# Patient Record
Sex: FEMALE | Race: WHITE | NOT HISPANIC OR LATINO | Employment: OTHER | URBAN - METROPOLITAN AREA
[De-identification: names, ages, dates, MRNs, and addresses within clinical notes are randomized per-mention and may not be internally consistent; named-entity substitution may affect disease eponyms.]

---

## 2020-05-01 DIAGNOSIS — K21.9 GASTROESOPHAGEAL REFLUX DISEASE, ESOPHAGITIS PRESENCE NOT SPECIFIED: Primary | ICD-10-CM

## 2020-05-01 RX ORDER — OMEPRAZOLE 20 MG/1
CAPSULE, DELAYED RELEASE ORAL
Qty: 90 CAPSULE | Refills: 0 | Status: SHIPPED | OUTPATIENT
Start: 2020-05-01 | End: 2020-05-06

## 2020-05-02 PROBLEM — E03.9 HYPOTHYROID: Status: ACTIVE | Noted: 2020-05-02

## 2020-05-02 PROBLEM — K63.5 COLON POLYP: Status: ACTIVE | Noted: 2020-05-02

## 2020-05-02 PROBLEM — K21.00 GASTROESOPHAGEAL REFLUX DISEASE WITH ESOPHAGITIS: Status: ACTIVE | Noted: 2020-05-02

## 2020-05-02 PROBLEM — I10 ESSENTIAL HYPERTENSION: Status: ACTIVE | Noted: 2020-05-02

## 2020-05-02 PROBLEM — D64.9 ANEMIA: Status: ACTIVE | Noted: 2020-05-02

## 2020-05-02 PROBLEM — K57.90 DIVERTICULOSIS: Status: ACTIVE | Noted: 2020-05-02

## 2020-05-02 PROBLEM — E78.00 HYPERCHOLESTEROLEMIA: Status: ACTIVE | Noted: 2020-05-02

## 2020-05-02 PROBLEM — K51.90 ULCERATIVE COLITIS WITHOUT COMPLICATIONS (HCC): Status: ACTIVE | Noted: 2020-05-02

## 2020-05-02 PROBLEM — K22.70 BARRETT'S ESOPHAGUS: Status: ACTIVE | Noted: 2020-05-02

## 2020-05-02 PROBLEM — E55.9 VITAMIN D DEFICIENCY: Status: ACTIVE | Noted: 2020-05-02

## 2020-05-06 DIAGNOSIS — K21.9 GASTROESOPHAGEAL REFLUX DISEASE, ESOPHAGITIS PRESENCE NOT SPECIFIED: ICD-10-CM

## 2020-05-06 RX ORDER — OMEPRAZOLE 20 MG/1
CAPSULE, DELAYED RELEASE ORAL
Qty: 90 CAPSULE | Refills: 0 | Status: SHIPPED | OUTPATIENT
Start: 2020-05-06 | End: 2020-08-11

## 2020-06-04 ENCOUNTER — OFFICE VISIT (OUTPATIENT)
Dept: FAMILY MEDICINE CLINIC | Facility: CLINIC | Age: 80
End: 2020-06-04
Payer: MEDICARE

## 2020-06-04 VITALS
SYSTOLIC BLOOD PRESSURE: 130 MMHG | WEIGHT: 142 LBS | DIASTOLIC BLOOD PRESSURE: 76 MMHG | HEART RATE: 80 BPM | BODY MASS INDEX: 26.13 KG/M2 | TEMPERATURE: 98.6 F | HEIGHT: 62 IN

## 2020-06-04 DIAGNOSIS — E03.9 ACQUIRED HYPOTHYROIDISM: ICD-10-CM

## 2020-06-04 DIAGNOSIS — J01.00 ACUTE NON-RECURRENT MAXILLARY SINUSITIS: ICD-10-CM

## 2020-06-04 DIAGNOSIS — D64.9 ANEMIA, UNSPECIFIED TYPE: ICD-10-CM

## 2020-06-04 DIAGNOSIS — K51.90 ULCERATIVE COLITIS WITHOUT COMPLICATIONS, UNSPECIFIED LOCATION (HCC): ICD-10-CM

## 2020-06-04 DIAGNOSIS — K21.00 GASTROESOPHAGEAL REFLUX DISEASE WITH ESOPHAGITIS: ICD-10-CM

## 2020-06-04 DIAGNOSIS — E78.00 HYPERCHOLESTEROLEMIA: ICD-10-CM

## 2020-06-04 DIAGNOSIS — I10 ESSENTIAL HYPERTENSION: Primary | ICD-10-CM

## 2020-06-04 DIAGNOSIS — E55.9 VITAMIN D DEFICIENCY: ICD-10-CM

## 2020-06-04 PROCEDURE — 3008F BODY MASS INDEX DOCD: CPT | Performed by: FAMILY MEDICINE

## 2020-06-04 PROCEDURE — 99214 OFFICE O/P EST MOD 30 MIN: CPT | Performed by: FAMILY MEDICINE

## 2020-06-04 PROCEDURE — 3078F DIAST BP <80 MM HG: CPT | Performed by: FAMILY MEDICINE

## 2020-06-04 PROCEDURE — 4040F PNEUMOC VAC/ADMIN/RCVD: CPT | Performed by: FAMILY MEDICINE

## 2020-06-04 PROCEDURE — 1160F RVW MEDS BY RX/DR IN RCRD: CPT | Performed by: FAMILY MEDICINE

## 2020-06-04 PROCEDURE — 3075F SYST BP GE 130 - 139MM HG: CPT | Performed by: FAMILY MEDICINE

## 2020-06-04 PROCEDURE — 1036F TOBACCO NON-USER: CPT | Performed by: FAMILY MEDICINE

## 2020-06-04 RX ORDER — ATORVASTATIN CALCIUM 20 MG/1
20 TABLET, FILM COATED ORAL DAILY
COMMUNITY
Start: 2020-03-31 | End: 2021-01-20

## 2020-06-04 RX ORDER — AMOXICILLIN 875 MG/1
875 TABLET, COATED ORAL 2 TIMES DAILY
Qty: 20 TABLET | Refills: 0 | Status: SHIPPED | OUTPATIENT
Start: 2020-06-04 | End: 2020-06-14

## 2020-06-04 RX ORDER — AMLODIPINE BESYLATE AND BENAZEPRIL HYDROCHLORIDE 5; 20 MG/1; MG/1
1 CAPSULE ORAL DAILY
COMMUNITY
Start: 2020-05-01 | End: 2020-10-30

## 2020-06-04 RX ORDER — FAMOTIDINE 40 MG/1
40 TABLET, FILM COATED ORAL DAILY
COMMUNITY
Start: 2020-04-30 | End: 2020-10-08 | Stop reason: ALTCHOICE

## 2020-06-04 RX ORDER — SULFASALAZINE 500 MG/1
1000 TABLET ORAL 2 TIMES DAILY
COMMUNITY
Start: 2020-05-29 | End: 2021-02-11 | Stop reason: SDUPTHER

## 2020-06-04 RX ORDER — LEVOTHYROXINE SODIUM 0.07 MG/1
75 TABLET ORAL DAILY
COMMUNITY
Start: 2020-05-28 | End: 2020-09-06

## 2020-06-09 ENCOUNTER — OFFICE VISIT (OUTPATIENT)
Dept: URGENT CARE | Facility: CLINIC | Age: 80
End: 2020-06-09
Payer: MEDICARE

## 2020-06-09 VITALS
SYSTOLIC BLOOD PRESSURE: 142 MMHG | HEART RATE: 78 BPM | OXYGEN SATURATION: 98 % | RESPIRATION RATE: 18 BRPM | DIASTOLIC BLOOD PRESSURE: 75 MMHG | TEMPERATURE: 98.1 F

## 2020-06-09 DIAGNOSIS — Z11.59 SCREENING FOR VIRAL DISEASE: Primary | ICD-10-CM

## 2020-06-09 PROCEDURE — U0003 INFECTIOUS AGENT DETECTION BY NUCLEIC ACID (DNA OR RNA); SEVERE ACUTE RESPIRATORY SYNDROME CORONAVIRUS 2 (SARS-COV-2) (CORONAVIRUS DISEASE [COVID-19]), AMPLIFIED PROBE TECHNIQUE, MAKING USE OF HIGH THROUGHPUT TECHNOLOGIES AS DESCRIBED BY CMS-2020-01-R: HCPCS | Performed by: PHYSICIAN ASSISTANT

## 2020-06-09 PROCEDURE — G0463 HOSPITAL OUTPT CLINIC VISIT: HCPCS | Performed by: PHYSICIAN ASSISTANT

## 2020-06-09 PROCEDURE — 99212 OFFICE O/P EST SF 10 MIN: CPT | Performed by: PHYSICIAN ASSISTANT

## 2020-06-11 LAB — SARS-COV-2 RNA SPEC QL NAA+PROBE: NOT DETECTED

## 2020-06-12 ENCOUNTER — TELEPHONE (OUTPATIENT)
Dept: URGENT CARE | Facility: CLINIC | Age: 80
End: 2020-06-12

## 2020-06-12 DIAGNOSIS — D64.9 ANEMIA, UNSPECIFIED TYPE: Primary | ICD-10-CM

## 2020-06-13 LAB
25(OH)D3 SERPL-MCNC: 30 NG/ML (ref 30–100)
BASOPHILS # BLD AUTO: 41 CELLS/UL (ref 0–200)
BASOPHILS NFR BLD AUTO: 1.1 %
EOSINOPHIL # BLD AUTO: 266 CELLS/UL (ref 15–500)
EOSINOPHIL NFR BLD AUTO: 7.2 %
ERYTHROCYTE [DISTWIDTH] IN BLOOD BY AUTOMATED COUNT: 12.2 % (ref 11–15)
HCT VFR BLD AUTO: 29.7 % (ref 35–45)
HGB BLD-MCNC: 9.6 G/DL (ref 11.7–15.5)
LYMPHOCYTES # BLD AUTO: 1665 CELLS/UL (ref 850–3900)
LYMPHOCYTES NFR BLD AUTO: 45 %
MCH RBC QN AUTO: 33.7 PG (ref 27–33)
MCHC RBC AUTO-ENTMCNC: 32.3 G/DL (ref 32–36)
MCV RBC AUTO: 104.2 FL (ref 80–100)
MONOCYTES # BLD AUTO: 407 CELLS/UL (ref 200–950)
MONOCYTES NFR BLD AUTO: 11 %
NEUTROPHILS # BLD AUTO: 1321 CELLS/UL (ref 1500–7800)
NEUTROPHILS NFR BLD AUTO: 35.7 %
PLATELET # BLD AUTO: 245 THOUSAND/UL (ref 140–400)
PMV BLD REES-ECKER: 10.1 FL (ref 7.5–12.5)
RBC # BLD AUTO: 2.85 MILLION/UL (ref 3.8–5.1)
WBC # BLD AUTO: 3.7 THOUSAND/UL (ref 3.8–10.8)

## 2020-07-07 LAB
BASOPHILS # BLD AUTO: 31 CELLS/UL (ref 0–200)
BASOPHILS NFR BLD AUTO: 0.6 %
EOSINOPHIL # BLD AUTO: 219 CELLS/UL (ref 15–500)
EOSINOPHIL NFR BLD AUTO: 4.3 %
ERYTHROCYTE [DISTWIDTH] IN BLOOD BY AUTOMATED COUNT: 11.8 % (ref 11–15)
HCT VFR BLD AUTO: 31.8 % (ref 35–45)
HGB BLD-MCNC: 10.1 G/DL (ref 11.7–15.5)
LYMPHOCYTES # BLD AUTO: 2213 CELLS/UL (ref 850–3900)
LYMPHOCYTES NFR BLD AUTO: 43.4 %
MCH RBC QN AUTO: 33.1 PG (ref 27–33)
MCHC RBC AUTO-ENTMCNC: 31.8 G/DL (ref 32–36)
MCV RBC AUTO: 104.3 FL (ref 80–100)
MONOCYTES # BLD AUTO: 546 CELLS/UL (ref 200–950)
MONOCYTES NFR BLD AUTO: 10.7 %
NEUTROPHILS # BLD AUTO: 2091 CELLS/UL (ref 1500–7800)
NEUTROPHILS NFR BLD AUTO: 41 %
PLATELET # BLD AUTO: 268 THOUSAND/UL (ref 140–400)
PMV BLD REES-ECKER: 9.9 FL (ref 7.5–12.5)
RBC # BLD AUTO: 3.05 MILLION/UL (ref 3.8–5.1)
WBC # BLD AUTO: 5.1 THOUSAND/UL (ref 3.8–10.8)

## 2020-07-09 DIAGNOSIS — D50.8 OTHER IRON DEFICIENCY ANEMIA: Primary | ICD-10-CM

## 2020-08-10 DIAGNOSIS — K21.9 GASTROESOPHAGEAL REFLUX DISEASE, ESOPHAGITIS PRESENCE NOT SPECIFIED: ICD-10-CM

## 2020-08-11 RX ORDER — OMEPRAZOLE 20 MG/1
CAPSULE, DELAYED RELEASE ORAL
Qty: 90 CAPSULE | Refills: 0 | Status: SHIPPED | OUTPATIENT
Start: 2020-08-11 | End: 2020-11-11

## 2020-09-05 DIAGNOSIS — E03.9 ACQUIRED HYPOTHYROIDISM: Primary | ICD-10-CM

## 2020-09-06 RX ORDER — LEVOTHYROXINE SODIUM 75 UG/1
TABLET ORAL
Qty: 90 TABLET | Refills: 0 | Status: SHIPPED | OUTPATIENT
Start: 2020-09-06 | End: 2020-12-04

## 2020-10-08 ENCOUNTER — OFFICE VISIT (OUTPATIENT)
Dept: FAMILY MEDICINE CLINIC | Facility: CLINIC | Age: 80
End: 2020-10-08
Payer: MEDICARE

## 2020-10-08 VITALS
HEART RATE: 84 BPM | HEIGHT: 62 IN | BODY MASS INDEX: 27.05 KG/M2 | WEIGHT: 147 LBS | OXYGEN SATURATION: 96 % | TEMPERATURE: 97.7 F | SYSTOLIC BLOOD PRESSURE: 140 MMHG | DIASTOLIC BLOOD PRESSURE: 76 MMHG

## 2020-10-08 DIAGNOSIS — E78.00 HYPERCHOLESTEROLEMIA: ICD-10-CM

## 2020-10-08 DIAGNOSIS — K21.00 GASTROESOPHAGEAL REFLUX DISEASE WITH ESOPHAGITIS, UNSPECIFIED WHETHER HEMORRHAGE: ICD-10-CM

## 2020-10-08 DIAGNOSIS — I10 ESSENTIAL HYPERTENSION: Primary | ICD-10-CM

## 2020-10-08 DIAGNOSIS — D64.9 ANEMIA, UNSPECIFIED TYPE: ICD-10-CM

## 2020-10-08 DIAGNOSIS — E03.9 ACQUIRED HYPOTHYROIDISM: ICD-10-CM

## 2020-10-08 DIAGNOSIS — K51.90 ULCERATIVE COLITIS WITHOUT COMPLICATIONS, UNSPECIFIED LOCATION (HCC): ICD-10-CM

## 2020-10-08 DIAGNOSIS — J01.00 ACUTE NON-RECURRENT MAXILLARY SINUSITIS: ICD-10-CM

## 2020-10-08 DIAGNOSIS — E55.9 VITAMIN D DEFICIENCY: ICD-10-CM

## 2020-10-08 PROCEDURE — 99214 OFFICE O/P EST MOD 30 MIN: CPT | Performed by: FAMILY MEDICINE

## 2020-10-08 RX ORDER — CEFDINIR 300 MG/1
300 CAPSULE ORAL EVERY 12 HOURS SCHEDULED
Qty: 20 CAPSULE | Refills: 0 | Status: SHIPPED | OUTPATIENT
Start: 2020-10-08 | End: 2020-10-18

## 2020-10-08 RX ORDER — DOXYCYCLINE HYCLATE 50 MG/1
324 CAPSULE, GELATIN COATED ORAL
COMMUNITY

## 2020-10-19 ENCOUNTER — TELEPHONE (OUTPATIENT)
Dept: FAMILY MEDICINE CLINIC | Facility: CLINIC | Age: 80
End: 2020-10-19

## 2020-10-19 DIAGNOSIS — J32.9 RECURRENT SINUS INFECTIONS: Primary | ICD-10-CM

## 2020-10-19 RX ORDER — AMOXICILLIN AND CLAVULANATE POTASSIUM 875; 125 MG/1; MG/1
1 TABLET, FILM COATED ORAL EVERY 12 HOURS SCHEDULED
COMMUNITY
End: 2020-10-19 | Stop reason: SDUPTHER

## 2020-10-19 RX ORDER — AMOXICILLIN AND CLAVULANATE POTASSIUM 875; 125 MG/1; MG/1
1 TABLET, FILM COATED ORAL EVERY 12 HOURS SCHEDULED
Qty: 20 TABLET | Refills: 0 | Status: SHIPPED | OUTPATIENT
Start: 2020-10-19 | End: 2020-10-29

## 2020-10-30 DIAGNOSIS — I10 ESSENTIAL HYPERTENSION: Primary | ICD-10-CM

## 2020-10-30 RX ORDER — AMLODIPINE BESYLATE AND BENAZEPRIL HYDROCHLORIDE 5; 20 MG/1; MG/1
CAPSULE ORAL
Qty: 90 CAPSULE | Refills: 0 | Status: SHIPPED | OUTPATIENT
Start: 2020-10-30 | End: 2021-01-28

## 2020-11-11 DIAGNOSIS — K21.9 GASTROESOPHAGEAL REFLUX DISEASE: ICD-10-CM

## 2020-11-11 RX ORDER — OMEPRAZOLE 20 MG/1
CAPSULE, DELAYED RELEASE ORAL
Qty: 90 CAPSULE | Refills: 0 | Status: SHIPPED | OUTPATIENT
Start: 2020-11-11 | End: 2020-11-13 | Stop reason: SDUPTHER

## 2020-12-04 DIAGNOSIS — E03.9 ACQUIRED HYPOTHYROIDISM: ICD-10-CM

## 2020-12-04 RX ORDER — LEVOTHYROXINE SODIUM 75 UG/1
TABLET ORAL
Qty: 90 TABLET | Refills: 0 | Status: SHIPPED | OUTPATIENT
Start: 2020-12-04 | End: 2021-02-15

## 2020-12-28 DIAGNOSIS — K21.9 GASTROESOPHAGEAL REFLUX DISEASE: ICD-10-CM

## 2020-12-28 DIAGNOSIS — R05.9 COUGH: ICD-10-CM

## 2020-12-28 DIAGNOSIS — K21.9 GASTROESOPHAGEAL REFLUX DISEASE WITHOUT ESOPHAGITIS: ICD-10-CM

## 2020-12-28 DIAGNOSIS — R09.89 THROAT CLEARING: ICD-10-CM

## 2020-12-28 DIAGNOSIS — K21.9 LARYNGOPHARYNGEAL REFLUX (LPR): ICD-10-CM

## 2020-12-28 RX ORDER — OMEPRAZOLE 20 MG/1
CAPSULE, DELAYED RELEASE ORAL
Qty: 90 CAPSULE | Refills: 0 | Status: SHIPPED | OUTPATIENT
Start: 2020-12-28 | End: 2021-01-04

## 2021-01-04 DIAGNOSIS — K21.9 GASTROESOPHAGEAL REFLUX DISEASE WITHOUT ESOPHAGITIS: ICD-10-CM

## 2021-01-04 DIAGNOSIS — R05.9 COUGH: ICD-10-CM

## 2021-01-04 DIAGNOSIS — K21.9 LARYNGOPHARYNGEAL REFLUX (LPR): ICD-10-CM

## 2021-01-04 DIAGNOSIS — R09.89 THROAT CLEARING: ICD-10-CM

## 2021-01-04 DIAGNOSIS — K21.9 GASTROESOPHAGEAL REFLUX DISEASE: ICD-10-CM

## 2021-01-04 RX ORDER — OMEPRAZOLE 20 MG/1
40 CAPSULE, DELAYED RELEASE ORAL
COMMUNITY
End: 2021-01-04 | Stop reason: SDUPTHER

## 2021-01-04 RX ORDER — OMEPRAZOLE 20 MG/1
40 CAPSULE, DELAYED RELEASE ORAL DAILY
Qty: 180 CAPSULE | Refills: 1 | Status: SHIPPED | OUTPATIENT
Start: 2021-01-04 | End: 2021-02-15

## 2021-01-20 DIAGNOSIS — E78.00 HYPERCHOLESTEROLEMIA: Primary | ICD-10-CM

## 2021-01-20 RX ORDER — ATORVASTATIN CALCIUM 20 MG/1
TABLET, FILM COATED ORAL
Qty: 90 TABLET | Refills: 0 | Status: SHIPPED | OUTPATIENT
Start: 2021-01-20 | End: 2021-04-26

## 2021-01-28 DIAGNOSIS — I10 ESSENTIAL HYPERTENSION: ICD-10-CM

## 2021-01-28 RX ORDER — AMLODIPINE BESYLATE AND BENAZEPRIL HYDROCHLORIDE 5; 20 MG/1; MG/1
CAPSULE ORAL
Qty: 90 CAPSULE | Refills: 0 | Status: SHIPPED | OUTPATIENT
Start: 2021-01-28 | End: 2021-04-26

## 2021-02-06 LAB
25(OH)D3 SERPL-MCNC: 28 NG/ML (ref 30–100)
ALBUMIN SERPL-MCNC: 4.3 G/DL (ref 3.6–5.1)
ALBUMIN/GLOB SERPL: 1.7 (CALC) (ref 1–2.5)
ALP SERPL-CCNC: 73 U/L (ref 37–153)
ALT SERPL-CCNC: 12 U/L (ref 6–29)
AST SERPL-CCNC: 22 U/L (ref 10–35)
BILIRUB DIRECT SERPL-MCNC: 0.1 MG/DL
BILIRUB INDIRECT SERPL-MCNC: 0.4 MG/DL (CALC) (ref 0.2–1.2)
BILIRUB SERPL-MCNC: 0.5 MG/DL (ref 0.2–1.2)
BUN SERPL-MCNC: 13 MG/DL (ref 7–25)
BUN/CREAT SERPL: NORMAL (CALC) (ref 6–22)
CALCIUM SERPL-MCNC: 9.3 MG/DL (ref 8.6–10.4)
CHLORIDE SERPL-SCNC: 104 MMOL/L (ref 98–110)
CHOLEST SERPL-MCNC: 185 MG/DL
CHOLEST/HDLC SERPL: 3.3 (CALC)
CO2 SERPL-SCNC: 26 MMOL/L (ref 20–32)
CREAT SERPL-MCNC: 0.83 MG/DL (ref 0.6–0.88)
GLOBULIN SER CALC-MCNC: 2.6 G/DL (CALC) (ref 1.9–3.7)
GLUCOSE SERPL-MCNC: 95 MG/DL (ref 65–99)
HDLC SERPL-MCNC: 56 MG/DL
LDLC SERPL CALC-MCNC: 110 MG/DL (CALC)
NONHDLC SERPL-MCNC: 129 MG/DL (CALC)
POTASSIUM SERPL-SCNC: 4.1 MMOL/L (ref 3.5–5.3)
PROT SERPL-MCNC: 6.9 G/DL (ref 6.1–8.1)
SL AMB EGFR AFRICAN AMERICAN: 76 ML/MIN/1.73M2
SL AMB EGFR NON AFRICAN AMERICAN: 66 ML/MIN/1.73M2
SODIUM SERPL-SCNC: 137 MMOL/L (ref 135–146)
T4 FREE SERPL-MCNC: 1.2 NG/DL (ref 0.8–1.8)
TRIGL SERPL-MCNC: 98 MG/DL
TSH SERPL-ACNC: 2.71 MIU/L (ref 0.4–4.5)

## 2021-02-10 ENCOUNTER — OFFICE VISIT (OUTPATIENT)
Dept: FAMILY MEDICINE CLINIC | Facility: CLINIC | Age: 81
End: 2021-02-10
Payer: MEDICARE

## 2021-02-10 VITALS
SYSTOLIC BLOOD PRESSURE: 136 MMHG | HEIGHT: 62 IN | HEART RATE: 70 BPM | TEMPERATURE: 96.6 F | DIASTOLIC BLOOD PRESSURE: 74 MMHG | OXYGEN SATURATION: 96 % | BODY MASS INDEX: 27.6 KG/M2 | WEIGHT: 150 LBS

## 2021-02-10 DIAGNOSIS — E03.9 ACQUIRED HYPOTHYROIDISM: ICD-10-CM

## 2021-02-10 DIAGNOSIS — D64.9 ANEMIA, UNSPECIFIED TYPE: ICD-10-CM

## 2021-02-10 DIAGNOSIS — Z00.00 MEDICARE ANNUAL WELLNESS VISIT, SUBSEQUENT: Primary | ICD-10-CM

## 2021-02-10 DIAGNOSIS — E78.00 HYPERCHOLESTEROLEMIA: ICD-10-CM

## 2021-02-10 DIAGNOSIS — K21.00 GASTROESOPHAGEAL REFLUX DISEASE WITH ESOPHAGITIS, UNSPECIFIED WHETHER HEMORRHAGE: ICD-10-CM

## 2021-02-10 DIAGNOSIS — K51.90 ULCERATIVE COLITIS WITHOUT COMPLICATIONS, UNSPECIFIED LOCATION (HCC): ICD-10-CM

## 2021-02-10 DIAGNOSIS — I10 ESSENTIAL HYPERTENSION: ICD-10-CM

## 2021-02-10 DIAGNOSIS — E55.9 VITAMIN D DEFICIENCY: ICD-10-CM

## 2021-02-10 PROBLEM — J01.00 ACUTE NON-RECURRENT MAXILLARY SINUSITIS: Status: RESOLVED | Noted: 2020-06-04 | Resolved: 2021-02-10

## 2021-02-10 PROCEDURE — G0438 PPPS, INITIAL VISIT: HCPCS | Performed by: FAMILY MEDICINE

## 2021-02-10 PROCEDURE — 99214 OFFICE O/P EST MOD 30 MIN: CPT | Performed by: FAMILY MEDICINE

## 2021-02-10 PROCEDURE — 1123F ACP DISCUSS/DSCN MKR DOCD: CPT | Performed by: FAMILY MEDICINE

## 2021-02-10 NOTE — ASSESSMENT & PLAN NOTE
Stable at present  No recent bleeding  Cont current meds per Dr Tamera Cortez  Last colo was in June 2020

## 2021-02-10 NOTE — PROGRESS NOTES
BMI Counseling: Body mass index is 27 44 kg/m²  The BMI is above normal  Nutrition recommendations include decreasing portion sizes, encouraging healthy choices of fruits and vegetables, consuming healthier snacks and moderation in carbohydrate intake  Exercise recommendations include exercising 3-5 times per week  No pharmacotherapy was ordered  Assessment/Plan:         Problem List Items Addressed This Visit        Digestive    Ulcerative colitis without complications ([address] )     Stable at present  No recent bleeding  Cont current meds per Dr Carlene Swartz  Last colo was in June 2020  Gastroesophageal reflux disease with esophagitis     No recent GERD  Last EGD was done in July 2020  Cont omeprazole 20 mg qd  Endocrine    Hypothyroid     TSH 2 71 and Free T4 1 2 in Feb 2021  Cont Euthyrox 75 mcg qd  Cardiovascular and Mediastinum    Essential hypertension     BP borderline  Pt to check at home and call if high  Cont current meds and follow low Na diet  Other    Vitamin D deficiency     Level 28 in Feb 2021  Pt to take 2000 U qd  Medicare annual wellness visit, subsequent - Primary     Wellness exam done  Had flu shot in Oct 2020  Had prevnar 13 and pneumovacc 23  Recommend Tdap, Shingrix, and COVID vaccine  Lipids and FBS are UTD  Last colo was in June 2020  Mammo ordered  Last Dexa was in 2018 and was normal  Has living will  Mood good  No recent falls  Inc exercise  Hypercholesterolemia      and LFTs normal in Feb 2021  Cont atorvastatin 20 mg qhs  Anemia     Taking Fe pill qd  Hgb stable at 10 8 in Feb 2021  Subjective:      Patient ID: Alfonso Dent is a 79 y o  female  Pt here for f/u HTN, HL, Hypothyroidism, GERD, Ulcerative Colitis, Vit D def, Anemia  Pt doing ok  No cp/sob  No headaches  No recent GERD or rectal bleeding  Still has cough and seeing ENT  Next appt is next week  BP has been ok   Had labs done last week and were ok  Had flu shot in Oct 2020  Due for wellness exam        The following portions of the patient's history were reviewed and updated as appropriate:   Past Medical History:  She has a past medical history of Arthritis, Cancer ([address] ), Disease of thyroid gland, Diverticulitis, Dizziness, GERD (gastroesophageal reflux disease), Hyperlipidemia, Hypertension, and Low vitamin D level ,  _______________________________________________________________________  Medical Problems:  does not have any pertinent problems on file ,  _______________________________________________________________________  Past Surgical History:   has a past surgical history that includes Colonoscopy; Replacement total knee (Bilateral); Hemorroidectomy; Tonsillectomy; and Tubal ligation  ,  _______________________________________________________________________  Family History:  family history includes Cancer in her brother and daughter; Heart disease in her brother ,  _______________________________________________________________________  Social History:   reports that she has never smoked  She has never used smokeless tobacco  She reports current alcohol use  She reports that she does not use drugs  ,  _______________________________________________________________________  Allergies:  is allergic to sulfa antibiotics     _______________________________________________________________________  Current Outpatient Medications   Medication Sig Dispense Refill    amLODIPine-benazepril (LOTREL 5-20) 5-20 MG per capsule Take 1 capsule by mouth once daily 90 capsule 0    atorvastatin (LIPITOR) 20 mg tablet Take 1 tablet by mouth once daily 90 tablet 0    cholecalciferol (VITAMIN D3) 1,000 units tablet Take 1,000 Units by mouth daily      Coenzyme Q10 (Co Q 10) 100 MG CAPS Take by mouth      Cyanocobalamin (VITAMIN B 12 PO) Take by mouth      Euthyrox 75 MCG tablet Take 1 tablet by mouth once daily 90 tablet 0    ferrous gluconate (FERGON) 324 mg tablet Take 324 mg by mouth daily with breakfast      Multiple Vitamin (multivitamin) capsule Take 1 capsule by mouth daily      omeprazole (PriLOSEC) 20 mg delayed release capsule Take 2 capsules (40 mg total) by mouth daily 180 capsule 1    sulfaSALAzine (AZULFIDINE) 500 mg tablet Take 1,000 mg by mouth 2 (two) times a day      vitamin A 2250 MCG (7500 UT) capsule Take 7,500 Units by mouth daily      vitamin E, tocopherol, 1,000 units capsule Take 1,000 Units by mouth daily      aluminum hydroxide-magnesium carbonate (Gaviscon)  mg/15 mL oral suspension Take 15 mL by mouth daily at bedtime (Patient not taking: Reported on 2/10/2021) 355 mL 5     No current facility-administered medications for this visit       _______________________________________________________________________  Review of Systems   Constitutional: Negative for chills, fatigue, fever and unexpected weight change  HENT: Positive for congestion and postnasal drip  Negative for ear pain, rhinorrhea, sinus pressure, sinus pain, sore throat and trouble swallowing  Respiratory: Positive for cough  Negative for chest tightness, shortness of breath and wheezing  Cardiovascular: Negative for chest pain and palpitations  Gastrointestinal: Negative for abdominal pain, constipation, diarrhea, nausea and vomiting  Genitourinary: Negative for difficulty urinating  Musculoskeletal: Negative for arthralgias  Skin: Negative for rash  Neurological: Negative for dizziness and headaches  Objective:  Vitals:    02/10/21 1006 02/10/21 1036   BP: 144/84 136/74   BP Location:  Left arm   Patient Position:  Sitting   Cuff Size:  Standard   Pulse: 70    Temp: (!) 96 6 °F (35 9 °C)    SpO2: 96%    Weight: 68 kg (150 lb)    Height: 5' 2" (1 575 m)      Body mass index is 27 44 kg/m²  Physical Exam  Vitals signs and nursing note reviewed  Constitutional:       Appearance: Normal appearance  She is well-developed  HENT:      Head: Normocephalic and atraumatic  Neck:      Musculoskeletal: Normal range of motion and neck supple  Cardiovascular:      Rate and Rhythm: Normal rate and regular rhythm  Heart sounds: Normal heart sounds  No murmur  Pulmonary:      Effort: Pulmonary effort is normal  No respiratory distress  Breath sounds: Normal breath sounds  No wheezing  Musculoskeletal:      Right lower leg: No edema  Left lower leg: No edema  Lymphadenopathy:      Cervical: No cervical adenopathy  Neurological:      Mental Status: She is alert and oriented to person, place, and time  Psychiatric:         Mood and Affect: Mood normal          Behavior: Behavior normal          Thought Content:  Thought content normal          Judgment: Judgment normal

## 2021-02-10 NOTE — PATIENT INSTRUCTIONS
Medicare Preventive Visit Patient Instructions  Thank you for completing your Welcome to Medicare Visit or Medicare Annual Wellness Visit today  Your next wellness visit will be due in one year (2/10/2022)  The screening/preventive services that you may require over the next 5-10 years are detailed below  Some tests may not apply to you based off risk factors and/or age  Screening tests ordered at today's visit but not completed yet may show as past due  Also, please note that scanned in results may not display below  Preventive Screenings:  Service Recommendations Previous Testing/Comments   Colorectal Cancer Screening  * Colonoscopy    * Fecal Occult Blood Test (FOBT)/Fecal Immunochemical Test (FIT)  * Fecal DNA/Cologuard Test  * Flexible Sigmoidoscopy Age: 54-65 years old   Colonoscopy: every 10 years (may be performed more frequently if at higher risk)  OR  FOBT/FIT: every 1 year  OR  Cologuard: every 3 years  OR  Sigmoidoscopy: every 5 years  Screening may be recommended earlier than age 48 if at higher risk for colorectal cancer  Also, an individualized decision between you and your healthcare provider will decide whether screening between the ages of 74-80 would be appropriate  Colonoscopy: 06/16/2020  FOBT/FIT: Not on file  Cologuard: Not on file  Sigmoidoscopy: Not on file         Breast Cancer Screening Age: 36 years old  Frequency: every 1-2 years  Not required if history of left and right mastectomy Mammogram: Not on file    Screening Current   Cervical Cancer Screening Between the ages of 21-29, pap smear recommended once every 3 years  Between the ages of 33-67, can perform pap smear with HPV co-testing every 5 years     Recommendations may differ for women with a history of total hysterectomy, cervical cancer, or abnormal pap smears in past  Pap Smear: Not on file    Screening Not Indicated   Hepatitis C Screening Once for adults born between 1945 and 1965  More frequently in patients at high risk for Hepatitis C Hep C Antibody: Not on file       Diabetes Screening 1-2 times per year if you're at risk for diabetes or have pre-diabetes Fasting glucose: No results in last 5 years   A1C: No results in last 5 years    Screening Current   Cholesterol Screening Once every 5 years if you don't have a lipid disorder  May order more often based on risk factors  Lipid panel: 02/05/2021    Screening Not Indicated  History Lipid Disorder     Other Preventive Screenings Covered by Medicare:  1  Abdominal Aortic Aneurysm (AAA) Screening: covered once if your at risk  You're considered to be at risk if you have a family history of AAA  2  Lung Cancer Screening: covers low dose CT scan once per year if you meet all of the following conditions: (1) Age 50-69; (2) No signs or symptoms of lung cancer; (3) Current smoker or have quit smoking within the last 15 years; (4) You have a tobacco smoking history of at least 30 pack years (packs per day multiplied by number of years you smoked); (5) You get a written order from a healthcare provider  3  Glaucoma Screening: covered annually if you're considered high risk: (1) You have diabetes OR (2) Family history of glaucoma OR (3)  aged 48 and older OR (3)  American aged 72 and older  3  Osteoporosis Screening: covered every 2 years if you meet one of the following conditions: (1) You're estrogen deficient and at risk for osteoporosis based off medical history and other findings; (2) Have a vertebral abnormality; (3) On glucocorticoid therapy for more than 3 months; (4) Have primary hyperparathyroidism; (5) On osteoporosis medications and need to assess response to drug therapy  · Last bone density test (DXA Scan): Not on file  5  HIV Screening: covered annually if you're between the age of 12-76  Also covered annually if you are younger than 13 and older than 72 with risk factors for HIV infection   For pregnant patients, it is covered up to 3 times per pregnancy  Immunizations:  Immunization Recommendations   Influenza Vaccine Annual influenza vaccination during flu season is recommended for all persons aged >= 6 months who do not have contraindications   Pneumococcal Vaccine (Prevnar and Pneumovax)  * Prevnar = PCV13  * Pneumovax = PPSV23   Adults 25-60 years old: 1-3 doses may be recommended based on certain risk factors  Adults 72 years old: Prevnar (PCV13) vaccine recommended followed by Pneumovax (PPSV23) vaccine  If already received PPSV23 since turning 65, then PCV13 recommended at least one year after PPSV23 dose  Hepatitis B Vaccine 3 dose series if at intermediate or high risk (ex: diabetes, end stage renal disease, liver disease)   Tetanus (Td) Vaccine - COST NOT COVERED BY MEDICARE PART B Following completion of primary series, a booster dose should be given every 10 years to maintain immunity against tetanus  Td may also be given as tetanus wound prophylaxis  Tdap Vaccine - COST NOT COVERED BY MEDICARE PART B Recommended at least once for all adults  For pregnant patients, recommended with each pregnancy  Shingles Vaccine (Shingrix) - COST NOT COVERED BY MEDICARE PART B  2 shot series recommended in those aged 48 and above     Health Maintenance Due:  There are no preventive care reminders to display for this patient  Immunizations Due:      Topic Date Due    DTaP,Tdap,and Td Vaccines (1 - Tdap) 01/29/1961     Advance Directives   What are advance directives? Advance directives are legal documents that state your wishes and plans for medical care  These plans are made ahead of time in case you lose your ability to make decisions for yourself  Advance directives can apply to any medical decision, such as the treatments you want, and if you want to donate organs  What are the types of advance directives? There are many types of advance directives, and each state has rules about how to use them   You may choose a combination of any of the following:  · Living will: This is a written record of the treatment you want  You can also choose which treatments you do not want, which to limit, and which to stop at a certain time  This includes surgery, medicine, IV fluid, and tube feedings  · Durable power of  for healthcare [facility]): This is a written record that states who you want to make healthcare choices for you when you are unable to make them for yourself  This person, called a proxy, is usually a family member or a friend  You may choose more than 1 proxy  · Do not resuscitate (DNR) order:  A DNR order is used in case your heart stops beating or you stop breathing  It is a request not to have certain forms of treatment, such as CPR  A DNR order may be included in other types of advance directives  · Medical directive: This covers the care that you want if you are in a coma, near death, or unable to make decisions for yourself  You can list the treatments you want for each condition  Treatment may include pain medicine, surgery, blood transfusions, dialysis, IV or tube feedings, and a ventilator (breathing machine)  · Values history: This document has questions about your views, beliefs, and how you feel and think about life  This information can help others choose the care that you would choose  Why are advance directives important? An advance directive helps you control your care  Although spoken wishes may be used, it is better to have your wishes written down  Spoken wishes can be misunderstood, or not followed  Treatments may be given even if you do not want them  An advance directive may make it easier for your family to make difficult choices about your care  Weight Management   Why it is important to manage your weight:  Being overweight increases your risk of health conditions such as heart disease, high blood pressure, type 2 diabetes, and certain types of cancer   It can also increase your risk for osteoarthritis, sleep apnea, and other respiratory problems  Aim for a slow, steady weight loss  Even a small amount of weight loss can lower your risk of health problems  How to lose weight safely:  A safe and healthy way to lose weight is to eat fewer calories and get regular exercise  You can lose up about 1 pound a week by decreasing the number of calories you eat by 500 calories each day  Healthy meal plan for weight management:  A healthy meal plan includes a variety of foods, contains fewer calories, and helps you stay healthy  A healthy meal plan includes the following:  · Eat whole-grain foods more often  A healthy meal plan should contain fiber  Fiber is the part of grains, fruits, and vegetables that is not broken down by your body  Whole-grain foods are healthy and provide extra fiber in your diet  Some examples of whole-grain foods are whole-wheat breads and pastas, oatmeal, brown rice, and bulgur  · Eat a variety of vegetables every day  Include dark, leafy greens such as spinach, kale, smith greens, and mustard greens  Eat yellow and orange vegetables such as carrots, sweet potatoes, and winter squash  · Eat a variety of fruits every day  Choose fresh or canned fruit (canned in its own juice or light syrup) instead of juice  Fruit juice has very little or no fiber  · Eat low-fat dairy foods  Drink fat-free (skim) milk or 1% milk  Eat fat-free yogurt and low-fat cottage cheese  Try low-fat cheeses such as mozzarella and other reduced-fat cheeses  · Choose meat and other protein foods that are low in fat  Choose beans or other legumes such as split peas or lentils  Choose fish, skinless poultry (chicken or turkey), or lean cuts of red meat (beef or pork)  Before you cook meat or poultry, cut off any visible fat  · Use less fat and oil  Try baking foods instead of frying them  Add less fat, such as margarine, sour cream, regular salad dressing and mayonnaise to foods  Eat fewer high-fat foods   Some examples of high-fat foods include french fries, doughnuts, ice cream, and cakes  · Eat fewer sweets  Limit foods and drinks that are high in sugar  This includes candy, cookies, regular soda, and sweetened drinks  Exercise:  Exercise at least 30 minutes per day on most days of the week  Some examples of exercise include walking, biking, dancing, and swimming  You can also fit in more physical activity by taking the stairs instead of the elevator or parking farther away from stores  Ask your healthcare provider about the best exercise plan for you  © Copyright [employer] 2018 Information is for End User's use only and may not be sold, redistributed or otherwise used for commercial purposes   All illustrations and images included in CareNotes® are the copyrighted property of A D A M , [employer]  or [address] [place], [place] [address]pete

## 2021-02-10 NOTE — ASSESSMENT & PLAN NOTE
Wellness exam done  Had flu shot in Oct 2020  Had prevnar 13 and pneumovacc 23  Recommend Tdap, Shingrix, and COVID vaccine  Lipids and FBS are UTD  Last colo was in June 2020  Mammo ordered  Last Dexa was in 2018 and was normal  Has living will  Mood good  No recent falls  Inc exercise

## 2021-02-10 NOTE — PROGRESS NOTES
Assessment and Plan:     Problem List Items Addressed This Visit        Other    Medicare annual wellness visit, subsequent - Primary     Wellness exam done  Had flu shot in Oct 2020  Had prevnar 13 and pneumovacc 23  Recommend Tdap, Shingrix, and COVID vaccine  Lipids and FBS are UTD  Last colo was in June 2020  Mammo ordered  Last Dexa was in 2018 and was normal  Has living will  Mood good  No recent falls  Inc exercise  BMI Counseling: Body mass index is 27 44 kg/m²  The BMI is above normal  Nutrition recommendations include decreasing portion sizes, encouraging healthy choices of fruits and vegetables, consuming healthier snacks and moderation in carbohydrate intake  Exercise recommendations include exercising 3-5 times per week  No pharmacotherapy was ordered  Preventive health issues were discussed with patient, and age appropriate screening tests were ordered as noted in patient's After Visit Summary  Personalized health advice and appropriate referrals for health education or preventive services given if needed, as noted in patient's After Visit Summary       History of Present Illness:     Patient presents for Medicare Annual Wellness visit    Patient Care Team:  Cheyenne Burris MD as PCP - General (Family Medicine)     Problem List:     Patient Active Problem List   Diagnosis    Essential hypertension    Hypercholesterolemia    Hypothyroid    [facility]cerative [facility] without complications [facility] [address] [facility]    Diverticulosis    Low's esophagus    Colon polyp    Gastroesophageal reflux disease with esophagitis    Vitamin D deficiency    Anemia    Medicare annual wellness visit, subsequent      Past Medical and Surgical History:     Past Medical History:   Diagnosis Date    Arthritis     osteoporosis, djd knees    Cancer ([facility] [address] )     on face    Disease of thyroid gland     low thyroid    Diverticulitis     gerd, diverticulitis    Dizziness     GERD (gastroesophageal reflux disease) ulcerative colitis;  Low's esophagus, diverticulosis    Hyperlipidemia     Hypertension     Low vitamin D level      Past Surgical History:   Procedure Laterality Date    COLONOSCOPY      colon polyps    HEMORROIDECTOMY      hemorroid removal    REPLACEMENT TOTAL KNEE Bilateral     b/ replacement    TONSILLECTOMY      TUBAL LIGATION        Family History:     Family History   Problem Relation Age of Onset    Heart disease Brother     Cancer Brother     Cancer Daughter         unsure of type of cancer, it was female cancer and hysterectomy done by Dr Angelita Hamilton History:        Social History     Socioeconomic History    Marital status: /Civil Union     Spouse name: None    Number of children: None    Years of education: None    Highest education level: None   Occupational History    None   Social Needs    Financial resource strain: None    Food insecurity     Worry: None     Inability: None    Transportation needs     Medical: None     Non-medical: None   Tobacco Use    Smoking status: Never Smoker    Smokeless tobacco: Never Used   Substance and Sexual Activity    Alcohol use: Yes     Frequency: Monthly or less     Drinks per session: 1 or 2     Binge frequency: Never     Comment: occasional    Drug use: Never    Sexual activity: None   Lifestyle    Physical activity     Days per week: None     Minutes per session: None    Stress: None   Relationships    Social connections     Talks on phone: None     Gets together: None     Attends Zoroastrian service: None     Active member of club or organization: None     Attends meetings of clubs or organizations: None     Relationship status: None    Intimate partner violence     Fear of current or ex partner: None     Emotionally abused: None     Physically abused: None     Forced sexual activity: None   Other Topics Concern    None   Social History Narrative    · Occupation:   retired      · Marital status:    · Sexual orientation:   Heterosexual      · Alcohol intake:   Occasional      · Caffeine intake: Moderate      · Chewing tobacco:   none      · Guns present in home:   No      · Live alone or with others:   with others      · Pets:   No         Per missy       Medications and Allergies:     Current Outpatient Medications   Medication Sig Dispense Refill    amLODIPine-benazepril (LOTREL 5-20) 5-20 MG per capsule Take 1 capsule by mouth once daily 90 capsule 0    atorvastatin (LIPITOR) 20 mg tablet Take 1 tablet by mouth once daily 90 tablet 0    cholecalciferol (VITAMIN D3) 1,000 units tablet Take 1,000 Units by mouth daily      Coenzyme Q10 (Co Q 10) 100 MG CAPS Take by mouth      Cyanocobalamin (VITAMIN B 12 PO) Take by mouth      Euthyrox 75 MCG tablet Take 1 tablet by mouth once daily 90 tablet 0    ferrous gluconate (FERGON) 324 mg tablet Take 324 mg by mouth daily with breakfast      Multiple Vitamin (multivitamin) capsule Take 1 capsule by mouth daily      omeprazole (PriLOSEC) 20 mg delayed release capsule Take 2 capsules (40 mg total) by mouth daily 180 capsule 1    sulfaSALAzine (AZULFIDINE) 500 mg tablet Take 1,000 mg by mouth 2 (two) times a day      vitamin A 2250 MCG (7500 UT) capsule Take 7,500 Units by mouth daily      vitamin E, tocopherol, 1,000 units capsule Take 1,000 Units by mouth daily      aluminum hydroxide-magnesium carbonate (Gaviscon)  mg/15 mL oral suspension Take 15 mL by mouth daily at bedtime (Patient not taking: Reported on 2/10/2021) 355 mL 5     No current facility-administered medications for this visit        Allergies   Allergen Reactions    Sulfa Antibiotics       Immunizations:     Immunization History   Administered Date(s) Administered    Influenza, high dose seasonal 0 7 mL 10/01/2020    Pneumococcal Conjugate 13-Valent 01/08/2015    Pneumococcal Polysaccharide PPV23 09/08/2009    influenza, trivalent, adjuvanted 10/07/2019      Health Maintenance: There are no preventive care reminders to display for this patient  Topic Date Due    DTaP,Tdap,and Td Vaccines (1 - Tdap) 01/29/1961      Medicare Health Risk Assessment:     /84   Pulse 70   Temp (!) 96 6 °F (35 9 °C)   Ht 5' 2" (1 575 m)   Wt 68 kg (150 lb)   SpO2 96%   BMI 27 44 kg/m²      Dara Lucia is here for her Subsequent Wellness visit  Health Risk Assessment:   Patient rates overall health as good  Patient feels that their physical health rating is same  Eyesight was rated as same  Hearing was rated as same  Patient feels that their emotional and mental health rating is same  Pain experienced in the last 7 days has been none  Patient states that she has experienced no weight loss or gain in last 6 months  Depression Screening:   PHQ-2 Score: 0      Fall Risk Screening: In the past year, patient has experienced: no history of falling in past year      Urinary Incontinence Screening:   Patient has not leaked urine accidently in the last six months  Home Safety:  Patient does not have trouble with stairs inside or outside of their home  Patient has working smoke alarms and has working carbon monoxide detector  Home safety hazards include: none  Nutrition:   Current diet is Other (please comment)  It's problem, with her health conditions and her 's    Medications:   Patient is currently taking over-the-counter supplements  OTC medications include: see medication list  Patient is able to manage medications  Activities of Daily Living (ADLs)/Instrumental Activities of Daily Living (IADLs):   Walk and transfer into and out of bed and chair?: Yes  Dress and groom yourself?: Yes    Bathe or shower yourself?: Yes    Feed yourself?  Yes  Do your laundry/housekeeping?: Yes  Manage your money, pay your bills and track your expenses?: Yes  Make your own meals?: Yes    Do your own shopping?: Yes    Previous Hospitalizations:   Any hospitalizations or ED visits within the last 12 months?: No      Advance Care Planning:   Living will: Yes    Durable POA for healthcare:  Yes    Advanced directive: Yes    Five wishes given: No      Cognitive Screening:   Provider or family/friend/caregiver concerned regarding cognition?: No    PREVENTIVE SCREENINGS      Cardiovascular Screening:    General: Screening Not Indicated and History Lipid Disorder      Diabetes Screening:     General: Screening Current      Colorectal Cancer Screening:     General: Screening Current      Breast Cancer Screening:     General: Screening Current      Cervical Cancer Screening:    General: Screening Not Indicated      Osteoporosis Screening:    General: Screening Not Indicated      Abdominal Aortic Aneurysm (AAA) Screening:        General: Screening Not Indicated      Lung Cancer Screening:     General: Screening Not Indicated      Hepatitis C Screening:    General: Screening Not Indicated      Carlo Hough MD

## 2021-02-11 DIAGNOSIS — K51.80 OTHER ULCERATIVE COLITIS WITHOUT COMPLICATION (HCC): Primary | ICD-10-CM

## 2021-02-11 RX ORDER — SULFASALAZINE 500 MG/1
1000 TABLET ORAL 2 TIMES DAILY
Qty: 120 TABLET | Refills: 2 | Status: SHIPPED | OUTPATIENT
Start: 2021-02-11 | End: 2021-04-12

## 2021-02-12 ENCOUNTER — IMMUNIZATIONS (OUTPATIENT)
Dept: FAMILY MEDICINE CLINIC | Facility: HOSPITAL | Age: 81
End: 2021-02-12

## 2021-02-12 DIAGNOSIS — Z23 ENCOUNTER FOR IMMUNIZATION: Primary | ICD-10-CM

## 2021-02-12 PROCEDURE — 91301 SARS-COV-2 / COVID-19 MRNA VACCINE (MODERNA) 100 MCG: CPT

## 2021-02-12 PROCEDURE — 0011A SARS-COV-2 / COVID-19 MRNA VACCINE (MODERNA) 100 MCG: CPT

## 2021-02-15 DIAGNOSIS — E03.9 ACQUIRED HYPOTHYROIDISM: ICD-10-CM

## 2021-02-15 RX ORDER — LEVOTHYROXINE SODIUM 75 UG/1
TABLET ORAL
Qty: 90 TABLET | Refills: 0 | Status: SHIPPED | OUTPATIENT
Start: 2021-02-15 | End: 2021-03-10 | Stop reason: SDUPTHER

## 2021-02-23 ENCOUNTER — HOSPITAL ENCOUNTER (OUTPATIENT)
Dept: CT IMAGING | Facility: HOSPITAL | Age: 81
Discharge: HOME | End: 2021-02-23
Payer: MEDICARE

## 2021-02-23 DIAGNOSIS — K21.9 LARYNGOPHARYNGEAL REFLUX (LPR): ICD-10-CM

## 2021-02-23 DIAGNOSIS — K21.9 GASTROESOPHAGEAL REFLUX DISEASE WITHOUT ESOPHAGITIS: ICD-10-CM

## 2021-02-23 DIAGNOSIS — R05.9 COUGH: ICD-10-CM

## 2021-02-23 DIAGNOSIS — R09.89 THROAT CLEARING: ICD-10-CM

## 2021-02-23 DIAGNOSIS — K21.9 GASTROESOPHAGEAL REFLUX DISEASE: ICD-10-CM

## 2021-02-23 PROCEDURE — 70486 CT MAXILLOFACIAL W/O DYE: CPT

## 2021-02-23 PROCEDURE — G1004 CDSM NDSC: HCPCS

## 2021-03-10 ENCOUNTER — TELEPHONE (OUTPATIENT)
Dept: FAMILY MEDICINE CLINIC | Facility: CLINIC | Age: 81
End: 2021-03-10

## 2021-03-10 DIAGNOSIS — E03.9 ACQUIRED HYPOTHYROIDISM: ICD-10-CM

## 2021-03-10 RX ORDER — LEVOTHYROXINE SODIUM 0.07 MG/1
75 TABLET ORAL DAILY
Qty: 90 TABLET | Refills: 2 | Status: SHIPPED | OUTPATIENT
Start: 2021-03-10 | End: 2022-02-25

## 2021-03-10 RX ORDER — LEVOTHYROXINE SODIUM 0.07 MG/1
75 TABLET ORAL DAILY
Qty: 90 TABLET | Refills: 2 | Status: CANCELLED | OUTPATIENT
Start: 2021-03-10

## 2021-03-10 NOTE — TELEPHONE ENCOUNTER
It is a generic version of her thyroid med and was likely given to her because that is what her pharmacy had in stock  It is ok that she was changed to it

## 2021-03-10 NOTE — TELEPHONE ENCOUNTER
Patient had medication of thyroid was changed to euthyrox, patient would like to know why and if per PCP this is okay   ND

## 2021-03-11 ENCOUNTER — IMMUNIZATIONS (OUTPATIENT)
Dept: FAMILY MEDICINE CLINIC | Facility: HOSPITAL | Age: 81
End: 2021-03-11

## 2021-03-11 DIAGNOSIS — Z23 ENCOUNTER FOR IMMUNIZATION: Primary | ICD-10-CM

## 2021-03-11 PROCEDURE — 0012A SARS-COV-2 / COVID-19 MRNA VACCINE (MODERNA) 100 MCG: CPT

## 2021-03-11 PROCEDURE — 91301 SARS-COV-2 / COVID-19 MRNA VACCINE (MODERNA) 100 MCG: CPT

## 2021-03-15 ENCOUNTER — OFFICE VISIT (OUTPATIENT)
Dept: GASTROENTEROLOGY | Facility: CLINIC | Age: 81
End: 2021-03-15
Payer: MEDICARE

## 2021-03-15 VITALS
HEIGHT: 62 IN | WEIGHT: 152 LBS | BODY MASS INDEX: 27.97 KG/M2 | SYSTOLIC BLOOD PRESSURE: 138 MMHG | DIASTOLIC BLOOD PRESSURE: 78 MMHG | HEART RATE: 82 BPM

## 2021-03-15 DIAGNOSIS — K76.0 FATTY LIVER: ICD-10-CM

## 2021-03-15 DIAGNOSIS — K21.9 LPRD (LARYNGOPHARYNGEAL REFLUX DISEASE): Primary | ICD-10-CM

## 2021-03-15 DIAGNOSIS — K21.9 GASTROESOPHAGEAL REFLUX DISEASE WITHOUT ESOPHAGITIS: ICD-10-CM

## 2021-03-15 DIAGNOSIS — K51.30 ULCERATIVE RECTOSIGMOIDITIS WITHOUT COMPLICATION (HCC): ICD-10-CM

## 2021-03-15 PROCEDURE — 99214 OFFICE O/P EST MOD 30 MIN: CPT | Performed by: INTERNAL MEDICINE

## 2021-03-15 NOTE — PROGRESS NOTES
[facility]patient [facility]  Alfonso Dent 79 y o  female MRN: [id number]  Encounter: [id number]          ASSESSMENT AND PLAN:      1  LPRD (laryngopharyngeal reflux disease)   patient is complaining chronic cough, she was seen by ENT physician and started on famotidine 40 mg p o  q h s , she is already taking  Omeprazole 40 mg p o  q a m  will schedule for EGD and then decide about further treatment plan  - EGD; Future    2  Gastroesophageal reflux disease without esophagitis   she currently denying any heartburn, no reflux symptoms, she is on PPI and H2 blocker, she is due for endoscopy which will schedule it in [place]    3  Ulcerative rectosigmoiditis without complication ([place] [address] )   she currently denies any diarrhea, no rectal bleeding, she is taking sulfasalazine and findings mg 2 tablets twice a day, she will continue with current medication and will monitor blood test and colitis marker  - CBC and differential; Future  - Comprehensive metabolic panel; Future  - C-reactive protein; Future    4  Fatty liver   continue with vitamin-E 400 International Units  q day  ______________________________________________________________________    SUBJECTIVE:   Patient seen and examined, she denying any abdominal pain, no heartburn, no reflux symptoms, no chronic diarrhea or rectal bleeding, she is complaining chronic cough, frequent throat clearing    REVIEW OF SYSTEMS IS OTHERWISE NEGATIVE  Historical Information   Past Medical History:   Diagnosis Date    Arthritis     osteoporosis, djd knees    Cancer ([facility] )     on face    Disease of thyroid gland     low thyroid    Diverticulitis     gerd, diverticulitis    Dizziness     GERD (gastroesophageal reflux disease)     ulcerative colitis;  Low's esophagus, diverticulosis    Hyperlipidemia     Hypertension     Low vitamin D level      Past Surgical History:   Procedure Laterality Date    COLONOSCOPY      colon polyps    HEMORROIDECTOMY      hemorroid removal    REPLACEMENT TOTAL KNEE Bilateral     b/ replacement    TONSILLECTOMY      TUBAL LIGATION       Social History   Social History     Substance and Sexual Activity   Alcohol Use Yes    Frequency: Monthly or less    Drinks per session: 1 or 2    Binge frequency: Never    Comment: occasional     Social History     Substance and Sexual Activity   Drug Use Never     Social History     Tobacco Use   Smoking Status Never Smoker   Smokeless Tobacco Never Used     Family History   Problem Relation Age of Onset    Heart disease Brother     Cancer Brother     Cancer Daughter         unsure of type of cancer, it was female cancer and hysterectomy done by Dr Rafael Durbin       Meds/Allergies       Current Outpatient Medications:     amLODIPine-benazepril (LOTREL 5-20) 5-20 MG per capsule    atorvastatin (LIPITOR) 20 mg tablet    cholecalciferol (VITAMIN D3) 1,000 units tablet    Coenzyme Q10 (Co Q 10) 100 MG CAPS    Cyanocobalamin (VITAMIN B 12 PO)    famotidine (PEPCID) 40 MG tablet    ferrous gluconate (FERGON) 324 mg tablet    levothyroxine (Euthyrox) 75 mcg tablet    omeprazole (PriLOSEC) 40 MG capsule    sulfaSALAzine (AZULFIDINE) 500 mg tablet    vitamin A 2250 MCG (7500 UT) capsule    vitamin E, tocopherol, 1,000 units capsule    aluminum hydroxide-magnesium carbonate (Gaviscon)  mg/15 mL oral suspension    Multiple Vitamin (multivitamin) capsule    Allergies   Allergen Reactions    Sulfa Antibiotics            Objective     Blood pressure 138/78, pulse 82, height 5' 2" (1 575 m), weight 68 9 kg (152 lb)  Body mass index is 27 8 kg/m²        PHYSICAL EXAM:      General Appearance:   Alert, cooperative, no distress   HEENT:   Normocephalic, atraumatic, anicteric      Neck:  Supple, symmetrical, trachea midline   Lungs:   Clear to auscultation bilaterally; no rales, rhonchi or wheezing; respirations unlabored    Heart[de-identified]   Regular rate and rhythm; no murmur, rub, or gallop  Abdomen:   Soft, non-tender, non-distended; normal bowel sounds; no masses, no organomegaly    Genitalia:   Deferred    Rectal:   Deferred    Extremities:  No cyanosis, clubbing or edema    Pulses:  2+ and symmetric    Skin:  No jaundice, rashes, or lesions    Lymph nodes:  No palpable cervical lymphadenopathy        Lab Results:   No visits with results within 1 Day(s) from this visit  Latest known visit with results is:   Orders Only on 02/05/2021   Component Date Value    Total Cholesterol 02/05/2021 185     HDL 02/05/2021 56     Triglycerides 02/05/2021 98     LDL Calculated 02/05/2021 110*    Chol HDLC Ratio 02/05/2021 3 3     Non-HDL Cholesterol 02/05/2021 129     Glucose, Random 02/05/2021 95     BUN 02/05/2021 13     Creatinine 02/05/2021 0 83     eGFR Non  02/05/2021 66     eGFR  02/05/2021 76     SL AMB BUN/CREATININE RA* 05/28/5241 NOT APPLICABLE     Sodium 90/42/2315 137     Potassium 02/05/2021 4 1     Chloride 02/05/2021 104     CO2 02/05/2021 26     Calcium 02/05/2021 9 3     Protein, Total 02/05/2021 6 9     Albumin 02/05/2021 4 3     Globulin 02/05/2021 2 6     Albumin/Globulin Ratio 02/05/2021 1 7     TOTAL BILIRUBIN 02/05/2021 0 5     Bilirubin, Direct 02/05/2021 0 1     Bilirubin, Indirect 02/05/2021 0 4     Alkaline Phosphatase 02/05/2021 73     AST 02/05/2021 22     ALT 02/05/2021 12     Free t4 02/05/2021 1 2     TSH 02/05/2021 2 71     Vitamin D, 25-Hydroxy, S* 02/05/2021 28*         Radiology Results:   Ct Sinus Wo Contrast    Result Date: 2/25/2021  Narrative: CT SINUSES INDICATION:   K21 9: Gastro-esophageal reflux disease without esophagitis R05: Cough R68 89: Other general symptoms and signs K21 9: Gastro-esophageal reflux disease without esophagitis  COMPARISON:  None  TECHNIQUE:  Axial CT imaging through the sinuses was performed    In addition, sagittal and coronal reformatted images were submitted for interpretation  Radiation dose length product (DLP) for this visit:  388 7 mGy-cm   This examination, like all CT scans performed in the [facility], was performed utilizing techniques to minimize radiation dose exposure, including the use of iterative reconstruction and automated exposure control  IMAGE QUALITY:  Diagnostic  FINDINGS: FRONTAL SINUSES:  Clear  ETHMOID AIR CELLS:  Clear  MAXILLARY SINUSES:  Clear  SPHENOID SINUSES:  Mild mucosal thickening left sphenoid sinus  NASAL SEPTUM AND CAVITY:  Septum is midline  Normal nasal cavity  ANTERIOR OSTEOMEATAL COMPLEX:  Patent  OSSEOUS STRUCTURES:  No bony erosion or destruction  Normal cribriform plate and planum sphenoidale  Visualized mastoid temporal bones are clear  The bony walls of the carotid canals are intact  SOFT TISSUES:  Normal      Impression: Minimal mucosal thickening left sphenoid sinus  Otherwise, unremarkable CT of the paranasal sinuses   Workstation performed: [id number]

## 2021-03-19 DIAGNOSIS — K51.80 OTHER ULCERATIVE COLITIS WITHOUT COMPLICATION (HCC): ICD-10-CM

## 2021-03-19 RX ORDER — SULFASALAZINE 500 MG/1
1000 TABLET ORAL 2 TIMES DAILY
Qty: 120 TABLET | Refills: 2 | OUTPATIENT
Start: 2021-03-19

## 2021-03-19 NOTE — TELEPHONE ENCOUNTER
Spoke to patient does not currently need refill  There was a mix up at pharmacy but it was straighten out per patient  She still has another refill left

## 2021-04-12 DIAGNOSIS — K51.80 OTHER ULCERATIVE COLITIS WITHOUT COMPLICATION (HCC): ICD-10-CM

## 2021-04-12 RX ORDER — SULFASALAZINE 500 MG/1
TABLET ORAL
Qty: 120 TABLET | Refills: 1 | Status: SHIPPED | OUTPATIENT
Start: 2021-04-12 | End: 2021-06-09

## 2021-04-26 DIAGNOSIS — E78.00 HYPERCHOLESTEROLEMIA: ICD-10-CM

## 2021-04-26 DIAGNOSIS — I10 ESSENTIAL HYPERTENSION: ICD-10-CM

## 2021-04-26 RX ORDER — ATORVASTATIN CALCIUM 20 MG/1
TABLET, FILM COATED ORAL
Qty: 90 TABLET | Refills: 0 | Status: SHIPPED | OUTPATIENT
Start: 2021-04-26 | End: 2021-07-08 | Stop reason: SDUPTHER

## 2021-04-26 RX ORDER — AMLODIPINE BESYLATE AND BENAZEPRIL HYDROCHLORIDE 5; 20 MG/1; MG/1
CAPSULE ORAL
Qty: 90 CAPSULE | Refills: 0 | Status: SHIPPED | OUTPATIENT
Start: 2021-04-26 | End: 2021-06-18 | Stop reason: ALTCHOICE

## 2021-05-17 ENCOUNTER — APPOINTMENT (OUTPATIENT)
Dept: LAB | Facility: CLINIC | Age: 81
End: 2021-05-17
Payer: MEDICARE

## 2021-05-17 ENCOUNTER — HOSPITAL ENCOUNTER (OUTPATIENT)
Dept: RADIOLOGY | Facility: HOSPITAL | Age: 81
Discharge: HOME | End: 2021-05-17
Payer: MEDICARE

## 2021-05-17 DIAGNOSIS — J45.991 COUGH VARIANT ASTHMA: ICD-10-CM

## 2021-05-17 DIAGNOSIS — K51.919 ULCERATIVE COLITIS WITH COMPLICATION, UNSPECIFIED LOCATION (HCC): ICD-10-CM

## 2021-05-17 DIAGNOSIS — K51.30 ULCERATIVE RECTOSIGMOIDITIS WITHOUT COMPLICATION (HCC): ICD-10-CM

## 2021-05-17 DIAGNOSIS — E53.8 B12 DEFICIENCY: ICD-10-CM

## 2021-05-17 DIAGNOSIS — Z91.018 ALMOND ALLERGY: ICD-10-CM

## 2021-05-17 DIAGNOSIS — R05.9 COUGH: ICD-10-CM

## 2021-05-17 DIAGNOSIS — K90.41 GLUTEN INTOLERANCE: ICD-10-CM

## 2021-05-17 DIAGNOSIS — K90.49 DAIRY PRODUCT INTOLERANCE: ICD-10-CM

## 2021-05-17 DIAGNOSIS — J38.01 PARESIS OF RIGHT VOCAL FOLD: ICD-10-CM

## 2021-05-17 DIAGNOSIS — Z91.89 RISK OF EXPOSURE TO LYME DISEASE: ICD-10-CM

## 2021-05-17 DIAGNOSIS — D64.9 ANEMIA, UNSPECIFIED TYPE: ICD-10-CM

## 2021-05-17 LAB
FOLATE SERPL-MCNC: 10.3 NG/ML (ref 3.1–17.5)
IGA SERPL-MCNC: 246 MG/DL (ref 70–400)
IGG SERPL-MCNC: 1060 MG/DL (ref 700–1600)
IGM SERPL-MCNC: 110 MG/DL (ref 40–230)
VIT B12 SERPL-MCNC: 1445 PG/ML (ref 100–900)

## 2021-05-17 PROCEDURE — 83519 RIA NONANTIBODY: CPT

## 2021-05-17 PROCEDURE — 82607 VITAMIN B-12: CPT

## 2021-05-17 PROCEDURE — 86618 LYME DISEASE ANTIBODY: CPT

## 2021-05-17 PROCEDURE — 82785 ASSAY OF IGE: CPT

## 2021-05-17 PROCEDURE — 86038 ANTINUCLEAR ANTIBODIES: CPT

## 2021-05-17 PROCEDURE — 82784 ASSAY IGA/IGD/IGG/IGM EACH: CPT

## 2021-05-17 PROCEDURE — 36415 COLL VENOUS BLD VENIPUNCTURE: CPT

## 2021-05-17 PROCEDURE — 82746 ASSAY OF FOLIC ACID SERUM: CPT

## 2021-05-17 PROCEDURE — 71046 X-RAY EXAM CHEST 2 VIEWS: CPT

## 2021-05-17 PROCEDURE — 86430 RHEUMATOID FACTOR TEST QUAL: CPT

## 2021-05-17 PROCEDURE — 83516 IMMUNOASSAY NONANTIBODY: CPT

## 2021-05-17 PROCEDURE — 86003 ALLG SPEC IGE CRUDE XTRC EA: CPT

## 2021-05-17 PROCEDURE — 86738 MYCOPLASMA ANTIBODY: CPT

## 2021-05-18 LAB
ALLERGEN COMMENT: NORMAL
ALMOND IGE QN: <0.1 KUA/I
B BURGDOR IGG+IGM SER-ACNC: 2
CASHEW NUT IGE QN: <0.1 KUA/I
CODFISH IGE QN: <0.1 KUA/I
EGG WHITE IGE QN: <0.1 KUA/I
GLUTEN IGE QN: <0.1 KUA/I
HAZELNUT IGE QN: <0.1 KUA/L
M PNEUMO IGG SER IA-ACNC: <100 U/ML (ref 0–99)
M PNEUMO IGM SER IA-ACNC: <770 U/ML (ref 0–769)
MILK IGE QN: <0.1 KUA/I
PEANUT IGE QN: <0.1 KUA/I
RHEUMATOID FACT SER QL LA: NEGATIVE
SALMON IGE QN: <0.1 KUA/I
SCALLOP IGE QN: <0.1 KUA/L
SESAME SEED IGE QN: <0.1 KUA/I
SHRIMP IGE QN: <0.1 KUA/L
SOYBEAN IGE QN: <0.1 KUA/I
TOTAL IGE SMQN RAST: 2.55 KU/L (ref 0–113)
TUNA IGE QN: <0.1 KUA/I
WALNUT IGE QN: <0.1 KUA/I
WHEAT IGE QN: <0.1 KUA/I

## 2021-05-19 LAB — RYE IGE QN: NEGATIVE

## 2021-05-23 LAB
GLIADIN IGG SER IA-ACNC: 2 UNITS (ref 0–19)
GLIADIN PEPTIDE+TTG IGA+IGG SER QL IA: NEGATIVE
Lab: NORMAL
NOTE: NORMAL
WHEAT IGE QN: <0.1 KU/L

## 2021-05-26 LAB — ACHR MOD AB/ACHR TOTAL SFR SER: <12 % (ref 0–20)

## 2021-06-01 ENCOUNTER — ANESTHESIA EVENT (OUTPATIENT)
Dept: GASTROENTEROLOGY | Facility: AMBULATORY SURGERY CENTER | Age: 81
End: 2021-06-01

## 2021-06-01 ENCOUNTER — ANESTHESIA (OUTPATIENT)
Dept: GASTROENTEROLOGY | Facility: AMBULATORY SURGERY CENTER | Age: 81
End: 2021-06-01
Payer: MEDICARE

## 2021-06-01 ENCOUNTER — HOSPITAL ENCOUNTER (OUTPATIENT)
Dept: GASTROENTEROLOGY | Facility: AMBULATORY SURGERY CENTER | Age: 81
Discharge: HOME | End: 2021-06-01
Payer: MEDICARE

## 2021-06-01 VITALS
RESPIRATION RATE: 18 BRPM | DIASTOLIC BLOOD PRESSURE: 79 MMHG | OXYGEN SATURATION: 96 % | BODY MASS INDEX: 27.6 KG/M2 | HEART RATE: 56 BPM | SYSTOLIC BLOOD PRESSURE: 157 MMHG | WEIGHT: 150 LBS | HEIGHT: 62 IN | TEMPERATURE: 96.4 F

## 2021-06-01 DIAGNOSIS — B37.81 CANDIDA ESOPHAGITIS (HCC): Primary | ICD-10-CM

## 2021-06-01 DIAGNOSIS — K21.9 LPRD (LARYNGOPHARYNGEAL REFLUX DISEASE): ICD-10-CM

## 2021-06-01 PROCEDURE — 00731 ANES UPR GI NDSC PX NOS: CPT | Performed by: NURSE ANESTHETIST, CERTIFIED REGISTERED

## 2021-06-01 PROCEDURE — 99100 ANES PT EXTEME AGE<1 YR&>70: CPT | Performed by: NURSE ANESTHETIST, CERTIFIED REGISTERED

## 2021-06-01 PROCEDURE — 88312 SPECIAL STAINS GROUP 1: CPT | Performed by: PATHOLOGY

## 2021-06-01 PROCEDURE — 88305 TISSUE EXAM BY PATHOLOGIST: CPT | Performed by: PATHOLOGY

## 2021-06-01 PROCEDURE — 43239 EGD BIOPSY SINGLE/MULTIPLE: CPT | Performed by: INTERNAL MEDICINE

## 2021-06-01 RX ORDER — SODIUM CHLORIDE 9 MG/ML
30 INJECTION, SOLUTION INTRAVENOUS CONTINUOUS
Status: DISCONTINUED | OUTPATIENT
Start: 2021-06-01 | End: 2021-06-05 | Stop reason: HOSPADM

## 2021-06-01 RX ORDER — SODIUM CHLORIDE 9 MG/ML
20 INJECTION, SOLUTION INTRAVENOUS CONTINUOUS
Status: DISCONTINUED | OUTPATIENT
Start: 2021-06-01 | End: 2021-06-05 | Stop reason: HOSPADM

## 2021-06-01 RX ORDER — PROPOFOL 10 MG/ML
INJECTION, EMULSION INTRAVENOUS AS NEEDED
Status: DISCONTINUED | OUTPATIENT
Start: 2021-06-01 | End: 2021-06-01

## 2021-06-01 RX ORDER — LIDOCAINE HYDROCHLORIDE 10 MG/ML
INJECTION, SOLUTION EPIDURAL; INFILTRATION; INTRACAUDAL; PERINEURAL AS NEEDED
Status: DISCONTINUED | OUTPATIENT
Start: 2021-06-01 | End: 2021-06-01

## 2021-06-01 RX ORDER — SODIUM CHLORIDE 9 MG/ML
INJECTION, SOLUTION INTRAVENOUS CONTINUOUS PRN
Status: DISCONTINUED | OUTPATIENT
Start: 2021-06-01 | End: 2021-06-01

## 2021-06-01 RX ADMIN — PROPOFOL 20 MG: 10 INJECTION, EMULSION INTRAVENOUS at 08:06

## 2021-06-01 RX ADMIN — PROPOFOL 20 MG: 10 INJECTION, EMULSION INTRAVENOUS at 08:05

## 2021-06-01 RX ADMIN — PROPOFOL 100 MG: 10 INJECTION, EMULSION INTRAVENOUS at 08:04

## 2021-06-01 RX ADMIN — LIDOCAINE HYDROCHLORIDE 50 MG: 10 INJECTION, SOLUTION EPIDURAL; INFILTRATION; INTRACAUDAL; PERINEURAL at 08:04

## 2021-06-01 RX ADMIN — SODIUM CHLORIDE: 9 INJECTION, SOLUTION INTRAVENOUS at 07:59

## 2021-06-01 NOTE — DISCHARGE INSTRUCTIONS
Oral Candidiasis   WHAT YOU NEED TO KNOW:   What is oral candidiasis? Oral candidiasis, or thrush, is a fungal infection that affects the inside of your mouth  What causes oral candidiasis? Oral candidiasis is caused by a type of fungus called Candida  Fungi are normally found in your mouth  When there are too many fungi, it can cause an infection  Babies and the elderly are at higher risk because their immune systems are not as strong   babies may get thrush if the mother had vaginal candidiasis during delivery  The following may increase your risk of oral candidiasis:  · Medical conditions that suppress your immune system, such as diabetes, cancer, or HIV and AIDS    · Medicines, such as antibiotics, steroids, or chemotherapy    · Radiation therapy to the head and neck    · Dry mouth    · Smoking    · Dentures    What are the signs and symptoms of oral candidiasis? · White or whitish-yellow patches in the mouth that look like milk curds    · Redness or bleeding under the patches    · Sore and painful mouth, with cracking or tearing on the corners    · Bright red tongue that may feel like it is burning    · Trouble swallowing and tasting    · Swelling under dentures    How is oral candidiasis diagnosed? Your healthcare provider will ask about your medical history  He will ask when your signs and symptoms started  He will examine the inside of your mouth and the area around your mouth  Your healthcare provider will also rub a cotton swab on one of the patches and check it under a microscope  How is oral candidiasis treated? Antifungal medicine helps kill the fungus that caused your oral candidiasis  This medicine may be a pill or a solution that you gargle  Remove dentures before you gargle  How can I help to prevent oral candidiasis? Brush your teeth, gums, and tongue after you eat and before you go to sleep  Use a toothbrush with soft bristles  See your dentist for regular exams   Remove your dentures when you sleep, or at least 6 hours each day  Clean your dentures and soak them in denture   Let them air dry after soaking  When should I seek immediate care? · You have trouble swallowing and your jaw and neck are stiff  · You are dizzy, thirsty, or have a dry mouth  · You are urinating little or not at all  · You cannot eat or drink because of the pain  When should I contact my healthcare provider? · You have a fever  · You have nausea, vomiting, or diarrhea  · Your signs and symptoms get worse, even after treatment  · You have questions or concerns about your condition or care  CARE AGREEMENT:   You have the right to help plan your care  Learn about your health condition and how it may be treated  Discuss treatment options with your healthcare providers to decide what care you want to receive  You always have the right to refuse treatment  The above information is an  only  It is not intended as medical advice for individual conditions or treatments  Talk to your doctor, nurse or pharmacist before following any medical regimen to see if it is safe and effective for you  © Copyright [address] Information is for End User's use only and may not be sold, redistributed or otherwise used for commercial purposes  All illustrations and images included in CareNotes® are the copyrighted property of [employer] M [employer]  or [employer]  Upper Endoscopy   WHAT YOU NEED TO KNOW:   An upper endoscopy is also called an upper gastrointestinal (GI) endoscopy, or an esophagogastroduodenoscopy (EGD)  It is a procedure to examine the inside of your esophagus, stomach, and duodenum (first part of the small intestine) with a scope  You may feel bloated, gassy, or have some abdominal discomfort after your procedure  Your throat may be sore for 24 to 36 hours  You may burp or pass gas from air that is still inside your body           DISCHARGE INSTRUCTIONS:   Seek care immediately if:    You have sudden, severe abdominal pain   You have problems swallowing   You have a large amount of black, sticky bowel movements or blood in your bowel movements   You have sudden trouble breathing   You feel weak, lightheaded, or faint or your heart beats faster than normal for you  Contact your healthcare provider if:    You have a fever and chills   You have nausea or are vomiting   Your abdomen is bloated or feels full and hard   You have abdominal pain   You have black, sticky bowel movements or blood in your bowel movements   You have not had a bowel movement for 3 days after your procedure   You have rash or hives   You have questions or concerns about your procedure  Activity:    Do not lift, strain, or run for 24 hours after your procedure   Rest after your procedure  You have been given medicine to relax you  Do not drive or make important decisions until the day after your procedure  Return to your normal activity as directed   Relieve gas and discomfort from bloating by lying on your right side with a heating pad on your abdomen  You may need to take short walks to help the gas move out  Eat small meals until bloating is relieved  Follow up with your healthcare provider as directed: Write down your questions so you remember to ask them during your visits  If you take a blood thinner, please review the specific instructions from your endoscopist about when you should resume it  These can be found in the Recommendation and Your Medication list sections of this After Visit Summary  Hiatal Hernia   WHAT YOU NEED TO KNOW:   What is a hiatal hernia? A hiatal hernia is a condition that causes part of your stomach to bulge through the hiatus (small opening) in your diaphragm  The part of the stomach may move up and down, or it may get trapped above the diaphragm  What increases my risk for a hiatal hernia? The exact cause of a hiatal hernia is not known  You may have been born with a large hiatus  The following may increase your risk of a hiatal hernia:  · Obesity    · Older age    · Medical conditions such as diverticulosis or esophagitis    · Previous surgery of the esophagus or stomach or trauma such as from a motor vehicle accident    What are the types of hiatal hernia? · Type I (sliding hiatal hernia): A portion of the stomach slides in and out of the hiatus  This type is the most common and usually causes gastroesophageal reflux disease (GERD)  GERD occurs when the esophageal sphincter does not close properly and causes acid reflux  The esophageal sphincter is the lower muscle of the esophagus  · Type II (paraesophageal hiatal hernia):  Type II hiatal hernia forms when a part of the stomach squeezes through the hiatus and lies next to the esophagus  · Type III (combined):  Type III hiatal hernia is a combination of a sliding and a paraesophageal hiatal hernia  · Type IV (complex paraesophageal hiatal hernia): The whole stomach, the small and large bowels, spleen, pancreas, or liver is pushed up into the chest     What are the signs and symptoms of a hiatal hernia? The most common symptom is heartburn  This usually occurs after meals and spreads to your neck, jaw, or shoulder  You may have no signs or symptoms, or you may have any of the following:  · Abdominal pain, especially in the area just above your navel    · Bitter or acid taste in your mouth    · Trouble swallowing    · Coughing or hoarseness    · Chest pain or shortness of breath that occurs after eating    · Frequent burping or hiccups    · Uncomfortable feeling of fullness after eating    How is a hiatal hernia diagnosed? · An upper GI series test  includes x-rays of your esophagus, stomach, and your small intestines   It is also called a barium swallow test  You will be given barium (a chalky liquid) to drink before the pictures are taken  This liquid helps your stomach and intestines show up better on the x-rays  An upper GI series can show if you have an ulcer, a blocked intestine, or other problems  · An endoscopy  uses a scope to see the inside of your digestive tract  A scope is a long, bendable tube with a light on the end of it  A camera may be hooked to the scope to take pictures  How is a hiatal hernia treated? Treatment depends on the type of hiatal hernia you have and on your symptoms  You may not need any treatment  You may need any of the following:  · Medicines  may be given to relieve heartburn symptoms  These medicines help to decrease or block stomach acid  You may also be given medicines that help to tighten the esophageal sphincter  · Surgery  may be done when medicines cannot control your symptoms, or other problems are present  Your healthcare provider may also suggest surgery depending on the type of hernia you have  Your healthcare provider can put your stomach back into its normal location  He may make the hiatus (hole) smaller and anchor your stomach in your abdomen  Fundoplication is a surgery that wraps the upper part of the stomach around the esophageal sphincter to strengthen it  How can I manage symptoms? The following nutrition and lifestyle changes may be recommended to relieve symptoms of heartburn  · Avoid foods that make your symptoms worse  These may include spicy foods, fruit juices, alcohol, caffeine, chocolate, and mint  · Eat several small meals during the day  Small meals give your stomach less food to digest     · Avoid lying down and bending forward after you eat  Do not eat meals 2 to 3 hours before bedtime  This decreases your risk for reflux  · Maintain a healthy weight  If you are overweight, weight loss may help relieve your symptoms  · Sleep with your head elevated  at least 6 inches  · Do not smoke  Smoking can increase your symptoms of heartburn      When should I seek immediate care? · You have severe abdominal pain  · You try to vomit but nothing comes out (retching)  · You have severe chest pain and sudden trouble breathing  · Your bowel movements are black or bloody  · Your vomit looks like coffee grounds or has blood in it  When should I contact my healthcare provider? · Your symptoms are getting worse  · You have nausea, and you are vomiting  · You are losing weight without trying  · You have questions or concerns about your condition or care  CARE AGREEMENT:   You have the right to help plan your care  Learn about your health condition and how it may be treated  Discuss treatment options with your healthcare providers to decide what care you want to receive  You always have the right to refuse treatment  The above information is an  only  It is not intended as medical advice for individual conditions or treatments  Talk to your doctor, nurse or pharmacist before following any medical regimen to see if it is safe and effective for you  © Copyright [address] Information is for End User's use only and may not be sold, redistributed or otherwise used for commercial purposes  All illustrations and images included in CareNotes® are the copyrighted property of [employer] [facility] [employer]  or [address] [place] [address]  Gastroesophageal Reflux Disease   WHAT YOU NEED TO KNOW:   Gastroesophageal reflux disease (GERD) is reflux that occurs more than twice a week for a few weeks  Reflux means acid and food in the stomach back up into the esophagus  It usually causes heartburn and other symptoms  GERD can cause other health problems over time if it is not treated  DISCHARGE INSTRUCTIONS:   Call your local emergency number ([address] in the [address] [place] [address],[address]) if:   · You have severe chest pain and sudden trouble breathing  Seek care immediately if:   · You have trouble breathing after you vomit      · You have trouble swallowing, or pain with swallowing  · Your bowel movements are black, bloody, or tarry-looking  · Your vomit looks like coffee grounds or has blood in it  Call your doctor or gastroenterologist if:   · You feel full and cannot burp or vomit  · You vomit large amounts, or you vomit often  · You are losing weight without trying  · Your symptoms get worse or do not improve with treatment  · You have questions or concerns about your condition or care  Medicines:   · Medicines  are used to decrease stomach acid  Medicine may also be used to help your lower esophageal sphincter and stomach contract (tighten) more  · Take your medicine as directed  Contact your healthcare provider if you think your medicine is not helping or if you have side effects  Tell him or her if you are allergic to any medicine  Keep a list of the medicines, vitamins, and herbs you take  Include the amounts, and when and why you take them  Bring the list or the pill bottles to follow-up visits  Carry your medicine list with you in case of an emergency  Manage GERD:   · Do not have foods or drinks that may increase heartburn  These include chocolate, peppermint, fried or fatty foods, drinks that contain caffeine, or carbonated drinks (soda)  Other foods include spicy foods, onions, tomatoes, and tomato-based foods  Do not have foods or drinks that can irritate your esophagus, such as citrus fruits, juices, and alcohol  · Do not eat large meals  When you eat a lot of food at one time, your stomach needs more acid to digest it  Eat 6 small meals each day instead of 3 large ones, and eat slowly  Do not eat meals 2 to 3 hours before bedtime  · Elevate the head of your bed  Place 6-inch blocks under the head of your bed frame  You may also use more than one pillow under your head and shoulders while you sleep  · Maintain a healthy weight  If you are overweight, weight loss may help relieve symptoms of GERD  · Do not smoke    Smoking weakens the lower esophageal sphincter and increases the risk of GERD  Ask your healthcare provider for information if you currently smoke and need help to quit  E-cigarettes or smokeless tobacco still contain nicotine  Talk to your healthcare provider before you use these products  · Do not wear clothing that is tight around your waist   Tight clothing can put pressure on your stomach and cause or worsen GERD symptoms  Follow up with your doctor or gastroenterologist as directed:  Write down your questions so you remember to ask them during your visits  © Copyright [address] Information is for End User's use only and may not be sold, redistributed or otherwise used for commercial purposes  All illustrations and images included in CareNotes® are the copyrighted property of [employer] Inc  or [address] [place], [place] [address]pete   The above information is an  only  It is not intended as medical advice for individual conditions or treatments  Talk to your doctor, nurse or pharmacist before following any medical regimen to see if it is safe and effective for you

## 2021-06-01 NOTE — ANESTHESIA POSTPROCEDURE EVALUATION
Post-Op Assessment Note    CV Status:  Stable  Pain Score: 0    Pain management: adequate     Mental Status:  Alert and awake   Hydration Status:  Euvolemic   PONV Controlled:  Controlled   Airway Patency:  Patent      Post Op Vitals Reviewed: Yes      Staff: CRNA         No complications documented      BP  175/80   Temp     Pulse  62   Resp   18   SpO2   96%

## 2021-06-01 NOTE — ANESTHESIA PREPROCEDURE EVALUATION
Procedure:  EGD    Relevant Problems   CARDIO   (+) Essential hypertension   (+) Hypercholesterolemia      ENDO   (+) Hypothyroid      GI/HEPATIC   (+) Gastroesophageal reflux disease with esophagitis      HEMATOLOGY   (+) Anemia        Physical Exam    Airway    Mallampati score: II  TM Distance: >3 FB  Neck ROM: full     Dental       Cardiovascular  Rhythm: regular, Rate: normal,     Pulmonary  Breath sounds clear to auscultation,     Other Findings        Anesthesia Plan  ASA Score- 2     Anesthesia Type- IV sedation with anesthesia with ASA Monitors  Additional Monitors:   Airway Plan:           Plan Factors-Exercise tolerance (METS): >4 METS  Chart reviewed  Patient summary reviewed  Patient is not a current smoker  Induction-     Postoperative Plan-     Informed Consent- Anesthetic plan and risks discussed with patient

## 2021-06-01 NOTE — H&P
History and Physical -  [facility]  Álvaro Cummings 79 y o  female MRN: [id number]                  HPI: Álvaro Cummings is a 79y o  year old female who presents for heartburn and LPR related symptoms      REVIEW OF SYSTEMS: Per the HPI, and otherwise unremarkable  Historical Information   Past Medical History:   Diagnosis Date    Anemia     Arthritis     osteoporosis, djd knees    Low's esophagus     Cancer ([facility] [address] )     on face    Colon polyp     Disease of thyroid gland     low thyroid    Diverticulitis     gerd, diverticulitis    Dizziness     GERD (gastroesophageal reflux disease)     ulcerative colitis;  Low's esophagus, diverticulosis; hx of laryngopharyngeal reflux    Hyperlipidemia     Hypertension     Low vitamin D level     LPRD (laryngopharyngeal reflux disease)     PONV (postoperative nausea and vomiting)     AFTER "BIG SURGERIES"    Postnasal drip     WITH COUGH    Ulcerative colitis ([facility] [address] )      Past Surgical History:   Procedure Laterality Date    COLONOSCOPY      colon polyps    FOOT SURGERY Bilateral     bunionectomy    HEMORROIDECTOMY      hemorroid removal    REPLACEMENT TOTAL KNEE Bilateral     b/ replacement    TONSILLECTOMY      TUBAL LIGATION      UPPER GASTROINTESTINAL ENDOSCOPY       Social History   Social History     Substance and Sexual Activity   Alcohol Use Yes    Frequency: Monthly or less    Drinks per session: 1 or 2    Binge frequency: Never    Comment: occasional wine     Social History     Substance and Sexual Activity   Drug Use Never     Social History     Tobacco Use   Smoking Status Never Smoker   Smokeless Tobacco Never Used     Family History   Problem Relation Age of Onset    Heart disease Brother     Cancer Brother     Cancer Daughter         unsure of type of cancer, it was female cancer and hysterectomy done by Dr Ballesteros Person disease Father        Meds/Allergies     (Not in a hospital admission)      Allergies Allergen Reactions    Sulfa Antibiotics Itching       Objective     BP (!) 186/88   Pulse 70   Temp (!) 96 4 °F (35 8 °C) (Temporal)   Resp 18   Ht 5' 2" (1 575 m)   Wt 68 kg (150 lb)   SpO2 97%   BMI 27 44 kg/m²       PHYSICAL EXAM    Gen: NAD  CV: RRR  CHEST: Clear  ABD: soft, NT/ND  EXT: no edema      ASSESSMENT/PLAN:  This is a 79y o  year old female here for EGD, and she is stable and optimized for her procedure

## 2021-06-01 NOTE — ADDENDUM NOTE
Addendum  created 06/01/21 0825 by Lizbeth Mcknight CRNA    Flowsheet accepted, Intraprocedure Flowsheets edited

## 2021-06-08 DIAGNOSIS — K51.80 OTHER ULCERATIVE COLITIS WITHOUT COMPLICATION (HCC): ICD-10-CM

## 2021-06-09 RX ORDER — SULFASALAZINE 500 MG/1
TABLET ORAL
Qty: 120 TABLET | Refills: 1 | Status: SHIPPED | OUTPATIENT
Start: 2021-06-09 | End: 2021-08-05

## 2021-06-10 ENCOUNTER — OFFICE VISIT (OUTPATIENT)
Dept: FAMILY MEDICINE CLINIC | Facility: CLINIC | Age: 81
End: 2021-06-10
Payer: MEDICARE

## 2021-06-10 VITALS
SYSTOLIC BLOOD PRESSURE: 150 MMHG | OXYGEN SATURATION: 96 % | HEART RATE: 68 BPM | HEIGHT: 62 IN | RESPIRATION RATE: 16 BRPM | BODY MASS INDEX: 27.42 KG/M2 | TEMPERATURE: 97.6 F | WEIGHT: 149 LBS | DIASTOLIC BLOOD PRESSURE: 70 MMHG

## 2021-06-10 DIAGNOSIS — E55.9 VITAMIN D DEFICIENCY: ICD-10-CM

## 2021-06-10 DIAGNOSIS — K51.90 ULCERATIVE COLITIS WITHOUT COMPLICATIONS, UNSPECIFIED LOCATION (HCC): ICD-10-CM

## 2021-06-10 DIAGNOSIS — K21.00 GASTROESOPHAGEAL REFLUX DISEASE WITH ESOPHAGITIS, UNSPECIFIED WHETHER HEMORRHAGE: ICD-10-CM

## 2021-06-10 DIAGNOSIS — E78.00 HYPERCHOLESTEROLEMIA: ICD-10-CM

## 2021-06-10 DIAGNOSIS — E03.9 ACQUIRED HYPOTHYROIDISM: ICD-10-CM

## 2021-06-10 DIAGNOSIS — I10 ESSENTIAL HYPERTENSION: Primary | ICD-10-CM

## 2021-06-10 DIAGNOSIS — D64.9 ANEMIA, UNSPECIFIED TYPE: ICD-10-CM

## 2021-06-10 PROCEDURE — 99214 OFFICE O/P EST MOD 30 MIN: CPT | Performed by: FAMILY MEDICINE

## 2021-06-10 NOTE — ASSESSMENT & PLAN NOTE
and LFTs normal in Feb 2021  Continue atorvastatin 20 mg qhs  Advised pt to follow a low cholesterol diet and to exercise on a regular basis

## 2021-06-10 NOTE — ASSESSMENT & PLAN NOTE
BP high  Will increase lotrel to 2 tabs a day  Pt advised to continue low Na diet and to exercise on a regular basis

## 2021-06-10 NOTE — PROGRESS NOTES
Assessment/Plan:         Problem List Items Addressed This Visit        Digestive    Ulcerative colitis without complications ([facility] [address] )     No recent bleeding or pain  Continue current meds per Dr Cohen Meraux  Last colonoscopy was in June 2020  Gastroesophageal reflux disease with esophagitis     No recent symptoms  Last EGD was done in June 20201  Continue omeprazole 20 mg qd and GERD diet  Endocrine    Hypothyroidism     TSH was 2 71 and Free T4 was 1 2 in Feb 2021  Continue Euthyrox 75 mcg qd  Cardiovascular and Mediastinum    Essential hypertension - Primary     BP high  Will increase lotrel to 2 tabs a day  Pt advised to continue low Na diet and to exercise on a regular basis  Other    Vitamin D deficiency     Level 28 in Feb 2021  Continue 2000 U qd and recheck level  Relevant Orders    Vitamin D 25 hydroxy    Hypercholesterolemia      and LFTs normal in Feb 2021  Continue atorvastatin 20 mg qhs  Advised pt to follow a low cholesterol diet and to exercise on a regular basis  Anemia     Taking Fe pill qd  Hgb stable at 10 7 in March 2021  Subjective:      Patient ID: Jordan Schaffer is a 79 y o  female  Pt here for f/u HTN, HL, Hypothyroidism, Ulcerative Colitis, GERD, Vit D def, Anemia  Doing ok  No cp/sob  No headaches  No abd pain  Pt feels shakey at times  Still has cough and PND  Saw ENT recently  Had CT of sinuses and CXR and were ok  No diarrhea or rectal bleeding  No recent GERD  BP good at home  Had COVID vaccines         The following portions of the patient's history were reviewed and updated as appropriate:   Past Medical History:  She has a past medical history of Anemia, Arthritis, Low's esophagus, [facility] ), Colon polyp, Disease of thyroid gland, Diverticulitis, Dizziness, GERD (gastroesophageal reflux disease), Hyperlipidemia, Hypertension, Low vitamin D level, LPRD (laryngopharyngeal reflux disease), PONV (postoperative nausea and vomiting), Postnasal drip, and Ulcerative colitis ([address] )  ,  _______________________________________________________________________  Medical Problems:  does not have any pertinent problems on file ,  _______________________________________________________________________  Past Surgical History:   has a past surgical history that includes Colonoscopy; Replacement total knee (Bilateral); Hemorroidectomy; Tonsillectomy; Tubal ligation; Foot surgery (Bilateral); and Upper gastrointestinal endoscopy  ,  _______________________________________________________________________  Family History:  family history includes Cancer in her brother and daughter; Heart disease in her brother and father ,  _______________________________________________________________________  Social History:   reports that she has never smoked  She has never used smokeless tobacco  She reports current alcohol use  She reports that she does not use drugs  ,  _______________________________________________________________________  Allergies:  is allergic to sulfa antibiotics     _______________________________________________________________________  Current Outpatient Medications   Medication Sig Dispense Refill    amLODIPine-benazepril (LOTREL 5-20) 5-20 MG per capsule Take 1 capsule by mouth once daily 90 capsule 0    atorvastatin (LIPITOR) 20 mg tablet Take 1 tablet by mouth once daily 90 tablet 0    cholecalciferol (VITAMIN D3) 1,000 units tablet Take 1,000 Units by mouth daily      Coenzyme Q10 (Co Q 10) 100 MG CAPS Take by mouth      Cyanocobalamin (VITAMIN B 12 PO) Take by mouth      famotidine (PEPCID) 40 MG tablet Take 1 tablet (40 mg total) by mouth daily before dinner 90 tablet 5    ferrous gluconate (FERGON) 324 mg tablet Take 324 mg by mouth daily with breakfast      levothyroxine (Euthyrox) 75 mcg tablet Take 1 tablet (75 mcg total) by mouth daily 90 tablet 2    Multiple Vitamin (multivitamin) capsule Take 1 capsule by mouth daily      nystatin (MYCOSTATIN) 500,000 units/5 mL suspension Apply 5 mL (500,000 Units total) to the mouth or throat 4 (four) times a day 200 mL 1    omeprazole (PriLOSEC) 40 MG capsule Take 1 capsule (40 mg total) by mouth daily before breakfast 90 capsule 5    sulfaSALAzine (AZULFIDINE) 500 mg tablet TAKE TWO TABS BY MOUTH TWICE DAILY 120 tablet 1    vitamin A 2250 MCG (7500 UT) capsule Take 7,500 Units by mouth daily      vitamin E, tocopherol, 1,000 units capsule Take 1,000 Units by mouth daily       No current facility-administered medications for this visit       _______________________________________________________________________  Review of Systems   Constitutional: Negative for fatigue and unexpected weight change  Respiratory: Positive for cough  Negative for chest tightness and shortness of breath  Cardiovascular: Negative for chest pain and palpitations  Gastrointestinal: Negative for abdominal pain, constipation, diarrhea, nausea and vomiting  Genitourinary: Negative for difficulty urinating  Musculoskeletal: Negative for arthralgias  Skin: Negative for rash  Neurological: Negative for dizziness and headaches  Objective:  Vitals:    06/10/21 0959 06/10/21 1020   BP: 154/86 150/70   BP Location: Left arm Left arm   Patient Position: Sitting Sitting   Cuff Size:  Large   Pulse: 68    Resp: 16    Temp: 97 6 °F (36 4 °C)    SpO2: 96%    Weight: 67 6 kg (149 lb)    Height: 5' 2" (1 575 m)      Body mass index is 27 25 kg/m²  Physical Exam  Vitals signs and nursing note reviewed  Constitutional:       Appearance: Normal appearance  She is well-developed  HENT:      Head: Normocephalic and atraumatic  Neck:      Musculoskeletal: Normal range of motion and neck supple  Cardiovascular:      Rate and Rhythm: Normal rate and regular rhythm  Heart sounds: Normal heart sounds  No murmur     Pulmonary:      Effort: Pulmonary effort is normal  No respiratory distress  Breath sounds: Normal breath sounds  No wheezing  Musculoskeletal:      Right lower leg: No edema  Left lower leg: No edema  Lymphadenopathy:      Cervical: No cervical adenopathy  Neurological:      Mental Status: She is alert and oriented to person, place, and time  Psychiatric:         Mood and Affect: Mood normal          Behavior: Behavior normal          Thought Content:  Thought content normal          Judgment: Judgment normal

## 2021-06-10 NOTE — ASSESSMENT & PLAN NOTE
No recent bleeding or pain  Continue current meds per Dr Rachel Goodrich  Last colonoscopy was in June 2020

## 2021-06-15 ENCOUNTER — TRANSCRIBE ORDERS (OUTPATIENT)
Dept: LAB | Facility: CLINIC | Age: 81
End: 2021-06-15

## 2021-06-15 ENCOUNTER — APPOINTMENT (OUTPATIENT)
Dept: LAB | Facility: CLINIC | Age: 81
End: 2021-06-15
Payer: MEDICARE

## 2021-06-15 DIAGNOSIS — E55.9 VITAMIN D DEFICIENCY: ICD-10-CM

## 2021-06-15 LAB — 25(OH)D3 SERPL-MCNC: 25 NG/ML (ref 30–100)

## 2021-06-15 PROCEDURE — 36415 COLL VENOUS BLD VENIPUNCTURE: CPT

## 2021-06-15 PROCEDURE — 82306 VITAMIN D 25 HYDROXY: CPT

## 2021-06-17 ENCOUNTER — TELEPHONE (OUTPATIENT)
Dept: FAMILY MEDICINE CLINIC | Facility: CLINIC | Age: 81
End: 2021-06-17

## 2021-06-17 NOTE — TELEPHONE ENCOUNTER
Patient has some questions on her ov with her ENT and some medication changes      Patient ph # [phone]

## 2021-06-18 DIAGNOSIS — I10 ESSENTIAL HYPERTENSION: Primary | ICD-10-CM

## 2021-06-18 RX ORDER — AMLODIPINE BESYLATE 10 MG/1
10 TABLET ORAL DAILY
Qty: 30 TABLET | Refills: 5 | Status: SHIPPED | OUTPATIENT
Start: 2021-06-18 | End: 2021-07-08 | Stop reason: SDUPTHER

## 2021-06-18 NOTE — TELEPHONE ENCOUNTER
Pt saw ENT and wanted her to stop lotrel due to cough  Will change to amlodipine 10 mg qd and pt to call if BP high  Order sent to pharmacy

## 2021-07-08 ENCOUNTER — OFFICE VISIT (OUTPATIENT)
Dept: FAMILY MEDICINE CLINIC | Facility: CLINIC | Age: 81
End: 2021-07-08
Payer: MEDICARE

## 2021-07-08 VITALS
RESPIRATION RATE: 16 BRPM | TEMPERATURE: 97 F | HEIGHT: 62 IN | BODY MASS INDEX: 27.58 KG/M2 | WEIGHT: 149.9 LBS | DIASTOLIC BLOOD PRESSURE: 80 MMHG | SYSTOLIC BLOOD PRESSURE: 144 MMHG | HEART RATE: 74 BPM | OXYGEN SATURATION: 95 %

## 2021-07-08 DIAGNOSIS — E55.9 VITAMIN D DEFICIENCY: ICD-10-CM

## 2021-07-08 DIAGNOSIS — E78.00 HYPERCHOLESTEROLEMIA: ICD-10-CM

## 2021-07-08 DIAGNOSIS — I10 ESSENTIAL HYPERTENSION: Primary | ICD-10-CM

## 2021-07-08 DIAGNOSIS — D64.9 ANEMIA, UNSPECIFIED TYPE: ICD-10-CM

## 2021-07-08 DIAGNOSIS — E03.9 ACQUIRED HYPOTHYROIDISM: ICD-10-CM

## 2021-07-08 PROCEDURE — 99214 OFFICE O/P EST MOD 30 MIN: CPT | Performed by: FAMILY MEDICINE

## 2021-07-08 RX ORDER — VALSARTAN 80 MG/1
80 TABLET ORAL DAILY
Qty: 30 TABLET | Refills: 0 | Status: SHIPPED | OUTPATIENT
Start: 2021-07-08 | End: 2021-08-04

## 2021-07-08 RX ORDER — AMLODIPINE BESYLATE 10 MG/1
10 TABLET ORAL DAILY
Qty: 90 TABLET | Refills: 2 | Status: SHIPPED | OUTPATIENT
Start: 2021-07-08

## 2021-07-08 RX ORDER — ATORVASTATIN CALCIUM 20 MG/1
20 TABLET, FILM COATED ORAL DAILY
Qty: 90 TABLET | Refills: 2 | Status: SHIPPED | OUTPATIENT
Start: 2021-07-08 | End: 2022-05-11

## 2021-07-08 NOTE — PROGRESS NOTES
Assessment/Plan:         Problem List Items Addressed This Visit        Endocrine    Hypothyroidism     TSH was 2 71 and Free T4 was 1 2 in Feb 2021  Continue Euthyrox 75 mcg qd  Recheck in 3 months  Relevant Orders    T4, free    TSH, 3rd generation       Cardiovascular and Mediastinum    Essential hypertension - Primary     Pt changed from lotrel to amlodipine 10 mg due to chronic cough  BP high  Will add valsartan 160 mg qd  Pt advised to continue low Na diet and to exercise on a regular basis  Relevant Medications    amLODIPine (NORVASC) 10 mg tablet    valsartan (DIOVAN) 80 mg tablet    Other Relevant Orders    Comprehensive metabolic panel       Other    Vitamin D deficiency     Level 25 in June 2021  Continue 2000 U qd  Relevant Orders    Vitamin D 25 hydroxy    Hypercholesterolemia      and LFTs normal in Feb 2021  Continue atorvastatin 20 mg qhs  Advised pt to follow a low cholesterol diet and to exercise on a regular basis  Relevant Medications    atorvastatin (LIPITOR) 20 mg tablet    Other Relevant Orders    Lipid panel    Comprehensive metabolic panel    Anemia     Taking Fe pill qd  Hgb stable at 10 7 in March 2021  Recheck in 3 months  Relevant Orders    CBC and differential            Subjective:      Patient ID: Ester Ramos is a 79 y o  female  Pt here for f/u HTN, HL, Hypothyroidism, Vit D def, Anemia, Cough  Doing oik         The following portions of the patient's history were reviewed and updated as appropriate:   Past Medical History:  She has a past medical history of Anemia, Arthritis, Low's esophagus, Cancer ([address] ), Colon polyp, Disease of thyroid gland, Diverticulitis, Dizziness, GERD (gastroesophageal reflux disease), Hyperlipidemia, Hypertension, Low vitamin D level, LPRD (laryngopharyngeal reflux disease), PONV (postoperative nausea and vomiting), Postnasal drip, and Ulcerative colitis [facility]) ,  _______________________________________________________________________  Medical Problems:  does not have any pertinent problems on file ,  _______________________________________________________________________  Past Surgical History:   has a past surgical history that includes Colonoscopy; Replacement total knee (Bilateral); Hemorroidectomy; Tonsillectomy; Tubal ligation; Foot surgery (Bilateral); and Upper gastrointestinal endoscopy  ,  _______________________________________________________________________  Family History:  family history includes Cancer in her brother and daughter; Heart disease in her brother and father ,  _______________________________________________________________________  Social History:   reports that she has never smoked  She has never used smokeless tobacco  She reports current alcohol use  She reports that she does not use drugs  ,  _______________________________________________________________________  Allergies:  is allergic to sulfa antibiotics     _______________________________________________________________________  Current Outpatient Medications   Medication Sig Dispense Refill    amLODIPine (NORVASC) 10 mg tablet Take 1 tablet (10 mg total) by mouth daily 90 tablet 2    atorvastatin (LIPITOR) 20 mg tablet Take 1 tablet (20 mg total) by mouth daily 90 tablet 2    cholecalciferol (VITAMIN D3) 1,000 units tablet Take 1,000 Units by mouth daily      Coenzyme Q10 (Co Q 10) 100 MG CAPS Take by mouth      Cyanocobalamin (VITAMIN B 12 PO) Take by mouth      ferrous gluconate (FERGON) 324 mg tablet Take 324 mg by mouth daily with breakfast      levothyroxine (Euthyrox) 75 mcg tablet Take 1 tablet (75 mcg total) by mouth daily 90 tablet 2    Multiple Vitamin (multivitamin) capsule Take 1 capsule by mouth daily      nystatin (MYCOSTATIN) 500,000 units/5 mL suspension Apply 5 mL (500,000 Units total) to the mouth or throat 4 (four) times a day 200 mL 1    omeprazole (PriLOSEC) 40 MG capsule Take 1 capsule (40 mg total) by mouth daily before breakfast 90 capsule 5    sulfaSALAzine (AZULFIDINE) 500 mg tablet TAKE TWO TABS BY MOUTH TWICE DAILY 120 tablet 1    vitamin A 2250 MCG (7500 UT) capsule Take 7,500 Units by mouth daily      vitamin E, tocopherol, 1,000 units capsule Take 1,000 Units by mouth daily      famotidine (PEPCID) 40 MG tablet Take 1 tablet (40 mg total) by mouth daily before dinner (Patient not taking: Reported on 7/8/2021) 90 tablet 5    valsartan (DIOVAN) 80 mg tablet Take 1 tablet (80 mg total) by mouth daily 30 tablet 0     No current facility-administered medications for this visit      _______________________________________________________________________  Review of Systems      Objective:  Vitals:    07/08/21 1037   BP: 144/80   BP Location: Left arm   Patient Position: Sitting   Cuff Size: Adult   Pulse: 74   Resp: 16   Temp: (!) 97 °F (36 1 °C)   TempSrc: Temporal   SpO2: 95%   Weight: 68 kg (149 lb 14 4 oz)   Height: 5' 2" (1 575 m)     Body mass index is 27 42 kg/m²       Physical Exam

## 2021-07-08 NOTE — ASSESSMENT & PLAN NOTE
Pt changed from lotrel to amlodipine 10 mg due to chronic cough  BP high  Will add valsartan 160 mg qd  Pt advised to continue low Na diet and to exercise on a regular basis

## 2021-07-26 PROBLEM — J45.991 COUGH VARIANT ASTHMA: Status: ACTIVE | Noted: 2021-07-26

## 2021-07-26 PROBLEM — Z91.018 ALMOND ALLERGY: Status: ACTIVE | Noted: 2021-07-26

## 2021-07-26 PROBLEM — J38.01 PARESIS OF RIGHT VOCAL FOLD: Status: ACTIVE | Noted: 2021-07-26

## 2021-07-26 PROBLEM — E53.8 B12 DEFICIENCY: Status: ACTIVE | Noted: 2021-07-26

## 2021-07-26 PROBLEM — K90.41 GLUTEN INTOLERANCE: Status: ACTIVE | Noted: 2021-07-26

## 2021-07-26 PROBLEM — K51.919 ULCERATIVE COLITIS WITH COMPLICATION (HCC): Status: ACTIVE | Noted: 2021-07-26

## 2021-07-26 PROBLEM — H69.83 DYSFUNCTION OF BOTH EUSTACHIAN TUBES: Status: ACTIVE | Noted: 2021-07-26

## 2021-07-26 PROBLEM — R05.9 COUGH: Status: ACTIVE | Noted: 2021-07-26

## 2021-07-26 PROBLEM — R49.0 DYSPHONIA: Status: ACTIVE | Noted: 2021-07-26

## 2021-07-26 PROBLEM — K90.49 DAIRY PRODUCT INTOLERANCE: Status: ACTIVE | Noted: 2021-07-26

## 2021-07-26 PROBLEM — R13.14 PHARYNGOESOPHAGEAL DYSPHAGIA: Status: ACTIVE | Noted: 2021-07-26

## 2021-07-26 PROBLEM — Z91.89 RISK OF EXPOSURE TO LYME DISEASE: Status: ACTIVE | Noted: 2021-07-26

## 2021-07-26 PROBLEM — H69.93 DYSFUNCTION OF BOTH EUSTACHIAN TUBES: Status: ACTIVE | Noted: 2021-07-26

## 2021-08-04 DIAGNOSIS — I10 ESSENTIAL HYPERTENSION: ICD-10-CM

## 2021-08-04 RX ORDER — VALSARTAN 80 MG/1
TABLET ORAL
Qty: 30 TABLET | Refills: 0 | Status: SHIPPED | OUTPATIENT
Start: 2021-08-04 | End: 2021-08-30

## 2021-08-05 DIAGNOSIS — K51.80 OTHER ULCERATIVE COLITIS WITHOUT COMPLICATION (HCC): ICD-10-CM

## 2021-08-05 RX ORDER — SULFASALAZINE 500 MG/1
TABLET ORAL
Qty: 120 TABLET | Refills: 1 | Status: SHIPPED | OUTPATIENT
Start: 2021-08-05 | End: 2021-10-07

## 2021-08-13 ENCOUNTER — OFFICE VISIT (OUTPATIENT)
Dept: GASTROENTEROLOGY | Facility: CLINIC | Age: 81
End: 2021-08-13
Payer: MEDICARE

## 2021-08-13 VITALS
DIASTOLIC BLOOD PRESSURE: 84 MMHG | SYSTOLIC BLOOD PRESSURE: 152 MMHG | HEART RATE: 72 BPM | WEIGHT: 151 LBS | HEIGHT: 62 IN | BODY MASS INDEX: 27.79 KG/M2

## 2021-08-13 DIAGNOSIS — K21.9 GASTROESOPHAGEAL REFLUX DISEASE WITHOUT ESOPHAGITIS: ICD-10-CM

## 2021-08-13 DIAGNOSIS — K51.20 ULCERATIVE PROCTITIS WITHOUT COMPLICATION (HCC): Primary | ICD-10-CM

## 2021-08-13 DIAGNOSIS — R09.89 THROAT CLEARING: ICD-10-CM

## 2021-08-13 DIAGNOSIS — K21.9 GASTROESOPHAGEAL REFLUX DISEASE: ICD-10-CM

## 2021-08-13 DIAGNOSIS — R05.9 COUGH: ICD-10-CM

## 2021-08-13 DIAGNOSIS — K21.9 LARYNGOPHARYNGEAL REFLUX (LPR): ICD-10-CM

## 2021-08-13 PROCEDURE — 99214 OFFICE O/P EST MOD 30 MIN: CPT | Performed by: INTERNAL MEDICINE

## 2021-08-13 RX ORDER — OMEPRAZOLE 40 MG/1
40 CAPSULE, DELAYED RELEASE ORAL
Qty: 90 CAPSULE | Refills: 2 | Status: SHIPPED | OUTPATIENT
Start: 2021-08-13 | End: 2022-01-13

## 2021-08-13 NOTE — PROGRESS NOTES
[facility] - Outpatient Follow-up Note  Jemal Del Real 79 y o  female MRN: [id number]  Encounter: [id number]          ASSESSMENT AND PLAN:      1  Gastroesophageal reflux disease   patient had a recent upper endoscopy which shows mild Candida esophagitis, small sliding hiatal hernia, she finish liquid nystatin, she currently denies any dysphagia or odynophagia, she is still complaining chronic cough, she was seen by ENT physician, she stop taking PPI, which I advised her to restart, chronic cough could be related to laryngopharyngeal reflux  She need to restart Prilosec, she stop taking famotidine also  - omeprazole (PriLOSEC) 40 MG capsule; Take 1 capsule (40 mg total) by mouth daily before breakfast  Dispense: 90 capsule; Refill: 2    2  Cough   most likely related to laryngopharyngeal reflux  - omeprazole (PriLOSEC) 40 MG capsule; Take 1 capsule (40 mg total) by mouth daily before breakfast  Dispense: 90 capsule; Refill: 2    3  Throat clearing     oral cavity examination appeared normal, no evidence of oral thrush    -4  Laryngopharyngeal reflux (LPR)    - omeprazole (PriLOSEC) 40 MG capsule; Take 1 capsule (40 mg total) by mouth daily before breakfast  Dispense: 90 capsule; Refill: 2      5   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)   sulfasalazine 500 mg  2 tablets twice a day, she currently denies any diarrhea, no rectal bleeding, she is up-to-date with colonoscopy    ______________________________________________________________________    SUBJECTIVE:   Patient seen and examined, she come for follow-up, she is complaining chronic cough which is dry cough, she had a recent upper endoscopy which shows small hiatal hernia, evidence of GERD and mild Candida esophagitis, she was treated with liquid nystatin, she currently denies any dysphagia or odynophagia,  somehow she stop taking  Prilosec and famotidine, advised her to restart taking at least Prilosec in the morning which will help with the cough    REVIEW OF SYSTEMS IS OTHERWISE NEGATIVE  Historical Information   Past Medical History:   Diagnosis Date    Anemia     Arthritis     osteoporosis, djd knees    Low's esophagus     Cancer ([facility] [address] )     on face    Colon polyp     Disease of thyroid gland     low thyroid    Diverticulitis     gerd, diverticulitis    Dizziness     GERD (gastroesophageal reflux disease)     ulcerative colitis;  Low's esophagus, diverticulosis; hx of laryngopharyngeal reflux    Hyperlipidemia     Hypertension     Low vitamin D level     LPRD (laryngopharyngeal reflux disease)     PONV (postoperative nausea and vomiting)     AFTER "BIG SURGERIES"    Postnasal drip     WITH COUGH    Ulcerative colitis ([facility] [address] )      Past Surgical History:   Procedure Laterality Date    COLONOSCOPY      colon polyps    FOOT SURGERY Bilateral     bunionectomy    HEMORROIDECTOMY      hemorroid removal    REPLACEMENT TOTAL KNEE Bilateral     b/ replacement    TONSILLECTOMY      TUBAL LIGATION      UPPER GASTROINTESTINAL ENDOSCOPY       Social History   Social History     Substance and Sexual Activity   Alcohol Use Yes    Comment: occasional wine     Social History     Substance and Sexual Activity   Drug Use Never     Social History     Tobacco Use   Smoking Status Never Smoker   Smokeless Tobacco Never Used     Family History   Problem Relation Age of Onset    Heart disease Brother     Cancer Brother     Cancer Daughter         unsure of type of cancer, it was female cancer and hysterectomy done by Dr Reyes Mead Heart disease Father        Meds/Allergies       Current Outpatient Medications:     amLODIPine (NORVASC) 10 mg tablet    atorvastatin (LIPITOR) 20 mg tablet    cholecalciferol (VITAMIN D3) 1,000 units tablet    Coenzyme Q10 (Co Q 10) 100 MG CAPS    Cyanocobalamin (VITAMIN B 12 PO)    ferrous gluconate (FERGON) 324 mg tablet    levothyroxine (Euthyrox) 75 mcg tablet    Multiple Vitamin (multivitamin) capsule    nystatin (MYCOSTATIN) 500,000 units/5 mL suspension    omeprazole (PriLOSEC) 40 MG capsule    sulfaSALAzine (AZULFIDINE) 500 mg tablet    valsartan (DIOVAN) 80 mg tablet    vitamin A 2250 MCG (7500 UT) capsule    vitamin E, tocopherol, 1,000 units capsule    famotidine (PEPCID) 40 MG tablet    Allergies   Allergen Reactions    Sulfa Antibiotics Itching           Objective     Blood pressure 152/84, pulse 72, height 5' 2" (1 575 m), weight 68 5 kg (151 lb)  Body mass index is 27 62 kg/m²  PHYSICAL EXAM:      General Appearance:   Alert, cooperative, no distress   HEENT:   Normocephalic, atraumatic, anicteric      Neck:  Supple, symmetrical, trachea midline   Lungs:   Clear to auscultation bilaterally; no rales, rhonchi or wheezing; respirations unlabored    Heart[de-identified]   Regular rate and rhythm; no murmur, rub, or gallop  Abdomen:   Soft, non-tender, non-distended; normal bowel sounds; no masses, no organomegaly    Genitalia:   Deferred    Rectal:   Deferred    Extremities:  No cyanosis, clubbing or edema    Pulses:  2+ and symmetric    Skin:  No jaundice, rashes, or lesions    Lymph nodes:  No palpable cervical lymphadenopathy        Lab Results:   No visits with results within 1 Day(s) from this visit  Latest known visit with results is:   Appointment on 06/15/2021   Component Date Value    Vit D, 25-Hydroxy 06/15/2021 25 0*         Radiology Results:   No results found

## 2021-08-30 DIAGNOSIS — I10 ESSENTIAL HYPERTENSION: ICD-10-CM

## 2021-08-30 RX ORDER — VALSARTAN 80 MG/1
TABLET ORAL
Qty: 30 TABLET | Refills: 0 | Status: SHIPPED | OUTPATIENT
Start: 2021-08-30 | End: 2021-10-01

## 2021-09-20 ENCOUNTER — APPOINTMENT (OUTPATIENT)
Dept: LAB | Facility: CLINIC | Age: 81
End: 2021-09-20
Payer: MEDICARE

## 2021-09-20 DIAGNOSIS — I10 ESSENTIAL HYPERTENSION: ICD-10-CM

## 2021-09-20 DIAGNOSIS — E55.9 VITAMIN D DEFICIENCY: ICD-10-CM

## 2021-09-20 DIAGNOSIS — E03.9 ACQUIRED HYPOTHYROIDISM: ICD-10-CM

## 2021-09-20 DIAGNOSIS — E78.00 HYPERCHOLESTEROLEMIA: ICD-10-CM

## 2021-09-20 DIAGNOSIS — D64.9 ANEMIA, UNSPECIFIED TYPE: ICD-10-CM

## 2021-09-20 LAB
25(OH)D3 SERPL-MCNC: 26.7 NG/ML (ref 30–100)
ALBUMIN SERPL BCP-MCNC: 3.9 G/DL (ref 3.5–5)
ALP SERPL-CCNC: 99 U/L (ref 46–116)
ALT SERPL W P-5'-P-CCNC: 28 U/L (ref 12–78)
ANION GAP SERPL CALCULATED.3IONS-SCNC: 1 MMOL/L (ref 4–13)
AST SERPL W P-5'-P-CCNC: 21 U/L (ref 5–45)
BASOPHILS # BLD AUTO: 0.06 THOUSANDS/ΜL (ref 0–0.1)
BASOPHILS NFR BLD AUTO: 1 % (ref 0–1)
BILIRUB SERPL-MCNC: 0.37 MG/DL (ref 0.2–1)
BUN SERPL-MCNC: 14 MG/DL (ref 5–25)
CALCIUM SERPL-MCNC: 9 MG/DL (ref 8.3–10.1)
CHLORIDE SERPL-SCNC: 104 MMOL/L (ref 100–108)
CHOLEST SERPL-MCNC: 152 MG/DL (ref 50–200)
CO2 SERPL-SCNC: 29 MMOL/L (ref 21–32)
CREAT SERPL-MCNC: 0.76 MG/DL (ref 0.6–1.3)
EOSINOPHIL # BLD AUTO: 0.24 THOUSAND/ΜL (ref 0–0.61)
EOSINOPHIL NFR BLD AUTO: 5 % (ref 0–6)
ERYTHROCYTE [DISTWIDTH] IN BLOOD BY AUTOMATED COUNT: 12.2 % (ref 11.6–15.1)
GFR SERPL CREATININE-BSD FRML MDRD: 73 ML/MIN/1.73SQ M
GLUCOSE P FAST SERPL-MCNC: 87 MG/DL (ref 65–99)
HCT VFR BLD AUTO: 33.7 % (ref 34.8–46.1)
HDLC SERPL-MCNC: 53 MG/DL
HGB BLD-MCNC: 10.9 G/DL (ref 11.5–15.4)
IMM GRANULOCYTES # BLD AUTO: 0.01 THOUSAND/UL (ref 0–0.2)
IMM GRANULOCYTES NFR BLD AUTO: 0 % (ref 0–2)
LDLC SERPL CALC-MCNC: 88 MG/DL (ref 0–100)
LYMPHOCYTES # BLD AUTO: 1.87 THOUSANDS/ΜL (ref 0.6–4.47)
LYMPHOCYTES NFR BLD AUTO: 37 % (ref 14–44)
MCH RBC QN AUTO: 34.2 PG (ref 26.8–34.3)
MCHC RBC AUTO-ENTMCNC: 32.3 G/DL (ref 31.4–37.4)
MCV RBC AUTO: 106 FL (ref 82–98)
MONOCYTES # BLD AUTO: 0.56 THOUSAND/ΜL (ref 0.17–1.22)
MONOCYTES NFR BLD AUTO: 11 % (ref 4–12)
NEUTROPHILS # BLD AUTO: 2.28 THOUSANDS/ΜL (ref 1.85–7.62)
NEUTS SEG NFR BLD AUTO: 46 % (ref 43–75)
NONHDLC SERPL-MCNC: 99 MG/DL
NRBC BLD AUTO-RTO: 0 /100 WBCS
PLATELET # BLD AUTO: 263 THOUSANDS/UL (ref 149–390)
PMV BLD AUTO: 10.2 FL (ref 8.9–12.7)
POTASSIUM SERPL-SCNC: 4.1 MMOL/L (ref 3.5–5.3)
PROT SERPL-MCNC: 7.6 G/DL (ref 6.4–8.2)
RBC # BLD AUTO: 3.19 MILLION/UL (ref 3.81–5.12)
SODIUM SERPL-SCNC: 134 MMOL/L (ref 136–145)
T4 FREE SERPL-MCNC: 1.05 NG/DL (ref 0.76–1.46)
TRIGL SERPL-MCNC: 56 MG/DL
TSH SERPL DL<=0.05 MIU/L-ACNC: 3.97 UIU/ML (ref 0.36–3.74)
WBC # BLD AUTO: 5.02 THOUSAND/UL (ref 4.31–10.16)

## 2021-09-20 PROCEDURE — 84439 ASSAY OF FREE THYROXINE: CPT

## 2021-09-20 PROCEDURE — 36415 COLL VENOUS BLD VENIPUNCTURE: CPT

## 2021-09-20 PROCEDURE — 85025 COMPLETE CBC W/AUTO DIFF WBC: CPT

## 2021-09-20 PROCEDURE — 80053 COMPREHEN METABOLIC PANEL: CPT

## 2021-09-20 PROCEDURE — 84443 ASSAY THYROID STIM HORMONE: CPT

## 2021-09-20 PROCEDURE — 82306 VITAMIN D 25 HYDROXY: CPT

## 2021-09-20 PROCEDURE — 80061 LIPID PANEL: CPT

## 2021-10-01 DIAGNOSIS — I10 ESSENTIAL HYPERTENSION: ICD-10-CM

## 2021-10-01 RX ORDER — VALSARTAN 80 MG/1
TABLET ORAL
Qty: 30 TABLET | Refills: 0 | Status: SHIPPED | OUTPATIENT
Start: 2021-10-01 | End: 2021-10-06

## 2021-10-06 DIAGNOSIS — I10 ESSENTIAL HYPERTENSION: ICD-10-CM

## 2021-10-06 RX ORDER — VALSARTAN 80 MG/1
TABLET ORAL
Qty: 30 TABLET | Refills: 0 | Status: SHIPPED | OUTPATIENT
Start: 2021-10-06 | End: 2021-11-30

## 2021-10-07 ENCOUNTER — OFFICE VISIT (OUTPATIENT)
Dept: URGENT CARE | Facility: CLINIC | Age: 81
End: 2021-10-07

## 2021-10-07 VITALS
TEMPERATURE: 97.4 F | HEART RATE: 69 BPM | WEIGHT: 151 LBS | SYSTOLIC BLOOD PRESSURE: 133 MMHG | HEIGHT: 62 IN | DIASTOLIC BLOOD PRESSURE: 66 MMHG | BODY MASS INDEX: 27.79 KG/M2 | RESPIRATION RATE: 16 BRPM

## 2021-10-07 DIAGNOSIS — B02.9 HERPES ZOSTER WITHOUT COMPLICATION: Primary | ICD-10-CM

## 2021-10-07 DIAGNOSIS — K51.80 OTHER ULCERATIVE COLITIS WITHOUT COMPLICATION (HCC): ICD-10-CM

## 2021-10-07 RX ORDER — VALACYCLOVIR HYDROCHLORIDE 1 G/1
1000 TABLET, FILM COATED ORAL 3 TIMES DAILY
Qty: 21 TABLET | Refills: 0 | Status: SHIPPED | OUTPATIENT
Start: 2021-10-07 | End: 2022-01-13

## 2021-10-07 RX ORDER — SULFASALAZINE 500 MG/1
TABLET ORAL
Qty: 120 TABLET | Refills: 1 | Status: SHIPPED | OUTPATIENT
Start: 2021-10-07 | End: 2021-12-12 | Stop reason: SDUPTHER

## 2021-10-08 ENCOUNTER — TELEPHONE (OUTPATIENT)
Dept: FAMILY MEDICINE CLINIC | Facility: CLINIC | Age: 81
End: 2021-10-08

## 2021-10-13 ENCOUNTER — OFFICE VISIT (OUTPATIENT)
Dept: FAMILY MEDICINE CLINIC | Facility: CLINIC | Age: 81
End: 2021-10-13
Payer: MEDICARE

## 2021-10-13 VITALS
WEIGHT: 150 LBS | DIASTOLIC BLOOD PRESSURE: 70 MMHG | HEART RATE: 73 BPM | TEMPERATURE: 97 F | OXYGEN SATURATION: 97 % | HEIGHT: 62 IN | SYSTOLIC BLOOD PRESSURE: 130 MMHG | BODY MASS INDEX: 27.6 KG/M2

## 2021-10-13 DIAGNOSIS — B02.9 HERPES ZOSTER WITHOUT COMPLICATION: ICD-10-CM

## 2021-10-13 DIAGNOSIS — E78.00 HYPERCHOLESTEROLEMIA: ICD-10-CM

## 2021-10-13 DIAGNOSIS — K21.00 GASTROESOPHAGEAL REFLUX DISEASE WITH ESOPHAGITIS WITHOUT HEMORRHAGE: ICD-10-CM

## 2021-10-13 DIAGNOSIS — K51.90 ULCERATIVE COLITIS WITHOUT COMPLICATIONS, UNSPECIFIED LOCATION (HCC): ICD-10-CM

## 2021-10-13 DIAGNOSIS — D64.9 ANEMIA, UNSPECIFIED TYPE: ICD-10-CM

## 2021-10-13 DIAGNOSIS — I10 ESSENTIAL HYPERTENSION: Primary | ICD-10-CM

## 2021-10-13 DIAGNOSIS — E55.9 VITAMIN D DEFICIENCY: ICD-10-CM

## 2021-10-13 DIAGNOSIS — E03.9 ACQUIRED HYPOTHYROIDISM: ICD-10-CM

## 2021-10-13 PROBLEM — K90.41 GLUTEN INTOLERANCE: Status: RESOLVED | Noted: 2021-07-26 | Resolved: 2021-10-13

## 2021-10-13 PROCEDURE — 99214 OFFICE O/P EST MOD 30 MIN: CPT | Performed by: FAMILY MEDICINE

## 2021-11-05 ENCOUNTER — OFFICE VISIT (OUTPATIENT)
Dept: GASTROENTEROLOGY | Facility: CLINIC | Age: 81
End: 2021-11-05
Payer: MEDICARE

## 2021-11-05 VITALS
DIASTOLIC BLOOD PRESSURE: 84 MMHG | HEART RATE: 68 BPM | HEIGHT: 62 IN | SYSTOLIC BLOOD PRESSURE: 167 MMHG | BODY MASS INDEX: 27.97 KG/M2 | WEIGHT: 152 LBS

## 2021-11-05 DIAGNOSIS — K51.20 ULCERATIVE PROCTITIS WITHOUT COMPLICATION (HCC): ICD-10-CM

## 2021-11-05 DIAGNOSIS — R05.3 CHRONIC COUGH: Primary | ICD-10-CM

## 2021-11-05 DIAGNOSIS — K21.9 GASTROESOPHAGEAL REFLUX DISEASE WITHOUT ESOPHAGITIS: ICD-10-CM

## 2021-11-05 PROCEDURE — 99214 OFFICE O/P EST MOD 30 MIN: CPT | Performed by: INTERNAL MEDICINE

## 2021-11-05 RX ORDER — DEXTROMETHORPHAN HYDROBROMIDE AND PROMETHAZINE HYDROCHLORIDE 15; 6.25 MG/5ML; MG/5ML
5 SOLUTION ORAL
Qty: 240 ML | Refills: 1 | Status: SHIPPED | OUTPATIENT
Start: 2021-11-05

## 2021-11-30 DIAGNOSIS — I10 ESSENTIAL HYPERTENSION: ICD-10-CM

## 2021-11-30 RX ORDER — VALSARTAN 80 MG/1
TABLET ORAL
Qty: 30 TABLET | Refills: 0 | Status: SHIPPED | OUTPATIENT
Start: 2021-11-30 | End: 2022-01-03

## 2021-12-01 ENCOUNTER — TELEPHONE (OUTPATIENT)
Dept: GASTROENTEROLOGY | Facility: CLINIC | Age: 81
End: 2021-12-01

## 2021-12-02 ENCOUNTER — TELEPHONE (OUTPATIENT)
Dept: FAMILY MEDICINE CLINIC | Facility: CLINIC | Age: 81
End: 2021-12-02

## 2021-12-11 DIAGNOSIS — K51.80 OTHER ULCERATIVE COLITIS WITHOUT COMPLICATION (HCC): ICD-10-CM

## 2021-12-12 RX ORDER — SULFASALAZINE 500 MG/1
TABLET ORAL
Qty: 120 TABLET | Refills: 1 | Status: SHIPPED | OUTPATIENT
Start: 2021-12-12 | End: 2022-02-07

## 2022-01-04 ENCOUNTER — APPOINTMENT (OUTPATIENT)
Dept: LAB | Facility: CLINIC | Age: 82
End: 2022-01-04
Payer: MEDICARE

## 2022-01-04 DIAGNOSIS — E03.9 ACQUIRED HYPOTHYROIDISM: ICD-10-CM

## 2022-01-04 DIAGNOSIS — E55.9 VITAMIN D DEFICIENCY: ICD-10-CM

## 2022-01-04 LAB
25(OH)D3 SERPL-MCNC: 30.7 NG/ML (ref 30–100)
T4 FREE SERPL-MCNC: 1.04 NG/DL (ref 0.76–1.46)
TSH SERPL DL<=0.05 MIU/L-ACNC: 2.95 UIU/ML (ref 0.36–3.74)

## 2022-01-04 PROCEDURE — 84443 ASSAY THYROID STIM HORMONE: CPT

## 2022-01-04 PROCEDURE — 82306 VITAMIN D 25 HYDROXY: CPT

## 2022-01-04 PROCEDURE — 36415 COLL VENOUS BLD VENIPUNCTURE: CPT

## 2022-01-04 PROCEDURE — 84439 ASSAY OF FREE THYROXINE: CPT

## 2022-01-26 DIAGNOSIS — I10 ESSENTIAL HYPERTENSION: ICD-10-CM

## 2022-01-26 RX ORDER — VALSARTAN 80 MG/1
80 TABLET ORAL DAILY
Qty: 90 TABLET | Refills: 1 | Status: SHIPPED | OUTPATIENT
Start: 2022-01-26

## 2022-01-31 PROBLEM — R05.3 CHRONIC COUGH: Status: ACTIVE | Noted: 2021-07-26

## 2022-02-04 DIAGNOSIS — K51.80 OTHER ULCERATIVE COLITIS WITHOUT COMPLICATION (HCC): ICD-10-CM

## 2022-02-07 ENCOUNTER — OFFICE VISIT (OUTPATIENT)
Dept: GASTROENTEROLOGY | Facility: CLINIC | Age: 82
End: 2022-02-07
Payer: MEDICARE

## 2022-02-07 VITALS
HEIGHT: 62 IN | DIASTOLIC BLOOD PRESSURE: 87 MMHG | SYSTOLIC BLOOD PRESSURE: 163 MMHG | BODY MASS INDEX: 28.08 KG/M2 | WEIGHT: 152.6 LBS | HEART RATE: 92 BPM

## 2022-02-07 DIAGNOSIS — R05.3 CHRONIC COUGH: Primary | ICD-10-CM

## 2022-02-07 DIAGNOSIS — K21.9 GASTROESOPHAGEAL REFLUX DISEASE WITHOUT ESOPHAGITIS: ICD-10-CM

## 2022-02-07 DIAGNOSIS — K21.9 LARYNGOPHARYNGEAL REFLUX (LPR): ICD-10-CM

## 2022-02-07 DIAGNOSIS — K51.20 ULCERATIVE PROCTITIS WITHOUT COMPLICATION (HCC): ICD-10-CM

## 2022-02-07 PROCEDURE — 99214 OFFICE O/P EST MOD 30 MIN: CPT | Performed by: INTERNAL MEDICINE

## 2022-02-07 RX ORDER — SULFASALAZINE 500 MG/1
TABLET ORAL
Qty: 120 TABLET | Refills: 1 | Status: SHIPPED | OUTPATIENT
Start: 2022-02-07 | End: 2022-04-06

## 2022-02-07 NOTE — PROGRESS NOTES
[facility] - Outpatient Follow-up Note  Carina Wilson 79 y o  female MRN: [id number]  Encounter: [id number]          ASSESSMENT AND PLAN:      1  Chronic cough  Patient now come for follow-up, she is still having persistent cough, she had a multiple Doctors Visit including ENT physician, pulmonologist ,  laryngoscopy examination was done also  She is taking pantoprazole and famotidine but no improvement in cough  Will schedule for EGD with [facility] pH testing for 48 hour, will hold PPI and H2 blocker 5 days before the test  - EGD; Future  - [facility] pH Monitoring; Future    2  Gastroesophageal reflux disease without esophagitis  Continue with pantoprazole 40 mg p o  q a m  and famotidine 40 mg p o  q h s , will schedule for EGD with 48 hour [facility] testing while patient is off from medication  - EGD; Future  - [facility] pH Monitoring; Future    3  Laryngopharyngeal reflux (LPR)    - EGD; Future  - [facility] pH Monitoring; Future    4  Ulcerative proctitis without complication ([address] )  Continue with sulfasalazine and 4 tablet p o  q day, currently she denies any rectal bleeding, no diarrhea, no abdominal pain    ______________________________________________________________________    SUBJECTIVE:  Patient seen and examined, she come for follow-up, she is still frustrated with persistent cough throughout the day and also at the nighttime whenever she lies down  She also complained postnasal drip, she was seen by ENT physician who think it is related to reflux, she is taking pantoprazole and famotidine but no improvement, she denies any heartburn or regurgitation, she also has a history of left-sided ulcerative colitis currently under remission with sulfasalazine  Her bowel movements are regular, she denies any abdominal pain      REVIEW OF SYSTEMS IS OTHERWISE NEGATIVE        Historical Information   Past Medical History:   Diagnosis Date    Anemia     Arthritis     osteoporosis, djd knees    Low's esophagus     Cancer ([facility] [address] )     on face    Colon polyp     Disease of thyroid gland     low thyroid    Diverticulitis     gerd, diverticulitis    Dizziness     GERD (gastroesophageal reflux disease)     ulcerative colitis;  Low's esophagus, diverticulosis; hx of laryngopharyngeal reflux    Hyperlipidemia     Hypertension     Low vitamin D level     LPRD (laryngopharyngeal reflux disease)     PONV (postoperative nausea and vomiting)     AFTER "BIG SURGERIES"    Postnasal drip     WITH COUGH    Ulcerative colitis ([facility] [address] )      Past Surgical History:   Procedure Laterality Date    COLONOSCOPY      colon polyps    FOOT SURGERY Bilateral     bunionectomy    HEMORROIDECTOMY      hemorroid removal    REPLACEMENT TOTAL KNEE Bilateral     b/ replacement    TONSILLECTOMY      TUBAL LIGATION      UPPER GASTROINTESTINAL ENDOSCOPY       Social History   Social History     Substance and Sexual Activity   Alcohol Use Yes    Comment: occasional wine     Social History     Substance and Sexual Activity   Drug Use Never     Social History     Tobacco Use   Smoking Status Never Smoker   Smokeless Tobacco Never Used     Family History   Problem Relation Age of Onset    Heart disease Brother     Cancer Brother     Cancer Daughter         unsure of type of cancer, it was female cancer and hysterectomy done by Dr Zully Carver Heart disease Father        Meds/Allergies       Current Outpatient Medications:     amLODIPine (NORVASC) 10 mg tablet    atorvastatin (LIPITOR) 20 mg tablet    cholecalciferol (VITAMIN D3) 1,000 units tablet    Coenzyme Q10 (Co Q 10) 100 MG CAPS    Cyanocobalamin (VITAMIN B 12 PO)    famotidine (PEPCID) 40 MG tablet    ferrous gluconate (FERGON) 324 mg tablet    levothyroxine (Euthyrox) 75 mcg tablet    pantoprazole (PROTONIX) 40 mg tablet    sulfaSALAzine (AZULFIDINE) 500 mg tablet    valsartan (DIOVAN) 80 mg tablet    vitamin A 2250 MCG (7500 UT) capsule    vitamin E, tocopherol, 1,000 units capsule    Multiple Vitamin (multivitamin) capsule    nystatin (MYCOSTATIN) 500,000 units/5 mL suspension    Promethazine-DM (PHENERGAN-DM) 6 25-15 mg/5 mL oral syrup    valACYclovir (VALTREX) 1,000 mg tablet    Allergies   Allergen Reactions    Sulfa Antibiotics Itching           Objective     Blood pressure 163/87, pulse 92, height 5' 2" (1 575 m), weight 69 2 kg (152 lb 9 6 oz)  Body mass index is 27 91 kg/m²  PHYSICAL EXAM:      General Appearance:   Alert, cooperative, no distress   HEENT:   Normocephalic, atraumatic, anicteric      Neck:  Supple, symmetrical, trachea midline   Lungs:   Clear to auscultation bilaterally; no rales, rhonchi or wheezing; respirations unlabored    Heart[de-identified]   Regular rate and rhythm; no murmur, rub, or gallop  Abdomen:   Soft, non-tender, non-distended; normal bowel sounds; no masses, no organomegaly    Genitalia:   Deferred    Rectal:   Deferred    Extremities:  No cyanosis, clubbing or edema    Pulses:  2+ and symmetric    Skin:  No jaundice, rashes, or lesions    Lymph nodes:  No palpable cervical lymphadenopathy        Lab Results:   No visits with results within 1 Day(s) from this visit  Latest known visit with results is:   Appointment on 01/04/2022   Component Date Value    Free T4 01/04/2022 1 04     TSH 3RD GENERATON 01/04/2022 2 950     Vit D, 25-Hydroxy 01/04/2022 30 7          Radiology Results:   No results found

## 2022-02-07 NOTE — PATIENT INSTRUCTIONS
Procedure:  EGD [facility]  Scheduled date of procedure (as of today): 2/22/22  Physician performing procedure: Maicol  Location of procedure: [facility]  Instructions reviewed with patient by:  Madyson  Clearances: N/A

## 2022-02-17 ENCOUNTER — TELEPHONE (OUTPATIENT)
Dept: GASTROENTEROLOGY | Facility: CLINIC | Age: 82
End: 2022-02-17

## 2022-02-17 NOTE — TELEPHONE ENCOUNTER
Please call pt  She said she is returning a call from [facility] she said  Thank you

## 2022-02-18 ENCOUNTER — TELEPHONE (OUTPATIENT)
Dept: GASTROENTEROLOGY | Facility: CLINIC | Age: 82
End: 2022-02-18

## 2022-02-18 NOTE — TELEPHONE ENCOUNTER
Spoke to Azalia Vang and informed 3 day ppi hold approved by Dr Nicanor Louis    Called and spoke to pt and informed that Dr Nicanor Louis approved 3 day ppi hold and that she would be contacted on Monday by a nurse to go over some questions and to provide time for her procedure on Tue

## 2022-02-18 NOTE — TELEPHONE ENCOUNTER
Dr Rajwinder Alves, this Selma Petersenuton was scheduled when pt was in the office and I was not made aware of it  Pt did not hold her PPI 5 days  She took it today already and will be starting hold tomorrow  She is scheduled Tue 2/22/22 which will be 3 day hold  Is this ok or should pt be rescheduled? Thank you!

## 2022-02-18 NOTE — TELEPHONE ENCOUNTER
I left message on both the home and cell for pt to please call me back asap as I need to ask her health questions for her procedure scheduled on Tue 2/22/22 with Dr Nancy Mistry  If I do not hear back from her in a few hours, will call one of her contacts as well

## 2022-02-18 NOTE — TELEPHONE ENCOUNTER
----- Message from Tam Campbell sent at 2/17/2022 11:42 AM EST -----  Regarding: FW: BRAVO check list  I lmom for pt to please call back to go over health questions for her BRAVO on Tue 2/22/22  Will call her again tomorrow if do not hear back from her    ----- Message -----  From: Onalee Apley, RN  Sent: 2/17/2022  10:08 AM EST  To: [address] Clerical  Subject: check list                                       Not see the check list for this pt's upcoming procedure-EGD/BRAVO  Can you send to me if done?

## 2022-02-21 ENCOUNTER — RA CDI HCC (OUTPATIENT)
Dept: OTHER | Facility: HOSPITAL | Age: 82
End: 2022-02-21

## 2022-02-21 NOTE — PROGRESS NOTES
[facility] 75  coding opportunities       Chart reviewed, no opportunity found: CHART REVIEWED, NO OPPORTUNITY FOUND                        Patients insurance company: [employer]

## 2022-02-22 ENCOUNTER — ANESTHESIA (OUTPATIENT)
Dept: GASTROENTEROLOGY | Facility: AMBULATORY SURGERY CENTER | Age: 82
End: 2022-02-22

## 2022-02-22 ENCOUNTER — ANESTHESIA EVENT (OUTPATIENT)
Dept: GASTROENTEROLOGY | Facility: AMBULATORY SURGERY CENTER | Age: 82
End: 2022-02-22

## 2022-02-22 ENCOUNTER — HOSPITAL ENCOUNTER (OUTPATIENT)
Dept: GASTROENTEROLOGY | Facility: AMBULATORY SURGERY CENTER | Age: 82
Discharge: HOME | End: 2022-02-22
Payer: MEDICARE

## 2022-02-22 VITALS
HEART RATE: 60 BPM | WEIGHT: 152 LBS | SYSTOLIC BLOOD PRESSURE: 145 MMHG | RESPIRATION RATE: 18 BRPM | TEMPERATURE: 96.8 F | HEIGHT: 62 IN | DIASTOLIC BLOOD PRESSURE: 74 MMHG | OXYGEN SATURATION: 96 % | BODY MASS INDEX: 27.97 KG/M2

## 2022-02-22 DIAGNOSIS — K21.9 LARYNGOPHARYNGEAL REFLUX (LPR): ICD-10-CM

## 2022-02-22 DIAGNOSIS — R05.3 CHRONIC COUGH: ICD-10-CM

## 2022-02-22 DIAGNOSIS — K21.9 GASTROESOPHAGEAL REFLUX DISEASE WITHOUT ESOPHAGITIS: ICD-10-CM

## 2022-02-22 PROCEDURE — 88305 TISSUE EXAM BY PATHOLOGIST: CPT | Performed by: PATHOLOGY

## 2022-02-22 PROCEDURE — 00731 ANES UPR GI NDSC PX NOS: CPT | Performed by: NURSE ANESTHETIST, CERTIFIED REGISTERED

## 2022-02-22 PROCEDURE — 43239 EGD BIOPSY SINGLE/MULTIPLE: CPT | Performed by: INTERNAL MEDICINE

## 2022-02-22 PROCEDURE — NC001 PR NO CHARGE: Performed by: INTERNAL MEDICINE

## 2022-02-22 PROCEDURE — 99100 ANES PT EXTEME AGE<1 YR&>70: CPT | Performed by: NURSE ANESTHETIST, CERTIFIED REGISTERED

## 2022-02-22 RX ORDER — SODIUM CHLORIDE 9 MG/ML
20 INJECTION, SOLUTION INTRAVENOUS CONTINUOUS
Status: CANCELLED | OUTPATIENT
Start: 2022-02-22

## 2022-02-22 RX ORDER — LIDOCAINE HYDROCHLORIDE 10 MG/ML
INJECTION, SOLUTION EPIDURAL; INFILTRATION; INTRACAUDAL; PERINEURAL AS NEEDED
Status: DISCONTINUED | OUTPATIENT
Start: 2022-02-22 | End: 2022-02-22

## 2022-02-22 RX ORDER — PROPOFOL 10 MG/ML
INJECTION, EMULSION INTRAVENOUS AS NEEDED
Status: DISCONTINUED | OUTPATIENT
Start: 2022-02-22 | End: 2022-02-22

## 2022-02-22 RX ORDER — SODIUM CHLORIDE 9 MG/ML
30 INJECTION, SOLUTION INTRAVENOUS CONTINUOUS
Status: DISCONTINUED | OUTPATIENT
Start: 2022-02-22 | End: 2022-02-26 | Stop reason: HOSPADM

## 2022-02-22 RX ORDER — SODIUM CHLORIDE 9 MG/ML
INJECTION, SOLUTION INTRAVENOUS CONTINUOUS PRN
Status: DISCONTINUED | OUTPATIENT
Start: 2022-02-22 | End: 2022-02-22

## 2022-02-22 RX ADMIN — PROPOFOL 50 MG: 10 INJECTION, EMULSION INTRAVENOUS at 10:31

## 2022-02-22 RX ADMIN — LIDOCAINE HYDROCHLORIDE 30 MG: 10 INJECTION, SOLUTION EPIDURAL; INFILTRATION; INTRACAUDAL; PERINEURAL at 10:27

## 2022-02-22 RX ADMIN — PROPOFOL 100 MG: 10 INJECTION, EMULSION INTRAVENOUS at 10:27

## 2022-02-22 RX ADMIN — SODIUM CHLORIDE: 9 INJECTION, SOLUTION INTRAVENOUS at 10:18

## 2022-02-22 NOTE — ANESTHESIA POSTPROCEDURE EVALUATION
Post-Op Assessment Note    CV Status:  Stable  Pain Score: 0    Pain management: adequate     Mental Status:  Alert and awake   Hydration Status:  Euvolemic   PONV Controlled:  Controlled   Airway Patency:  Patent      Post Op Vitals Reviewed: Yes      Staff: CRNA   Comments: vss        No complications documented      BP     Temp     Pulse     Resp      SpO2

## 2022-02-22 NOTE — ANESTHESIA PREPROCEDURE EVALUATION
Procedure:  EGD  BRAVO PH MONITORING    Relevant Problems   CARDIO   (+) Essential hypertension   (+) Hypercholesterolemia      ENDO   (+) Hypothyroidism      GI/HEPATIC   (+) Gastroesophageal reflux disease with esophagitis   (+) Pharyngoesophageal dysphagia      HEMATOLOGY   (+) Anemia      PULMONARY   (+) Cough variant asthma         Physical Exam    Airway    Mallampati score: I  TM Distance: >3 FB  Neck ROM: full     Dental   No notable dental hx implants,     Cardiovascular  Rhythm: regular, Rate: normal, Cardiovascular exam normal    Pulmonary  Pulmonary exam normal Breath sounds clear to auscultation,     Other Findings        Anesthesia Plan  ASA Score- 2     Anesthesia Type- IV sedation with anesthesia with ASA Monitors  Additional Monitors:   Airway Plan:           Plan Factors-Exercise tolerance (METS): >4 METS  Chart reviewed  EKG reviewed  Imaging results reviewed  Existing labs reviewed  Patient summary reviewed  Induction- intravenous  Postoperative Plan-     Informed Consent- Anesthetic plan and risks discussed with patient

## 2022-02-22 NOTE — DISCHARGE INSTRUCTIONS
Upper Endoscopy   WHAT YOU NEED TO KNOW:   An upper endoscopy is also called an upper gastrointestinal (GI) endoscopy, or an esophagogastroduodenoscopy (EGD)  It is a procedure to examine the inside of your esophagus, stomach, and duodenum (first part of the small intestine) with a scope  You may feel bloated, gassy, or have some abdominal discomfort after your procedure  Your throat may be sore for 24 to 36 hours  You may burp or pass gas from air that is still inside your body  DISCHARGE INSTRUCTIONS:   Seek care immediately if:    You have sudden, severe abdominal pain   You have problems swallowing   You have a large amount of black, sticky bowel movements or blood in your bowel movements   You have sudden trouble breathing   You feel weak, lightheaded, or faint or your heart beats faster than normal for you  Contact your healthcare provider if:    You have a fever and chills   You have nausea or are vomiting   Your abdomen is bloated or feels full and hard   You have abdominal pain   You have black, sticky bowel movements or blood in your bowel movements   You have not had a bowel movement for 3 days after your procedure   You have rash or hives   You have questions or concerns about your procedure  Activity:    Do not lift, strain, or run for 24 hours after your procedure   Rest after your procedure  You have been given medicine to relax you  Do not drive or make important decisions until the day after your procedure  Return to your normal activity as directed   Relieve gas and discomfort from bloating by lying on your right side with a heating pad on your abdomen  You may need to take short walks to help the gas move out  Eat small meals until bloating is relieved  Follow up with your healthcare provider as directed: Write down your questions so you remember to ask them during your visits       If you take a blood thinner, please review the specific instructions from your endoscopist about when you should resume it  These can be found in the Recommendation and Your Medication list sections of this After Visit Summary  Gastritis   WHAT YOU NEED TO KNOW:   What is gastritis? Gastritis is inflammation or irritation of the lining of your stomach  What increases my risk for gastritis? · Infection with bacteria, a virus, or a parasite    · NSAIDs, aspirin, or steroid medicine    · Use of tobacco products or alcohol    · Trauma such as an injury to your stomach or intestine    · Autoimmune disorders such as diabetes, thyroid disease, or Crohn disease    · Stress    · Age older than 60 years    · Illegal drugs, such as cocaine    What are the signs and symptoms of gastritis? · Stomach pain, burning, or tenderness when you press on it    · Stomach fullness or tightness    · Nausea or vomiting    · Loss of appetite, or feeling full quickly when you eat    · Bad breath    · Fatigue or feeling more tired than usual    · Heartburn    How is gastritis diagnosed? Your healthcare provider will ask about your signs and symptoms and examine you  You may need any of the following:  · Blood tests  may be used to show an infection, dehydration, or anemia (low red blood cell levels)  · A bowel movement sample  may be tested for blood or the germ that may be causing your gastritis  · A breath test  may show if H pylori is causing your gastritis  You will be given a liquid to drink  Then you will breathe into a bag  Your healthcare provider will measure the amount of carbon dioxide in your breath  Extra amounts of carbon dioxide may mean you have an H pylori infection  · An endoscopy  may be used to look for irritation or bleeding in your stomach  Your healthcare provider will use an endoscope (tube with a light and camera on the end) during the procedure  He or she may take a sample from your stomach to be tested      How is gastritis treated? Your symptoms may go away without treatment  Treatment will depend on what is causing your gastritis  Your healthcare provider may recommend changes to the medicines you take  Medicines may be given to help treat a bacterial infection or decrease stomach acid  How can I manage or prevent gastritis? · Do not smoke  Nicotine and other chemicals in cigarettes and cigars can make your symptoms worse and cause lung damage  Ask your healthcare provider for information if you currently smoke and need help to quit  E-cigarettes or smokeless tobacco still contain nicotine  Talk to your healthcare provider before you use these products  · Do not drink alcohol  Alcohol can prevent healing and make your gastritis worse  Talk to your healthcare provider if you need help to stop drinking  · Do not take NSAIDs or aspirin unless directed  These and similar medicines can cause irritation of your stomach lining  If your healthcare provider says it is okay to take NSAIDs, take them with food  · Do not eat foods that cause irritation  Foods such as oranges and salsa can cause burning or pain  Eat a variety of healthy foods  Examples include fruits (not citrus), vegetables, low-fat dairy products, beans, whole-grain breads, and lean meats and fish  Try to eat small meals, and drink water with your meals  Do not eat for at least 3 hours before you go to bed  · Find ways to relax and decrease stress  Stress can increase stomach acid and make gastritis worse  Activities such as yoga, meditation, or listening to music can help you relax  Spend time with friends, or do things you enjoy  Call 911 for any of the following:   · You develop chest pain or shortness of breath  When should I seek immediate care? · You vomit blood  · You have black or bloody bowel movements  · You have severe stomach or back pain  When should I contact my healthcare provider? · You have a fever      · You have new or worsening symptoms, even after treatment  · You have questions or concerns about your condition or care  CARE AGREEMENT:   You have the right to help plan your care  Learn about your health condition and how it may be treated  Discuss treatment options with your healthcare providers to decide what care you want to receive  You always have the right to refuse treatment  The above information is an  only  It is not intended as medical advice for individual conditions or treatments  Talk to your doctor, nurse or pharmacist before following any medical regimen to see if it is safe and effective for you  © Copyright [employer] 2021 Information is for End User's use only and may not be sold, redistributed or otherwise used for commercial purposes  All illustrations and images included in CareNotes® are the copyrighted property of A D A M , Inc  or [facility] Hakeem Smith   GERD (Gastroesophageal Reflux Disease)   WHAT YOU NEED TO KNOW:   What is gastroesophageal reflux disease (GERD)? GERD is reflux that occurs more than twice a week for a few weeks  Reflux means acid and food in the stomach back up into the esophagus  It usually causes heartburn and other symptoms  GERD can cause other health problems over time if it is not treated  What causes GERD? GERD often occurs when the lower muscle (sphincter) of the esophagus does not close properly  The sphincter normally opens to let food into the stomach  It then closes to keep food and stomach acid in the stomach  If the sphincter does not close properly, stomach acid and food back up (reflux) into the esophagus   The following may increase your risk for GERD:  · Certain foods such as spicy foods, chocolate, foods that contain caffeine, peppermint, and fried foods    · Hiatal hernia    · Certain medicines such as calcium channel blockers (used to treat high blood pressure), allergy medicines, sedatives, or antidepressants    · Pregnancy or obesity    · Lying down after a meal    · Drinking alcohol or smoking    What are the signs and symptoms of GERD? · Heartburn (burning pain in your chest)    · Pain after meals that spreads to your neck, jaw, or shoulder    · Pain that gets better when you change positions    · Bitter or acid taste in your mouth    · A dry cough    · Trouble swallowing or pain with swallowing    · Hoarseness or a sore throat    · Burping or hiccups    · Feeling full soon after you start eating    How is GERD diagnosed? Your healthcare provider will ask about your symptoms and when they started  Tell him or her about other medical conditions you have, your eating habits, and your activities  You may also need any of the following:  · The amount of acid  in your esophagus and stomach may be checked  A small probe is used to check the amount  · An endoscopy  is a procedure used to look at the inside of your esophagus and stomach  An endoscope is a bendable tube with a light and camera on the end  Your healthcare provider may remove a small sample of tissue and send it to a lab for tests  · X-ray  pictures may be taken of your stomach and intestines (bowel)  You may be given a chalky liquid to drink before the pictures are taken  This liquid helps your stomach and intestines show up better on the x-rays  · Pressure and function  tests of your esophagus can help find problems such as a hiatal hernia  How is GERD treated? · Medicines  are used to decrease stomach acid  Medicine may also be used to help your lower esophageal sphincter and stomach contract (tighten) more  · Surgery  is done to wrap the upper part of the stomach around the esophageal sphincter  This will strengthen the sphincter and prevent reflux  How can I manage GERD? · Do not have foods or drinks that may increase heartburn  These include chocolate, peppermint, fried or fatty foods, drinks that contain caffeine, or carbonated drinks (soda)  Other foods include spicy foods, onions, tomatoes, and tomato-based foods  Do not have foods or drinks that can irritate your esophagus, such as citrus fruits, juices, and alcohol  · Do not eat large meals  When you eat a lot of food at one time, your stomach needs more acid to digest it  Eat 6 small meals each day instead of 3 large ones, and eat slowly  Do not eat meals 2 to 3 hours before bedtime  · Elevate the head of your bed  Place 6-inch blocks under the head of your bed frame  You may also use more than one pillow under your head and shoulders while you sleep  · Maintain a healthy weight  If you are overweight, weight loss may help relieve symptoms of GERD  · Do not smoke  Smoking weakens the lower esophageal sphincter and increases the risk of GERD  Ask your healthcare provider for information if you currently smoke and need help to quit  E-cigarettes or smokeless tobacco still contain nicotine  Talk to your healthcare provider before you use these products  · Do not wear clothing that is tight around your waist   Tight clothing can put pressure on your stomach and cause or worsen GERD symptoms  Call your local emergency number (911 in the [address] [facility]) if:   · You have severe chest pain and sudden trouble breathing  When should I seek immediate care? · You have trouble breathing after you vomit  · You have trouble swallowing, or pain with swallowing  · Your bowel movements are black, bloody, or tarry-looking  · Your vomit looks like coffee grounds or has blood in it  When should I call my doctor? · You feel full and cannot burp or vomit  · You vomit large amounts, or you vomit often  · You are losing weight without trying  · Your symptoms get worse or do not improve with treatment  · You have questions or concerns about your condition or care  CARE AGREEMENT:   You have the right to help plan your care   Learn about your health condition and how it may be treated  Discuss treatment options with your healthcare providers to decide what care you want to receive  You always have the right to refuse treatment  The above information is an  only  It is not intended as medical advice for individual conditions or treatments  Talk to your doctor, nurse or pharmacist before following any medical regimen to see if it is safe and effective for you  © Copyright [employer] 2021 Information is for End User's use only and may not be sold, redistributed or otherwise used for commercial purposes  All illustrations and images included in CareNotes® are the copyrighted property of [employer]  or Nataly Smith   Hiatal Hernia   WHAT YOU NEED TO KNOW:   What is a hiatal hernia? A hiatal hernia is a condition that causes part of your stomach to bulge through the hiatus (small opening) in your diaphragm  The part of the stomach may move up and down, or it may get trapped above the diaphragm  What increases my risk for a hiatal hernia? The exact cause of a hiatal hernia is not known  You may have been born with a large hiatus  The following may increase your risk of a hiatal hernia:  · Obesity    · Older age    · Medical conditions such as diverticulosis or esophagitis    · Previous surgery of the esophagus or stomach or trauma such as from a motor vehicle accident    What are the types of hiatal hernia? · Type I (sliding hiatal hernia): A portion of the stomach slides in and out of the hiatus  This type is the most common and usually causes gastroesophageal reflux disease (GERD)  GERD occurs when the esophageal sphincter does not close properly and causes acid reflux  The esophageal sphincter is the lower muscle of the esophagus  · Type II (paraesophageal hiatal hernia):  Type II hiatal hernia forms when a part of the stomach squeezes through the hiatus and lies next to the esophagus      · Type III (combined):  Type III hiatal hernia is a combination of a sliding and a paraesophageal hiatal hernia  · Type IV (complex paraesophageal hiatal hernia): The whole stomach, the small and large bowels, spleen, pancreas, or liver is pushed up into the chest     What are the signs and symptoms of a hiatal hernia? The most common symptom is heartburn  This usually occurs after meals and spreads to your neck, jaw, or shoulder  You may have no signs or symptoms, or you may have any of the following:  · Abdominal pain, especially in the area just above your navel    · Bitter or acid taste in your mouth    · Trouble swallowing    · Coughing or hoarseness    · Chest pain or shortness of breath that occurs after eating    · Frequent burping or hiccups    · Uncomfortable feeling of fullness after eating    How is a hiatal hernia diagnosed? · An upper GI series test  includes x-rays of your esophagus, stomach, and your small intestines  It is also called a barium swallow test  You will be given barium (a chalky liquid) to drink before the pictures are taken  This liquid helps your stomach and intestines show up better on the x-rays  An upper GI series can show if you have an ulcer, a blocked intestine, or other problems  · An endoscopy  uses a scope to see the inside of your digestive tract  A scope is a long, bendable tube with a light on the end of it  A camera may be hooked to the scope to take pictures  How is a hiatal hernia treated? Treatment depends on the type of hiatal hernia you have and on your symptoms  You may not need any treatment  You may need any of the following:  · Medicines  may be given to relieve heartburn symptoms  These medicines help to decrease or block stomach acid  You may also be given medicines that help to tighten the esophageal sphincter  · Surgery  may be done when medicines cannot control your symptoms, or other problems are present  Your healthcare provider may also suggest surgery depending on the type of hernia you have   Your healthcare provider can put your stomach back into its normal location  He or she may make the hiatus (hole) smaller and anchor your stomach in your abdomen  Fundoplication is a surgery that wraps the upper part of the stomach around the esophageal sphincter to strengthen it  How can I manage my symptoms? The following nutrition and lifestyle changes may be recommended to relieve symptoms of heartburn:     · Avoid foods that make your symptoms worse  These may include spicy foods, fruit juices, alcohol, caffeine, chocolate, and mint  · Eat several small meals during the day  Small meals give your stomach less food to digest     · Avoid lying down and bending forward after you eat  Do not eat meals 2 to 3 hours before bedtime  This decreases your risk for reflux  · Maintain a healthy weight  If you are overweight, weight loss may help relieve your symptoms  · Sleep with your head elevated  at least 6 inches  · Do not smoke  Smoking can increase your symptoms of heartburn  Call your local emergency number (911 in the [address] [facility]) if:   · You have severe chest pain and sudden trouble breathing  When should I seek immediate care? · You have severe abdominal pain  · You try to vomit but nothing comes out (retching)  · Your bowel movements are black or bloody  · Your vomit looks like coffee grounds or has blood in it  When should I call my doctor? · Your symptoms are getting worse  · You have nausea, and you are vomiting  · You are losing weight without trying  · You have questions or concerns about your condition or care  CARE AGREEMENT:   You have the right to help plan your care  Learn about your health condition and how it may be treated  Discuss treatment options with your healthcare providers to decide what care you want to receive  You always have the right to refuse treatment  The above information is an  only   It is not intended as medical advice for individual conditions or treatments  Talk to your doctor, nurse or pharmacist before following any medical regimen to see if it is safe and effective for you  © Copyright [employer] 2021 Information is for End User's use only and may not be sold, redistributed or otherwise used for commercial purposes  All illustrations and images included in CareNotes® are the copyrighted property of [employer] [place]  or [facility]  Peptic Ulcer   WHAT YOU NEED TO KNOW:   What is a peptic ulcer? A peptic ulcer is an open sore in the lining of your stomach, intestine, or esophagus  Peptic ulcers have different names, depending on their location  Gastric ulcers are peptic ulcers in the stomach  Duodenal ulcers are peptic ulcers in the small intestine  Esophageal ulcers are peptic ulcers in the esophagus  Peptic ulcers may be a short-term or long-term problem  What causes or increases my risk for a peptic ulcer? · Bacteria in the stomach called Helicobacter pylori (H  pylori)    · Certain medicines, such as NSAIDs or aspirin    · Zollinger-Jolley syndrome    · Cigarettes or alcohol    · Physical stress, such as from a burn or severe illness    · A family history of peptic ulcers    What are the signs and symptoms of a peptic ulcer? · Burning or pain in your upper abdomen 1 to 3 hours after you eat or when your stomach is empty    · Pain that is worse at night or that comes and goes for weeks    · Pain that is relieved or worse when you eat or take antacid medicine    · Nausea, vomiting, or burping    · Red or black bowel movements from bleeding caused by the ulcer    How is a peptic ulcer diagnosed? · Blood tests  may be done to test for H  pylori  · A sample of your bowel movement  may be sent to a lab for tests  The test can show if you have H  pylori or if there is blood in your bowel movements  · A urea breath test  checks for H  pylori  You will drink a liquid that has a radioactive carbon   [address] [facility] minutes after you drink the liquid, you will blow into a bag  The radioactive carbon will show if H  pylori is present  · An endoscopy  uses a scope to see the inside of your digestive system  A scope is a long, flexible tube with a light on the end  A camera may be used with the scope to take pictures  During an endoscopy, your healthcare provider may find problems with how your digestive system is working  Samples may be taken from your digestive system and sent to a lab for tests  · An upper GI x-ray  is a picture of your stomach and intestines  You may be given a chalky liquid to drink before the pictures are taken  This liquid helps your stomach and intestines show up better on the x-rays  An upper GI x-ray can show if you have an ulcer  How is a peptic ulcer treated? · Medicines  that decrease the amount of acid made by your stomach may be given  You may also need medicines that protect your stomach lining from acid and antibiotics to treat H  pylori infection  Your healthcare provider may make changes to your current medicines if it caused your ulcer  Do not make changes to your medicines without your provider's direction  · Surgery  may be needed if other treatments do not heal your ulcer  Surgery on the nerves in your stomach may be done to help your stomach make less acid  Another type of surgery removes part of your stomach  Surgery may also be done to close an ulcer that has caused a perforation (tear) through your stomach or intestines  Call your local emergency number (911 in the [address] [place] [address],[address]) if:   · You have a fast heartbeat or fast breathing  · You are too dizzy or weak to stand up  · You vomit bright red blood  · You have bright red blood in your bowel movement  When should I seek immediate care? · You have severe pain in your stomach  · Your vomit looks like coffee grounds  · Your bowel movements are black  · You have sudden shortness of breath      When should I call my doctor? · You have a fever  · You have diarrhea or constipation  · Your stomach pain does not go away or gets worse after you take medicine  · You have questions or concerns about your condition or care  CARE AGREEMENT:   You have the right to help plan your care  Learn about your health condition and how it may be treated  Discuss treatment options with your healthcare providers to decide what care you want to receive  You always have the right to refuse treatment  The above information is an  only  It is not intended as medical advice for individual conditions or treatments  Talk to your doctor, nurse or pharmacist before following any medical regimen to see if it is safe and effective for you  © Copyright [employer] 2021 Information is for End User's use only and may not be sold, redistributed or otherwise used for commercial purposes   All illustrations and images included in CareNotes® are the copyrighted property of [employer]  or [address] [place] [address] [employer]

## 2022-02-22 NOTE — H&P
History and Physical -  [facility]  Benjamín Ku 79 y o  female MRN: [id number]                  HPI: Benjamín Ku is a 79y o  year old female who presents for chronic cough      REVIEW OF SYSTEMS: Per the HPI, and otherwise unremarkable  Historical Information   Past Medical History:   Diagnosis Date    Anemia     Arthritis     osteoporosis, djd knees, pt denies osteoporosis on 2/21/22    Low's esophagus     Cancer ([facility] [address] )     on face    Colon polyp     Cough 02/21/2022    cough for 3 years, expectorates yellow mucus    Disease of thyroid gland     low thyroid    Diverticulitis     gerd, diverticulitis    Dizziness     GERD (gastroesophageal reflux disease)     ulcerative colitis;  Low's esophagus, diverticulosis; hx of laryngopharyngeal reflux    Hyperlipidemia     Hypertension     Low vitamin D level     LPRD (laryngopharyngeal reflux disease) 02/21/2022    PONV (postoperative nausea and vomiting)     AFTER "BIG SURGERIES"    Postnasal drip     WITH COUGH    Ulcerative colitis ([facility] [address] )      Past Surgical History:   Procedure Laterality Date    APPENDECTOMY      CATARACT EXTRACTION Bilateral     COLONOSCOPY      colon polyps    COLONOSCOPY      FOOT SURGERY Bilateral     bunionectomy    HEMORROIDECTOMY      hemorroid removal    JOINT REPLACEMENT Bilateral     knees    REPLACEMENT TOTAL KNEE Bilateral     b/ replacement    TONSILLECTOMY      TUBAL LIGATION      UPPER GASTROINTESTINAL ENDOSCOPY       Social History   Social History     Substance and Sexual Activity   Alcohol Use Not Currently     Social History     Substance and Sexual Activity   Drug Use Never     Social History     Tobacco Use   Smoking Status Never Smoker   Smokeless Tobacco Never Used     Family History   Problem Relation Age of Onset    Heart disease Brother     Cancer Brother     Cancer Daughter         unsure of type of cancer, it was female cancer and hysterectomy done by   Fierro    Heart disease Father        Meds/Allergies     (Not in a hospital admission)      Allergies   Allergen Reactions    Sulfa Antibiotics Itching       Objective     BP (!) 175/83 Comment: 173/81  Pulse 68   Temp (!) 96 8 °F (36 °C) (Temporal)   Resp 18   Ht 5' 2" (1 575 m)   Wt 68 9 kg (152 lb)   SpO2 97%   BMI 27 80 kg/m²       PHYSICAL EXAM    Gen: NAD  CV: RRR  CHEST: Clear  ABD: soft, NT/ND  EXT: no edema      ASSESSMENT/PLAN:  This is a 79y o  year old female here for EGD with Bravo pH study, and she is stable and optimized for her procedure

## 2022-02-25 DIAGNOSIS — E03.9 ACQUIRED HYPOTHYROIDISM: ICD-10-CM

## 2022-02-25 RX ORDER — LEVOTHYROXINE SODIUM 75 UG/1
TABLET ORAL
Qty: 90 TABLET | Refills: 0 | Status: SHIPPED | OUTPATIENT
Start: 2022-02-25 | End: 2022-06-07

## 2022-02-28 ENCOUNTER — OFFICE VISIT (OUTPATIENT)
Dept: FAMILY MEDICINE CLINIC | Facility: CLINIC | Age: 82
End: 2022-02-28
Payer: MEDICARE

## 2022-02-28 VITALS
RESPIRATION RATE: 16 BRPM | BODY MASS INDEX: 28.34 KG/M2 | HEART RATE: 72 BPM | TEMPERATURE: 98 F | WEIGHT: 154 LBS | DIASTOLIC BLOOD PRESSURE: 80 MMHG | HEIGHT: 62 IN | SYSTOLIC BLOOD PRESSURE: 132 MMHG | OXYGEN SATURATION: 96 %

## 2022-02-28 DIAGNOSIS — E03.9 ACQUIRED HYPOTHYROIDISM: ICD-10-CM

## 2022-02-28 DIAGNOSIS — I10 ESSENTIAL HYPERTENSION: ICD-10-CM

## 2022-02-28 DIAGNOSIS — Z12.31 ENCOUNTER FOR SCREENING MAMMOGRAM FOR BREAST CANCER: ICD-10-CM

## 2022-02-28 DIAGNOSIS — D64.9 ANEMIA, UNSPECIFIED TYPE: ICD-10-CM

## 2022-02-28 DIAGNOSIS — Z13.820 ENCOUNTER FOR OSTEOPOROSIS SCREENING IN ASYMPTOMATIC POSTMENOPAUSAL PATIENT: ICD-10-CM

## 2022-02-28 DIAGNOSIS — Z00.00 MEDICARE ANNUAL WELLNESS VISIT, SUBSEQUENT: Primary | ICD-10-CM

## 2022-02-28 DIAGNOSIS — E55.9 VITAMIN D DEFICIENCY: ICD-10-CM

## 2022-02-28 DIAGNOSIS — K51.90 ULCERATIVE COLITIS WITHOUT COMPLICATIONS, UNSPECIFIED LOCATION (HCC): ICD-10-CM

## 2022-02-28 DIAGNOSIS — Z78.0 ENCOUNTER FOR OSTEOPOROSIS SCREENING IN ASYMPTOMATIC POSTMENOPAUSAL PATIENT: ICD-10-CM

## 2022-02-28 DIAGNOSIS — E78.00 HYPERCHOLESTEROLEMIA: ICD-10-CM

## 2022-02-28 PROCEDURE — 1123F ACP DISCUSS/DSCN MKR DOCD: CPT | Performed by: FAMILY MEDICINE

## 2022-02-28 PROCEDURE — 99214 OFFICE O/P EST MOD 30 MIN: CPT | Performed by: FAMILY MEDICINE

## 2022-02-28 PROCEDURE — G0439 PPPS, SUBSEQ VISIT: HCPCS | Performed by: FAMILY MEDICINE

## 2022-02-28 NOTE — ASSESSMENT & PLAN NOTE
BP good  Continue amlodipine 10 mg and valsartan 80 mg qd  Pt advised to continue low Na diet and to exercise on a regular basis

## 2022-02-28 NOTE — ASSESSMENT & PLAN NOTE
LDL was 88 and LFTs were normal in Sept 2021  Continue atorvastatin 20 mg qhs  Advised pt to follow a low cholesterol diet and to exercise on a regular basis

## 2022-02-28 NOTE — ASSESSMENT & PLAN NOTE
Wellness exam done  Had COVID vaccines  Had prevnar 13 and pneumovacc 23  Recommend Tdap and Shingrix  Lipids and FBS are UTD  Last colo was in June 2020  Has living will  Mood good  No recent falls

## 2022-02-28 NOTE — PROGRESS NOTES
Assessment and Plan:     Problem List Items Addressed This Visit        Digestive    Ulcerative colitis without complications ([address] )     No recent bleeding or abdominal pain  Continue current meds per Dr Cherylene Gentry  Last colonoscopy was in June 2020  Endocrine    Hypothyroidism     TSH 2 95 and Free T4 1 04 in Jan 2022  Continue Euthyrox 75 mcg qd  Relevant Orders    TSH, 3rd generation    T4, free       Cardiovascular and Mediastinum    Essential hypertension     BP good  Continue amlodipine 10 mg and valsartan 80 mg qd  Pt advised to continue low Na diet and to exercise on a regular basis  Relevant Orders    Comprehensive metabolic panel    CBC and differential       Other    Vitamin D deficiency     Level 30 7 in Jan 2022  Continue 4000 U qd  Relevant Orders    Vitamin D 25 hydroxy    Medicare annual wellness visit, subsequent - Primary     Wellness exam done  Had COVID vaccines  Had prevnar 13 and pneumovacc 23  Recommend Tdap and Shingrix  Lipids and FBS are UTD  Last colo was in June 2020  Has living will  Mood good  No recent falls  Hypercholesterolemia     LDL was 88 and LFTs were normal in Sept 2021  Continue atorvastatin 20 mg qhs  Advised pt to follow a low cholesterol diet and to exercise on a regular basis  Relevant Orders    Lipid panel    Comprehensive metabolic panel    Anemia     Taking Fe pill qd  Hgb stable at 10 9 in Sept 2021  Relevant Orders    CBC and differential      Other Visit Diagnoses     Encounter for osteoporosis screening in asymptomatic postmenopausal patient        Relevant Orders    DXA bone density spine hip and pelvis    Encounter for screening mammogram for breast cancer        Relevant Orders    Mammo screening bilateral w cad        BMI Counseling: Body mass index is 28 17 kg/m²   The BMI is above normal  Nutrition recommendations include decreasing portion sizes, encouraging healthy choices of fruits and vegetables, consuming healthier snacks and moderation in carbohydrate intake  Exercise recommendations include exercising 3-5 times per week  No pharmacotherapy was ordered  Rationale for BMI follow-up plan is due to patient being overweight or obese  Depression Screening and Follow-up Plan: Patient was screened for depression during today's encounter  They screened negative with a PHQ-2 score of 0  Falls Plan of Care: balance, strength, and gait training instructions were provided  Preventive health issues were discussed with patient, and age appropriate screening tests were ordered as noted in patient's After Visit Summary  Personalized health advice and appropriate referrals for health education or preventive services given if needed, as noted in patient's After Visit Summary       History of Present Illness:     Patient presents for Medicare Annual Wellness visit    Patient Care Team:  Audrey Dang MD as PCP - General (Family Medicine)     Problem List:     Patient Active Problem List   Diagnosis    Essential hypertension    Hypercholesterolemia    Hypothyroidism    Ulcerative colitis without complications ([address] )    Diverticulosis    Low's esophagus    Colon polyp    Gastroesophageal reflux disease with esophagitis    Vitamin D deficiency    Anemia    Medicare annual wellness visit, subsequent    Chronic cough    Ulcerative colitis with complication ([address] )    Dairy product intolerance    [place] allergy    B12 deficiency    Risk of exposure to Lyme disease    Paresis of right vocal fold    Cough variant asthma     Dysphonia    Pharyngoesophageal dysphagia    Dysfunction of both eustachian tubes    Herpes zoster without complication      Past Medical and Surgical History:     Past Medical History:   Diagnosis Date    Anemia     Arthritis     osteoporosis, djd knees, pt denies osteoporosis on 2/21/22    Low's esophagus     Cancer ([address] )     on face    Colon polyp     Cough 02/21/2022    cough for 3 years, expectorates yellow mucus    Disease of thyroid gland     low thyroid    Diverticulitis     gerd, diverticulitis    Dizziness     GERD (gastroesophageal reflux disease)     ulcerative colitis;  Low's esophagus, diverticulosis; hx of laryngopharyngeal reflux    Hyperlipidemia     Hypertension     Low vitamin D level     LPRD (laryngopharyngeal reflux disease) 02/21/2022    PONV (postoperative nausea and vomiting)     AFTER "BIG SURGERIES"    Postnasal drip     WITH COUGH    Ulcerative colitis ([address] )      Past Surgical History:   Procedure Laterality Date    APPENDECTOMY      CATARACT EXTRACTION Bilateral     COLONOSCOPY      colon polyps    COLONOSCOPY      FOOT SURGERY Bilateral     bunionectomy    HEMORROIDECTOMY      hemorroid removal    JOINT REPLACEMENT Bilateral     knees    REPLACEMENT TOTAL KNEE Bilateral     b/ replacement    TONSILLECTOMY      TUBAL LIGATION      UPPER GASTROINTESTINAL ENDOSCOPY        Family History:     Family History   Problem Relation Age of Onset    Heart disease Brother     Cancer Brother     Cancer Daughter         unsure of type of cancer, it was female cancer and hysterectomy done by Dr Nicholos Boas Heart disease Father       Social History:     Social History     Socioeconomic History    Marital status: /Civil Union     Spouse name: None    Number of children: None    Years of education: None    Highest education level: None   Occupational History    None   Tobacco Use    Smoking status: Never Smoker    Smokeless tobacco: Never Used   Vaping Use    Vaping Use: Never used   Substance and Sexual Activity    Alcohol use: Not Currently    Drug use: Never    Sexual activity: Not Currently   Other Topics Concern    None   Social History Narrative    · Occupation:   retired      · Marital status:         · Sexual orientation:   Heterosexual      · Alcohol intake:   Occasional      · Caffeine intake:    Moderate      · Chewing tobacco:   none      · Guns present in home:   No      · Live alone or with others:   with others      · Pets:   No         Per missy      Social Determinants of Health     Financial Resource Strain: Not on file   Food Insecurity: Not on file   Transportation Needs: Not on file   Physical Activity: Not on file   Stress: Not on file   Social Connections: Not on file   Intimate Partner Violence: Not on file   Housing Stability: Not on file      Medications and Allergies:     Current Outpatient Medications   Medication Sig Dispense Refill    amLODIPine (NORVASC) 10 mg tablet Take 1 tablet (10 mg total) by mouth daily 90 tablet 2    atorvastatin (LIPITOR) 20 mg tablet Take 1 tablet (20 mg total) by mouth daily 90 tablet 2    cholecalciferol (VITAMIN D3) 1,000 units tablet Take 1,000 Units by mouth daily      Coenzyme Q10 (Co Q 10) 100 MG CAPS Take by mouth      Cyanocobalamin (VITAMIN B 12 PO) Take by mouth      Euthyrox 75 MCG tablet Take 1 tablet by mouth once daily 90 tablet 0    famotidine (PEPCID) 40 MG tablet TAKE 1 TABLET BY MOUTH ONCE DAILY BEFORE SUPPER 90 tablet 0    ferrous gluconate (FERGON) 324 mg tablet Take 324 mg by mouth daily with breakfast      Multiple Vitamin (multivitamin) capsule Take 1 capsule by mouth daily        pantoprazole (PROTONIX) 40 mg tablet Take 1 tablet (40 mg total) by mouth every morning 30 min before breakfast 30 tablet 11    sulfaSALAzine (AZULFIDINE) 500 mg tablet TAKE 2 TABLETS BY MOUTH TWICE A  tablet 1    valsartan (DIOVAN) 80 mg tablet Take 1 tablet (80 mg total) by mouth daily 90 tablet 1    vitamin A 2250 MCG (7500 UT) capsule Take 7,500 Units by mouth daily      vitamin E, tocopherol, 1,000 units capsule Take 1,000 Units by mouth daily      nystatin (MYCOSTATIN) 500,000 units/5 mL suspension Apply 5 mL (500,000 Units total) to the mouth or throat 4 (four) times a day (Patient not taking: Reported on 10/7/2021) 200 mL 1    Promethazine-DM (PHENERGAN-DM) 6 25-15 mg/5 mL oral syrup Take 5 mL by mouth 2 (two) times daily after meals (Patient not taking: Reported on 1/13/2022 ) 240 mL 1    valACYclovir (VALTREX) 1,000 mg tablet Take 1 tablet (1,000 mg total) by mouth 3 (three) times a day for 7 days 21 tablet 0     No current facility-administered medications for this visit  Allergies   Allergen Reactions    Sulfa Antibiotics Itching      Immunizations:     Immunization History   Administered Date(s) Administered    COVID-19 MODERNA VACC 0 25 ML IM BOOSTER 11/22/2021    COVID-19 MODERNA VACC 0 5 ML IM 02/12/2021, 03/11/2021    Influenza, high dose seasonal 0 7 mL 10/01/2020, 10/13/2021    Pneumococcal Conjugate 13-Valent 01/08/2015    Pneumococcal Polysaccharide PPV23 09/08/2009    influenza, trivalent, adjuvanted 10/07/2019      Health Maintenance: There are no preventive care reminders to display for this patient  There are no preventive care reminders to display for this patient  Medicare Health Risk Assessment:     /80 (BP Location: Left arm, Patient Position: Sitting)   Pulse 72   Temp 98 °F (36 7 °C)   Resp 16   Ht 5' 2" (1 575 m)   Wt 69 9 kg (154 lb)   SpO2 96%   BMI 28 17 kg/m²      Bernie Hare is here for her Subsequent Wellness visit  Health Risk Assessment:   Patient rates overall health as very good  Patient feels that their physical health rating is same  Patient is satisfied with their life  Eyesight was rated as same  Hearing was rated as same  Patient feels that their emotional and mental health rating is same  Patients states they are sometimes angry  Patient states they are sometimes unusually tired/fatigued  Pain experienced in the last 7 days has been none  Patient states that she has experienced no weight loss or gain in last 6 months  Depression Screening:   PHQ-2 Score: 0      Fall Risk Screening:    In the past year, patient has experienced: history of falling in past year Number of falls: 1  Injured during fall?: Yes    Feels unsteady when standing or walking?: No    Worried about falling?: No      Urinary Incontinence Screening:   Patient has not leaked urine accidently in the last six months  Home Safety:  Patient does not have trouble with stairs inside or outside of their home  Patient has working smoke alarms and has working carbon monoxide detector  Home safety hazards include: none  Nutrition:   Current diet is Regular  Careful due to colitis and diverticulitis    Medications:   Patient is currently taking over-the-counter supplements  OTC medications include: supplements  Patient is able to manage medications  Activities of Daily Living (ADLs)/Instrumental Activities of Daily Living (IADLs):   Walk and transfer into and out of bed and chair?: Yes  Dress and groom yourself?: Yes    Bathe or shower yourself?: Yes    Feed yourself? Yes  Do your laundry/housekeeping?: Yes  Manage your money, pay your bills and track your expenses?: Yes  Make your own meals?: Yes    Do your own shopping?: Yes    Previous Hospitalizations:   Any hospitalizations or ED visits within the last 12 months?: No      Advance Care Planning:   Living will: Yes    Durable POA for healthcare:  Yes    Advanced directive: Yes    Advanced directive counseling given: Yes    Five wishes given: No    Patient declined ACP directive: Yes      Cognitive Screening:   Provider or family/friend/caregiver concerned regarding cognition?: No    PREVENTIVE SCREENINGS      Cardiovascular Screening:    General: Screening Not Indicated and History Lipid Disorder      Diabetes Screening:     General: Screening Current      Breast Cancer Screening:       Due for: Mammogram        Cervical Cancer Screening:    General: Screening Not Indicated      Osteoporosis Screening:      Due for: DXA Axial      Abdominal Aortic Aneurysm (AAA) Screening:        General: Screening Not Indicated      Lung Cancer Screening: General: Screening Not Indicated    Screening, Brief Intervention, and Referral to Treatment (SBIRT)    Screening  Typical number of drinks in a day: 0  Typical number of drinks in a week: 0  Interpretation: Low risk drinking behavior  AUDIT-C Screenin) How often did you have a drink containing alcohol in the past year? never  2) How many drinks did you have on a typical day when you were drinking in the past year? 0  3) How often did you have 6 or more drinks on one occasion in the past year? never    AUDIT-C Score: 0  Interpretation: Score 0-2 (female): Negative screen for alcohol misuse    Brief Intervention  Alcohol & drug use screenings were reviewed  No concerns regarding substance use disorder identified  Assessment/Plan:         Problem List Items Addressed This Visit        Digestive    Ulcerative colitis without complications ([address] )     No recent bleeding or abdominal pain  Continue current meds per Dr Sandor Sprague  Last colonoscopy was in 2020  Endocrine    Hypothyroidism     TSH 2 95 and Free T4 1 04 in 2022  Continue Euthyrox 75 mcg qd  Relevant Orders    TSH, 3rd generation    T4, free       Cardiovascular and Mediastinum    Essential hypertension     BP good  Continue amlodipine 10 mg and valsartan 80 mg qd  Pt advised to continue low Na diet and to exercise on a regular basis  Relevant Orders    Comprehensive metabolic panel    CBC and differential       Other    Vitamin D deficiency     Level 30 7 in 2022  Continue 4000 U qd  Relevant Orders    Vitamin D 25 hydroxy    Medicare annual wellness visit, subsequent - Primary     Wellness exam done  Had COVID vaccines  Had prevnar 13 and pneumovacc 23  Recommend Tdap and Shingrix  Lipids and FBS are UTD  Last colo was in 2020  Has living will  Mood good  No recent falls  Hypercholesterolemia     LDL was 88 and LFTs were normal in 2021  Continue atorvastatin 20 mg qhs   Advised pt to follow a low cholesterol diet and to exercise on a regular basis  Relevant Orders    Lipid panel    Comprehensive metabolic panel    Anemia     Taking Fe pill qd  Hgb stable at 10 9 in Sept 2021  Relevant Orders    CBC and differential      Other Visit Diagnoses     Encounter for osteoporosis screening in asymptomatic postmenopausal patient        Relevant Orders    DXA bone density spine hip and pelvis    Encounter for screening mammogram for breast cancer        Relevant Orders    Mammo screening bilateral w cad            Subjective:      Patient ID: Kailyn Cardona is a 79 y o  female  Pt here for f/u HTN, HL, Hypothyroidism, Vit D Def, Ulcerative Colitis, Anemia  Pt also due for wellness exam  No cp/sob  No recent abd pain  BP has been good  Walks at times  Had flu shot and COVID vaccines, Had shingrix #1  No new c/o  The following portions of the patient's history were reviewed and updated as appropriate:   Past Medical History:  She has a past medical history of Anemia, Arthritis, Low's esophagus, Cancer ([facility] [address] ), Colon polyp, Cough (02/21/2022), Disease of thyroid gland, Diverticulitis, Dizziness, GERD (gastroesophageal reflux disease), Hyperlipidemia, Hypertension, Low vitamin D level, LPRD (laryngopharyngeal reflux disease) (02/21/2022), PONV (postoperative nausea and vomiting), Postnasal drip, and Ulcerative colitis ([facility] [address] )  ,  _______________________________________________________________________  Medical Problems:  does not have any pertinent problems on file ,  _______________________________________________________________________  Past Surgical History:   has a past surgical history that includes Colonoscopy; Replacement total knee (Bilateral); Hemorroidectomy; Tonsillectomy; Tubal ligation; Foot surgery (Bilateral); Upper gastrointestinal endoscopy; Colonoscopy; Joint replacement (Bilateral);  Cataract extraction (Bilateral); and Appendectomy  ,  _______________________________________________________________________  Family History:  family history includes Cancer in her brother and daughter; Heart disease in her brother and father ,  _______________________________________________________________________  Social History:   reports that she has never smoked  She has never used smokeless tobacco  She reports previous alcohol use  She reports that she does not use drugs  ,  _______________________________________________________________________  Allergies:  is allergic to sulfa antibiotics     _______________________________________________________________________  Current Outpatient Medications   Medication Sig Dispense Refill    amLODIPine (NORVASC) 10 mg tablet Take 1 tablet (10 mg total) by mouth daily 90 tablet 2    atorvastatin (LIPITOR) 20 mg tablet Take 1 tablet (20 mg total) by mouth daily 90 tablet 2    cholecalciferol (VITAMIN D3) 1,000 units tablet Take 1,000 Units by mouth daily      Coenzyme Q10 (Co Q 10) 100 MG CAPS Take by mouth      Cyanocobalamin (VITAMIN B 12 PO) Take by mouth      Euthyrox 75 MCG tablet Take 1 tablet by mouth once daily 90 tablet 0    famotidine (PEPCID) 40 MG tablet TAKE 1 TABLET BY MOUTH ONCE DAILY BEFORE SUPPER 90 tablet 0    ferrous gluconate (FERGON) 324 mg tablet Take 324 mg by mouth daily with breakfast      Multiple Vitamin (multivitamin) capsule Take 1 capsule by mouth daily        pantoprazole (PROTONIX) 40 mg tablet Take 1 tablet (40 mg total) by mouth every morning 30 min before breakfast 30 tablet 11    sulfaSALAzine (AZULFIDINE) 500 mg tablet TAKE 2 TABLETS BY MOUTH TWICE A  tablet 1    valsartan (DIOVAN) 80 mg tablet Take 1 tablet (80 mg total) by mouth daily 90 tablet 1    vitamin A 2250 MCG (7500 UT) capsule Take 7,500 Units by mouth daily      vitamin E, tocopherol, 1,000 units capsule Take 1,000 Units by mouth daily      nystatin (MYCOSTATIN) 500,000 units/5 mL suspension Apply 5 mL (500,000 Units total) to the mouth or throat 4 (four) times a day (Patient not taking: Reported on 10/7/2021) 200 mL 1    Promethazine-DM (PHENERGAN-DM) 6 25-15 mg/5 mL oral syrup Take 5 mL by mouth 2 (two) times daily after meals (Patient not taking: Reported on 1/13/2022 ) 240 mL 1    valACYclovir (VALTREX) 1,000 mg tablet Take 1 tablet (1,000 mg total) by mouth 3 (three) times a day for 7 days 21 tablet 0     No current facility-administered medications for this visit      _______________________________________________________________________  Review of Systems   Constitutional: Negative for fatigue and unexpected weight change  Respiratory: Negative for cough, chest tightness and shortness of breath  Cardiovascular: Negative for chest pain and palpitations  Gastrointestinal: Negative for abdominal pain, constipation, diarrhea, nausea and vomiting  Genitourinary: Negative for difficulty urinating  Musculoskeletal: Positive for arthralgias  Skin: Negative for rash  Neurological: Negative for dizziness and headaches  Objective:  Vitals:    02/28/22 1400   BP: 132/80   BP Location: Left arm   Patient Position: Sitting   Pulse: 72   Resp: 16   Temp: 98 °F (36 7 °C)   SpO2: 96%   Weight: 69 9 kg (154 lb)   Height: 5' 2" (1 575 m)     Body mass index is 28 17 kg/m²  Physical Exam  Vitals and nursing note reviewed  Constitutional:       Appearance: Normal appearance  She is well-developed  HENT:      Head: Normocephalic and atraumatic  Cardiovascular:      Rate and Rhythm: Normal rate and regular rhythm  Heart sounds: Normal heart sounds  No murmur heard  Pulmonary:      Effort: Pulmonary effort is normal  No respiratory distress  Breath sounds: Normal breath sounds  No wheezing  Musculoskeletal:      Cervical back: Normal range of motion and neck supple  Right lower leg: No edema  Left lower leg: No edema     Lymphadenopathy: Cervical: No cervical adenopathy  Neurological:      Mental Status: She is alert and oriented to person, place, and time  Psychiatric:         Mood and Affect: Mood normal          Behavior: Behavior normal          Thought Content:  Thought content normal          Judgment: Judgment normal            Nancy Tom MD

## 2022-02-28 NOTE — PATIENT INSTRUCTIONS
Medicare Preventive Visit Patient Instructions  Thank you for completing your Welcome to Medicare Visit or Medicare Annual Wellness Visit today  Your next wellness visit will be due in one year (3/1/2023)  The screening/preventive services that you may require over the next 5-10 years are detailed below  Some tests may not apply to you based off risk factors and/or age  Screening tests ordered at today's visit but not completed yet may show as past due  Also, please note that scanned in results may not display below  Preventive Screenings:  Service Recommendations Previous Testing/Comments   Colorectal Cancer Screening  * Colonoscopy    * Fecal Occult Blood Test (FOBT)/Fecal Immunochemical Test (FIT)  * Fecal DNA/Cologuard Test  * Flexible Sigmoidoscopy Age: 54-65 years old   Colonoscopy: every 10 years (may be performed more frequently if at higher risk)  OR  FOBT/FIT: every 1 year  OR  Cologuard: every 3 years  OR  Sigmoidoscopy: every 5 years  Screening may be recommended earlier than age 48 if at higher risk for colorectal cancer  Also, an individualized decision between you and your healthcare provider will decide whether screening between the ages of 74-80 would be appropriate  Colonoscopy: 06/16/2020  FOBT/FIT: Not on file  Cologuard: Not on file  Sigmoidoscopy: Not on file          Breast Cancer Screening Age: 36 years old  Frequency: every 1-2 years  Not required if history of left and right mastectomy Mammogram: 08/08/2019        Cervical Cancer Screening Between the ages of 21-29, pap smear recommended once every 3 years  Between the ages of 33-67, can perform pap smear with HPV co-testing every 5 years     Recommendations may differ for women with a history of total hysterectomy, cervical cancer, or abnormal pap smears in past  Pap Smear: Not on file    Screening Not Indicated   Hepatitis C Screening Once for adults born between [facility]  More frequently in patients at high risk for Hepatitis C Hep C Antibody: Not on file        Diabetes Screening 1-2 times per year if you're at risk for diabetes or have pre-diabetes Fasting glucose: 87 mg/dL   A1C: No results in last 5 years    Screening Current   Cholesterol Screening Once every 5 years if you don't have a lipid disorder  May order more often based on risk factors  Lipid panel: 09/20/2021    Screening Not Indicated  History Lipid Disorder     Other Preventive Screenings Covered by Medicare:  1  Abdominal Aortic Aneurysm (AAA) Screening: covered once if your at risk  You're considered to be at risk if you have a family history of AAA  2  Lung Cancer Screening: covers low dose CT scan once per year if you meet all of the following conditions: (1) Age 50-69; (2) No signs or symptoms of lung cancer; (3) Current smoker or have quit smoking within the last 15 years; (4) You have a tobacco smoking history of at least 30 pack years (packs per day multiplied by number of years you smoked); (5) You get a written order from a healthcare provider  3  Glaucoma Screening: covered annually if you're considered high risk: (1) You have diabetes OR (2) Family history of glaucoma OR (3)  aged 48 and older OR (3)  American aged 72 and older  3  Osteoporosis Screening: covered every 2 years if you meet one of the following conditions: (1) You're estrogen deficient and at risk for osteoporosis based off medical history and other findings; (2) Have a vertebral abnormality; (3) On glucocorticoid therapy for more than 3 months; (4) Have primary hyperparathyroidism; (5) On osteoporosis medications and need to assess response to drug therapy  · Last bone density test (DXA Scan): Not on file  5  HIV Screening: covered annually if you're between the age of 12-76  Also covered annually if you are younger than 13 and older than 72 with risk factors for HIV infection   For pregnant patients, it is covered up to 3 times per pregnancy  Immunizations:  Immunization Recommendations   Influenza Vaccine Annual influenza vaccination during flu season is recommended for all persons aged >= 6 months who do not have contraindications   Pneumococcal Vaccine (Prevnar and Pneumovax)  * Prevnar = PCV13  * Pneumovax = PPSV23   Adults 25-60 years old: 1-3 doses may be recommended based on certain risk factors  Adults 72 years old: Prevnar (PCV13) vaccine recommended followed by Pneumovax (PPSV23) vaccine  If already received PPSV23 since turning 65, then PCV13 recommended at least one year after PPSV23 dose  Hepatitis B Vaccine 3 dose series if at intermediate or high risk (ex: diabetes, end stage renal disease, liver disease)   Tetanus (Td) Vaccine - COST NOT COVERED BY MEDICARE PART B Following completion of primary series, a booster dose should be given every 10 years to maintain immunity against tetanus  Td may also be given as tetanus wound prophylaxis  Tdap Vaccine - COST NOT COVERED BY MEDICARE PART B Recommended at least once for all adults  For pregnant patients, recommended with each pregnancy  Shingles Vaccine (Shingrix) - COST NOT COVERED BY MEDICARE PART B  2 shot series recommended in those aged 48 and above     Health Maintenance Due:  There are no preventive care reminders to display for this patient  Immunizations Due:  There are no preventive care reminders to display for this patient  Advance Directives   What are advance directives? Advance directives are legal documents that state your wishes and plans for medical care  These plans are made ahead of time in case you lose your ability to make decisions for yourself  Advance directives can apply to any medical decision, such as the treatments you want, and if you want to donate organs  What are the types of advance directives? There are many types of advance directives, and each state has rules about how to use them   You may choose a combination of any of the following:  · Living will: This is a written record of the treatment you want  You can also choose which treatments you do not want, which to limit, and which to stop at a certain time  This includes surgery, medicine, IV fluid, and tube feedings  · Durable power of  for healthcare [facility]): This is a written record that states who you want to make healthcare choices for you when you are unable to make them for yourself  This person, called a proxy, is usually a family member or a friend  You may choose more than 1 proxy  · Do not resuscitate (DNR) order:  A DNR order is used in case your heart stops beating or you stop breathing  It is a request not to have certain forms of treatment, such as CPR  A DNR order may be included in other types of advance directives  · Medical directive: This covers the care that you want if you are in a coma, near death, or unable to make decisions for yourself  You can list the treatments you want for each condition  Treatment may include pain medicine, surgery, blood transfusions, dialysis, IV or tube feedings, and a ventilator (breathing machine)  · Values history: This document has questions about your views, beliefs, and how you feel and think about life  This information can help others choose the care that you would choose  Why are advance directives important? An advance directive helps you control your care  Although spoken wishes may be used, it is better to have your wishes written down  Spoken wishes can be misunderstood, or not followed  Treatments may be given even if you do not want them  An advance directive may make it easier for your family to make difficult choices about your care  Fall Prevention    Fall prevention  includes ways to make your home and other areas safer  It also includes ways you can move more carefully to prevent a fall   Health conditions that cause changes in your blood pressure, vision, or muscle strength and coordination may increase your risk for falls  Medicines may also increase your risk for falls if they make you dizzy, weak, or sleepy  Fall prevention tips:   · Stand or sit up slowly  · Use assistive devices as directed  · Wear shoes that fit well and have soles that   · Wear a personal alarm  · Stay active  · Manage your medical conditions  Home Safety Tips:  · Add items to prevent falls in the bathroom  · Keep paths clear  · Install bright lights in your home  · Keep items you use often on shelves within reach  · Paint or place reflective tape on the edges of your stairs  Weight Management   Why it is important to manage your weight:  Being overweight increases your risk of health conditions such as heart disease, high blood pressure, type 2 diabetes, and certain types of cancer  It can also increase your risk for osteoarthritis, sleep apnea, and other respiratory problems  Aim for a slow, steady weight loss  Even a small amount of weight loss can lower your risk of health problems  How to lose weight safely:  A safe and healthy way to lose weight is to eat fewer calories and get regular exercise  You can lose up about 1 pound a week by decreasing the number of calories you eat by 500 calories each day  Healthy meal plan for weight management:  A healthy meal plan includes a variety of foods, contains fewer calories, and helps you stay healthy  A healthy meal plan includes the following:  · Eat whole-grain foods more often  A healthy meal plan should contain fiber  Fiber is the part of grains, fruits, and vegetables that is not broken down by your body  Whole-grain foods are healthy and provide extra fiber in your diet  Some examples of whole-grain foods are whole-wheat breads and pastas, oatmeal, brown rice, and bulgur  · Eat a variety of vegetables every day  Include dark, leafy greens such as spinach, kale, smith greens, and mustard greens   Eat yellow and orange vegetables such as carrots, sweet potatoes, and winter squash  · Eat a variety of fruits every day  Choose fresh or canned fruit (canned in its own juice or light syrup) instead of juice  Fruit juice has very little or no fiber  · Eat low-fat dairy foods  Drink fat-free (skim) milk or 1% milk  Eat fat-free yogurt and low-fat cottage cheese  Try low-fat cheeses such as mozzarella and other reduced-fat cheeses  · Choose meat and other protein foods that are low in fat  Choose beans or other legumes such as split peas or lentils  Choose fish, skinless poultry (chicken or turkey), or lean cuts of red meat (beef or pork)  Before you cook meat or poultry, cut off any visible fat  · Use less fat and oil  Try baking foods instead of frying them  Add less fat, such as margarine, sour cream, regular salad dressing and mayonnaise to foods  Eat fewer high-fat foods  Some examples of high-fat foods include french fries, doughnuts, ice cream, and cakes  · Eat fewer sweets  Limit foods and drinks that are high in sugar  This includes candy, cookies, regular soda, and sweetened drinks  Exercise:  Exercise at least 30 minutes per day on most days of the week  Some examples of exercise include walking, biking, dancing, and swimming  You can also fit in more physical activity by taking the stairs instead of the elevator or parking farther away from stores  Ask your healthcare provider about the best exercise plan for you  © Copyright [employer] 2018 Information is for End User's use only and may not be sold, redistributed or otherwise used for commercial purposes   All illustrations and images included in CareNotes® are the copyrighted property of [employer] M [employer]  or [address] [place] [address] [employer]

## 2022-02-28 NOTE — ASSESSMENT & PLAN NOTE
No recent bleeding or abdominal pain  Continue current meds per Dr Cat Meade  Last colonoscopy was in June 2020

## 2022-03-01 ENCOUNTER — TELEPHONE (OUTPATIENT)
Dept: GASTROENTEROLOGY | Facility: CLINIC | Age: 82
End: 2022-03-01

## 2022-03-01 NOTE — TELEPHONE ENCOUNTER
----- Message from Samuel Hernadez LPN sent at 3/1/8705 10:42 AM EST -----  Patient aware  Educated  Please call patient to schedule f/u appt   Thank you

## 2022-03-01 NOTE — TELEPHONE ENCOUNTER
Spoke with pt  She was driving  She will call back to schedule appt  I will try calling her again tomorrow  If she calls back p[lease get her scheduled  Thank you

## 2022-03-09 ENCOUNTER — HOSPITAL ENCOUNTER (OUTPATIENT)
Dept: MAMMOGRAPHY | Facility: HOSPITAL | Age: 82
Discharge: HOME | End: 2022-03-09
Attending: FAMILY MEDICINE
Payer: MEDICARE

## 2022-03-09 VITALS — BODY MASS INDEX: 28.36 KG/M2 | WEIGHT: 154.1 LBS | HEIGHT: 62 IN

## 2022-03-09 DIAGNOSIS — Z12.31 ENCOUNTER FOR SCREENING MAMMOGRAM FOR BREAST CANCER: ICD-10-CM

## 2022-03-09 PROCEDURE — 77067 SCR MAMMO BI INCL CAD: CPT

## 2022-03-09 PROCEDURE — 77063 BREAST TOMOSYNTHESIS BI: CPT

## 2022-03-18 ENCOUNTER — OFFICE VISIT (OUTPATIENT)
Dept: GASTROENTEROLOGY | Facility: CLINIC | Age: 82
End: 2022-03-18
Payer: MEDICARE

## 2022-03-18 VITALS
HEART RATE: 69 BPM | HEIGHT: 62 IN | BODY MASS INDEX: 28.67 KG/M2 | DIASTOLIC BLOOD PRESSURE: 54 MMHG | WEIGHT: 155.8 LBS | SYSTOLIC BLOOD PRESSURE: 133 MMHG

## 2022-03-18 DIAGNOSIS — K21.9 GASTROESOPHAGEAL REFLUX DISEASE WITHOUT ESOPHAGITIS: ICD-10-CM

## 2022-03-18 DIAGNOSIS — K44.9 HIATAL HERNIA: ICD-10-CM

## 2022-03-18 DIAGNOSIS — K21.9 LPRD (LARYNGOPHARYNGEAL REFLUX DISEASE): Primary | ICD-10-CM

## 2022-03-18 DIAGNOSIS — R01.1 HEART MURMUR ON PHYSICAL EXAMINATION: ICD-10-CM

## 2022-03-18 DIAGNOSIS — K26.9 DUODENAL ULCER: ICD-10-CM

## 2022-03-18 PROCEDURE — 99214 OFFICE O/P EST MOD 30 MIN: CPT | Performed by: INTERNAL MEDICINE

## 2022-03-18 NOTE — PROGRESS NOTES
[facility] - Outpatient Follow-up Note  Carina Wilson 79 y o  female MRN: [id number]  Encounter: [id number]          ASSESSMENT AND PLAN:      1  LPRD (laryngopharyngeal reflux disease)   patient is status post EGD with 48  Hour of  Bravo pH study which confirm moderate to severe degree of GERD with very high DeMeester score, 96% correlation with chronic cough and acid reflux, 100% correlation with heartburn and reflux symptoms with GERD  She is taking pantoprazole 40 mg p o  q a m  and famotidine 40 mg p o  q h s , she is still having nighttime reflux symptoms and cough, will increase pantoprazole dose to 40 mg twice a day and continue with famotidine at bedtime  I advised her to avoid late dinner, head of the bed elevation    2  Hiatal hernia   small 1-2 cm size hiatal hernia  If she did not respond to medication then will consider for TIF procedure  3  Gastroesophageal reflux disease without esophagitis    Long history of GERD with LPR symptoms, will increase pantoprazole dose to twice a day and famotidine at bedtime    4  Duodenal ulcer   could be NSAID induced ulcer, biopsy came back negative for H pylori infection    5  Heart murmur on physical examination    - Ambulatory Referral to Cardiology; Future    ______________________________________________________________________    SUBJECTIVE:   Patient seen and examined, she come for follow-up after EGD with 48 hour Bravo pH testing  PH study was done while she was off of PPI and H2 blocker  Bravo pH study result was reviewed with the patient, she had  97 episode of reflux in 2 days, average DeMeester score was 75, total acid exposure time 8 4%, and there is a strong symptom correlation with heartburn, chronic cough and reflux symptoms with GERD  She restarted pantoprazole and famotidine but still having cough at nighttime    She denies any dysphagia or odynophagia, she denies any postnasal drip      REVIEW OF SYSTEMS IS OTHERWISE NEGATIVE  Historical Information   Past Medical History:   Diagnosis Date    Anemia     Arthritis     osteoporosis, djd knees, pt denies osteoporosis on 2/21/22    Low's esophagus     Cancer ([facility] [address] )     on face    Colon polyp     Cough 02/21/2022    cough for 3 years, expectorates yellow mucus    Disease of thyroid gland     low thyroid    Diverticulitis     gerd, diverticulitis    Dizziness     GERD (gastroesophageal reflux disease)     ulcerative colitis;  Low's esophagus, diverticulosis; hx of laryngopharyngeal reflux    Hyperlipidemia     Hypertension     Low vitamin D level     LPRD (laryngopharyngeal reflux disease) 02/21/2022    PONV (postoperative nausea and vomiting)     AFTER "BIG SURGERIES"    Postnasal drip     WITH COUGH    Ulcerative colitis ([facility] [address] )      Past Surgical History:   Procedure Laterality Date    APPENDECTOMY      CATARACT EXTRACTION Bilateral     COLONOSCOPY      colon polyps    COLONOSCOPY      FOOT SURGERY Bilateral     bunionectomy    HEMORROIDECTOMY      hemorroid removal    JOINT REPLACEMENT Bilateral     knees    REPLACEMENT TOTAL KNEE Bilateral     b/ replacement    TONSILLECTOMY      TUBAL LIGATION      UPPER GASTROINTESTINAL ENDOSCOPY       Social History   Social History     Substance and Sexual Activity   Alcohol Use Not Currently     Social History     Substance and Sexual Activity   Drug Use Never     Social History     Tobacco Use   Smoking Status Never Smoker   Smokeless Tobacco Never Used     Family History   Problem Relation Age of Onset    Heart disease Brother     Cancer Brother 54    Cancer Daughter         unsure of type of cancer, it was female cancer and hysterectomy done by Dr Moreno Rape disease Father     Cancer Brother 79       Meds/Allergies       Current Outpatient Medications:     amLODIPine (NORVASC) 10 mg tablet    atorvastatin (LIPITOR) 20 mg tablet    cholecalciferol (VITAMIN D3) 1,000 units tablet    Coenzyme Q10 (Co Q 10) 100 MG CAPS    Cyanocobalamin (VITAMIN B 12 PO)    Euthyrox 75 MCG tablet    famotidine (PEPCID) 40 MG tablet    ferrous gluconate (FERGON) 324 mg tablet    Multiple Vitamin (multivitamin) capsule    pantoprazole (PROTONIX) 40 mg tablet    Promethazine-DM (PHENERGAN-DM) 6 25-15 mg/5 mL oral syrup    sulfaSALAzine (AZULFIDINE) 500 mg tablet    valsartan (DIOVAN) 80 mg tablet    vitamin A 2250 MCG (7500 UT) capsule    vitamin E, tocopherol, 1,000 units capsule    nystatin (MYCOSTATIN) 500,000 units/5 mL suspension    valACYclovir (VALTREX) 1,000 mg tablet    Allergies   Allergen Reactions    Sulfa Antibiotics Itching           Objective     Blood pressure 133/54, pulse 69, height 5' 2" (1 575 m), weight 70 7 kg (155 lb 12 8 oz)  Body mass index is 28 5 kg/m²  PHYSICAL EXAM:      General Appearance:   Alert, cooperative, no distress   HEENT:   Normocephalic, atraumatic, anicteric      Neck:  Supple, symmetrical, trachea midline   Lungs:   Clear to auscultation bilaterally; no rales, rhonchi or wheezing; respirations unlabored    Heart[de-identified]   Regular rate and rhythm;  Systolic ejection murmur in aortic area, rub, or gallop  Abdomen:   Soft, non-tender, non-distended; normal bowel sounds; no masses, no organomegaly    Genitalia:   Deferred    Rectal:   Deferred    Extremities:  No cyanosis, clubbing or edema    Pulses:  2+ and symmetric    Skin:  No jaundice, rashes, or lesions    Lymph nodes:  No palpable cervical lymphadenopathy        Lab Results:   No visits with results within 1 Day(s) from this visit     Latest known visit with results is:   Hospital Outpatient Visit on 02/22/2022   Component Date Value    Case Report 02/22/2022                      Value:Surgical Pathology Report                         Case: [id number]                                   Authorizing Provider:  Chriss Frazier MD Collected:           02/22/2022 1031              Ordering Location:     [address] [place] [address] Received:            02/22/2022 2159                                     [facility]                                                                  Pathologist:           Gregorio Comer MD                                                         Specimen:    Stomach, cold bx antrum gastritis ck for hpy                                               Final Diagnosis 02/22/2022                      Value: This result contains rich text formatting which cannot be displayed here   Additional Information 02/22/2022                      Value: This result contains rich text formatting which cannot be displayed here  [place] Description 02/22/2022                      Value: This result contains rich text formatting which cannot be displayed here  Radiology Results:   EGD    Result Date: 2/22/2022  Narrative: [facility] [address] [facility] [id number] [phone] DATE OF SERVICE: 2/22/22 PHYSICIAN(S): Kelly Bradley MD Proceduralist Procedure : EGD with Bravo capsule deployment in the lower esophagus along with biopsies INDICATION: Chronic cough, Gastroesophageal reflux disease without esophagitis, Laryngopharyngeal reflux (LPR) POST-OP DIAGNOSIS: See the impression below  PREPROCEDURE: Informed consent was obtained for the procedure, including sedation  Risks of perforation, hemorrhage, adverse drug reaction and aspiration were discussed  The patient was placed in the left lateral decubitus position  Patient was explained about the risks and benefits of the procedure  Risks including but not limited to bleeding, infection, and perforation were explained in detail  Also explained about less than 100% sensitivity with the exam and other alternatives  DETAILS OF PROCEDURE: Patient was taken to the procedure room where a time out was performed to confirm correct patient and correct procedure   The patient underwent monitored anesthesia care, which was administered by an anesthesia professional  The patient's blood pressure, heart rate, level of consciousness, respirations and oxygen were monitored throughout the procedure  The scope was advanced to the second part of the duodenum  Retroflexion was performed in the fundus  Prior to the procedure, the patient's H  Pylori status was negative  The patient experienced no blood loss  The procedure was not difficult  The patient tolerated the procedure well  There were no apparent complications  ANESTHESIA INFORMATION: ASA: II Anesthesia Type: IV Sedation with Anesthesia MEDICATIONS: No administrations occurring from 1023 to 1037 on 02/22/22 FINDINGS: Single small, superficial, round, benign-appearing ulcer in the duodenal bulb with clean base (Fan III) Mild, localized erythematous mucosa in the body of the stomach and antrum; performed cold forceps biopsy to rule out H  pylori Small sliding hiatal hernia (type I hiatal hernia) without Eddi Saunas lesions present GE junction was 36 cm from incisor, 6 cm about the GE junction, Bravo capsule was deployed under direct endoscopic visualization SPECIMENS: ID Type Source Tests Collected by Time Destination 1 : cold bx antrum gastritis ck for hpy Tissue Stomach TISSUE EXAM Oscar Marley MD 2/22/2022 10:31 AM      Impression: Small duodenal ulcer Successfully placement of Bravo capsule 6 cm above the GE junction Small sliding hiatal Hernia Mild erythema in the antrum RECOMMENDATION: Await pathology results Follow up post Bravo instructions Avoid PPI and H2 blocker for next 48 hour Follow-up office visit in 2-3 weeks   Oscar Marley MD     Mammo screening bilateral w 3d & cad    Result Date: 3/10/2022  Narrative: DIAGNOSIS: Encounter for screening mammogram for breast cancer TECHNIQUE: Digital screening mammography was performed  Computer Aided Detection (CAD) analyzed all applicable images   COMPARISONS: Prior breast imaging dated: 08/08/2019, 06/28/2018, 12/15/2016, 02/19/2013, and 01/12/2012 RELEVANT HISTORY: Family Breast Cancer History: No known family history of breast cancer  Family Medical History: No known relevant family medical history  Personal History: Hormone history includes birth control  No known relevant surgical history  No known relevant medical history  The patient is scheduled in a reminder system for screening mammography  8-10% of cancers will be missed on mammography  Management of a palpable abnormality must be based on clinical grounds  Patients will be notified of their results via letter from our facility  Accredited by [employer] of Radiology and FDA  RISK ASSESSMENT: 5 Year Tyrer-Cuzick: No Score 10 Year Tyrer-Cuzick: No Score Lifetime Tyrer-Cuzick: 0 43 % TISSUE DENSITY: The breasts are heterogeneously dense, which may obscure small masses  INDICATION: Nicole Lal is a 79 y o  female presenting for screening mammography  FINDINGS: Bilateral There are no suspicious masses, grouped microcalcifications or areas of unexplained architectural distortion  The skin and nipple areolar complex are unremarkable  Benign-type calcification present  Impression: No mammographic evidence of malignancy  ASSESSMENT/BI-RADS CATEGORY: Left: 2 - Benign Right: 2 - Benign Overall: 2 - Benign RECOMMENDATION:      - Routine screening mammogram in 1 year for both breasts  Workstation ID: [id number]     Bravo pH Monitoring    Result Date: 3/7/2022  Narrative:  48 hour Bravo pH study interpretation report-  study was done while patient was off from PPI and H2 blocker for more than 5 days  Impression- 1  Average DeMeester score 75 7     2  Total acid exposure time 8 4% which is higher than normal limit     3  Total acid exposure time 2/57 minutes, number of episode of reflux 97,   number of  long reflux 11, longest reflux was 17 9 minute    4   Symptoms associated probability and symptoms index suggest is 100% correlation with heartburn, 95% correlation with cough and 96% correlation with reflux symptoms finding consistent with GERD with LPR  Recommendation- 1  Will increase PPI dose to twice a day along with famotidine 40 mg at nighttime    Will try high-dose PPI along with H2 blocker at nighttime for 8 weeks and then follow up in office visit

## 2022-03-31 DIAGNOSIS — K21.00 GASTROESOPHAGEAL REFLUX DISEASE WITH ESOPHAGITIS WITHOUT HEMORRHAGE: ICD-10-CM

## 2022-03-31 RX ORDER — PANTOPRAZOLE SODIUM 40 MG/1
40 TABLET, DELAYED RELEASE ORAL 2 TIMES DAILY
Qty: 60 TABLET | Refills: 3 | Status: SHIPPED | OUTPATIENT
Start: 2022-03-31 | End: 2022-07-29

## 2022-04-05 ENCOUNTER — HOSPITAL ENCOUNTER (OUTPATIENT)
Dept: RADIOLOGY | Facility: HOSPITAL | Age: 82
Discharge: HOME | End: 2022-04-05
Payer: MEDICARE

## 2022-04-05 DIAGNOSIS — Z78.0 ENCOUNTER FOR OSTEOPOROSIS SCREENING IN ASYMPTOMATIC POSTMENOPAUSAL PATIENT: ICD-10-CM

## 2022-04-05 DIAGNOSIS — Z13.820 ENCOUNTER FOR OSTEOPOROSIS SCREENING IN ASYMPTOMATIC POSTMENOPAUSAL PATIENT: ICD-10-CM

## 2022-04-05 PROCEDURE — 77080 DXA BONE DENSITY AXIAL: CPT

## 2022-04-06 DIAGNOSIS — K51.80 OTHER ULCERATIVE COLITIS WITHOUT COMPLICATION (HCC): ICD-10-CM

## 2022-04-06 RX ORDER — SULFASALAZINE 500 MG/1
TABLET ORAL
Qty: 120 TABLET | Refills: 1 | Status: SHIPPED | OUTPATIENT
Start: 2022-04-06 | End: 2022-06-13

## 2022-04-12 ENCOUNTER — APPOINTMENT (OUTPATIENT)
Dept: LAB | Facility: CLINIC | Age: 82
End: 2022-04-12
Payer: MEDICARE

## 2022-04-12 DIAGNOSIS — D64.9 ANEMIA, UNSPECIFIED TYPE: ICD-10-CM

## 2022-04-12 DIAGNOSIS — E03.9 ACQUIRED HYPOTHYROIDISM: ICD-10-CM

## 2022-04-12 DIAGNOSIS — I10 ESSENTIAL HYPERTENSION: ICD-10-CM

## 2022-04-12 DIAGNOSIS — E78.00 HYPERCHOLESTEROLEMIA: ICD-10-CM

## 2022-04-12 DIAGNOSIS — E55.9 VITAMIN D DEFICIENCY: ICD-10-CM

## 2022-04-12 LAB
25(OH)D3 SERPL-MCNC: 32.7 NG/ML (ref 30–100)
ALBUMIN SERPL BCP-MCNC: 4.1 G/DL (ref 3.5–5)
ALP SERPL-CCNC: 95 U/L (ref 46–116)
ALT SERPL W P-5'-P-CCNC: 22 U/L (ref 12–78)
ANION GAP SERPL CALCULATED.3IONS-SCNC: 4 MMOL/L (ref 4–13)
AST SERPL W P-5'-P-CCNC: 23 U/L (ref 5–45)
BASOPHILS # BLD AUTO: 0.05 THOUSANDS/ΜL (ref 0–0.1)
BASOPHILS NFR BLD AUTO: 1 % (ref 0–1)
BILIRUB SERPL-MCNC: 0.43 MG/DL (ref 0.2–1)
BUN SERPL-MCNC: 14 MG/DL (ref 5–25)
CALCIUM SERPL-MCNC: 9.3 MG/DL (ref 8.3–10.1)
CHLORIDE SERPL-SCNC: 105 MMOL/L (ref 100–108)
CHOLEST SERPL-MCNC: 189 MG/DL
CO2 SERPL-SCNC: 30 MMOL/L (ref 21–32)
CREAT SERPL-MCNC: 0.83 MG/DL (ref 0.6–1.3)
EOSINOPHIL # BLD AUTO: 0.19 THOUSAND/ΜL (ref 0–0.61)
EOSINOPHIL NFR BLD AUTO: 5 % (ref 0–6)
ERYTHROCYTE [DISTWIDTH] IN BLOOD BY AUTOMATED COUNT: 12.7 % (ref 11.6–15.1)
GFR SERPL CREATININE-BSD FRML MDRD: 65 ML/MIN/1.73SQ M
GLUCOSE P FAST SERPL-MCNC: 87 MG/DL (ref 65–99)
HCT VFR BLD AUTO: 32.3 % (ref 34.8–46.1)
HDLC SERPL-MCNC: 57 MG/DL
HGB BLD-MCNC: 10.2 G/DL (ref 11.5–15.4)
IMM GRANULOCYTES # BLD AUTO: 0 THOUSAND/UL (ref 0–0.2)
IMM GRANULOCYTES NFR BLD AUTO: 0 % (ref 0–2)
LDLC SERPL CALC-MCNC: 119 MG/DL (ref 0–100)
LYMPHOCYTES # BLD AUTO: 1.89 THOUSANDS/ΜL (ref 0.6–4.47)
LYMPHOCYTES NFR BLD AUTO: 48 % (ref 14–44)
MCH RBC QN AUTO: 33.9 PG (ref 26.8–34.3)
MCHC RBC AUTO-ENTMCNC: 31.6 G/DL (ref 31.4–37.4)
MCV RBC AUTO: 107 FL (ref 82–98)
MONOCYTES # BLD AUTO: 0.37 THOUSAND/ΜL (ref 0.17–1.22)
MONOCYTES NFR BLD AUTO: 10 % (ref 4–12)
NEUTROPHILS # BLD AUTO: 1.41 THOUSANDS/ΜL (ref 1.85–7.62)
NEUTS SEG NFR BLD AUTO: 36 % (ref 43–75)
NONHDLC SERPL-MCNC: 132 MG/DL
NRBC BLD AUTO-RTO: 0 /100 WBCS
PLATELET # BLD AUTO: 237 THOUSANDS/UL (ref 149–390)
PMV BLD AUTO: 10.3 FL (ref 8.9–12.7)
POTASSIUM SERPL-SCNC: 3.7 MMOL/L (ref 3.5–5.3)
PROT SERPL-MCNC: 7.4 G/DL (ref 6.4–8.2)
RBC # BLD AUTO: 3.01 MILLION/UL (ref 3.81–5.12)
SODIUM SERPL-SCNC: 139 MMOL/L (ref 136–145)
T4 FREE SERPL-MCNC: 1.08 NG/DL (ref 0.76–1.46)
TRIGL SERPL-MCNC: 64 MG/DL
TSH SERPL DL<=0.05 MIU/L-ACNC: 4.55 UIU/ML (ref 0.45–4.5)
WBC # BLD AUTO: 3.91 THOUSAND/UL (ref 4.31–10.16)

## 2022-04-12 PROCEDURE — 80053 COMPREHEN METABOLIC PANEL: CPT

## 2022-04-12 PROCEDURE — 84439 ASSAY OF FREE THYROXINE: CPT

## 2022-04-12 PROCEDURE — 36415 COLL VENOUS BLD VENIPUNCTURE: CPT

## 2022-04-12 PROCEDURE — 85025 COMPLETE CBC W/AUTO DIFF WBC: CPT

## 2022-04-12 PROCEDURE — 82306 VITAMIN D 25 HYDROXY: CPT

## 2022-04-12 PROCEDURE — 84443 ASSAY THYROID STIM HORMONE: CPT

## 2022-04-12 PROCEDURE — 80061 LIPID PANEL: CPT

## 2022-05-11 DIAGNOSIS — E78.00 HYPERCHOLESTEROLEMIA: ICD-10-CM

## 2022-05-11 RX ORDER — ATORVASTATIN CALCIUM 20 MG/1
TABLET, FILM COATED ORAL
Qty: 90 TABLET | Refills: 0 | Status: SHIPPED | OUTPATIENT
Start: 2022-05-11

## 2022-05-24 ENCOUNTER — OFFICE VISIT (OUTPATIENT)
Dept: CARDIOLOGY CLINIC | Facility: CLINIC | Age: 82
End: 2022-05-24
Payer: MEDICARE

## 2022-05-24 VITALS
OXYGEN SATURATION: 98 % | DIASTOLIC BLOOD PRESSURE: 78 MMHG | WEIGHT: 161.5 LBS | HEIGHT: 62 IN | BODY MASS INDEX: 29.72 KG/M2 | SYSTOLIC BLOOD PRESSURE: 138 MMHG | HEART RATE: 76 BPM

## 2022-05-24 DIAGNOSIS — I10 ESSENTIAL HYPERTENSION: Primary | ICD-10-CM

## 2022-05-24 DIAGNOSIS — R01.1 HEART MURMUR ON PHYSICAL EXAMINATION: ICD-10-CM

## 2022-05-24 DIAGNOSIS — R01.1 HEART MURMUR: ICD-10-CM

## 2022-05-24 DIAGNOSIS — E78.5 DYSLIPIDEMIA: ICD-10-CM

## 2022-05-24 PROCEDURE — 99205 OFFICE O/P NEW HI 60 MIN: CPT | Performed by: INTERNAL MEDICINE

## 2022-05-24 PROCEDURE — 93000 ELECTROCARDIOGRAM COMPLETE: CPT | Performed by: INTERNAL MEDICINE

## 2022-05-24 NOTE — PROGRESS NOTES
Cardiology Consultation     Lillie Aquino  [id number]  1940  [facility] [place]  [address] [place] [address] [address] [facility] [id number]      1  Essential hypertension     2  Heart murmur  POCT ECG    Echo complete w/ contrast if indicated   3  Dyslipidemia     4  Heart murmur on physical examination  Ambulatory Referral to Cardiology       Discussion/Summary:    On auscultation, it sounds like she has the murmur of aortic stenosis  No findings suggest severe stenosis based on the exam   I have ordered an echocardiogram   Blood pressure is adequately controlled  Discussed that she will likely need surveillance with echocardiogram in the future to monitor for any progression of aortic stenosis if this is truly present  Even if severe, does not seem that she is symptomatic currently so conservative management seems appropriate for the time being  She and her  are aware that if she has symptomatic severe aortic stenosis, this would require evaluation with surgery, likely TAVR  Lipid panel is reasonably controlled with her current dose of atorvastatin  In the absence of any alarming findings on the echocardiogram, would see her back in 6 months  History of Present Illness:    76-year-old female who is referred to the office today after auscultation of a murmur on a regular exam by her gastroenterologist   In general, she denies any cardiac symptoms  Denies any prior cardiac testing and has not seen a cardiologist in the past to her recollection  Some family members have had cardiac disease, but not necessarily at an early age  She knows of no family history of valve disease  She is not very active overall, but in her daily routine denies any major limitations  Walking from the exam room to the waiting room, she did not need to stop    In general, she denies any symptoms of shortness of breath with exertion  She has no PND, orthopnea, lower extremity edema  She has had complaints of a somewhat chronic cough  She is producing some yellow sputum  No clear etiology for this has been found  Patient Active Problem List   Diagnosis    Essential hypertension    Hypercholesterolemia    Hypothyroidism    Ulcerative colitis without complications ([facility] [address] )    Diverticulosis    Low's esophagus    Colon polyp    Gastroesophageal reflux disease with esophagitis    Vitamin D deficiency    Anemia    Medicare annual wellness visit, subsequent    Chronic cough    Ulcerative colitis with complication ([facility] [address] )    Dairy product intolerance    Shirley allergy    B12 deficiency    Risk of exposure to Lyme disease    Paresis of right vocal fold    Cough variant asthma     Dysphonia    Pharyngoesophageal dysphagia    Dysfunction of both eustachian tubes    Herpes zoster without complication     Past Medical History:   Diagnosis Date    Anemia     Arthritis     osteoporosis, djd knees, pt denies osteoporosis on 2/21/22    Low's esophagus     Cancer ([facility] [address] )     on face    Colon polyp     Cough 02/21/2022    cough for 3 years, expectorates yellow mucus    Disease of thyroid gland     low thyroid    Diverticulitis     gerd, diverticulitis    Dizziness     GERD (gastroesophageal reflux disease)     ulcerative colitis;  Low's esophagus, diverticulosis; hx of laryngopharyngeal reflux    Hyperlipidemia     Hypertension     Low vitamin D level     LPRD (laryngopharyngeal reflux disease) 02/21/2022    PONV (postoperative nausea and vomiting)     AFTER "BIG SURGERIES"    Postnasal drip     WITH COUGH    Ulcerative colitis ([facility] [address] )      Social History     Tobacco Use    Smoking status: Never Smoker    Smokeless tobacco: Never Used   Vaping Use    Vaping Use: Never used   Substance Use Topics    Alcohol use: Not Currently    Drug use: Never      Family History   Problem Relation Age of Onset    Heart disease Brother     Cancer Brother 54    Cancer Daughter         unsure of type of cancer, it was female cancer and hysterectomy done by Dr Mcduffie Son disease Father     Cancer Brother 79     Past Surgical History:   Procedure Laterality Date    APPENDECTOMY      CATARACT EXTRACTION Bilateral     COLONOSCOPY      colon polyps    COLONOSCOPY      FOOT SURGERY Bilateral     bunionectomy    HEMORROIDECTOMY      hemorroid removal    JOINT REPLACEMENT Bilateral     knees    REPLACEMENT TOTAL KNEE Bilateral     b/ replacement    TONSILLECTOMY      TUBAL LIGATION      UPPER GASTROINTESTINAL ENDOSCOPY         Current Outpatient Medications:     amLODIPine (NORVASC) 10 mg tablet, Take 1 tablet (10 mg total) by mouth daily, Disp: 90 tablet, Rfl: 2    atorvastatin (LIPITOR) 20 mg tablet, Take 1 tablet by mouth once daily, Disp: 90 tablet, Rfl: 0    cholecalciferol (VITAMIN D3) 1,000 units tablet, Take 1,000 Units by mouth daily, Disp: , Rfl:     Coenzyme Q10 (Co Q 10) 100 MG CAPS, Take by mouth, Disp: , Rfl:     Cyanocobalamin (VITAMIN B 12 PO), Take by mouth, Disp: , Rfl:     Euthyrox 75 MCG tablet, Take 1 tablet by mouth once daily, Disp: 90 tablet, Rfl: 0    famotidine (PEPCID) 40 MG tablet, TAKE 1 TABLET BY MOUTH ONCE DAILY BEFORE SUPPER, Disp: 90 tablet, Rfl: 0    Multiple Vitamin (multivitamin) capsule, Take 1 capsule by mouth daily  , Disp: , Rfl:     pantoprazole (PROTONIX) 40 mg tablet, Take 1 tablet (40 mg total) by mouth 2 (two) times a day 30 min before breakfast, Disp: 60 tablet, Rfl: 3    sulfaSALAzine (AZULFIDINE) 500 mg tablet, TAKE 2 TABLETS BY MOUTH TWICE A DAY, Disp: 120 tablet, Rfl: 1    valsartan (DIOVAN) 80 mg tablet, Take 1 tablet (80 mg total) by mouth daily, Disp: 90 tablet, Rfl: 1    vitamin A 2250 MCG (7500 UT) capsule, Take 7,500 Units by mouth daily, Disp: , Rfl:     vitamin E, tocopherol, 1,000 units capsule, Take 1,000 Units by mouth daily, Disp: , Rfl:   ferrous gluconate (FERGON) 324 mg tablet, Take 324 mg by mouth daily with breakfast (Patient not taking: No sig reported), Disp: , Rfl:     nystatin (MYCOSTATIN) 500,000 units/5 mL suspension, Apply 5 mL (500,000 Units total) to the mouth or throat 4 (four) times a day (Patient not taking: No sig reported), Disp: 200 mL, Rfl: 1    Promethazine-DM (PHENERGAN-DM) 6 25-15 mg/5 mL oral syrup, Take 5 mL by mouth 2 (two) times daily after meals (Patient not taking: No sig reported), Disp: 240 mL, Rfl: 1    valACYclovir (VALTREX) 1,000 mg tablet, Take 1 tablet (1,000 mg total) by mouth 3 (three) times a day for 7 days (Patient not taking: No sig reported), Disp: 21 tablet, Rfl: 0  Allergies   Allergen Reactions    Sulfa Antibiotics Itching       Vitals:    05/24/22 1004   BP: 138/78   BP Location: Left arm   Patient Position: Sitting   Cuff Size: Standard   Pulse: 76   SpO2: 98%   Weight: 73 3 kg (161 lb 8 oz)   Height: 5' 2" (1 575 m)     Vitals:    05/24/22 1004   Weight: 73 3 kg (161 lb 8 oz)      Height: 5' 2" (157 5 cm)   Body mass index is 29 54 kg/m²  Physical Exam:   GENERAL: Alert, well appearing, and in no distress  HEENT:  PERRL, EOMI, no scleral icterus, no conjunctival pallor  NECK:  Supple, No elevated JVP, no thyromegaly, no carotid bruits  HEART:  Regular  3/6 ANNA RUSB  Preserved S2   LUNGS:  Clear to auscultation bilaterally  ABDOMEN:  Soft, non-tender, positive bowel sounds, no rebound or guarding  EXTREMITIES:  No edema  VASCULAR:  Normal pedal pulses   NEURO: No focal deficits,  SKIN: Normal without suspicious lesions on exposed skin    ROS:  Except as noted in HPI, is otherwise reviewed in detail and a 12 point review of systems is negative      Labs:  Lab Results   Component Value Date    SODIUM 139 04/12/2022    K 3 7 04/12/2022     04/12/2022    CREATININE 0 83 04/12/2022    BUN 14 04/12/2022    CO2 30 04/12/2022    ALT 22 04/12/2022    AST 23 04/12/2022    TSH 2 71 02/05/2021 GLUF 87 04/12/2022    WBC 3 91 (L) 04/12/2022    HGB 10 2 (L) 04/12/2022    HCT 32 3 (L) 04/12/2022     04/12/2022       No results found for: CHOL  Lab Results   Component Value Date    HDL 57 04/12/2022    HDL 53 09/20/2021    HDL 56 02/05/2021     Lab Results   Component Value Date    LDLCALC 119 (H) 04/12/2022    LDLCALC 88 09/20/2021    LDLCALC 110 (H) 02/05/2021     Lab Results   Component Value Date    TRIG 64 04/12/2022    TRIG 56 09/20/2021    TRIG 98 02/05/2021       EKG:  Sinus rhythm, 76 BPM  Normal EKG

## 2022-06-07 DIAGNOSIS — E03.9 ACQUIRED HYPOTHYROIDISM: ICD-10-CM

## 2022-06-07 RX ORDER — LEVOTHYROXINE SODIUM 75 UG/1
TABLET ORAL
Qty: 90 TABLET | Refills: 0 | Status: SHIPPED | OUTPATIENT
Start: 2022-06-07

## 2022-06-13 DIAGNOSIS — K51.80 OTHER ULCERATIVE COLITIS WITHOUT COMPLICATION (HCC): ICD-10-CM

## 2022-06-13 RX ORDER — SULFASALAZINE 500 MG/1
TABLET ORAL
Qty: 120 TABLET | Refills: 1 | Status: SHIPPED | OUTPATIENT
Start: 2022-06-13 | End: 2022-07-11

## 2022-06-21 ENCOUNTER — RA CDI HCC (OUTPATIENT)
Dept: OTHER | Facility: HOSPITAL | Age: 82
End: 2022-06-21

## 2022-06-21 NOTE — PROGRESS NOTES
[facility] [address]  coding opportunities       Chart reviewed, no opportunity found: CHART REVIEWED, NO OPPORTUNITY FOUND        Patients Insurance     Medicare Insurance: Medicare

## 2022-06-28 ENCOUNTER — OFFICE VISIT (OUTPATIENT)
Dept: FAMILY MEDICINE CLINIC | Facility: CLINIC | Age: 82
End: 2022-06-28
Payer: MEDICARE

## 2022-06-28 VITALS
DIASTOLIC BLOOD PRESSURE: 80 MMHG | HEIGHT: 62 IN | TEMPERATURE: 96.6 F | BODY MASS INDEX: 29.63 KG/M2 | SYSTOLIC BLOOD PRESSURE: 130 MMHG | RESPIRATION RATE: 16 BRPM | WEIGHT: 161 LBS | OXYGEN SATURATION: 97 % | HEART RATE: 75 BPM

## 2022-06-28 DIAGNOSIS — K51.90 ULCERATIVE COLITIS WITHOUT COMPLICATIONS, UNSPECIFIED LOCATION (HCC): ICD-10-CM

## 2022-06-28 DIAGNOSIS — I10 ESSENTIAL HYPERTENSION: Primary | ICD-10-CM

## 2022-06-28 DIAGNOSIS — E78.00 HYPERCHOLESTEROLEMIA: ICD-10-CM

## 2022-06-28 DIAGNOSIS — K21.00 GASTROESOPHAGEAL REFLUX DISEASE WITH ESOPHAGITIS, UNSPECIFIED WHETHER HEMORRHAGE: ICD-10-CM

## 2022-06-28 DIAGNOSIS — D64.9 ANEMIA, UNSPECIFIED TYPE: ICD-10-CM

## 2022-06-28 DIAGNOSIS — E03.9 ACQUIRED HYPOTHYROIDISM: ICD-10-CM

## 2022-06-28 DIAGNOSIS — E55.9 VITAMIN D DEFICIENCY: ICD-10-CM

## 2022-06-28 PROCEDURE — 99214 OFFICE O/P EST MOD 30 MIN: CPT | Performed by: FAMILY MEDICINE

## 2022-06-28 NOTE — ASSESSMENT & PLAN NOTE
No recent bleeding or abdominal pain  Continue current meds per Dr Stratton Fee  Last colonoscopy was in June 2020

## 2022-06-28 NOTE — ASSESSMENT & PLAN NOTE
Pt had 48 hr pH monitor and EGD in Feb 2022 by Dr Norberto Rosen  They showed increased acid production and her med was changed to pantoprazole 40 mg bid and famotidine 40 mg before supper

## 2022-06-28 NOTE — ASSESSMENT & PLAN NOTE
LDL was 119 and LFTs were normal in April 2022  Continue atorvastatin 20 mg qhs  Advised pt to follow a low cholesterol diet and to exercise on a regular basis

## 2022-06-28 NOTE — PROGRESS NOTES
Depression Screening and Follow-up Plan: Patient was screened for depression during today's encounter  They screened negative with a PHQ-2 score of 0  Assessment/Plan:         Problem List Items Addressed This Visit        Digestive    Ulcerative colitis without complications ([address] )     No recent bleeding or abdominal pain  Continue current meds per Dr Dea Mcknight  Last colonoscopy was in June 2020  Gastroesophageal reflux disease with esophagitis     Pt had 48 hr pH monitor and EGD in Feb 2022 by Dr Dea Mcknight  They showed increased acid production and her med was changed to pantoprazole 40 mg bid and famotidine 40 mg before supper  Endocrine    Hypothyroidism     TSH 4 55 and Free T4 1 08 in April 2022  Continue Euthyrox 75 mcg qd  Cardiovascular and Mediastinum    Essential hypertension - Primary     BP has been good  Continue amlodipine 10 mg and valsartan 80 mg qd  Pt advised to continue low Na diet and to exercise on a regular basis  Other    Vitamin D deficiency     Level 32 7 in April 2022  Continue 4000 U qd  Hypercholesterolemia     LDL was 119 and LFTs were normal in April 2022  Continue atorvastatin 20 mg qhs  Advised pt to follow a low cholesterol diet and to exercise on a regular basis  Anemia     Taking Fe pill qd  Hgb a little lower at 10 2 in April 2022  Will recheck CBC  Relevant Orders    CBC and differential            Subjective:      Patient ID: Jerome Sparrow is a 79 y o  female  Pt here for f/u HTN, HL, Hypothyroidism, UC, GERD, Anemia  Pt doing ok  No cp/sob  Still has chronic cough  Had 48 hr pH monitor and EGD done in Feb 2022 and med changed to pantoprazole 40 bid and famotidine 40 qd  BP good  No recent abd pain or bleeding  Follows diet  Saw Cardiology and going for echo this week for her systolic murmur       Hypertension  Pertinent negatives include no chest pain, headaches, palpitations or shortness of breath  Hyperlipidemia  Pertinent negatives include no chest pain or shortness of breath  The following portions of the patient's history were reviewed and updated as appropriate:   Past Medical History:  She has a past medical history of Anemia, Arthritis, Low's esophagus, Cancer ([facility] ), Colon polyp, Cough (02/21/2022), Disease of thyroid gland, Diverticulitis, Dizziness, GERD (gastroesophageal reflux disease), Hyperlipidemia, Hypertension, Low vitamin D level, LPRD (laryngopharyngeal reflux disease) (02/21/2022), PONV (postoperative nausea and vomiting), Postnasal drip, and Ulcerative colitis ([place] [address] )  ,  _______________________________________________________________________  Medical Problems:  does not have any pertinent problems on file ,  _______________________________________________________________________  Past Surgical History:   has a past surgical history that includes Colonoscopy; Replacement total knee (Bilateral); Hemorroidectomy; Tonsillectomy; Tubal ligation; Foot surgery (Bilateral); Upper gastrointestinal endoscopy; Colonoscopy; Joint replacement (Bilateral); Cataract extraction (Bilateral); and Appendectomy  ,  _______________________________________________________________________  Family History:  family history includes Cancer in her daughter; Cancer (age of onset: 54) in her brother; Cancer (age of onset: 79) in her brother; Heart disease in her brother and father ,  _______________________________________________________________________  Social History:   reports that she has never smoked  She has never used smokeless tobacco  She reports previous alcohol use  She reports that she does not use drugs  ,  _______________________________________________________________________  Allergies:  is allergic to sulfa antibiotics     _______________________________________________________________________  Current Outpatient Medications   Medication Sig Dispense Refill    amLODIPine (NORVASC) 10 mg tablet Take 1 tablet (10 mg total) by mouth daily 90 tablet 2    atorvastatin (LIPITOR) 20 mg tablet Take 1 tablet by mouth once daily 90 tablet 0    cholecalciferol (VITAMIN D3) 1,000 units tablet Take 1,000 Units by mouth daily      Coenzyme Q10 (Co Q 10) 100 MG CAPS Take by mouth      Cyanocobalamin (VITAMIN B 12 PO) Take by mouth      Euthyrox 75 MCG tablet Take 1 tablet by mouth once daily 90 tablet 0    famotidine (PEPCID) 40 MG tablet TAKE 1 TABLET BY MOUTH ONCE DAILY BEFORE SUPPER 90 tablet 0    ferrous gluconate (FERGON) 324 mg tablet Take 324 mg by mouth daily with breakfast Takes once a week      Multiple Vitamin (multivitamin) capsule Take 1 capsule by mouth daily        pantoprazole (PROTONIX) 40 mg tablet Take 1 tablet (40 mg total) by mouth 2 (two) times a day 30 min before breakfast 60 tablet 3    sulfaSALAzine (AZULFIDINE) 500 mg tablet TAKE 2 TABLETS BY MOUTH TWICE A  tablet 1    valsartan (DIOVAN) 80 mg tablet Take 1 tablet (80 mg total) by mouth daily 90 tablet 1    vitamin A 2250 MCG (7500 UT) capsule Take 7,500 Units by mouth daily      vitamin E, tocopherol, 1,000 units capsule Take 1,000 Units by mouth daily      nystatin (MYCOSTATIN) 500,000 units/5 mL suspension Apply 5 mL (500,000 Units total) to the mouth or throat 4 (four) times a day (Patient not taking: No sig reported) 200 mL 1    Promethazine-DM (PHENERGAN-DM) 6 25-15 mg/5 mL oral syrup Take 5 mL by mouth 2 (two) times daily after meals (Patient not taking: No sig reported) 240 mL 1    valACYclovir (VALTREX) 1,000 mg tablet Take 1 tablet (1,000 mg total) by mouth 3 (three) times a day for 7 days (Patient not taking: No sig reported) 21 tablet 0     No current facility-administered medications for this visit      _______________________________________________________________________  Review of Systems   Constitutional: Negative for fatigue and unexpected weight change     Respiratory: Positive for cough  Negative for chest tightness and shortness of breath  Cardiovascular: Negative for chest pain and palpitations  Gastrointestinal: Negative for abdominal pain, constipation, diarrhea, nausea and vomiting  Genitourinary: Negative for difficulty urinating  Musculoskeletal: Positive for arthralgias  Skin: Negative for rash  Neurological: Negative for dizziness and headaches  Objective:  Vitals:    06/28/22 1003   BP: 130/80   BP Location: Left arm   Patient Position: Sitting   Cuff Size: Standard   Pulse: 75   Resp: 16   Temp: (!) 96 6 °F (35 9 °C)   TempSrc: Temporal   SpO2: 97%   Weight: 73 kg (161 lb)   Height: 5' 2" (1 575 m)     Body mass index is 29 45 kg/m²  Physical Exam  Vitals and nursing note reviewed  Constitutional:       Appearance: Normal appearance  She is well-developed  HENT:      Head: Normocephalic and atraumatic  Cardiovascular:      Rate and Rhythm: Normal rate and regular rhythm  Heart sounds: Murmur heard  Pulmonary:      Effort: Pulmonary effort is normal  No respiratory distress  Breath sounds: Normal breath sounds  No wheezing  Musculoskeletal:      Cervical back: Normal range of motion and neck supple  Right lower leg: No edema  Left lower leg: No edema  Lymphadenopathy:      Cervical: No cervical adenopathy  Neurological:      Mental Status: She is alert and oriented to person, place, and time  Psychiatric:         Mood and Affect: Mood normal          Behavior: Behavior normal          Thought Content:  Thought content normal          Judgment: Judgment normal

## 2022-06-28 NOTE — ASSESSMENT & PLAN NOTE
BP has been good  Continue amlodipine 10 mg and valsartan 80 mg qd  Pt advised to continue low Na diet and to exercise on a regular basis

## 2022-06-30 ENCOUNTER — HOSPITAL ENCOUNTER (OUTPATIENT)
Dept: NON INVASIVE DIAGNOSTICS | Facility: CLINIC | Age: 82
Discharge: HOME | End: 2022-06-30
Payer: MEDICARE

## 2022-06-30 VITALS
WEIGHT: 161 LBS | BODY MASS INDEX: 29.63 KG/M2 | HEART RATE: 75 BPM | DIASTOLIC BLOOD PRESSURE: 80 MMHG | HEIGHT: 62 IN | SYSTOLIC BLOOD PRESSURE: 130 MMHG

## 2022-06-30 DIAGNOSIS — R01.1 HEART MURMUR: ICD-10-CM

## 2022-06-30 LAB
AORTIC ROOT: 3 CM
AORTIC VALVE MEAN VELOCITY: 21.9 M/S
APICAL FOUR CHAMBER EJECTION FRACTION: 65 %
ASCENDING AORTA: 3.3 CM
AV AREA BY CONTINUOUS VTI: 0.8 CM2
AV AREA PEAK VELOCITY: 0.8 CM2
AV LVOT MEAN GRADIENT: 1 MMHG
AV LVOT PEAK GRADIENT: 2 MMHG
AV MEAN GRADIENT: 21 MMHG
AV PEAK GRADIENT: 32 MMHG
AV VALVE AREA: 0.76 CM2
AV VELOCITY RATIO: 0.27
DOP CALC AO PEAK VEL: 2.81 M/S
DOP CALC AO VTI: 72.39 CM
DOP CALC LVOT AREA: 2.83 CM2
DOP CALC LVOT DIAMETER: 1.9 CM
DOP CALC LVOT PEAK VEL VTI: 19.39 CM
DOP CALC LVOT PEAK VEL: 0.76 M/S
DOP CALC LVOT STROKE INDEX: 32.2 ML/M2
DOP CALC LVOT STROKE VOLUME: 54.95 CM3
E WAVE DECELERATION TIME: 250 MS
FRACTIONAL SHORTENING: 30 % (ref 28–44)
INTERVENTRICULAR SEPTUM IN DIASTOLE (PARASTERNAL SHORT AXIS VIEW): 1 CM
INTERVENTRICULAR SEPTUM: 1 CM (ref 0.6–1.1)
LAAS-AP2: 15.5 CM2
LAAS-AP4: 17 CM2
LEFT ATRIUM SIZE: 3.7 CM
LEFT INTERNAL DIMENSION IN SYSTOLE: 3.1 CM (ref 2.1–4)
LEFT VENTRICULAR INTERNAL DIMENSION IN DIASTOLE: 4.4 CM (ref 3.5–6)
LEFT VENTRICULAR POSTERIOR WALL IN END DIASTOLE: 1 CM
LEFT VENTRICULAR STROKE VOLUME: 49 ML
LVSV (TEICH): 49 ML
MV E'TISSUE VEL-SEP: 5 CM/S
MV PEAK A VEL: 0.79 M/S
MV PEAK E VEL: 69 CM/S
MV STENOSIS PRESSURE HALF TIME: 72 MS
MV VALVE AREA P 1/2 METHOD: 3.06 CM2
PV PEAK GRADIENT: 2 MMHG
RIGHT ATRIUM AREA SYSTOLE A4C: 14 CM2
RIGHT VENTRICLE ID DIMENSION: 3.8 CM
SL CV LEFT ATRIUM LENGTH A2C: 5.4 CM
SL CV LV EF: 65
SL CV PED ECHO LEFT VENTRICLE DIASTOLIC VOLUME (MOD BIPLANE) 2D: 87 ML
SL CV PED ECHO LEFT VENTRICLE SYSTOLIC VOLUME (MOD BIPLANE) 2D: 38 ML
TR MAX PG: 16 MMHG
TR PEAK VELOCITY: 2 M/S
TRICUSPID VALVE PEAK REGURGITATION VELOCITY: 1.98 M/S

## 2022-06-30 PROCEDURE — 93306 TTE W/DOPPLER COMPLETE: CPT | Performed by: INTERNAL MEDICINE

## 2022-06-30 PROCEDURE — 93306 TTE W/DOPPLER COMPLETE: CPT

## 2022-07-01 ENCOUNTER — APPOINTMENT (OUTPATIENT)
Dept: LAB | Facility: CLINIC | Age: 82
End: 2022-07-01
Payer: MEDICARE

## 2022-07-01 DIAGNOSIS — D64.9 ANEMIA, UNSPECIFIED TYPE: ICD-10-CM

## 2022-07-01 LAB
BASOPHILS # BLD AUTO: 0.06 THOUSANDS/ΜL (ref 0–0.1)
BASOPHILS NFR BLD AUTO: 1 % (ref 0–1)
EOSINOPHIL # BLD AUTO: 0.25 THOUSAND/ΜL (ref 0–0.61)
EOSINOPHIL NFR BLD AUTO: 6 % (ref 0–6)
ERYTHROCYTE [DISTWIDTH] IN BLOOD BY AUTOMATED COUNT: 13 % (ref 11.6–15.1)
HCT VFR BLD AUTO: 30.7 % (ref 34.8–46.1)
HGB BLD-MCNC: 10 G/DL (ref 11.5–15.4)
IMM GRANULOCYTES # BLD AUTO: 0.01 THOUSAND/UL (ref 0–0.2)
IMM GRANULOCYTES NFR BLD AUTO: 0 % (ref 0–2)
LYMPHOCYTES # BLD AUTO: 1.84 THOUSANDS/ΜL (ref 0.6–4.47)
LYMPHOCYTES NFR BLD AUTO: 41 % (ref 14–44)
MCH RBC QN AUTO: 33.4 PG (ref 26.8–34.3)
MCHC RBC AUTO-ENTMCNC: 32.6 G/DL (ref 31.4–37.4)
MCV RBC AUTO: 103 FL (ref 82–98)
MONOCYTES # BLD AUTO: 0.46 THOUSAND/ΜL (ref 0.17–1.22)
MONOCYTES NFR BLD AUTO: 10 % (ref 4–12)
NEUTROPHILS # BLD AUTO: 1.9 THOUSANDS/ΜL (ref 1.85–7.62)
NEUTS SEG NFR BLD AUTO: 42 % (ref 43–75)
NRBC BLD AUTO-RTO: 0 /100 WBCS
PLATELET # BLD AUTO: 260 THOUSANDS/UL (ref 149–390)
PMV BLD AUTO: 10.1 FL (ref 8.9–12.7)
RBC # BLD AUTO: 2.99 MILLION/UL (ref 3.81–5.12)
WBC # BLD AUTO: 4.52 THOUSAND/UL (ref 4.31–10.16)

## 2022-07-01 PROCEDURE — 85025 COMPLETE CBC W/AUTO DIFF WBC: CPT

## 2022-07-01 PROCEDURE — 36415 COLL VENOUS BLD VENIPUNCTURE: CPT

## 2022-07-08 DIAGNOSIS — K51.80 OTHER ULCERATIVE COLITIS WITHOUT COMPLICATION (HCC): ICD-10-CM

## 2022-07-11 ENCOUNTER — OFFICE VISIT (OUTPATIENT)
Dept: GASTROENTEROLOGY | Facility: CLINIC | Age: 82
End: 2022-07-11
Payer: MEDICARE

## 2022-07-11 ENCOUNTER — TELEPHONE (OUTPATIENT)
Dept: GASTROENTEROLOGY | Facility: CLINIC | Age: 82
End: 2022-07-11

## 2022-07-11 VITALS
HEART RATE: 68 BPM | WEIGHT: 162.4 LBS | SYSTOLIC BLOOD PRESSURE: 151 MMHG | BODY MASS INDEX: 29.88 KG/M2 | DIASTOLIC BLOOD PRESSURE: 88 MMHG | HEIGHT: 62 IN

## 2022-07-11 DIAGNOSIS — D50.0 IRON DEFICIENCY ANEMIA DUE TO CHRONIC BLOOD LOSS: Primary | ICD-10-CM

## 2022-07-11 DIAGNOSIS — K51.211 ULCERATIVE PROCTITIS WITH RECTAL BLEEDING (HCC): ICD-10-CM

## 2022-07-11 DIAGNOSIS — K21.9 LPRD (LARYNGOPHARYNGEAL REFLUX DISEASE): ICD-10-CM

## 2022-07-11 PROCEDURE — 99214 OFFICE O/P EST MOD 30 MIN: CPT | Performed by: INTERNAL MEDICINE

## 2022-07-11 RX ORDER — SULFASALAZINE 500 MG/1
TABLET ORAL
Qty: 360 TABLET | Refills: 1 | Status: SHIPPED | OUTPATIENT
Start: 2022-07-11 | End: 2022-08-11

## 2022-07-11 NOTE — TELEPHONE ENCOUNTER
Scheduled date of colonoscopy (as of today): 7/28/22  Physician performing colonoscopy: Nancy Mistry  Location of colonoscopy: [facility]  Bowel prep reviewed with patient: Lauro Sánchez  Instructions reviewed with patient by: Yudi Figueredo  Clearances: None

## 2022-07-11 NOTE — PROGRESS NOTES
[facility] - Outpatient Follow-up Note  Pushpa Mahan 79 y o  female MRN: [id number]  Encounter: [id number]          ASSESSMENT AND PLAN:      1  Iron deficiency anemia due to chronic blood loss  [de-identified] year female with history of left-sided ulcerative colitis, history of GERD with LPR was referred back to us for drop in hemoglobin and hematocrit with evidence of iron deficiency anemia, patient stopped taking iron tablet, she has small amount of blood in the stool, she denies any melena, no abdominal pain, no watery diarrhea, she is taking sulfasalazine every day, she also take Protonix and famotidine for chronic GERD, she denies any NSAID use, will do colonoscopy to evaluate for ulcerative colitis in a any lower GI blood loss, risk and benefit of the procedure was discussed  - Colonoscopy; Future    2  Ulcerative proctitis with rectal bleeding (HCC)    - Colonoscopy; Future    3  LPRD (laryngopharyngeal reflux disease)  Status post EGD with [facility]ravo pH study which confirmed evidence of LPR, continue with Protonix in the morning, famotidine at nighttime    ______________________________________________________________________    SUBJECTIVE:  Patient seen and examined, she come for follow-up because of drop in hemoglobin and hematocrit, she has a history of iron deficiency anemia, history of left-sided ulcerative colitis, currently denies any melena or rectal bleeding, she stop taking iron tablet and that is reason her hemoglobin start trending down, her last colonoscopy was in 2020, last EGD was recently, she has a history of LPR, currently on Protonix in the morning and famotidine at nighttime  She also takes sulfasalazine      REVIEW OF SYSTEMS IS OTHERWISE NEGATIVE        Historical Information   Past Medical History:   Diagnosis Date    Anemia     Arthritis     osteoporosis, djd knees, pt denies osteoporosis on 2/21/22    Low's esophagus     Cancer ([address] )     on face    Colon polyp     Cough 02/21/2022    cough for 3 years, expectorates yellow mucus    Disease of thyroid gland     low thyroid    Diverticulitis     gerd, diverticulitis    Dizziness     GERD (gastroesophageal reflux disease)     ulcerative colitis;  Low's esophagus, diverticulosis; hx of laryngopharyngeal reflux    Hyperlipidemia     Hypertension     Low vitamin D level     LPRD (laryngopharyngeal reflux disease) 02/21/2022    PONV (postoperative nausea and vomiting)     AFTER "BIG SURGERIES"    Postnasal drip     WITH COUGH    Ulcerative colitis ([address] )      Past Surgical History:   Procedure Laterality Date    APPENDECTOMY      CATARACT EXTRACTION Bilateral     COLONOSCOPY      colon polyps    COLONOSCOPY      FOOT SURGERY Bilateral     bunionectomy    HEMORROIDECTOMY      hemorroid removal    JOINT REPLACEMENT Bilateral     knees    REPLACEMENT TOTAL KNEE Bilateral     b/ replacement    TONSILLECTOMY      TUBAL LIGATION      UPPER GASTROINTESTINAL ENDOSCOPY       Social History   Social History     Substance and Sexual Activity   Alcohol Use Not Currently     Social History     Substance and Sexual Activity   Drug Use Never     Social History     Tobacco Use   Smoking Status Never Smoker   Smokeless Tobacco Never Used     Family History   Problem Relation Age of Onset    Heart disease Brother     Cancer Brother 54    Cancer Daughter         unsure of type of cancer, it was female cancer and hysterectomy done by Dr Missy Toth Heart disease Father     Cancer Brother 79       Meds/Allergies       Current Outpatient Medications:     amLODIPine (NORVASC) 10 mg tablet    atorvastatin (LIPITOR) 20 mg tablet    cholecalciferol (VITAMIN D3) 1,000 units tablet    Coenzyme Q10 (Co Q 10) 100 MG CAPS    Cyanocobalamin (VITAMIN B 12 PO)    Euthyrox 75 MCG tablet    famotidine (PEPCID) 40 MG tablet    ferrous gluconate (FERGON) 324 mg tablet    Multiple Vitamin (multivitamin) capsule   nystatin (MYCOSTATIN) 500,000 units/5 mL suspension    pantoprazole (PROTONIX) 40 mg tablet    Promethazine-DM (PHENERGAN-DM) 6 25-15 mg/5 mL oral syrup    sulfaSALAzine (AZULFIDINE) 500 mg tablet    valsartan (DIOVAN) 80 mg tablet    vitamin A 2250 MCG (7500 UT) capsule    vitamin E, tocopherol, 1,000 units capsule    valACYclovir (VALTREX) 1,000 mg tablet    Allergies   Allergen Reactions    Sulfa Antibiotics Itching           Objective     Blood pressure 151/88, pulse 68, height 5' 2" (1 575 m), weight 73 7 kg (162 lb 6 4 oz)  Body mass index is 29 7 kg/m²  PHYSICAL EXAM:      General Appearance:   Alert, cooperative, no distress   HEENT:   Normocephalic, atraumatic, anicteric      Neck:  Supple, symmetrical, trachea midline   Lungs:   Clear to auscultation bilaterally; no rales, rhonchi or wheezing; respirations unlabored    Heart[de-identified]   Regular rate and rhythm; no murmur, rub, or gallop  Abdomen:   Soft, non-tender, non-distended; normal bowel sounds; no masses, no organomegaly    Genitalia:   Deferred    Rectal:   Deferred    Extremities:  No cyanosis, clubbing or edema    Pulses:  2+ and symmetric    Skin:  No jaundice, rashes, or lesions    Lymph nodes:  No palpable cervical lymphadenopathy        Lab Results:   No visits with results within 1 Day(s) from this visit     Latest known visit with results is:   Appointment on 07/01/2022   Component Date Value    WBC 07/01/2022 4 52     RBC 07/01/2022 2 99 (A)    Hemoglobin 07/01/2022 10 0 (A)    Hematocrit 07/01/2022 30 7 (A)    MCV 07/01/2022 103 (A)    MCH 07/01/2022 33 4     MCHC 07/01/2022 32 6     RDW 07/01/2022 13 0     MPV 07/01/2022 10 1     Platelets 68/63/2785 260     nRBC 07/01/2022 0     Neutrophils Relative 07/01/2022 42 (A)    Immat GRANS % 07/01/2022 0     Lymphocytes Relative 07/01/2022 41     Monocytes Relative 07/01/2022 10     Eosinophils Relative 07/01/2022 6     Basophils Relative 07/01/2022 1     Neutrophils Absolute 07/01/2022 1 90     Immature Grans Absolute 07/01/2022 0 01     Lymphocytes Absolute 07/01/2022 1 84     Monocytes Absolute 07/01/2022 0 46     Eosinophils Absolute 07/01/2022 0 25     Basophils Absolute 07/01/2022 0 06          Radiology Results:   Echo complete w/ contrast if indicated    Result Date: 6/30/2022  Narrative: Meme Camargo  Left Ventricle: Left ventricular cavity size is normal  Wall thickness is normal  The left ventricular ejection fraction is 65%  Systolic function is normal  Wall motion is normal  Diastolic function is mildly abnormal, consistent with grade I (abnormal) relaxation  Left atrial filling pressure is elevated    Right Ventricle: Right ventricular cavity size is normal  Systolic function is normal    Aortic Valve: The aortic valve is trileaflet  The leaflets are moderately thickened  The leaflets are moderately calcified  There is moderately reduced mobility  There is moderate and There is moderate to severe stenosis  The aortic valve peak velocity is 2 81 m/s  The aortic valve peak gradient is 32 mmHg  The aortic valve mean gradient is 21 mmHg  The DVI is 0 27  The aortic valve area is 0 76 cm2 using an LVOT diameter of 1 9 cm    Mitral Valve: There is mild regurgitation    Tricuspid Valve: There is moderate regurgitation

## 2022-07-13 ENCOUNTER — TELEPHONE (OUTPATIENT)
Dept: OTHER | Facility: OTHER | Age: 82
End: 2022-07-13

## 2022-07-13 NOTE — TELEPHONE ENCOUNTER
Pt  Called to let Dr Prerna Miguel know that her Cardiologist did and echo on her because of there heart murmur and he said everything was fine, She was good to go for the procedure

## 2022-07-28 ENCOUNTER — ANESTHESIA EVENT (OUTPATIENT)
Dept: GASTROENTEROLOGY | Facility: AMBULATORY SURGERY CENTER | Age: 82
End: 2022-07-28

## 2022-07-28 ENCOUNTER — HOSPITAL ENCOUNTER (OUTPATIENT)
Dept: GASTROENTEROLOGY | Facility: AMBULATORY SURGERY CENTER | Age: 82
Discharge: HOME | End: 2022-07-28
Payer: MEDICARE

## 2022-07-28 ENCOUNTER — ANESTHESIA (OUTPATIENT)
Dept: GASTROENTEROLOGY | Facility: AMBULATORY SURGERY CENTER | Age: 82
End: 2022-07-28

## 2022-07-28 VITALS
WEIGHT: 150 LBS | HEART RATE: 57 BPM | TEMPERATURE: 98.1 F | SYSTOLIC BLOOD PRESSURE: 174 MMHG | OXYGEN SATURATION: 96 % | DIASTOLIC BLOOD PRESSURE: 80 MMHG | HEIGHT: 62 IN | BODY MASS INDEX: 27.6 KG/M2 | RESPIRATION RATE: 18 BRPM

## 2022-07-28 DIAGNOSIS — K51.211 ULCERATIVE PROCTITIS WITH RECTAL BLEEDING (HCC): ICD-10-CM

## 2022-07-28 DIAGNOSIS — D50.0 IRON DEFICIENCY ANEMIA DUE TO CHRONIC BLOOD LOSS: ICD-10-CM

## 2022-07-28 PROCEDURE — 88305 TISSUE EXAM BY PATHOLOGIST: CPT | Performed by: PATHOLOGY

## 2022-07-28 PROCEDURE — 45380 COLONOSCOPY AND BIOPSY: CPT | Performed by: INTERNAL MEDICINE

## 2022-07-28 RX ORDER — PROPOFOL 10 MG/ML
INJECTION, EMULSION INTRAVENOUS AS NEEDED
Status: DISCONTINUED | OUTPATIENT
Start: 2022-07-28 | End: 2022-07-28

## 2022-07-28 RX ORDER — SODIUM CHLORIDE, SODIUM LACTATE, POTASSIUM CHLORIDE, CALCIUM CHLORIDE 600; 310; 30; 20 MG/100ML; MG/100ML; MG/100ML; MG/100ML
INJECTION, SOLUTION INTRAVENOUS CONTINUOUS PRN
Status: DISCONTINUED | OUTPATIENT
Start: 2022-07-28 | End: 2022-07-28

## 2022-07-28 RX ADMIN — PROPOFOL 20 MG: 10 INJECTION, EMULSION INTRAVENOUS at 09:47

## 2022-07-28 RX ADMIN — PROPOFOL 30 MG: 10 INJECTION, EMULSION INTRAVENOUS at 09:51

## 2022-07-28 RX ADMIN — PROPOFOL 100 MG: 10 INJECTION, EMULSION INTRAVENOUS at 09:43

## 2022-07-28 RX ADMIN — SODIUM CHLORIDE, SODIUM LACTATE, POTASSIUM CHLORIDE, CALCIUM CHLORIDE: 600; 310; 30; 20 INJECTION, SOLUTION INTRAVENOUS at 09:28

## 2022-07-28 NOTE — ANESTHESIA PREPROCEDURE EVALUATION
Procedure:  COLONOSCOPY    Relevant Problems   CARDIO   (+) Essential hypertension   (+) Hypercholesterolemia      ENDO   (+) Hypothyroidism      GI/HEPATIC   (+) Gastroesophageal reflux disease with esophagitis   (+) Pharyngoesophageal dysphagia      HEMATOLOGY   (+) Anemia      PULMONARY   (+) Cough variant asthma         Physical Exam    Airway    Mallampati score: III  TM Distance: >3 FB  Neck ROM: full     Dental   No notable dental hx     Cardiovascular  Rhythm: regular, Rate: normal, Murmur,     Pulmonary  Pulmonary exam normal     Other Findings      6/2022 echo showing EF 65%, mod-severe AS  Patient asymptomatic and very functional  Did fine with EGD a few months ago  Anesthesia Plan  ASA Score- 2     Anesthesia Type- IV sedation with anesthesia with ASA Monitors  Additional Monitors:   Airway Plan:           Plan Factors-Exercise tolerance (METS): >4 METS  Chart reviewed  Patient summary reviewed  Patient is not a current smoker  Induction- intravenous  Postoperative Plan-     Informed Consent- Anesthetic plan and risks discussed with patient

## 2022-07-28 NOTE — H&P
History and Physical - [facility]  Carmen Lugo 79 y o  female MRN: [id number]                  HPI: Carmen Lugo is a 79y o  year old female who presents for colonoscopy, history of left-sided ulcerative colitis with intermittent rectal bleeding      REVIEW OF SYSTEMS: Per the HPI, and otherwise unremarkable  Historical Information   Past Medical History:   Diagnosis Date    Anemia     Arthritis     osteoporosis, djd knees, pt denies osteoporosis on 2/21/22    Low's esophagus     [facility] [address]     on face    Colon polyp     Cough 02/21/2022    cough for 3 years, expectorates yellow mucus    Disease of thyroid gland     low thyroid    Diverticulitis     gerd, diverticulitis    Dizziness     GERD (gastroesophageal reflux disease)     ulcerative colitis;  Low's esophagus, diverticulosis; hx of laryngopharyngeal reflux    Hyperlipidemia     Hypertension     Low vitamin D level     LPRD (laryngopharyngeal reflux disease) 02/21/2022    PONV (postoperative nausea and vomiting)     AFTER "BIG SURGERIES"    Postnasal drip     WITH COUGH    Ulcerative colitis ([facility] [address] )      Past Surgical History:   Procedure Laterality Date    APPENDECTOMY      CATARACT EXTRACTION Bilateral     COLONOSCOPY      colon polyps    COLONOSCOPY      FOOT SURGERY Bilateral     bunionectomy    HEMORROIDECTOMY      hemorroid removal    JOINT REPLACEMENT Bilateral     knees    REPLACEMENT TOTAL KNEE Bilateral     b/ replacement    TONSILLECTOMY      TUBAL LIGATION      UPPER GASTROINTESTINAL ENDOSCOPY       Social History   Social History     Substance and Sexual Activity   Alcohol Use Not Currently     Social History     Substance and Sexual Activity   Drug Use Never     Social History     Tobacco Use   Smoking Status Never Smoker   Smokeless Tobacco Never Used     Family History   Problem Relation Age of Onset    Heart disease Brother     Cancer Brother 54    Cancer Daughter         unsure of type of cancer, it was female cancer and hysterectomy done by Dr Tonya Crane disease Father     Cancer Brother 79       Meds/Allergies     (Not in a hospital admission)      Allergies   Allergen Reactions    Sulfa Antibiotics Itching       Objective     /80   Pulse 75   Temp 98 1 °F (36 7 °C)   Resp 18   Ht 5' 2" (1 575 m)   Wt 68 kg (150 lb)   SpO2 94%   BMI 27 44 kg/m²       PHYSICAL EXAM    Gen: NAD  CV: RRR  CHEST: Clear  ABD: soft, NT/ND  EXT: no edema      ASSESSMENT/PLAN:  This is a 79y o  year old female here for colonoscopy, and she is stable and optimized for her procedure

## 2022-07-28 NOTE — ANESTHESIA POSTPROCEDURE EVALUATION
Post-Op Assessment Note    CV Status:  Stable  Pain Score: 0    Pain management: adequate     Mental Status:  Alert and awake   Hydration Status:  Euvolemic   PONV Controlled:  Controlled   Airway Patency:  Patent      Post Op Vitals Reviewed: Yes      Staff: CRNA         No complications documented      BP   159/72   Temp   98   Pulse  51   Resp   16   SpO2   98

## 2022-07-29 DIAGNOSIS — K21.00 GASTROESOPHAGEAL REFLUX DISEASE WITH ESOPHAGITIS WITHOUT HEMORRHAGE: ICD-10-CM

## 2022-07-29 RX ORDER — PANTOPRAZOLE SODIUM 40 MG/1
TABLET, DELAYED RELEASE ORAL
Qty: 60 TABLET | Refills: 0 | Status: SHIPPED | OUTPATIENT
Start: 2022-07-29 | End: 2022-08-28

## 2022-08-11 DIAGNOSIS — K51.80 OTHER ULCERATIVE COLITIS WITHOUT COMPLICATION (HCC): ICD-10-CM

## 2022-08-11 RX ORDER — SULFASALAZINE 500 MG/1
TABLET ORAL
Qty: 360 TABLET | Refills: 2 | Status: SHIPPED | OUTPATIENT
Start: 2022-08-11

## 2022-08-12 DIAGNOSIS — I10 ESSENTIAL HYPERTENSION: ICD-10-CM

## 2022-08-12 DIAGNOSIS — E78.00 HYPERCHOLESTEROLEMIA: ICD-10-CM

## 2022-08-12 RX ORDER — VALSARTAN 80 MG/1
TABLET ORAL
Qty: 90 TABLET | Refills: 0 | Status: SHIPPED | OUTPATIENT
Start: 2022-08-12

## 2022-08-12 RX ORDER — ATORVASTATIN CALCIUM 20 MG/1
TABLET, FILM COATED ORAL
Qty: 90 TABLET | Refills: 0 | Status: SHIPPED | OUTPATIENT
Start: 2022-08-12

## 2022-08-28 DIAGNOSIS — K21.00 GASTROESOPHAGEAL REFLUX DISEASE WITH ESOPHAGITIS WITHOUT HEMORRHAGE: ICD-10-CM

## 2022-08-28 RX ORDER — PANTOPRAZOLE SODIUM 40 MG/1
TABLET, DELAYED RELEASE ORAL
Qty: 60 TABLET | Refills: 0 | Status: SHIPPED | OUTPATIENT
Start: 2022-08-28 | End: 2022-10-03

## 2022-09-04 DIAGNOSIS — E03.9 ACQUIRED HYPOTHYROIDISM: ICD-10-CM

## 2022-09-06 RX ORDER — LEVOTHYROXINE SODIUM 75 UG/1
TABLET ORAL
Qty: 90 TABLET | Refills: 0 | Status: SHIPPED | OUTPATIENT
Start: 2022-09-06

## 2022-09-12 DIAGNOSIS — I10 ESSENTIAL HYPERTENSION: ICD-10-CM

## 2022-09-12 RX ORDER — AMLODIPINE BESYLATE 10 MG/1
TABLET ORAL
Qty: 90 TABLET | Refills: 0 | Status: SHIPPED | OUTPATIENT
Start: 2022-09-12

## 2022-10-03 DIAGNOSIS — K21.00 GASTROESOPHAGEAL REFLUX DISEASE WITH ESOPHAGITIS WITHOUT HEMORRHAGE: ICD-10-CM

## 2022-10-03 RX ORDER — PANTOPRAZOLE SODIUM 40 MG/1
TABLET, DELAYED RELEASE ORAL
Qty: 60 TABLET | Refills: 0 | Status: SHIPPED | OUTPATIENT
Start: 2022-10-03

## 2022-10-12 PROBLEM — Z00.00 MEDICARE ANNUAL WELLNESS VISIT, SUBSEQUENT: Status: RESOLVED | Noted: 2021-02-10 | Resolved: 2022-10-12

## 2022-11-02 DIAGNOSIS — K21.00 GASTROESOPHAGEAL REFLUX DISEASE WITH ESOPHAGITIS WITHOUT HEMORRHAGE: ICD-10-CM

## 2022-11-02 RX ORDER — PANTOPRAZOLE SODIUM 40 MG/1
TABLET, DELAYED RELEASE ORAL
Qty: 60 TABLET | Refills: 0 | Status: SHIPPED | OUTPATIENT
Start: 2022-11-02

## 2022-11-04 ENCOUNTER — OFFICE VISIT (OUTPATIENT)
Dept: FAMILY MEDICINE CLINIC | Facility: CLINIC | Age: 82
End: 2022-11-04

## 2022-11-04 VITALS
HEART RATE: 88 BPM | OXYGEN SATURATION: 98 % | WEIGHT: 151 LBS | TEMPERATURE: 98.6 F | DIASTOLIC BLOOD PRESSURE: 72 MMHG | RESPIRATION RATE: 16 BRPM | BODY MASS INDEX: 27.79 KG/M2 | SYSTOLIC BLOOD PRESSURE: 122 MMHG | HEIGHT: 62 IN

## 2022-11-04 DIAGNOSIS — E78.00 HYPERCHOLESTEROLEMIA: ICD-10-CM

## 2022-11-04 DIAGNOSIS — I10 ESSENTIAL HYPERTENSION: Primary | ICD-10-CM

## 2022-11-04 DIAGNOSIS — D64.9 ANEMIA, UNSPECIFIED TYPE: ICD-10-CM

## 2022-11-04 DIAGNOSIS — K51.90 ULCERATIVE COLITIS WITHOUT COMPLICATIONS, UNSPECIFIED LOCATION (HCC): ICD-10-CM

## 2022-11-04 DIAGNOSIS — E03.9 ACQUIRED HYPOTHYROIDISM: ICD-10-CM

## 2022-11-04 DIAGNOSIS — E55.9 VITAMIN D DEFICIENCY: ICD-10-CM

## 2022-11-04 DIAGNOSIS — K21.00 GASTROESOPHAGEAL REFLUX DISEASE WITH ESOPHAGITIS, UNSPECIFIED WHETHER HEMORRHAGE: ICD-10-CM

## 2022-11-04 PROBLEM — Z91.89 RISK OF EXPOSURE TO LYME DISEASE: Status: RESOLVED | Noted: 2021-07-26 | Resolved: 2022-11-04

## 2022-11-04 NOTE — ASSESSMENT & PLAN NOTE
LDL was 119 and LFTs were normal in April 2022  Continue atorvastatin 20 mg qhs  Advised pt to follow a low cholesterol diet and to exercise on a regular basis  Will recheck

## 2022-11-04 NOTE — PROGRESS NOTES
Depression Screening and Follow-up Plan: Patient was screened for depression during today's encounter  They screened negative with a PHQ-2 score of 0  Assessment/Plan:         Problem List Items Addressed This Visit        Digestive    Ulcerative colitis without complications ([address] )     No recent bleeding or abdominal pain  Continue current meds per Dr Fang Ruiz  Last colonoscopy was in July 2022  Gastroesophageal reflux disease with esophagitis     Stable  Pt had 48 hr pH monitor and EGD in Feb 2022 by Dr Fang Ruiz  They showed increased acid production and her med was changed to pantoprazole 40 mg bid and famotidine 40 mg before supper  Endocrine    Hypothyroidism     TSH was 4 55 and Free T4 was 1 08 in April 2022  Continue Euthyrox 75 mcg qd  Recheck levels  Relevant Orders    TSH, 3rd generation    T4, free       Cardiovascular and Mediastinum    Essential hypertension - Primary     BP good  Continue amlodipine 10 mg and valsartan 80 mg qd  Pt advised to continue low Na diet and to exercise on a regular basis  Relevant Orders    Comprehensive metabolic panel       Other    Vitamin D deficiency     Level 32 7 in April 2022  Continue 4000 U qd  Hypercholesterolemia     LDL was 119 and LFTs were normal in April 2022  Continue atorvastatin 20 mg qhs  Advised pt to follow a low cholesterol diet and to exercise on a regular basis  Will recheck  Relevant Orders    Lipid panel    Comprehensive metabolic panel    Anemia     Taking Fe pill qd  Hgb was a little lower at 10 2 in April 2022  Will recheck CBC  Relevant Orders    CBC and differential            Subjective:      Patient ID: Marko Dean is a 79 y o  female  Pt here for f/u HTN, HL, Hypothyroidism, Anemia, GERD, Ulcerative Colitis  No cp/sob  No headaches  Had flu shot  No recent GERD  Still gets cough at times  No recent abd pain or bleeding  BP has been good         The following portions of the patient's history were reviewed and updated as appropriate:   Past Medical History:  She has a past medical history of Anemia, Arthritis, Low's esophagus, Cancer ([facility] ), Colon polyp, Cough (02/21/2022), Disease of thyroid gland, Diverticulitis, Dizziness, GERD (gastroesophageal reflux disease), Hyperlipidemia, Hypertension, Low vitamin D level, LPRD (laryngopharyngeal reflux disease) (02/21/2022), PONV (postoperative nausea and vomiting), Postnasal drip, and Ulcerative colitis ([facility] )  ,  _______________________________________________________________________  Medical Problems:  does not have any pertinent problems on file ,  _______________________________________________________________________  Past Surgical History:   has a past surgical history that includes Colonoscopy; Replacement total knee (Bilateral); Hemorroidectomy; Tonsillectomy; Tubal ligation; Foot surgery (Bilateral); Upper gastrointestinal endoscopy; Colonoscopy; Joint replacement (Bilateral); Cataract extraction (Bilateral); and Appendectomy  ,  _______________________________________________________________________  Family History:  family history includes Cancer in her daughter; Cancer (age of onset: 54) in her brother; Cancer (age of onset: 79) in her brother; Heart disease in her brother and father ,  _______________________________________________________________________  Social History:   reports that she has never smoked  She has never used smokeless tobacco  She reports previous alcohol use  She reports that she does not use drugs  ,  _______________________________________________________________________  Allergies:  is allergic to sulfa antibiotics     _______________________________________________________________________  Current Outpatient Medications   Medication Sig Dispense Refill   • amLODIPine (NORVASC) 10 mg tablet Take 1 tablet by mouth once daily 90 tablet 0   • atorvastatin (LIPITOR) 20 mg tablet Take 1 tablet by mouth once daily 90 tablet 0   • cholecalciferol (VITAMIN D3) 1,000 units tablet Take 1,000 Units by mouth daily     • Coenzyme Q10 (Co Q 10) 100 MG CAPS Take by mouth     • Cyanocobalamin (VITAMIN B 12 PO) Take by mouth     • Euthyrox 75 MCG tablet Take 1 tablet by mouth once daily 90 tablet 0   • famotidine (PEPCID) 40 MG tablet TAKE 1 TABLET BY MOUTH ONCE DAILY BEFORE SUPPER 90 tablet 0   • ferrous gluconate (FERGON) 324 mg tablet Take 324 mg by mouth daily with breakfast Takes once a week     • fluticasone (FLONASE) 50 mcg/act nasal spray 2 sprays into each nostril daily 16 g 11   • Multiple Vitamin (multivitamin) capsule Take 1 capsule by mouth daily     • pantoprazole (PROTONIX) 40 mg tablet TAKE 1 TABLET BY MOUTH TWICE DAILY 30  MINUTES  BEFORE  MEALS 60 tablet 0   • sulfaSALAzine (AZULFIDINE) 500 mg tablet TAKE 2 TABLETS BY MOUTH TWICE A  tablet 2   • valsartan (DIOVAN) 80 mg tablet Take 1 tablet by mouth once daily 90 tablet 0   • vitamin A 2250 MCG (7500 UT) capsule Take 7,500 Units by mouth daily     • vitamin E, tocopherol, 1,000 units capsule Take 1,000 Units by mouth daily     • nystatin (MYCOSTATIN) 500,000 units/5 mL suspension Apply 5 mL (500,000 Units total) to the mouth or throat 4 (four) times a day (Patient not taking: No sig reported) 200 mL 1   • Promethazine-DM (PHENERGAN-DM) 6 25-15 mg/5 mL oral syrup Take 5 mL by mouth 2 (two) times daily after meals (Patient not taking: No sig reported) 240 mL 1   • valACYclovir (VALTREX) 1,000 mg tablet Take 1 tablet (1,000 mg total) by mouth 3 (three) times a day for 7 days (Patient not taking: No sig reported) 21 tablet 0     No current facility-administered medications for this visit      _______________________________________________________________________  Review of Systems   Constitutional: Negative for fatigue and unexpected weight change  Respiratory: Negative for cough, chest tightness and shortness of breath      Cardiovascular: Negative for chest pain and palpitations  Gastrointestinal: Negative for abdominal pain, constipation, diarrhea, nausea and vomiting  Genitourinary: Negative for difficulty urinating  Musculoskeletal: Negative for arthralgias  Skin: Negative for rash  Neurological: Negative for dizziness and headaches  Objective:  Vitals:    11/04/22 1403   BP: 122/72   BP Location: Left arm   Patient Position: Standing   Cuff Size: Standard   Pulse: 88   Resp: 16   Temp: 98 6 °F (37 °C)   TempSrc: Temporal   SpO2: 98%   Weight: 68 5 kg (151 lb)   Height: 5' 2" (1 575 m)     Body mass index is 27 62 kg/m²  Physical Exam  Vitals and nursing note reviewed  Constitutional:       Appearance: Normal appearance  She is well-developed  HENT:      Head: Normocephalic and atraumatic  Cardiovascular:      Rate and Rhythm: Normal rate and regular rhythm  Heart sounds: Normal heart sounds  No murmur heard  Pulmonary:      Effort: Pulmonary effort is normal  No respiratory distress  Breath sounds: Normal breath sounds  No wheezing  Musculoskeletal:      Cervical back: Normal range of motion and neck supple  Right lower leg: No edema  Left lower leg: No edema  Lymphadenopathy:      Cervical: No cervical adenopathy  Neurological:      Mental Status: She is alert and oriented to person, place, and time  Psychiatric:         Mood and Affect: Mood normal          Behavior: Behavior normal          Thought Content:  Thought content normal          Judgment: Judgment normal

## 2022-11-04 NOTE — ASSESSMENT & PLAN NOTE
Stable  Pt had 48 hr pH monitor and EGD in Feb 2022 by Dr Barbie Bhardwaj  They showed increased acid production and her med was changed to pantoprazole 40 mg bid and famotidine 40 mg before supper

## 2022-11-04 NOTE — ASSESSMENT & PLAN NOTE
No recent bleeding or abdominal pain  Continue current meds per Dr Popeye Carbajal  Last colonoscopy was in July 2022

## 2022-11-09 ENCOUNTER — APPOINTMENT (OUTPATIENT)
Dept: LAB | Facility: CLINIC | Age: 82
End: 2022-11-09

## 2022-11-09 DIAGNOSIS — E03.9 ACQUIRED HYPOTHYROIDISM: ICD-10-CM

## 2022-11-09 DIAGNOSIS — R79.89 LOW SERUM SODIUM: Primary | ICD-10-CM

## 2022-11-09 DIAGNOSIS — D64.9 ANEMIA, UNSPECIFIED TYPE: ICD-10-CM

## 2022-11-09 DIAGNOSIS — E78.00 HYPERCHOLESTEROLEMIA: ICD-10-CM

## 2022-11-09 DIAGNOSIS — I10 ESSENTIAL HYPERTENSION: ICD-10-CM

## 2022-11-09 LAB
ALBUMIN SERPL BCP-MCNC: 3.6 G/DL (ref 3.5–5)
ALP SERPL-CCNC: 90 U/L (ref 46–116)
ALT SERPL W P-5'-P-CCNC: 22 U/L (ref 12–78)
ANION GAP SERPL CALCULATED.3IONS-SCNC: 7 MMOL/L (ref 4–13)
AST SERPL W P-5'-P-CCNC: 21 U/L (ref 5–45)
BASOPHILS # BLD AUTO: 0.04 THOUSANDS/ÂΜL (ref 0–0.1)
BASOPHILS NFR BLD AUTO: 1 % (ref 0–1)
BILIRUB SERPL-MCNC: 0.62 MG/DL (ref 0.2–1)
BUN SERPL-MCNC: 12 MG/DL (ref 5–25)
CALCIUM SERPL-MCNC: 9.4 MG/DL (ref 8.3–10.1)
CHLORIDE SERPL-SCNC: 99 MMOL/L (ref 96–108)
CHOLEST SERPL-MCNC: 134 MG/DL
CO2 SERPL-SCNC: 24 MMOL/L (ref 21–32)
CREAT SERPL-MCNC: 0.78 MG/DL (ref 0.6–1.3)
EOSINOPHIL # BLD AUTO: 0.25 THOUSAND/ÂΜL (ref 0–0.61)
EOSINOPHIL NFR BLD AUTO: 5 % (ref 0–6)
ERYTHROCYTE [DISTWIDTH] IN BLOOD BY AUTOMATED COUNT: 12.6 % (ref 11.6–15.1)
GFR SERPL CREATININE-BSD FRML MDRD: 70 ML/MIN/1.73SQ M
GLUCOSE P FAST SERPL-MCNC: 94 MG/DL (ref 65–99)
HCT VFR BLD AUTO: 30.1 % (ref 34.8–46.1)
HDLC SERPL-MCNC: 51 MG/DL
HGB BLD-MCNC: 9.9 G/DL (ref 11.5–15.4)
IMM GRANULOCYTES # BLD AUTO: 0.01 THOUSAND/UL (ref 0–0.2)
IMM GRANULOCYTES NFR BLD AUTO: 0 % (ref 0–2)
LDLC SERPL CALC-MCNC: 65 MG/DL (ref 0–100)
LYMPHOCYTES # BLD AUTO: 1.48 THOUSANDS/ÂΜL (ref 0.6–4.47)
LYMPHOCYTES NFR BLD AUTO: 31 % (ref 14–44)
MCH RBC QN AUTO: 34.4 PG (ref 26.8–34.3)
MCHC RBC AUTO-ENTMCNC: 32.9 G/DL (ref 31.4–37.4)
MCV RBC AUTO: 105 FL (ref 82–98)
MONOCYTES # BLD AUTO: 0.63 THOUSAND/ÂΜL (ref 0.17–1.22)
MONOCYTES NFR BLD AUTO: 13 % (ref 4–12)
NEUTROPHILS # BLD AUTO: 2.35 THOUSANDS/ÂΜL (ref 1.85–7.62)
NEUTS SEG NFR BLD AUTO: 50 % (ref 43–75)
NONHDLC SERPL-MCNC: 83 MG/DL
NRBC BLD AUTO-RTO: 0 /100 WBCS
PLATELET # BLD AUTO: 290 THOUSANDS/UL (ref 149–390)
PMV BLD AUTO: 9.9 FL (ref 8.9–12.7)
POTASSIUM SERPL-SCNC: 3.9 MMOL/L (ref 3.5–5.3)
PROT SERPL-MCNC: 7.5 G/DL (ref 6.4–8.4)
RBC # BLD AUTO: 2.88 MILLION/UL (ref 3.81–5.12)
SODIUM SERPL-SCNC: 130 MMOL/L (ref 135–147)
T4 FREE SERPL-MCNC: 1.43 NG/DL (ref 0.76–1.46)
TRIGL SERPL-MCNC: 89 MG/DL
TSH SERPL DL<=0.05 MIU/L-ACNC: 2.67 UIU/ML (ref 0.45–4.5)
WBC # BLD AUTO: 4.76 THOUSAND/UL (ref 4.31–10.16)

## 2022-11-10 DIAGNOSIS — I10 ESSENTIAL HYPERTENSION: ICD-10-CM

## 2022-11-10 DIAGNOSIS — E78.00 HYPERCHOLESTEROLEMIA: ICD-10-CM

## 2022-11-10 RX ORDER — VALSARTAN 80 MG/1
TABLET ORAL
Qty: 90 TABLET | Refills: 0 | Status: SHIPPED | OUTPATIENT
Start: 2022-11-10

## 2022-11-10 RX ORDER — ATORVASTATIN CALCIUM 20 MG/1
TABLET, FILM COATED ORAL
Qty: 90 TABLET | Refills: 0 | Status: SHIPPED | OUTPATIENT
Start: 2022-11-10

## 2022-11-17 ENCOUNTER — HOSPITAL ENCOUNTER (OUTPATIENT)
Dept: CT IMAGING | Facility: HOSPITAL | Age: 82
Discharge: HOME | End: 2022-11-17
Attending: OTOLARYNGOLOGY

## 2022-11-17 DIAGNOSIS — R05.3 CHRONIC COUGH: ICD-10-CM

## 2022-11-23 ENCOUNTER — APPOINTMENT (OUTPATIENT)
Dept: LAB | Facility: CLINIC | Age: 82
End: 2022-11-23

## 2022-11-23 DIAGNOSIS — R79.89 LOW SERUM SODIUM: ICD-10-CM

## 2022-11-23 LAB
ANION GAP SERPL CALCULATED.3IONS-SCNC: 7 MMOL/L (ref 4–13)
BUN SERPL-MCNC: 8 MG/DL (ref 5–25)
CALCIUM SERPL-MCNC: 9.2 MG/DL (ref 8.3–10.1)
CHLORIDE SERPL-SCNC: 101 MMOL/L (ref 96–108)
CO2 SERPL-SCNC: 25 MMOL/L (ref 21–32)
CREAT SERPL-MCNC: 0.89 MG/DL (ref 0.6–1.3)
GFR SERPL CREATININE-BSD FRML MDRD: 60 ML/MIN/1.73SQ M
GLUCOSE SERPL-MCNC: 106 MG/DL (ref 65–140)
POTASSIUM SERPL-SCNC: 3.8 MMOL/L (ref 3.5–5.3)
SODIUM SERPL-SCNC: 133 MMOL/L (ref 135–147)

## 2022-11-30 ENCOUNTER — OFFICE VISIT (OUTPATIENT)
Dept: CARDIOLOGY CLINIC | Facility: CLINIC | Age: 82
End: 2022-11-30

## 2022-11-30 VITALS
SYSTOLIC BLOOD PRESSURE: 126 MMHG | DIASTOLIC BLOOD PRESSURE: 78 MMHG | HEIGHT: 62 IN | HEART RATE: 81 BPM | OXYGEN SATURATION: 97 % | BODY MASS INDEX: 27.38 KG/M2 | WEIGHT: 148.8 LBS

## 2022-11-30 DIAGNOSIS — E78.00 HYPERCHOLESTEROLEMIA: ICD-10-CM

## 2022-11-30 DIAGNOSIS — I35.0 NON-RHEUMATIC AORTIC STENOSIS: Primary | ICD-10-CM

## 2022-11-30 DIAGNOSIS — I10 ESSENTIAL HYPERTENSION: ICD-10-CM

## 2022-11-30 NOTE — PATIENT INSTRUCTIONS
Transcatheter Aortic Valve Replacement   AMBULATORY CARE:   What you need to know about a TAVR:   A TAVR is a procedure to replace your aortic valve  It is done without removing your old valve  The aortic valve is between the left ventricle and the aorta  The left ventricle is the lower left chamber of your heart  The aorta is a blood vessel that pumps blood to your brain and body  The aortic valve opens and closes to let blood flow from your heart to the rest of your body  TAVR may be used to replace your aortic valve if open heart surgery is not safe for you  Your valve will be replaced with a tissue valve  The tissue may be taken from an animal, such as a pig or cow  What will happen before a TAVR:   You may need an echocardiogram, angiogram, EKG, or CT scan before your procedure  These tests will help your healthcare provider plan your procedure  They will also make sure a TAVR is safe for you  You may need to stop taking blood thinner medicine several days before your procedure  This will prevent heavy bleeding during your procedure  Your healthcare provider will talk to you about how to prepare for your procedure  He may tell you not to eat or drink anything after midnight on the day of your procedure  He will tell you what medicines to take or not take on the day of your procedure  You may stay in the hospital 2 to 5 days after your procedure  Arrange for someone to drive you home and stay with you  This person can call 911 if you have problems at home  What will happen during a TAVR:   You may be given general anesthesia to keep you asleep and free from pain during your procedure  You may instead be given IV sedation and local anesthesia  IV sedation will make you feel calm and relaxed during the procedure  With local anesthesia, you may still feel pressure or pushing during your procedure, but you should not feel any pain   You may be given an antibiotic through your IV to prevent a bacterial infection  Tell healthcare providers if you have ever had an allergic reaction to an antibiotic  Your healthcare provider will make an incision in your neck, groin, or chest  He will guide a catheter with a balloon on the end through a blood vessel and into your heart  He will inflate the balloon in the opening of your valve  This balloon make space for your new valve to fit inside the old one  The balloon will be deflated and the catheter will be removed  Your healthcare provider will insert another catheter into your heart  This catheter will hold a balloon, a stent, and the new valve  The new valve will be inside of the stent  When the catheter reaches your valve, your healthcare provider will expand the balloon  The balloon will push your old valve against the walls of your heart  The stent and new valve will be put in the old valve's position  The balloon will be deflated  The stent will continue to hold your old valve out of the way  It will also keep your new valve in the correct place  Your healthcare provider will remove the catheter and wire  He may use clamps, stitches, or other devices to close the incision  Pressure will be applied to the incision for several minutes to stop any bleeding  A pressure bandage or other pressure device may be placed over the incision to help prevent more bleeding  What will happen after a TAVR:   Healthcare providers will monitor your vital signs and pulses in your arm or leg, closely  They will check your pressure bandage for bleeding or swelling  You will need to lie flat with your leg or arm straight for 2 to 4 hours  Do not  get out of bed until healthcare providers says it is okay  Arm or leg movements can cause serious bleeding  You may need blood tests, a chest x-ray, an EKG, or an echocardiogram before you leave the hospital  These tests will make sure your valve is working correctly   You will need to take blood thinner medicine for at least 6 months after your procedure  You may need to take aspirin for the rest of your life  These medicines will prevent blood clots from forming near your valve  Risks of a TAVR:  You may have bruising or pain where the catheter was  You may get an infection or bleed more than expected  The catheter may cause a hole in your heart or blood vessels  A blood clot may form around your valve  The clot may travel to your brain and cause a stroke  Your heartbeat may become irregular during or after a TAVR  You may need medicines or procedures to treat this  Your new valve may not work correctly  You may need another procedure to replace the valve  Follow up with your doctor as directed:  Write down your questions so you remember to ask them during your visits  © Copyright [employer] 2022 Information is for End User's use only and may not be sold, redistributed or otherwise used for commercial purposes  All illustrations and images included in CareNotes® are the copyrighted property of [employer] A M , Inc  or [facility]  The above information is an  only  It is not intended as medical advice for individual conditions or treatments  Talk to your doctor, nurse or pharmacist before following any medical regimen to see if it is safe and effective for you  Aortic Stenosis   WHAT YOU NEED TO KNOW:   Aortic stenosis is a condition that makes your aortic valve become narrow and stiff  The narrow, stiff valve causes your heart to work harder to pump blood into the aorta         DISCHARGE INSTRUCTIONS:   Call your local emergency number (911 in the [address] [place] [address],[address]) or have someone call if:   You have any of the following signs of a heart attack:      Squeezing, pressure, or pain in your chest    You may  also have any of the following:     Discomfort or pain in your back, neck, jaw, stomach, or arm    Shortness of breath    Nausea or vomiting    Lightheadedness or a sudden cold sweat    You have any of the following signs of a stroke:      Numbness or drooping on one side of your face     Weakness in an arm or leg    Confusion or difficulty speaking    Dizziness, a severe headache, or vision loss    Return to the emergency department if:   You have chest pain when you move around  It goes away when you are still  You have increasing shortness of breath  You faint  Call your doctor or cardiologist if:   The veins in your neck look swollen or are bulging  You have increased swelling in your legs or ankles  Your heart beats faster than usual     You feel your heart flutter often  You have questions or concerns about your condition or care  Medicines: You may need any of the following:  Medicines  may help decrease your cholesterol levels and your blood pressure  You may also be given medicine to help lower swelling  Take your medicine as directed  Contact your healthcare provider if you think your medicine is not helping or if you have side effects  Tell him or her if you are allergic to any medicine  Keep a list of the medicines, vitamins, and herbs you take  Include the amounts, and when and why you take them  Bring the list or the pill bottles to follow-up visits  Carry your medicine list with you in case of an emergency  Manage aortic stenosis:   Limit activities  Your healthcare provider may have you limit strenuous activity  Strenuous activity will make your heart work too hard  Ask your healthcare provider what activities are safe for you to do  Eat heart-healthy foods  Heart-healthy foods include salmon, tuna, walnuts, whole-grain breads, low-fat dairy products, beans, and oils such as olive or canola oil  A dietitian or your provider can give you more information on meal plans such as the DASH (Dietary Approaches to Stop Hypertension) eating plan  The DASH plan is low in sodium, processed sugar, unhealthy fats, and total fat  It is high in potassium, calcium, and fiber   These can be found in vegetables, fruit, and whole-grain foods  Limit sodium (salt) as directed  Too much sodium can affect your fluid balance  Check labels to find low-sodium or no-salt-added foods  You can also make small changes to get less salt  For example, if you add salt while you cook, do not add more salt at the table  Ask your healthcare provider or dietitian for more ways to cut down on salt  Limit or do not drink alcohol  Ask your healthcare provider if it is okay for you to drink alcohol  Alcohol can increase your risk for high blood pressure and coronary artery disease  Your provider can tell you how many drinks are okay to have within 24 hours or within 1 week  A drink of alcohol is 12 ounces of beer, 5 ounces of wine, or 1½ ounces of liquor  Maintain a healthy weight  Being overweight can increase your risk for high blood pressure and coronary artery disease  These conditions can make your symptoms worse  Ask your healthcare provider what a healthy weight is for you  Ask him or her to help you create a weight loss plan if you are overweight  Talk to your healthcare provider about pregnancy  If you are a woman and want to get pregnant, talk to your healthcare provider  You and your baby may need to be monitored by specialists during your pregnancy  Ask about vaccines you may need  Certain diseases are dangerous for a person who has aortic stenosis  Vaccines help prevent infections that can cause some diseases  Get a flu vaccine as soon as recommended each year, usually in September or October  Your healthcare provider can tell you if you also need other vaccines, and when to get them  Prevent aortic stenosis:   Manage other health conditions  High blood pressure and high cholesterol levels increase your risk for aortic stenosis  Ask your healthcare provider for more information on managing these conditions  Get treatment for strep throat    If strep throat is not treated, it can cause rheumatic fever  Take care of your teeth and gums  Gingivitis, a gum disease, increases your risk for aortic stenosis  See your dental provider regularly to treat problems early  Follow up with your doctor or cardiologist as directed: You may need to return for more tests to check your heart over time  Write down your questions so you remember to ask them during your visits  © Copyright [employer] 2022 Information is for End User's use only and may not be sold, redistributed or otherwise used for commercial purposes  All illustrations and images included in CareNotes® are the copyrighted property of [employer]  or [facility]   The above information is an  only  It is not intended as medical advice for individual conditions or treatments  Talk to your doctor, nurse or pharmacist before following any medical regimen to see if it is safe and effective for you

## 2022-11-30 NOTE — PROGRESS NOTES
Cardiology Follow Up    Kane Almeida  [id number]  1940  [facility] [place]  [address] [place], [place] [address] [facility] [address]      1  Non-rheumatic aortic stenosis  Echo complete w/ contrast if indicated      2  Essential hypertension        3  Hypercholesterolemia            Discussion/Summary:    Aortic stenosis is moderate to severe  Mean gradient was 21, DVI 0 27  However, the calculated valve area was around 0 8 cm2  She remains asymptomatic  I encouraged her to do a little bit more aerobic exercise  She says her memory is poor  I advised her to have family members come to next visit, as I do suspect that intervention on the valve will be forthcoming and I would like to discuss further  Overall, she seems to be a quite reasonable candidate for TAVR  I will repeat an echo in 6 months and see her afterwards  If she has any symptoms in the meantime, she will contact me  History of Present Illness:    79-year-old female who had been referred to me after gastroenterologists heard a murmur  Echocardiogram showed moderate to severe aortic stenosis diagnosed in June of 2022  LV function is normal     She lives independently with her   She is reasonably active with walking but does not do any formal exercise  She had denied any symptoms  Interval history:  She returns for follow-up  We reviewed her echocardiogram from June which showed moderate to severe aortic stenosis  I discussed the diagnosis with her and the ultimate need for intervention if she develops severe aortic stenosis and symptoms  At this time, she seems to be asymptomatic still  I encouraged her to do little bit more aerobic exercise  She has not had any PND, orthopnea, chest pain  No lower extremity edema          Patient Active Problem List   Diagnosis   • Essential hypertension   • Hypercholesterolemia   • Hypothyroidism   • Ulcerative colitis without complications (RUST 75 )   • Diverticulosis   • Low's esophagus   • Colon polyp   • Gastroesophageal reflux disease with esophagitis   • Vitamin D deficiency   • Anemia   • Chronic cough   • Ulcerative colitis with complication (HCC)   • Dairy product intolerance   • Marcell allergy   • B12 deficiency   • Paresis of right vocal fold   • Cough variant asthma    • Dysphonia   • Pharyngoesophageal dysphagia   • Dysfunction of both eustachian tubes   • Herpes zoster without complication   • Non-rheumatic aortic stenosis     Past Medical History:   Diagnosis Date   • Anemia    • Arthritis     osteoporosis, djd knees, pt denies osteoporosis on 2/21/22   • Low's esophagus    • Cancer ([place] [address] )     on face   • Colon polyp    • Cough 02/21/2022    cough for 3 years, expectorates yellow mucus   • Disease of thyroid gland     low thyroid   • Diverticulitis     gerd, diverticulitis   • Dizziness    • GERD (gastroesophageal reflux disease)     ulcerative colitis;  Low's esophagus, diverticulosis; hx of laryngopharyngeal reflux   • Hyperlipidemia    • Hypertension    • Low vitamin D level    • LPRD (laryngopharyngeal reflux disease) 02/21/2022   • PONV (postoperative nausea and vomiting)     AFTER "BIG SURGERIES"   • Postnasal drip     WITH COUGH   • Ulcerative colitis ([place] [address] )      Social History     Tobacco Use   • Smoking status: Never   • Smokeless tobacco: Never   Vaping Use   • Vaping Use: Never used   Substance Use Topics   • Alcohol use: Not Currently   • Drug use: Never      Family History   Problem Relation Age of Onset   • Heart disease Brother    • Cancer Brother 54   • Cancer Daughter         unsure of type of cancer, it was female cancer and hysterectomy done by Dr Mike Hartman   • Heart disease Father    • Cancer Brother 79     Past Surgical History:   Procedure Laterality Date   • APPENDECTOMY     • CATARACT EXTRACTION Bilateral    • COLONOSCOPY      colon polyps   • COLONOSCOPY     • FOOT SURGERY Bilateral     bunionectomy   • HEMORROIDECTOMY      hemorroid removal   • JOINT REPLACEMENT Bilateral     knees   • REPLACEMENT TOTAL KNEE Bilateral     b/ replacement   • TONSILLECTOMY     • TUBAL LIGATION     • UPPER GASTROINTESTINAL ENDOSCOPY         Current Outpatient Medications:   •  amLODIPine (NORVASC) 10 mg tablet, Take 1 tablet by mouth once daily, Disp: 90 tablet, Rfl: 0  •  atorvastatin (LIPITOR) 20 mg tablet, Take 1 tablet by mouth once daily, Disp: 90 tablet, Rfl: 0  •  cholecalciferol (VITAMIN D3) 1,000 units tablet, Take 1,000 Units by mouth daily, Disp: , Rfl:   •  Coenzyme Q10 (Co Q 10) 100 MG CAPS, Take by mouth, Disp: , Rfl:   •  Cyanocobalamin (VITAMIN B 12 PO), Take by mouth, Disp: , Rfl:   •  Euthyrox 75 MCG tablet, Take 1 tablet by mouth once daily, Disp: 90 tablet, Rfl: 0  •  famotidine (PEPCID) 40 MG tablet, TAKE 1 TABLET BY MOUTH ONCE DAILY BEFORE SUPPER, Disp: 90 tablet, Rfl: 0  •  ferrous gluconate (FERGON) 324 mg tablet, Take 324 mg by mouth daily with breakfast Takes once a week, Disp: , Rfl:   •  Multiple Vitamin (multivitamin) capsule, Take 1 capsule by mouth daily, Disp: , Rfl:   •  pantoprazole (PROTONIX) 40 mg tablet, TAKE 1 TABLET BY MOUTH TWICE DAILY 30  MINUTES  BEFORE  MEALS, Disp: 60 tablet, Rfl: 0  •  sulfaSALAzine (AZULFIDINE) 500 mg tablet, TAKE 2 TABLETS BY MOUTH TWICE A DAY, Disp: 360 tablet, Rfl: 2  •  valsartan (DIOVAN) 80 mg tablet, Take 1 tablet by mouth once daily, Disp: 90 tablet, Rfl: 0  •  vitamin A 2250 MCG (7500 UT) capsule, Take 7,500 Units by mouth daily, Disp: , Rfl:   •  vitamin E, tocopherol, 1,000 units capsule, Take 1,000 Units by mouth daily, Disp: , Rfl:   •  fluticasone (FLONASE) 50 mcg/act nasal spray, 2 sprays into each nostril daily (Patient not taking: Reported on 11/30/2022), Disp: 16 g, Rfl: 11  •  nystatin (MYCOSTATIN) 500,000 units/5 mL suspension, Apply 5 mL (500,000 Units total) to the mouth or throat 4 (four) times a day (Patient not taking: Reported on 10/17/2022), Disp: 200 mL, Rfl: 1  •  Promethazine-DM (PHENERGAN-DM) 6 25-15 mg/5 mL oral syrup, Take 5 mL by mouth 2 (two) times daily after meals (Patient not taking: Reported on 10/17/2022), Disp: 240 mL, Rfl: 1  •  valACYclovir (VALTREX) 1,000 mg tablet, Take 1 tablet (1,000 mg total) by mouth 3 (three) times a day for 7 days (Patient not taking: Reported on 3/18/2022), Disp: 21 tablet, Rfl: 0  Allergies   Allergen Reactions   • Sulfa Antibiotics Itching       Vitals:    11/30/22 0903   BP: 126/78   BP Location: Left arm   Patient Position: Sitting   Cuff Size: Adult   Pulse: 81   SpO2: 97%   Weight: 67 5 kg (148 lb 12 8 oz)   Height: 5' 2" (1 575 m)     Vitals:    11/30/22 0903   Weight: 67 5 kg (148 lb 12 8 oz)      Height: 5' 2" (157 5 cm)   Body mass index is 27 22 kg/m²  Physical Exam:  GEN: Karly Garibay appears well, alert and oriented x 3, pleasant and cooperative   HEENT: pupils equal, round, and reactive to light; extraocular muscles intact  NECK: supple, no carotid bruits   HEART: regular rhythm, 3/6 ANNA  LUNGS: clear to auscultation bilaterally; no wheezes, rales, or rhonchi   ABDOMEN: normal bowel sounds, soft, no tenderness, no distention  EXTREMITIES: peripheral pulses normal; no clubbing, cyanosis, or edema  NEURO: no focal findings   SKIN: normal without suspicious lesions on exposed skin    ROS:  Except as noted in HPI, is otherwise reviewed in detail and a 12 point review of systems is negative      Labs:  Lab Results   Component Value Date    SODIUM 133 (L) 11/23/2022    K 3 8 11/23/2022     11/23/2022    CREATININE 0 89 11/23/2022    BUN 8 11/23/2022    CO2 25 11/23/2022    ALT 22 11/09/2022    AST 21 11/09/2022    TSH 2 71 02/05/2021    GLUF 94 11/09/2022    WBC 4 76 11/09/2022    HGB 9 9 (L) 11/09/2022    HCT 30 1 (L) 11/09/2022     11/09/2022       No results found for: CHOL  Lab Results   Component Value Date    HDL 51 11/09/2022    HDL 57 04/12/2022    HDL 53 09/20/2021     Lab Results   Component Value Date    LDLCALC 65 11/09/2022    LDLCALC 119 (H) 04/12/2022    LDLCALC 88 09/20/2021     Lab Results   Component Value Date    TRIG 89 11/09/2022    TRIG 64 04/12/2022    TRIG 56 09/20/2021       Echo 6/30/22:  Left Ventricle: Left ventricular cavity size is normal  Wall thickness is normal  The left ventricular ejection fraction is 65%  Systolic function is normal  Wall motion is normal  Diastolic function is mildly abnormal, consistent with grade I (abnormal) relaxation  Left atrial filling pressure is elevated  •  Right Ventricle: Right ventricular cavity size is normal  Systolic function is normal   •  Aortic Valve: The aortic valve is trileaflet  The leaflets are moderately thickened  The leaflets are moderately calcified  There is moderately reduced mobility  There is moderate and There is moderate to severe stenosis  The aortic valve peak velocity is 2 81 m/s  The aortic valve peak gradient is 32 mmHg  The aortic valve mean gradient is 21 mmHg  The DVI is 0 27  The aortic valve area is 0 76 cm2 using an LVOT diameter of 1 9 cm  •  Mitral Valve: There is mild regurgitation  •  Tricuspid Valve: There is moderate regurgitation

## 2022-12-01 DIAGNOSIS — E03.9 ACQUIRED HYPOTHYROIDISM: ICD-10-CM

## 2022-12-01 DIAGNOSIS — K21.00 GASTROESOPHAGEAL REFLUX DISEASE WITH ESOPHAGITIS WITHOUT HEMORRHAGE: ICD-10-CM

## 2022-12-01 RX ORDER — PANTOPRAZOLE SODIUM 40 MG/1
TABLET, DELAYED RELEASE ORAL
Qty: 60 TABLET | Refills: 0 | Status: SHIPPED | OUTPATIENT
Start: 2022-12-01

## 2022-12-01 RX ORDER — LEVOTHYROXINE SODIUM 0.07 MG/1
TABLET ORAL
Qty: 90 TABLET | Refills: 0 | Status: SHIPPED | OUTPATIENT
Start: 2022-12-01

## 2022-12-27 DIAGNOSIS — K21.00 GASTROESOPHAGEAL REFLUX DISEASE WITH ESOPHAGITIS WITHOUT HEMORRHAGE: ICD-10-CM

## 2022-12-28 RX ORDER — PANTOPRAZOLE SODIUM 40 MG/1
TABLET, DELAYED RELEASE ORAL
Qty: 60 TABLET | Refills: 0 | Status: SHIPPED | OUTPATIENT
Start: 2022-12-28

## 2023-01-27 DIAGNOSIS — K21.00 GASTROESOPHAGEAL REFLUX DISEASE WITH ESOPHAGITIS WITHOUT HEMORRHAGE: ICD-10-CM

## 2023-01-27 RX ORDER — PANTOPRAZOLE SODIUM 40 MG/1
TABLET, DELAYED RELEASE ORAL
Qty: 60 TABLET | Refills: 0 | Status: SHIPPED | OUTPATIENT
Start: 2023-01-27

## 2023-02-04 ENCOUNTER — HOSPITAL ENCOUNTER (EMERGENCY)
Facility: HOSPITAL | Age: 83
Discharge: HOME | End: 2023-02-04
Attending: EMERGENCY MEDICINE

## 2023-02-04 ENCOUNTER — APPOINTMENT (EMERGENCY)
Dept: RADIOLOGY | Facility: HOSPITAL | Age: 83
End: 2023-02-04

## 2023-02-04 ENCOUNTER — APPOINTMENT (EMERGENCY)
Dept: ULTRASOUND IMAGING | Facility: HOSPITAL | Age: 83
End: 2023-02-04

## 2023-02-04 ENCOUNTER — APPOINTMENT (EMERGENCY)
Dept: CT IMAGING | Facility: HOSPITAL | Age: 83
End: 2023-02-04

## 2023-02-04 VITALS
DIASTOLIC BLOOD PRESSURE: 88 MMHG | OXYGEN SATURATION: 97 % | SYSTOLIC BLOOD PRESSURE: 197 MMHG | RESPIRATION RATE: 18 BRPM | TEMPERATURE: 98 F | HEART RATE: 60 BPM

## 2023-02-04 DIAGNOSIS — M54.6 ACUTE RIGHT-SIDED THORACIC BACK PAIN: Primary | ICD-10-CM

## 2023-02-04 LAB
ALBUMIN SERPL BCP-MCNC: 3.8 G/DL (ref 3.5–5)
ALP SERPL-CCNC: 86 U/L (ref 34–104)
ALT SERPL W P-5'-P-CCNC: 14 U/L (ref 7–52)
ANION GAP SERPL CALCULATED.3IONS-SCNC: 7 MMOL/L (ref 4–13)
AST SERPL W P-5'-P-CCNC: 24 U/L (ref 13–39)
BACTERIA UR QL AUTO: ABNORMAL /HPF
BASOPHILS # BLD AUTO: 0.05 THOUSANDS/ÂΜL (ref 0–0.1)
BASOPHILS NFR BLD AUTO: 1 % (ref 0–1)
BILIRUB SERPL-MCNC: 0.38 MG/DL (ref 0.2–1)
BILIRUB UR QL STRIP: NEGATIVE
BUN SERPL-MCNC: 14 MG/DL (ref 5–25)
CALCIUM SERPL-MCNC: 9 MG/DL (ref 8.4–10.2)
CHLORIDE SERPL-SCNC: 103 MMOL/L (ref 96–108)
CLARITY UR: CLEAR
CO2 SERPL-SCNC: 25 MMOL/L (ref 21–32)
COLOR UR: YELLOW
CREAT SERPL-MCNC: 0.85 MG/DL (ref 0.6–1.3)
EOSINOPHIL # BLD AUTO: 0.21 THOUSAND/ÂΜL (ref 0–0.61)
EOSINOPHIL NFR BLD AUTO: 5 % (ref 0–6)
ERYTHROCYTE [DISTWIDTH] IN BLOOD BY AUTOMATED COUNT: 12.9 % (ref 11.6–15.1)
GFR SERPL CREATININE-BSD FRML MDRD: 63 ML/MIN/1.73SQ M
GLUCOSE SERPL-MCNC: 93 MG/DL (ref 65–140)
GLUCOSE UR STRIP-MCNC: NEGATIVE MG/DL
HCT VFR BLD AUTO: 29.2 % (ref 34.8–46.1)
HGB BLD-MCNC: 9.5 G/DL (ref 11.5–15.4)
HGB UR QL STRIP.AUTO: NEGATIVE
IMM GRANULOCYTES # BLD AUTO: 0.01 THOUSAND/UL (ref 0–0.2)
IMM GRANULOCYTES NFR BLD AUTO: 0 % (ref 0–2)
KETONES UR STRIP-MCNC: NEGATIVE MG/DL
LEUKOCYTE ESTERASE UR QL STRIP: ABNORMAL
LYMPHOCYTES # BLD AUTO: 1.55 THOUSANDS/ÂΜL (ref 0.6–4.47)
LYMPHOCYTES NFR BLD AUTO: 35 % (ref 14–44)
MCH RBC QN AUTO: 33.6 PG (ref 26.8–34.3)
MCHC RBC AUTO-ENTMCNC: 32.5 G/DL (ref 31.4–37.4)
MCV RBC AUTO: 103 FL (ref 82–98)
MONOCYTES # BLD AUTO: 0.54 THOUSAND/ÂΜL (ref 0.17–1.22)
MONOCYTES NFR BLD AUTO: 12 % (ref 4–12)
NEUTROPHILS # BLD AUTO: 2.05 THOUSANDS/ÂΜL (ref 1.85–7.62)
NEUTS SEG NFR BLD AUTO: 47 % (ref 43–75)
NITRITE UR QL STRIP: NEGATIVE
NON-SQ EPI CELLS URNS QL MICRO: ABNORMAL /HPF
NRBC BLD AUTO-RTO: 0 /100 WBCS
PH UR STRIP.AUTO: 7.5 [PH]
PLATELET # BLD AUTO: 258 THOUSANDS/UL (ref 149–390)
PMV BLD AUTO: 9.5 FL (ref 8.9–12.7)
POTASSIUM SERPL-SCNC: 3.9 MMOL/L (ref 3.5–5.3)
PROT SERPL-MCNC: 6.8 G/DL (ref 6.4–8.4)
PROT UR STRIP-MCNC: NEGATIVE MG/DL
RBC # BLD AUTO: 2.83 MILLION/UL (ref 3.81–5.12)
RBC #/AREA URNS AUTO: ABNORMAL /HPF
SODIUM SERPL-SCNC: 135 MMOL/L (ref 135–147)
SP GR UR STRIP.AUTO: 1.02 (ref 1–1.03)
UROBILINOGEN UR STRIP-ACNC: <2 MG/DL
WBC # BLD AUTO: 4.41 THOUSAND/UL (ref 4.31–10.16)
WBC #/AREA URNS AUTO: ABNORMAL /HPF

## 2023-02-04 RX ORDER — LIDOCAINE 50 MG/G
1 PATCH TOPICAL ONCE
Status: DISCONTINUED | OUTPATIENT
Start: 2023-02-04 | End: 2023-02-04 | Stop reason: HOSPADM

## 2023-02-04 RX ORDER — ACETAMINOPHEN 325 MG/1
650 TABLET ORAL ONCE
Status: COMPLETED | OUTPATIENT
Start: 2023-02-04 | End: 2023-02-04

## 2023-02-04 RX ADMIN — LIDOCAINE 5% 1 PATCH: 700 PATCH TOPICAL at 16:11

## 2023-02-04 RX ADMIN — ACETAMINOPHEN 650 MG: 325 TABLET ORAL at 16:10

## 2023-02-05 LAB
ATRIAL RATE: 63 BPM
P AXIS: 0 DEGREES
PR INTERVAL: 194 MS
QRS AXIS: 7 DEGREES
QRSD INTERVAL: 84 MS
QT INTERVAL: 422 MS
QTC INTERVAL: 431 MS
T WAVE AXIS: 53 DEGREES
VENTRICULAR RATE: 63 BPM

## 2023-02-05 NOTE — ED PROVIDER NOTES
History  Chief Complaint   Patient presents with   • Flank Pain     Pt reports R flank pain, started 2 days ago  Denies urinary symptoms  HPI   79-year-old female with history of hypertension, hyperlipidemia, GERD presenting with R sided back/flank pain  She reports that the pain started approximately 2 days ago and has progressively worsened to the point where she has difficulty sleeping at night  Denies any trauma, new physical activities such as bending or twisting  Describes it as a sharp, shooting pain from the back forward around the ribs  Denies any urinary symptoms such as dysuria, hematuria  No fevers/chills, abdominal pain, diarrhea, nausea or vomiting  Denies a history of kidney stones  Has not tried taking anything for the pain yet at home  Prior to Admission Medications   Prescriptions Last Dose Informant Patient Reported? Taking?    Coenzyme Q10 (Co Q 10) 100 MG CAPS 2023  Yes Yes   Sig: Take by mouth   Cyanocobalamin (VITAMIN B 12 PO) 2023  Yes Yes   Sig: Take by mouth   Multiple Vitamin (multivitamin) capsule 2023  Yes Yes   Sig: Take 1 capsule by mouth daily   Promethazine-DM (PHENERGAN-DM) 6 25-15 mg/5 mL oral syrup Not Taking  No No   Sig: Take 5 mL by mouth 2 (two) times daily after meals   Patient not taking: Reported on 10/17/2022   amLODIPine (NORVASC) 10 mg tablet 2023  No Yes   Sig: Take 1 tablet by mouth once daily   atorvastatin (LIPITOR) 20 mg tablet 2023  No Yes   Sig: Take 1 tablet by mouth once daily   azelastine (ASTELIN) 0 1 % nasal spray   No No   Si sprays into each nostril 2 (two) times a day   benzonatate (TESSALON PERLES) 100 mg capsule Not Taking  No No   Sig: Take 1-2 capsules (100-200 mg total) by mouth 3 (three) times a day as needed for cough for up to 14 days   Patient not taking: Reported on 2023   cholecalciferol (VITAMIN D3) 1,000 units tablet 2023  Yes Yes   Sig: Take 1,000 Units by mouth daily   famotidine (PEPCID) 40 MG tablet 2023  No Yes   Sig: TAKE 1 TABLET BY MOUTH ONCE DAILY BEFORE SUPPER   ferrous gluconate (FERGON) 324 mg tablet 2023  Yes Yes   Sig: Take 324 mg by mouth daily with breakfast Takes once a week   fluticasone (FLONASE) 50 mcg/act nasal spray Not Taking  No No   Si sprays into each nostril daily   Patient not taking: Reported on 2022   ipratropium (ATROVENT) 0 03 % nasal spray Unknown  No No   Si sprays into each nostril 3 (three) times a day as needed (nasal drip with meals)   Patient not taking: Reported on 2023   levothyroxine 75 mcg tablet 2023  No Yes   Sig: Take 1 tablet by mouth once daily   nystatin (MYCOSTATIN) 500,000 units/5 mL suspension Not Taking  No No   Sig: Apply 5 mL (500,000 Units total) to the mouth or throat 4 (four) times a day   Patient not taking: Reported on 10/17/2022   pantoprazole (PROTONIX) 40 mg tablet 2023  No Yes   Sig: TAKE 1 TABLET BY MOUTH TWICE DAILY 30 MINUTES BEFORE MEALS   sulfaSALAzine (AZULFIDINE) 500 mg tablet Not Taking  No No   Sig: TAKE 2 TABLETS BY MOUTH TWICE A DAY   Patient not taking: Reported on 2023   valACYclovir (VALTREX) 1,000 mg tablet   No No   Sig: Take 1 tablet (1,000 mg total) by mouth 3 (three) times a day for 7 days   Patient not taking: Reported on 3/18/2022   valsartan (DIOVAN) 80 mg tablet 2023  No Yes   Sig: Take 1 tablet by mouth once daily   vitamin A 2250 MCG (7500 UT) capsule 2023  Yes Yes   Sig: Take 7,500 Units by mouth daily   vitamin E, tocopherol, 1,000 units capsule 2023  Yes Yes   Sig: Take 1,000 Units by mouth daily      Facility-Administered Medications: None       Past Medical History:   Diagnosis Date   • Anemia    • Arthritis     osteoporosis, djd knees, pt denies osteoporosis on 21   • Low's esophagus    • Cancer (HCC)     on face   • Colon polyp    • Cough 2022    cough for 3 years, expectorates yellow mucus   • Disease of thyroid gland     low thyroid   • Diverticulitis     gerd, diverticulitis   • Dizziness    • GERD (gastroesophageal reflux disease)     ulcerative colitis; Low's esophagus, diverticulosis; hx of laryngopharyngeal reflux   • Hyperlipidemia    • Hypertension    • Low vitamin D level    • LPRD (laryngopharyngeal reflux disease) 02/21/2022   • PONV (postoperative nausea and vomiting)     AFTER "BIG SURGERIES"   • Postnasal drip     WITH COUGH   • Ulcerative colitis ([address] )        Past Surgical History:   Procedure Laterality Date   • APPENDECTOMY     • CATARACT EXTRACTION Bilateral    • COLONOSCOPY      colon polyps   • COLONOSCOPY     • FOOT SURGERY Bilateral     bunionectomy   • HEMORROIDECTOMY      hemorroid removal   • JOINT REPLACEMENT Bilateral     knees   • REPLACEMENT TOTAL KNEE Bilateral     b/ replacement   • TONSILLECTOMY     • TUBAL LIGATION     • UPPER GASTROINTESTINAL ENDOSCOPY         Family History   Problem Relation Age of Onset   • Heart disease Brother    • Cancer Brother 54   • Cancer Daughter         unsure of type of cancer, it was female cancer and hysterectomy done by Dr Pippa Mcbride   • Heart disease Father    • Cancer Brother 79     I have reviewed and agree with the history as documented  E-Cigarette/Vaping   • E-Cigarette Use Never User      E-Cigarette/Vaping Substances   • Nicotine No    • THC No    • CBD No    • Flavoring No    • Other No    • Unknown No      Social History     Tobacco Use   • Smoking status: Never   • Smokeless tobacco: Never   Vaping Use   • Vaping Use: Never used   Substance Use Topics   • Alcohol use: Not Currently   • Drug use: Never        Review of Systems   Constitutional: Negative for chills and fever  HENT: Negative for ear pain and sore throat  Eyes: Negative for pain and visual disturbance  Respiratory: Negative for cough and shortness of breath  Cardiovascular: Negative for chest pain and palpitations  Gastrointestinal: Negative for abdominal pain and vomiting     Genitourinary: Positive for flank pain  Negative for dysuria and hematuria  Musculoskeletal: Positive for back pain  Negative for arthralgias  Skin: Negative for color change and rash  Neurological: Negative for seizures and syncope  All other systems reviewed and are negative  Physical Exam  ED Triage Vitals   Temperature Pulse Respirations Blood Pressure SpO2   02/04/23 1531 02/04/23 1531 02/04/23 1531 02/04/23 1531 02/04/23 1531   98 °F (36 7 °C) 71 18 130/100 97 %      Temp Source Heart Rate Source Patient Position - Orthostatic VS BP Location FiO2 (%)   02/04/23 1531 02/04/23 1531 02/04/23 1531 02/04/23 1531 --   Oral Monitor Sitting Right arm       Pain Score       02/04/23 1610       7             Orthostatic Vital Signs  Vitals:    02/04/23 1531 02/04/23 1800 02/04/23 1915   BP: 130/100 (!) 182/86 (!) 197/88   Pulse: 71 61 60   Patient Position - Orthostatic VS: Sitting  Lying       Physical Exam  Vitals and nursing note reviewed  Constitutional:       General: She is not in acute distress  Appearance: She is well-developed  HENT:      Head: Normocephalic and atraumatic  Eyes:      Conjunctiva/sclera: Conjunctivae normal    Cardiovascular:      Rate and Rhythm: Normal rate and regular rhythm  Heart sounds: No murmur heard  Pulmonary:      Effort: Pulmonary effort is normal  No respiratory distress  Breath sounds: Normal breath sounds  Abdominal:      Palpations: Abdomen is soft  Tenderness: There is no abdominal tenderness  There is no right CVA tenderness or left CVA tenderness  Musculoskeletal:         General: Tenderness present  No swelling, deformity or signs of injury  Cervical back: Neck supple  Comments: Tenderness to palpation of mid thoracic paraspinal muscles under scapula without evidence of obvious injury  Skin:     General: Skin is warm and dry  Capillary Refill: Capillary refill takes less than 2 seconds     Neurological:      Mental Status: She is alert    Psychiatric:         Mood and Affect: Mood normal          ED Medications  Medications   acetaminophen (TYLENOL) tablet 650 mg (650 mg Oral Given 2/4/23 1610)       Diagnostic Studies  Results Reviewed     Procedure Component Value Units Date/Time    Urine culture [[id number]] Collected: 02/04/23 1717    Lab Status:  In process Specimen: Urine, Clean Catch Updated: 02/04/23 2029    Comprehensive metabolic panel [[id number]] Collected: 02/04/23 1609    Lab Status: Final result Specimen: Blood from Arm, Left Updated: 02/04/23 1809     Sodium 135 mmol/L      Potassium 3 9 mmol/L      Chloride 103 mmol/L      CO2 25 mmol/L      ANION GAP 7 mmol/L      BUN 14 mg/dL      Creatinine 0 85 mg/dL      Glucose 93 mg/dL      Calcium 9 0 mg/dL      AST 24 U/L      ALT 14 U/L      Alkaline Phosphatase 86 U/L      Total Protein 6 8 g/dL      Albumin 3 8 g/dL      Total Bilirubin 0 38 mg/dL      eGFR 63 ml/min/1 73sq m     Narrative:      [place] guidelines for Chronic Kidney Disease (CKD):   •  Stage 1 with normal or high GFR (GFR > 90 mL/min/1 73 square meters)  •  Stage 2 Mild CKD (GFR = 60-89 mL/min/1 73 square meters)  •  Stage 3A Moderate CKD (GFR = 45-59 mL/min/1 73 square meters)  •  Stage 3B Moderate CKD (GFR = 30-44 mL/min/1 73 square meters)  •  Stage 4 Severe CKD (GFR = 15-29 mL/min/1 73 square meters)  •  Stage 5 End Stage CKD (GFR <15 mL/min/1 73 square meters)  Note: GFR calculation is accurate only with a steady state creatinine    Urine Microscopic [[id number]]  (Abnormal) Collected: 02/04/23 1717    Lab Status: Final result Specimen: Urine, Clean Catch Updated: 02/04/23 1730     RBC, UA 1-2 /hpf      WBC, UA 4-10 /hpf      Epithelial Cells None Seen /hpf      Bacteria, UA None Seen /hpf     UA w Reflex to Microscopic w Reflex to Culture [[id number]]  (Abnormal) Collected: 02/04/23 1717    Lab Status: Final result Specimen: Urine, Clean Catch Updated: 02/04/23 1726     Color, UA Yellow     Clarity, UA Clear     Specific Gravity, UA 1 019     pH, UA 7 5     Leukocytes, UA Trace     Nitrite, UA Negative     Protein, UA Negative mg/dl      Glucose, UA Negative mg/dl      Ketones, UA Negative mg/dl      Urobilinogen, UA <2 0 mg/dl      Bilirubin, UA Negative     Occult Blood, UA Negative    CBC and differential [[id number]]  (Abnormal) Collected: 02/04/23 1609    Lab Status: Final result Specimen: Blood from Arm, Left Updated: 02/04/23 1619     WBC 4 41 Thousand/uL      RBC 2 83 Million/uL      Hemoglobin 9 5 g/dL      Hematocrit 29 2 %       fL      MCH 33 6 pg      MCHC 32 5 g/dL      RDW 12 9 %      MPV 9 5 fL      Platelets 858 Thousands/uL      nRBC 0 /100 WBCs      Neutrophils Relative 47 %      Immat GRANS % 0 %      Lymphocytes Relative 35 %      Monocytes Relative 12 %      Eosinophils Relative 5 %      Basophils Relative 1 %      Neutrophils Absolute 2 05 Thousands/µL      Immature Grans Absolute 0 01 Thousand/uL      Lymphocytes Absolute 1 55 Thousands/µL      Monocytes Absolute 0 54 Thousand/µL      Eosinophils Absolute 0 21 Thousand/µL      Basophils Absolute 0 05 Thousands/µL                  US right upper quadrant   Final Result by Gary Melchor MD (02/04 1938)      Cholelithiasis without evidence of acute cholecystitis  Workstation performed: [id number]         CT renal stone study abdomen pelvis wo contrast   Final Result by Sterling Urbina MD (02/04 1835)      Nonobstructing left renal stone  No evidence of ureteral stone or hydronephrosis  1 2 cm left renal lesion is similar in size to 2019 although slightly denser and may represent hemorrhagic cyst   Nonemergent ultrasound follow-up is suggested  Gallstones      Mild fecal stasis  The study was marked in [facility] for immediate notification           Workstation performed: [id number]         XR chest 1 view portable   ED Interpretation by Isa Giron DO (02/04 1914)   No acute cardiopulmonary abnormality      Final Result by Lanie Null MD (02/05 2225)      No acute cardiopulmonary disease  Workstation performed: [id number]               Procedures  ECG 12 Lead Documentation Only    Date/Time: 2/5/2023 5:03 PM  Performed by: Sirena Wild DO  Authorized by: Sirena Wild DO     Indications / Diagnosis:  Back pain  ECG reviewed by me, the ED Provider: yes    Previous ECG:     Previous ECG:  Compared to current    Similarity:  No change    Comparison to cardiac monitor: Yes    Interpretation:     Interpretation: normal    Rate:     ECG rate:  63    ECG rate assessment: normal    Rhythm:     Rhythm: sinus rhythm    Ectopy:     Ectopy: none    QRS:     QRS axis:  Normal  Conduction:     Conduction: normal    ST segments:     ST segments:  Normal  T waves:     T waves: normal            ED Course                             SBIRT 20yo+    Flowsheet Row Most Recent Value   SBIRT (25 yo +)    In order to provide better care to our patients, we are screening all of our patients for alcohol and drug use  Would it be okay to ask you these screening questions? Yes Filed at: 02/04/2023 1847   Initial Alcohol Screen: US AUDIT-C     1  How often do you have a drink containing alcohol? 0 Filed at: 02/04/2023 1847   2  How many drinks containing alcohol do you have on a typical day you are drinking? 0 Filed at: 02/04/2023 1847   3a  Male UNDER 65: How often do you have five or more drinks on one occasion? 0 Filed at: 02/04/2023 1847   3b  FEMALE Any Age, or MALE 65+: How often do you have 4 or more drinks on one occassion? 0 Filed at: 02/04/2023 1847   Audit-C Score 0 Filed at: 02/04/2023 1847   SARBJIT: How many times in the past year have you    Used an illegal drug or used a prescription medication for non-medical reasons?  Never Filed at: 02/04/2023 1847          Devon' Criteria for PE    Flowsheet Row Most Recent Value   Devon' Criteria for PE    Clinical signs and symptoms of DVT 0 Filed at: 02/04/2023 1558   PE is primary diagnosis or equally likely 0 Filed at: 02/04/2023 1558   HR >100 0 Filed at: 02/04/2023 1558   Immobilization at least 3 days or Surgery in the previous 4 weeks 0 Filed at: 02/04/2023 1558   Previous, objectively diagnosed PE or DVT 0 Filed at: 02/04/2023 1558   Hemoptysis 0 Filed at: 02/04/2023 1558   Malignancy with treatment within 6 months or palliative 0 Filed at: 02/04/2023 1558   Wells' Criteria Total 0 Filed at: 02/04/2023 [address] [facility] Making  79-year-old female patient presenting with right-sided, sharp flank pain for 2 days  On initial evaluation, differential diagnosis included muscle strain, developing shingles, renal stone, gallbladder pathology among others  Based on physical examination, leans most likely toward acute muscle strain given reproducible tenderness to palpation of the back  However, given severity of the pain and patient's age and risk factors did obtain CT of the abdomen and pelvis to rule out obstructing renal stone  This was largely unremarkable  Right upper quadrant ultrasound was ordered given presence of gallstones on CT scan showed no evidence of acute cholecystitis  Labs showed preserved renal function, no sign of infection  Urinalysis showed no sign of infection as well  Patient was treated with p o  Tylenol and lidocaine patch with improvement in pain  Most likely patient experienced muscle strain and is now having significant pain related to this  Counseled on rest, avoiding bending and twisting, p o  anti-inflammatories at home for symptomatic relief  Counseled to follow-up with PCP and and return to the ED if symptoms acutely worsen and are unable to be managed at home  At time of discharge, patient was stable and in understanding of and in agreement with plan      Acute right-sided thoracic back pain: acute illness or injury  Amount and/or Complexity of Data Reviewed  Labs: ordered  Radiology: ordered and independent interpretation performed  Risk  OTC drugs  Disposition  Final diagnoses:   Acute right-sided thoracic back pain     Time reflects when diagnosis was documented in both MDM as applicable and the Disposition within this note     Time User Action Codes Description Comment    2/4/2023  7:40 PM Dileep Gonzalez Add [M54 6] Acute right-sided thoracic back pain       ED Disposition     ED Disposition   Discharge    Condition   Stable    Date/Time   Sat Feb 4, 2023  7:41 PM    Comment   Thaddeus Jiang discharge to home/self care                 Follow-up Information     Follow up With Specialties Details Why [address] MD Family Medicine Call  For ER follow up [address]  [address] [facility]  [address] [address] [place] [address]  [phone]            Discharge Medication List as of 2/4/2023  7:42 PM      CONTINUE these medications which have NOT CHANGED    Details   amLODIPine (NORVASC) 10 mg tablet Take 1 tablet by mouth once daily, Normal      atorvastatin (LIPITOR) 20 mg tablet Take 1 tablet by mouth once daily, Normal      cholecalciferol (VITAMIN D3) 1,000 units tablet Take 1,000 Units by mouth daily, Historical Med      Coenzyme Q10 (Co Q 10) 100 MG CAPS Take by mouth, Historical Med      Cyanocobalamin (VITAMIN B 12 PO) Take by mouth, Historical Med      famotidine (PEPCID) 40 MG tablet TAKE 1 TABLET BY MOUTH ONCE DAILY BEFORE SUPPER, Normal      ferrous gluconate (FERGON) 324 mg tablet Take 324 mg by mouth daily with breakfast Takes once a week, Historical Med      levothyroxine 75 mcg tablet Take 1 tablet by mouth once daily, Normal      Multiple Vitamin (multivitamin) capsule Take 1 capsule by mouth daily, Historical Med      pantoprazole (PROTONIX) 40 mg tablet TAKE 1 TABLET BY MOUTH TWICE DAILY 30 MINUTES BEFORE MEALS, Normal      valsartan (DIOVAN) 80 mg tablet Take 1 tablet by mouth once daily, Normal      vitamin A 2250 MCG (7500 UT) capsule Take 7,500 Units by mouth daily, Historical Med      vitamin E, tocopherol, 1,000 units capsule Take 1,000 Units by mouth daily, Historical Med      azelastine (ASTELIN) 0 1 % nasal spray 2 sprays into each nostril 2 (two) times a day, Starting Mon 12/12/2022, Until Wed 1/11/2023, Normal      benzonatate (TESSALON PERLES) 100 mg capsule Take 1-2 capsules (100-200 mg total) by mouth 3 (three) times a day as needed for cough for up to 14 days, Starting Mon 1/23/2023, Until Mon 2/6/2023 at 2359, Normal      fluticasone (FLONASE) 50 mcg/act nasal spray 2 sprays into each nostril daily, Starting Mon 10/17/2022, Normal      ipratropium (ATROVENT) 0 03 % nasal spray 2 sprays into each nostril 3 (three) times a day as needed (nasal drip with meals), Starting Mon 12/12/2022, Normal      nystatin (MYCOSTATIN) 500,000 units/5 mL suspension Apply 5 mL (500,000 Units total) to the mouth or throat 4 (four) times a day, Starting Tue 6/1/2021, Normal      Promethazine-DM (PHENERGAN-DM) 6 25-15 mg/5 mL oral syrup Take 5 mL by mouth 2 (two) times daily after meals, Starting Fri 11/5/2021, Normal      sulfaSALAzine (AZULFIDINE) 500 mg tablet TAKE 2 TABLETS BY MOUTH TWICE A DAY, Normal      valACYclovir (VALTREX) 1,000 mg tablet Take 1 tablet (1,000 mg total) by mouth 3 (three) times a day for 7 days, Starting Thu 10/7/2021, Until Thu 1/13/2022, Normal           No discharge procedures on file  PDMP Review     None           ED Provider  Attending physically available and evaluated Nella Harris  DOMO managed the patient along with the ED Attending      Electronically Signed by         Euell Dubin, DO  02/05/23 3381

## 2023-02-05 NOTE — DISCHARGE INSTRUCTIONS
- Please continue to use pain patches at home as needed, you can also take aleve, ibuprofen, or tylenol for pain at home  - Follow up with primary care physician as needed   - Return to the ED if symptoms acutely worsen and you are not able to tolerate at home

## 2023-02-06 LAB — BACTERIA UR CULT: NORMAL

## 2023-02-07 DIAGNOSIS — I10 ESSENTIAL HYPERTENSION: ICD-10-CM

## 2023-02-07 DIAGNOSIS — E78.00 HYPERCHOLESTEROLEMIA: ICD-10-CM

## 2023-02-07 RX ORDER — ATORVASTATIN CALCIUM 20 MG/1
TABLET, FILM COATED ORAL
Qty: 90 TABLET | Refills: 0 | Status: SHIPPED | OUTPATIENT
Start: 2023-02-07

## 2023-02-07 RX ORDER — VALSARTAN 80 MG/1
TABLET ORAL
Qty: 90 TABLET | Refills: 0 | Status: SHIPPED | OUTPATIENT
Start: 2023-02-07

## 2023-02-08 NOTE — ED ATTENDING ATTESTATION
2/4/2023  Angelica OLIVEROS DO, saw and evaluated the patient  I have discussed the patient with the resident/non-physician practitioner and agree with the resident's/non-physician practitioner's findings, Plan of Care, and MDM as documented in the resident's/non-physician practitioner's note, except where noted  All available labs and Radiology studies were reviewed  I was present for key portions of any procedure(s) performed by the resident/non-physician practitioner and I was immediately available to provide assistance  At this point I agree with the current assessment done in the Emergency Department  I have conducted an independent evaluation of this patient a history and physical is as follows:    80 yo F w/ right sided back/flank pain x 2 days  Worse w/ movement  No other associated symptoms  PE:  The patient is well appearing, non-toxic, in NAD  Head: normocephalic, atraumatic  HEENT: mucous membranes moist   Lungs: CTA b/l, no resp distress  Heart: RRR  No M/R/G  Abdomen: NT, ND, no R/R/G  Neuro: CN2-12 intact, GCS 15  Normal strength and sensation, normal speech and gait  Cap refill < 2 sec, skin warm and dry  No rashes or lesions  Mild tenderness R mid thoracic back laterally to paraspinal back muscles, no midline tenderness  Workup - labs, urine, CT renal stone study showed gallstones - RUQ US shows cholelithiasis w/o cholecystitis  Stable for d/c home, outpt f/u          ED Course         Critical Care Time  Procedures

## 2023-02-16 ENCOUNTER — TELEPHONE (OUTPATIENT)
Dept: FAMILY MEDICINE CLINIC | Facility: CLINIC | Age: 83
End: 2023-02-16

## 2023-02-16 DIAGNOSIS — Z12.31 ENCOUNTER FOR SCREENING MAMMOGRAM FOR BREAST CANCER: Primary | ICD-10-CM

## 2023-02-16 NOTE — TELEPHONE ENCOUNTER
Estelita Yanez came into the office needs a new order for her mammogram    Please call Estelita Yanez when order has been placed     Estelita Yanez # [phone]

## 2023-03-01 DIAGNOSIS — E03.9 ACQUIRED HYPOTHYROIDISM: ICD-10-CM

## 2023-03-01 DIAGNOSIS — K21.00 GASTROESOPHAGEAL REFLUX DISEASE WITH ESOPHAGITIS WITHOUT HEMORRHAGE: ICD-10-CM

## 2023-03-01 RX ORDER — PANTOPRAZOLE SODIUM 40 MG/1
TABLET, DELAYED RELEASE ORAL
Qty: 60 TABLET | Refills: 0 | Status: SHIPPED | OUTPATIENT
Start: 2023-03-01

## 2023-03-01 RX ORDER — LEVOTHYROXINE SODIUM 0.07 MG/1
TABLET ORAL
Qty: 90 TABLET | Refills: 0 | Status: SHIPPED | OUTPATIENT
Start: 2023-03-01

## 2023-03-21 PROBLEM — J30.9 ALLERGIC RHINITIS: Status: ACTIVE | Noted: 2023-03-21

## 2023-03-21 PROBLEM — K21.9 GASTROESOPHAGEAL REFLUX DISEASE WITHOUT ESOPHAGITIS: Status: ACTIVE | Noted: 2023-03-21

## 2023-03-21 PROBLEM — J84.10 CALCIFIED GRANULOMA OF LUNG (HCC): Status: ACTIVE | Noted: 2023-03-21

## 2023-03-21 PROBLEM — J98.4 CALCIFIED GRANULOMA OF LUNG: Status: ACTIVE | Noted: 2023-03-21

## 2023-03-30 ENCOUNTER — TELEPHONE (OUTPATIENT)
Dept: FAMILY MEDICINE CLINIC | Facility: CLINIC | Age: 83
End: 2023-03-30

## 2023-03-30 DIAGNOSIS — K21.00 GASTROESOPHAGEAL REFLUX DISEASE WITH ESOPHAGITIS WITHOUT HEMORRHAGE: ICD-10-CM

## 2023-03-30 RX ORDER — PANTOPRAZOLE SODIUM 40 MG/1
TABLET, DELAYED RELEASE ORAL
Qty: 60 TABLET | Refills: 0 | Status: SHIPPED | OUTPATIENT
Start: 2023-03-30

## 2023-03-30 NOTE — TELEPHONE ENCOUNTER
Pt is no longer taking amlodipine. She is wondering if something else can be prescribed to her. The medication makes her cough. When she stops the medication the cough goes away.

## 2023-04-03 NOTE — TELEPHONE ENCOUNTER
Spoke to pt and she stated her BP is fine and she doesn't know why she was on it in the first place. I advised her to record readings in the am and pm for 1 week and call the office with the numbers. I told her if you feel she needs medication something will be ordered at that time.

## 2023-04-28 DIAGNOSIS — K21.00 GASTROESOPHAGEAL REFLUX DISEASE WITH ESOPHAGITIS WITHOUT HEMORRHAGE: ICD-10-CM

## 2023-04-28 RX ORDER — PANTOPRAZOLE SODIUM 40 MG/1
TABLET, DELAYED RELEASE ORAL
Qty: 60 TABLET | Refills: 0 | Status: SHIPPED | OUTPATIENT
Start: 2023-04-28

## 2023-05-03 ENCOUNTER — OFFICE VISIT (OUTPATIENT)
Dept: FAMILY MEDICINE CLINIC | Facility: CLINIC | Age: 83
End: 2023-05-03

## 2023-05-03 VITALS
HEIGHT: 62 IN | SYSTOLIC BLOOD PRESSURE: 144 MMHG | BODY MASS INDEX: 27.23 KG/M2 | HEART RATE: 72 BPM | DIASTOLIC BLOOD PRESSURE: 82 MMHG | OXYGEN SATURATION: 96 % | TEMPERATURE: 97.7 F | WEIGHT: 148 LBS

## 2023-05-03 DIAGNOSIS — D64.9 ANEMIA, UNSPECIFIED TYPE: ICD-10-CM

## 2023-05-03 DIAGNOSIS — K51.90 ULCERATIVE COLITIS WITHOUT COMPLICATIONS, UNSPECIFIED LOCATION (HCC): ICD-10-CM

## 2023-05-03 DIAGNOSIS — I10 ESSENTIAL HYPERTENSION: ICD-10-CM

## 2023-05-03 DIAGNOSIS — E03.9 ACQUIRED HYPOTHYROIDISM: ICD-10-CM

## 2023-05-03 DIAGNOSIS — E78.00 HYPERCHOLESTEROLEMIA: ICD-10-CM

## 2023-05-03 DIAGNOSIS — Z00.00 MEDICARE ANNUAL WELLNESS VISIT, SUBSEQUENT: Primary | ICD-10-CM

## 2023-05-03 DIAGNOSIS — E55.9 VITAMIN D DEFICIENCY: ICD-10-CM

## 2023-05-03 DIAGNOSIS — K21.00 GASTROESOPHAGEAL REFLUX DISEASE WITH ESOPHAGITIS, UNSPECIFIED WHETHER HEMORRHAGE: ICD-10-CM

## 2023-05-03 PROBLEM — K51.919 ULCERATIVE COLITIS WITH COMPLICATION (HCC): Status: RESOLVED | Noted: 2021-07-26 | Resolved: 2023-05-03

## 2023-05-03 PROBLEM — K21.9 GASTROESOPHAGEAL REFLUX DISEASE WITHOUT ESOPHAGITIS: Status: RESOLVED | Noted: 2023-03-21 | Resolved: 2023-05-03

## 2023-05-03 RX ORDER — AMLODIPINE BESYLATE 10 MG/1
10 TABLET ORAL DAILY
Qty: 90 TABLET | Refills: 2 | Status: SHIPPED | OUTPATIENT
Start: 2023-05-03

## 2023-05-03 NOTE — PATIENT INSTRUCTIONS
Medicare Preventive Visit Patient Instructions  Thank you for completing your Welcome to Medicare Visit or Medicare Annual Wellness Visit today  Your next wellness visit will be due in one year (5/3/2024)  The screening/preventive services that you may require over the next 5-10 years are detailed below  Some tests may not apply to you based off risk factors and/or age  Screening tests ordered at today's visit but not completed yet may show as past due  Also, please note that scanned in results may not display below  Preventive Screenings:  Service Recommendations Previous Testing/Comments   Colorectal Cancer Screening  * Colonoscopy    * Fecal Occult Blood Test (FOBT)/Fecal Immunochemical Test (FIT)  * Fecal DNA/Cologuard Test  * Flexible Sigmoidoscopy Age: 39-70 years old   Colonoscopy: every 10 years (may be performed more frequently if at higher risk)  OR  FOBT/FIT: every 1 year  OR  Cologuard: every 3 years  OR  Sigmoidoscopy: every 5 years  Screening may be recommended earlier than age 39 if at higher risk for colorectal cancer  Also, an individualized decision between you and your healthcare provider will decide whether screening between the ages of 74-80 would be appropriate  Colonoscopy: 07/28/2022  FOBT/FIT: Not on file  Cologuard: Not on file  Sigmoidoscopy: Not on file          Breast Cancer Screening Age: 36 years old  Frequency: every 1-2 years  Not required if history of left and right mastectomy Mammogram: 03/09/2022    Screening Current   Cervical Cancer Screening Between the ages of 21-29, pap smear recommended once every 3 years  Between the ages of 33-67, can perform pap smear with HPV co-testing every 5 years     Recommendations may differ for women with a history of total hysterectomy, cervical cancer, or abnormal pap smears in past  Pap Smear: Not on file    Screening Not Indicated   Hepatitis C Screening Once for adults born between 1945 and 1965  More frequently in patients at high risk for Hepatitis C Hep C Antibody: Not on file        Diabetes Screening 1-2 times per year if you're at risk for diabetes or have pre-diabetes Fasting glucose: 94 mg/dL (11/9/2022)  A1C: No results in last 5 years (No results in last 5 years)  Screening Current   Cholesterol Screening Once every 5 years if you don't have a lipid disorder  May order more often based on risk factors  Lipid panel: 11/09/2022    Screening Not Indicated  History Lipid Disorder     Other Preventive Screenings Covered by Medicare:  1  Abdominal Aortic Aneurysm (AAA) Screening: covered once if your at risk  You're considered to be at risk if you have a family history of AAA  2  Lung Cancer Screening: covers low dose CT scan once per year if you meet all of the following conditions: (1) Age 50-69; (2) No signs or symptoms of lung cancer; (3) Current smoker or have quit smoking within the last 15 years; (4) You have a tobacco smoking history of at least 20 pack years (packs per day multiplied by number of years you smoked); (5) You get a written order from a healthcare provider  3  Glaucoma Screening: covered annually if you're considered high risk: (1) You have diabetes OR (2) Family history of glaucoma OR (3)  aged 48 and older OR (3)  American aged 72 and older  3  Osteoporosis Screening: covered every 2 years if you meet one of the following conditions: (1) You're estrogen deficient and at risk for osteoporosis based off medical history and other findings; (2) Have a vertebral abnormality; (3) On glucocorticoid therapy for more than 3 months; (4) Have primary hyperparathyroidism; (5) On osteoporosis medications and need to assess response to drug therapy  · Last bone density test (DXA Scan): 04/05/2022   5  HIV Screening: covered annually if you're between the age of 15-65  Also covered annually if you are younger than 13 and older than 72 with risk factors for HIV infection   For pregnant patients, it is covered up to 3 times per pregnancy  Immunizations:  Immunization Recommendations   Influenza Vaccine Annual influenza vaccination during flu season is recommended for all persons aged >= 6 months who do not have contraindications   Pneumococcal Vaccine   * Pneumococcal conjugate vaccine = PCV13 (Prevnar 13), PCV15 (Vaxneuvance), PCV20 (Prevnar 20)  * Pneumococcal polysaccharide vaccine = PPSV23 (Pneumovax) Adults 25-60 years old: 1-3 doses may be recommended based on certain risk factors  Adults 72 years old: 1-2 doses may be recommended based off what pneumonia vaccine you previously received   Hepatitis B Vaccine 3 dose series if at intermediate or high risk (ex: diabetes, end stage renal disease, liver disease)   Tetanus (Td) Vaccine - COST NOT COVERED BY MEDICARE PART B Following completion of primary series, a booster dose should be given every 10 years to maintain immunity against tetanus  Td may also be given as tetanus wound prophylaxis  Tdap Vaccine - COST NOT COVERED BY MEDICARE PART B Recommended at least once for all adults  For pregnant patients, recommended with each pregnancy  Shingles Vaccine (Shingrix) - COST NOT COVERED BY MEDICARE PART B  2 shot series recommended in those aged 48 and above     Health Maintenance Due:  There are no preventive care reminders to display for this patient  Immunizations Due:      Topic Date Due    COVID-19 Vaccine (4 - Booster for Moderna series) 01/17/2022     Advance Directives   What are advance directives? Advance directives are legal documents that state your wishes and plans for medical care  These plans are made ahead of time in case you lose your ability to make decisions for yourself  Advance directives can apply to any medical decision, such as the treatments you want, and if you want to donate organs  What are the types of advance directives? There are many types of advance directives, and each state has rules about how to use them   You may choose a combination of any of the following:  · Living will: This is a written record of the treatment you want  You can also choose which treatments you do not want, which to limit, and which to stop at a certain time  This includes surgery, medicine, IV fluid, and tube feedings  · Durable power of  for healthcare [facility]): This is a written record that states who you want to make healthcare choices for you when you are unable to make them for yourself  This person, called a proxy, is usually a family member or a friend  You may choose more than 1 proxy  · Do not resuscitate (DNR) order:  A DNR order is used in case your heart stops beating or you stop breathing  It is a request not to have certain forms of treatment, such as CPR  A DNR order may be included in other types of advance directives  · Medical directive: This covers the care that you want if you are in a coma, near death, or unable to make decisions for yourself  You can list the treatments you want for each condition  Treatment may include pain medicine, surgery, blood transfusions, dialysis, IV or tube feedings, and a ventilator (breathing machine)  · Values history: This document has questions about your views, beliefs, and how you feel and think about life  This information can help others choose the care that you would choose  Why are advance directives important? An advance directive helps you control your care  Although spoken wishes may be used, it is better to have your wishes written down  Spoken wishes can be misunderstood, or not followed  Treatments may be given even if you do not want them  An advance directive may make it easier for your family to make difficult choices about your care  Urinary Incontinence   Urinary incontinence (UI)  is when you lose control of your bladder  UI develops because your bladder cannot store or empty urine properly   The 3 most common types of UI are stress incontinence, urge incontinence, or both  Medicines:   · May be given to help strengthen your bladder control  Report any side effects of medication to your healthcare provider  Do pelvic muscle exercises often:  Your pelvic muscles help you stop urinating  Squeeze these muscles tight for 5 seconds, then relax for 5 seconds  Gradually work up to squeezing for 10 seconds  Do 3 sets of 15 repetitions a day, or as directed  This will help strengthen your pelvic muscles and improve bladder control  Train your bladder:  Go to the bathroom at set times, such as every 2 hours, even if you do not feel the urge to go  You can also try to hold your urine when you feel the urge to go  For example, hold your urine for 5 minutes when you feel the urge to go  As that becomes easier, hold your urine for 10 minutes  Self-care:   · Keep a UI record  Write down how often you leak urine and how much you leak  Make a note of what you were doing when you leaked urine  · Drink liquids as directed  You may need to limit the amount of liquid you drink to help control your urine leakage  Do not drink any liquid right before you go to bed  Limit or do not have drinks that contain caffeine or alcohol  · Prevent constipation  Eat a variety of high-fiber foods  Good examples are high-fiber cereals, beans, vegetables, and whole-grain breads  Walking is the best way to trigger your intestines to have a bowel movement  · Exercise regularly and maintain a healthy weight  Weight loss and exercise will decrease pressure on your bladder and help you control your leakage  · Use a catheter as directed  to help empty your bladder  A catheter is a tiny, plastic tube that is put into your bladder to drain your urine  · Go to behavior therapy as directed  Behavior therapy may be used to help you learn to control your urge to urinate      Weight Management   Why it is important to manage your weight:  Being overweight increases your risk of health conditions such as heart disease, high blood pressure, type 2 diabetes, and certain types of cancer  It can also increase your risk for osteoarthritis, sleep apnea, and other respiratory problems  Aim for a slow, steady weight loss  Even a small amount of weight loss can lower your risk of health problems  How to lose weight safely:  A safe and healthy way to lose weight is to eat fewer calories and get regular exercise  You can lose up about 1 pound a week by decreasing the number of calories you eat by 500 calories each day  Healthy meal plan for weight management:  A healthy meal plan includes a variety of foods, contains fewer calories, and helps you stay healthy  A healthy meal plan includes the following:  · Eat whole-grain foods more often  A healthy meal plan should contain fiber  Fiber is the part of grains, fruits, and vegetables that is not broken down by your body  Whole-grain foods are healthy and provide extra fiber in your diet  Some examples of whole-grain foods are whole-wheat breads and pastas, oatmeal, brown rice, and bulgur  · Eat a variety of vegetables every day  Include dark, leafy greens such as spinach, kale, smith greens, and mustard greens  Eat yellow and orange vegetables such as carrots, sweet potatoes, and winter squash  · Eat a variety of fruits every day  Choose fresh or canned fruit (canned in its own juice or light syrup) instead of juice  Fruit juice has very little or no fiber  · Eat low-fat dairy foods  Drink fat-free (skim) milk or 1% milk  Eat fat-free yogurt and low-fat cottage cheese  Try low-fat cheeses such as mozzarella and other reduced-fat cheeses  · Choose meat and other protein foods that are low in fat  Choose beans or other legumes such as split peas or lentils  Choose fish, skinless poultry (chicken or turkey), or lean cuts of red meat (beef or pork)  Before you cook meat or poultry, cut off any visible fat  · Use less fat and oil    Try baking foods instead of frying them  Add less fat, such as margarine, sour cream, regular salad dressing and mayonnaise to foods  Eat fewer high-fat foods  Some examples of high-fat foods include french fries, doughnuts, ice cream, and cakes  · Eat fewer sweets  Limit foods and drinks that are high in sugar  This includes candy, cookies, regular soda, and sweetened drinks  Exercise:  Exercise at least 30 minutes per day on most days of the week  Some examples of exercise include walking, biking, dancing, and swimming  You can also fit in more physical activity by taking the stairs instead of the elevator or parking farther away from stores  Ask your healthcare provider about the best exercise plan for you  © Copyright [employer] 2018 Information is for End User's use only and may not be sold, redistributed or otherwise used for commercial purposes   All illustrations and images included in CareNotes® are the copyrighted property of [employer]  or [address] [place] [address]

## 2023-05-03 NOTE — ASSESSMENT & PLAN NOTE
Wellness exam done  Had COVID vaccines  Had prevnar 13 and pneumovacc 23  Recommend Tdap and Shingrix series  Labs are UTD  For mammogram in June 2023  Last colonoscopy was in July 2022 and no further ones needed per GI  Has living will  Mood good  No recent falls

## 2023-05-03 NOTE — ASSESSMENT & PLAN NOTE
LDL 65 and LFTs were normal in Nov 2022  Continue atorvastatin 20 mg qhs  Advised pt to follow a low cholesterol diet and to exercise on a regular basis

## 2023-05-03 NOTE — ASSESSMENT & PLAN NOTE
BP up and down  Continue valsartan 80 mg qd  Has been holding amlodipine for past 6 weeks to see if it helped her cough but no change  Pt told to restart it  Pt advised to continue low Na diet and to exercise on a regular basis

## 2023-05-08 DIAGNOSIS — K51.80 OTHER ULCERATIVE COLITIS WITHOUT COMPLICATION (HCC): ICD-10-CM

## 2023-05-08 RX ORDER — SULFASALAZINE 500 MG/1
TABLET ORAL
Qty: 360 TABLET | Refills: 2 | Status: SHIPPED | OUTPATIENT
Start: 2023-05-08

## 2023-05-09 ENCOUNTER — APPOINTMENT (OUTPATIENT)
Dept: LAB | Facility: CLINIC | Age: 83
End: 2023-05-09

## 2023-05-09 DIAGNOSIS — E55.9 VITAMIN D DEFICIENCY: ICD-10-CM

## 2023-05-09 DIAGNOSIS — D64.9 ANEMIA, UNSPECIFIED TYPE: ICD-10-CM

## 2023-05-09 LAB
25(OH)D3 SERPL-MCNC: 35.4 NG/ML (ref 30–100)
BASOPHILS # BLD AUTO: 0.08 THOUSANDS/ÂΜL (ref 0–0.1)
BASOPHILS NFR BLD AUTO: 1 % (ref 0–1)
EOSINOPHIL # BLD AUTO: 0.35 THOUSAND/ÂΜL (ref 0–0.61)
EOSINOPHIL NFR BLD AUTO: 6 % (ref 0–6)
ERYTHROCYTE [DISTWIDTH] IN BLOOD BY AUTOMATED COUNT: 13.5 % (ref 11.6–15.1)
HCT VFR BLD AUTO: 29.3 % (ref 34.8–46.1)
HGB BLD-MCNC: 9.1 G/DL (ref 11.5–15.4)
IMM GRANULOCYTES # BLD AUTO: 0.02 THOUSAND/UL (ref 0–0.2)
IMM GRANULOCYTES NFR BLD AUTO: 0 % (ref 0–2)
LYMPHOCYTES # BLD AUTO: 1.77 THOUSANDS/ÂΜL (ref 0.6–4.47)
LYMPHOCYTES NFR BLD AUTO: 30 % (ref 14–44)
MCH RBC QN AUTO: 33 PG (ref 26.8–34.3)
MCHC RBC AUTO-ENTMCNC: 31.1 G/DL (ref 31.4–37.4)
MCV RBC AUTO: 106 FL (ref 82–98)
MONOCYTES # BLD AUTO: 0.61 THOUSAND/ÂΜL (ref 0.17–1.22)
MONOCYTES NFR BLD AUTO: 10 % (ref 4–12)
NEUTROPHILS # BLD AUTO: 3.01 THOUSANDS/ÂΜL (ref 1.85–7.62)
NEUTS SEG NFR BLD AUTO: 53 % (ref 43–75)
NRBC BLD AUTO-RTO: 0 /100 WBCS
PLATELET # BLD AUTO: 294 THOUSANDS/UL (ref 149–390)
PMV BLD AUTO: 9.8 FL (ref 8.9–12.7)
RBC # BLD AUTO: 2.76 MILLION/UL (ref 3.81–5.12)
WBC # BLD AUTO: 5.84 THOUSAND/UL (ref 4.31–10.16)

## 2023-05-12 DIAGNOSIS — I10 ESSENTIAL HYPERTENSION: ICD-10-CM

## 2023-05-12 RX ORDER — VALSARTAN 80 MG/1
TABLET ORAL
Qty: 90 TABLET | Refills: 0 | Status: SHIPPED | OUTPATIENT
Start: 2023-05-12 | End: 2023-05-18

## 2023-05-15 ENCOUNTER — TELEPHONE (OUTPATIENT)
Dept: FAMILY MEDICINE CLINIC | Facility: CLINIC | Age: 83
End: 2023-05-15

## 2023-05-15 ENCOUNTER — TELEPHONE (OUTPATIENT)
Dept: HEMATOLOGY ONCOLOGY | Facility: CLINIC | Age: 83
End: 2023-05-15

## 2023-05-15 DIAGNOSIS — D50.8 OTHER IRON DEFICIENCY ANEMIA: Primary | ICD-10-CM

## 2023-05-15 NOTE — TELEPHONE ENCOUNTER
Ok to refer to Hematology for persistent iron deficiency anemia and pt can't tolerate oral iron therapy

## 2023-05-15 NOTE — TELEPHONE ENCOUNTER
Patient Call    Who are you speaking with? Patient    If it is not the patient, are they listed on an active communication consent form? N/A   What is the reason for this call? Patient calling in to schedule a new patient appointment  The patient did not have an exact diagnosis so she will be asking her referring provider to fax us a referral with her exact diagnosis  Does this require a call back? No   If a call back is required, please list best call back number N/A   If a call back is required, advise that a message will be forwarded to their care team and someone will return their call as soon as possible  Did you relay this information to the patient?  N/A

## 2023-05-15 NOTE — TELEPHONE ENCOUNTER
LM on our VM:  Nicole Vogel said the hematologist is needing an order/script with a reason for her to be seen in order to make this appointment    She said their phone number is [phone] their FAX: [phone]

## 2023-05-18 DIAGNOSIS — I10 ESSENTIAL HYPERTENSION: ICD-10-CM

## 2023-05-18 DIAGNOSIS — E78.00 HYPERCHOLESTEROLEMIA: ICD-10-CM

## 2023-05-18 RX ORDER — ATORVASTATIN CALCIUM 20 MG/1
TABLET, FILM COATED ORAL
Qty: 90 TABLET | Refills: 0 | Status: SHIPPED | OUTPATIENT
Start: 2023-05-18

## 2023-05-18 RX ORDER — VALSARTAN 80 MG/1
TABLET ORAL
Qty: 90 TABLET | Refills: 0 | Status: SHIPPED | OUTPATIENT
Start: 2023-05-18

## 2023-06-01 ENCOUNTER — HOSPITAL ENCOUNTER (OUTPATIENT)
Dept: NON INVASIVE DIAGNOSTICS | Facility: CLINIC | Age: 83
Discharge: HOME | End: 2023-06-01

## 2023-06-01 VITALS
BODY MASS INDEX: 27.23 KG/M2 | SYSTOLIC BLOOD PRESSURE: 144 MMHG | WEIGHT: 148 LBS | DIASTOLIC BLOOD PRESSURE: 82 MMHG | HEIGHT: 62 IN | HEART RATE: 72 BPM

## 2023-06-01 DIAGNOSIS — I35.0 NON-RHEUMATIC AORTIC STENOSIS: ICD-10-CM

## 2023-06-01 LAB
AORTIC ROOT: 3.2 CM
AORTIC VALVE MEAN VELOCITY: 28.4 M/S
APICAL FOUR CHAMBER EJECTION FRACTION: 69 %
ASCENDING AORTA: 3.4 CM
AV AREA BY CONTINUOUS VTI: 0.7 CM2
AV AREA PEAK VELOCITY: 0.8 CM2
AV LVOT MEAN GRADIENT: 2 MMHG
AV LVOT PEAK GRADIENT: 4 MMHG
AV MEAN GRADIENT: 36 MMHG
AV PEAK GRADIENT: 57 MMHG
AV VALVE AREA: 0.74 CM2
AV VELOCITY RATIO: 0.27
DOP CALC AO PEAK VEL: 3.77 M/S
DOP CALC AO VTI: 93.24 CM
DOP CALC LVOT AREA: 2.83 CM2
DOP CALC LVOT DIAMETER: 1.9 CM
DOP CALC LVOT PEAK VEL VTI: 24.41 CM
DOP CALC LVOT PEAK VEL: 1.01 M/S
DOP CALC LVOT STROKE INDEX: 42.9 ML/M2
DOP CALC LVOT STROKE VOLUME: 69.17 CM3
E WAVE DECELERATION TIME: 349 MS
FRACTIONAL SHORTENING: 35 % (ref 28–44)
INTERVENTRICULAR SEPTUM IN DIASTOLE (PARASTERNAL SHORT AXIS VIEW): 1.4 CM
INTERVENTRICULAR SEPTUM: 1.4 CM (ref 0.6–1.1)
LAAS-AP2: 21.3 CM2
LAAS-AP4: 17 CM2
LEFT ATRIUM SIZE: 3.7 CM
LEFT INTERNAL DIMENSION IN SYSTOLE: 2.4 CM (ref 2.1–4)
LEFT VENTRICULAR INTERNAL DIMENSION IN DIASTOLE: 3.7 CM (ref 3.5–6)
LEFT VENTRICULAR POSTERIOR WALL IN END DIASTOLE: 1.1 CM
LEFT VENTRICULAR STROKE VOLUME: 38 ML
LVSV (TEICH): 38 ML
MV E'TISSUE VEL-SEP: 4 CM/S
MV PEAK A VEL: 0.95 M/S
MV PEAK E VEL: 64 CM/S
MV STENOSIS PRESSURE HALF TIME: 101 MS
MV VALVE AREA P 1/2 METHOD: 2.18 CM2
RIGHT ATRIUM AREA SYSTOLE A4C: 10.6 CM2
RIGHT VENTRICLE ID DIMENSION: 2.6 CM
SL CV LEFT ATRIUM LENGTH A2C: 5.5 CM
SL CV LV EF: 65
SL CV PED ECHO LEFT VENTRICLE DIASTOLIC VOLUME (MOD BIPLANE) 2D: 58 ML
SL CV PED ECHO LEFT VENTRICLE SYSTOLIC VOLUME (MOD BIPLANE) 2D: 21 ML
TR MAX PG: 19 MMHG
TR PEAK VELOCITY: 2.2 M/S
TRICUSPID ANNULAR PLANE SYSTOLIC EXCURSION: 2 CM
TRICUSPID VALVE PEAK REGURGITATION VELOCITY: 2.2 M/S

## 2023-06-02 DIAGNOSIS — K21.00 GASTROESOPHAGEAL REFLUX DISEASE WITH ESOPHAGITIS WITHOUT HEMORRHAGE: ICD-10-CM

## 2023-06-02 RX ORDER — PANTOPRAZOLE SODIUM 40 MG/1
TABLET, DELAYED RELEASE ORAL
Qty: 60 TABLET | Refills: 0 | Status: SHIPPED | OUTPATIENT
Start: 2023-06-02

## 2023-06-02 NOTE — RESULT ENCOUNTER NOTE
Spoke with pt in regards of results per Dr Chris Arthur , pt verbalized understanding and will also be scheduling an appt

## 2023-06-19 ENCOUNTER — HOSPITAL ENCOUNTER (OUTPATIENT)
Dept: MAMMOGRAPHY | Facility: HOSPITAL | Age: 83
Discharge: HOME | End: 2023-06-19
Attending: FAMILY MEDICINE
Payer: MEDICARE

## 2023-06-19 VITALS — BODY MASS INDEX: 27.23 KG/M2 | HEIGHT: 62 IN | WEIGHT: 148 LBS

## 2023-06-19 DIAGNOSIS — Z12.31 ENCOUNTER FOR SCREENING MAMMOGRAM FOR BREAST CANCER: ICD-10-CM

## 2023-06-19 PROCEDURE — 77063 BREAST TOMOSYNTHESIS BI: CPT

## 2023-06-19 PROCEDURE — 77067 SCR MAMMO BI INCL CAD: CPT

## 2023-06-20 DIAGNOSIS — R92.8 ABNORMAL MAMMOGRAM OF LEFT BREAST: Primary | ICD-10-CM

## 2023-07-01 DIAGNOSIS — K21.00 GASTROESOPHAGEAL REFLUX DISEASE WITH ESOPHAGITIS WITHOUT HEMORRHAGE: ICD-10-CM

## 2023-07-02 PROBLEM — Z00.00 MEDICARE ANNUAL WELLNESS VISIT, SUBSEQUENT: Status: RESOLVED | Noted: 2021-02-10 | Resolved: 2023-07-02

## 2023-07-03 RX ORDER — PANTOPRAZOLE SODIUM 40 MG/1
TABLET, DELAYED RELEASE ORAL
Qty: 60 TABLET | Refills: 0 | Status: SHIPPED | OUTPATIENT
Start: 2023-07-03

## 2023-07-09 PROBLEM — J38.7 IRRITABLE LARYNX: Status: ACTIVE | Noted: 2021-07-26

## 2023-07-09 PROBLEM — J32.9 RHINOSINUSITIS: Status: ACTIVE | Noted: 2023-07-09

## 2023-07-09 PROBLEM — J31.0 RHINOSINUSITIS: Status: ACTIVE | Noted: 2023-07-09

## 2023-07-11 ENCOUNTER — APPOINTMENT (OUTPATIENT)
Dept: LAB | Facility: CLINIC | Age: 83
End: 2023-07-11
Payer: MEDICARE

## 2023-07-11 ENCOUNTER — CONSULT (OUTPATIENT)
Dept: HEMATOLOGY ONCOLOGY | Facility: CLINIC | Age: 83
End: 2023-07-11
Payer: MEDICARE

## 2023-07-11 VITALS
HEART RATE: 75 BPM | RESPIRATION RATE: 16 BRPM | OXYGEN SATURATION: 96 % | DIASTOLIC BLOOD PRESSURE: 86 MMHG | HEIGHT: 62 IN | WEIGHT: 145 LBS | TEMPERATURE: 98.4 F | SYSTOLIC BLOOD PRESSURE: 146 MMHG | BODY MASS INDEX: 26.68 KG/M2

## 2023-07-11 DIAGNOSIS — E73.9 LACTOSE MALABSORPTION: ICD-10-CM

## 2023-07-11 DIAGNOSIS — D50.0 IRON DEFICIENCY ANEMIA DUE TO CHRONIC BLOOD LOSS: ICD-10-CM

## 2023-07-11 DIAGNOSIS — K57.90 DIVERTICULOSIS: ICD-10-CM

## 2023-07-11 DIAGNOSIS — K51.90 ULCERATIVE COLITIS WITHOUT COMPLICATIONS, UNSPECIFIED LOCATION (HCC): ICD-10-CM

## 2023-07-11 DIAGNOSIS — D53.9 MACROCYTIC ANEMIA: ICD-10-CM

## 2023-07-11 DIAGNOSIS — D53.9 MACROCYTIC ANEMIA: Primary | ICD-10-CM

## 2023-07-11 LAB
ALBUMIN SERPL BCP-MCNC: 4.1 G/DL (ref 3.5–5)
ALP SERPL-CCNC: 77 U/L (ref 34–104)
ALT SERPL W P-5'-P-CCNC: 11 U/L (ref 7–52)
ANION GAP SERPL CALCULATED.3IONS-SCNC: 5 MMOL/L
AST SERPL W P-5'-P-CCNC: 21 U/L (ref 13–39)
BASOPHILS # BLD AUTO: 0.07 THOUSANDS/ÂΜL (ref 0–0.1)
BASOPHILS NFR BLD AUTO: 1 % (ref 0–1)
BILIRUB SERPL-MCNC: 0.4 MG/DL (ref 0.2–1)
BLD SMEAR INTERP: NORMAL
BUN SERPL-MCNC: 12 MG/DL (ref 5–25)
CALCIUM SERPL-MCNC: 9 MG/DL (ref 8.4–10.2)
CHLORIDE SERPL-SCNC: 94 MMOL/L (ref 96–108)
CO2 SERPL-SCNC: 27 MMOL/L (ref 21–32)
CREAT SERPL-MCNC: 0.71 MG/DL (ref 0.6–1.3)
EOSINOPHIL # BLD AUTO: 0.18 THOUSAND/ÂΜL (ref 0–0.61)
EOSINOPHIL NFR BLD AUTO: 4 % (ref 0–6)
ERYTHROCYTE [DISTWIDTH] IN BLOOD BY AUTOMATED COUNT: 13.7 % (ref 11.6–15.1)
FERRITIN SERPL-MCNC: 35 NG/ML (ref 11–307)
FOLATE SERPL-MCNC: 12.5 NG/ML
GFR SERPL CREATININE-BSD FRML MDRD: 78 ML/MIN/1.73SQ M
GLUCOSE SERPL-MCNC: 103 MG/DL (ref 65–140)
HCT VFR BLD AUTO: 27.6 % (ref 34.8–46.1)
HGB BLD-MCNC: 9.1 G/DL (ref 11.5–15.4)
IMM GRANULOCYTES # BLD AUTO: 0.01 THOUSAND/UL (ref 0–0.2)
IMM GRANULOCYTES NFR BLD AUTO: 0 % (ref 0–2)
IRON SATN MFR SERPL: 21 % (ref 15–50)
IRON SERPL-MCNC: 62 UG/DL (ref 50–170)
LDH SERPL-CCNC: 180 U/L (ref 140–271)
LYMPHOCYTES # BLD AUTO: 1.87 THOUSANDS/ÂΜL (ref 0.6–4.47)
LYMPHOCYTES NFR BLD AUTO: 36 % (ref 14–44)
MCH RBC QN AUTO: 33 PG (ref 26.8–34.3)
MCHC RBC AUTO-ENTMCNC: 33 G/DL (ref 31.4–37.4)
MCV RBC AUTO: 100 FL (ref 82–98)
MONOCYTES # BLD AUTO: 0.63 THOUSAND/ÂΜL (ref 0.17–1.22)
MONOCYTES NFR BLD AUTO: 12 % (ref 4–12)
NEUTROPHILS # BLD AUTO: 2.41 THOUSANDS/ÂΜL (ref 1.85–7.62)
NEUTS SEG NFR BLD AUTO: 47 % (ref 43–75)
NRBC BLD AUTO-RTO: 0 /100 WBCS
PLATELET # BLD AUTO: 301 THOUSANDS/UL (ref 149–390)
PMV BLD AUTO: 9.5 FL (ref 8.9–12.7)
POTASSIUM SERPL-SCNC: 4.5 MMOL/L (ref 3.5–5.3)
PROT SERPL-MCNC: 7 G/DL (ref 6.4–8.4)
RBC # BLD AUTO: 2.76 MILLION/UL (ref 3.81–5.12)
RETICS # AUTO: ABNORMAL 10*3/UL (ref 14097–95744)
RETICS # CALC: 2.62 % (ref 0.37–1.87)
SODIUM SERPL-SCNC: 126 MMOL/L (ref 135–147)
TIBC SERPL-MCNC: 301 UG/DL (ref 250–450)
VIT B12 SERPL-MCNC: 576 PG/ML (ref 180–914)
WBC # BLD AUTO: 5.17 THOUSAND/UL (ref 4.31–10.16)

## 2023-07-11 PROCEDURE — 99204 OFFICE O/P NEW MOD 45 MIN: CPT

## 2023-07-11 PROCEDURE — 82607 VITAMIN B-12: CPT

## 2023-07-11 PROCEDURE — 82746 ASSAY OF FOLIC ACID SERUM: CPT

## 2023-07-11 PROCEDURE — 83010 ASSAY OF HAPTOGLOBIN QUANT: CPT

## 2023-07-11 PROCEDURE — 80053 COMPREHEN METABOLIC PANEL: CPT

## 2023-07-11 PROCEDURE — 83550 IRON BINDING TEST: CPT

## 2023-07-11 PROCEDURE — 85045 AUTOMATED RETICULOCYTE COUNT: CPT

## 2023-07-11 PROCEDURE — 83918 ORGANIC ACIDS TOTAL QUANT: CPT

## 2023-07-11 PROCEDURE — 85025 COMPLETE CBC W/AUTO DIFF WBC: CPT

## 2023-07-11 PROCEDURE — 82728 ASSAY OF FERRITIN: CPT

## 2023-07-11 PROCEDURE — 83615 LACTATE (LD) (LDH) ENZYME: CPT

## 2023-07-11 PROCEDURE — 36415 COLL VENOUS BLD VENIPUNCTURE: CPT

## 2023-07-11 PROCEDURE — 83540 ASSAY OF IRON: CPT

## 2023-07-11 NOTE — PROGRESS NOTES
[facility] [id number]  Hematology Ambulatory Consult  Cynthia Bautista, 1940, [id number]  7/11/2023    Assessment/Plan:  1. Macrocytic anemia  2. Diverticulosis  3. Ulcerative colitis without complications, unspecified location ([address])  4. Iron deficiency anemia due to chronic blood loss  5. Lactose malabsorption  Ms. Priscilla Campos is an 70-year-old female seen in consultation for worsening macrocytic anemia. Baseline hemoglobin was tens but most recently was noted to be decreasing at 9.1 with an MCV of 100. She has been trying to take oral iron without an iron panel to assess if there is a deficiency and is unable to tolerate due to worsening constipation. She does have sources of chronic blood loss including ulcerative colitis and diverticulosis for which she usually has a UC flare once a month noting blood in her stool. She also has malabsorption due to chronic PPI use that she takes Pepcid and Protonix daily as well as has a lactose intolerance. I discussed to the patient that this anemia could be multifactorial and explained in detail the work-up listed below. Lab work today includes hemolysis work-up as well as deficiencies in folate, B12, and iron as well as reticulocyte count. She will go for these labs today after leaving the office and I will call her with the results if any interventions are necessary based on the results. In hopes that there is a deficiency to correct I will see her back in the office in about 3 months to ensure adequate supplementation with repeat labs at that time. She knows to call the office with new or worsening symptoms for sooner evaluation.      - Ambulatory Referral to Hematology / Oncology  - CBC and differential; Future  - Comprehensive metabolic panel; Future  - Haptoglobin; Future  - Hemolysis Smear; Future  - Folate; Future  - LD,Blood;  Future  - Methylmalonic acid, serum; Future  - Reticulocytes; Future  - Vitamin B12; Future  - Iron Panel (Includes Ferritin, Iron Sat%, Iron, and TIBC); Future      The patient is scheduled for follow-up in approximately 3 months. Patient voiced agreement and understanding to the above. Patient knows to call the Hematology/Oncology office with any questions and concerns regarding the above. Barrier(s) to care: None. The patient is able to self care.    -------------------------------------------------------------------------------------------------------    Chief Complaint   Patient presents with   • Consult     Anemia       Referring provider:  Shraddha Baker MD  [address] [place] [address]    History of present illness:  Shaun Halsted is an 41-year-old female with past medical history of ulcerative colitis, diverticulosis, Low's esophagus, GERD, hypothyroidism, hypertension, aortic stenosis, hypercholesterolemia, and lactose intolerance. She is seen in consultation for worsening macrocytic anemia. Her baseline hemoglobin is usually in the tens and most recently has decreased to 9.1 with an MCV of 100. All other cell lines are within normal limits. She has been on oral iron without an iron panel drawn which has caused her significant constipation. She denies ever being told that she or a family member is diagnosed with thalassemia. She denies history of gastric bypass or colon resection. Her last colonoscopy was 2022 UC and divertiulitis controlled with diet. She did note blood in her stool last week which is likely due to her ulcerative colitis. She says she has a flare about once a month with this. She also takes Protonix and Pepcid once daily. She denies pagophagia, pica, restless leg syndrome, brittle nails, thinning hair, or pallor. She denies coffee ground stools, tarry stools, bright red blood per rectum, hematuria, or vaginal bleeding.   She denies lightheadedness, dizziness, shortness of breath,  palpitations, or chest pain. She denies fevers, chills, unintentional weight loss, abdominal pain/distension, nausea, vomiting, constipation, diarrhea, petechiae/purpura, or LE swelling. Her current symptoms include fatigue, mild dyspnea on exertion, and easy bruising. Review of Systems   Constitutional: Positive for fatigue. Negative for activity change, appetite change, diaphoresis, fever and unexpected weight change. HENT: Negative for trouble swallowing and voice change. Eyes: Negative for photophobia and visual disturbance. Respiratory: Positive for shortness of breath. Negative for chest tightness. Cardiovascular: Negative for chest pain, palpitations and leg swelling. Gastrointestinal: Positive for blood in stool. Negative for abdominal distention, abdominal pain, constipation, diarrhea, nausea and vomiting. Endocrine: Negative for cold intolerance and heat intolerance. Genitourinary: Negative for dysuria, hematuria and urgency. Musculoskeletal: Negative for arthralgias, back pain, gait problem, joint swelling and myalgias. Skin: Negative for pallor and rash. Neurological: Negative for dizziness, weakness and light-headedness. Hematological: Negative for adenopathy. Bruises/bleeds easily. Psychiatric/Behavioral: Negative for confusion and sleep disturbance.        Patient Active Problem List   Diagnosis   • Essential hypertension   • Hypercholesterolemia   • Hypothyroidism   • Ulcerative colitis without complications ([address])   • Diverticulosis   • Low's esophagus   • Colon polyp   • Gastroesophageal reflux disease with esophagitis   • Vitamin D deficiency   • Anemia   • Chronic cough   • Dairy product intolerance   • Mills allergy   • B12 deficiency   • Irritable larynx   • Cough variant asthma    • Dysphonia   • Pharyngoesophageal dysphagia   • Dysfunction of both eustachian tubes   • Herpes zoster without complication   • Non-rheumatic aortic stenosis   • Calcified granuloma of lung (HCC)   • Allergic rhinitis   • Gastroesophageal reflux disease   • Rhinosinusitis       Past Medical History:   Diagnosis Date   • Allergic rhinitis 3/21/2023   • Anemia    • Arthritis     osteoporosis, djd knees, pt denies osteoporosis on 2/21/22   • Low's esophagus    • Cancer ([address])     on face   • Colon polyp    • Cough 02/21/2022    cough for 3 years, expectorates yellow mucus   • Disease of thyroid gland     low thyroid   • Diverticulitis     gerd, diverticulitis   • Dizziness    • GERD (gastroesophageal reflux disease)     ulcerative colitis;  Low's esophagus, diverticulosis; hx of laryngopharyngeal reflux   • Hyperlipidemia    • Hypertension    • Low vitamin D level    • LPRD (laryngopharyngeal reflux disease) 02/21/2022   • PONV (postoperative nausea and vomiting)     AFTER "BIG SURGERIES"   • Postnasal drip     WITH COUGH   • Ulcerative colitis ([address])        Past Surgical History:   Procedure Laterality Date   • APPENDECTOMY     • CATARACT EXTRACTION Bilateral    • COLONOSCOPY      colon polyps   • COLONOSCOPY     • FOOT SURGERY Bilateral     bunionectomy   • HEMORROIDECTOMY      hemorroid removal   • JOINT REPLACEMENT Bilateral     knees   • REPLACEMENT TOTAL KNEE Bilateral     b/ replacement   • TONSILLECTOMY     • TUBAL LIGATION     • UPPER GASTROINTESTINAL ENDOSCOPY         Family History   Problem Relation Age of Onset   • Heart disease Father    • No Known Problems Sister    • No Known Problems Sister    • Cancer Daughter         unsure of type of cancer, it was female cancer and hysterectomy done by Dr. Wendi Skelton   • No Known Problems Maternal Grandmother    • No Known Problems Paternal Grandmother    • Heart disease Brother    • Cancer Brother 54   • Cancer Brother 79   • No Known Problems Maternal Aunt    • No Known Problems Paternal Aunt        Social History     Socioeconomic History   • Marital status: /Civil Union     Spouse name: None   • Number of children: None   • Years of education: None   • Highest education level: None   Occupational History   • None   Tobacco Use   • Smoking status: Never   • Smokeless tobacco: Never   Vaping Use   • Vaping Use: Never used   Substance and Sexual Activity   • Alcohol use: Not Currently   • Drug use: Never   • Sexual activity: Not Currently   Other Topics Concern   • None   Social History Narrative    · Occupation:   retired      · Marital status:         · Sexual orientation:   Heterosexual      · Alcohol intake:   Occasional      · Caffeine intake: Moderate      · Chewing tobacco:   none      · Guns present in home:   No      · Live alone or with others:   with others      · Pets:   No         Per missy      Social Determinants of Health     Financial Resource Strain: Low Risk  (5/3/2023)    Overall Financial Resource Strain (CARDIA)    • Difficulty of Paying Living Expenses: Not very hard   Food Insecurity: Not on file   Transportation Needs: No Transportation Needs (5/3/2023)    PRAPARE - Transportation    • Lack of Transportation (Medical): No    • Lack of Transportation (Non-Medical):  No   Physical Activity: Not on file   Stress: Not on file   Social Connections: Not on file   Intimate Partner Violence: Not on file   Housing Stability: Not on file         Current Outpatient Medications:   •  amLODIPine (NORVASC) 10 mg tablet, Take 1 tablet (10 mg total) by mouth daily, Disp: 90 tablet, Rfl: 2  •  atorvastatin (LIPITOR) 20 mg tablet, Take 1 tablet by mouth once daily, Disp: 90 tablet, Rfl: 0  •  cholecalciferol (VITAMIN D3) 1,000 units tablet, Take 1,000 Units by mouth daily, Disp: , Rfl:   •  Coenzyme Q10 (Co Q 10) 100 MG CAPS, Take by mouth, Disp: , Rfl:   •  Cyanocobalamin (VITAMIN B 12 PO), Take by mouth, Disp: , Rfl:   •  levothyroxine 75 mcg tablet, Take 1 tablet by mouth once daily, Disp: 90 tablet, Rfl: 1  •  Multiple Vitamin (multivitamin) capsule, Take 1 capsule by mouth daily, Disp: , Rfl:   • pantoprazole (PROTONIX) 40 mg tablet, TAKE 1 TABLET BY MOUTH TWICE DAILY 30 MINUTES BEFORE MEALS, Disp: 60 tablet, Rfl: 0  •  Promethazine-DM (PHENERGAN-DM) 6.25-15 mg/5 mL oral syrup, Take 5 mL by mouth 2 (two) times daily after meals, Disp: 240 mL, Rfl: 1  •  sulfaSALAzine (AZULFIDINE) 500 mg tablet, TAKE 2 TABLETS BY MOUTH TWICE A DAY, Disp: 360 tablet, Rfl: 2  •  valsartan (DIOVAN) 80 mg tablet, Take 1 tablet by mouth once daily, Disp: 90 tablet, Rfl: 0  •  vitamin A 2250 MCG (7500 UT) capsule, Take 7,500 Units by mouth daily, Disp: , Rfl:   •  vitamin E, tocopherol, 1,000 units capsule, Take 1,000 Units by mouth daily, Disp: , Rfl:   •  azelastine (ASTELIN) 0.1 % nasal spray, 2 sprays into each nostril 2 (two) times a day (Patient not taking: Reported on 5/3/2023), Disp: 30 mL, Rfl: 11  •  famotidine (PEPCID) 40 MG tablet, TAKE 1 TABLET BY MOUTH ONCE DAILY BEFORE SUPPER (Patient not taking: Reported on 7/11/2023), Disp: 90 tablet, Rfl: 0  •  ferrous gluconate (FERGON) 324 mg tablet, Take 324 mg by mouth daily with breakfast Takes once a week (Patient not taking: Reported on 5/3/2023), Disp: , Rfl:   •  fluticasone (FLONASE) 50 mcg/act nasal spray, 2 sprays into each nostril daily (Patient not taking: Reported on 5/3/2023), Disp: 16 g, Rfl: 11  •  ipratropium (ATROVENT) 0.03 % nasal spray, 2 sprays into each nostril 3 (three) times a day as needed (nasal drip with meals) (Patient not taking: Reported on 5/3/2023), Disp: 30 mL, Rfl: 11  •  nystatin (MYCOSTATIN) 500,000 units/5 mL suspension, Apply 5 mL (500,000 Units total) to the mouth or throat 4 (four) times a day (Patient not taking: Reported on 7/11/2023), Disp: 200 mL, Rfl: 1  •  valACYclovir (VALTREX) 1,000 mg tablet, Take 1 tablet (1,000 mg total) by mouth 3 (three) times a day for 7 days (Patient not taking: Reported on 3/18/2022), Disp: 21 tablet, Rfl: 0    Allergies   Allergen Reactions   • Sulfa Antibiotics Itching       Objective:  /86 (BP Location: Left arm, Patient Position: Sitting, Cuff Size: Adult)   Pulse 75   Temp 98.4 °F (36.9 °C) (Tympanic)   Resp 16   Ht 5' 2" (1.575 m)   Wt 65.8 kg (145 lb)   SpO2 96%   BMI 26.52 kg/m²   Physical Exam  Constitutional:       General: She is not in acute distress. Appearance: Normal appearance. She is not ill-appearing. HENT:      Head: Normocephalic and atraumatic. Eyes:      Extraocular Movements: Extraocular movements intact. Conjunctiva/sclera: Conjunctivae normal.      Pupils: Pupils are equal, round, and reactive to light. Cardiovascular:      Rate and Rhythm: Normal rate and regular rhythm. Pulmonary:      Effort: Pulmonary effort is normal. No respiratory distress. Abdominal:      General: Bowel sounds are normal. There is no distension. Tenderness: There is no abdominal tenderness. Musculoskeletal:         General: Normal range of motion. Cervical back: Normal range of motion. No tenderness. Right lower leg: No edema. Left lower leg: No edema. Lymphadenopathy:      Cervical: No cervical adenopathy. Skin:     General: Skin is warm and dry. Capillary Refill: Capillary refill takes less than 2 seconds. Coloration: Skin is not jaundiced or pale. Neurological:      General: No focal deficit present. Mental Status: She is alert and oriented to person, place, and time. Mental status is at baseline. Motor: No weakness. Gait: Gait normal.   Psychiatric:         Mood and Affect: Mood normal.         Behavior: Behavior normal.         Thought Content:  Thought content normal.         Judgment: Judgment normal.         Result Review  Labs:   Hospital Outpatient Visit on 06/01/2023   Component Date Value Ref Range Status   • AV area peak didi 06/01/2023 0.8  cm2 Final   • LA size 06/01/2023 3.7  cm Final   • Aortic valve mean velocity 06/01/2023 28.40  m/s Final   • Triscuspid Valve Regurgitation Pea* 06/01/2023 19.0  mmHg Final   • Tricuspid valve peak regurgitation* 06/01/2023 2.20  m/s Final   • LVPWd 06/01/2023 1.10  cm Final   • Left Atrium Area-systolic Apical T* 60/76/7692 21.3  cm2 Final   • Left Atrium Area-systolic Four Rhonda* 65/97/8058 17  cm2 Final   • MV E' Tissue Velocity Septal 06/01/2023 4  cm/s Final   • Tricuspid annular plane systolic e* 65/69/7882 1.51  cm Final   • TR Peak Vasiliy 06/01/2023 2.2  m/s Final   • IVSd 06/01/2023 1.40  cm Final   • LV DIASTOLIC VOLUME (MOD BIPLANE) * 06/01/2023 58  mL Final   • LEFT VENTRICLE SYSTOLIC VOLUME (MO* 97/06/6121 21  mL Final   • Left ventricular stroke volume (2D) 06/01/2023 38.00  mL Final   • A4C EF 06/01/2023 69  % Final   • LA length (A2C) 06/01/2023 5.50  cm Final   • LVIDd 06/01/2023 3.70  cm Final   • IVS 06/01/2023 1.4  cm Final   • LVIDS 06/01/2023 2.40  cm Final   • FS 06/01/2023 35  28 - 44 % Final   • Asc Ao 06/01/2023 3.4  cm Final   • Ao root 06/01/2023 3.20  cm Final   • RVID d 06/01/2023 2.6  cm Final   • LVOT mn grad 06/01/2023 2.0  mmHg Final   • AV area by cont VTI 06/01/2023 0.7  cm2 Final   • AV mean gradient 06/01/2023 36  mmHg Final   • AV LVOT peak gradient 06/01/2023 4  mmHg Final   • MV valve area p 1/2 method 06/01/2023 2.18  cm2 Final   • E wave deceleration time 06/01/2023 349  ms Final   • LVOT diameter 06/01/2023 1.9  cm Final   • LVOT peak vasiliy 06/01/2023 1.01  m/s Final   • LVOT peak VTI 06/01/2023 24.41  cm Final   • Aortic valve peak velocity 06/01/2023 3.77  m/s Final   • Ao VTI 06/01/2023 93.24  cm Final   • LVOT stroke volume 06/01/2023 69.17  cm3 Final   • AV peak gradient 06/01/2023 57  mmHg Final   • MV Peak E Vasiliy 06/01/2023 64  cm/s Final   • MV Peak A Vasiliy 06/01/2023 0.95  m/s Final   • RAA A4C 06/01/2023 10.6  cm2 Final   • MV stenosis pressure 1/2 time 06/01/2023 101  ms Final   • LVOT stroke volume index 06/01/2023 42.90  ml/m2 Final   • LVSV, 2D 06/01/2023 38  mL Final   • LVOT area 06/01/2023 2.83  cm2 Final   • DVI 06/01/2023 0.27   Final • AV valve area 06/01/2023 0.74  cm2 Final   • LV EF 06/01/2023 65   Final   Office Visit on 05/15/2023   Component Date Value Ref Range Status   • Wound Culture 05/18/2023 Few Colonies of   Final    Mixed Skin Kiley   • Gram Stain Result 05/18/2023 1+ Polys   Final   • Gram Stain Result 05/18/2023 No bacteria seen   Final     Imaging:  No relevant imaging to review       Please note: This report has been generated by a voice recognition software system. Therefore there may be syntax, spelling, and/or grammatical errors. Please call if you have any questions.

## 2023-07-12 LAB — HAPTOGLOB SERPL-MCNC: 42 MG/DL (ref 41–333)

## 2023-07-15 LAB — METHYLMALONATE SERPL-SCNC: 435 NMOL/L (ref 0–378)

## 2023-07-19 ENCOUNTER — HOSPITAL ENCOUNTER (OUTPATIENT)
Dept: MAMMOGRAPHY | Facility: CLINIC | Age: 83
Discharge: HOME | End: 2023-07-19
Payer: MEDICARE

## 2023-07-19 VITALS — WEIGHT: 145 LBS | HEIGHT: 62 IN | BODY MASS INDEX: 26.68 KG/M2

## 2023-07-19 DIAGNOSIS — R92.8 ABNORMAL SCREENING MAMMOGRAM: ICD-10-CM

## 2023-07-19 PROCEDURE — 77065 DX MAMMO INCL CAD UNI: CPT

## 2023-07-27 ENCOUNTER — OFFICE VISIT (OUTPATIENT)
Dept: CARDIOLOGY CLINIC | Facility: CLINIC | Age: 83
End: 2023-07-27
Payer: MEDICARE

## 2023-07-27 VITALS
HEART RATE: 79 BPM | OXYGEN SATURATION: 96 % | WEIGHT: 144 LBS | HEIGHT: 62 IN | SYSTOLIC BLOOD PRESSURE: 110 MMHG | BODY MASS INDEX: 26.5 KG/M2 | DIASTOLIC BLOOD PRESSURE: 68 MMHG

## 2023-07-27 DIAGNOSIS — I10 ESSENTIAL HYPERTENSION: ICD-10-CM

## 2023-07-27 DIAGNOSIS — I35.0 NON-RHEUMATIC AORTIC STENOSIS: Primary | ICD-10-CM

## 2023-07-27 PROCEDURE — 99215 OFFICE O/P EST HI 40 MIN: CPT | Performed by: INTERNAL MEDICINE

## 2023-07-27 NOTE — PATIENT INSTRUCTIONS
Transcatheter Aortic Valve Replacement   AMBULATORY CARE:   What you need to know about a TAVR:   A TAVR is a procedure to replace your aortic valve. It is done without removing your old valve. The aortic valve is between the left ventricle and the aorta. The left ventricle is the lower left chamber of your heart. The aorta is a blood vessel that pumps blood to your brain and body. The aortic valve opens and closes to let blood flow from your heart to the rest of your body. TAVR may be used to replace your aortic valve if open heart surgery is not safe for you. Your valve will be replaced with a tissue valve. The tissue may be taken from an animal, such as a pig or cow. What will happen before a TAVR:   You may need an echocardiogram, angiogram, EKG, or CT scan before your procedure. These tests will help your provider plan your procedure. They will also make sure a TAVR is safe for you. You may need to stop taking blood thinner medicine several days before your procedure. This will prevent heavy bleeding during your procedure. Your provider will talk to you about how to prepare for your procedure. He or she may tell you not to eat or drink anything after midnight on the day of your procedure. He or she will tell you what medicines to take or not take on the day of your procedure. You may stay in the hospital 2 to 5 days after your procedure. Arrange for someone to drive you home and stay with you. This person can call 911 if you have problems at home. What will happen during a TAVR:   You may be given general anesthesia to keep you asleep and free from pain during your procedure. You may instead be given IV sedation and local anesthesia. IV sedation will make you feel calm and relaxed during the procedure. With local anesthesia, you may still feel pressure or pushing during your procedure, but you should not feel any pain.  You may be given an antibiotic through your IV to prevent a bacterial infection. Tell providers if you have ever had an allergic reaction to an antibiotic. Your provider will make an incision in your neck, groin, or chest. He or she will guide a catheter with a balloon on the end through a blood vessel and into your heart. He or she will inflate the balloon in the opening of your valve. This balloon make space for your new valve to fit inside the old one. The balloon will be deflated and the catheter will be removed. Your provider will insert another catheter into your heart. This catheter will hold a balloon, a stent, and the new valve. The new valve will be inside of the stent. When the catheter reaches your valve, your provider will expand the balloon. The balloon will push your old valve against the walls of your heart. The stent and new valve will be put in the old valve's position. The balloon will be deflated. The stent will continue to hold your old valve out of the way. It will also keep your new valve in the correct place. Your provider will remove the catheter and wire. He or she may use clamps, stitches, or other devices to close the incision. Pressure will be applied to the incision for several minutes to stop any bleeding. A pressure bandage or other pressure device may be placed over the incision to help prevent more bleeding. What will happen after a TAVR:   Providers will monitor your vital signs and pulses in your arm or leg, closely. They will check your pressure bandage for bleeding or swelling. You will need to lie flat with your leg or arm straight for 2 to 4 hours. Do not  get out of bed until providers says it is okay. Arm or leg movements can cause serious bleeding. You may need blood tests, a chest x-ray, an EKG, or an echocardiogram before you leave the hospital. These tests will make sure your valve is working correctly. You will need to take blood thinner medicine for at least 6 months after your procedure.  You may need to take aspirin for the rest of your life. These medicines will prevent blood clots from forming near your valve. Risks of a TAVR:  You may have bruising or pain where the catheter was. You may get an infection or bleed more than expected. The catheter may cause a hole in your heart or blood vessels. A blood clot may form around your valve. The clot may travel to your brain and cause a stroke. Your heartbeat may become irregular during or after a TAVR. You may need medicines or procedures to treat this. Your new valve may not work correctly. You may need another procedure to replace the valve. Follow up with your doctor as directed:  Write down your questions so you remember to ask them during your visits. © Copyright Arie Robison 2022 Information is for End User's use only and may not be sold, redistributed or otherwise used for commercial purposes. The above information is an  only. It is not intended as medical advice for individual conditions or treatments. Talk to your doctor, nurse or pharmacist before following any medical regimen to see if it is safe and effective for you. Aortic Stenosis   AMBULATORY CARE:   Aortic stenosis  is a condition that makes your aortic valve become narrow and stiff. The narrow, stiff valve causes your heart to work harder to pump blood into the aorta.        Common symptoms include the following:   Chest pain or tightness    Fast, jumpy, or fluttery heartbeat    Shortness of breath during activity or when you lie down    Severe tiredness    Dizziness or feeling faint    Call your local emergency number (911 in the [address]) or have someone call if:   You have any of the following signs of a heart attack:      Squeezing, pressure, or pain in your chest    You may  also have any of the following:     Discomfort or pain in your back, neck, jaw, stomach, or arm    Shortness of breath    Nausea or vomiting    Lightheadedness or a sudden cold sweat    You have any of the following signs of a stroke:      Numbness or drooping on one side of your face     Weakness in an arm or leg    Confusion or difficulty speaking    Dizziness, a severe headache, or vision loss    Seek care immediately if:   You have chest pain when you move around. It goes away when you are still. You have increasing shortness of breath. You faint. Call your doctor or cardiologist if:   The veins in your neck look swollen or are bulging. You have increased swelling in your legs or ankles. Your heart beats faster than usual.    You feel your heart flutter often. You have questions or concerns about your condition or care. Treatment  may include any of the following:  Medicines  may help decrease your cholesterol levels and your blood pressure. You may also be given medicine to help lower swelling. Valve replacement  is the main treatment for aortic stenosis. It is a surgery to remove part or all of your aortic valve. A new valve is then secured in place. The new valve may be from a donor (another person or animal), or may be an artificial valve. Balloon valvuloplasty  helps widen your aortic valve and allow blood to flow through easier. It is also called a closed valvotomy. Manage aortic stenosis:   Limit activities. Your healthcare provider may have you limit strenuous activity. Strenuous activity will make your heart work too hard. Ask your healthcare provider what activities are safe for you to do. Eat heart-healthy foods. Heart-healthy foods include salmon, tuna, walnuts, whole-grain breads, low-fat dairy products, beans, and oils such as olive or canola oil. A dietitian or your provider can give you more information on meal plans such as the DASH (Dietary Approaches to Stop Hypertension) eating plan. The DASH plan is low in sodium, processed sugar, unhealthy fats, and total fat. It is high in potassium, calcium, and fiber. These can be found in vegetables, fruit, and whole-grain foods. Limit sodium (salt) as directed. Too much sodium can affect your fluid balance. Check labels to find low-sodium or no-salt-added foods. You can also make small changes to get less salt. For example, if you add salt while you cook, do not add more salt at the table. Ask your healthcare provider or dietitian for more ways to cut down on salt. Limit or do not drink alcohol. Ask your healthcare provider if it is okay for you to drink alcohol. Alcohol can increase your risk for high blood pressure and coronary artery disease. Your provider can tell you how many drinks are okay to have within 24 hours or within 1 week. A drink of alcohol is 12 ounces of beer, 5 ounces of wine, or 1½ ounces of liquor. Maintain a healthy weight. Being overweight can increase your risk for high blood pressure and coronary artery disease. These conditions can make your symptoms worse. Ask your healthcare provider what a healthy weight is for you. Ask him or her to help you create a weight loss plan if you are overweight. Talk to your healthcare provider about pregnancy. If you are a woman and want to get pregnant, talk to your healthcare provider. You and your baby may need to be monitored by specialists during your pregnancy. Ask about vaccines you may need. Certain diseases are dangerous for a person who has aortic stenosis. Vaccines help prevent infections that can cause some diseases. Get a flu vaccine as soon as recommended each year, usually in September or October. Your healthcare provider can tell you if you also need other vaccines, and when to get them. Prevent aortic stenosis:   Manage other health conditions. High blood pressure and high cholesterol levels increase your risk for aortic stenosis. Ask your healthcare provider for more information on managing these conditions. Get treatment for strep throat. If strep throat is not treated, it can cause rheumatic fever.     Take care of your teeth and gums. Gingivitis, a gum disease, increases your risk for aortic stenosis. See your dental provider regularly to treat problems early. Follow up with your doctor or cardiologist as directed: You may need to return for more tests to check your heart over time. Write down your questions so you remember to ask them during your visits. © Copyright Mely Guardian 2022 Information is for End User's use only and may not be sold, redistributed or otherwise used for commercial purposes. The above information is an  only. It is not intended as medical advice for individual conditions or treatments. Talk to your doctor, nurse or pharmacist before following any medical regimen to see if it is safe and effective for you.

## 2023-07-27 NOTE — PROGRESS NOTES
Cardiology Follow Up    Shorty Calvert  [id number]  1940  [facility] [address] [facility] [address] [phone]      1. Non-rheumatic aortic stenosis  Ambulatory referral to Cardiovascular Surgery      2. Essential hypertension            Discussion/Summary:    Most recent echocardiogram now shows severe aortic stenosis. She has symptoms attributable to this. She reports dyspnea on exertion and fatigue. She has chronic dizziness and poor balance and also has a chronic cough. I was very clear to explain to the patient and her family that I do not think these are necessarily related to the aortic valve and she should not expect drastic improvement in these after correction of the AS. We reviewed the work-up for TAVR. I will refer her to the CT surgeons. Her blood pressure is currently controlled in the office. She is on amlodipine and Diovan. These are chronic medications without changes in doses recently. She is unsure of what her blood pressures at home. She initially told me that her blood pressure was reaching 044M systolic, but I think this is actually probably more 80 in the diastolic as her blood pressures have been controlled when she seen her primary care physician, as well. I did not change her blood pressure medication today but asked her to keep track of the readings and write them down and bring her cuff to the next visit so that I can correlate and make adjustments if necessary. History of Present Illness:    58-year-old female discovered to have aortic stenosis after gastroenterologist heard aortic murmur at time of her procedure. Initial echocardiogram showed moderate to severe aortic stenosis diagnosed in June of 2022. LV function is normal.    She lives independently with her . Interval history:    She returns for follow-up after the echocardiogram done recently.  At the last visit, she indicated to me that she had "poor memory ", so I advised her to bring family members. Today, her  as well as her son and daughter are all in the room with her today. I am meeting him for the first time. Patient continues to live independently with her . Not do a lot of exercise or activity, but she says she walks with a cane. She is not using a cane in the office today. She reports poor balance and some dizziness and does have falls in the past.    When she exerts herself, she reports she is tired and fatigued. He has no PND or orthopnea. She has had a chronic cough for several years. She has undergone evaluation with GI but never a pulmonologist. This has not necessarily worsened but has been quite persistent. We discussed the most recent echo which was done and showed progression of the aortic stenosis which is now in the severe range. We discussed that while I do not think the cough or the dizziness are necessarily attributable to the valve, her fatigue and decreased exertional capacity in general is likely relation to the severe AS. Reviewed TAVR in detail with the patient and her family. She is interested in proceeding with evaluation.     Patient Active Problem List   Diagnosis   • Essential hypertension   • Hypercholesterolemia   • Hypothyroidism   • Ulcerative colitis without complications ([address])   • Diverticulosis   • Low's esophagus   • Colon polyp   • Gastroesophageal reflux disease with esophagitis   • Vitamin D deficiency   • Anemia   • Chronic cough   • Dairy product intolerance   • [place] allergy   • B12 deficiency   • Irritable larynx   • Cough variant asthma    • Dysphonia   • Pharyngoesophageal dysphagia   • Dysfunction of both eustachian tubes   • Herpes zoster without complication   • Non-rheumatic aortic stenosis   • Calcified granuloma of lung (HCC)   • Allergic rhinitis   • Gastroesophageal reflux disease   • Rhinosinusitis     Past Medical History:   Diagnosis Date   • Allergic rhinitis 3/21/2023   • Anemia    • Arthritis     osteoporosis, djd knees, pt denies osteoporosis on 2/21/22   • Low's esophagus    • Cancer ([address])     on face   • Colon polyp    • Cough 02/21/2022    cough for 3 years, expectorates yellow mucus   • Disease of thyroid gland     low thyroid   • Diverticulitis     gerd, diverticulitis   • Dizziness    • GERD (gastroesophageal reflux disease)     ulcerative colitis;  Low's esophagus, diverticulosis; hx of laryngopharyngeal reflux   • Hyperlipidemia    • Hypertension    • Low vitamin D level    • LPRD (laryngopharyngeal reflux disease) 02/21/2022   • PONV (postoperative nausea and vomiting)     AFTER "BIG SURGERIES"   • Postnasal drip     WITH COUGH   • Ulcerative colitis ([address])      Social History     Tobacco Use   • Smoking status: Never   • Smokeless tobacco: Never   Vaping Use   • Vaping Use: Never used   Substance Use Topics   • Alcohol use: Not Currently   • Drug use: Never      Family History   Problem Relation Age of Onset   • Heart disease Father    • No Known Problems Sister    • No Known Problems Sister    • Cancer Daughter         unsure of type of cancer, it was female cancer and hysterectomy done by Dr. Aydee Jc   • No Known Problems Maternal Grandmother    • No Known Problems Paternal Grandmother    • Heart disease Brother    • Cancer Brother 54   • Cancer Brother 79   • No Known Problems Maternal Aunt    • No Known Problems Paternal Aunt      Past Surgical History:   Procedure Laterality Date   • APPENDECTOMY     • CATARACT EXTRACTION Bilateral    • COLONOSCOPY      colon polyps   • COLONOSCOPY     • FOOT SURGERY Bilateral     bunionectomy   • HEMORROIDECTOMY      hemorroid removal   • JOINT REPLACEMENT Bilateral     knees   • REPLACEMENT TOTAL KNEE Bilateral     b/ replacement   • TONSILLECTOMY     • TUBAL LIGATION     • UPPER GASTROINTESTINAL ENDOSCOPY         Current Outpatient Medications:   •  amLODIPine (NORVASC) 10 mg tablet, Take 1 tablet (10 mg total) by mouth daily, Disp: 90 tablet, Rfl: 2  •  atorvastatin (LIPITOR) 20 mg tablet, Take 1 tablet by mouth once daily, Disp: 90 tablet, Rfl: 0  •  cholecalciferol (VITAMIN D3) 1,000 units tablet, Take 1,000 Units by mouth daily, Disp: , Rfl:   •  Coenzyme Q10 (Co Q 10) 100 MG CAPS, Take by mouth, Disp: , Rfl:   •  Cyanocobalamin (VITAMIN B 12 PO), Take by mouth, Disp: , Rfl:   •  famotidine (PEPCID) 40 MG tablet, TAKE 1 TABLET BY MOUTH ONCE DAILY BEFORE SUPPER, Disp: 90 tablet, Rfl: 0  •  gabapentin (Neurontin) 100 mg capsule, Take 1 capsule (100 mg total) by mouth 2 (two) times a day for 5 days, THEN 2 capsules (200 mg total) 2 (two) times a day for 5 days, THEN 3 capsules (300 mg total) 2 (two) times a day for 20 days. , Disp: 150 capsule, Rfl: 1  •  levothyroxine 75 mcg tablet, Take 1 tablet by mouth once daily, Disp: 90 tablet, Rfl: 1  •  Multiple Vitamin (multivitamin) capsule, Take 1 capsule by mouth daily, Disp: , Rfl:   •  sulfaSALAzine (AZULFIDINE) 500 mg tablet, TAKE 2 TABLETS BY MOUTH TWICE A DAY, Disp: 360 tablet, Rfl: 2  •  valsartan (DIOVAN) 80 mg tablet, Take 1 tablet by mouth once daily, Disp: 90 tablet, Rfl: 0  •  vitamin A 2250 MCG (7500 UT) capsule, Take 7,500 Units by mouth daily, Disp: , Rfl:   •  vitamin E, tocopherol, 1,000 units capsule, Take 1,000 Units by mouth daily, Disp: , Rfl:   •  azelastine (ASTELIN) 0.1 % nasal spray, 2 sprays into each nostril 2 (two) times a day (Patient not taking: Reported on 5/3/2023), Disp: 30 mL, Rfl: 11  •  ferrous gluconate (FERGON) 324 mg tablet, Take 324 mg by mouth daily with breakfast Takes once a week (Patient not taking: Reported on 5/3/2023), Disp: , Rfl:   •  fluticasone (FLONASE) 50 mcg/act nasal spray, 2 sprays into each nostril daily (Patient not taking: Reported on 5/3/2023), Disp: 16 g, Rfl: 11  •  ipratropium (ATROVENT) 0.03 % nasal spray, 2 sprays into each nostril 3 (three) times a day as needed (nasal drip with meals) (Patient not taking: Reported on 5/3/2023), Disp: 30 mL, Rfl: 11  •  nystatin (MYCOSTATIN) 500,000 units/5 mL suspension, Apply 5 mL (500,000 Units total) to the mouth or throat 4 (four) times a day (Patient not taking: Reported on 7/11/2023), Disp: 200 mL, Rfl: 1  •  pantoprazole (PROTONIX) 40 mg tablet, TAKE 1 TABLET BY MOUTH TWICE DAILY 30 MINUTES BEFORE MEALS (Patient not taking: Reported on 7/27/2023), Disp: 60 tablet, Rfl: 0  •  Promethazine-DM (PHENERGAN-DM) 6.25-15 mg/5 mL oral syrup, Take 5 mL by mouth 2 (two) times daily after meals (Patient not taking: Reported on 7/27/2023), Disp: 240 mL, Rfl: 1  •  valACYclovir (VALTREX) 1,000 mg tablet, Take 1 tablet (1,000 mg total) by mouth 3 (three) times a day for 7 days (Patient not taking: Reported on 3/18/2022), Disp: 21 tablet, Rfl: 0  Allergies   Allergen Reactions   • Sulfa Antibiotics Itching       Vitals:    07/27/23 1415   BP: 110/68   BP Location: Left arm   Patient Position: Sitting   Cuff Size: Standard   Pulse: 79   SpO2: 96%   Weight: 65.3 kg (144 lb)   Height: 5' 2" (1.575 m)     Vitals:    07/27/23 1415   Weight: 65.3 kg (144 lb)      Height: 5' 2" (157.5 cm)   Body mass index is 26.34 kg/m². Physical Exam:  GEN: Jenny Wynn is an elderly female. HEENT: pupils equal, round, and reactive to light; extraocular muscles intact  NECK: supple, no carotid bruits   HEART: regular +3/6 ANNA. Diminished S2.   LUNGS: mild rales  ABDOMEN: normal bowel sounds, soft, no tenderness, no distention  EXTREMITIES: peripheral pulses normal; no clubbing, cyanosis, or edema  NEURO: no focal findings   SKIN: normal without suspicious lesions on exposed skin    ROS:  Except as noted in HPI, is otherwise reviewed in detail and a 12 point review of systems is negative.     Labs:  Lab Results   Component Value Date    SODIUM 126 (L) 07/11/2023    K 4.5 07/11/2023    CL 94 (L) 07/11/2023    CREATININE 0.71 07/11/2023    BUN 12 07/11/2023    CO2 27 07/11/2023 ALT 11 07/11/2023    AST 21 07/11/2023    TSH 2.71 02/05/2021    GLUF 94 11/09/2022    WBC 5.17 07/11/2023    HGB 9.1 (L) 07/11/2023    HCT 27.6 (L) 07/11/2023     07/11/2023       No results found for: "CHOL"  Lab Results   Component Value Date    HDL 51 11/09/2022    HDL 57 04/12/2022    HDL 53 09/20/2021     Lab Results   Component Value Date    LDLCALC 65 11/09/2022    LDLCALC 119 (H) 04/12/2022    LDLCALC 88 09/20/2021     Lab Results   Component Value Date    TRIG 89 11/09/2022    TRIG 64 04/12/2022    TRIG 56 09/20/2021     Echo 6/2023  Left Ventricle: Left ventricular cavity size is normal. Wall thickness is moderately increased. The left ventricular ejection fraction is 65%. Systolic function is normal. Wall motion is normal. Diastolic function is mildly abnormal, consistent with grade I (abnormal) relaxation. •  Aortic Valve: The aortic valve is trileaflet. The leaflets are not thickened. The leaflets are moderately calcified. There is moderately reduced mobility. There is severe stenosis. •  Mitral Valve: There is mild regurgitation. •  Tricuspid Valve: There is mild regurgitation. Echo 6/30/22:  Left Ventricle: Left ventricular cavity size is normal. Wall thickness is normal. The left ventricular ejection fraction is 65%. Systolic function is normal. Wall motion is normal. Diastolic function is mildly abnormal, consistent with grade I (abnormal) relaxation. Left atrial filling pressure is elevated. •  Right Ventricle: Right ventricular cavity size is normal. Systolic function is normal.  •  Aortic Valve: The aortic valve is trileaflet. The leaflets are moderately thickened. The leaflets are moderately calcified. There is moderately reduced mobility. There is moderate and There is moderate to severe stenosis. The aortic valve peak velocity is 2.81 m/s. The aortic valve peak gradient is 32 mmHg. The aortic valve mean gradient is 21 mmHg. The DVI is 0.27.  The aortic valve area is 0.76 cm2 using an LVOT diameter of 1.9 cm. •  Mitral Valve: There is mild regurgitation. •  Tricuspid Valve: There is moderate regurgitation.

## 2023-08-02 ENCOUNTER — HOSPITAL ENCOUNTER (OUTPATIENT)
Dept: INFUSION CENTER | Facility: CLINIC | Age: 83
Discharge: HOME | End: 2023-08-02
Payer: MEDICARE

## 2023-08-02 DIAGNOSIS — E53.8 B12 DEFICIENCY: Primary | ICD-10-CM

## 2023-08-02 PROCEDURE — 96372 THER/PROPH/DIAG INJ SC/IM: CPT

## 2023-08-02 RX ORDER — CYANOCOBALAMIN 1000 UG/ML
1000 INJECTION, SOLUTION INTRAMUSCULAR; SUBCUTANEOUS ONCE
Status: COMPLETED | OUTPATIENT
Start: 2023-08-02 | End: 2023-08-02

## 2023-08-02 RX ORDER — CYANOCOBALAMIN 1000 UG/ML
1000 INJECTION, SOLUTION INTRAMUSCULAR; SUBCUTANEOUS ONCE
Status: CANCELLED | OUTPATIENT
Start: 2023-08-09

## 2023-08-02 RX ADMIN — CYANOCOBALAMIN 1000 MCG: 1000 INJECTION, SOLUTION INTRAMUSCULAR; SUBCUTANEOUS at 13:33

## 2023-08-02 NOTE — PLAN OF CARE
Med rec updated and complete  Allergies reviewed  Pt reports no antibiotics in the last 2 weeks  Pt also takes LISINOPRIL 20MG in the morning and LISINOPRIL with HCTZ 20-25MG at night   Problem: Potential for Falls  Goal: Patient will remain free of falls  Description: INTERVENTIONS:  - Educate patient/family on patient safety including physical limitations  - Instruct patient to call for assistance with activity   - Consult OT/PT to assist with strengthening/mobility   - Keep Call bell within reach  - Keep bed low and locked with side rails adjusted as appropriate  - Keep care items and personal belongings within reach  - Initiate and maintain comfort rounds  - Make Fall Risk Sign visible to stafF    - Obtain necessary fall risk management equipment: CANE  - Apply yellow socks and bracelet for high fall risk patients  - Consider moving patient to room near nurses station  Outcome: Progressing

## 2023-08-07 DIAGNOSIS — K21.00 GASTROESOPHAGEAL REFLUX DISEASE WITH ESOPHAGITIS WITHOUT HEMORRHAGE: ICD-10-CM

## 2023-08-07 RX ORDER — PANTOPRAZOLE SODIUM 40 MG/1
TABLET, DELAYED RELEASE ORAL
Qty: 60 TABLET | Refills: 0 | Status: SHIPPED | OUTPATIENT
Start: 2023-08-07

## 2023-08-09 ENCOUNTER — HOSPITAL ENCOUNTER (OUTPATIENT)
Dept: INFUSION CENTER | Facility: CLINIC | Age: 83
Discharge: HOME | End: 2023-08-09
Payer: MEDICARE

## 2023-08-09 DIAGNOSIS — E53.8 B12 DEFICIENCY: Primary | ICD-10-CM

## 2023-08-09 RX ORDER — CYANOCOBALAMIN 1000 UG/ML
1000 INJECTION, SOLUTION INTRAMUSCULAR; SUBCUTANEOUS ONCE
Status: COMPLETED | OUTPATIENT
Start: 2023-08-09 | End: 2023-08-09

## 2023-08-09 RX ORDER — CYANOCOBALAMIN 1000 UG/ML
1000 INJECTION, SOLUTION INTRAMUSCULAR; SUBCUTANEOUS ONCE
Status: CANCELLED | OUTPATIENT
Start: 2023-08-16

## 2023-08-09 RX ADMIN — CYANOCOBALAMIN 1000 MCG: 1000 INJECTION, SOLUTION INTRAMUSCULAR; SUBCUTANEOUS at 13:52

## 2023-08-09 NOTE — PROGRESS NOTES
Pt resting with no complaints. B12 given in [facility] without issue. Pt declined AVS. Next appt reviewed c/ pt.

## 2023-08-16 ENCOUNTER — HOSPITAL ENCOUNTER (OUTPATIENT)
Dept: INFUSION CENTER | Facility: CLINIC | Age: 83
Discharge: HOME | End: 2023-08-16
Payer: MEDICARE

## 2023-08-16 DIAGNOSIS — E53.8 B12 DEFICIENCY: Primary | ICD-10-CM

## 2023-08-16 PROCEDURE — 96372 THER/PROPH/DIAG INJ SC/IM: CPT

## 2023-08-16 RX ORDER — CYANOCOBALAMIN 1000 UG/ML
1000 INJECTION, SOLUTION INTRAMUSCULAR; SUBCUTANEOUS ONCE
Status: COMPLETED | OUTPATIENT
Start: 2023-08-16 | End: 2023-08-16

## 2023-08-16 RX ORDER — CYANOCOBALAMIN 1000 UG/ML
1000 INJECTION, SOLUTION INTRAMUSCULAR; SUBCUTANEOUS ONCE
Status: CANCELLED | OUTPATIENT
Start: 2023-08-23

## 2023-08-16 RX ADMIN — CYANOCOBALAMIN 1000 MCG: 1000 INJECTION, SOLUTION INTRAMUSCULAR; SUBCUTANEOUS at 14:57

## 2023-08-16 NOTE — PROGRESS NOTES
Pt. offers no complaints. B12 admin. IM in [address] [place] [address] Dr Alfonso without incident. AVS declined, next appt. confirmed.
[FreeTextEntry1] : 52 year old female presents for follow-up for covid infectino.\par Continue current regimen.\par Further management pending re-evaluation in 2 weeks\par \par Attending:\par Agree with above\par Pt improving\par complete 5 days pred 20\par d/w Dr. Glaser -- after the 20mg, will do 10mg for a week, then 5mg per week, while holding her humira/mtx for RA\par f/u scheduled\par

## 2023-08-17 DIAGNOSIS — E78.00 HYPERCHOLESTEROLEMIA: ICD-10-CM

## 2023-08-18 RX ORDER — ATORVASTATIN CALCIUM 20 MG/1
TABLET, FILM COATED ORAL
Qty: 90 TABLET | Refills: 1 | Status: SHIPPED | OUTPATIENT
Start: 2023-08-18

## 2023-08-21 ENCOUNTER — TELEPHONE (OUTPATIENT)
Dept: CARDIOLOGY CLINIC | Facility: CLINIC | Age: 83
End: 2023-08-21

## 2023-08-21 ENCOUNTER — OFFICE VISIT (OUTPATIENT)
Dept: CARDIAC SURGERY | Facility: CLINIC | Age: 83
End: 2023-08-21
Payer: MEDICARE

## 2023-08-21 ENCOUNTER — PREP FOR PROCEDURE (OUTPATIENT)
Dept: CARDIOLOGY CLINIC | Facility: CLINIC | Age: 83
End: 2023-08-21

## 2023-08-21 VITALS
OXYGEN SATURATION: 95 % | SYSTOLIC BLOOD PRESSURE: 118 MMHG | DIASTOLIC BLOOD PRESSURE: 58 MMHG | WEIGHT: 147.9 LBS | HEIGHT: 62 IN | HEART RATE: 72 BPM | TEMPERATURE: 98.1 F | BODY MASS INDEX: 27.22 KG/M2

## 2023-08-21 DIAGNOSIS — I35.0 NONRHEUMATIC AORTIC (VALVE) STENOSIS: Primary | ICD-10-CM

## 2023-08-21 DIAGNOSIS — I35.0 NON-RHEUMATIC AORTIC STENOSIS: Primary | ICD-10-CM

## 2023-08-21 DIAGNOSIS — Z13.6 ENCOUNTER FOR SCREENING FOR STENOSIS OF CAROTID ARTERY: ICD-10-CM

## 2023-08-21 PROCEDURE — 99205 OFFICE O/P NEW HI 60 MIN: CPT | Performed by: THORACIC SURGERY (CARDIOTHORACIC VASCULAR SURGERY)

## 2023-08-21 NOTE — LETTER
August 21, 2023     Jay Anderson, [address]    Patient: Sukhi Perez   YOB: 1940   Date of Visit: 8/21/2023       Dear Dr. Natalia Tello:    Thank you for referring Sukhi Perez to me for evaluation. Below are my notes for this consultation. If you have questions, please do not hesitate to call me. I look forward to following your patient along with you. Sincerely,        Gaudencio Mac, DO        CC: MD Wendi [facility], [facility]  8/21/2023  1:49 PM  Attested  Consultation - Cardiothoracic Surgery   Sukhi Perez 79 y.o. female MRN: [id number]    Physician Requesting Consult: Dr. Jennie Flaherty    Reason for Consult / Principal Problem: Aortic stenosis, Non-Rheumatic    History of Present Illness: Sukhi Perez is a 79y.o. year old female who presents for initial outpatient surgical consultation for symptomatic severe aortic stenosis. Patient's medical history is notable for AS, HTN, HLD, hypothyrroidism, pulmonary nodules, anemia, ulcerative colitis, colon polyps, diverticulitis, GERD, Low's esophagus, cholelithiasis and arthritis/DJD s/p b/l TKR's. Patient's Gastroenterologist detected a heart murmur on exam at an office appt in March 2022. Patient was referred for Cardiology consultation and echocardiogram performed in June 2022 demonstrated moderate to severe AS. She has been under surveillnace by Cardiology. She underwent repeat echo in June 2023 that now demonstrates progression to severe AS. Patient is accompanied by her  and daughter today. She admits to SAVAGE with activities such as mopping the floor, climbing stairs and other strenuous activity. She has had a chronic cough for approximately 4 years that is productive for yellow mucus. She has post nasal drip which exacerbates the cough. She also admits to fatigue / decrease in activity tolerance as well as feeling off balance.  She states she thinks her fatigue is related to the diet she has to follow for her ulcerative colitis as she has lost muscle mass and stamina. She experiences mild bilateral ankle edema. She denies chest pian, palpitations, lightheadedness or syncope. Patient denies childhood h/o murmur or rheumatic fever. Patient denies tobacco, alcohol or drugs. Patient obtains routine dental care every 6 months- no active issues. Patient is  and lives independently with her  in a single level home. Past Medical History:  Past Medical History:   Diagnosis Date   • Allergic rhinitis 3/21/2023   • Anemia    • Arthritis     osteoporosis, djd knees, pt denies osteoporosis on 2/21/22   • Low's esophagus    • Cancer ([address])     on face   • Colon polyp    • Cough 02/21/2022    cough for 3 years, expectorates yellow mucus   • Disease of thyroid gland     low thyroid   • Diverticulitis     gerd, diverticulitis   • Dizziness    • GERD (gastroesophageal reflux disease)     ulcerative colitis;  Low's esophagus, diverticulosis; hx of laryngopharyngeal reflux   • Hyperlipidemia    • Hypertension    • Low vitamin D level    • LPRD (laryngopharyngeal reflux disease) 02/21/2022   • PONV (postoperative nausea and vomiting)     AFTER "BIG SURGERIES"   • Postnasal drip     WITH COUGH   • Ulcerative colitis ([address])          Past Surgical History:   Past Surgical History:   Procedure Laterality Date   • APPENDECTOMY     • CATARACT EXTRACTION Bilateral    • COLONOSCOPY      colon polyps   • COLONOSCOPY  07/28/2022   • FOOT SURGERY Bilateral     bunionectomy   • HEMORROIDECTOMY      hemorroid removal   • JOINT REPLACEMENT Bilateral     knees   • REPLACEMENT TOTAL KNEE Bilateral     b/ replacement   • TONSILLECTOMY     • TUBAL LIGATION     • UPPER GASTROINTESTINAL ENDOSCOPY           Family History:  Family History   Problem Relation Age of Onset   • Heart disease Father    • No Known Problems Sister    • No Known Problems Sister    • Cancer Daughter unsure of type of cancer, it was female cancer and hysterectomy done by Dr. Jak Alvarenga   • No Known Problems Maternal Grandmother    • No Known Problems Paternal Grandmother    • Heart disease Brother    • Cancer Brother 54   • Cancer Brother 79   • No Known Problems Maternal Aunt    • No Known Problems Paternal Aunt          Social History:    Social History     Substance and Sexual Activity   Alcohol Use Not Currently     Social History     Substance and Sexual Activity   Drug Use Never     Social History     Tobacco Use   Smoking Status Never   Smokeless Tobacco Never         Home Medications:   Prior to Admission medications    Medication Sig Start Date End Date Taking?  Authorizing Provider   amLODIPine (NORVASC) 10 mg tablet Take 1 tablet (10 mg total) by mouth daily 5/3/23   Kristine Collado MD   atorvastatin (LIPITOR) 20 mg tablet Take 1 tablet by mouth once daily 8/18/23   Kristine Collado MD   azelastine (ASTELIN) 0.1 % nasal spray 2 sprays into each nostril 2 (two) times a day  Patient not taking: Reported on 5/3/2023 12/12/22 1/11/23  Antony Parra MD   cholecalciferol (VITAMIN D3) 1,000 units tablet Take 1,000 Units by mouth daily    Historical Provider, MD   Coenzyme Q10 (Co Q 10) 100 MG CAPS Take by mouth    Historical Provider, MD   Cyanocobalamin (VITAMIN B 12 PO) Take by mouth    Historical Provider, MD   famotidine (PEPCID) 40 MG tablet TAKE 1 TABLET BY MOUTH ONCE DAILY BEFORE SUPPER 6/6/23   Sejal Tapia MD   ferrous gluconate (FERGON) 324 mg tablet Take 324 mg by mouth daily with breakfast Takes once a week  Patient not taking: Reported on 5/3/2023    Historical Provider, MD   fluticasone [place]) 50 mcg/act nasal spray 2 sprays into each nostril daily  Patient not taking: Reported on 5/3/2023 10/17/22   Antony Parra MD   gabapentin (Neurontin) 100 mg capsule Take 1 capsule (100 mg total) by mouth 2 (two) times a day for 5 days, THEN 2 capsules (200 mg total) 2 (two) times a day for 5 days, THEN 3 capsules (300 mg total) 2 (two) times a day for 20 days. 7/23/23 8/22/23  Yamila Carson MD   ipratropium (ATROVENT) 0.03 % nasal spray 2 sprays into each nostril 3 (three) times a day as needed (nasal drip with meals)  Patient not taking: Reported on 5/3/2023 12/12/22   Yamila Carson MD   levothyroxine 75 mcg tablet Take 1 tablet by mouth once daily 6/2/23   Elder Hassan MD   Multiple Vitamin (multivitamin) capsule Take 1 capsule by mouth daily    Historical Provider, MD   nystatin (MYCOSTATIN) 500,000 units/5 mL suspension Apply 5 mL (500,000 Units total) to the mouth or throat 4 (four) times a day  Patient not taking: Reported on 7/11/2023 6/1/21   Vel Larsen MD   pantoprazole (PROTONIX) 40 mg tablet TAKE 1 TABLET BY MOUTH TWICE DAILY 30 MINUTES BEFORE MEALS 8/7/23   Óscar Cedeno PA-C   Promethazine-[facility]) 6.25-15 mg/5 mL oral syrup Take 5 mL by mouth 2 (two) times daily after meals  Patient not taking: Reported on 7/27/2023 11/5/21   Vel Larsen MD   sulfaSALAzine (AZULFIDINE) 500 mg tablet TAKE 2 TABLETS BY MOUTH TWICE A DAY 5/8/23   Elder Hassan MD   valACYclovir (VALTREX) 1,000 mg tablet Take 1 tablet (1,000 mg total) by mouth 3 (three) times a day for 7 days  Patient not taking: Reported on 3/18/2022 10/7/21 1/13/22  ALINA Young   valsartan (DIOVAN) 80 mg tablet Take 1 tablet by mouth once daily 5/18/23   Elder Hassan MD   vitamin A 2250 MCG (7500 UT) capsule Take 7,500 Units by mouth daily    Historical Provider, MD   vitamin E, tocopherol, 1,000 units capsule Take 1,000 Units by mouth daily    Historical Provider, MD       Allergies:   Allergies   Allergen Reactions   • Sulfa Antibiotics Itching       Review of Systems:  Review of Systems - History obtained from chart review and the patient  General ROS: positive for  - fatigue and change in activity tolerance  negative for - chills, fever, sleep disturbance or weight gain  Psychological ROS: negative  Ophthalmic ROS: negative  ENT ROS: negative  Allergy and Immunology ROS: negative  Hematological and Lymphatic ROS: negative  Endocrine ROS: negative  Breast ROS: negative  Respiratory ROS: positive for - cough / expectorating yellow mucus  negative for - hemoptysis, orthopnea, pleuritic pain, stridor, tachypnea or wheezing  Cardiovascular ROS: positive for - dyspnea on exertion, edema and murmur  negative for - chest pain, irregular heartbeat, loss of consciousness, orthopnea, palpitations, paroxysmal nocturnal dyspnea or rapid heart rate  Gastrointestinal ROS: no abdominal pain, change in bowel habits, or black or bloody stools  Genito-Urinary ROS: no dysuria, trouble voiding, or hematuria  Musculoskeletal ROS: positive for - gait disturbance  Neurological ROS: positive for - gait disturbance, impaired coordination/balance and memory loss  negative for - numbness/tingling, seizures, speech problems, tremors or visual changes  Dermatological ROS: negative    Vital Signs:     Vitals:    08/21/23 1314 08/21/23 1316   BP: 129/62 118/58   BP Location: Left arm Right arm   Patient Position: Sitting Sitting   Cuff Size: Standard Standard   Pulse: 72    Temp: 98.1 °F (36.7 °C)    TempSrc: Tympanic    SpO2: 95%    Weight: 67.1 kg (147 lb 14.4 oz)    Height: 5' 2" (1.575 m)        Physical Exam:    General: Alert, oriented, obese, no acute distress  HEENT/NECK:  PERRLA. No jugular venous distention. Cardiac:Regular rate and rhythm, II/VI harsh systolic murmur RUSB   Carotid arteries: 1+ pulses, faint bruits vs radiation of murmur to neck bilaterally. Pulmonary:  Breath sounds clear bilaterally. Abdomen:  Non-tender, Non-distended. Positive bowel sounds. Upper extremities: 2+ radial pulses; brisk capillary refill  Lower extremities: Extremities warm/dry. PT/DP pulses 2+ bilaterally. 1+ edema B/L  Neuro: Alert and oriented X 3. Sensation is grossly intact. No focal deficits.   Musculoskeletal: MAEE, stable gait  Skin: Warm/Dry, without rashes or lesions. Lab Results:   Lab Results   Component Value Date    WBC 5.17 07/11/2023    HGB 9.1 (L) 07/11/2023    HCT 27.6 (L) 07/11/2023     (H) 07/11/2023     07/11/2023     Lab Results   Component Value Date    SODIUM 126 (L) 07/11/2023    K 4.5 07/11/2023    CL 94 (L) 07/11/2023    CO2 27 07/11/2023    AGAP 5 07/11/2023    BUN 12 07/11/2023    CREATININE 0.71 07/11/2023    GLUC 103 07/11/2023    GLUF 94 11/09/2022    CALCIUM 9.0 07/11/2023    AST 21 07/11/2023    ALT 11 07/11/2023    ALKPHOS 77 07/11/2023    TP 7.0 07/11/2023    TBILI 0.40 07/11/2023    EGFR 78 07/11/2023       Imaging Studies:     Echocardiogram: 6/1/23    •  Left Ventricle: Left ventricular cavity size is normal. Wall thickness is moderately increased. The left ventricular ejection fraction is 65%. Systolic function is normal. Wall motion is normal. Diastolic function is mildly abnormal, consistent with grade I (abnormal) relaxation. •  Aortic Valve: The aortic valve is trileaflet. The leaflets are not thickened. The leaflets are moderately calcified. There is moderately reduced mobility. There is severe stenosis. •  Mitral Valve: There is mild regurgitation. •  Tricuspid Valve: There is mild regurgitation. Findings    Left Ventricle Left ventricular cavity size is normal. Wall thickness is moderately increased. The left ventricular ejection fraction is 65%. Systolic function is normal.  Wall motion is normal. Diastolic function is mildly abnormal, consistent with grade I (abnormal) relaxation. Right Ventricle Right ventricular cavity size is normal. Systolic function is normal. Wall thickness is normal.      Left Atrium The atrium is normal in size. Right Atrium The atrium is normal in size. Aortic Valve The aortic valve is trileaflet. The leaflets are not thickened. The leaflets are moderately calcified. There is moderately reduced mobility.  There is no evidence of regurgitation. There is severe stenosis. Mitral Valve Mitral valve structure is normal. There is mild regurgitation. There is no evidence of stenosis. Tricuspid Valve Tricuspid valve structure is normal. There is mild regurgitation. There is no evidence of stenosis. Pulmonic Valve Pulmonic valve structure is normal. There is no evidence of regurgitation. There is no evidence of stenosis. Ascending Aorta The aortic root is normal in size. IVC/SVC The inferior vena cava is normal in size. Pericardium There is no pericardial effusion. The pericardium is normal in appearance.         Left Ventricle Measurements    Function/Volumes   A4C EF 69 %         LVOT stroke volume 69.17 cm3         LVOT stroke volume index 42.9 ml/m2         Dimensions   LVIDd 3.7 cm         LVIDS 2.4 cm         IVSd 1.4 cm         LVPWd 1.1 cm         LVOT area 2.83 cm2         FS 35 %         Diastolic Filling   MV E' Tissue Velocity Septal 4 cm/s         E wave deceleration time 349 ms         MV Peak E Vasiliy 64 cm/s         MV Peak A Vasiliy 0.95 m/s          Report Measurements   AV LVOT peak gradient 4 mmHg              Interventricular Septum Measurements    Shunt Ratio   LVOT peak VTI 24.41 cm         LVOT peak vasiliy 1.01 m/s              Right Ventricle Measurements    Dimensions   RVID d 2.6 cm         Tricuspid annular plane systolic excursion 2 cm               Left Atrium Measurements    Dimensions   LA size 3.7 cm         LA length (A2C) 5.5 cm               Right Atrium Measurements    Dimensions   RAA A4C 10.6 cm2               Atrial Septum Measurements    Shunt Ratio   LVOT peak VTI 24.41 cm         LVOT peak vasiliy 1.01 m/s               Aortic Valve Measurements    Stenosis   Aortic valve peak velocity 3.77 m/s         LVOT peak vasiliy 1.01 m/s         Ao VTI 93.24 cm         LVOT peak VTI 24.41 cm         AV mean gradient 36 mmHg         LVOT mn grad 2 mmHg         AV peak gradient 57 mmHg AV LVOT peak gradient 4 mmHg         Area/Dimensions   DVI 0.27          AV valve area 0.74 cm2         AV area by cont VTI 0.7 cm2         AV area peak vasiliy 0.8 cm2         LVOT diameter 1.9 cm         LVOT area 2.83 cm2               Mitral Valve Measurements    Stenosis   MV stenosis pressure 1/2 time 101 ms         MV valve area p 1/2 method 2.18 cm2               Tricuspid Valve Measurements    RVSP Parameters   TR Peak Vasiliy 2.2 m/s         Triscuspid Valve Regurgitation Peak Gradient 19 mmHg               Aorta Measurements    Aortic Dimensions   Ao root 3.2 cm         Asc Ao 3.4 cm                   I have personally reviewed pertinent films in PACS     PCP and Cardiology notes reviewed    TAVR evaluation Assessment:     Maximino Garcia: III    Aortic Stenosis Stage: D3    STS risk score (preliminary): 4.3 %    KCCQ-12 completed    Assessment:  Patient Active Problem List    Diagnosis Date Noted   • Rhinosinusitis 07/09/2023   • Calcified granuloma of lung ([address]) 03/21/2023   • Allergic rhinitis 03/21/2023   • Gastroesophageal reflux disease 03/21/2023   • Non-rheumatic aortic stenosis 11/30/2022   • Herpes zoster without complication 61/73/5807   • Chronic cough 07/26/2021   • Dairy product intolerance 07/26/2021   • [place] allergy 07/26/2021   • B12 deficiency 07/26/2021   • Irritable larynx 07/26/2021   • Cough variant asthma  07/26/2021   • Dysphonia 07/26/2021   • Pharyngoesophageal dysphagia 07/26/2021   • Dysfunction of both eustachian tubes 07/26/2021   • Essential hypertension 05/02/2020   • Hypercholesterolemia 05/02/2020   • Hypothyroidism 05/02/2020   • Ulcerative colitis without complications ([address]) 86/80/7807   • Diverticulosis 05/02/2020   • Low's esophagus 05/02/2020   • Colon polyp 05/02/2020   • Gastroesophageal reflux disease with esophagitis 05/02/2020   • Vitamin D deficiency 05/02/2020   • Anemia 05/02/2020         Impression/Plan:    Judith Egan has symptomatic severe aortic stenosis.  They will undergo the following testing for transcatheter aortic valve replacement: Gated CTA of the chest/abdomen/pelvis, 3D GIOVANNY, cardiac catheterization, dental clearance, pulmonary function tests with ABG, and carotid artery ultrasound. Once these studies have been completed, Pao Singh will follow up in our office to review the results and to be evaluated to confirm the suitability of proceeding with transcatheter aortic valve replacment. Pao Singh was comfortable with our recommendations, and their questions were answered to their satisfaction. Thank you for allowing us to participate in the care of this patient. The patient recently had a screening colonoscopy in 7/28/22. Therefore GI referral is not indicated at this time. ALINA Agosto  DATE: August 21, 2023  TIME: 1:18 PM  Attestation signed by Corrie Mak DO at 8/21/2023  1:52 PM:  I supervised the Advanced Practitioner. ? I performed, in its entirety, the assessment/plan component of the visit. I agree with the Advanced Practitioner's note with the following additions/exceptions:      Ms. Erma West presents for evaluation of severe aortic stenosis. She has some mild SOB and fatigue, as well as a chronic productive cough. TTE was reviewed by me personally. It shows severe AS with an PINO of 0.74, a mean gradient of 36mmHg and a velocity of 3.77m/s. Based on these findings, I recommend TAVR. Pre-op testing, including CTA, carotid duplex and cardiac cath have been ordered. She will return once her testing is complete to review results and plan her procedure.     Corrie Mak DO 08/21/23

## 2023-08-21 NOTE — LETTER
2023       Sukhi Perez              : 1940        MRN: [id number]  [address] [place] [address] [address] [address]       Procedure Name: RIGHT + LEFT HEART CATHETERIZATION    Procedure date: 23    Location: [facility]  Address: [address] [place] [address], [address]      The hospital will contact you the day prior to your procedure, usually between 4PM - 6PM to instruct you on the time and place to report. If you do not hear from a [facility]  by 6PM the evening prior to your procedure, please contact the campus that you are scheduled at. [facility]: [facility] [address] [place] [address] Street: [address] [place] [address] Day Surgery [phone]   [facility] HOSPITAL: [facility] [facility] [address] [phone]    You may have nothing to eat or drink from midnight the night prior to your procedure. You may have a minimal amount of water with your morning medications. DO NOT stop taking Plavix or Aspirin unless advised otherwise. If your procedure is scheduled after 12:00 noon, you may have clear liquids until 8:00AM the morning of your procedure. Clear liquids are 7UP, Ginger Ale, Jello or broth. Arrange for a responsible person to drive you to and from the hospital.    Please shower/bathe the night before your procedure and do not use powders or lotions. Please notify us if you have been admitted to the hospital within the past 30 days. Bring a list of daily medications, vitamins, minerals, herbals and nutritional supplements you take. Include dosage and time you take them each day. If packing an overnight bag, pack minimal clothing, you will be given hospital sleepwear. Do not bring money, valuables or jewelry. Wedding band is OK. If you use CPAP machine, bring it to the hospital.      Have your Photo ID and Insurance cards with you.     DO NOT take any diabetic medication, including insulin, the morning of the procedure. Oral diabetic medications may include: Glucophage, Prandin, Glyburide, Micronase, Avandia, Glocovance, Precose, Glynase y Starlix. You should continue to take your morning dose of heart and/or blood pressure medications with a sip of water UNLESS ADVISED OTHERWISE.     Special Instructions:    Medication hold:   N/A    Blood work to be done on 8/24/23:  CMP / CBC (FASTING 8 HOURS)        Thank you,   Eden "Jeremy Torres" [address] Cardiology   [facility]  Christina REYNA  Ph: [phone]

## 2023-08-21 NOTE — TELEPHONE ENCOUNTER
----- Message from Florencio Meyer sent at 8/21/2023  1:40 PM EDT -----  Regarding: Cath  Please schedule patient for:    Pre TAVR Cardiac Cath: to be scheduled in the next few weeks. Patient has medicare as primary insurance and had recent bloodwork. Please schedule with either Dr. Debra To, Dr. Ari Reese, Dr. Temi Lira, or Dr. Espinoza Escort    To be done at: [address] [place] [address]    To be done by:    Please address any questions regarding this request to Herlinda Lehman or Sen Kowalski,    Thank you.

## 2023-08-21 NOTE — TELEPHONE ENCOUNTER
Patient scheduled for Right + Left Heart Cath on 9/7/23 at [facility] with Dr. Michelle Mckinney. Mailed patient instructions. Patient aware of all general instructions. Medication holds:   N/A    Labs to be done on 8/24/23:  CMP / CBC (fasting 8 hours)     Insurance: Medicare     Please obtain auth.      Thank you,  Eden "Ani AgarwalKeepTruckins Wrike

## 2023-08-21 NOTE — PROGRESS NOTES
Consultation - Cardiothoracic Surgery   Myra Kidd 79 y.o. female MRN: [id number]    Physician Requesting Consult: Dr. Caty Patel    Reason for Consult / Principal Problem: Aortic stenosis, Non-Rheumatic    History of Present Illness: Myra Kidd is a 79y.o. year old female who presents for initial outpatient surgical consultation for symptomatic severe aortic stenosis. Patient's medical history is notable for AS, HTN, HLD, hypothyrroidism, pulmonary nodules, anemia, ulcerative colitis, colon polyps, diverticulitis, GERD, Low's esophagus, cholelithiasis and arthritis/DJD s/p b/l TKR's. Patient's Gastroenterologist detected a heart murmur on exam at an office appt in March 2022. Patient was referred for Cardiology consultation and echocardiogram performed in June 2022 demonstrated moderate to severe AS. She has been under surveillnace by Cardiology. She underwent repeat echo in June 2023 that now demonstrates progression to severe AS. Patient is accompanied by her  and daughter today. She admits to SAVAGE with activities such as mopping the floor, climbing stairs and other strenuous activity. She has had a chronic cough for approximately 4 years that is productive for yellow mucus. She has post nasal drip which exacerbates the cough. She also admits to fatigue / decrease in activity tolerance as well as feeling off balance. She states she thinks her fatigue is related to the diet she has to follow for her ulcerative colitis as she has lost muscle mass and stamina. She experiences mild bilateral ankle edema. She denies chest pian, palpitations, lightheadedness or syncope. Patient denies childhood h/o murmur or rheumatic fever. Patient denies tobacco, alcohol or drugs. Patient obtains routine dental care every 6 months- no active issues. Patient is  and lives independently with her  in a single level home.       Past Medical History:  Past Medical History:   Diagnosis Date • Allergic rhinitis 3/21/2023   • Anemia    • Arthritis     osteoporosis, djd knees, pt denies osteoporosis on 2/21/22   • Low's esophagus    • Cancer ([address])     on face   • Colon polyp    • Cough 02/21/2022    cough for 3 years, expectorates yellow mucus   • Disease of thyroid gland     low thyroid   • Diverticulitis     gerd, diverticulitis   • Dizziness    • GERD (gastroesophageal reflux disease)     ulcerative colitis;  Low's esophagus, diverticulosis; hx of laryngopharyngeal reflux   • Hyperlipidemia    • Hypertension    • Low vitamin D level    • LPRD (laryngopharyngeal reflux disease) 02/21/2022   • PONV (postoperative nausea and vomiting)     AFTER "BIG SURGERIES"   • Postnasal drip     WITH COUGH   • Ulcerative colitis ([address])          Past Surgical History:   Past Surgical History:   Procedure Laterality Date   • APPENDECTOMY     • CATARACT EXTRACTION Bilateral    • COLONOSCOPY      colon polyps   • COLONOSCOPY  07/28/2022   • FOOT SURGERY Bilateral     bunionectomy   • HEMORROIDECTOMY      hemorroid removal   • JOINT REPLACEMENT Bilateral     knees   • REPLACEMENT TOTAL KNEE Bilateral     b/ replacement   • TONSILLECTOMY     • TUBAL LIGATION     • UPPER GASTROINTESTINAL ENDOSCOPY           Family History:  Family History   Problem Relation Age of Onset   • Heart disease Father    • No Known Problems Sister    • No Known Problems Sister    • Cancer Daughter         unsure of type of cancer, it was female cancer and hysterectomy done by Dr. Ildefonso Strauss   • No Known Problems Maternal Grandmother    • No Known Problems Paternal Grandmother    • Heart disease Brother    • Cancer Brother 54   • Cancer Brother 79   • No Known Problems Maternal Aunt    • No Known Problems Paternal Aunt          Social History:    Social History     Substance and Sexual Activity   Alcohol Use Not Currently     Social History     Substance and Sexual Activity   Drug Use Never     Social History     Tobacco Use   Smoking Status Never   Smokeless Tobacco Never         Home Medications:   Prior to Admission medications    Medication Sig Start Date End Date Taking? Authorizing Provider   amLODIPine (NORVASC) 10 mg tablet Take 1 tablet (10 mg total) by mouth daily 5/3/23   Josiah Malave MD   atorvastatin (LIPITOR) 20 mg tablet Take 1 tablet by mouth once daily 8/18/23   Josiah Malave MD   azelastine (ASTELIN) 0.1 % nasal spray 2 sprays into each nostril 2 (two) times a day  Patient not taking: Reported on 5/3/2023 12/12/22 1/11/23  Yuliet Plata MD   cholecalciferol (VITAMIN D3) 1,000 units tablet Take 1,000 Units by mouth daily    Historical Provider, MD   Coenzyme Q10 (Co Q 10) 100 MG CAPS Take by mouth    Historical Provider, MD   Cyanocobalamin (VITAMIN B 12 PO) Take by mouth    Historical Provider, MD   famotidine (PEPCID) 40 MG tablet TAKE 1 TABLET BY MOUTH ONCE DAILY BEFORE SUPPER 6/6/23   Hussein Finnegan MD   ferrous gluconate (FERGON) 324 mg tablet Take 324 mg by mouth daily with breakfast Takes once a week  Patient not taking: Reported on 5/3/2023    Historical Provider, MD   fluticasone Mayank Shave) 50 mcg/act nasal spray 2 sprays into each nostril daily  Patient not taking: Reported on 5/3/2023 10/17/22   Yuliet Plata MD   gabapentin (Neurontin) 100 mg capsule Take 1 capsule (100 mg total) by mouth 2 (two) times a day for 5 days, THEN 2 capsules (200 mg total) 2 (two) times a day for 5 days, THEN 3 capsules (300 mg total) 2 (two) times a day for 20 days.  7/23/23 8/22/23  Yuliet Plata MD   ipratropium (ATROVENT) 0.03 % nasal spray 2 sprays into each nostril 3 (three) times a day as needed (nasal drip with meals)  Patient not taking: Reported on 5/3/2023 12/12/22   Yuliet Plata MD   levothyroxine 75 mcg tablet Take 1 tablet by mouth once daily 6/2/23   Josiah Malave MD   Multiple Vitamin (multivitamin) capsule Take 1 capsule by mouth daily    Historical Provider, MD   nystatin (MYCOSTATIN) 500,000 units/5 mL suspension Apply 5 mL (500,000 Units total) to the mouth or throat 4 (four) times a day  Patient not taking: Reported on 7/11/2023 6/1/21   Joana Wells MD   pantoprazole (PROTONIX) 40 mg tablet TAKE 1 TABLET BY MOUTH TWICE DAILY 30 MINUTES BEFORE MEALS 8/7/23   Keny Thrasher PA-C   Promethazine-DM [facility]) 6.25-15 mg/5 mL oral syrup Take 5 mL by mouth 2 (two) times daily after meals  Patient not taking: Reported on 7/27/2023 11/5/21   Joana Wells MD   sulfaSALAzine (AZULFIDINE) 500 mg tablet TAKE 2 TABLETS BY MOUTH TWICE A DAY 5/8/23   Michelle Heredia MD   valACYclovir (VALTREX) 1,000 mg tablet Take 1 tablet (1,000 mg total) by mouth 3 (three) times a day for 7 days  Patient not taking: Reported on 3/18/2022 10/7/21 1/13/22  ALINA Bruner   valsartan (DIOVAN) 80 mg tablet Take 1 tablet by mouth once daily 5/18/23   Michelle Heredia MD   vitamin A 2250 MCG (7500 UT) capsule Take 7,500 Units by mouth daily    Historical Provider, MD   vitamin E, tocopherol, 1,000 units capsule Take 1,000 Units by mouth daily    Historical Provider, MD       Allergies:   Allergies   Allergen Reactions   • Sulfa Antibiotics Itching       Review of Systems:  Review of Systems - History obtained from chart review and the patient  General ROS: positive for  - fatigue and change in activity tolerance  negative for - chills, fever, sleep disturbance or weight gain  Psychological ROS: negative  Ophthalmic ROS: negative  ENT ROS: negative  Allergy and Immunology ROS: negative  Hematological and Lymphatic ROS: negative  Endocrine ROS: negative  Breast ROS: negative  Respiratory ROS: positive for - cough / expectorating yellow mucus  negative for - hemoptysis, orthopnea, pleuritic pain, stridor, tachypnea or wheezing  Cardiovascular ROS: positive for - dyspnea on exertion, edema and murmur  negative for - chest pain, irregular heartbeat, loss of consciousness, orthopnea, palpitations, paroxysmal nocturnal dyspnea or rapid heart rate  Gastrointestinal ROS: no abdominal pain, change in bowel habits, or black or bloody stools  Genito-Urinary ROS: no dysuria, trouble voiding, or hematuria  Musculoskeletal ROS: positive for - gait disturbance  Neurological ROS: positive for - gait disturbance, impaired coordination/balance and memory loss  negative for - numbness/tingling, seizures, speech problems, tremors or visual changes  Dermatological ROS: negative    Vital Signs:     Vitals:    08/21/23 1314 08/21/23 1316   BP: 129/62 118/58   BP Location: Left arm Right arm   Patient Position: Sitting Sitting   Cuff Size: Standard Standard   Pulse: 72    Temp: 98.1 °F (36.7 °C)    TempSrc: Tympanic    SpO2: 95%    Weight: 67.1 kg (147 lb 14.4 oz)    Height: 5' 2" (1.575 m)        Physical Exam:    General: Alert, oriented, obese, no acute distress  HEENT/NECK:  PERRLA. No jugular venous distention. Cardiac:Regular rate and rhythm, II/VI harsh systolic murmur RUSB   Carotid arteries: 1+ pulses, faint bruits vs radiation of murmur to neck bilaterally. Pulmonary:  Breath sounds clear bilaterally. Abdomen:  Non-tender, Non-distended. Positive bowel sounds. Upper extremities: 2+ radial pulses; brisk capillary refill  Lower extremities: Extremities warm/dry. PT/DP pulses 2+ bilaterally. 1+ edema B/L  Neuro: Alert and oriented X 3. Sensation is grossly intact. No focal deficits. Musculoskeletal: MAEE, stable gait  Skin: Warm/Dry, without rashes or lesions.       Lab Results:   Lab Results   Component Value Date    WBC 5.17 07/11/2023    HGB 9.1 (L) 07/11/2023    HCT 27.6 (L) 07/11/2023     (H) 07/11/2023     07/11/2023     Lab Results   Component Value Date    SODIUM 126 (L) 07/11/2023    K 4.5 07/11/2023    CL 94 (L) 07/11/2023    CO2 27 07/11/2023    AGAP 5 07/11/2023    BUN 12 07/11/2023    CREATININE 0.71 07/11/2023    GLUC 103 07/11/2023    GLUF 94 11/09/2022    CALCIUM 9.0 07/11/2023    AST 21 07/11/2023    ALT 11 07/11/2023 ALKPHOS 77 07/11/2023    TP 7.0 07/11/2023    TBILI 0.40 07/11/2023    EGFR 78 07/11/2023       Imaging Studies:     Echocardiogram: 6/1/23    •  Left Ventricle: Left ventricular cavity size is normal. Wall thickness is moderately increased. The left ventricular ejection fraction is 65%. Systolic function is normal. Wall motion is normal. Diastolic function is mildly abnormal, consistent with grade I (abnormal) relaxation. •  Aortic Valve: The aortic valve is trileaflet. The leaflets are not thickened. The leaflets are moderately calcified. There is moderately reduced mobility. There is severe stenosis. •  Mitral Valve: There is mild regurgitation. •  Tricuspid Valve: There is mild regurgitation.     Findings    Left Ventricle Left ventricular cavity size is normal. Wall thickness is moderately increased. The left ventricular ejection fraction is 65%. Systolic function is normal.  Wall motion is normal. Diastolic function is mildly abnormal, consistent with grade I (abnormal) relaxation. Right Ventricle Right ventricular cavity size is normal. Systolic function is normal. Wall thickness is normal.      Left Atrium The atrium is normal in size. Right Atrium The atrium is normal in size. Aortic Valve The aortic valve is trileaflet. The leaflets are not thickened. The leaflets are moderately calcified. There is moderately reduced mobility. There is no evidence of regurgitation. There is severe stenosis. Mitral Valve Mitral valve structure is normal. There is mild regurgitation. There is no evidence of stenosis. Tricuspid Valve Tricuspid valve structure is normal. There is mild regurgitation. There is no evidence of stenosis. Pulmonic Valve Pulmonic valve structure is normal. There is no evidence of regurgitation. There is no evidence of stenosis. Ascending Aorta The aortic root is normal in size. IVC/SVC The inferior vena cava is normal in size.       Pericardium There is no pericardial effusion. The pericardium is normal in appearance.         Left Ventricle Measurements    Function/Volumes   A4C EF 69 %         LVOT stroke volume 69.17 cm3         LVOT stroke volume index 42.9 ml/m2         Dimensions   LVIDd 3.7 cm         LVIDS 2.4 cm         IVSd 1.4 cm         LVPWd 1.1 cm         LVOT area 2.83 cm2         FS 35 %         Diastolic Filling   MV E' Tissue Velocity Septal 4 cm/s         E wave deceleration time 349 ms         MV Peak E Vasiliy 64 cm/s         MV Peak A Vasiliy 0.95 m/s          Report Measurements   AV LVOT peak gradient 4 mmHg              Interventricular Septum Measurements    Shunt Ratio   LVOT peak VTI 24.41 cm         LVOT peak vasiliy 1.01 m/s              Right Ventricle Measurements    Dimensions   RVID d 2.6 cm         Tricuspid annular plane systolic excursion 2 cm               Left Atrium Measurements    Dimensions   LA size 3.7 cm         LA length (A2C) 5.5 cm               Right Atrium Measurements    Dimensions   RAA A4C 10.6 cm2               Atrial Septum Measurements    Shunt Ratio   LVOT peak VTI 24.41 cm         LVOT peak vasiliy 1.01 m/s               Aortic Valve Measurements    Stenosis   Aortic valve peak velocity 3.77 m/s         LVOT peak vasiliy 1.01 m/s         Ao VTI 93.24 cm         LVOT peak VTI 24.41 cm         AV mean gradient 36 mmHg         LVOT mn grad 2 mmHg         AV peak gradient 57 mmHg         AV LVOT peak gradient 4 mmHg         Area/Dimensions   DVI 0.27          AV valve area 0.74 cm2         AV area by cont VTI 0.7 cm2         AV area peak vasiliy 0.8 cm2         LVOT diameter 1.9 cm         LVOT area 2.83 cm2               Mitral Valve Measurements    Stenosis   MV stenosis pressure 1/2 time 101 ms         MV valve area p 1/2 method 2.18 cm2               Tricuspid Valve Measurements    RVSP Parameters   TR Peak Vasiliy 2.2 m/s         Triscuspid Valve Regurgitation Peak Gradient 19 mmHg               Aorta Measurements    Aortic Dimensions   Ao root 3.2 cm         Asc Ao 3.4 cm                   I have personally reviewed pertinent films in PACS     PCP and Cardiology notes reviewed    TAVR evaluation Assessment:     [address]: III    Aortic Stenosis Stage: D3    STS risk score (preliminary): 4.3 %    KCCQ-12 completed    Assessment:  Patient Active Problem List    Diagnosis Date Noted   • Rhinosinusitis 07/09/2023   • Calcified granuloma of lung ([address]) 03/21/2023   • Allergic rhinitis 03/21/2023   • Gastroesophageal reflux disease 03/21/2023   • Non-rheumatic aortic stenosis 11/30/2022   • Herpes zoster without complication 11/77/9981   • Chronic cough 07/26/2021   • Dairy product intolerance 07/26/2021   • [place] allergy 07/26/2021   • B12 deficiency 07/26/2021   • Irritable larynx 07/26/2021   • Cough variant asthma  07/26/2021   • Dysphonia 07/26/2021   • Pharyngoesophageal dysphagia 07/26/2021   • Dysfunction of both eustachian tubes 07/26/2021   • Essential hypertension 05/02/2020   • Hypercholesterolemia 05/02/2020   • Hypothyroidism 05/02/2020   • Ulcerative colitis without complications ([address]) 05/64/5347   • Diverticulosis 05/02/2020   • Low's esophagus 05/02/2020   • Colon polyp 05/02/2020   • Gastroesophageal reflux disease with esophagitis 05/02/2020   • Vitamin D deficiency 05/02/2020   • Anemia 05/02/2020         Impression/Plan:    Citlalli Tovar has symptomatic severe aortic stenosis. They will undergo the following testing for transcatheter aortic valve replacement: Gated CTA of the chest/abdomen/pelvis, 3D GIOVANNY, cardiac catheterization, dental clearance, pulmonary function tests with ABG, and carotid artery ultrasound. Once these studies have been completed, Citlalli Tovar will follow up in our office to review the results and to be evaluated to confirm the suitability of proceeding with transcatheter aortic valve replacment.      Citlalli Tovar was comfortable with our recommendations, and their questions were answered to their satisfaction. Thank you for allowing us to participate in the care of this patient. The patient recently had a screening colonoscopy in 7/28/22. Therefore GI referral is not indicated at this time.      SIGNATURE: ALINA Pisano  DATE: August 21, 2023  TIME: 1:18 PM

## 2023-08-21 NOTE — H&P (VIEW-ONLY)
Consultation - Cardiothoracic Surgery   Iveth Howell 79 y.o. female MRN: [id number]    Physician Requesting Consult: Dr. Tito Ramirez    Reason for Consult / Principal Problem: Aortic stenosis, Non-Rheumatic    History of Present Illness: Iveth Howell is a 79y.o. year old female who presents for initial outpatient surgical consultation for symptomatic severe aortic stenosis. Patient's medical history is notable for AS, HTN, HLD, hypothyrroidism, pulmonary nodules, anemia, ulcerative colitis, colon polyps, diverticulitis, GERD, Low's esophagus, cholelithiasis and arthritis/DJD s/p b/l TKR's. Patient's Gastroenterologist detected a heart murmur on exam at an office appt in March 2022. Patient was referred for Cardiology consultation and echocardiogram performed in June 2022 demonstrated moderate to severe AS. She has been under surveillnace by Cardiology. She underwent repeat echo in June 2023 that now demonstrates progression to severe AS. Patient is accompanied by her  and daughter today. She admits to SAVAGE with activities such as mopping the floor, climbing stairs and other strenuous activity. She has had a chronic cough for approximately 4 years that is productive for yellow mucus. She has post nasal drip which exacerbates the cough. She also admits to fatigue / decrease in activity tolerance as well as feeling off balance. She states she thinks her fatigue is related to the diet she has to follow for her ulcerative colitis as she has lost muscle mass and stamina. She experiences mild bilateral ankle edema. She denies chest pian, palpitations, lightheadedness or syncope. Patient denies childhood h/o murmur or rheumatic fever. Patient denies tobacco, alcohol or drugs. Patient obtains routine dental care every 6 months- no active issues. Patient is  and lives independently with her  in a single level home.       Past Medical History:  Past Medical History:   Diagnosis Date • Allergic rhinitis 3/21/2023   • Anemia    • Arthritis     osteoporosis, djd knees, pt denies osteoporosis on 2/21/22   • Low's esophagus    • Cancer ([address])     on face   • Colon polyp    • Cough 02/21/2022    cough for 3 years, expectorates yellow mucus   • Disease of thyroid gland     low thyroid   • Diverticulitis     gerd, diverticulitis   • Dizziness    • GERD (gastroesophageal reflux disease)     ulcerative colitis;  Low's esophagus, diverticulosis; hx of laryngopharyngeal reflux   • Hyperlipidemia    • Hypertension    • Low vitamin D level    • LPRD (laryngopharyngeal reflux disease) 02/21/2022   • PONV (postoperative nausea and vomiting)     AFTER "BIG SURGERIES"   • Postnasal drip     WITH COUGH   • Ulcerative colitis ([address])          Past Surgical History:   Past Surgical History:   Procedure Laterality Date   • APPENDECTOMY     • CATARACT EXTRACTION Bilateral    • COLONOSCOPY      colon polyps   • COLONOSCOPY  07/28/2022   • FOOT SURGERY Bilateral     bunionectomy   • HEMORROIDECTOMY      hemorroid removal   • JOINT REPLACEMENT Bilateral     knees   • REPLACEMENT TOTAL KNEE Bilateral     b/ replacement   • TONSILLECTOMY     • TUBAL LIGATION     • UPPER GASTROINTESTINAL ENDOSCOPY           Family History:  Family History   Problem Relation Age of Onset   • Heart disease Father    • No Known Problems Sister    • No Known Problems Sister    • Cancer Daughter         unsure of type of cancer, it was female cancer and hysterectomy done by Dr. Sloan Escalera   • No Known Problems Maternal Grandmother    • No Known Problems Paternal Grandmother    • Heart disease Brother    • Cancer Brother 54   • Cancer Brother 79   • No Known Problems Maternal Aunt    • No Known Problems Paternal Aunt          Social History:    Social History     Substance and Sexual Activity   Alcohol Use Not Currently     Social History     Substance and Sexual Activity   Drug Use Never     Social History     Tobacco Use   Smoking Status Never   Smokeless Tobacco Never         Home Medications:   Prior to Admission medications    Medication Sig Start Date End Date Taking? Authorizing Provider   amLODIPine (NORVASC) 10 mg tablet Take 1 tablet (10 mg total) by mouth daily 5/3/23   Cameron Smiley MD   atorvastatin (LIPITOR) 20 mg tablet Take 1 tablet by mouth once daily 8/18/23   Cameron Smiley MD   azelastine (ASTELIN) 0.1 % nasal spray 2 sprays into each nostril 2 (two) times a day  Patient not taking: Reported on 5/3/2023 12/12/22 1/11/23  Zaida Bedolla MD   cholecalciferol (VITAMIN D3) 1,000 units tablet Take 1,000 Units by mouth daily    Historical Provider, MD   Coenzyme Q10 (Co Q 10) 100 MG CAPS Take by mouth    Historical Provider, MD   Cyanocobalamin (VITAMIN B 12 PO) Take by mouth    Historical Provider, MD   famotidine (PEPCID) 40 MG tablet TAKE 1 TABLET BY MOUTH ONCE DAILY BEFORE SUPPER 6/6/23   Beni Smallwood MD   ferrous gluconate (FERGON) 324 mg tablet Take 324 mg by mouth daily with breakfast Takes once a week  Patient not taking: Reported on 5/3/2023    Historical Provider, MD   fluticasone Linda Hench) 50 mcg/act nasal spray 2 sprays into each nostril daily  Patient not taking: Reported on 5/3/2023 10/17/22   Zaida Bedolla MD   gabapentin (Neurontin) 100 mg capsule Take 1 capsule (100 mg total) by mouth 2 (two) times a day for 5 days, THEN 2 capsules (200 mg total) 2 (two) times a day for 5 days, THEN 3 capsules (300 mg total) 2 (two) times a day for 20 days.  7/23/23 8/22/23  Zaida Bedolla MD   ipratropium (ATROVENT) 0.03 % nasal spray 2 sprays into each nostril 3 (three) times a day as needed (nasal drip with meals)  Patient not taking: Reported on 5/3/2023 12/12/22   Zaida Bedolla MD   levothyroxine 75 mcg tablet Take 1 tablet by mouth once daily 6/2/23   Cameron Smiley MD   Multiple Vitamin (multivitamin) capsule Take 1 capsule by mouth daily    Historical Provider, MD   nystatin (MYCOSTATIN) 500,000 units/5 mL suspension Apply 5 mL (500,000 Units total) to the mouth or throat 4 (four) times a day  Patient not taking: Reported on 7/11/2023 6/1/21   Zoya Vail MD   pantoprazole (PROTONIX) 40 mg tablet TAKE 1 TABLET BY MOUTH TWICE DAILY 30 MINUTES BEFORE MEALS 8/7/23   Gustavo Phillip PA-C   Promethazine-[facility]) 6.25-15 mg/5 mL oral syrup Take 5 mL by mouth 2 (two) times daily after meals  Patient not taking: Reported on 7/27/2023 11/5/21   Zoya Vail MD   sulfaSALAzine (AZULFIDINE) 500 mg tablet TAKE 2 TABLETS BY MOUTH TWICE A DAY 5/8/23   Ricardo Winn MD   valACYclovir (VALTREX) 1,000 mg tablet Take 1 tablet (1,000 mg total) by mouth 3 (three) times a day for 7 days  Patient not taking: Reported on 3/18/2022 10/7/21 1/13/22  ALINA Ruiz   valsartan (DIOVAN) 80 mg tablet Take 1 tablet by mouth once daily 5/18/23   Ricardo Winn MD   vitamin A 2250 MCG (7500 UT) capsule Take 7,500 Units by mouth daily    Historical Provider, MD   vitamin E, tocopherol, 1,000 units capsule Take 1,000 Units by mouth daily    Historical Provider, MD       Allergies:   Allergies   Allergen Reactions   • Sulfa Antibiotics Itching       Review of Systems:  Review of Systems - History obtained from chart review and the patient  General ROS: positive for  - fatigue and change in activity tolerance  negative for - chills, fever, sleep disturbance or weight gain  Psychological ROS: negative  Ophthalmic ROS: negative  ENT ROS: negative  Allergy and Immunology ROS: negative  Hematological and Lymphatic ROS: negative  Endocrine ROS: negative  Breast ROS: negative  Respiratory ROS: positive for - cough / expectorating yellow mucus  negative for - hemoptysis, orthopnea, pleuritic pain, stridor, tachypnea or wheezing  Cardiovascular ROS: positive for - dyspnea on exertion, edema and murmur  negative for - chest pain, irregular heartbeat, loss of consciousness, orthopnea, palpitations, paroxysmal nocturnal dyspnea or rapid heart rate  Gastrointestinal ROS: no abdominal pain, change in bowel habits, or black or bloody stools  Genito-Urinary ROS: no dysuria, trouble voiding, or hematuria  Musculoskeletal ROS: positive for - gait disturbance  Neurological ROS: positive for - gait disturbance, impaired coordination/balance and memory loss  negative for - numbness/tingling, seizures, speech problems, tremors or visual changes  Dermatological ROS: negative    Vital Signs:     Vitals:    08/21/23 1314 08/21/23 1316   BP: 129/62 118/58   BP Location: Left arm Right arm   Patient Position: Sitting Sitting   Cuff Size: Standard Standard   Pulse: 72    Temp: 98.1 °F (36.7 °C)    TempSrc: Tympanic    SpO2: 95%    Weight: 67.1 kg (147 lb 14.4 oz)    Height: 5' 2" (1.575 m)        Physical Exam:    General: Alert, oriented, obese, no acute distress  HEENT/NECK:  PERRLA. No jugular venous distention. Cardiac:Regular rate and rhythm, II/VI harsh systolic murmur RUSB   Carotid arteries: 1+ pulses, faint bruits vs radiation of murmur to neck bilaterally. Pulmonary:  Breath sounds clear bilaterally. Abdomen:  Non-tender, Non-distended. Positive bowel sounds. Upper extremities: 2+ radial pulses; brisk capillary refill  Lower extremities: Extremities warm/dry. PT/DP pulses 2+ bilaterally. 1+ edema B/L  Neuro: Alert and oriented X 3. Sensation is grossly intact. No focal deficits. Musculoskeletal: MAEE, stable gait  Skin: Warm/Dry, without rashes or lesions.       Lab Results:   Lab Results   Component Value Date    WBC 5.17 07/11/2023    HGB 9.1 (L) 07/11/2023    HCT 27.6 (L) 07/11/2023     (H) 07/11/2023     07/11/2023     Lab Results   Component Value Date    SODIUM 126 (L) 07/11/2023    K 4.5 07/11/2023    CL 94 (L) 07/11/2023    CO2 27 07/11/2023    AGAP 5 07/11/2023    BUN 12 07/11/2023    CREATININE 0.71 07/11/2023    GLUC 103 07/11/2023    GLUF 94 11/09/2022    CALCIUM 9.0 07/11/2023    AST 21 07/11/2023    ALT 11 07/11/2023 ALKPHOS 77 07/11/2023    TP 7.0 07/11/2023    TBILI 0.40 07/11/2023    EGFR 78 07/11/2023       Imaging Studies:     Echocardiogram: 6/1/23    •  Left Ventricle: Left ventricular cavity size is normal. Wall thickness is moderately increased. The left ventricular ejection fraction is 65%. Systolic function is normal. Wall motion is normal. Diastolic function is mildly abnormal, consistent with grade I (abnormal) relaxation. •  Aortic Valve: The aortic valve is trileaflet. The leaflets are not thickened. The leaflets are moderately calcified. There is moderately reduced mobility. There is severe stenosis. •  Mitral Valve: There is mild regurgitation. •  Tricuspid Valve: There is mild regurgitation.     Findings    Left Ventricle Left ventricular cavity size is normal. Wall thickness is moderately increased. The left ventricular ejection fraction is 65%. Systolic function is normal.  Wall motion is normal. Diastolic function is mildly abnormal, consistent with grade I (abnormal) relaxation. Right Ventricle Right ventricular cavity size is normal. Systolic function is normal. Wall thickness is normal.      Left Atrium The atrium is normal in size. Right Atrium The atrium is normal in size. Aortic Valve The aortic valve is trileaflet. The leaflets are not thickened. The leaflets are moderately calcified. There is moderately reduced mobility. There is no evidence of regurgitation. There is severe stenosis. Mitral Valve Mitral valve structure is normal. There is mild regurgitation. There is no evidence of stenosis. Tricuspid Valve Tricuspid valve structure is normal. There is mild regurgitation. There is no evidence of stenosis. Pulmonic Valve Pulmonic valve structure is normal. There is no evidence of regurgitation. There is no evidence of stenosis. Ascending Aorta The aortic root is normal in size. IVC/SVC The inferior vena cava is normal in size.       Pericardium There is no pericardial effusion. The pericardium is normal in appearance.         Left Ventricle Measurements    Function/Volumes   A4C EF 69 %         LVOT stroke volume 69.17 cm3         LVOT stroke volume index 42.9 ml/m2         Dimensions   LVIDd 3.7 cm         LVIDS 2.4 cm         IVSd 1.4 cm         LVPWd 1.1 cm         LVOT area 2.83 cm2         FS 35 %         Diastolic Filling   MV E' Tissue Velocity Septal 4 cm/s         E wave deceleration time 349 ms         MV Peak E Vasiliy 64 cm/s         MV Peak A Vasiliy 0.95 m/s          Report Measurements   AV LVOT peak gradient 4 mmHg              Interventricular Septum Measurements    Shunt Ratio   LVOT peak VTI 24.41 cm         LVOT peak vasiliy 1.01 m/s              Right Ventricle Measurements    Dimensions   RVID d 2.6 cm         Tricuspid annular plane systolic excursion 2 cm               Left Atrium Measurements    Dimensions   LA size 3.7 cm         LA length (A2C) 5.5 cm               Right Atrium Measurements    Dimensions   RAA A4C 10.6 cm2               Atrial Septum Measurements    Shunt Ratio   LVOT peak VTI 24.41 cm         LVOT peak vasiliy 1.01 m/s               Aortic Valve Measurements    Stenosis   Aortic valve peak velocity 3.77 m/s         LVOT peak vasiliy 1.01 m/s         Ao VTI 93.24 cm         LVOT peak VTI 24.41 cm         AV mean gradient 36 mmHg         LVOT mn grad 2 mmHg         AV peak gradient 57 mmHg         AV LVOT peak gradient 4 mmHg         Area/Dimensions   DVI 0.27          AV valve area 0.74 cm2         AV area by cont VTI 0.7 cm2         AV area peak vasiliy 0.8 cm2         LVOT diameter 1.9 cm         LVOT area 2.83 cm2               Mitral Valve Measurements    Stenosis   MV stenosis pressure 1/2 time 101 ms         MV valve area p 1/2 method 2.18 cm2               Tricuspid Valve Measurements    RVSP Parameters   TR Peak Vasiliy 2.2 m/s         Triscuspid Valve Regurgitation Peak Gradient 19 mmHg               Aorta Measurements    Aortic Dimensions   Ao root 3.2 cm         Asc Ao 3.4 cm                   I have personally reviewed pertinent films in PACS     PCP and Cardiology notes reviewed    TAVR evaluation Assessment:     [address]: III    Aortic Stenosis Stage: D3    STS risk score (preliminary): 4.3 %    KCCQ-12 completed    Assessment:  Patient Active Problem List    Diagnosis Date Noted   • Rhinosinusitis 07/09/2023   • Calcified granuloma of lung ([address]) 03/21/2023   • Allergic rhinitis 03/21/2023   • Gastroesophageal reflux disease 03/21/2023   • Non-rheumatic aortic stenosis 11/30/2022   • Herpes zoster without complication 34/84/7558   • Chronic cough 07/26/2021   • Dairy product intolerance 07/26/2021   • [place] allergy 07/26/2021   • B12 deficiency 07/26/2021   • Irritable larynx 07/26/2021   • Cough variant asthma  07/26/2021   • Dysphonia 07/26/2021   • Pharyngoesophageal dysphagia 07/26/2021   • Dysfunction of both eustachian tubes 07/26/2021   • Essential hypertension 05/02/2020   • Hypercholesterolemia 05/02/2020   • Hypothyroidism 05/02/2020   • Ulcerative colitis without complications ([address]) 30/81/8466   • Diverticulosis 05/02/2020   • Low's esophagus 05/02/2020   • Colon polyp 05/02/2020   • Gastroesophageal reflux disease with esophagitis 05/02/2020   • Vitamin D deficiency 05/02/2020   • Anemia 05/02/2020         Impression/Plan:    Tameka Fraga has symptomatic severe aortic stenosis. They will undergo the following testing for transcatheter aortic valve replacement: Gated CTA of the chest/abdomen/pelvis, 3D GIOVANNY, cardiac catheterization, dental clearance, pulmonary function tests with ABG, and carotid artery ultrasound. Once these studies have been completed, Tameka Fraga will follow up in our office to review the results and to be evaluated to confirm the suitability of proceeding with transcatheter aortic valve replacment.      Tameka Fraga was comfortable with our recommendations, and their questions were answered to their satisfaction. Thank you for allowing us to participate in the care of this patient. The patient recently had a screening colonoscopy in 7/28/22. Therefore GI referral is not indicated at this time.      SIGNATURE: ALINA Hirsch  DATE: August 21, 2023  TIME: 1:18 PM

## 2023-08-23 ENCOUNTER — HOSPITAL ENCOUNTER (OUTPATIENT)
Dept: INFUSION CENTER | Facility: CLINIC | Age: 83
Discharge: HOME | End: 2023-08-23
Payer: MEDICARE

## 2023-08-23 DIAGNOSIS — E53.8 B12 DEFICIENCY: Primary | ICD-10-CM

## 2023-08-23 PROCEDURE — 96372 THER/PROPH/DIAG INJ SC/IM: CPT

## 2023-08-23 RX ORDER — CYANOCOBALAMIN 1000 UG/ML
1000 INJECTION, SOLUTION INTRAMUSCULAR; SUBCUTANEOUS ONCE
Status: COMPLETED | OUTPATIENT
Start: 2023-08-23 | End: 2023-08-23

## 2023-08-23 RX ORDER — CYANOCOBALAMIN 1000 UG/ML
1000 INJECTION, SOLUTION INTRAMUSCULAR; SUBCUTANEOUS ONCE
Status: CANCELLED | OUTPATIENT
Start: 2023-08-23

## 2023-08-23 RX ADMIN — CYANOCOBALAMIN 1000 MCG: 1000 INJECTION, SOLUTION INTRAMUSCULAR; SUBCUTANEOUS at 15:01

## 2023-08-23 NOTE — PLAN OF CARE
Problem: Knowledge Deficit  Goal: Patient/family/caregiver demonstrates understanding of disease process, treatment plan, medications, and discharge instructions  Description: Complete learning assessment and assess knowledge base.   Interventions:  - Provide teaching at level of understanding  - Provide teaching via preferred learning methods  Outcome: Progressing Yes - the patient is able to be screened

## 2023-08-23 NOTE — PROGRESS NOTES
Patient to infusion for B12 injection. She offers no complaints. Pt tolerated Injection given in Left Arm. Next appointment confirmed, copy of AVS provided.

## 2023-08-24 ENCOUNTER — APPOINTMENT (OUTPATIENT)
Dept: LAB | Facility: CLINIC | Age: 83
End: 2023-08-24
Payer: MEDICARE

## 2023-08-24 LAB
ALBUMIN SERPL BCP-MCNC: 3.9 G/DL (ref 3.5–5)
ALP SERPL-CCNC: 73 U/L (ref 34–104)
ALT SERPL W P-5'-P-CCNC: 9 U/L (ref 7–52)
ANION GAP SERPL CALCULATED.3IONS-SCNC: 3 MMOL/L
AST SERPL W P-5'-P-CCNC: 19 U/L (ref 13–39)
BASOPHILS # BLD AUTO: 0.08 THOUSANDS/ÂΜL (ref 0–0.1)
BASOPHILS NFR BLD AUTO: 2 % (ref 0–1)
BILIRUB SERPL-MCNC: 0.35 MG/DL (ref 0.2–1)
BUN SERPL-MCNC: 14 MG/DL (ref 5–25)
CALCIUM SERPL-MCNC: 8.9 MG/DL (ref 8.4–10.2)
CHLORIDE SERPL-SCNC: 98 MMOL/L (ref 96–108)
CO2 SERPL-SCNC: 29 MMOL/L (ref 21–32)
CREAT SERPL-MCNC: 0.89 MG/DL (ref 0.6–1.3)
EOSINOPHIL # BLD AUTO: 0.24 THOUSAND/ÂΜL (ref 0–0.61)
EOSINOPHIL NFR BLD AUTO: 5 % (ref 0–6)
ERYTHROCYTE [DISTWIDTH] IN BLOOD BY AUTOMATED COUNT: 13.6 % (ref 11.6–15.1)
GFR SERPL CREATININE-BSD FRML MDRD: 59 ML/MIN/1.73SQ M
GLUCOSE SERPL-MCNC: 95 MG/DL (ref 65–140)
HCT VFR BLD AUTO: 26.5 % (ref 34.8–46.1)
HGB BLD-MCNC: 8.6 G/DL (ref 11.5–15.4)
IMM GRANULOCYTES # BLD AUTO: 0.01 THOUSAND/UL (ref 0–0.2)
IMM GRANULOCYTES NFR BLD AUTO: 0 % (ref 0–2)
LYMPHOCYTES # BLD AUTO: 1.96 THOUSANDS/ÂΜL (ref 0.6–4.47)
LYMPHOCYTES NFR BLD AUTO: 37 % (ref 14–44)
MCH RBC QN AUTO: 33.2 PG (ref 26.8–34.3)
MCHC RBC AUTO-ENTMCNC: 32.5 G/DL (ref 31.4–37.4)
MCV RBC AUTO: 102 FL (ref 82–98)
MONOCYTES # BLD AUTO: 0.72 THOUSAND/ÂΜL (ref 0.17–1.22)
MONOCYTES NFR BLD AUTO: 14 % (ref 4–12)
NEUTROPHILS # BLD AUTO: 2.33 THOUSANDS/ÂΜL (ref 1.85–7.62)
NEUTS SEG NFR BLD AUTO: 42 % (ref 43–75)
NRBC BLD AUTO-RTO: 0 /100 WBCS
PLATELET # BLD AUTO: 275 THOUSANDS/UL (ref 149–390)
PMV BLD AUTO: 9.5 FL (ref 8.9–12.7)
POTASSIUM SERPL-SCNC: 4.4 MMOL/L (ref 3.5–5.3)
PROT SERPL-MCNC: 6.8 G/DL (ref 6.4–8.4)
RBC # BLD AUTO: 2.59 MILLION/UL (ref 3.81–5.12)
SODIUM SERPL-SCNC: 130 MMOL/L (ref 135–147)
WBC # BLD AUTO: 5.34 THOUSAND/UL (ref 4.31–10.16)

## 2023-08-24 PROCEDURE — 85025 COMPLETE CBC W/AUTO DIFF WBC: CPT

## 2023-08-24 PROCEDURE — 36415 COLL VENOUS BLD VENIPUNCTURE: CPT

## 2023-08-24 PROCEDURE — 80053 COMPREHEN METABOLIC PANEL: CPT

## 2023-08-24 NOTE — TELEPHONE ENCOUNTER
Jordin Wyman is not required. Verified that patient has Medicare primary with a supplement secondary.

## 2023-08-28 ENCOUNTER — RA CDI HCC (OUTPATIENT)
Dept: OTHER | Facility: HOSPITAL | Age: 83
End: 2023-08-28

## 2023-08-30 ENCOUNTER — HOSPITAL ENCOUNTER (OUTPATIENT)
Dept: NON INVASIVE DIAGNOSTICS | Facility: HOSPITAL | Age: 83
Discharge: HOME | End: 2023-08-30
Payer: MEDICARE

## 2023-08-30 ENCOUNTER — HOSPITAL ENCOUNTER (OUTPATIENT)
Dept: RADIOLOGY | Facility: HOSPITAL | Age: 83
Discharge: HOME | End: 2023-08-30
Payer: MEDICARE

## 2023-08-30 DIAGNOSIS — I35.0 NON-RHEUMATIC AORTIC STENOSIS: ICD-10-CM

## 2023-08-30 DIAGNOSIS — Z13.6 ENCOUNTER FOR SCREENING FOR STENOSIS OF CAROTID ARTERY: ICD-10-CM

## 2023-08-30 PROCEDURE — G1004 CDSM NDSC: HCPCS

## 2023-08-30 PROCEDURE — 93880 EXTRACRANIAL BILAT STUDY: CPT

## 2023-08-30 PROCEDURE — 74174 CTA ABD&PLVS W/CONTRAST: CPT

## 2023-08-30 PROCEDURE — 93880 EXTRACRANIAL BILAT STUDY: CPT | Performed by: SURGERY

## 2023-08-30 PROCEDURE — 75572 CT HRT W/3D IMAGE: CPT

## 2023-08-30 RX ORDER — IODIXANOL 320 MG/ML
120 INJECTION, SOLUTION INTRAVASCULAR
Status: COMPLETED | OUTPATIENT
Start: 2023-08-30 | End: 2023-08-30

## 2023-08-30 RX ADMIN — IODIXANOL 120 ML: 320 INJECTION, SOLUTION INTRAVASCULAR at 09:51

## 2023-09-02 DIAGNOSIS — K21.00 GASTROESOPHAGEAL REFLUX DISEASE WITH ESOPHAGITIS WITHOUT HEMORRHAGE: ICD-10-CM

## 2023-09-05 RX ORDER — PANTOPRAZOLE SODIUM 40 MG/1
TABLET, DELAYED RELEASE ORAL
Qty: 60 TABLET | Refills: 0 | Status: SHIPPED | OUTPATIENT
Start: 2023-09-05

## 2023-09-06 ENCOUNTER — OFFICE VISIT (OUTPATIENT)
Dept: FAMILY MEDICINE CLINIC | Facility: CLINIC | Age: 83
End: 2023-09-06
Payer: MEDICARE

## 2023-09-06 VITALS
RESPIRATION RATE: 16 BRPM | OXYGEN SATURATION: 97 % | BODY MASS INDEX: 26.68 KG/M2 | HEART RATE: 72 BPM | SYSTOLIC BLOOD PRESSURE: 122 MMHG | HEIGHT: 62 IN | DIASTOLIC BLOOD PRESSURE: 72 MMHG | TEMPERATURE: 98.6 F | WEIGHT: 145 LBS

## 2023-09-06 DIAGNOSIS — Z23 ENCOUNTER FOR IMMUNIZATION: ICD-10-CM

## 2023-09-06 DIAGNOSIS — K51.90 ULCERATIVE COLITIS WITHOUT COMPLICATIONS, UNSPECIFIED LOCATION (HCC): ICD-10-CM

## 2023-09-06 DIAGNOSIS — D64.9 ANEMIA, UNSPECIFIED TYPE: ICD-10-CM

## 2023-09-06 DIAGNOSIS — E78.00 HYPERCHOLESTEROLEMIA: ICD-10-CM

## 2023-09-06 DIAGNOSIS — N18.30 STAGE 3 CHRONIC KIDNEY DISEASE, UNSPECIFIED WHETHER STAGE 3A OR 3B CKD (HCC): ICD-10-CM

## 2023-09-06 DIAGNOSIS — I10 ESSENTIAL HYPERTENSION: Primary | ICD-10-CM

## 2023-09-06 DIAGNOSIS — E03.9 ACQUIRED HYPOTHYROIDISM: ICD-10-CM

## 2023-09-06 DIAGNOSIS — R93.89 ABNORMAL CT OF THE CHEST: ICD-10-CM

## 2023-09-06 DIAGNOSIS — E55.9 VITAMIN D DEFICIENCY: ICD-10-CM

## 2023-09-06 DIAGNOSIS — K21.00 GASTROESOPHAGEAL REFLUX DISEASE WITH ESOPHAGITIS, UNSPECIFIED WHETHER HEMORRHAGE: ICD-10-CM

## 2023-09-06 PROBLEM — K21.9 GASTROESOPHAGEAL REFLUX DISEASE: Status: RESOLVED | Noted: 2023-03-21 | Resolved: 2023-09-06

## 2023-09-06 PROCEDURE — G0008 ADMIN INFLUENZA VIRUS VAC: HCPCS | Performed by: FAMILY MEDICINE

## 2023-09-06 PROCEDURE — 99214 OFFICE O/P EST MOD 30 MIN: CPT | Performed by: FAMILY MEDICINE

## 2023-09-06 PROCEDURE — 90662 IIV NO PRSV INCREASED AG IM: CPT | Performed by: FAMILY MEDICINE

## 2023-09-06 NOTE — ASSESSMENT & PLAN NOTE
Recheck lipids. Continue atorvastatin 20 mg qhs. Advised pt to follow a low cholesterol diet and to exercise on a regular basis.

## 2023-09-06 NOTE — ASSESSMENT & PLAN NOTE
Blood pressure ok. Continue valsartan 80 mg daily and amlodipine 10 mg daily. Pt advised to continue low Na diet and to exercise on a regular basis.

## 2023-09-06 NOTE — ASSESSMENT & PLAN NOTE
Lab Results   Component Value Date    EGFR 59 08/24/2023    EGFR 78 07/11/2023    EGFR 63 02/04/2023    CREATININE 0.89 08/24/2023    CREATININE 0.71 07/11/2023    CREATININE 0.85 02/04/2023     GFR 59 in Aug 2023.

## 2023-09-06 NOTE — PROGRESS NOTES
Assessment/Plan:         Problem List Items Addressed This Visit        Digestive    Ulcerative colitis without complications ([address])     Has bleeding at times and loose BMs. Continue current meds per Dr Dee Blunt. Last colonoscopy was in July 2022. Gastroesophageal reflux disease with esophagitis     Stable. Pt had 48 hr pH monitor and EGD in Feb 2022 by Dr Dee Blunt. Continue pantoprazole 40 mg bid and famotidine 40 mg before supper. Endocrine    Hypothyroidism     Recheck TSH and Free T4. Continue levothyroxine 75 mcg qd. Relevant Orders    TSH, 3rd generation    T4, free       Cardiovascular and Mediastinum    Essential hypertension - Primary     Blood pressure ok. Continue valsartan 80 mg daily and amlodipine 10 mg daily. Pt advised to continue low Na diet and to exercise on a regular basis. Genitourinary    Stage 3 chronic kidney disease, unspecified whether stage 3a or 3b CKD ([facility])     Lab Results   Component Value Date    EGFR 59 08/24/2023    EGFR 78 07/11/2023    EGFR 63 02/04/2023    CREATININE 0.89 08/24/2023    CREATININE 0.71 07/11/2023    CREATININE 0.85 02/04/2023     GFR 59 in Aug 2023. Other    Vitamin D deficiency     Level 35.4 in May 2023. Continue 4000 U qd. Hypercholesterolemia     Recheck lipids. Continue atorvastatin 20 mg qhs. Advised pt to follow a low cholesterol diet and to exercise on a regular basis. Relevant Orders    Lipid panel    Anemia     Patient saw Hematology in July 2023. Hgb down to 8.6 in Aug 2023. Recheck CBC. Relevant Orders    CBC and differential    Abnormal CT of the chest     Will refer to Pulmonary for further evaluation and management.           Relevant Orders    Ambulatory Referral to Pulmonology   Other Visit Diagnoses     Encounter for immunization        Relevant Orders    influenza vaccine, high-dose, PF 0.7 mL (FLUZONE HIGH-DOSE)            Subjective:      Patient ID: Cheikh Paula is a 79 y.o. female. Patient here for follow-up Hypertension, Hyperlipidemia, Hypothyroidism, UC, Anemia, GERD, Vitamin D deficiency. Patient doing ok. No chest pain. Gets shortness of breath at times. Going to get catherization tomorrow. Blood pressure has been good. No abdominal pain or blood in BMs. Patient has been seeing Hematology and getting B12 shots. Not taking iron pill. The following portions of the patient's history were reviewed and updated as appropriate:   Past Medical History:  She has a past medical history of Allergic rhinitis (3/21/2023), Anemia, Arthritis, Low's esophagus, Cancer ([address]), Colon polyp, Cough (02/21/2022), Disease of thyroid gland, Diverticulitis, Dizziness, GERD (gastroesophageal reflux disease), Hyperlipidemia, Hypertension, Low vitamin D level, LPRD (laryngopharyngeal reflux disease) (02/21/2022), PONV (postoperative nausea and vomiting), Postnasal drip, and Ulcerative colitis ([address]). ,  _______________________________________________________________________  Medical Problems:  does not have any pertinent problems on file.,  _______________________________________________________________________  Past Surgical History:   has a past surgical history that includes Colonoscopy; Replacement total knee (Bilateral); Hemorroidectomy; Tonsillectomy; Tubal ligation; Foot surgery (Bilateral); Upper gastrointestinal endoscopy; Colonoscopy (07/28/2022); Joint replacement (Bilateral); Cataract extraction (Bilateral); and Appendectomy. ,  _______________________________________________________________________  Family History:  family history includes Cancer in her daughter; Cancer (age of onset: 54) in her brother; Cancer (age of onset: 79) in her brother; Heart disease in her brother and father; No Known Problems in her maternal aunt, maternal grandmother, paternal aunt, paternal grandmother, sister, and sister. ,  _______________________________________________________________________  Social History:   reports that she has never smoked. She has never used smokeless tobacco. She reports that she does not currently use alcohol. She reports that she does not use drugs. ,  _______________________________________________________________________  Allergies:  is allergic to sulfa antibiotics. .  _______________________________________________________________________  Current Outpatient Medications   Medication Sig Dispense Refill   • amLODIPine (NORVASC) 10 mg tablet Take 1 tablet (10 mg total) by mouth daily 90 tablet 2   • atorvastatin (LIPITOR) 20 mg tablet Take 1 tablet by mouth once daily 90 tablet 1   • cholecalciferol (VITAMIN D3) 1,000 units tablet Take 1,000 Units by mouth daily     • Coenzyme Q10 (Co Q 10) 100 MG CAPS Take by mouth     • Cyanocobalamin (VITAMIN B 12 PO) Take by mouth     • famotidine (PEPCID) 40 MG tablet TAKE 1 TABLET BY MOUTH ONCE DAILY BEFORE SUPPER 90 tablet 0   • levothyroxine 75 mcg tablet Take 1 tablet by mouth once daily 90 tablet 1   • Multiple Vitamin (multivitamin) capsule Take 1 capsule by mouth daily     • pantoprazole (PROTONIX) 40 mg tablet TAKE 1 TABLET BY MOUTH TWICE DAILY 30  MINUTES  BEFORE  MEALS 60 tablet 0   • sulfaSALAzine (AZULFIDINE) 500 mg tablet TAKE 2 TABLETS BY MOUTH TWICE A  tablet 2   • valsartan (DIOVAN) 80 mg tablet Take 1 tablet by mouth once daily 90 tablet 0   • vitamin A 2250 MCG (7500 UT) capsule Take 7,500 Units by mouth daily     • vitamin E, tocopherol, 1,000 units capsule Take 1,000 Units by mouth daily       No current facility-administered medications for this visit.     _______________________________________________________________________  Review of Systems   Constitutional: Positive for fatigue. Negative for unexpected weight change. Respiratory: Positive for cough. Negative for chest tightness and shortness of breath.     Cardiovascular: Negative for chest pain and palpitations. Gastrointestinal: Negative for abdominal pain, constipation, diarrhea, nausea and vomiting. Genitourinary: Negative for difficulty urinating. Musculoskeletal: Positive for gait problem. Negative for arthralgias. Skin: Negative for rash. Neurological: Negative for dizziness and headaches. Objective:  Vitals:    09/06/23 0954   BP: 122/72   BP Location: Left arm   Patient Position: Sitting   Cuff Size: Standard   Pulse: 72   Resp: 16   Temp: 98.6 °F (37 °C)   TempSrc: Tympanic   SpO2: 97%   Weight: 65.8 kg (145 lb)   Height: 5' 2" (1.575 m)     Body mass index is 26.52 kg/m². Physical Exam  Vitals and nursing note reviewed. Constitutional:       Appearance: Normal appearance. She is well-developed. Comments: Uses cane   HENT:      Head: Normocephalic and atraumatic. Cardiovascular:      Rate and Rhythm: Normal rate and regular rhythm. Heart sounds: Normal heart sounds. No murmur heard. Pulmonary:      Effort: Pulmonary effort is normal. No respiratory distress. Breath sounds: Normal breath sounds. No wheezing. Musculoskeletal:      Cervical back: Normal range of motion and neck supple. Right lower leg: No edema. Left lower leg: No edema. Lymphadenopathy:      Cervical: No cervical adenopathy. Neurological:      Mental Status: She is alert and oriented to person, place, and time. Psychiatric:         Mood and Affect: Mood normal.         Behavior: Behavior normal.         Thought Content:  Thought content normal.         Judgment: Judgment normal.

## 2023-09-06 NOTE — ASSESSMENT & PLAN NOTE
Stable. Pt had 48 hr pH monitor and EGD in Feb 2022 by Dr Sung Kan. Continue pantoprazole 40 mg bid and famotidine 40 mg before supper.

## 2023-09-06 NOTE — ASSESSMENT & PLAN NOTE
Has bleeding at times and loose BMs. Continue current meds per Dr Rosario Turpin. Last colonoscopy was in July 2022.

## 2023-09-07 ENCOUNTER — HOSPITAL ENCOUNTER (OUTPATIENT)
Facility: HOSPITAL | Age: 83
Setting detail: OUTPATIENT SURGERY
Discharge: HOME | End: 2023-09-07
Attending: INTERNAL MEDICINE | Admitting: INTERNAL MEDICINE
Payer: MEDICARE

## 2023-09-07 VITALS
SYSTOLIC BLOOD PRESSURE: 156 MMHG | WEIGHT: 145 LBS | TEMPERATURE: 98.2 F | RESPIRATION RATE: 16 BRPM | BODY MASS INDEX: 26.68 KG/M2 | HEART RATE: 65 BPM | HEIGHT: 62 IN | OXYGEN SATURATION: 97 % | DIASTOLIC BLOOD PRESSURE: 67 MMHG

## 2023-09-07 DIAGNOSIS — I35.0 NONRHEUMATIC AORTIC (VALVE) STENOSIS: ICD-10-CM

## 2023-09-07 PROBLEM — J31.0 RHINOSINUSITIS: Status: RESOLVED | Noted: 2023-07-09 | Resolved: 2023-09-07

## 2023-09-07 PROBLEM — J32.9 RHINOSINUSITIS: Status: RESOLVED | Noted: 2023-07-09 | Resolved: 2023-09-07

## 2023-09-07 LAB
ANION GAP SERPL CALCULATED.3IONS-SCNC: 7 MMOL/L
ATRIAL RATE: 78 BPM
BUN SERPL-MCNC: 14 MG/DL (ref 5–25)
CALCIUM SERPL-MCNC: 8.6 MG/DL (ref 8.4–10.2)
CHLORIDE SERPL-SCNC: 101 MMOL/L (ref 96–108)
CO2 SERPL-SCNC: 25 MMOL/L (ref 21–32)
CREAT SERPL-MCNC: 0.77 MG/DL (ref 0.6–1.3)
GFR SERPL CREATININE-BSD FRML MDRD: 71 ML/MIN/1.73SQ M
GLUCOSE P FAST SERPL-MCNC: 86 MG/DL (ref 65–99)
GLUCOSE SERPL-MCNC: 86 MG/DL (ref 65–140)
P AXIS: -24 DEGREES
POTASSIUM SERPL-SCNC: 3.9 MMOL/L (ref 3.5–5.3)
PR INTERVAL: 176 MS
QRS AXIS: -7 DEGREES
QRSD INTERVAL: 82 MS
QT INTERVAL: 378 MS
QTC INTERVAL: 430 MS
SODIUM SERPL-SCNC: 133 MMOL/L (ref 135–147)
T WAVE AXIS: 32 DEGREES
VENTRICULAR RATE: 78 BPM

## 2023-09-07 PROCEDURE — 93005 ELECTROCARDIOGRAM TRACING: CPT

## 2023-09-07 PROCEDURE — 99152 MOD SED SAME PHYS/QHP 5/>YRS: CPT | Performed by: INTERNAL MEDICINE

## 2023-09-07 PROCEDURE — 99153 MOD SED SAME PHYS/QHP EA: CPT | Performed by: INTERNAL MEDICINE

## 2023-09-07 PROCEDURE — C1769 GUIDE WIRE: HCPCS | Performed by: INTERNAL MEDICINE

## 2023-09-07 PROCEDURE — 80048 BASIC METABOLIC PNL TOTAL CA: CPT | Performed by: INTERNAL MEDICINE

## 2023-09-07 PROCEDURE — 93010 ELECTROCARDIOGRAM REPORT: CPT | Performed by: INTERNAL MEDICINE

## 2023-09-07 PROCEDURE — 93454 CORONARY ARTERY ANGIO S&I: CPT | Performed by: INTERNAL MEDICINE

## 2023-09-07 PROCEDURE — C1894 INTRO/SHEATH, NON-LASER: HCPCS | Performed by: INTERNAL MEDICINE

## 2023-09-07 RX ORDER — HEPARIN SODIUM 1000 [USP'U]/ML
INJECTION, SOLUTION INTRAVENOUS; SUBCUTANEOUS CODE/TRAUMA/SEDATION MEDICATION
Status: DISCONTINUED | OUTPATIENT
Start: 2023-09-07 | End: 2023-09-07 | Stop reason: HOSPADM

## 2023-09-07 RX ORDER — MIDAZOLAM HYDROCHLORIDE 2 MG/2ML
INJECTION, SOLUTION INTRAMUSCULAR; INTRAVENOUS CODE/TRAUMA/SEDATION MEDICATION
Status: DISCONTINUED | OUTPATIENT
Start: 2023-09-07 | End: 2023-09-07 | Stop reason: HOSPADM

## 2023-09-07 RX ORDER — NITROGLYCERIN 20 MG/100ML
INJECTION INTRAVENOUS CODE/TRAUMA/SEDATION MEDICATION
Status: DISCONTINUED | OUTPATIENT
Start: 2023-09-07 | End: 2023-09-07 | Stop reason: HOSPADM

## 2023-09-07 RX ORDER — CLOPIDOGREL BISULFATE 75 MG/1
600 TABLET ORAL ONCE
Status: COMPLETED | OUTPATIENT
Start: 2023-09-07 | End: 2023-09-07

## 2023-09-07 RX ORDER — SODIUM CHLORIDE 9 MG/ML
100 INJECTION, SOLUTION INTRAVENOUS CONTINUOUS
Status: DISCONTINUED | OUTPATIENT
Start: 2023-09-07 | End: 2023-09-07 | Stop reason: HOSPADM

## 2023-09-07 RX ORDER — ASPIRIN 81 MG/1
324 TABLET, CHEWABLE ORAL ONCE
Status: COMPLETED | OUTPATIENT
Start: 2023-09-07 | End: 2023-09-07

## 2023-09-07 RX ORDER — FENTANYL CITRATE 50 UG/ML
INJECTION, SOLUTION INTRAMUSCULAR; INTRAVENOUS CODE/TRAUMA/SEDATION MEDICATION
Status: DISCONTINUED | OUTPATIENT
Start: 2023-09-07 | End: 2023-09-07 | Stop reason: HOSPADM

## 2023-09-07 RX ORDER — VERAPAMIL HCL 2.5 MG/ML
AMPUL (ML) INTRAVENOUS CODE/TRAUMA/SEDATION MEDICATION
Status: DISCONTINUED | OUTPATIENT
Start: 2023-09-07 | End: 2023-09-07 | Stop reason: HOSPADM

## 2023-09-07 RX ORDER — LIDOCAINE HYDROCHLORIDE 10 MG/ML
INJECTION, SOLUTION EPIDURAL; INFILTRATION; INTRACAUDAL; PERINEURAL CODE/TRAUMA/SEDATION MEDICATION
Status: DISCONTINUED | OUTPATIENT
Start: 2023-09-07 | End: 2023-09-07 | Stop reason: HOSPADM

## 2023-09-07 RX ORDER — SODIUM CHLORIDE 9 MG/ML
50 INJECTION, SOLUTION INTRAVENOUS CONTINUOUS
Status: DISCONTINUED | OUTPATIENT
Start: 2023-09-07 | End: 2023-09-07

## 2023-09-07 RX ADMIN — CLOPIDOGREL BISULFATE 600 MG: 75 TABLET ORAL at 08:23

## 2023-09-07 RX ADMIN — ASPIRIN 81 MG CHEWABLE TABLET 324 MG: 81 TABLET CHEWABLE at 08:23

## 2023-09-07 RX ADMIN — SODIUM CHLORIDE 201.3 ML: 0.9 INJECTION, SOLUTION INTRAVENOUS at 08:19

## 2023-09-07 NOTE — Clinical Note
The site was marked. Prepped: right groin and right radial. Prepped with: ChloraPrep. The site was clipped. The patient was draped.

## 2023-09-07 NOTE — DISCHARGE INSTR - AVS FIRST PAGE
1. Please see the post cardiac catheterization dishcarge instructions. No heavy lifting, greater than 10 lbs. or strenuous  activity for 48 hrs. 2.Remove band aid tomorrow. Shower and wash area- wrist gently with soap and water- beginning tomorrow. Rinse and pat dry. Apply new water seal band aid. Repeat this process for 5 days. No powders, creams lotions or antibiotic ointments  for 5 days. No tub baths, hot tubs or swimming for 5 days. 3. Please call our office ([phone]) if you have any fever, redness, swelling, discharge from your wrist access site.     4.No driving for 1 day

## 2023-09-07 NOTE — INTERVAL H&P NOTE
H&P reviewed. After examining the patient, I find no changed to the H&P since it had been written. 83F w/ severe AS is referred for preop C for TAVR. /77 (BP Location: Right arm)   Pulse 83   Temp 97.6 °F (36.4 °C) (Temporal)   Resp 16   Ht 5' 2" (1.575 m)   Wt 65.8 kg (145 lb)   SpO2 97%   BMI 26.52 kg/m²      Patient re-evaluated.  Accept as history and physical.    Caitlin Wasserman, DO/September 7, 2023/7:51 AM

## 2023-09-13 ENCOUNTER — TELEPHONE (OUTPATIENT)
Dept: OTHER | Facility: OTHER | Age: 83
End: 2023-09-13

## 2023-09-13 NOTE — TELEPHONE ENCOUNTER
Pt called. \[facility]\"" is returning a phone call from the office to schedule appt.    Pt will be available for a callback on Friday, 09/15/23

## 2023-09-18 ENCOUNTER — TELEPHONE (OUTPATIENT)
Dept: CARDIAC SURGERY | Facility: CLINIC | Age: 83
End: 2023-09-18

## 2023-09-18 ENCOUNTER — HOSPITAL ENCOUNTER (OUTPATIENT)
Dept: INFUSION CENTER | Facility: CLINIC | Age: 83
Discharge: HOME | End: 2023-09-18
Payer: MEDICARE

## 2023-09-18 DIAGNOSIS — E53.8 B12 DEFICIENCY: Primary | ICD-10-CM

## 2023-09-18 PROCEDURE — 96372 THER/PROPH/DIAG INJ SC/IM: CPT

## 2023-09-18 RX ORDER — CYANOCOBALAMIN 1000 UG/ML
1000 INJECTION, SOLUTION INTRAMUSCULAR; SUBCUTANEOUS ONCE
OUTPATIENT
Start: 2023-10-16

## 2023-09-18 RX ORDER — CYANOCOBALAMIN 1000 UG/ML
1000 INJECTION, SOLUTION INTRAMUSCULAR; SUBCUTANEOUS ONCE
Status: COMPLETED | OUTPATIENT
Start: 2023-09-18 | End: 2023-09-18

## 2023-09-18 RX ADMIN — CYANOCOBALAMIN 1000 MCG: 1000 INJECTION, SOLUTION INTRAMUSCULAR at 15:24

## 2023-09-18 NOTE — PROGRESS NOTES
Pt to clinic for B12 injection, pt tolerated in left arm.  Reviewed pt next appointment, declined avs

## 2023-09-18 NOTE — TELEPHONE ENCOUNTER
Spoke with patient and informed her Dr. Kamlesh Zheng will see her back in [facility] after she has been seen by pulmonary team for pre TAVR CTA incidental findings.

## 2023-09-20 ENCOUNTER — APPOINTMENT (OUTPATIENT)
Dept: LAB | Facility: CLINIC | Age: 83
End: 2023-09-20
Payer: MEDICARE

## 2023-09-20 DIAGNOSIS — E03.9 ACQUIRED HYPOTHYROIDISM: ICD-10-CM

## 2023-09-20 DIAGNOSIS — D64.9 ANEMIA, UNSPECIFIED TYPE: ICD-10-CM

## 2023-09-20 DIAGNOSIS — E78.00 HYPERCHOLESTEROLEMIA: ICD-10-CM

## 2023-09-20 LAB
BASOPHILS # BLD AUTO: 0.06 THOUSANDS/ÂΜL (ref 0–0.1)
BASOPHILS NFR BLD AUTO: 1 % (ref 0–1)
CHOLEST SERPL-MCNC: 134 MG/DL
EOSINOPHIL # BLD AUTO: 0.26 THOUSAND/ÂΜL (ref 0–0.61)
EOSINOPHIL NFR BLD AUTO: 6 % (ref 0–6)
ERYTHROCYTE [DISTWIDTH] IN BLOOD BY AUTOMATED COUNT: 13.9 % (ref 11.6–15.1)
HCT VFR BLD AUTO: 26.5 % (ref 34.8–46.1)
HDLC SERPL-MCNC: 48 MG/DL
HGB BLD-MCNC: 8.9 G/DL (ref 11.5–15.4)
IMM GRANULOCYTES # BLD AUTO: 0.01 THOUSAND/UL (ref 0–0.2)
IMM GRANULOCYTES NFR BLD AUTO: 0 % (ref 0–2)
LDLC SERPL CALC-MCNC: 72 MG/DL (ref 0–100)
LYMPHOCYTES # BLD AUTO: 1.65 THOUSANDS/ÂΜL (ref 0.6–4.47)
LYMPHOCYTES NFR BLD AUTO: 36 % (ref 14–44)
MCH RBC QN AUTO: 34.1 PG (ref 26.8–34.3)
MCHC RBC AUTO-ENTMCNC: 33.6 G/DL (ref 31.4–37.4)
MCV RBC AUTO: 102 FL (ref 82–98)
MONOCYTES # BLD AUTO: 0.53 THOUSAND/ÂΜL (ref 0.17–1.22)
MONOCYTES NFR BLD AUTO: 12 % (ref 4–12)
NEUTROPHILS # BLD AUTO: 2.03 THOUSANDS/ÂΜL (ref 1.85–7.62)
NEUTS SEG NFR BLD AUTO: 45 % (ref 43–75)
NONHDLC SERPL-MCNC: 86 MG/DL
NRBC BLD AUTO-RTO: 0 /100 WBCS
PLATELET # BLD AUTO: 283 THOUSANDS/UL (ref 149–390)
PMV BLD AUTO: 9.4 FL (ref 8.9–12.7)
RBC # BLD AUTO: 2.61 MILLION/UL (ref 3.81–5.12)
T4 FREE SERPL-MCNC: 0.91 NG/DL (ref 0.61–1.12)
TRIGL SERPL-MCNC: 68 MG/DL
TSH SERPL DL<=0.05 MIU/L-ACNC: 5.77 UIU/ML (ref 0.45–4.5)
WBC # BLD AUTO: 4.54 THOUSAND/UL (ref 4.31–10.16)

## 2023-09-20 PROCEDURE — 84443 ASSAY THYROID STIM HORMONE: CPT

## 2023-09-20 PROCEDURE — 80061 LIPID PANEL: CPT

## 2023-09-20 PROCEDURE — 85025 COMPLETE CBC W/AUTO DIFF WBC: CPT

## 2023-09-20 PROCEDURE — 84439 ASSAY OF FREE THYROXINE: CPT

## 2023-09-20 PROCEDURE — 36415 COLL VENOUS BLD VENIPUNCTURE: CPT

## 2023-09-21 ENCOUNTER — TELEPHONE (OUTPATIENT)
Dept: FAMILY MEDICINE CLINIC | Facility: CLINIC | Age: 83
End: 2023-09-21

## 2023-09-21 DIAGNOSIS — D64.9 ANEMIA, UNSPECIFIED TYPE: Primary | ICD-10-CM

## 2023-09-21 NOTE — TELEPHONE ENCOUNTER
----- Message from Chris Xie MD sent at 9/20/2023  9:20 AM EDT -----  Call pt. Hgb  a little higher at 8.9.  Recheck CBC in 1 mth

## 2023-09-22 ENCOUNTER — CONSULT (OUTPATIENT)
Dept: PULMONOLOGY | Facility: CLINIC | Age: 83
End: 2023-09-22
Payer: MEDICARE

## 2023-09-22 VITALS
HEART RATE: 75 BPM | SYSTOLIC BLOOD PRESSURE: 128 MMHG | WEIGHT: 144 LBS | RESPIRATION RATE: 18 BRPM | HEIGHT: 62 IN | TEMPERATURE: 98 F | BODY MASS INDEX: 26.5 KG/M2 | OXYGEN SATURATION: 96 % | DIASTOLIC BLOOD PRESSURE: 82 MMHG

## 2023-09-22 DIAGNOSIS — R91.8 OTHER NONSPECIFIC ABNORMAL FINDING OF LUNG FIELD: ICD-10-CM

## 2023-09-22 DIAGNOSIS — R93.89 ABNORMAL CT OF THE CHEST: Primary | ICD-10-CM

## 2023-09-22 DIAGNOSIS — I35.0 AORTIC STENOSIS, SEVERE: ICD-10-CM

## 2023-09-22 DIAGNOSIS — R05.3 CHRONIC COUGH: ICD-10-CM

## 2023-09-22 PROCEDURE — 99204 OFFICE O/P NEW MOD 45 MIN: CPT | Performed by: INTERNAL MEDICINE

## 2023-09-22 NOTE — PROGRESS NOTES
Assessment/Plan:    Abnormal CT of the chest  Mrs. Alvaro Wood had a CT angiogram on 8/30/2023 as part of the work-up prior to TAVR for severe aortic stenosis. It showed  abnormal soft tissue dense material interposed between the esophagus and aorta. . Similar  soft tissue was seen on the old study from November of last year. There are some small mediastinal nodes anterior to the maxime and adjacent to the arch. These were present previously. The presence of these other nodes raises the suspicion that the  process anterior to the aorta could be related to adenopathy. He also had opacification of the medial basal and posterior basal bronchi likely due to mucous. She has no previous history of cancer and is a never smoker. On clinical examination, her chest was clear to auscultation. She did not have any significant neck or supraclavicular or axillary lymphadenopathy. She had systolic murmur of severe aortic stenosis. Etiology of this soft tissue shadow is not clear at this point. Most likely this is a lymph node. The possibilities are sarcoidosis, neoplasm or previous granulomatous disease. I will discuss this with the radiologist.  If this is a chronic finding and unlikely a neoplasm, the patient can undergo surgery. I had a long discussion with her and her  and answered all their questions. Chronic cough  She has chronic cough for about 4 years which is productive of phlegm which is occasionally yellow. She has history of sinus issues and postnasal drip. She also has shortness of breath on exertion and her exercise tolerance is about a block on level ground. She had no wheezing. She has no previous history of asthma or COPD. She is a never smoker. On clinical examination, her chest was clear to auscultation. Currently she is not on any inhalers or oxygen. I have ordered a PFT to further evaluate her. This could be due to asthma.   Sarcoidosis is also there in the differential diagnosis. Diagnoses and all orders for this visit:    Abnormal CT of the chest  -     Ambulatory Referral to Pulmonology    Chronic cough  -     Complete PFT with post Bronchodilator and Six Minute walk; Future    Aortic stenosis, severe          Subjective:      Patient ID: Leticia Craft is a 79 y.o. female. Mrs. Leticia Craft is a 24-year-old lady with past medical history of hypertension hyperlipidemia and ulcerative colitis who was found to have a systolic aortic murmur and was subsequently diagnosed with severe arctic stenosis with valve area of 0.74 and gradient of 36. She underwent a CT angiogram as part of the preparation for TAVR. She was reported to have " abnormal soft tissue dense material interposed between the esophagus and aorta. Similar soft tissue was seen on the old study from November of last year. There are some small mediastinal nodes anterior to the maxime and adjacent to the arch which were present previously. The presence of these other nodes raised the suspicion that the process anterior to the aorta could be related to adenopathy". It was also some opacification of the medial basal and posterior basal bronchi likely due to mucous. She is a never smoker and there is no previous history of cancer. She had mammogram last year which was reportedly normal. she has no history of previous major lung problems. She however gives a history of chronic cough which has been present for about 4 years. This cough is productive mostly in the mornings. She has noted some yellow phlegm. She also has sinus issues and has noted postnasal drip. She does not use any inhaler or oxygen. She has shortness of breath on exertion and her exercise tolerance is about a block. She denied any wheezing or chest pain. However she admitted to some chest tightness occasionally. She feels occasionally dizzy. He denied any dysphagia. She stated that she had endoscopy sometime back with Dr. Aster Sosa. Has been on treatment with sulfasalazine for her ulcerative colitis and her symptoms are controlled. She denied any fever or chills. She did not have COVID. Has occasional swelling of ankles. Has history of hypertension and has been on treatment with amlodipine. The following portions of the patient's history were reviewed and updated as appropriate: allergies, current medications, past family history, past medical history, past social history, past surgical history and problem list.    Review of Systems   Constitutional: Positive for fatigue. Negative for activity change, appetite change, chills, fever and unexpected weight change. HENT: Positive for hearing loss. Negative for rhinorrhea, sneezing, sore throat, trouble swallowing and voice change. Eyes: Negative for visual disturbance. Respiratory: Positive for cough, shortness of breath and wheezing. Negative for chest tightness. Cardiovascular: Positive for leg swelling. Negative for chest pain and palpitations. Gastrointestinal: Positive for abdominal pain. Negative for constipation, diarrhea, nausea and vomiting. History of severe gastroesophageal reflux disease   Genitourinary: Negative for dysuria, frequency and urgency. Musculoskeletal: Positive for arthralgias. Negative for gait problem. Skin: Negative for rash. Allergic/Immunologic: Negative for environmental allergies. Neurological: Positive for dizziness. Negative for syncope, light-headedness and headaches. Psychiatric/Behavioral: Positive for sleep disturbance. Negative for agitation and confusion. The patient is not nervous/anxious. Objective:      /82 (BP Location: Left arm, Patient Position: Sitting, Cuff Size: Standard)   Pulse 75   Temp 98 °F (36.7 °C) (Tympanic)   Resp 18   Ht 5' 2" (1.575 m)   Wt 65.3 kg (144 lb)   SpO2 96%   BMI 26.34 kg/m²          Physical Exam  Vitals reviewed.    Constitutional:       General: She is not in acute distress. Appearance: She is not ill-appearing, toxic-appearing or diaphoretic. HENT:      Head: Normocephalic. Mouth/Throat:      Mouth: Mucous membranes are moist.   Eyes:      General: No scleral icterus. Conjunctiva/sclera: Conjunctivae normal.   Cardiovascular:      Rate and Rhythm: Regular rhythm. Heart sounds: Murmur heard. Pulmonary:      Effort: Pulmonary effort is normal. No respiratory distress. Breath sounds: Normal breath sounds. No stridor. No wheezing, rhonchi or rales. Chest:      Chest wall: No tenderness. Abdominal:      General: Bowel sounds are normal.      Palpations: Abdomen is soft. Tenderness: There is no abdominal tenderness. There is no guarding. Musculoskeletal:      Cervical back: Neck supple. No rigidity. Right lower leg: Edema present. Left lower leg: Edema present. Lymphadenopathy:      Cervical: No cervical adenopathy. Skin:     Coloration: Skin is not jaundiced or pale. Findings: No rash. Neurological:      Mental Status: She is alert and oriented to person, place, and time. Gait: Gait normal.   Psychiatric:         Mood and Affect: Mood normal.         Behavior: Behavior normal.         Thought Content: Thought content normal.         Judgment: Judgment normal.        9/27/2023: I subsequently spoke to Dr. Fan Patel the radiologist who read her CT scan. We decided to order a CT PET scan for further evaluation. She also had a lytic lesion in the spine. If it is negative, she can go ahead with surgery and do further work-up after surgery. I spoke to the patient subsequently. She stated that her surgery has been tentatively scheduled for the 10/10/2023.

## 2023-09-22 NOTE — ASSESSMENT & PLAN NOTE
She has chronic cough for about 4 years which is productive of phlegm which is occasionally yellow. She has history of sinus issues and postnasal drip. She also has shortness of breath on exertion and her exercise tolerance is about a block on level ground. She had no wheezing. She has no previous history of asthma or COPD. She is a never smoker. On clinical examination, her chest was clear to auscultation. Currently she is not on any inhalers or oxygen. I have ordered a PFT to further evaluate her. This could be due to asthma. Sarcoidosis is also there in the differential diagnosis.

## 2023-09-22 NOTE — ASSESSMENT & PLAN NOTE
Mrs. Meme Gabriel had a CT angiogram on 8/30/2023 as part of the work-up prior to TAVR for severe aortic stenosis. It showed  abnormal soft tissue dense material interposed between the esophagus and aorta. . Similar  soft tissue was seen on the old study from November of last year. There are some small mediastinal nodes anterior to the maxime and adjacent to the arch. These were present previously. The presence of these other nodes raises the suspicion that the  process anterior to the aorta could be related to adenopathy. He also had opacification of the medial basal and posterior basal bronchi likely due to mucous. She has no previous history of cancer and is a never smoker. On clinical examination, her chest was clear to auscultation. She did not have any significant neck or supraclavicular or axillary lymphadenopathy. She had systolic murmur of severe aortic stenosis. Etiology of this soft tissue shadow is not clear at this point. Most likely this is a lymph node. The possibilities are sarcoidosis, neoplasm or previous granulomatous disease. I will discuss this with the radiologist.  If this is a chronic finding and unlikely a neoplasm, the patient can undergo surgery. I had a long discussion with her and her  and answered all their questions.

## 2023-09-27 DIAGNOSIS — R91.8 OTHER NONSPECIFIC ABNORMAL FINDING OF LUNG FIELD: ICD-10-CM

## 2023-09-27 DIAGNOSIS — R93.89 ABNORMAL CT OF THE CHEST: Primary | ICD-10-CM

## 2023-09-30 DIAGNOSIS — K21.00 GASTROESOPHAGEAL REFLUX DISEASE WITH ESOPHAGITIS WITHOUT HEMORRHAGE: ICD-10-CM

## 2023-10-05 ENCOUNTER — TELEPHONE (OUTPATIENT)
Dept: CARDIAC SURGERY | Facility: CLINIC | Age: 83
End: 2023-10-05

## 2023-10-05 ENCOUNTER — OFFICE VISIT (OUTPATIENT)
Dept: GASTROENTEROLOGY | Facility: CLINIC | Age: 83
End: 2023-10-05
Payer: MEDICARE

## 2023-10-05 ENCOUNTER — APPOINTMENT (OUTPATIENT)
Dept: LAB | Facility: CLINIC | Age: 83
End: 2023-10-05
Payer: MEDICARE

## 2023-10-05 VITALS
HEIGHT: 62 IN | SYSTOLIC BLOOD PRESSURE: 130 MMHG | DIASTOLIC BLOOD PRESSURE: 73 MMHG | BODY MASS INDEX: 26.68 KG/M2 | WEIGHT: 145 LBS | HEART RATE: 76 BPM

## 2023-10-05 DIAGNOSIS — D53.9 MACROCYTIC ANEMIA: ICD-10-CM

## 2023-10-05 DIAGNOSIS — K51.30 ULCERATIVE RECTOSIGMOIDITIS WITHOUT COMPLICATION (HCC): ICD-10-CM

## 2023-10-05 DIAGNOSIS — E53.8 B12 DEFICIENCY: ICD-10-CM

## 2023-10-05 DIAGNOSIS — K21.00 GASTROESOPHAGEAL REFLUX DISEASE WITH ESOPHAGITIS WITHOUT HEMORRHAGE: ICD-10-CM

## 2023-10-05 DIAGNOSIS — D51.9 ANEMIA DUE TO VITAMIN B12 DEFICIENCY, UNSPECIFIED B12 DEFICIENCY TYPE: ICD-10-CM

## 2023-10-05 DIAGNOSIS — J84.10 CALCIFIED GRANULOMA OF LUNG (HCC): ICD-10-CM

## 2023-10-05 DIAGNOSIS — D50.0 IRON DEFICIENCY ANEMIA DUE TO CHRONIC BLOOD LOSS: ICD-10-CM

## 2023-10-05 DIAGNOSIS — K21.9 GASTROESOPHAGEAL REFLUX DISEASE WITHOUT ESOPHAGITIS: ICD-10-CM

## 2023-10-05 DIAGNOSIS — K51.30 ULCERATIVE RECTOSIGMOIDITIS WITHOUT COMPLICATION (HCC): Primary | ICD-10-CM

## 2023-10-05 DIAGNOSIS — D64.9 ANEMIA, UNSPECIFIED TYPE: ICD-10-CM

## 2023-10-05 LAB
ALBUMIN SERPL BCP-MCNC: 4 G/DL (ref 3.5–5)
ALP SERPL-CCNC: 71 U/L (ref 34–104)
ALT SERPL W P-5'-P-CCNC: 9 U/L (ref 7–52)
ANION GAP SERPL CALCULATED.3IONS-SCNC: 6 MMOL/L
AST SERPL W P-5'-P-CCNC: 19 U/L (ref 13–39)
BASOPHILS # BLD AUTO: 0.05 THOUSANDS/ÂΜL (ref 0–0.1)
BASOPHILS NFR BLD AUTO: 1 % (ref 0–1)
BILIRUB SERPL-MCNC: 0.48 MG/DL (ref 0.2–1)
BUN SERPL-MCNC: 15 MG/DL (ref 5–25)
CALCIUM SERPL-MCNC: 8.7 MG/DL (ref 8.4–10.2)
CHLORIDE SERPL-SCNC: 96 MMOL/L (ref 96–108)
CO2 SERPL-SCNC: 25 MMOL/L (ref 21–32)
CREAT SERPL-MCNC: 0.86 MG/DL (ref 0.6–1.3)
CRP SERPL QL: <1 MG/L
EOSINOPHIL # BLD AUTO: 0.13 THOUSAND/ÂΜL (ref 0–0.61)
EOSINOPHIL NFR BLD AUTO: 3 % (ref 0–6)
ERYTHROCYTE [DISTWIDTH] IN BLOOD BY AUTOMATED COUNT: 14.2 % (ref 11.6–15.1)
ERYTHROCYTE [SEDIMENTATION RATE] IN BLOOD: 11 MM/HOUR (ref 0–29)
FERRITIN SERPL-MCNC: 19 NG/ML (ref 11–307)
FOLATE SERPL-MCNC: 11.4 NG/ML
GFR SERPL CREATININE-BSD FRML MDRD: 62 ML/MIN/1.73SQ M
GLUCOSE SERPL-MCNC: 90 MG/DL (ref 65–140)
HCT VFR BLD AUTO: 24.3 % (ref 34.8–46.1)
HGB BLD-MCNC: 8.3 G/DL (ref 11.5–15.4)
IMM GRANULOCYTES # BLD AUTO: 0.02 THOUSAND/UL (ref 0–0.2)
IMM GRANULOCYTES NFR BLD AUTO: 0 % (ref 0–2)
IRON SATN MFR SERPL: 26 % (ref 15–50)
IRON SERPL-MCNC: 78 UG/DL (ref 50–212)
LYMPHOCYTES # BLD AUTO: 1.46 THOUSANDS/ÂΜL (ref 0.6–4.47)
LYMPHOCYTES NFR BLD AUTO: 29 % (ref 14–44)
MCH RBC QN AUTO: 33.7 PG (ref 26.8–34.3)
MCHC RBC AUTO-ENTMCNC: 34.2 G/DL (ref 31.4–37.4)
MCV RBC AUTO: 99 FL (ref 82–98)
MONOCYTES # BLD AUTO: 0.56 THOUSAND/ÂΜL (ref 0.17–1.22)
MONOCYTES NFR BLD AUTO: 11 % (ref 4–12)
NEUTROPHILS # BLD AUTO: 2.74 THOUSANDS/ÂΜL (ref 1.85–7.62)
NEUTS SEG NFR BLD AUTO: 56 % (ref 43–75)
NRBC BLD AUTO-RTO: 0 /100 WBCS
PLATELET # BLD AUTO: 260 THOUSANDS/UL (ref 149–390)
PMV BLD AUTO: 9.4 FL (ref 8.9–12.7)
POTASSIUM SERPL-SCNC: 4.1 MMOL/L (ref 3.5–5.3)
PROT SERPL-MCNC: 6.8 G/DL (ref 6.4–8.4)
RBC # BLD AUTO: 2.46 MILLION/UL (ref 3.81–5.12)
SODIUM SERPL-SCNC: 127 MMOL/L (ref 135–147)
TIBC SERPL-MCNC: 297 UG/DL (ref 250–450)
UIBC SERPL-MCNC: 219 UG/DL (ref 155–355)
VIT B12 SERPL-MCNC: 1242 PG/ML (ref 180–914)
WBC # BLD AUTO: 4.96 THOUSAND/UL (ref 4.31–10.16)

## 2023-10-05 PROCEDURE — 82607 VITAMIN B-12: CPT

## 2023-10-05 PROCEDURE — 85025 COMPLETE CBC W/AUTO DIFF WBC: CPT

## 2023-10-05 PROCEDURE — 86140 C-REACTIVE PROTEIN: CPT

## 2023-10-05 PROCEDURE — 83550 IRON BINDING TEST: CPT

## 2023-10-05 PROCEDURE — 82746 ASSAY OF FOLIC ACID SERUM: CPT

## 2023-10-05 PROCEDURE — 83918 ORGANIC ACIDS TOTAL QUANT: CPT

## 2023-10-05 PROCEDURE — 80053 COMPREHEN METABOLIC PANEL: CPT

## 2023-10-05 PROCEDURE — 82164 ANGIOTENSIN I ENZYME TEST: CPT

## 2023-10-05 PROCEDURE — 36415 COLL VENOUS BLD VENIPUNCTURE: CPT

## 2023-10-05 PROCEDURE — 83540 ASSAY OF IRON: CPT

## 2023-10-05 PROCEDURE — 99214 OFFICE O/P EST MOD 30 MIN: CPT | Performed by: INTERNAL MEDICINE

## 2023-10-05 PROCEDURE — 82728 ASSAY OF FERRITIN: CPT

## 2023-10-05 PROCEDURE — 85652 RBC SED RATE AUTOMATED: CPT

## 2023-10-05 RX ORDER — PANTOPRAZOLE SODIUM 40 MG/1
40 TABLET, DELAYED RELEASE ORAL
Qty: 90 TABLET | Refills: 1 | Status: SHIPPED | OUTPATIENT
Start: 2023-10-05

## 2023-10-05 NOTE — TELEPHONE ENCOUNTER
Spoke with patient and informed her Dr. Laurie Mishra recommends canceling TAVR for next week until after the 10/18/23 PET scan is done and reviewed. I informed patient we would cancel today's appointment and call her after Dr. Laurie Mishra has reviewed the PET scan results.

## 2023-10-05 NOTE — PROGRESS NOTES
[facility] - Outpatient Follow-up Note  Alvaro Wood 79 y.o. female MRN: [id number]  Encounter: [id number]          ASSESSMENT AND PLAN:      1. Ulcerative rectosigmoiditis without complication (HCC)  Left-sided ulcerative colitis, currently ulcerative colitis related symptoms are under remission with sulfasalazine 2 tablet twice a day, patient is tolerating medication without any side effect no rectal bleeding, no diarrhea or abdominal pain. Will order C-reactive protein and ESR. Last colonoscopy was in July 2022 which shows evidence of diverticulosis, no active colitis. - C-reactive protein; Future  - Sedimentation rate, automated; Future    2. Gastroesophageal reflux disease without esophagitis  Refill for pantoprazole was given for 90-day supply, advised to stop famotidine, up-to-date with endoscopy    3. Anemia due to vitamin B12 deficiency, unspecified B12 deficiency type  Hemoglobin is slowly trending down, MCV is high could be B12 deficiency, patient is getting B12 injection at [facility]. I also advised to take iron tablet every day and monitor CBC  ______________________________________________________________________    SUBJECTIVE:. patient seen and examined, she come for follow-up, she denying any abdominal pain, no diarrhea or rectal bleeding, she has a left-sided ulcerative colitis, colitis related symptoms are under remission, her hemoglobin is slowly trending down but no sign of GI bleeding, last colonoscopy and endoscopy both came back normal.  She is asking for refill for pantoprazole, reflux symptoms are well controlled, advised her to stop taking famotidine. She was diagnosed with aortic stenosis and she is scheduled for TAVR with cardiothoracic surgeon at [facility]. She is getting B12 injection at [facility], last B12 level is within normal limit, hemoglobin overall stable not dropping.   Her stool color is normal, she denying any smoking or alcohol use        REVIEW OF SYSTEMS IS OTHERWISE NEGATIVE. Historical Information   Past Medical History:   Diagnosis Date   • Allergic rhinitis 3/21/2023   • Anemia    • Arthritis     osteoporosis, djd knees, pt denies osteoporosis on 2/21/22   • Low's esophagus    • Cancer ([address])     on face   • Colon polyp    • Cough 02/21/2022    cough for 3 years, expectorates yellow mucus   • Disease of thyroid gland     low thyroid   • Diverticulitis     gerd, diverticulitis   • Dizziness    • GERD (gastroesophageal reflux disease)     ulcerative colitis; Low's esophagus, diverticulosis; hx of laryngopharyngeal reflux   • Hyperlipidemia    • Hypertension    • Low vitamin D level    • LPRD (laryngopharyngeal reflux disease) 02/21/2022   • PONV (postoperative nausea and vomiting)     AFTER "BIG SURGERIES"   • Postnasal drip     WITH COUGH   • Ulcerative colitis [facility])      Past Surgical History:   Procedure Laterality Date   • APPENDECTOMY     • CARDIAC CATHETERIZATION N/A 9/7/2023    Procedure: Cardiac RHC/LHC; Surgeon: Apoorva Ratliff MD;  Location: [facility];   Service: Cardiology   • CATARACT EXTRACTION Bilateral    • COLONOSCOPY      colon polyps   • COLONOSCOPY  07/28/2022   • FOOT SURGERY Bilateral     bunionectomy   • HEMORROIDECTOMY      hemorroid removal   • JOINT REPLACEMENT Bilateral     knees   • REPLACEMENT TOTAL KNEE Bilateral     b/ replacement   • TONSILLECTOMY     • TUBAL LIGATION     • UPPER GASTROINTESTINAL ENDOSCOPY       Social History   Social History     Substance and Sexual Activity   Alcohol Use Not Currently     Social History     Substance and Sexual Activity   Drug Use Never     Social History     Tobacco Use   Smoking Status Never   Smokeless Tobacco Never     Family History   Problem Relation Age of Onset   • Heart disease Father    • No Known Problems Sister    • No Known Problems Sister    • Cancer Daughter         unsure of type of cancer, it was female cancer and hysterectomy done by Dr. Jey Daugherty   • No Known Problems Maternal Grandmother    • No Known Problems Paternal Grandmother    • Heart disease Brother    • Cancer Brother 54   • Cancer Brother 79   • No Known Problems Maternal Aunt    • No Known Problems Paternal Aunt        Meds/Allergies       Current Outpatient Medications:   •  amLODIPine (NORVASC) 10 mg tablet  •  atorvastatin (LIPITOR) 20 mg tablet  •  cholecalciferol (VITAMIN D3) 1,000 units tablet  •  Coenzyme Q10 (Co Q 10) 100 MG CAPS  •  Cyanocobalamin (VITAMIN B 12 PO)  •  famotidine (PEPCID) 40 MG tablet  •  levothyroxine 75 mcg tablet  •  Multiple Vitamin (multivitamin) capsule  •  pantoprazole (PROTONIX) 40 mg tablet  •  sulfaSALAzine (AZULFIDINE) 500 mg tablet  •  valsartan (DIOVAN) 80 mg tablet  •  vitamin A 2250 MCG (7500 UT) capsule  •  vitamin E, tocopherol, 1,000 units capsule    Allergies   Allergen Reactions   • Sulfa Antibiotics Itching           Objective     Blood pressure 130/73, pulse 76, height 5' 2" (1.575 m), weight 65.8 kg (145 lb). Body mass index is 26.52 kg/m². PHYSICAL EXAM:      General Appearance:   Alert, cooperative, no distress   HEENT:   Normocephalic, atraumatic, anicteric.     Neck:  Supple, symmetrical, trachea midline   Lungs:   Clear to auscultation bilaterally; no rales, rhonchi or wheezing; respirations unlabored    Heart[de-identified]   Regular rate and rhythm; no murmur, rub, or gallop. Abdomen:   Soft, non-tender, non-distended; normal bowel sounds; no masses, no organomegaly    Genitalia:   Deferred    Rectal:   Deferred    Extremities:  No cyanosis, clubbing or edema    Pulses:  2+ and symmetric    Skin:  No jaundice, rashes, or lesions    Lymph nodes:  No palpable cervical lymphadenopathy        Lab Results:   No visits with results within 1 Day(s) from this visit.    Latest known visit with results is:   Appointment on 09/20/2023   Component Date Value   • TSH 3RD GENERATON 09/20/2023 5.770 (H) • Free T4 09/20/2023 0.91    • Cholesterol 09/20/2023 134    • Triglycerides 09/20/2023 68    • HDL, Direct 09/20/2023 48 (L)    • LDL Calculated 09/20/2023 72    • Non-HDL-Chol (CHOL-HDL) 09/20/2023 86    • WBC 09/20/2023 4.54    • RBC 09/20/2023 2.61 (L)    • Hemoglobin 09/20/2023 8.9 (L)    • Hematocrit 09/20/2023 26.5 (L)    • MCV 09/20/2023 102 (H)    • MCH 09/20/2023 34.1    • MCHC 09/20/2023 33.6    • RDW 09/20/2023 13.9    • MPV 09/20/2023 9.4    • Platelets 83/91/3653 283    • nRBC 09/20/2023 0    • Neutrophils Relative 09/20/2023 45    • Immat GRANS % 09/20/2023 0    • Lymphocytes Relative 09/20/2023 36    • Monocytes Relative 09/20/2023 12    • Eosinophils Relative 09/20/2023 6    • Basophils Relative 09/20/2023 1    • Neutrophils Absolute 09/20/2023 2.03    • Immature Grans Absolute 09/20/2023 0.01    • Lymphocytes Absolute 09/20/2023 1.65    • Monocytes Absolute 09/20/2023 0.53    • Eosinophils Absolute 09/20/2023 0.26    • Basophils Absolute 09/20/2023 0.06          Radiology Results:   Cardiac catheterization    Result Date: 9/7/2023  Narrative: Non-obstructive coronary atherosclerosis.  Plan: TAVR

## 2023-10-06 ENCOUNTER — TELEPHONE (OUTPATIENT)
Dept: HEMATOLOGY ONCOLOGY | Facility: CLINIC | Age: 83
End: 2023-10-06

## 2023-10-06 LAB — ACE SERPL-CCNC: 35 U/L (ref 14–82)

## 2023-10-06 NOTE — TELEPHONE ENCOUNTER
RANDA  DR chel ALLEN   Who are you speaking with? Patient   If it is not the patient, are they listed on an active communication consent form? N/A   Is this a RANDA or DR chel ALLEN RANDA   Which provider is patient currently scheduled or established with? ALINA Caldwell   What is the original appointment date and time? 10/24/23 1:30PM   At which location is the appointment scheduled to take place? [facility]   Which provider is the patient transitioning care to? Dr. Jey Hampton   What is the new appointment date and time? 1204/23 8:20AM   At which location is the new appointment scheduled to take place? [facility]   What is the reason for this change?  Provider

## 2023-10-06 NOTE — TELEPHONE ENCOUNTER
KAREN still in process. Called and spoke with patient. She is aware results will be reviewed by provider once they are all back and we will reach out with the results. Patient verbalized understanding.

## 2023-10-06 NOTE — TELEPHONE ENCOUNTER
Lab Inquiry   Who are you speaking with? Patient     If it is not the patient, are they listed on an active communication consent form? N/A   Name of ordering provider Dr. Princess Madera   What is being requested? Lab results to be reviewed   Lab draw location [facility]   What is the best call back number?  [phone]

## 2023-10-09 LAB — METHYLMALONATE SERPL-SCNC: 315 NMOL/L (ref 0–378)

## 2023-10-10 RX ORDER — PANTOPRAZOLE SODIUM 40 MG/1
TABLET, DELAYED RELEASE ORAL
Qty: 60 TABLET | Refills: 0 | Status: SHIPPED | OUTPATIENT
Start: 2023-10-10

## 2023-10-11 ENCOUNTER — OFFICE VISIT (OUTPATIENT)
Dept: GASTROENTEROLOGY | Facility: CLINIC | Age: 83
End: 2023-10-11
Payer: MEDICARE

## 2023-10-11 VITALS
SYSTOLIC BLOOD PRESSURE: 142 MMHG | BODY MASS INDEX: 26.68 KG/M2 | DIASTOLIC BLOOD PRESSURE: 65 MMHG | HEART RATE: 78 BPM | HEIGHT: 62 IN | WEIGHT: 145 LBS

## 2023-10-11 DIAGNOSIS — K21.9 GASTROESOPHAGEAL REFLUX DISEASE WITHOUT ESOPHAGITIS: Primary | ICD-10-CM

## 2023-10-11 DIAGNOSIS — K51.30 ULCERATIVE RECTOSIGMOIDITIS WITHOUT COMPLICATION (HCC): ICD-10-CM

## 2023-10-11 DIAGNOSIS — R05.3 CHRONIC COUGH: ICD-10-CM

## 2023-10-11 DIAGNOSIS — K51.80 OTHER ULCERATIVE COLITIS WITHOUT COMPLICATION (HCC): ICD-10-CM

## 2023-10-11 PROCEDURE — 99213 OFFICE O/P EST LOW 20 MIN: CPT | Performed by: INTERNAL MEDICINE

## 2023-10-11 RX ORDER — SULFASALAZINE 500 MG/1
1000 TABLET ORAL 2 TIMES DAILY
Qty: 360 TABLET | Refills: 2 | Status: SHIPPED | OUTPATIENT
Start: 2023-10-11

## 2023-10-11 NOTE — PROGRESS NOTES
[facility] - Outpatient Follow-up Note  Carlito Gonzalez 79 y.o. female MRN: [id number]  Encounter: [id number]          ASSESSMENT AND PLAN:      1. Gastroesophageal reflux disease without esophagitis  Long history of GERD currently on pantoprazole 40 mg twice a day,, last office visit we discontinued famotidine, now pharmacy is giving her hard time to refill pantoprazole twice a day. Her last endoscopy shows ulcer was well-healed, will recommended reducing pantoprazole dose to once a day only. She is still having chronic cough, there is no improvement in the cough after starting pantoprazole twice a day or for adding famotidine  - EGD; Future    2. Ulcerative rectosigmoiditis without complication ([address])  Under remission with sulfasalazine 500 mg 2 tablet twice a day    3. Chronic cough  Patient was seen by ENT physician, suspecting LPR, pantoprazole dose was increased to twice a day but no improvement in chronic cough. She is okay to switch pantoprazole to once a day and discontinue famotidine  - EGD; Future    4. Other ulcerative colitis without complication ([facility])  Refill for sulfasalazine was given  - sulfaSALAzine (AZULFIDINE) 500 mg tablet; Take 2 tablets (1,000 mg total) by mouth 2 (two) times a day  Dispense: 360 tablet; Refill: 2    ______________________________________________________________________    SUBJECTIVE: Patient seen and examined, she come for follow-up. As per patient she was advised to see us because pharmacy is giving her hard time to refill the prescription of pantoprazole twice a day. She was seen by ENT physician who increase pantoprazole dose to twice a day, she is taking PPI twice a day and famotidine but no improvement in coughing, last office visit we discussed about reducing famotidine or discontinuing, she is okay to discontinue famotidine, she also needed clarification about pantoprazole dose which I advised to reduce to once a day only.   She is due for endoscopy which she want to schedule next year. She denying any smoking or alcohol use, she denying any abdominal pain, no rectal bleeding, she is asking refill for sulfasalazine which was also sent to the pharmacy      REVIEW OF SYSTEMS IS OTHERWISE NEGATIVE. Historical Information   Past Medical History:   Diagnosis Date    Allergic rhinitis 3/21/2023    Anemia     Arthritis     osteoporosis, djd knees, pt denies osteoporosis on 2/21/22    Low's esophagus     Cancer ([address])     on face    Colon polyp     Cough 02/21/2022    cough for 3 years, expectorates yellow mucus    Disease of thyroid gland     low thyroid    Diverticulitis     gerd, diverticulitis    Dizziness     GERD (gastroesophageal reflux disease)     ulcerative colitis; Low's esophagus, diverticulosis; hx of laryngopharyngeal reflux    Hyperlipidemia     Hypertension     Low vitamin D level     LPRD (laryngopharyngeal reflux disease) 02/21/2022    PONV (postoperative nausea and vomiting)     AFTER "BIG SURGERIES"    Postnasal drip     WITH COUGH    Ulcerative colitis [facility])      Past Surgical History:   Procedure Laterality Date    APPENDECTOMY      CARDIAC CATHETERIZATION N/A 9/7/2023    Procedure: Cardiac RHC/LHC; Surgeon: Dm Maddox MD;  [facility];   Service: Cardiology    CATARACT EXTRACTION Bilateral     COLONOSCOPY      colon polyps    COLONOSCOPY  07/28/2022    FOOT SURGERY Bilateral     bunionectomy    HEMORROIDECTOMY      hemorroid removal    JOINT REPLACEMENT Bilateral     knees    REPLACEMENT TOTAL KNEE Bilateral     b/ replacement    TONSILLECTOMY      TUBAL LIGATION      UPPER GASTROINTESTINAL ENDOSCOPY       Social History   Social History     Substance and Sexual Activity   Alcohol Use Not Currently     Social History     Substance and Sexual Activity   Drug Use Never     Social History     Tobacco Use   Smoking Status Never   Smokeless Tobacco Never     Family History   Problem Relation Age of Onset Heart disease Father     No Known Problems Sister     No Known Problems Sister     Cancer Daughter         unsure of type of cancer, it was female cancer and hysterectomy done by Dr. Ildefonso Strauss    No Known Problems Maternal Grandmother     No Known Problems Paternal Grandmother     Heart disease Brother     Cancer Brother 54    Cancer Brother 79    No Known Problems Maternal Aunt     No Known Problems Paternal Aunt        Meds/Allergies       Current Outpatient Medications:     amLODIPine (NORVASC) 10 mg tablet    atorvastatin (LIPITOR) 20 mg tablet    cholecalciferol (VITAMIN D3) 1,000 units tablet    Coenzyme Q10 (Co Q 10) 100 MG CAPS    Cyanocobalamin (VITAMIN B 12 PO)    famotidine (PEPCID) 40 MG tablet    levothyroxine 75 mcg tablet    Multiple Vitamin (multivitamin) capsule    pantoprazole (PROTONIX) 40 mg tablet    sulfaSALAzine (AZULFIDINE) 500 mg tablet    valsartan (DIOVAN) 80 mg tablet    vitamin A 2250 MCG (7500 UT) capsule    vitamin E, tocopherol, 1,000 units capsule    pantoprazole (PROTONIX) 40 mg tablet    Allergies   Allergen Reactions    Sulfa Antibiotics Itching           Objective     Blood pressure 142/65, pulse 78, height 5' 2" (1.575 m), weight 65.8 kg (145 lb). Body mass index is 26.52 kg/m². PHYSICAL EXAM:      General Appearance:   Alert, cooperative, no distress   HEENT:   Normocephalic, atraumatic, anicteric. Neck:  Supple, symmetrical, trachea midline   Lungs:   Clear to auscultation bilaterally; no rales, rhonchi or wheezing; respirations unlabored    Heart[de-identified]   Regular rate and rhythm; no murmur, rub, or gallop.    Abdomen:   Soft, non-tender, non-distended; normal bowel sounds; no masses, no organomegaly    Genitalia:   Deferred    Rectal:   Deferred    Extremities:  No cyanosis, clubbing or edema    Pulses:  2+ and symmetric    Skin:  No jaundice, rashes, or lesions    Lymph nodes:  No palpable cervical lymphadenopathy        Lab Results:   No visits with results within 1 Day(s) from this visit. Latest known visit with results is:   Appointment on 10/05/2023   Component Date Value    WBC 10/05/2023 4.96     RBC 10/05/2023 2.46 (L)     Hemoglobin 10/05/2023 8.3 (L)     Hematocrit 10/05/2023 24.3 (L)     MCV 10/05/2023 99 (H)     MCH 10/05/2023 33.7     MCHC 10/05/2023 34.2     RDW 10/05/2023 14.2     MPV 10/05/2023 9.4     Platelets 54/17/4373 260     nRBC 10/05/2023 0     Neutrophils Relative 10/05/2023 56     Immat GRANS % 10/05/2023 0     Lymphocytes Relative 10/05/2023 29     Monocytes Relative 10/05/2023 11     Eosinophils Relative 10/05/2023 3     Basophils Relative 10/05/2023 1     Neutrophils Absolute 10/05/2023 2.74     Immature Grans Absolute 10/05/2023 0.02     Lymphocytes Absolute 10/05/2023 1.46     Monocytes Absolute 10/05/2023 0.56     Eosinophils Absolute 10/05/2023 0.13     Basophils Absolute 10/05/2023 0.05     CRP 10/05/2023 <1.0     Sed Rate 10/05/2023 11     Angio Convert Enzyme 10/05/2023 35     Sodium 10/05/2023 127 (L)     Potassium 10/05/2023 4.1     Chloride 10/05/2023 96     CO2 10/05/2023 25     ANION GAP 10/05/2023 6     BUN 10/05/2023 15     Creatinine 10/05/2023 0.86     Glucose 10/05/2023 90     Calcium 10/05/2023 8.7     AST 10/05/2023 19     ALT 10/05/2023 9     Alkaline Phosphatase 10/05/2023 71     Total Protein 10/05/2023 6.8     Albumin 10/05/2023 4.0     Total Bilirubin 10/05/2023 0.48     eGFR 10/05/2023 62     Folate 10/05/2023 11.4     Vitamin B-12 10/05/2023 1,242 (H)     Methylmalonic Acid, S 10/05/2023 315     Iron Saturation 10/05/2023 26     TIBC 10/05/2023 297     Iron 10/05/2023 78     UIBC 10/05/2023 219     Ferritin 10/05/2023 19          Radiology Results:   No results found.

## 2023-10-12 DIAGNOSIS — E53.8 B12 DEFICIENCY: Primary | ICD-10-CM

## 2023-10-12 RX ORDER — CYANOCOBALAMIN 1000 UG/ML
1000 INJECTION, SOLUTION INTRAMUSCULAR; SUBCUTANEOUS ONCE
Status: CANCELLED | OUTPATIENT
Start: 2023-10-16

## 2023-10-16 ENCOUNTER — OFFICE VISIT (OUTPATIENT)
Dept: PULMONOLOGY | Facility: CLINIC | Age: 83
End: 2023-10-16
Payer: MEDICARE

## 2023-10-16 ENCOUNTER — HOSPITAL ENCOUNTER (OUTPATIENT)
Dept: INFUSION CENTER | Facility: CLINIC | Age: 83
Discharge: HOME | End: 2023-10-16
Payer: MEDICARE

## 2023-10-16 VITALS
HEART RATE: 84 BPM | SYSTOLIC BLOOD PRESSURE: 132 MMHG | BODY MASS INDEX: 26.68 KG/M2 | WEIGHT: 145 LBS | TEMPERATURE: 100.7 F | DIASTOLIC BLOOD PRESSURE: 80 MMHG | HEIGHT: 62 IN | OXYGEN SATURATION: 98 %

## 2023-10-16 DIAGNOSIS — R05.3 CHRONIC COUGH: ICD-10-CM

## 2023-10-16 DIAGNOSIS — R93.89 ABNORMAL CT OF THE CHEST: Primary | ICD-10-CM

## 2023-10-16 DIAGNOSIS — E53.8 B12 DEFICIENCY: Primary | ICD-10-CM

## 2023-10-16 PROCEDURE — 99214 OFFICE O/P EST MOD 30 MIN: CPT | Performed by: INTERNAL MEDICINE

## 2023-10-16 RX ORDER — CYANOCOBALAMIN 1000 UG/ML
1000 INJECTION, SOLUTION INTRAMUSCULAR; SUBCUTANEOUS ONCE
OUTPATIENT
Start: 2023-11-13

## 2023-10-16 RX ORDER — CYANOCOBALAMIN 1000 UG/ML
1000 INJECTION, SOLUTION INTRAMUSCULAR; SUBCUTANEOUS ONCE
Status: COMPLETED | OUTPATIENT
Start: 2023-10-16 | End: 2023-10-16

## 2023-10-16 RX ADMIN — CYANOCOBALAMIN 1000 MCG: 1000 INJECTION INTRAMUSCULAR; SUBCUTANEOUS at 16:05

## 2023-10-16 NOTE — PROGRESS NOTES
Patient to [address] [place] for Vitamin B12: Offers no complaints at present time: Lab work ( 10/05/23 ) reviewed: Vitamin B12 - 1242:  Within parameters to treat: Injection given in Left UA without incident: No adverse reactions noted: Verified follow up appt with patient ( 11/13/23 ): AVS offered and declined

## 2023-10-16 NOTE — PROGRESS NOTES
Assessment/Plan:    Abnormal CT of the chest  Mrs. Stefan Baldwin had a CT angiogram on 8/30/2023 as part of the work-up prior to TAVR for severe aortic stenosis. It showed  abnormal soft tissue dense material interposed between the esophagus and aorta. . Similar  soft tissue was seen on the old study from November of last year. There were some small mediastinal nodes anterior to the maxime and adjacent to the arch. These were present previously. The presence of these other nodes raises the suspicion that the  process anterior to the aorta could be related to adenopathy. He also had opacification of the medial basal and posterior basal bronchi likely due to mucous. She had no previous history of cancer and is a never smoker. On clinical examination, her chest was clear to auscultation. She did not have any significant neck or supraclavicular or axillary lymphadenopathy. She had systolic murmur of severe aortic stenosis. Etiology of this soft tissue shadow is not clear at this point. This is most likely this is a lymph node. The possibilities were sarcoidosis, neoplasm or previous granulomatous disease. I had discussed with the radiologist.  She cannot undergo bronchoscopy at this time. I have ordered a CT PET scan for further evaluation. If the PET scan is negative  our plan is to let her undergo TAVR I had a long discussion with her and her family and answered all their questions. Chronic cough  She has chronic cough for about 4 years which is productive of phlegm which is occasionally yellow. She has history of sinus issues and postnasal drip. She also has shortness of breath on exertion and her exercise tolerance is about a block on level ground. She had no wheezing. She has no previous history of asthma or COPD. She is a never smoker. On clinical examination, her chest was clear to auscultation. Currently she is not on any inhalers or oxygen. I have ordered a PFT t is waiting to be done. Currently her cough is not bothersome. Diagnoses and all orders for this visit:    Abnormal CT of the chest    Chronic cough          Subjective:      Patient ID: Cheikh Mitchell is a 79 y.o. female. Mrs. Cheikh Mitchell came for follow-up for her abnormal CT scan and chronic cough. She has severe aortic stenosis and is she is awaiting TAVR. Her TAVR was postponed in view of the abnormal CT scan for which she is going for a PET scan soon. If the PET scan is negative she can be cleared for surgery. Currently she has cough with occasional yellow phlegm which I think is secondary to her chronic sinus issues. She has some shortness of breath on exertion and some swelling of feet. No wheezing no chest pain no palpitations. Her appetite has been good. He was found to have a temperature 100.4 in the office and I told her to recheck it at home. She has had  flu vaccination as well as COVID vaccinations. She has some arthralgia. The following portions of the patient's history were reviewed and updated as appropriate: allergies, current medications, past family history, past medical history, past social history, past surgical history, and problem list.    Review of Systems   Constitutional:  Positive for fatigue and fever. Negative for appetite change and chills. HENT:  Positive for hearing loss, rhinorrhea and sneezing. Negative for sore throat, trouble swallowing and voice change. Respiratory:  Positive for cough (occ yellow phlegm) and shortness of breath. Negative for chest tightness and wheezing. Cardiovascular:  Positive for leg swelling. Negative for chest pain and palpitations. Gastrointestinal:  Positive for constipation. Negative for abdominal pain, diarrhea, nausea and vomiting. Genitourinary:  Negative for dysuria, frequency and urgency. Musculoskeletal:  Positive for arthralgias. Negative for gait problem. Skin:  Negative for rash.    Allergic/Immunologic: Negative for environmental allergies. Neurological:  Positive for dizziness. Negative for syncope, light-headedness and headaches. Psychiatric/Behavioral:  Negative for agitation, confusion and sleep disturbance. The patient is not nervous/anxious. Objective:      /80 (BP Location: Right arm, Patient Position: Sitting, Cuff Size: Standard)   Pulse 84   Temp (!) 100.7 °F (38.2 °C)   Ht 5' 2" (1.575 m)   Wt 65.8 kg (145 lb)   SpO2 98%   BMI 26.52 kg/m²          Physical Exam  Constitutional:       General: She is not in acute distress. Appearance: She is obese. She is not toxic-appearing. HENT:      Head: Normocephalic. Mouth/Throat:      Mouth: Mucous membranes are moist.   Eyes:      General: No scleral icterus. Conjunctiva/sclera: Conjunctivae normal.   Cardiovascular:      Rate and Rhythm: Normal rate and regular rhythm. Heart sounds: Normal heart sounds. No murmur heard. Pulmonary:      Effort: Pulmonary effort is normal. No respiratory distress. Breath sounds: Normal breath sounds. No wheezing, rhonchi or rales. Abdominal:      General: Bowel sounds are normal.      Palpations: Abdomen is soft. Tenderness: There is no abdominal tenderness. There is no guarding. Musculoskeletal:      Cervical back: No rigidity. Right lower leg: No edema. Left lower leg: No edema. Lymphadenopathy:      Cervical: No cervical adenopathy. Skin:     Coloration: Skin is not jaundiced or pale. Findings: No rash. Neurological:      Mental Status: She is alert and oriented to person, place, and time. Gait: Gait normal.   Psychiatric:         Mood and Affect: Mood normal.         Behavior: Behavior normal.         Thought Content: Thought content normal.         Judgment: Judgment normal.      I spent 30 minutes of time take care of this patient with complex medical issues.   The majority of this time was spent directly with the patient counseling as well as coordinating care.

## 2023-10-17 ENCOUNTER — TELEPHONE (OUTPATIENT)
Dept: HEMATOLOGY ONCOLOGY | Facility: CLINIC | Age: 83
End: 2023-10-17

## 2023-10-17 DIAGNOSIS — D50.0 IRON DEFICIENCY ANEMIA DUE TO CHRONIC BLOOD LOSS: ICD-10-CM

## 2023-10-17 DIAGNOSIS — E53.8 B12 DEFICIENCY: Primary | ICD-10-CM

## 2023-10-17 DIAGNOSIS — D53.9 MACROCYTIC ANEMIA: ICD-10-CM

## 2023-10-17 NOTE — TELEPHONE ENCOUNTER
Returned telephone call back and left vm. called her this AM. Slow Fe. Left vm with RN teams number.

## 2023-10-17 NOTE — TELEPHONE ENCOUNTER
Hi, this is Iveth Howell calling you back. YOB: 1940 and I didn't get the name of the when I'm supposed to get for the I guess for the iron. Could you call me back please and let me know what that is again what I'm supposed to start taking? Thank you.  Bye, bye.

## 2023-10-17 NOTE — ASSESSMENT & PLAN NOTE
She has chronic cough for about 4 years which is productive of phlegm which is occasionally yellow. She has history of sinus issues and postnasal drip. She also has shortness of breath on exertion and her exercise tolerance is about a block on level ground. She had no wheezing. She has no previous history of asthma or COPD. She is a never smoker. On clinical examination, her chest was clear to auscultation. Currently she is not on any inhalers or oxygen. I have ordered a PFT t is waiting to be done. Currently her cough is not bothersome.

## 2023-10-17 NOTE — ASSESSMENT & PLAN NOTE
Mrs. Blake Shore had a CT angiogram on 8/30/2023 as part of the work-up prior to TAVR for severe aortic stenosis. It showed  abnormal soft tissue dense material interposed between the esophagus and aorta. . Similar  soft tissue was seen on the old study from November of last year. There were some small mediastinal nodes anterior to the maxime and adjacent to the arch. These were present previously. The presence of these other nodes raises the suspicion that the  process anterior to the aorta could be related to adenopathy. He also had opacification of the medial basal and posterior basal bronchi likely due to mucous. She had no previous history of cancer and is a never smoker. On clinical examination, her chest was clear to auscultation. She did not have any significant neck or supraclavicular or axillary lymphadenopathy. She had systolic murmur of severe aortic stenosis. Etiology of this soft tissue shadow is not clear at this point. This is most likely this is a lymph node. The possibilities were sarcoidosis, neoplasm or previous granulomatous disease. I had discussed with the radiologist.  She cannot undergo bronchoscopy at this time. I have ordered a CT PET scan for further evaluation. If the PET scan is negative  our plan is to let her undergo TAVR I had a long discussion with her and her family and answered all their questions.

## 2023-10-18 ENCOUNTER — HOSPITAL ENCOUNTER (OUTPATIENT)
Dept: RADIOLOGY | Age: 83
Discharge: HOME | End: 2023-10-18
Payer: MEDICARE

## 2023-10-18 ENCOUNTER — TELEPHONE (OUTPATIENT)
Dept: PULMONOLOGY | Facility: CLINIC | Age: 83
End: 2023-10-18

## 2023-10-18 DIAGNOSIS — D38.3 NEOPLASM OF UNCERTAIN BEHAVIOR OF MEDIASTINUM: ICD-10-CM

## 2023-10-18 DIAGNOSIS — R91.8 OTHER NONSPECIFIC ABNORMAL FINDING OF LUNG FIELD: ICD-10-CM

## 2023-10-18 DIAGNOSIS — R93.89 ABNORMAL CT OF THE CHEST: ICD-10-CM

## 2023-10-18 LAB — GLUCOSE SERPL-MCNC: 88 MG/DL (ref 65–140)

## 2023-10-18 PROCEDURE — 78815 PET IMAGE W/CT SKULL-THIGH: CPT

## 2023-10-18 PROCEDURE — 82948 REAGENT STRIP/BLOOD GLUCOSE: CPT

## 2023-10-18 PROCEDURE — G1004 CDSM NDSC: HCPCS

## 2023-10-18 PROCEDURE — A9552 F18 FDG: HCPCS

## 2023-10-18 NOTE — TELEPHONE ENCOUNTER
Peri from Radiology left v/m re: sign. findings on NM PET CT.   Please review and either check paras report or call them at [phone] option 3

## 2023-10-18 NOTE — LETTER
[address] [place] [id number]      October 23, 2023    MRN: [id number]     Phone: [phone]     Dear Ms. Mike Dumont recently had a(n) Nuclear Medicine performed on 10/18/2023 at  [address] [place] [address] that was requested by Lux Guardado MD. The study was reviewed by a radiologist, which is a physician who specializes in medical imaging. The radiologist issued a report describing his or her findings. In that report there was a finding that the radiologist felt warranted further discussion with your health care provider and that discussion would be beneficial to you. The results were sent to Lux Guardado MD on 10/18/2023  3:21 PM. We recommend that you contact Lux Guardado MD at [phone] or set up an appointment to discuss the results of the imaging test. If you have already heard from Lux Guardado MD regarding the results of your study, you can disregard this letter. This letter is not meant to alarm you, but intended to encourage you to follow-up on your results with the provider that sent you for the imaging study. In addition, we have enclosed answers to frequently asked questions by other patients who have also received a letter to review results with their health care provider (see page two). Thank you for choosing [address] [place] [address] for your medical imaging needs. FREQUENTLY ASKED QUESTIONS    Why am I receiving this letter? [facility] requires us to notify patients who have findings on imaging exams that may require more testing or follow-up with a health professional within the next 3 months.         How serious is the finding on the imaging test?  This letter is sent to all patients who may need follow-up or more testing within the next 3 months. Receiving this letter does not necessarily mean you have a life-threatening imaging finding or disease. Recommendations in the radiologist’s imaging report are general in nature and it is up to your healthcare provider to say whether those recommendations make sense for your situation. You are strongly encouraged to talk to your health care provider about the results and ask whether additional steps need to be taken. Where can I get a copy of the final report for my recent radiology exam?  To get a full copy of the report you can access your records online at [email or web address] or please contact [facility] Medical Records Department at [phone] Monday through Friday between 8 am and 6 pm.         What do I need to do now? Please contact your health care provider who requested the imaging study to discuss what further actions (if any) are needed. You may have already heard from (your ordering provider) in regard to this test in which case you can disregard this letter. NOTICE IN ACCORDANCE WITH THE [place] “PATIENT TEST RESULT INFORMATION ACT OF 2018”    You are receiving this notice as a result of a determination by your diagnostic imaging service that further discussions of your test results are warranted and would be beneficial to you. The complete results of your test or tests have been or will be sent to the health care practitioner that ordered the test or tests. It is recommended that you contact your health care practitioner to discuss your results as soon as possible.

## 2023-10-27 ENCOUNTER — OFFICE VISIT (OUTPATIENT)
Dept: CARDIOLOGY CLINIC | Facility: CLINIC | Age: 83
End: 2023-10-27
Payer: MEDICARE

## 2023-10-27 VITALS
BODY MASS INDEX: 26.31 KG/M2 | OXYGEN SATURATION: 96 % | DIASTOLIC BLOOD PRESSURE: 58 MMHG | SYSTOLIC BLOOD PRESSURE: 122 MMHG | WEIGHT: 143 LBS | HEIGHT: 62 IN | HEART RATE: 69 BPM

## 2023-10-27 DIAGNOSIS — R93.89 ABNORMAL CT OF THE CHEST: ICD-10-CM

## 2023-10-27 DIAGNOSIS — I10 ESSENTIAL HYPERTENSION: ICD-10-CM

## 2023-10-27 DIAGNOSIS — I35.0 AORTIC STENOSIS, SEVERE: Primary | ICD-10-CM

## 2023-10-27 PROCEDURE — 99214 OFFICE O/P EST MOD 30 MIN: CPT | Performed by: INTERNAL MEDICINE

## 2023-10-27 NOTE — PROGRESS NOTES
Cardiology Follow Up    Clement Wagner  [id number]  1940  [facility] [address] [place] [id number] [phone]      1. Aortic stenosis, severe        2. Essential hypertension        3. Abnormal CT of the chest            Discussion/Summary:    AS - for TAVR. THis should be scheduled for the near future now that her testing is done and the abnormality on the CT scan has been sorted out. PET scan did not suggest malignancy. She will need abx prior to dental work after she has the valve replacement. HTN - controlled. Indicated to patient she may be on different medications after her surgery. Nonobstructive CAD on cath. No focal lesions. On atorvastatin and will be on antiplatelet with her valve. History of Present Illness:  79-year-old female discovered to have aortic stenosis after gastroenterologist heard aortic murmur at time of her procedure. Initial echocardiogram showed moderate to severe aortic stenosis diagnosed in June of 2022. LV function is normal.    She lives independently with her . AS has progressed, and she has some symptoms. She has undergone TAVR eval.  On the CT, there was concern for malignancy but she has now undergon an evluation for this and the PEt scan did not suggest it. Interval history:    Preop testing showed moderate diffuse plaque in coronary vessels, but no focal lesion for intervention. There was abnormality on the CT scan, she has seen pulmonary for evaluation, just had a PET scan which did not suggest malignancy and now she is going to see the surgeon on Monday and should proceed with the surgical schedulding. She says it's scheduled for mid nov. Has limited memory still but denies any cardiac symptoms. BP is well controlled.       Patient Active Problem List   Diagnosis   • Essential hypertension   • Hypercholesterolemia   • Hypothyroidism   • Ulcerative colitis without complications (HCC)   • Diverticulosis   • Low's esophagus   • Colon polyp   • Gastroesophageal reflux disease with esophagitis   • Vitamin D deficiency   • Anemia   • Chronic cough   • Dairy product intolerance   • [place] allergy   • B12 deficiency   • Irritable larynx   • Cough variant asthma    • Dysphonia   • Pharyngoesophageal dysphagia   • Dysfunction of both eustachian tubes   • Herpes zoster without complication   • Aortic stenosis, severe   • Calcified granuloma of lung (HCC)   • Allergic rhinitis   • Abnormal CT of the chest   • Stage 3 chronic kidney disease, unspecified whether stage 3a or 3b CKD (HCC)     Past Medical History:   Diagnosis Date   • Allergic rhinitis 3/21/2023   • Anemia    • Arthritis     osteoporosis, djd knees, pt denies osteoporosis on 2/21/22   • Low's esophagus    • Cancer ([address])     on face   • Colon polyp    • Cough 02/21/2022    cough for 3 years, expectorates yellow mucus   • Disease of thyroid gland     low thyroid   • Diverticulitis     gerd, diverticulitis   • Dizziness    • GERD (gastroesophageal reflux disease)     ulcerative colitis;  Low's esophagus, diverticulosis; hx of laryngopharyngeal reflux   • Hyperlipidemia    • Hypertension    • Low vitamin D level    • LPRD (laryngopharyngeal reflux disease) 02/21/2022   • PONV (postoperative nausea and vomiting)     AFTER "BIG SURGERIES"   • Postnasal drip     WITH COUGH   • Ulcerative colitis ([address])      Social History     Tobacco Use   • Smoking status: Never   • Smokeless tobacco: Never   Vaping Use   • Vaping Use: Never used   Substance Use Topics   • Alcohol use: Not Currently   • Drug use: Never      Family History   Problem Relation Age of Onset   • Heart disease Father    • No Known Problems Sister    • No Known Problems Sister    • Cancer Daughter         unsure of type of cancer, it was female cancer and hysterectomy done by Dr. Lam [place]   • No Known Problems Maternal Grandmother    • No Known Problems Paternal Grandmother    • Heart disease Brother    • Cancer Brother 54   • Cancer Brother 79   • No Known Problems Maternal Aunt    • No Known Problems Paternal Aunt      Past Surgical History:   Procedure Laterality Date   • APPENDECTOMY     • CARDIAC CATHETERIZATION N/A 9/7/2023    Procedure: Cardiac RHC/LHC; Surgeon: Kyara Lee MD;  Location:  CARDIAC CATH LAB;   Service: Cardiology   • CATARACT EXTRACTION Bilateral    • COLONOSCOPY      colon polyps   • COLONOSCOPY  07/28/2022   • FOOT SURGERY Bilateral     bunionectomy   • HEMORROIDECTOMY      hemorroid removal   • JOINT REPLACEMENT Bilateral     knees   • REPLACEMENT TOTAL KNEE Bilateral     b/ replacement   • TONSILLECTOMY     • TUBAL LIGATION     • UPPER GASTROINTESTINAL ENDOSCOPY         Current Outpatient Medications:   •  amLODIPine (NORVASC) 10 mg tablet, Take 1 tablet (10 mg total) by mouth daily, Disp: 90 tablet, Rfl: 2  •  atorvastatin (LIPITOR) 20 mg tablet, Take 1 tablet by mouth once daily, Disp: 90 tablet, Rfl: 1  •  cholecalciferol (VITAMIN D3) 1,000 units tablet, Take 1,000 Units by mouth daily, Disp: , Rfl:   •  Coenzyme Q10 (Co Q 10) 100 MG CAPS, Take by mouth, Disp: , Rfl:   •  Cyanocobalamin (VITAMIN B 12 PO), Take by mouth, Disp: , Rfl:   •  levothyroxine 75 mcg tablet, Take 1 tablet by mouth once daily, Disp: 90 tablet, Rfl: 1  •  Multiple Vitamin (multivitamin) capsule, Take 1 capsule by mouth daily, Disp: , Rfl:   •  sulfaSALAzine (AZULFIDINE) 500 mg tablet, Take 2 tablets (1,000 mg total) by mouth 2 (two) times a day, Disp: 360 tablet, Rfl: 2  •  valsartan (DIOVAN) 80 mg tablet, Take 1 tablet by mouth once daily, Disp: 90 tablet, Rfl: 0  •  vitamin A 2250 MCG (7500 UT) capsule, Take 7,500 Units by mouth daily, Disp: , Rfl:   •  vitamin E, tocopherol, 1,000 units capsule, Take 1,000 Units by mouth daily, Disp: , Rfl:   •  famotidine (PEPCID) 40 MG tablet, TAKE 1 TABLET BY MOUTH ONCE DAILY BEFORE SUPPER (Patient not taking: Reported on 10/16/2023), Disp: 90 tablet, Rfl: 0  •  pantoprazole (PROTONIX) 40 mg tablet, TAKE 1 TABLET BY MOUTH TWICE DAILY 30 MINUTES BEFORE MEALS (Patient not taking: Reported on 10/16/2023), Disp: 60 tablet, Rfl: 0  Allergies   Allergen Reactions   • Sulfa Antibiotics Itching       Vitals:    10/27/23 1543   BP: 122/58   BP Location: Left arm   Patient Position: Sitting   Cuff Size: Standard   Pulse: 69   SpO2: 96%   Weight: 64.9 kg (143 lb)   Height: 5' 2" (1.575 m)     Vitals:    10/27/23 1543   Weight: 64.9 kg (143 lb)      Height: 5' 2" (157.5 cm)   Body mass index is 26.16 kg/m². Physical Exam:  GEN: Judith Egan is an elderly female, NAD  HEENT: pupils equal, round, and reactive to light; extraocular muscles intact  NECK: supple, no carotid bruits   HEART: regular 3/6 ANNA. Decreased S2.  LUNGS: scattered rales  ABDOMEN: normal bowel sounds, soft, no tenderness, no distention  EXTREMITIES: peripheral pulses normal; no clubbing, cyanosis, or edema  NEURO: no focal findings   SKIN: normal without suspicious lesions on exposed skin    ROS:  Except as noted in HPI, is otherwise reviewed in detail and a 12 point review of systems is negative.     Labs:  Lab Results   Component Value Date    SODIUM 127 (L) 10/05/2023    K 4.1 10/05/2023    CL 96 10/05/2023    CREATININE 0.86 10/05/2023    BUN 15 10/05/2023    CO2 25 10/05/2023    ALT 9 10/05/2023    AST 19 10/05/2023    TSH 2.71 02/05/2021    GLUF 86 09/07/2023    WBC 4.96 10/05/2023    HGB 8.3 (L) 10/05/2023    HCT 24.3 (L) 10/05/2023     10/05/2023       No results found for: "CHOL"  Lab Results   Component Value Date    HDL 48 (L) 09/20/2023    HDL 51 11/09/2022    HDL 57 04/12/2022     Lab Results   Component Value Date    LDLCALC 72 09/20/2023    LDLCALC 65 11/09/2022    LDLCALC 119 (H) 04/12/2022     Lab Results   Component Value Date    TRIG 68 09/20/2023    TRIG 89 11/09/2022    TRIG 64 04/12/2022     Echo 6/2023  Left Ventricle: Left ventricular cavity size is normal. Wall thickness is moderately increased. The left ventricular ejection fraction is 65%. Systolic function is normal. Wall motion is normal. Diastolic function is mildly abnormal, consistent with grade I (abnormal) relaxation. •  Aortic Valve: The aortic valve is trileaflet. The leaflets are not thickened. The leaflets are moderately calcified. There is moderately reduced mobility. There is severe stenosis. •  Mitral Valve: There is mild regurgitation. •  Tricuspid Valve: There is mild regurgitation. Echo 6/30/22:  Left Ventricle: Left ventricular cavity size is normal. Wall thickness is normal. The left ventricular ejection fraction is 65%. Systolic function is normal. Wall motion is normal. Diastolic function is mildly abnormal, consistent with grade I (abnormal) relaxation. Left atrial filling pressure is elevated. •  Right Ventricle: Right ventricular cavity size is normal. Systolic function is normal.  •  Aortic Valve: The aortic valve is trileaflet. The leaflets are moderately thickened. The leaflets are moderately calcified. There is moderately reduced mobility. There is moderate and There is moderate to severe stenosis. The aortic valve peak velocity is 2.81 m/s. The aortic valve peak gradient is 32 mmHg. The aortic valve mean gradient is 21 mmHg. The DVI is 0.27. The aortic valve area is 0.76 cm2 using an LVOT diameter of 1.9 cm. •  Mitral Valve: There is mild regurgitation. •  Tricuspid Valve: There is moderate regurgitation.

## 2023-10-28 NOTE — ASSESSMENT & PLAN NOTE
Problem: Discharge Planning  Goal: Discharge to home or other facility with appropriate resources  Outcome: Progressing     Problem: Pain  Goal: Verbalizes/displays adequate comfort level or baseline comfort level  Outcome: Progressing     Problem: Safety - Adult  Goal: Free from fall injury  Outcome: Progressing     Problem: ABCDS Injury Assessment  Goal: Absence of physical injury  Outcome: Progressing     Problem: Musculoskeletal - Adult  Goal: Return mobility to safest level of function  Outcome: Progressing  Goal: Return ADL status to a safe level of function  Outcome: Progressing Stable  Pt had 48 hr pH monitor and EGD in Feb 2022 by Dr Santa Valle  Continue pantoprazole 40 mg bid and famotidine 40 mg before supper

## 2023-10-30 ENCOUNTER — OFFICE VISIT (OUTPATIENT)
Dept: CARDIAC SURGERY | Facility: CLINIC | Age: 83
End: 2023-10-30
Payer: MEDICARE

## 2023-10-30 VITALS
BODY MASS INDEX: 26.35 KG/M2 | OXYGEN SATURATION: 98 % | TEMPERATURE: 97.6 F | DIASTOLIC BLOOD PRESSURE: 67 MMHG | SYSTOLIC BLOOD PRESSURE: 139 MMHG | HEIGHT: 62 IN | HEART RATE: 69 BPM | WEIGHT: 143.2 LBS

## 2023-10-30 DIAGNOSIS — I35.0 AORTIC STENOSIS, SEVERE: Primary | ICD-10-CM

## 2023-10-30 PROCEDURE — 99214 OFFICE O/P EST MOD 30 MIN: CPT | Performed by: THORACIC SURGERY (CARDIOTHORACIC VASCULAR SURGERY)

## 2023-10-30 RX ORDER — CEFAZOLIN SODIUM 2 G/50ML
2000 SOLUTION INTRAVENOUS ONCE
OUTPATIENT
Start: 2023-10-30 | End: 2023-10-30

## 2023-10-30 RX ORDER — CHLORHEXIDINE GLUCONATE ORAL RINSE 1.2 MG/ML
15 SOLUTION DENTAL ONCE
OUTPATIENT
Start: 2023-10-30 | End: 2023-10-30

## 2023-10-30 NOTE — PROGRESS NOTES
Pre-Op History & Physical - Cardiothoracic Surgery   Dorina Steele 79 y.o. female MRN: [id number]    Physician Requesting Consult: Dr. Piyush Marti    Reason for Consult / Principal Problem: Aortic stenosis, Non-Rheumatic    History of Present Illness: Dorina Steele is a 79y.o. year old female who was previously evaluated in our office by Dar Wahlen D.O. for transcatheter aortic valve replacement. During this initial consultation, arrangements were made for the following studies to be completed: Gated CTA of the chest/abdomen/pelvis, cardiac catheterization, dental clearance and carotid artery ultrasound. Dorina Steele now presents to review the results of these tests and obtain a second surgeon to confirm the suitability of proceeding with transcatheter aortic valve replacment. CTA c/a/p demonstrated a new endobronchial process for which she was seen by pulmonary medicine, underwent NM PET scan and subsequently cleared from a pulmonary standpoint to proceed with TAVR. She will be evaluated at a later date with elective diagnostic bronchoscopy after recovery from TAVR. In review, Patient's medical history is notable for AS, HTN, HLD, hypothyroidism, pulmonary nodules, anemia, ulcerative colitis, colon polyps, diverticulitis, GERD, Low's esophagus, cholelithiasis and arthritis/DJD s/p b/l TKR's. Patient's Gastroenterologist detected a heart murmur on exam at an office appt in March 2022. Patient was referred for Cardiology consultation and echocardiogram performed in June 2022 demonstrated moderate to severe AS. She has been under surveillnace by Cardiology. She underwent repeat echo in June 2023 that now demonstrates progression to severe AS. Patient is accompanied by her  and daughter today. She admits to SAVAGE with activities such as mopping the floor, climbing stairs and other strenuous activity. She has had a chronic cough for approximately 4 years that is productive for yellow mucus. She has post nasal drip which exacerbates the cough. She also admits to fatigue / decrease in activity tolerance as well as feeling off balance. She states she thinks her fatigue is related to the diet she has to follow for her ulcerative colitis as she has lost muscle mass and stamina. She experiences mild bilateral ankle edema. She denies chest pian, palpitations, lightheadedness or syncope. Patient denies childhood h/o murmur or rheumatic fever. Patient denies tobacco, alcohol or drugs. Patient obtains routine dental care every 6 months- no active issues and up to date. Patient is  and lives independently with her  in a single level home. Past Medical History:  Past Medical History:   Diagnosis Date    Allergic rhinitis 3/21/2023    Anemia     Arthritis     osteoporosis, djd knees, pt denies osteoporosis on 2/21/22    Low's esophagus     Cancer ([address])     on face    Colon polyp     Cough 02/21/2022    cough for 3 years, expectorates yellow mucus    Disease of thyroid gland     low thyroid    Diverticulitis     gerd, diverticulitis    Dizziness     GERD (gastroesophageal reflux disease)     ulcerative colitis; Low's esophagus, diverticulosis; hx of laryngopharyngeal reflux    Hyperlipidemia     Hypertension     Low vitamin D level     LPRD (laryngopharyngeal reflux disease) 02/21/2022    PONV (postoperative nausea and vomiting)     AFTER "BIG SURGERIES"    Postnasal drip     WITH COUGH    Ulcerative colitis [facility])          Past Surgical History:   Past Surgical History:   Procedure Laterality Date    APPENDECTOMY      CARDIAC CATHETERIZATION N/A 9/7/2023    Procedure: Cardiac RHC/LHC; Surgeon: Mitul Ward MD;  Location: [facility];   Service: Cardiology    CATARACT EXTRACTION Bilateral     COLONOSCOPY      colon polyps    COLONOSCOPY  07/28/2022    FOOT SURGERY Bilateral     bunionectomy    HEMORROIDECTOMY      hemorroid removal    JOINT REPLACEMENT Bilateral knees    REPLACEMENT TOTAL KNEE Bilateral     b/ replacement    TONSILLECTOMY      TUBAL LIGATION      UPPER GASTROINTESTINAL ENDOSCOPY           Family History:  Family History   Problem Relation Age of Onset    Heart disease Father     No Known Problems Sister     No Known Problems Sister     Cancer Daughter         unsure of type of cancer, it was female cancer and hysterectomy done by Dr. Maura Dow    No Known Problems Maternal Grandmother     No Known Problems Paternal Grandmother     Heart disease Brother     Cancer Brother 54    Cancer Brother 79    No Known Problems Maternal Aunt     No Known Problems Paternal Aunt          Social History:    Social History     Substance and Sexual Activity   Alcohol Use Not Currently     Social History     Substance and Sexual Activity   Drug Use Never     Social History     Tobacco Use   Smoking Status Never   Smokeless Tobacco Never       Home Medications:   Prior to Admission medications    Medication Sig Start Date End Date Taking?  Authorizing Provider   amLODIPine (NORVASC) 10 mg tablet Take 1 tablet (10 mg total) by mouth daily 5/3/23   Eron El MD   atorvastatin (LIPITOR) 20 mg tablet Take 1 tablet by mouth once daily 8/18/23   Eron El MD   cholecalciferol (VITAMIN D3) 1,000 units tablet Take 1,000 Units by mouth daily    Historical Provider, MD   Coenzyme Q10 (Co Q 10) 100 MG CAPS Take by mouth    Historical Provider, MD   Cyanocobalamin (VITAMIN B 12 PO) Take by mouth    Historical Provider, MD   famotidine (PEPCID) 40 MG tablet TAKE 1 TABLET BY MOUTH ONCE DAILY BEFORE SUPPER  Patient not taking: Reported on 10/16/2023 9/13/23   Kervin Day MD   levothyroxine 75 mcg tablet Take 1 tablet by mouth once daily 6/2/23   Eron El MD   Multiple Vitamin (multivitamin) capsule Take 1 capsule by mouth daily    Historical Provider, MD   pantoprazole (PROTONIX) 40 mg tablet TAKE 1 TABLET BY MOUTH TWICE DAILY 30 MINUTES BEFORE MEALS  Patient not taking: Reported on 10/16/2023 10/10/23   Shi Aguilar PA-C   sulfaSALAzine (AZULFIDINE) 500 mg tablet Take 2 tablets (1,000 mg total) by mouth 2 (two) times a day 10/11/23   Shaquille Duong MD   valsartan (DIOVAN) 80 mg tablet Take 1 tablet by mouth once daily 5/18/23   Kristine Collado MD   vitamin A 2250 MCG (7500 UT) capsule Take 7,500 Units by mouth daily    Historical Provider, MD   vitamin E, tocopherol, 1,000 units capsule Take 1,000 Units by mouth daily    Historical Provider, MD       Allergies:   Allergies   Allergen Reactions    Sulfa Antibiotics Itching       Review of Systems:  Review of Systems - History obtained from chart review and the patient  General ROS: positive for  - fatigue and change in activity tolerance  negative for - chills or fever  Psychological ROS: negative  Ophthalmic ROS: positive for - uses glasses  ENT ROS: negative  Allergy and Immunology ROS: negative  Hematological and Lymphatic ROS: negative  Endocrine ROS: negative  Breast ROS: negative  Respiratory ROS: positive for - post nasal drip, chronic cough and expectorating yellow mucus x 4 years  negative for - hemoptysis, orthopnea, pleuritic pain, stridor, tachypnea, or wheezing  Cardiovascular ROS: positive for - dyspnea on exertion, edema, and murmur  negative for - chest pain, irregular heartbeat, loss of consciousness, orthopnea, palpitations, paroxysmal nocturnal dyspnea, or rapid heart rate  Gastrointestinal ROS: no abdominal pain, change in bowel habits, or black or bloody stools  Genito-Urinary ROS: no dysuria, trouble voiding, or hematuria  Musculoskeletal ROS: positive for - gait disturbance  Neurological ROS: positive for - gait disturbance, impaired coordination/balance, and memory loss  Dermatological ROS: negative    Vital Signs:     Vitals:    10/30/23 1053 10/30/23 1055   BP: 139/64 139/67   BP Location: Left arm Right arm   Patient Position: Sitting Sitting   Cuff Size: Standard Standard   Pulse: 69    Temp: 97.6 °F (36.4 °C)    TempSrc: Tympanic    SpO2: 98%    Weight: 65 kg (143 lb 3.2 oz)    Height: 5' 2" (1.575 m)        Physical Exam:    General: Alert, oriented, well developed, no acute distress  HEENT/NECK:  PERRLA. No jugular venous distention. Cardiac:Regular rate and rhythm, II/VI harsh systolic murmur RUSB. Carotids: 1+ pulses, faint bruits vs radiation of murmur  to neck   Pulmonary:  Breath sounds clear bilaterally. Abdomen:  Non-tender, Non-distended. Positive bowel sounds. Upper extremities: 2+ radial pulses; brisk capillary refill  Lower extremities: Extremities warm/dry. PT/DP pulses 2+ bilaterally. 1+ edema B/L  Neuro: Alert and oriented X 3. Sensation is grossly intact. No focal deficits. Musculoskeletal: MAEE, stable gait  Skin: Warm/Dry, without rashes or lesions.     Lab Results:     Lab Results   Component Value Date    WBC 4.96 10/05/2023    HGB 8.3 (L) 10/05/2023    HCT 24.3 (L) 10/05/2023    MCV 99 (H) 10/05/2023     10/05/2023     Lab Results   Component Value Date    SODIUM 127 (L) 10/05/2023    K 4.1 10/05/2023    CL 96 10/05/2023    CO2 25 10/05/2023    AGAP 6 10/05/2023    BUN 15 10/05/2023    CREATININE 0.86 10/05/2023    GLUC 90 10/05/2023    GLUF 86 09/07/2023    CALCIUM 8.7 10/05/2023    AST 19 10/05/2023    ALT 9 10/05/2023    ALKPHOS 71 10/05/2023    TP 6.8 10/05/2023    TBILI 0.48 10/05/2023    EGFR 62 10/05/2023     Lab Results   Component Value Date    CHOLESTEROL 134 09/20/2023    CHOLESTEROL 134 11/09/2022    CHOLESTEROL 189 04/12/2022     Lab Results   Component Value Date    HDL 48 (L) 09/20/2023    HDL 51 11/09/2022    HDL 57 04/12/2022     Lab Results   Component Value Date    TRIG 68 09/20/2023    TRIG 89 11/09/2022    TRIG 64 04/12/2022     Lab Results   Component Value Date    [address] 86 09/20/2023    [address] 83 11/09/2022    [address] 132 04/12/2022           Imaging Studies:     Echocardiogram: 6/1/23       Left Ventricle: Left ventricular cavity size is normal. Wall thickness is moderately increased. The left ventricular ejection fraction is 65%. Systolic function is normal. Wall motion is normal. Diastolic function is mildly abnormal, consistent with grade I (abnormal) relaxation. Aortic Valve: The aortic valve is trileaflet. The leaflets are not thickened. The leaflets are moderately calcified. There is moderately reduced mobility. There is severe stenosis. Mitral Valve: There is mild regurgitation. Tricuspid Valve: There is mild regurgitation. Findings    Left Ventricle Left ventricular cavity size is normal. Wall thickness is moderately increased. The left ventricular ejection fraction is 65%. Systolic function is normal.  Wall motion is normal. Diastolic function is mildly abnormal, consistent with grade I (abnormal) relaxation. Right Ventricle Right ventricular cavity size is normal. Systolic function is normal. Wall thickness is normal.   Left Atrium The atrium is normal in size. Right Atrium The atrium is normal in size. Aortic Valve The aortic valve is trileaflet. The leaflets are not thickened. The leaflets are moderately calcified. There is moderately reduced mobility. There is no evidence of regurgitation. There is severe stenosis. Mitral Valve Mitral valve structure is normal. There is mild regurgitation. There is no evidence of stenosis. Tricuspid Valve Tricuspid valve structure is normal. There is mild regurgitation. There is no evidence of stenosis. Pulmonic Valve Pulmonic valve structure is normal. There is no evidence of regurgitation. There is no evidence of stenosis. Ascending Aorta The aortic root is normal in size. IVC/SVC The inferior vena cava is normal in size. Pericardium There is no pericardial effusion. The pericardium is normal in appearance.      Left Ventricle Measurements    Function/Volumes   A4C EF 69 %         LVOT stroke volume 69.17 cm3         LVOT stroke volume index 42.9 ml/m2 Dimensions   LVIDd 3.7 cm         LVIDS 2.4 cm         IVSd 1.4 cm         LVPWd 1.1 cm         LVOT area 2.83 cm2         FS 35 %         Diastolic Filling   MV E' Tissue Velocity Septal 4 cm/s         E wave deceleration time 349 ms         MV Peak E Vasiliy 64 cm/s         MV Peak A Vasiliy 0.95 m/s          Report Measurements   AV LVOT peak gradient 4 mmHg              Interventricular Septum Measurements    Shunt Ratio   LVOT peak VTI 24.41 cm         LVOT peak vasilyi 1.01 m/s              Right Ventricle Measurements    Dimensions   RVID d 2.6 cm         Tricuspid annular plane systolic excursion 2 cm               Left Atrium Measurements    Dimensions   LA size 3.7 cm         LA length (A2C) 5.5 cm               Right Atrium Measurements    Dimensions   RAA A4C 10.6 cm2               Atrial Septum Measurements    Shunt Ratio   LVOT peak VTI 24.41 cm         LVOT peak vasiliy 1.01 m/s               Aortic Valve Measurements    Stenosis   Aortic valve peak velocity 3.77 m/s         LVOT peak vasiliy 1.01 m/s         Ao VTI 93.24 cm         LVOT peak VTI 24.41 cm         AV mean gradient 36 mmHg         LVOT mn grad 2 mmHg         AV peak gradient 57 mmHg         AV LVOT peak gradient 4 mmHg         Area/Dimensions   DVI 0.27         AV valve area 0.74 cm2         AV area by cont VTI 0.7 cm2         AV area peak vasiliy 0.8 cm2         LVOT diameter 1.9 cm         LVOT area 2.83 cm2               Mitral Valve Measurements    Stenosis   MV stenosis pressure 1/2 time 101 ms         MV valve area p 1/2 method 2.18 cm2               Tricuspid Valve Measurements    RVSP Parameters   TR Peak Vasiliy 2.2 m/s         Triscuspid Valve Regurgitation Peak Gradient 19 mmHg               Aorta Measurements    Aortic Dimensions   Ao root 3.2 cm         Asc Ao 3.4 cm               Gated CTA of the chest/abdomen/pelvis: 8/30/23    VASCULAR STRUCTURES:  Annulus: diameter 28.1 x 22.8 mm  area: 498 sq mm  Annulus to LCA: 14.1 mm  Annulus to RCA: 14.5 mm  Please see series 751 for the relevant images. Minimal diameter right iliofemoral segment: 7.4 x 8.4 mm. This occurs at the level of the mid external iliac. There is mild tortuosity of the iliac system. Minimal diameter left iliofemoral segment: 7.8 x 8.5 mm. This occurs at the level of the distal external iliac. Please see series 451 for the relevant images. The left shows similar tortuosity     The ascending aorta is not aneurysmal measuring 30.3 x 33.7 mm with mild atherosclerosis. There is a type 1 aortic arch with both branching anatomy and no stenosis in the visualized great vessels. The aortic arch is nonaneurysmal with mild    atherosclerosis. The descending thoracic aorta is nonaneurysmal with mild  atherosclerosis. Cardiac findings: There is diffuse calcification of the aortic valve cusps. The left ventricle is normal in size. The ventricular septum is not both. No prior valvular surgery. No prior bypass surgery. No pericardial effusion. No cardiac mass or thrombus. The abdominal aorta is normal in course, caliber, and contour. . The celiac is abnormally ectatic. The proximal celiac measures 5.9 x 6.7 mm. The mid celiac measures 8.6 x 10.1. OTHER FINDINGS:    Solitary lesion of bone at L1. This appears relatively stable     Abnormal soft tissue the mediastinum. This is similar to 9 months ago. Endobronchial process on the right. This is new      Cardiac catheterization: 23    Non-obstructive coronary atherosclerosis. EC23    NSR      Carotid artery ultrasound: 23    RIGHT:  There is <50% stenosis noted in the internal carotid artery. Plaque is  heterogenous and irregular. Vertebral artery flow is antegrade. There is no significant subclavian artery  disease. LEFT:  There is 50-69% stenosis noted in the internal carotid artery. Plaque is  heterogenous and irregular. Vertebral artery flow is antegrade.  There is no significant subclavian artery  disease. NOTE: ICA stenosis may be overestimated secondary to tortuous internal carotid  artery. NM PET Scan: 10/18/23    IMPRESSION:     1. No suspicious focal uptake in the nodular soft tissue densities between the esophagus and mid descending thoracic aorta seen on prior CT examination. 2.  Partial opacification of right lower lobe bronchi posteromedially similar to the prior CT. There is mild uptake in this region, question inflammatory but given persistence this should be reassessed on follow-up CT in 2 to 3 months vs follow up with   bronchoscopy. 3.  Mildly increased uptake in the right hilar region, nonspecific may be reactive. 4.  Fairly extensive activity at the mid to distal sigmoid colon. Questionable wall thickening versus underdistention here on limited CT. Patient does have a history of ulcerative colitis which may explain these findings. 5.  Fairly diffuse radiotracer uptake in the thyroid gland,  nonspecific but can be seen with thyroiditis. I have personally reviewed pertinent films in PACS    TAVR evaluation Assessment:     5 Meter Walk Test:      Attempt 1: 10 sec   Attempt 2: 11 sec   Attempt 3: 11 sec    STS Risk Score: 4.7%    Aortic Stenosis Stage: D3    [facility]: III    [id number] completed        Impression/Plan:    Salvatore Onealy has symptomatic severe aortic stenosis. They have undergone testing for transcatheter aortic valve replacement. The results of these studies have been interpreted by the multidisciplinary TAVR team who have determined the patient to be Intermediate risk for open aortic valve replacement based on other pre-existing comobidities not reflected in the STS risk score. The risks, benefits, and alternatives to TAVR were discussed in detail with the patient today. They understand and wish to proceed with transfemoral transcatheter aortic valve replacement. Informed consent was obtained and a date for the intervention has been set.     Pre-op instructions reviewed with patient and they were instructed to stop now: CoQ10, Vitamin E, Vitamin A and multivitamins and stop Valsartan 3 days before surgery. Salvatore Ladalan was comfortable with our recommendations, and their questions were answered to their satisfaction.        SIGNATURE: ALINA Short  DATE: October 30, 2023  TIME: 11:11 AM

## 2023-10-30 NOTE — H&P
Pre-Op History & Physical - Cardiothoracic Surgery   Clement Wagner 79 y.o. female MRN: [id number]    Physician Requesting Consult: Dr. Reji Ansari    Reason for Consult / Principal Problem: Aortic stenosis, Non-Rheumatic    History of Present Illness: Clement Wagner is a 79y.o. year old female who was previously evaluated in our office by Aki Norton D.O. for transcatheter aortic valve replacement. During this initial consultation, arrangements were made for the following studies to be completed: Gated CTA of the chest/abdomen/pelvis, cardiac catheterization, dental clearance and carotid artery ultrasound. Clement Wagner now presents to review the results of these tests and obtain a second surgeon to confirm the suitability of proceeding with transcatheter aortic valve replacment. CTA c/a/p demonstrated a new endobronchial process for which she was seen by pulmonary medicine, underwent NM PET scan and subsequently cleared from a pulmonary standpoint to proceed with TAVR. She will be evaluated at a later date with elective diagnostic bronchoscopy after recovery from TAVR. In review, Patient's medical history is notable for AS, HTN, HLD, hypothyroidism, pulmonary nodules, anemia, ulcerative colitis, colon polyps, diverticulitis, GERD, Low's esophagus, cholelithiasis and arthritis/DJD s/p b/l TKR's. Patient's Gastroenterologist detected a heart murmur on exam at an office appt in March 2022. Patient was referred for Cardiology consultation and echocardiogram performed in June 2022 demonstrated moderate to severe AS. She has been under surveillnace by Cardiology. She underwent repeat echo in June 2023 that now demonstrates progression to severe AS. Patient is accompanied by her  and daughter today. She admits to SAVAGE with activities such as mopping the floor, climbing stairs and other strenuous activity. She has had a chronic cough for approximately 4 years that is productive for yellow mucus. She has post nasal drip which exacerbates the cough. She also admits to fatigue / decrease in activity tolerance as well as feeling off balance. She states she thinks her fatigue is related to the diet she has to follow for her ulcerative colitis as she has lost muscle mass and stamina. She experiences mild bilateral ankle edema. She denies chest pian, palpitations, lightheadedness or syncope. Patient denies childhood h/o murmur or rheumatic fever. Patient denies tobacco, alcohol or drugs. Patient obtains routine dental care every 6 months- no active issues and up to date. Patient is  and lives independently with her  in a single level home. Past Medical History:  Past Medical History:   Diagnosis Date    Allergic rhinitis 3/21/2023    Anemia     Arthritis     osteoporosis, djd knees, pt denies osteoporosis on 2/21/22    Low's esophagus     Cancer ([address])     on face    Colon polyp     Cough 02/21/2022    cough for 3 years, expectorates yellow mucus    Disease of thyroid gland     low thyroid    Diverticulitis     gerd, diverticulitis    Dizziness     GERD (gastroesophageal reflux disease)     ulcerative colitis; Low's esophagus, diverticulosis; hx of laryngopharyngeal reflux    Hyperlipidemia     Hypertension     Low vitamin D level     LPRD (laryngopharyngeal reflux disease) 02/21/2022    PONV (postoperative nausea and vomiting)     AFTER "BIG SURGERIES"    Postnasal drip     WITH COUGH    Ulcerative colitis [facility])          Past Surgical History:   Past Surgical History:   Procedure Laterality Date    APPENDECTOMY      CARDIAC CATHETERIZATION N/A 9/7/2023    Procedure: Cardiac RHC/LHC; Surgeon: Sandra Kelley MD;  Location: [facility];   Service: Cardiology    CATARACT EXTRACTION Bilateral     COLONOSCOPY      colon polyps    COLONOSCOPY  07/28/2022    FOOT SURGERY Bilateral     bunionectomy    HEMORROIDECTOMY      hemorroid removal    JOINT REPLACEMENT Bilateral knees    REPLACEMENT TOTAL KNEE Bilateral     b/ replacement    TONSILLECTOMY      TUBAL LIGATION      UPPER GASTROINTESTINAL ENDOSCOPY           Family History:  Family History   Problem Relation Age of Onset    Heart disease Father     No Known Problems Sister     No Known Problems Sister     Cancer Daughter         unsure of type of cancer, it was female cancer and hysterectomy done by Dr. Jerri Diaz    No Known Problems Maternal Grandmother     No Known Problems Paternal Grandmother     Heart disease Brother     Cancer Brother 54    Cancer Brother 79    No Known Problems Maternal Aunt     No Known Problems Paternal Aunt          Social History:    Social History     Substance and Sexual Activity   Alcohol Use Not Currently     Social History     Substance and Sexual Activity   Drug Use Never     Social History     Tobacco Use   Smoking Status Never   Smokeless Tobacco Never       Home Medications:   Prior to Admission medications    Medication Sig Start Date End Date Taking?  Authorizing Provider   amLODIPine (NORVASC) 10 mg tablet Take 1 tablet (10 mg total) by mouth daily 5/3/23   Celina Méndez MD   atorvastatin (LIPITOR) 20 mg tablet Take 1 tablet by mouth once daily 8/18/23   Celina Méndez MD   cholecalciferol (VITAMIN D3) 1,000 units tablet Take 1,000 Units by mouth daily    Historical Provider, MD   Coenzyme Q10 (Co Q 10) 100 MG CAPS Take by mouth    Historical Provider, MD   Cyanocobalamin (VITAMIN B 12 PO) Take by mouth    Historical Provider, MD   famotidine (PEPCID) 40 MG tablet TAKE 1 TABLET BY MOUTH ONCE DAILY BEFORE SUPPER  Patient not taking: Reported on 10/16/2023 9/13/23   Andrew Guerra MD   levothyroxine 75 mcg tablet Take 1 tablet by mouth once daily 6/2/23   Celina Méndez MD   Multiple Vitamin (multivitamin) capsule Take 1 capsule by mouth daily    Historical Provider, MD   pantoprazole (PROTONIX) 40 mg tablet TAKE 1 TABLET BY MOUTH TWICE DAILY 30 MINUTES BEFORE MEALS  Patient not taking: Reported on 10/16/2023 10/10/23   Liseth Aguilar PA-C   sulfaSALAzine (AZULFIDINE) 500 mg tablet Take 2 tablets (1,000 mg total) by mouth 2 (two) times a day 10/11/23   Calderon Guerin MD   valsartan (DIOVAN) 80 mg tablet Take 1 tablet by mouth once daily 5/18/23   Grisel Tenorio MD   vitamin A 2250 MCG (7500 UT) capsule Take 7,500 Units by mouth daily    Historical Provider, MD   vitamin E, tocopherol, 1,000 units capsule Take 1,000 Units by mouth daily    Historical Provider, MD       Allergies:   Allergies   Allergen Reactions    Sulfa Antibiotics Itching       Review of Systems:  Review of Systems - History obtained from chart review and the patient  General ROS: positive for  - fatigue and change in activity tolerance  negative for - chills or fever  Psychological ROS: negative  Ophthalmic ROS: positive for - uses glasses  ENT ROS: negative  Allergy and Immunology ROS: negative  Hematological and Lymphatic ROS: negative  Endocrine ROS: negative  Breast ROS: negative  Respiratory ROS: positive for - post nasal drip, chronic cough and expectorating yellow mucus x 4 years  negative for - hemoptysis, orthopnea, pleuritic pain, stridor, tachypnea, or wheezing  Cardiovascular ROS: positive for - dyspnea on exertion, edema, and murmur  negative for - chest pain, irregular heartbeat, loss of consciousness, orthopnea, palpitations, paroxysmal nocturnal dyspnea, or rapid heart rate  Gastrointestinal ROS: no abdominal pain, change in bowel habits, or black or bloody stools  Genito-Urinary ROS: no dysuria, trouble voiding, or hematuria  Musculoskeletal ROS: positive for - gait disturbance  Neurological ROS: positive for - gait disturbance, impaired coordination/balance, and memory loss  Dermatological ROS: negative    Vital Signs:     Vitals:    10/30/23 1053 10/30/23 1055   BP: 139/64 139/67   BP Location: Left arm Right arm   Patient Position: Sitting Sitting   Cuff Size: Standard Standard   Pulse: 69    Temp: 97.6 °F (36.4 °C)    TempSrc: Tympanic    SpO2: 98%    Weight: 65 kg (143 lb 3.2 oz)    Height: 5' 2" (1.575 m)        Physical Exam:    General: Alert, oriented, well developed, no acute distress  HEENT/NECK:  PERRLA. No jugular venous distention. Cardiac:Regular rate and rhythm, II/VI harsh systolic murmur RUSB. Carotids: 1+ pulses, faint bruits vs radiation of murmur  to neck   Pulmonary:  Breath sounds clear bilaterally. Abdomen:  Non-tender, Non-distended. Positive bowel sounds. Upper extremities: 2+ radial pulses; brisk capillary refill  Lower extremities: Extremities warm/dry. PT/DP pulses 2+ bilaterally. 1+ edema B/L  Neuro: Alert and oriented X 3. Sensation is grossly intact. No focal deficits. Musculoskeletal: MAEE, stable gait  Skin: Warm/Dry, without rashes or lesions.     Lab Results:     Lab Results   Component Value Date    WBC 4.96 10/05/2023    HGB 8.3 (L) 10/05/2023    HCT 24.3 (L) 10/05/2023    MCV 99 (H) 10/05/2023     10/05/2023     Lab Results   Component Value Date    SODIUM 127 (L) 10/05/2023    K 4.1 10/05/2023    CL 96 10/05/2023    CO2 25 10/05/2023    AGAP 6 10/05/2023    BUN 15 10/05/2023    CREATININE 0.86 10/05/2023    GLUC 90 10/05/2023    GLUF 86 09/07/2023    CALCIUM 8.7 10/05/2023    AST 19 10/05/2023    ALT 9 10/05/2023    ALKPHOS 71 10/05/2023    TP 6.8 10/05/2023    TBILI 0.48 10/05/2023    EGFR 62 10/05/2023     Lab Results   Component Value Date    CHOLESTEROL 134 09/20/2023    CHOLESTEROL 134 11/09/2022    CHOLESTEROL 189 04/12/2022     Lab Results   Component Value Date    HDL 48 (L) 09/20/2023    HDL 51 11/09/2022    HDL 57 04/12/2022     Lab Results   Component Value Date    TRIG 68 09/20/2023    TRIG 89 11/09/2022    TRIG 64 04/12/2022     Lab Results   Component Value Date    [address] 86 09/20/2023    [address] 83 11/09/2022    [address] 132 04/12/2022           Imaging Studies:     Echocardiogram: 6/1/23       Left Ventricle: Left ventricular cavity size is normal. Wall thickness is moderately increased. The left ventricular ejection fraction is 65%. Systolic function is normal. Wall motion is normal. Diastolic function is mildly abnormal, consistent with grade I (abnormal) relaxation. Aortic Valve: The aortic valve is trileaflet. The leaflets are not thickened. The leaflets are moderately calcified. There is moderately reduced mobility. There is severe stenosis. Mitral Valve: There is mild regurgitation. Tricuspid Valve: There is mild regurgitation. Findings    Left Ventricle Left ventricular cavity size is normal. Wall thickness is moderately increased. The left ventricular ejection fraction is 65%. Systolic function is normal.  Wall motion is normal. Diastolic function is mildly abnormal, consistent with grade I (abnormal) relaxation. Right Ventricle Right ventricular cavity size is normal. Systolic function is normal. Wall thickness is normal.   Left Atrium The atrium is normal in size. Right Atrium The atrium is normal in size. Aortic Valve The aortic valve is trileaflet. The leaflets are not thickened. The leaflets are moderately calcified. There is moderately reduced mobility. There is no evidence of regurgitation. There is severe stenosis. Mitral Valve Mitral valve structure is normal. There is mild regurgitation. There is no evidence of stenosis. Tricuspid Valve Tricuspid valve structure is normal. There is mild regurgitation. There is no evidence of stenosis. Pulmonic Valve Pulmonic valve structure is normal. There is no evidence of regurgitation. There is no evidence of stenosis. Ascending Aorta The aortic root is normal in size. IVC/SVC The inferior vena cava is normal in size. Pericardium There is no pericardial effusion. The pericardium is normal in appearance.      Left Ventricle Measurements    Function/Volumes   A4C EF 69 %         LVOT stroke volume 69.17 cm3         LVOT stroke volume index 42.9 ml/m2 Dimensions   LVIDd 3.7 cm         LVIDS 2.4 cm         IVSd 1.4 cm         LVPWd 1.1 cm         LVOT area 2.83 cm2         FS 35 %         Diastolic Filling   MV E' Tissue Velocity Septal 4 cm/s         E wave deceleration time 349 ms         MV Peak E Vasiliy 64 cm/s         MV Peak A Vasiliy 0.95 m/s          Report Measurements   AV LVOT peak gradient 4 mmHg              Interventricular Septum Measurements    Shunt Ratio   LVOT peak VTI 24.41 cm         LVOT peak vasiliy 1.01 m/s              Right Ventricle Measurements    Dimensions   RVID d 2.6 cm         Tricuspid annular plane systolic excursion 2 cm               Left Atrium Measurements    Dimensions   LA size 3.7 cm         LA length (A2C) 5.5 cm               Right Atrium Measurements    Dimensions   RAA A4C 10.6 cm2               Atrial Septum Measurements    Shunt Ratio   LVOT peak VTI 24.41 cm         LVOT peak vasiliy 1.01 m/s               Aortic Valve Measurements    Stenosis   Aortic valve peak velocity 3.77 m/s         LVOT peak vasiliy 1.01 m/s         Ao VTI 93.24 cm         LVOT peak VTI 24.41 cm         AV mean gradient 36 mmHg         LVOT mn grad 2 mmHg         AV peak gradient 57 mmHg         AV LVOT peak gradient 4 mmHg         Area/Dimensions   DVI 0.27         AV valve area 0.74 cm2         AV area by cont VTI 0.7 cm2         AV area peak vasiliy 0.8 cm2         LVOT diameter 1.9 cm         LVOT area 2.83 cm2               Mitral Valve Measurements    Stenosis   MV stenosis pressure 1/2 time 101 ms         MV valve area p 1/2 method 2.18 cm2               Tricuspid Valve Measurements    RVSP Parameters   TR Peak Vasiliy 2.2 m/s         Triscuspid Valve Regurgitation Peak Gradient 19 mmHg               Aorta Measurements    Aortic Dimensions   Ao root 3.2 cm         Asc Ao 3.4 cm               Gated CTA of the chest/abdomen/pelvis: 8/30/23    VASCULAR STRUCTURES:  Annulus: diameter 28.1 x 22.8 mm  area: 498 sq mm  Annulus to LCA: 14.1 mm  Annulus to RCA: 14.5 mm  Please see series 751 for the relevant images. Minimal diameter right iliofemoral segment: 7.4 x 8.4 mm. This occurs at the level of the mid external iliac. There is mild tortuosity of the iliac system. Minimal diameter left iliofemoral segment: 7.8 x 8.5 mm. This occurs at the level of the distal external iliac. Please see series 451 for the relevant images. The left shows similar tortuosity     The ascending aorta is not aneurysmal measuring 30.3 x 33.7 mm with mild atherosclerosis. There is a type 1 aortic arch with both branching anatomy and no stenosis in the visualized great vessels. The aortic arch is nonaneurysmal with mild    atherosclerosis. The descending thoracic aorta is nonaneurysmal with mild  atherosclerosis. Cardiac findings: There is diffuse calcification of the aortic valve cusps. The left ventricle is normal in size. The ventricular septum is not both. No prior valvular surgery. No prior bypass surgery. No pericardial effusion. No cardiac mass or thrombus. The abdominal aorta is normal in course, caliber, and contour. . The celiac is abnormally ectatic. The proximal celiac measures 5.9 x 6.7 mm. The mid celiac measures 8.6 x 10.1. OTHER FINDINGS:    Solitary lesion of bone at L1. This appears relatively stable     Abnormal soft tissue the mediastinum. This is similar to 9 months ago. Endobronchial process on the right. This is new      Cardiac catheterization: 23    Non-obstructive coronary atherosclerosis. EC23    NSR      Carotid artery ultrasound: 23    RIGHT:  There is <50% stenosis noted in the internal carotid artery. Plaque is  heterogenous and irregular. Vertebral artery flow is antegrade. There is no significant subclavian artery  disease. LEFT:  There is 50-69% stenosis noted in the internal carotid artery. Plaque is  heterogenous and irregular. Vertebral artery flow is antegrade.  There is no significant subclavian artery  disease. NOTE: ICA stenosis may be overestimated secondary to tortuous internal carotid  artery. NM PET Scan: 10/18/23    IMPRESSION:     1. No suspicious focal uptake in the nodular soft tissue densities between the esophagus and mid descending thoracic aorta seen on prior CT examination. 2.  Partial opacification of right lower lobe bronchi posteromedially similar to the prior CT. There is mild uptake in this region, question inflammatory but given persistence this should be reassessed on follow-up CT in 2 to 3 months vs follow up with   bronchoscopy. 3.  Mildly increased uptake in the right hilar region, nonspecific may be reactive. 4.  Fairly extensive activity at the mid to distal sigmoid colon. Questionable wall thickening versus underdistention here on limited CT. Patient does have a history of ulcerative colitis which may explain these findings. 5.  Fairly diffuse radiotracer uptake in the thyroid gland,  nonspecific but can be seen with thyroiditis. I have personally reviewed pertinent films in PACS    TAVR evaluation Assessment:     5 Meter Walk Test:      Attempt 1: 10 sec   Attempt 2: 11 sec   Attempt 3: 11 sec    STS Risk Score: 4.7%    Aortic Stenosis Stage: D3    [facility]: III    WTIL-30 completed        Impression/Plan:    Maritza Cervantes has symptomatic severe aortic stenosis. They have undergone testing for transcatheter aortic valve replacement. The results of these studies have been interpreted by the multidisciplinary TAVR team who have determined the patient to be Intermediate risk for open aortic valve replacement based on other pre-existing comobidities not reflected in the STS risk score. The risks, benefits, and alternatives to TAVR were discussed in detail with the patient today. They understand and wish to proceed with transfemoral transcatheter aortic valve replacement. Informed consent was obtained and a date for the intervention has been set.     Pre-op instructions reviewed with patient and they were instructed to stop now: CoQ10, Vitamin E, Vitamin A and multivitamins and stop Valsartan 3 days before surgery. Esther Delgado was comfortable with our recommendations, and their questions were answered to their satisfaction.        SIGNATURE: ALINA Spence  DATE: October 30, 2023  TIME: 11:11 AM

## 2023-10-30 NOTE — H&P (VIEW-ONLY)
Pre-Op History & Physical - Cardiothoracic Surgery   Judith Egan 79 y.o. female MRN: [id number]    Physician Requesting Consult: Dr. Annmarie Esposito    Reason for Consult / Principal Problem: Aortic stenosis, Non-Rheumatic    History of Present Illness: Judith Egan is a 79y.o. year old female who was previously evaluated in our office by Paola Greer D.O. for transcatheter aortic valve replacement. During this initial consultation, arrangements were made for the following studies to be completed: Gated CTA of the chest/abdomen/pelvis, cardiac catheterization, dental clearance and carotid artery ultrasound. Judith Egan now presents to review the results of these tests and obtain a second surgeon to confirm the suitability of proceeding with transcatheter aortic valve replacment. CTA c/a/p demonstrated a new endobronchial process for which she was seen by pulmonary medicine, underwent NM PET scan and subsequently cleared from a pulmonary standpoint to proceed with TAVR. She will be evaluated at a later date with elective diagnostic bronchoscopy after recovery from TAVR. In review, Patient's medical history is notable for AS, HTN, HLD, hypothyroidism, pulmonary nodules, anemia, ulcerative colitis, colon polyps, diverticulitis, GERD, Low's esophagus, cholelithiasis and arthritis/DJD s/p b/l TKR's. Patient's Gastroenterologist detected a heart murmur on exam at an office appt in March 2022. Patient was referred for Cardiology consultation and echocardiogram performed in June 2022 demonstrated moderate to severe AS. She has been under surveillnace by Cardiology. She underwent repeat echo in June 2023 that now demonstrates progression to severe AS. Patient is accompanied by her  and daughter today. She admits to SAVAGE with activities such as mopping the floor, climbing stairs and other strenuous activity. She has had a chronic cough for approximately 4 years that is productive for yellow mucus. She has post nasal drip which exacerbates the cough. She also admits to fatigue / decrease in activity tolerance as well as feeling off balance. She states she thinks her fatigue is related to the diet she has to follow for her ulcerative colitis as she has lost muscle mass and stamina. She experiences mild bilateral ankle edema. She denies chest pian, palpitations, lightheadedness or syncope. Patient denies childhood h/o murmur or rheumatic fever. Patient denies tobacco, alcohol or drugs. Patient obtains routine dental care every 6 months- no active issues and up to date. Patient is  and lives independently with her  in a single level home. Past Medical History:  Past Medical History:   Diagnosis Date    Allergic rhinitis 3/21/2023    Anemia     Arthritis     osteoporosis, djd knees, pt denies osteoporosis on 2/21/22    Low's esophagus     Cancer ([address])     on face    Colon polyp     Cough 02/21/2022    cough for 3 years, expectorates yellow mucus    Disease of thyroid gland     low thyroid    Diverticulitis     gerd, diverticulitis    Dizziness     GERD (gastroesophageal reflux disease)     ulcerative colitis; Low's esophagus, diverticulosis; hx of laryngopharyngeal reflux    Hyperlipidemia     Hypertension     Low vitamin D level     LPRD (laryngopharyngeal reflux disease) 02/21/2022    PONV (postoperative nausea and vomiting)     AFTER "BIG SURGERIES"    Postnasal drip     WITH COUGH    Ulcerative colitis [facility])          Past Surgical History:   Past Surgical History:   Procedure Laterality Date    APPENDECTOMY      CARDIAC CATHETERIZATION N/A 9/7/2023    Procedure: Cardiac RHC/LHC; Surgeon: Kyara Lee MD;  Location: [facility];   Service: Cardiology    CATARACT EXTRACTION Bilateral     COLONOSCOPY      colon polyps    COLONOSCOPY  07/28/2022    FOOT SURGERY Bilateral     bunionectomy    HEMORROIDECTOMY      hemorroid removal    JOINT REPLACEMENT Bilateral knees    REPLACEMENT TOTAL KNEE Bilateral     b/ replacement    TONSILLECTOMY      TUBAL LIGATION      UPPER GASTROINTESTINAL ENDOSCOPY           Family History:  Family History   Problem Relation Age of Onset    Heart disease Father     No Known Problems Sister     No Known Problems Sister     Cancer Daughter         unsure of type of cancer, it was female cancer and hysterectomy done by Dr. Vicente Madrid    No Known Problems Maternal Grandmother     No Known Problems Paternal Grandmother     Heart disease Brother     Cancer Brother 54    Cancer Brother 79    No Known Problems Maternal Aunt     No Known Problems Paternal Aunt          Social History:    Social History     Substance and Sexual Activity   Alcohol Use Not Currently     Social History     Substance and Sexual Activity   Drug Use Never     Social History     Tobacco Use   Smoking Status Never   Smokeless Tobacco Never       Home Medications:   Prior to Admission medications    Medication Sig Start Date End Date Taking?  Authorizing Provider   amLODIPine (NORVASC) 10 mg tablet Take 1 tablet (10 mg total) by mouth daily 5/3/23   Mathieu Delcid MD   atorvastatin (LIPITOR) 20 mg tablet Take 1 tablet by mouth once daily 8/18/23   Mathieu Delcid MD   cholecalciferol (VITAMIN D3) 1,000 units tablet Take 1,000 Units by mouth daily    Historical Provider, MD   Coenzyme Q10 (Co Q 10) 100 MG CAPS Take by mouth    Historical Provider, MD   Cyanocobalamin (VITAMIN B 12 PO) Take by mouth    Historical Provider, MD   famotidine (PEPCID) 40 MG tablet TAKE 1 TABLET BY MOUTH ONCE DAILY BEFORE SUPPER  Patient not taking: Reported on 10/16/2023 9/13/23   Horacio Ray MD   levothyroxine 75 mcg tablet Take 1 tablet by mouth once daily 6/2/23   Mathieu Delcid MD   Multiple Vitamin (multivitamin) capsule Take 1 capsule by mouth daily    Historical Provider, MD   pantoprazole (PROTONIX) 40 mg tablet TAKE 1 TABLET BY MOUTH TWICE DAILY 30 MINUTES BEFORE MEALS  Patient not taking: Reported on 10/16/2023 10/10/23   Rony Aguilar PA-C   sulfaSALAzine (AZULFIDINE) 500 mg tablet Take 2 tablets (1,000 mg total) by mouth 2 (two) times a day 10/11/23   Vel Larsen MD   valsartan (DIOVAN) 80 mg tablet Take 1 tablet by mouth once daily 5/18/23   Elder Hassan MD   vitamin A 2250 MCG (7500 UT) capsule Take 7,500 Units by mouth daily    Historical Provider, MD   vitamin E, tocopherol, 1,000 units capsule Take 1,000 Units by mouth daily    Historical Provider, MD       Allergies:   Allergies   Allergen Reactions    Sulfa Antibiotics Itching       Review of Systems:  Review of Systems - History obtained from chart review and the patient  General ROS: positive for  - fatigue and change in activity tolerance  negative for - chills or fever  Psychological ROS: negative  Ophthalmic ROS: positive for - uses glasses  ENT ROS: negative  Allergy and Immunology ROS: negative  Hematological and Lymphatic ROS: negative  Endocrine ROS: negative  Breast ROS: negative  Respiratory ROS: positive for - post nasal drip, chronic cough and expectorating yellow mucus x 4 years  negative for - hemoptysis, orthopnea, pleuritic pain, stridor, tachypnea, or wheezing  Cardiovascular ROS: positive for - dyspnea on exertion, edema, and murmur  negative for - chest pain, irregular heartbeat, loss of consciousness, orthopnea, palpitations, paroxysmal nocturnal dyspnea, or rapid heart rate  Gastrointestinal ROS: no abdominal pain, change in bowel habits, or black or bloody stools  Genito-Urinary ROS: no dysuria, trouble voiding, or hematuria  Musculoskeletal ROS: positive for - gait disturbance  Neurological ROS: positive for - gait disturbance, impaired coordination/balance, and memory loss  Dermatological ROS: negative    Vital Signs:     Vitals:    10/30/23 1053 10/30/23 1055   BP: 139/64 139/67   BP Location: Left arm Right arm   Patient Position: Sitting Sitting   Cuff Size: Standard Standard   Pulse: 69    Temp: 97.6 °F (36.4 °C)    TempSrc: Tympanic    SpO2: 98%    Weight: 65 kg (143 lb 3.2 oz)    Height: 5' 2" (1.575 m)        Physical Exam:    General: Alert, oriented, well developed, no acute distress  HEENT/NECK:  PERRLA. No jugular venous distention. Cardiac:Regular rate and rhythm, II/VI harsh systolic murmur RUSB. Carotids: 1+ pulses, faint bruits vs radiation of murmur  to neck   Pulmonary:  Breath sounds clear bilaterally. Abdomen:  Non-tender, Non-distended. Positive bowel sounds. Upper extremities: 2+ radial pulses; brisk capillary refill  Lower extremities: Extremities warm/dry. PT/DP pulses 2+ bilaterally. 1+ edema B/L  Neuro: Alert and oriented X 3. Sensation is grossly intact. No focal deficits. Musculoskeletal: MAEE, stable gait  Skin: Warm/Dry, without rashes or lesions.     Lab Results:     Lab Results   Component Value Date    WBC 4.96 10/05/2023    HGB 8.3 (L) 10/05/2023    HCT 24.3 (L) 10/05/2023    MCV 99 (H) 10/05/2023     10/05/2023     Lab Results   Component Value Date    SODIUM 127 (L) 10/05/2023    K 4.1 10/05/2023    CL 96 10/05/2023    CO2 25 10/05/2023    AGAP 6 10/05/2023    BUN 15 10/05/2023    CREATININE 0.86 10/05/2023    GLUC 90 10/05/2023    GLUF 86 09/07/2023    CALCIUM 8.7 10/05/2023    AST 19 10/05/2023    ALT 9 10/05/2023    ALKPHOS 71 10/05/2023    TP 6.8 10/05/2023    TBILI 0.48 10/05/2023    EGFR 62 10/05/2023     Lab Results   Component Value Date    CHOLESTEROL 134 09/20/2023    CHOLESTEROL 134 11/09/2022    CHOLESTEROL 189 04/12/2022     Lab Results   Component Value Date    HDL 48 (L) 09/20/2023    HDL 51 11/09/2022    HDL 57 04/12/2022     Lab Results   Component Value Date    TRIG 68 09/20/2023    TRIG 89 11/09/2022    TRIG 64 04/12/2022     Lab Results   Component Value Date    [address] 86 09/20/2023    [address] 83 11/09/2022    [address] 132 04/12/2022           Imaging Studies:     Echocardiogram: 6/1/23       Left Ventricle: Left ventricular cavity size is normal. Wall thickness is moderately increased. The left ventricular ejection fraction is 65%. Systolic function is normal. Wall motion is normal. Diastolic function is mildly abnormal, consistent with grade I (abnormal) relaxation. Aortic Valve: The aortic valve is trileaflet. The leaflets are not thickened. The leaflets are moderately calcified. There is moderately reduced mobility. There is severe stenosis. Mitral Valve: There is mild regurgitation. Tricuspid Valve: There is mild regurgitation. Findings    Left Ventricle Left ventricular cavity size is normal. Wall thickness is moderately increased. The left ventricular ejection fraction is 65%. Systolic function is normal.  Wall motion is normal. Diastolic function is mildly abnormal, consistent with grade I (abnormal) relaxation. Right Ventricle Right ventricular cavity size is normal. Systolic function is normal. Wall thickness is normal.   Left Atrium The atrium is normal in size. Right Atrium The atrium is normal in size. Aortic Valve The aortic valve is trileaflet. The leaflets are not thickened. The leaflets are moderately calcified. There is moderately reduced mobility. There is no evidence of regurgitation. There is severe stenosis. Mitral Valve Mitral valve structure is normal. There is mild regurgitation. There is no evidence of stenosis. Tricuspid Valve Tricuspid valve structure is normal. There is mild regurgitation. There is no evidence of stenosis. Pulmonic Valve Pulmonic valve structure is normal. There is no evidence of regurgitation. There is no evidence of stenosis. Ascending Aorta The aortic root is normal in size. IVC/SVC The inferior vena cava is normal in size. Pericardium There is no pericardial effusion. The pericardium is normal in appearance.      Left Ventricle Measurements    Function/Volumes   A4C EF 69 %         LVOT stroke volume 69.17 cm3         LVOT stroke volume index 42.9 ml/m2 Dimensions   LVIDd 3.7 cm         LVIDS 2.4 cm         IVSd 1.4 cm         LVPWd 1.1 cm         LVOT area 2.83 cm2         FS 35 %         Diastolic Filling   MV E' Tissue Velocity Septal 4 cm/s         E wave deceleration time 349 ms         MV Peak E Vasiliy 64 cm/s         MV Peak A Vasiliy 0.95 m/s          Report Measurements   AV LVOT peak gradient 4 mmHg              Interventricular Septum Measurements    Shunt Ratio   LVOT peak VTI 24.41 cm         LVOT peak vasiliy 1.01 m/s              Right Ventricle Measurements    Dimensions   RVID d 2.6 cm         Tricuspid annular plane systolic excursion 2 cm               Left Atrium Measurements    Dimensions   LA size 3.7 cm         LA length (A2C) 5.5 cm               Right Atrium Measurements    Dimensions   RAA A4C 10.6 cm2               Atrial Septum Measurements    Shunt Ratio   LVOT peak VTI 24.41 cm         LVOT peak vasiliy 1.01 m/s               Aortic Valve Measurements    Stenosis   Aortic valve peak velocity 3.77 m/s         LVOT peak vasiliy 1.01 m/s         Ao VTI 93.24 cm         LVOT peak VTI 24.41 cm         AV mean gradient 36 mmHg         LVOT mn grad 2 mmHg         AV peak gradient 57 mmHg         AV LVOT peak gradient 4 mmHg         Area/Dimensions   DVI 0.27         AV valve area 0.74 cm2         AV area by cont VTI 0.7 cm2         AV area peak vasiliy 0.8 cm2         LVOT diameter 1.9 cm         LVOT area 2.83 cm2               Mitral Valve Measurements    Stenosis   MV stenosis pressure 1/2 time 101 ms         MV valve area p 1/2 method 2.18 cm2               Tricuspid Valve Measurements    RVSP Parameters   TR Peak Vasiliy 2.2 m/s         Triscuspid Valve Regurgitation Peak Gradient 19 mmHg               Aorta Measurements    Aortic Dimensions   Ao root 3.2 cm         Asc Ao 3.4 cm               Gated CTA of the chest/abdomen/pelvis: 8/30/23    VASCULAR STRUCTURES:  Annulus: diameter 28.1 x 22.8 mm  area: 498 sq mm  Annulus to LCA: 14.1 mm  Annulus to RCA: 14.5 mm  Please see series 751 for the relevant images. Minimal diameter right iliofemoral segment: 7.4 x 8.4 mm. This occurs at the level of the mid external iliac. There is mild tortuosity of the iliac system. Minimal diameter left iliofemoral segment: 7.8 x 8.5 mm. This occurs at the level of the distal external iliac. Please see series 451 for the relevant images. The left shows similar tortuosity     The ascending aorta is not aneurysmal measuring 30.3 x 33.7 mm with mild atherosclerosis. There is a type 1 aortic arch with both branching anatomy and no stenosis in the visualized great vessels. The aortic arch is nonaneurysmal with mild    atherosclerosis. The descending thoracic aorta is nonaneurysmal with mild  atherosclerosis. Cardiac findings: There is diffuse calcification of the aortic valve cusps. The left ventricle is normal in size. The ventricular septum is not both. No prior valvular surgery. No prior bypass surgery. No pericardial effusion. No cardiac mass or thrombus. The abdominal aorta is normal in course, caliber, and contour. . The celiac is abnormally ectatic. The proximal celiac measures 5.9 x 6.7 mm. The mid celiac measures 8.6 x 10.1. OTHER FINDINGS:    Solitary lesion of bone at L1. This appears relatively stable     Abnormal soft tissue the mediastinum. This is similar to 9 months ago. Endobronchial process on the right. This is new      Cardiac catheterization: 23    Non-obstructive coronary atherosclerosis. EC23    NSR      Carotid artery ultrasound: 23    RIGHT:  There is <50% stenosis noted in the internal carotid artery. Plaque is  heterogenous and irregular. Vertebral artery flow is antegrade. There is no significant subclavian artery  disease. LEFT:  There is 50-69% stenosis noted in the internal carotid artery. Plaque is  heterogenous and irregular. Vertebral artery flow is antegrade.  There is no significant subclavian artery  disease. NOTE: ICA stenosis may be overestimated secondary to tortuous internal carotid  artery. NM PET Scan: 10/18/23    IMPRESSION:     1. No suspicious focal uptake in the nodular soft tissue densities between the esophagus and mid descending thoracic aorta seen on prior CT examination. 2.  Partial opacification of right lower lobe bronchi posteromedially similar to the prior CT. There is mild uptake in this region, question inflammatory but given persistence this should be reassessed on follow-up CT in 2 to 3 months vs follow up with   bronchoscopy. 3.  Mildly increased uptake in the right hilar region, nonspecific may be reactive. 4.  Fairly extensive activity at the mid to distal sigmoid colon. Questionable wall thickening versus underdistention here on limited CT. Patient does have a history of ulcerative colitis which may explain these findings. 5.  Fairly diffuse radiotracer uptake in the thyroid gland,  nonspecific but can be seen with thyroiditis. I have personally reviewed pertinent films in PACS    TAVR evaluation Assessment:     5 Meter Walk Test:      Attempt 1: 10 sec   Attempt 2: 11 sec   Attempt 3: 11 sec    STS Risk Score: 4.7%    Aortic Stenosis Stage: D3    [facility]: III    [id number] completed        Impression/Plan:    Meme Gabriel has symptomatic severe aortic stenosis. They have undergone testing for transcatheter aortic valve replacement. The results of these studies have been interpreted by the multidisciplinary TAVR team who have determined the patient to be Intermediate risk for open aortic valve replacement based on other pre-existing comobidities not reflected in the STS risk score. The risks, benefits, and alternatives to TAVR were discussed in detail with the patient today. They understand and wish to proceed with transfemoral transcatheter aortic valve replacement. Informed consent was obtained and a date for the intervention has been set.     Pre-op instructions reviewed with patient and they were instructed to stop now: CoQ10, Vitamin E, Vitamin A and multivitamins and stop Valsartan 3 days before surgery. Jhon Callejas was comfortable with our recommendations, and their questions were answered to their satisfaction.        SIGNATURE: ALINA Choi  DATE: October 30, 2023  TIME: 11:11 AM

## 2023-11-01 ENCOUNTER — APPOINTMENT (OUTPATIENT)
Dept: LAB | Facility: CLINIC | Age: 83
End: 2023-11-01
Payer: MEDICARE

## 2023-11-01 ENCOUNTER — LAB REQUISITION (OUTPATIENT)
Dept: LAB | Facility: HOSPITAL | Age: 83
End: 2023-11-01
Payer: MEDICARE

## 2023-11-01 DIAGNOSIS — D50.0 IRON DEFICIENCY ANEMIA DUE TO CHRONIC BLOOD LOSS: ICD-10-CM

## 2023-11-01 DIAGNOSIS — I35.0 AORTIC STENOSIS, SEVERE: ICD-10-CM

## 2023-11-01 DIAGNOSIS — I35.0 NONRHEUMATIC AORTIC (VALVE) STENOSIS: ICD-10-CM

## 2023-11-01 DIAGNOSIS — D53.9 MACROCYTIC ANEMIA: ICD-10-CM

## 2023-11-01 DIAGNOSIS — E53.8 B12 DEFICIENCY: ICD-10-CM

## 2023-11-01 LAB
ABO GROUP BLD: NORMAL
ALBUMIN SERPL BCP-MCNC: 3.9 G/DL (ref 3.5–5)
ALP SERPL-CCNC: 77 U/L (ref 34–104)
ALT SERPL W P-5'-P-CCNC: 8 U/L (ref 7–52)
ANION GAP SERPL CALCULATED.3IONS-SCNC: 4 MMOL/L
AST SERPL W P-5'-P-CCNC: 18 U/L (ref 13–39)
BILIRUB SERPL-MCNC: 0.47 MG/DL (ref 0.2–1)
BLD GP AB SCN SERPL QL: NEGATIVE
BUN SERPL-MCNC: 13 MG/DL (ref 5–25)
CALCIUM SERPL-MCNC: 9.1 MG/DL (ref 8.4–10.2)
CHLORIDE SERPL-SCNC: 104 MMOL/L (ref 96–108)
CO2 SERPL-SCNC: 27 MMOL/L (ref 21–32)
CREAT SERPL-MCNC: 0.77 MG/DL (ref 0.6–1.3)
ERYTHROCYTE [DISTWIDTH] IN BLOOD BY AUTOMATED COUNT: 15.1 % (ref 11.6–15.1)
GFR SERPL CREATININE-BSD FRML MDRD: 71 ML/MIN/1.73SQ M
GLUCOSE P FAST SERPL-MCNC: 90 MG/DL (ref 65–99)
HCT VFR BLD AUTO: 27.7 % (ref 34.8–46.1)
HGB BLD-MCNC: 9 G/DL (ref 11.5–15.4)
INR PPP: 0.99 (ref 0.84–1.19)
MCH RBC QN AUTO: 33.6 PG (ref 26.8–34.3)
MCHC RBC AUTO-ENTMCNC: 32.5 G/DL (ref 31.4–37.4)
MCV RBC AUTO: 103 FL (ref 82–98)
PLATELET # BLD AUTO: 243 THOUSANDS/UL (ref 149–390)
PMV BLD AUTO: 9.4 FL (ref 8.9–12.7)
POTASSIUM SERPL-SCNC: 4 MMOL/L (ref 3.5–5.3)
PROT SERPL-MCNC: 6.7 G/DL (ref 6.4–8.4)
PROTHROMBIN TIME: 13.7 SECONDS (ref 11.6–14.5)
RBC # BLD AUTO: 2.68 MILLION/UL (ref 3.81–5.12)
RH BLD: POSITIVE
SODIUM SERPL-SCNC: 135 MMOL/L (ref 135–147)
SPECIMEN EXPIRATION DATE: NORMAL
WBC # BLD AUTO: 5.34 THOUSAND/UL (ref 4.31–10.16)

## 2023-11-01 PROCEDURE — 86850 RBC ANTIBODY SCREEN: CPT | Performed by: NURSE PRACTITIONER

## 2023-11-01 PROCEDURE — 85610 PROTHROMBIN TIME: CPT

## 2023-11-01 PROCEDURE — 86900 BLOOD TYPING SEROLOGIC ABO: CPT | Performed by: NURSE PRACTITIONER

## 2023-11-01 PROCEDURE — 80053 COMPREHEN METABOLIC PANEL: CPT

## 2023-11-01 PROCEDURE — 87081 CULTURE SCREEN ONLY: CPT

## 2023-11-01 PROCEDURE — 85027 COMPLETE CBC AUTOMATED: CPT

## 2023-11-01 PROCEDURE — 86901 BLOOD TYPING SEROLOGIC RH(D): CPT | Performed by: NURSE PRACTITIONER

## 2023-11-01 PROCEDURE — 36415 COLL VENOUS BLD VENIPUNCTURE: CPT

## 2023-11-02 LAB — MRSA NOSE QL CULT: NORMAL

## 2023-11-06 ENCOUNTER — ANESTHESIA EVENT (OUTPATIENT)
Dept: PERIOP | Facility: HOSPITAL | Age: 83
DRG: 267 | End: 2023-11-06
Payer: MEDICARE

## 2023-11-06 RX ORDER — PHENYLEPHRINE HCL IN 0.9% NACL 1 MG/10 ML
200-2000 SYRINGE (ML) INTRAVENOUS ONCE
Status: CANCELLED | OUTPATIENT
Start: 2023-11-07

## 2023-11-06 NOTE — ANESTHESIA PREPROCEDURE EVALUATION
Procedure:  REPLACEMENT AORTIC VALVE TRANSCATHETER (TAVR) TRANSFEMORAL W/ 26MM BUCKNER DOROTHEA S3 UR VALVE(ACCESS ON LEFT) GIOVANNY (Chest)  Cardiac tavr (Chest)    Relevant Problems   CARDIO   (+) Aortic stenosis, severe   (+) Essential hypertension   (+) Hypercholesterolemia      ENDO   (+) Hypothyroidism      GI/HEPATIC   (+) Gastroesophageal reflux disease with esophagitis   (+) Pharyngoesophageal dysphagia      /RENAL   (+) Stage 3 chronic kidney disease, unspecified whether stage 3a or 3b CKD (HCC)      HEMATOLOGY   (+) Anemia      PULMONARY   (+) Cough variant asthma       Digestive   (+) Low's esophagus   (+) Ulcerative colitis without complications (HCC)      Findings    Left Ventricle Left ventricular cavity size is normal. Wall thickness is moderately increased. The left ventricular ejection fraction is 65%. Systolic function is normal.  Wall motion is normal. Diastolic function is mildly abnormal, consistent with grade I (abnormal) relaxation. Right Ventricle Right ventricular cavity size is normal. Systolic function is normal. Wall thickness is normal.   Left Atrium The atrium is normal in size. Right Atrium The atrium is normal in size. Aortic Valve The aortic valve is trileaflet. The leaflets are not thickened. The leaflets are moderately calcified. There is moderately reduced mobility. There is no evidence of regurgitation. There is severe stenosis. Mitral Valve Mitral valve structure is normal. There is mild regurgitation. There is no evidence of stenosis. Tricuspid Valve Tricuspid valve structure is normal. There is mild regurgitation. There is no evidence of stenosis. Pulmonic Valve Pulmonic valve structure is normal. There is no evidence of regurgitation. There is no evidence of stenosis. Ascending Aorta The aortic root is normal in size. IVC/SVC The inferior vena cava is normal in size. Pericardium There is no pericardial effusion. The pericardium is normal in appearance. Normal sinus rhythm  Normal ECG  When compared with ECG of 04-FEB-2023 16:15,  No significant change was found    Nonrheumatic aortic (valve) stenosis [I35.0 (ICD-10-CM)]     Impression    Non-obstructive coronary atherosclerosis. Plan: TAVR  Physical Exam    Airway    Mallampati score: III  TM Distance: >3 FB  Neck ROM: full     Dental   No notable dental hx     Cardiovascular  Rhythm: regular, Rate: normal, Murmur    Pulmonary  Pulmonary exam normal Breath sounds clear to auscultation    Other Findings        Anesthesia Plan  ASA Score- 4     Anesthesia Type- general with ASA Monitors. Additional Monitors: arterial line and central venous line. Airway Plan: ETT. Comment: GIOVANNY. Plan Factors-Exercise tolerance (METS): <4 METS. Chart reviewed. EKG reviewed. Existing labs reviewed. Patient summary reviewed. Patient is not a current smoker. Induction- intravenous. Postoperative Plan- Plan for postoperative opioid use. Planned trial extubation    Informed Consent- Anesthetic plan and risks discussed with patient. I personally reviewed this patient with the CRNA. Discussed and agreed on the Anesthesia Plan with the CRNA. Nubia Coronel

## 2023-11-07 ENCOUNTER — APPOINTMENT (INPATIENT)
Dept: RADIOLOGY | Facility: HOSPITAL | Age: 83
DRG: 267 | End: 2023-11-07
Payer: MEDICARE

## 2023-11-07 ENCOUNTER — APPOINTMENT (OUTPATIENT)
Dept: NON INVASIVE DIAGNOSTICS | Facility: HOSPITAL | Age: 83
DRG: 267 | End: 2023-11-07
Payer: MEDICARE

## 2023-11-07 ENCOUNTER — ANESTHESIA (OUTPATIENT)
Dept: PERIOP | Facility: HOSPITAL | Age: 83
DRG: 267 | End: 2023-11-07
Payer: MEDICARE

## 2023-11-07 ENCOUNTER — TELEPHONE (OUTPATIENT)
Age: 83
End: 2023-11-07

## 2023-11-07 ENCOUNTER — HOSPITAL ENCOUNTER (INPATIENT)
Facility: HOSPITAL | Age: 83
LOS: 1 days | Discharge: HOME HEALTH | DRG: 267 | End: 2023-11-08
Attending: THORACIC SURGERY (CARDIOTHORACIC VASCULAR SURGERY) | Admitting: THORACIC SURGERY (CARDIOTHORACIC VASCULAR SURGERY)
Payer: MEDICARE

## 2023-11-07 DIAGNOSIS — E78.00 HYPERCHOLESTEROLEMIA: ICD-10-CM

## 2023-11-07 DIAGNOSIS — Z95.2 S/P TAVR (TRANSCATHETER AORTIC VALVE REPLACEMENT): ICD-10-CM

## 2023-11-07 DIAGNOSIS — I35.9 NONRHEUMATIC AORTIC VALVE DISORDER: ICD-10-CM

## 2023-11-07 DIAGNOSIS — I10 ESSENTIAL HYPERTENSION: ICD-10-CM

## 2023-11-07 DIAGNOSIS — N18.30 STAGE 3 CHRONIC KIDNEY DISEASE, UNSPECIFIED WHETHER STAGE 3A OR 3B CKD (HCC): ICD-10-CM

## 2023-11-07 DIAGNOSIS — I44.7 NEW ONSET LEFT BUNDLE BRANCH BLOCK (LBBB): ICD-10-CM

## 2023-11-07 DIAGNOSIS — I35.0 AORTIC STENOSIS, SEVERE: Primary | ICD-10-CM

## 2023-11-07 DIAGNOSIS — I35.0 AORTIC STENOSIS, SEVERE: ICD-10-CM

## 2023-11-07 DIAGNOSIS — Z95.2 S/P TAVR (TRANSCATHETER AORTIC VALVE REPLACEMENT): Primary | ICD-10-CM

## 2023-11-07 LAB
ABO GROUP BLD: NORMAL
ANION GAP SERPL CALCULATED.3IONS-SCNC: 8 MMOL/L
ATRIAL RATE: 81 BPM
BASE EXCESS BLDA CALC-SCNC: -1 MMOL/L (ref -2–3)
BASE EXCESS BLDA CALC-SCNC: -1 MMOL/L (ref -2–3)
BASE EXCESS BLDA CALC-SCNC: -2 MMOL/L (ref -2–3)
BUN SERPL-MCNC: 11 MG/DL (ref 5–25)
CA-I BLD-SCNC: 1.19 MMOL/L (ref 1.12–1.32)
CA-I BLD-SCNC: 1.22 MMOL/L (ref 1.12–1.32)
CA-I BLD-SCNC: 1.25 MMOL/L (ref 1.12–1.32)
CALCIUM SERPL-MCNC: 8.3 MG/DL (ref 8.4–10.2)
CHLORIDE SERPL-SCNC: 106 MMOL/L (ref 96–108)
CO2 SERPL-SCNC: 24 MMOL/L (ref 21–32)
CREAT SERPL-MCNC: 0.72 MG/DL (ref 0.6–1.3)
GFR SERPL CREATININE-BSD FRML MDRD: 77 ML/MIN/1.73SQ M
GLUCOSE SERPL-MCNC: 102 MG/DL (ref 65–140)
GLUCOSE SERPL-MCNC: 106 MG/DL (ref 65–140)
GLUCOSE SERPL-MCNC: 112 MG/DL (ref 65–140)
GLUCOSE SERPL-MCNC: 118 MG/DL (ref 65–140)
GLUCOSE SERPL-MCNC: 135 MG/DL (ref 65–140)
GLUCOSE SERPL-MCNC: 88 MG/DL (ref 65–140)
GLUCOSE SERPL-MCNC: 98 MG/DL (ref 65–140)
GLUCOSE SERPL-MCNC: 99 MG/DL (ref 65–140)
HCO3 BLDA-SCNC: 22.2 MMOL/L (ref 22–28)
HCO3 BLDA-SCNC: 22.7 MMOL/L (ref 22–28)
HCO3 BLDA-SCNC: 24 MMOL/L (ref 24–30)
HCT VFR BLD AUTO: 23.9 % (ref 34.8–46.1)
HCT VFR BLD AUTO: 25.3 % (ref 34.8–46.1)
HCT VFR BLD CALC: 21 % (ref 34.8–46.1)
HCT VFR BLD CALC: 23 % (ref 34.8–46.1)
HCT VFR BLD CALC: 26 % (ref 34.8–46.1)
HGB BLD-MCNC: 7.8 G/DL (ref 11.5–15.4)
HGB BLD-MCNC: 8.2 G/DL (ref 11.5–15.4)
HGB BLDA-MCNC: 7.1 G/DL (ref 11.5–15.4)
HGB BLDA-MCNC: 7.8 G/DL (ref 11.5–15.4)
HGB BLDA-MCNC: 8.8 G/DL (ref 11.5–15.4)
KCT BLD-ACNC: 151 SEC (ref 89–137)
KCT BLD-ACNC: 169 SEC (ref 89–137)
KCT BLD-ACNC: 279 SEC (ref 89–137)
P AXIS: -28 DEGREES
PCO2 BLD: 23 MMOL/L (ref 21–32)
PCO2 BLD: 24 MMOL/L (ref 21–32)
PCO2 BLD: 25 MMOL/L (ref 21–32)
PCO2 BLD: 31.5 MM HG (ref 36–44)
PCO2 BLD: 36.6 MM HG (ref 36–44)
PCO2 BLD: 38.8 MM HG (ref 42–50)
PH BLD: 7.4 [PH] (ref 7.35–7.45)
PH BLD: 7.4 [PH] (ref 7.3–7.4)
PH BLD: 7.46 [PH] (ref 7.35–7.45)
PLATELET # BLD AUTO: 217 THOUSANDS/UL (ref 149–390)
PMV BLD AUTO: 9.4 FL (ref 8.9–12.7)
PO2 BLD: 106 MM HG (ref 75–129)
PO2 BLD: 167 MM HG (ref 75–129)
PO2 BLD: 60 MM HG (ref 35–45)
POTASSIUM BLD-SCNC: 2.9 MMOL/L (ref 3.5–5.3)
POTASSIUM BLD-SCNC: 3.2 MMOL/L (ref 3.5–5.3)
POTASSIUM BLD-SCNC: 3.4 MMOL/L (ref 3.5–5.3)
POTASSIUM SERPL-SCNC: 3.2 MMOL/L (ref 3.5–5.3)
PR INTERVAL: 166 MS
QRS AXIS: -27 DEGREES
QRSD INTERVAL: 82 MS
QT INTERVAL: 420 MS
QTC INTERVAL: 487 MS
RH BLD: POSITIVE
SAO2 % BLD FROM PO2: 100 % (ref 60–85)
SAO2 % BLD FROM PO2: 91 % (ref 60–85)
SAO2 % BLD FROM PO2: 98 % (ref 60–85)
SODIUM BLD-SCNC: 137 MMOL/L (ref 136–145)
SODIUM BLD-SCNC: 139 MMOL/L (ref 136–145)
SODIUM BLD-SCNC: 139 MMOL/L (ref 136–145)
SODIUM SERPL-SCNC: 138 MMOL/L (ref 135–147)
SPECIMEN SOURCE: ABNORMAL
T WAVE AXIS: 46 DEGREES
VENTRICULAR RATE: 81 BPM

## 2023-11-07 PROCEDURE — 84295 ASSAY OF SERUM SODIUM: CPT

## 2023-11-07 PROCEDURE — 82803 BLOOD GASES ANY COMBINATION: CPT

## 2023-11-07 PROCEDURE — 33361 REPLACE AORTIC VALVE PERQ: CPT | Performed by: THORACIC SURGERY (CARDIOTHORACIC VASCULAR SURGERY)

## 2023-11-07 PROCEDURE — 84132 ASSAY OF SERUM POTASSIUM: CPT

## 2023-11-07 PROCEDURE — C1760 CLOSURE DEV, VASC: HCPCS | Performed by: THORACIC SURGERY (CARDIOTHORACIC VASCULAR SURGERY)

## 2023-11-07 PROCEDURE — 33361 REPLACE AORTIC VALVE PERQ: CPT | Performed by: INTERNAL MEDICINE

## 2023-11-07 PROCEDURE — 02HV33Z INSERTION OF INFUSION DEVICE INTO SUPERIOR VENA CAVA, PERCUTANEOUS APPROACH: ICD-10-PCS | Performed by: ANESTHESIOLOGY

## 2023-11-07 PROCEDURE — 85014 HEMATOCRIT: CPT

## 2023-11-07 PROCEDURE — 93010 ELECTROCARDIOGRAM REPORT: CPT | Performed by: INTERNAL MEDICINE

## 2023-11-07 PROCEDURE — C1769 GUIDE WIRE: HCPCS | Performed by: THORACIC SURGERY (CARDIOTHORACIC VASCULAR SURGERY)

## 2023-11-07 PROCEDURE — C1751 CATH, INF, PER/CENT/MIDLINE: HCPCS | Performed by: THORACIC SURGERY (CARDIOTHORACIC VASCULAR SURGERY)

## 2023-11-07 PROCEDURE — C1894 INTRO/SHEATH, NON-LASER: HCPCS | Performed by: THORACIC SURGERY (CARDIOTHORACIC VASCULAR SURGERY)

## 2023-11-07 PROCEDURE — NC001 PR NO CHARGE: Performed by: THORACIC SURGERY (CARDIOTHORACIC VASCULAR SURGERY)

## 2023-11-07 PROCEDURE — 85049 AUTOMATED PLATELET COUNT: CPT | Performed by: PHYSICIAN ASSISTANT

## 2023-11-07 PROCEDURE — 76377 3D RENDER W/INTRP POSTPROCES: CPT

## 2023-11-07 PROCEDURE — 82330 ASSAY OF CALCIUM: CPT

## 2023-11-07 PROCEDURE — NC001 PR NO CHARGE: Performed by: PHYSICIAN ASSISTANT

## 2023-11-07 PROCEDURE — 85014 HEMATOCRIT: CPT | Performed by: PHYSICIAN ASSISTANT

## 2023-11-07 PROCEDURE — 85018 HEMOGLOBIN: CPT | Performed by: PHYSICIAN ASSISTANT

## 2023-11-07 PROCEDURE — 02RF38Z REPLACEMENT OF AORTIC VALVE WITH ZOOPLASTIC TISSUE, PERCUTANEOUS APPROACH: ICD-10-PCS | Performed by: THORACIC SURGERY (CARDIOTHORACIC VASCULAR SURGERY)

## 2023-11-07 PROCEDURE — 80048 BASIC METABOLIC PNL TOTAL CA: CPT | Performed by: PHYSICIAN ASSISTANT

## 2023-11-07 PROCEDURE — 82948 REAGENT STRIP/BLOOD GLUCOSE: CPT

## 2023-11-07 PROCEDURE — 93005 ELECTROCARDIOGRAM TRACING: CPT

## 2023-11-07 PROCEDURE — 93355 ECHO TRANSESOPHAGEAL (TEE): CPT

## 2023-11-07 PROCEDURE — 85347 COAGULATION TIME ACTIVATED: CPT

## 2023-11-07 PROCEDURE — 86920 COMPATIBILITY TEST SPIN: CPT

## 2023-11-07 PROCEDURE — 71045 X-RAY EXAM CHEST 1 VIEW: CPT

## 2023-11-07 PROCEDURE — 82947 ASSAY GLUCOSE BLOOD QUANT: CPT

## 2023-11-07 DEVICE — PERCLOSE™ PROSTYLE™ SUTURE-MEDIATED CLOSURE AND REPAIR SYSTEM
Type: IMPLANTABLE DEVICE | Site: GROIN | Status: FUNCTIONAL
Brand: PERCLOSE™ PROSTYLE™

## 2023-11-07 DEVICE — SAPIEN 3 ULTRA RESILIA TRANSCATHETER HEART VALVE, 26MM
Type: IMPLANTABLE DEVICE | Site: HEART | Status: FUNCTIONAL
Brand: SAPIEN 3 ULTRA RESILIA

## 2023-11-07 RX ORDER — FONDAPARINUX SODIUM 2.5 MG/.5ML
2.5 INJECTION SUBCUTANEOUS DAILY
Status: DISCONTINUED | OUTPATIENT
Start: 2023-11-08 | End: 2023-11-08 | Stop reason: HOSPADM

## 2023-11-07 RX ORDER — ROCURONIUM BROMIDE 10 MG/ML
INJECTION, SOLUTION INTRAVENOUS AS NEEDED
Status: DISCONTINUED | OUTPATIENT
Start: 2023-11-07 | End: 2023-11-07

## 2023-11-07 RX ORDER — METOCLOPRAMIDE HYDROCHLORIDE 5 MG/ML
10 INJECTION INTRAMUSCULAR; INTRAVENOUS ONCE AS NEEDED
Status: DISCONTINUED | OUTPATIENT
Start: 2023-11-07 | End: 2023-11-07 | Stop reason: HOSPADM

## 2023-11-07 RX ORDER — AMLODIPINE BESYLATE 10 MG/1
10 TABLET ORAL DAILY
Status: DISCONTINUED | OUTPATIENT
Start: 2023-11-07 | End: 2023-11-08 | Stop reason: HOSPADM

## 2023-11-07 RX ORDER — LIDOCAINE HYDROCHLORIDE 10 MG/ML
INJECTION, SOLUTION INFILTRATION; PERINEURAL AS NEEDED
Status: DISCONTINUED | OUTPATIENT
Start: 2023-11-07 | End: 2023-11-07

## 2023-11-07 RX ORDER — ONDANSETRON 2 MG/ML
4 INJECTION INTRAMUSCULAR; INTRAVENOUS EVERY 6 HOURS PRN
Status: DISCONTINUED | OUTPATIENT
Start: 2023-11-07 | End: 2023-11-08 | Stop reason: HOSPADM

## 2023-11-07 RX ORDER — HYDROMORPHONE HCL/PF 1 MG/ML
0.5 SYRINGE (ML) INJECTION
Status: DISCONTINUED | OUTPATIENT
Start: 2023-11-07 | End: 2023-11-07 | Stop reason: HOSPADM

## 2023-11-07 RX ORDER — ATORVASTATIN CALCIUM 20 MG/1
20 TABLET, FILM COATED ORAL
Status: DISCONTINUED | OUTPATIENT
Start: 2023-11-07 | End: 2023-11-08 | Stop reason: HOSPADM

## 2023-11-07 RX ORDER — ACETAMINOPHEN 325 MG/1
650 TABLET ORAL EVERY 4 HOURS PRN
Status: DISCONTINUED | OUTPATIENT
Start: 2023-11-07 | End: 2023-11-08 | Stop reason: HOSPADM

## 2023-11-07 RX ORDER — ASPIRIN 81 MG/1
81 TABLET, CHEWABLE ORAL DAILY
Status: DISCONTINUED | OUTPATIENT
Start: 2023-11-07 | End: 2023-11-08 | Stop reason: HOSPADM

## 2023-11-07 RX ORDER — LIDOCAINE HYDROCHLORIDE 10 MG/ML
INJECTION, SOLUTION EPIDURAL; INFILTRATION; INTRACAUDAL; PERINEURAL
Status: COMPLETED | OUTPATIENT
Start: 2023-11-07 | End: 2023-11-07

## 2023-11-07 RX ORDER — PANTOPRAZOLE SODIUM 40 MG/1
40 TABLET, DELAYED RELEASE ORAL DAILY
Status: DISCONTINUED | OUTPATIENT
Start: 2023-11-07 | End: 2023-11-07 | Stop reason: SDUPTHER

## 2023-11-07 RX ORDER — HYDROMORPHONE HCL IN WATER/PF 6 MG/30 ML
0.2 PATIENT CONTROLLED ANALGESIA SYRINGE INTRAVENOUS
Status: DISCONTINUED | OUTPATIENT
Start: 2023-11-07 | End: 2023-11-07 | Stop reason: HOSPADM

## 2023-11-07 RX ORDER — PROMETHAZINE HYDROCHLORIDE 25 MG/ML
12.5 INJECTION, SOLUTION INTRAMUSCULAR; INTRAVENOUS ONCE AS NEEDED
Status: DISCONTINUED | OUTPATIENT
Start: 2023-11-07 | End: 2023-11-07 | Stop reason: HOSPADM

## 2023-11-07 RX ORDER — ALBUTEROL SULFATE 2.5 MG/3ML
2.5 SOLUTION RESPIRATORY (INHALATION) ONCE AS NEEDED
Status: DISCONTINUED | OUTPATIENT
Start: 2023-11-07 | End: 2023-11-07 | Stop reason: HOSPADM

## 2023-11-07 RX ORDER — CEFAZOLIN SODIUM 1 G/50ML
1000 SOLUTION INTRAVENOUS EVERY 8 HOURS
Status: COMPLETED | OUTPATIENT
Start: 2023-11-07 | End: 2023-11-08

## 2023-11-07 RX ORDER — PANTOPRAZOLE SODIUM 40 MG/1
40 TABLET, DELAYED RELEASE ORAL
Status: DISCONTINUED | OUTPATIENT
Start: 2023-11-07 | End: 2023-11-08 | Stop reason: HOSPADM

## 2023-11-07 RX ORDER — ONDANSETRON 2 MG/ML
INJECTION INTRAMUSCULAR; INTRAVENOUS AS NEEDED
Status: DISCONTINUED | OUTPATIENT
Start: 2023-11-07 | End: 2023-11-07

## 2023-11-07 RX ORDER — LABETALOL HYDROCHLORIDE 5 MG/ML
10 INJECTION, SOLUTION INTRAVENOUS
Status: DISCONTINUED | OUTPATIENT
Start: 2023-11-07 | End: 2023-11-07 | Stop reason: HOSPADM

## 2023-11-07 RX ORDER — ONDANSETRON 2 MG/ML
4 INJECTION INTRAMUSCULAR; INTRAVENOUS ONCE AS NEEDED
Status: DISCONTINUED | OUTPATIENT
Start: 2023-11-07 | End: 2023-11-07 | Stop reason: HOSPADM

## 2023-11-07 RX ORDER — SODIUM CHLORIDE, SODIUM LACTATE, POTASSIUM CHLORIDE, CALCIUM CHLORIDE 600; 310; 30; 20 MG/100ML; MG/100ML; MG/100ML; MG/100ML
INJECTION, SOLUTION INTRAVENOUS CONTINUOUS PRN
Status: DISCONTINUED | OUTPATIENT
Start: 2023-11-07 | End: 2023-11-07

## 2023-11-07 RX ORDER — SULFASALAZINE 500 MG/1
1000 TABLET ORAL 2 TIMES DAILY
Status: DISCONTINUED | OUTPATIENT
Start: 2023-11-07 | End: 2023-11-08 | Stop reason: HOSPADM

## 2023-11-07 RX ORDER — LOSARTAN POTASSIUM 25 MG/1
25 TABLET ORAL DAILY
Status: DISCONTINUED | OUTPATIENT
Start: 2023-11-07 | End: 2023-11-08 | Stop reason: HOSPADM

## 2023-11-07 RX ORDER — HYDRALAZINE HYDROCHLORIDE 20 MG/ML
10 INJECTION INTRAMUSCULAR; INTRAVENOUS EVERY 6 HOURS PRN
Status: DISCONTINUED | OUTPATIENT
Start: 2023-11-07 | End: 2023-11-08 | Stop reason: HOSPADM

## 2023-11-07 RX ORDER — HEPARIN SODIUM 1000 [USP'U]/ML
400 INJECTION, SOLUTION INTRAVENOUS; SUBCUTANEOUS ONCE
Status: DISCONTINUED | OUTPATIENT
Start: 2023-11-07 | End: 2023-11-07 | Stop reason: HOSPADM

## 2023-11-07 RX ORDER — POTASSIUM CHLORIDE 20 MEQ/1
40 TABLET, EXTENDED RELEASE ORAL ONCE
Status: DISCONTINUED | OUTPATIENT
Start: 2023-11-07 | End: 2023-11-07

## 2023-11-07 RX ORDER — HYDRALAZINE HYDROCHLORIDE 20 MG/ML
5 INJECTION INTRAMUSCULAR; INTRAVENOUS
Status: DISCONTINUED | OUTPATIENT
Start: 2023-11-07 | End: 2023-11-07 | Stop reason: HOSPADM

## 2023-11-07 RX ORDER — SODIUM CHLORIDE 9 MG/ML
INJECTION, SOLUTION INTRAVENOUS CONTINUOUS PRN
Status: DISCONTINUED | OUTPATIENT
Start: 2023-11-07 | End: 2023-11-07

## 2023-11-07 RX ORDER — PROPOFOL 10 MG/ML
INJECTION, EMULSION INTRAVENOUS AS NEEDED
Status: DISCONTINUED | OUTPATIENT
Start: 2023-11-07 | End: 2023-11-07

## 2023-11-07 RX ORDER — FAMOTIDINE 20 MG/1
40 TABLET, FILM COATED ORAL
Status: DISCONTINUED | OUTPATIENT
Start: 2023-11-07 | End: 2023-11-08 | Stop reason: HOSPADM

## 2023-11-07 RX ORDER — POLYETHYLENE GLYCOL 3350 17 G/17G
17 POWDER, FOR SOLUTION ORAL DAILY
Status: DISCONTINUED | OUTPATIENT
Start: 2023-11-07 | End: 2023-11-08 | Stop reason: HOSPADM

## 2023-11-07 RX ORDER — PROTAMINE SULFATE 10 MG/ML
INJECTION, SOLUTION INTRAVENOUS AS NEEDED
Status: DISCONTINUED | OUTPATIENT
Start: 2023-11-07 | End: 2023-11-07

## 2023-11-07 RX ORDER — BISACODYL 10 MG
10 SUPPOSITORY, RECTAL RECTAL DAILY PRN
Status: DISCONTINUED | OUTPATIENT
Start: 2023-11-07 | End: 2023-11-08 | Stop reason: HOSPADM

## 2023-11-07 RX ORDER — CHLORHEXIDINE GLUCONATE ORAL RINSE 1.2 MG/ML
15 SOLUTION DENTAL 2 TIMES DAILY
Status: DISCONTINUED | OUTPATIENT
Start: 2023-11-07 | End: 2023-11-07

## 2023-11-07 RX ORDER — LIDOCAINE HYDROCHLORIDE 10 MG/ML
INJECTION, SOLUTION EPIDURAL; INFILTRATION; INTRACAUDAL; PERINEURAL
Status: COMPLETED
Start: 2023-11-07 | End: 2023-11-07

## 2023-11-07 RX ORDER — FUROSEMIDE 10 MG/ML
40 INJECTION INTRAMUSCULAR; INTRAVENOUS ONCE
Status: COMPLETED | OUTPATIENT
Start: 2023-11-07 | End: 2023-11-07

## 2023-11-07 RX ORDER — MAGNESIUM HYDROXIDE 1200 MG/15ML
LIQUID ORAL AS NEEDED
Status: DISCONTINUED | OUTPATIENT
Start: 2023-11-07 | End: 2023-11-07 | Stop reason: HOSPADM

## 2023-11-07 RX ORDER — DEXAMETHASONE SODIUM PHOSPHATE 10 MG/ML
INJECTION, SOLUTION INTRAMUSCULAR; INTRAVENOUS AS NEEDED
Status: DISCONTINUED | OUTPATIENT
Start: 2023-11-07 | End: 2023-11-07

## 2023-11-07 RX ORDER — FENTANYL CITRATE 50 UG/ML
INJECTION, SOLUTION INTRAMUSCULAR; INTRAVENOUS AS NEEDED
Status: DISCONTINUED | OUTPATIENT
Start: 2023-11-07 | End: 2023-11-07

## 2023-11-07 RX ORDER — CLOPIDOGREL BISULFATE 75 MG/1
75 TABLET ORAL DAILY
Status: DISCONTINUED | OUTPATIENT
Start: 2023-11-07 | End: 2023-11-08 | Stop reason: HOSPADM

## 2023-11-07 RX ORDER — LEVOTHYROXINE SODIUM 0.07 MG/1
75 TABLET ORAL
Status: DISCONTINUED | OUTPATIENT
Start: 2023-11-07 | End: 2023-11-08 | Stop reason: HOSPADM

## 2023-11-07 RX ORDER — SODIUM CHLORIDE, SODIUM LACTATE, POTASSIUM CHLORIDE, CALCIUM CHLORIDE 600; 310; 30; 20 MG/100ML; MG/100ML; MG/100ML; MG/100ML
125 INJECTION, SOLUTION INTRAVENOUS CONTINUOUS
Status: DISCONTINUED | OUTPATIENT
Start: 2023-11-07 | End: 2023-11-07

## 2023-11-07 RX ORDER — INSULIN LISPRO 100 [IU]/ML
1-5 INJECTION, SOLUTION INTRAVENOUS; SUBCUTANEOUS
Status: DISCONTINUED | OUTPATIENT
Start: 2023-11-07 | End: 2023-11-08 | Stop reason: HOSPADM

## 2023-11-07 RX ORDER — HEPARIN SODIUM 1000 [USP'U]/ML
INJECTION, SOLUTION INTRAVENOUS; SUBCUTANEOUS AS NEEDED
Status: DISCONTINUED | OUTPATIENT
Start: 2023-11-07 | End: 2023-11-07

## 2023-11-07 RX ORDER — CHLORHEXIDINE GLUCONATE ORAL RINSE 1.2 MG/ML
15 SOLUTION DENTAL ONCE
Status: COMPLETED | OUTPATIENT
Start: 2023-11-07 | End: 2023-11-07

## 2023-11-07 RX ORDER — CEFAZOLIN SODIUM 2 G/50ML
2000 SOLUTION INTRAVENOUS ONCE
Status: COMPLETED | OUTPATIENT
Start: 2023-11-07 | End: 2023-11-07

## 2023-11-07 RX ORDER — FENTANYL CITRATE/PF 50 MCG/ML
25 SYRINGE (ML) INJECTION
Status: DISCONTINUED | OUTPATIENT
Start: 2023-11-07 | End: 2023-11-07 | Stop reason: HOSPADM

## 2023-11-07 RX ORDER — POTASSIUM CHLORIDE 14.9 MG/ML
20 INJECTION INTRAVENOUS ONCE
Status: COMPLETED | OUTPATIENT
Start: 2023-11-07 | End: 2023-11-07

## 2023-11-07 RX ORDER — CHLORHEXIDINE GLUCONATE ORAL RINSE 1.2 MG/ML
15 SOLUTION DENTAL 2 TIMES DAILY
Status: DISCONTINUED | OUTPATIENT
Start: 2023-11-07 | End: 2023-11-08 | Stop reason: HOSPADM

## 2023-11-07 RX ADMIN — ATORVASTATIN CALCIUM 20 MG: 20 TABLET, FILM COATED ORAL at 16:33

## 2023-11-07 RX ADMIN — AMLODIPINE BESYLATE 10 MG: 10 TABLET ORAL at 12:22

## 2023-11-07 RX ADMIN — SODIUM CHLORIDE, SODIUM LACTATE, POTASSIUM CHLORIDE, AND CALCIUM CHLORIDE: .6; .31; .03; .02 INJECTION, SOLUTION INTRAVENOUS at 08:45

## 2023-11-07 RX ADMIN — SULFASALAZINE 1000 MG: 500 TABLET ORAL at 13:38

## 2023-11-07 RX ADMIN — LEVOTHYROXINE SODIUM 75 MCG: 75 TABLET ORAL at 12:22

## 2023-11-07 RX ADMIN — NICARDIPINE HYDROCHLORIDE 5 MG/HR: 2.5 INJECTION, SOLUTION INTRAVENOUS at 09:36

## 2023-11-07 RX ADMIN — CEFAZOLIN SODIUM 1000 MG: 1 SOLUTION INTRAVENOUS at 16:17

## 2023-11-07 RX ADMIN — POTASSIUM CHLORIDE 20 MEQ: 14.9 INJECTION, SOLUTION INTRAVENOUS at 16:21

## 2023-11-07 RX ADMIN — CEFAZOLIN SODIUM 2000 MG: 2 SOLUTION INTRAVENOUS at 09:27

## 2023-11-07 RX ADMIN — CHLORHEXIDINE GLUCONATE 15 ML: 1.2 SOLUTION ORAL at 08:06

## 2023-11-07 RX ADMIN — FENTANYL CITRATE 100 MCG: 50 INJECTION, SOLUTION INTRAMUSCULAR; INTRAVENOUS at 09:07

## 2023-11-07 RX ADMIN — MUPIROCIN 1 APPLICATION: 20 OINTMENT TOPICAL at 21:45

## 2023-11-07 RX ADMIN — FUROSEMIDE 40 MG: 10 INJECTION, SOLUTION INTRAMUSCULAR; INTRAVENOUS at 16:10

## 2023-11-07 RX ADMIN — PROTAMINE SULFATE 60 MG: 10 INJECTION, SOLUTION INTRAVENOUS at 09:57

## 2023-11-07 RX ADMIN — LIDOCAINE HYDROCHLORIDE 0.5 ML: 10 INJECTION, SOLUTION EPIDURAL; INFILTRATION; INTRACAUDAL; PERINEURAL at 09:02

## 2023-11-07 RX ADMIN — NICARDIPINE HYDROCHLORIDE 7.5 MG/HR: 2.5 INJECTION, SOLUTION INTRAVENOUS at 16:31

## 2023-11-07 RX ADMIN — SUGAMMADEX 130 MG: 100 INJECTION, SOLUTION INTRAVENOUS at 10:09

## 2023-11-07 RX ADMIN — NICARDIPINE HYDROCHLORIDE 15 MG/HR: 2.5 INJECTION, SOLUTION INTRAVENOUS at 11:45

## 2023-11-07 RX ADMIN — FAMOTIDINE 40 MG: 20 TABLET, FILM COATED ORAL at 12:22

## 2023-11-07 RX ADMIN — LIDOCAINE HYDROCHLORIDE 50 MG: 10 INJECTION, SOLUTION EPIDURAL; INFILTRATION; INTRACAUDAL; PERINEURAL at 09:06

## 2023-11-07 RX ADMIN — MUPIROCIN 1 APPLICATION: 20 OINTMENT TOPICAL at 08:06

## 2023-11-07 RX ADMIN — SULFASALAZINE 1000 MG: 500 TABLET ORAL at 21:46

## 2023-11-07 RX ADMIN — SODIUM CHLORIDE: 9 INJECTION, SOLUTION INTRAVENOUS at 09:03

## 2023-11-07 RX ADMIN — PANTOPRAZOLE SODIUM 40 MG: 40 TABLET, DELAYED RELEASE ORAL at 12:24

## 2023-11-07 RX ADMIN — ROCURONIUM BROMIDE 50 MG: 10 INJECTION, SOLUTION INTRAVENOUS at 09:07

## 2023-11-07 RX ADMIN — CLOPIDOGREL BISULFATE 75 MG: 75 TABLET ORAL at 12:23

## 2023-11-07 RX ADMIN — LOSARTAN POTASSIUM 25 MG: 25 TABLET, FILM COATED ORAL at 12:24

## 2023-11-07 RX ADMIN — CHLORHEXIDINE GLUCONATE 15 ML: 1.2 SOLUTION ORAL at 18:08

## 2023-11-07 RX ADMIN — PROPOFOL 60 MG: 10 INJECTION, EMULSION INTRAVENOUS at 09:07

## 2023-11-07 RX ADMIN — ASPIRIN 81 MG CHEWABLE TABLET 81 MG: 81 TABLET CHEWABLE at 12:23

## 2023-11-07 RX ADMIN — ONDANSETRON 4 MG: 2 INJECTION INTRAMUSCULAR; INTRAVENOUS at 09:36

## 2023-11-07 RX ADMIN — HEPARIN SODIUM 9000 UNITS: 1000 INJECTION INTRAVENOUS; SUBCUTANEOUS at 09:48

## 2023-11-07 RX ADMIN — DEXAMETHASONE SODIUM PHOSPHATE 10 MG: 10 INJECTION, SOLUTION INTRAMUSCULAR; INTRAVENOUS at 09:36

## 2023-11-07 NOTE — PROGRESS NOTES
Quick Note - Cardiothoracic Surgery   Judith Egan 79 y.o. female MRN: [id number]  Unit/Bed#: OR POOL Encounter: [id number]      Judith Egan underwent transcatheter aortic valve replacement at [address] today. Following surgery, 12 lead ECG was completed and new LBBB was identified, no belle or heart block noted at this time. Electrophysiology was notified via tiger text to confirm this finding. A repeat ECG was ordered, to be completed in three hours at 1330. The EP PA on call will follow up the results and formally complete a consultation of this finding has not resolved.     Noe Bryan PA-C  11/07/23  10:42 AM

## 2023-11-07 NOTE — RESPIRATORY THERAPY NOTE
Airway Clearance Protocol         11/07/23 1714   Respiratory Protocol   Protocol Initiated? Yes   Protocol Selection Airway Clearance   Language Barrier? No   Medical & Social History Reviewed? Yes   Diagnostic Studies Reviewed? Yes   Physical Assessment Performed? Yes   Airway Clearance Plan Incentive Spirometer   Respiratory Assessment   Assessment Type Assess only   General Appearance Awake; Alert   Respiratory Pattern Normal   Chest Assessment Chest expansion symmetrical   Bilateral Breath Sounds Clear;Diminished   Location Specific No   Cough None   Resp Comments 80 y/o s/p REPLACEMENT AORTIC VALVE TRANSCATHETER (TAVR) from today. No nicotine usage, no pulm history and no pulm meds at home. Taught pt on initial usage of IS device and she moved 1500 ml's. She knows to continue 10 x / hr while awake.    O2 Device Room air

## 2023-11-07 NOTE — ANESTHESIA POSTPROCEDURE EVALUATION
Post-Op Assessment Note    CV Status:  Stable  Pain Score: 0    Pain management: adequate     Mental Status:  Alert and awake   Hydration Status:  Euvolemic   PONV Controlled:  Controlled   Airway Patency:  Patent      Post Op Vitals Reviewed: Yes      Staff: Anesthesiologist, CRNA         No notable events documented.     BP  118/54   Temp   97.2   Pulse  75   Resp   12   SpO2   99

## 2023-11-07 NOTE — ANESTHESIA PROCEDURE NOTES
Procedure Performed: GIOVANNY Anesthesia  Start Time:  11/7/2023 9:30 AM        Preanesthesia Checklist    Patient identified, IV assessed, risks and benefits discussed, monitors and equipment assessed, procedure being performed at surgeon's request and anesthesia consent obtained. Procedure    Diagnostic Indications for GIOVANNY:  assessment of ascending aorta, assessment of surgical repair and hemodynamic monitoring. Type of GIOVANNY: interventional GIOVANNY with real time guidance of intracardiac procedure. Images Saved: ultrasound permanent image saved. Physician Requesting Echo: Sarabjit Alvarenga DO. Location performed: OR. Intubated. Heart visualized. Insertion of GIOVANNY Probe:  Easy. Probe Type:  Epiaortic and multiplane. Modalities:  Color flow mapping, 3D, continuous wave Doppler and pulse wave Doppler. Echocardiographic and Doppler Measurements    PREPROCEDURE    LEFT VENTRICLE:  Systolic Function: normal. Ejection Fraction: 60-65%. Cavity size: normal.   Regional Wall Motion Abnormalities: none. RIGHT VENTRICLE:  Systolic Function: normal.  Cavity size normal. No hypertrophy              AORTIC VALVE:  Leaflets: trileaflet. Leaflet motions restricted. Stenosis: severe. Mean Gradient: 42 mmHg. Peak Gradient: 71 mmHg. Area: 0.61 cm². Regurgitation: trace. MITRAL VALVE:  Leaflets: normal. Leaflet Motions: normal. Regurgitation: mild. Stenosis: none. TRICUSPID VALVE:  Leaflets: normal. Leaflet Motions: normal. Stenosis: none. Regurgitation: mild. PULMONIC VALVE:  Leaflets: normal. Regurgitation: none. Stenosis: none. ASCENDING AORTA:  Size:  normal.  Dissection not present. AORTIC ARCH:  Size:  normal.  dissection not present. Grade 3: atheroma protruding < 0.5 cm into lumen. DESCENDING AORTA:  Size: normal.  Dissection not present. Grade 3: atheroma protruding < 0.5 cm into lumen.         RIGHT ATRIUM:  Size:  normal. No spontaneous echo contrast.    LEFT ATRIUM:  Size: normal. No spontaneous echo contrast.            ATRIAL SEPTUM:  Intra-atrial septal morphology: lipomatous hypertrophy. VENTRICULAR SEPTUM:  Intra-ventricular septum morphology: normal.             OTHER FINDINGS:  Pericardium:  normal. Pleural Effusion:  none. POSTPROCEDURE    LEFT VENTRICLE: Unchanged . RIGHT VENTRICLE: Unchanged . AORTIC VALVE:   Leaflets: bioprosthetic. Stenosis: none. Mean Gradient: 3 mmHg. Regurgitation: 2 trace PVLs seen and trace. Valve Size: 26 mm. MITRAL VALVE: Unchanged . TRICUSPID VALVE: Unchanged . PULMONIC VALVE: Unchanged             ATRIA: Unchanged . AORTA: Unchanged . REMOVAL:  Probe Removal: atraumatic.

## 2023-11-07 NOTE — CONSULTS
Consultation - Geriatrics   Meme Gabriel 79 y.o. female MRN: [id number]  Unit/Bed#: OR POOL Encounter: [id number]      Assessment/Plan  Aortic stenosis  S/p TAVR  CT surgery managing    Frailty   Clinical Frail Scale: 4- Vulnerable  Not dependent for daily help, symptoms limit activity  PT/OT  Albumin   Keep physically, mentally and socially active    Memory loss  Self described "poor memory"  TSH 5.770 (9/20/23)  Vitamin B12- 1242 (10/5/23)  maintain neuro protective lifestyle :   Keep physically, mentally and socially active   Lower BMI   Increase physical exercise   Recommend Mediterranean/MIND diet   Monitor good control of blood pressure, blood sugar and cholestrerol    Ambulatory dysfunction  At risk for falls secondary to age, medications, vision impairment, gait instability  Fall precautions  Reports gait instability "falling all the time"  Recommend follow up with PCP for PT referral for balance and strengthening  Fall prevention handout given from cdc.gov/steadi    Insomnia  Related to hospitalization  Maintain circadian rhythm     Delirium precautions  Alert and oriented x 3  Avoid deliriogenic medications such as tramadol, benzodiazepines, anticholinergics,  Benadryl  Treat pain, See geriatric pain protocol  Monitor for constipation and urinary retention  Encourage early and frequent moblization, OOB  Encourage Hydration/ Nutrition  Implement sleep hygiene, limit night time interuptions, group activities    Advanced care planning  Full code      Home medication review  [facility]  Pantoprazole 40 mg po daily  Famotidine 40 mg po with dinner prn  Levothyroxine 75 mcg po daily  Valsartan 80 mg po daily  Amlodipine 10 mg po daily  Vitamin D 4000 iu po daily  Atorvastatin 20 mg po Q HS  Sulfasalazine 500 mg po BID      History of Present Illness   Physician Requesting Consult:  Elvin John DO  Reason for Consult / Principal Problem: aortic stenosis  Hx and PE limited by: NA  HPI: Meme Gabriel is a 79y.o. year old female who presents with aortic stenosis. She is admitted for transcatheter aortic stenosis. She has arthritis, GERD, HTN, hyperlipidemia, ulcerative colitis    Prior to arrival pt lives at home with her spouse. She is independent with IADLs and ADLs. She ambulates with a cane. She has prior falls. She wears glasses. She is hard of hearing. She has urinary incontinence. She is alert and oriented x 3. She is hoping to go home today. Inpatient consult to Gerontology  Consult performed by: ALINA Ortiz  Consult ordered by: Noe Bryan PA-C          Review of Systems   Constitutional:  Negative for unexpected weight change. HENT:  Negative for hearing loss. Eyes:  Negative for visual disturbance. Respiratory:  Negative for cough. Cardiovascular:  Negative for chest pain. Gastrointestinal:  Negative for constipation. Genitourinary:  Negative for difficulty urinating. Musculoskeletal:  Positive for gait problem. Neurological:  Positive for weakness. Psychiatric/Behavioral:  Positive for sleep disturbance. Historical Information   Past Medical History:   Diagnosis Date    Allergic rhinitis 3/21/2023    Anemia     Arthritis     osteoporosis, djd knees, pt denies osteoporosis on 2/21/22    Low's esophagus     Cancer ([address])     on face    Colon polyp     Cough 02/21/2022    cough for 3 years, expectorates yellow mucus    Disease of thyroid gland     low thyroid    Diverticulitis     gerd, diverticulitis    Dizziness     GERD (gastroesophageal reflux disease)     ulcerative colitis;  Low's esophagus, diverticulosis; hx of laryngopharyngeal reflux    Hyperlipidemia     Hypertension     Low vitamin D level     LPRD (laryngopharyngeal reflux disease) 02/21/2022    PONV (postoperative nausea and vomiting)     AFTER "BIG SURGERIES"    Postnasal drip     WITH COUGH    Ulcerative colitis [facility])      Past Surgical History:   Procedure Laterality Date APPENDECTOMY      CARDIAC CATHETERIZATION N/A 9/7/2023    Procedure: Cardiac RHC/LHC; Surgeon: Sandra Kelley MD;  Location: [facility] CARD[facility];   Service: Cardiology    CATARACT EXTRACTION Bilateral     COLONOSCOPY      colon polyps    COLONOSCOPY  07/28/2022    FOOT SURGERY Bilateral     bunionectomy    HEMORROIDECTOMY      hemorroid removal    JOINT REPLACEMENT Bilateral     knees    REPLACEMENT TOTAL KNEE Bilateral     b/ replacement    TONSILLECTOMY      TUBAL LIGATION      UPPER GASTROINTESTINAL ENDOSCOPY       Social History   Social History     Substance and Sexual Activity   Alcohol Use Not Currently     Social History     Substance and Sexual Activity   Drug Use Never     Social History     Tobacco Use   Smoking Status Never   Smokeless Tobacco Never         Family History:   Family History   Problem Relation Age of Onset    Heart disease Father     No Known Problems Sister     No Known Problems Sister     Cancer Daughter         unsure of type of cancer, it was female cancer and hysterectomy done by Dr. Lucia Jaime    No Known Problems Maternal Grandmother     No Known Problems Paternal Grandmother     Heart disease Brother     Cancer Brother 54    Cancer Brother 79    No Known Problems Maternal Aunt     No Known Problems Paternal Aunt        Meds/Allergies   Current meds:   Current Facility-Administered Medications   Medication Dose Route Frequency    acetaminophen (TYLENOL) rectal suppository 650 mg  650 mg Rectal Q4H PRN    acetaminophen (TYLENOL) tablet 650 mg  650 mg Oral Q4H PRN    albuterol inhalation solution 2.5 mg  2.5 mg Nebulization Once PRN    amLODIPine (NORVASC) tablet 10 mg  10 mg Oral Daily    aspirin chewable tablet 81 mg  81 mg Oral Daily    atorvastatin (LIPITOR) tablet 20 mg  20 mg Oral Daily With Dinner    bisacodyl (DULCOLAX) rectal suppository 10 mg  10 mg Rectal Daily PRN    ceFAZolin (ANCEF) IVPB (premix in dextrose) 1,000 mg 50 mL  1,000 mg Intravenous Q8H    chlorhexidine (PERIDEX) 0.12 % oral rinse 15 mL  15 mL Mouth/Throat BID    clopidogrel (PLAVIX) tablet 75 mg  75 mg Oral Daily    famotidine (PEPCID) tablet 40 mg  40 mg Oral Daily    fentaNYL (SUBLIMAZE) injection 25 mcg  25 mcg Intravenous Q5 Min PRN    [START ON 11/8/2023] fondaparinux (ARIXTRA) subcutaneous injection 2.5 mg  2.5 mg Subcutaneous Daily    heparin 400 units/kg perfusion syringe (1000 unit/mL) 400 Units/kg = 26,000 Units  400 Units/kg Perfusion Once    hydrALAZINE (APRESOLINE) injection 10 mg  10 mg Intravenous Q6H PRN    hydrALAZINE (APRESOLINE) injection 5 mg  5 mg Intravenous Q20 Min PRN    HYDROmorphone (DILAUDID) injection 0.5 mg  0.5 mg Intravenous Q5 Min PRN    HYDROmorphone HCl (DILAUDID) injection 0.2 mg  0.2 mg Intravenous Q5 Min PRN    insulin lispro (HumaLOG) 100 units/mL subcutaneous injection 1-5 Units  1-5 Units Subcutaneous TID AC    ipratropium (ATROVENT) 0.02 % inhalation solution 0.5 mg  0.5 mg Nebulization Once PRN    labetalol (NORMODYNE) injection 10 mg  10 mg Intravenous Q10 Min PRN    lactated ringers infusion  125 mL/hr Intravenous Continuous    levothyroxine tablet 75 mcg  75 mcg Oral Daily    losartan (COZAAR) tablet 25 mg  25 mg Oral Daily    metoclopramide (REGLAN) injection 10 mg  10 mg Intravenous Once PRN    mupirocin (BACTROBAN) 2 % nasal ointment 1 Application  1 Application Nasal V78O LALO    niCARdipine (CARDENE) 25 mg (STANDARD CONCENTRATION) in sodium chloride 0.9% 250 mL  1-15 mg/hr Intravenous Titrated    ondansetron (ZOFRAN) injection 4 mg  4 mg Intravenous Once PRN    ondansetron (ZOFRAN) injection 4 mg  4 mg Intravenous Q6H PRN    pantoprazole (PROTONIX) EC tablet 40 mg  40 mg Oral Daily    pantoprazole (PROTONIX) EC tablet 40 mg  40 mg Oral Daily    phenylephrine 10,000 mcg/20 mL perfusion syringe (500 mcg/mL) 10,000 mcg  10,000 mcg Perfusion Once    polyethylene glycol (MIRALAX) packet 17 g  17 g Oral Daily    promethazine (PHENERGAN) injection 12.5 mg  12.5 mg Intravenous Once PRN    sulfaSALAzine (AZULFIDINE) tablet 1,000 mg  1,000 mg Oral BID        Allergies   Allergen Reactions    Sulfa Antibiotics Itching       Objective   Vitals: Blood pressure 119/52, pulse 72, temperature (!) 97.4 °F (36.3 °C), resp. rate 21, height 5' 1" (1.549 m), weight 60.7 kg (133 lb 14.9 oz), SpO2 98 %. ,Body mass index is 25.31 kg/m². Physical Exam  Vitals and nursing note reviewed. HENT:      Head: Normocephalic. Nose: No congestion. Mouth/Throat:      Mouth: Mucous membranes are moist.   Eyes:      General:         Right eye: No discharge. Left eye: No discharge. Cardiovascular:      Rate and Rhythm: Normal rate and regular rhythm. Pulses: Normal pulses. Pulmonary:      Effort: Pulmonary effort is normal.      Breath sounds: Normal breath sounds. Abdominal:      General: Bowel sounds are normal.      Palpations: Abdomen is soft. Musculoskeletal:         General: Normal range of motion. Cervical back: Normal range of motion. Skin:     General: Skin is warm and dry. Neurological:      Mental Status: She is alert and oriented to person, place, and time. Mental status is at baseline. Psychiatric:         Mood and Affect: Mood normal.         Lab Results:   Results from last 7 days   Lab Units 11/07/23  1034 11/07/23  0918 11/01/23  0749   WBC Thousand/uL  --   --  5.34   HEMOGLOBIN g/dL 7.8*  --  9.0*   I STAT HEMOGLOBIN   --    < >  --    HEMATOCRIT % 23.9*  --  27.7*   HEMATOCRIT, ISTAT   --    < >  --    PLATELETS Thousands/uL 217  --  243    < > = values in this interval not displayed.         Results from last 7 days   Lab Units 11/07/23  1008 11/07/23  0918 11/01/23  0749   POTASSIUM mmol/L  --   --  4.0   CHLORIDE mmol/L  --   --  104   CO2 mmol/L  --   --  27   CO2, I-STAT mmol/L 24   < >  --    BUN mg/dL  --   --  13   CREATININE mg/dL  --   --  0.77   CALCIUM mg/dL  --   --  9.1   ALK PHOS U/L  --   --  77   ALT U/L  --   --  8   AST U/L  --   --  18   GLUCOSE, ISTAT mg/dl 99   < >  --     < > = values in this interval not displayed. Imaging Studies: I have personally reviewed pertinent reports. EKG, Pathology, and Other Studies: I have personally reviewed pertinent reports.     VTE Prophylaxis: Sequential compression device (Venodyne)     Code Status: Level 1 - Full Code

## 2023-11-07 NOTE — CONSULTS
Post TAVR Treatment Plan - Electrophysiology  Esther Delgado 79 y.o. female MRN: [id number]  Unit/Bed#: [facility] 403-01 Encounter: [id number]    Electrophysiology was consulted concerning EKG changes after undergoing TAVR today (new left bundle branch block). Patient's pre, immediately post, and three hour EKGs were reviewed. Plan:  After reviewing EKG's, juan miguelen'ts QRS has narrowed back from 154msec to 82msec (prior had been 82msec). Therefore, formal EP consult is not needed and we will not formally see. Please reach out with any further questions, concerns, or any changes on telemetry/repeat EKG's. She is not on anticoagulation or beta blocker therapy.       Studies Reviewed:  EKG prior to TAVR:       EKG immediately post TAVR:       EKG three hours post TAVR:

## 2023-11-07 NOTE — INTERVAL H&P NOTE
H&P reviewed. After examining the patient I find no changes in the patients condition since the H&P had been written. Anticipated Length of Stay:  Patient will be admitted on an Inpatient basis with an anticipated length of stay of  greater than 2 midnights. Justification for Hospital Stay:  Post surgical recovery following open heart surgery.       Vitals:    11/07/23 0746   BP: (!) 191/97   Pulse: 72   Resp: 14   Temp: 98.7 °F (37.1 °C)   SpO2: 95%

## 2023-11-07 NOTE — OP NOTE
OPERATIVE REPORT  PATIENT NAME: Clement Wagner    :  1940  MRN: [id number]  Pt Location: [facility] HYBRID OR ROOM 02    SURGERY DATE: 2023    SURGEON: Aki Norton DO     ASSISTANT: Senia Hutson PA-C    CO-SURGEON: Dr. Kathrine Cornell DIAGNOSIS Symptomatic severe aortic stenosis. POSTOPERATIVE DIAGNOSIS Symptomatic severe aortic stenosis. NYHA CLASS: 3    CCS CLASS: 2    PROCEDURE Transcatheter aortic valve replacement with a 26 mm Blum DOROTHEA 3 Ultra Resilia bioprosthetic valve via a percutaneous left transfemoral approach. ANESTHESIA Dr. Marnell Primrose, general endotracheal anesthesia with transesophageal echocardiogram guidance. CARDIOPULMONARY BYPASS TIME 0. PACKS/TUBES/DRAINS None. ESTIMATED BLOOD LOSS: 10 mL    OPERATIVE TECHNIQUE The patient was taken to the operating room and placed supine on the operating table. Following the satisfactory induction of general anesthesia and placement of monitoring lines, the patient was prepped and draped in the usual sterile fashion. A time-out procedure was performed. The left common femoral artery was accessed percutaneously using Seldinger technique and fluoroscopy. Two (2) Perclose sutures were deployed in the standard fashion. The left common femoral vein was accessed in a similar fashion and was cannulated with a 6 [place] sheath. The femoral artery was cannulated with a 7 [place] sheath. A pigtail catheter was inserted and advanced through the left common femoral artery sheath into the right coronary cusp. Using a series of injections, the angle of deployment was determined. Through the left common femoral vein sheath, a balloon tip temporary pacing catheter was inserted into the right ventricle and its capture was confirmed. The patient was systemically heparinized.  The left common femoral artery sheath was then removed over an extra-stiff wire and the delivery sheath was inserted through the left common femoral artery and advanced into the aorta. The aortic valve was crossed with a wire. A 26 mm DOROTHEA 3 Ultra Resilia valve was then advanced through the sheath into the aorta and positioned onto the deployment balloon in the aorta and then advanced around the aortic arch and through the annulus of the aortic valve to the appropriate level. At this point, the catheter was desheathed in the standard fashion. The valve was positioned appropriately using a combination of fluoroscopy and transesophageal echocardiogram guidance. During an episode of rapid pacing, balloon deployment of the 26 mm DOROTHEA 3 Ultra Resilia valve was performed. Following deployment, the position of the valve was confirmed by fluoroscopy and echocardiography and its position appeared appropriate with trace perivalvular leak. The valve delivery system was subsequently removed and the sheath was removed from the left common femoral artery while the Perclose sutures were secured and direct pressure was held. Protamine was administered with normalization of the ACT. Pressure was released from the left groin and there was no active bleeding and no evidence of hematoma development. The common femoral vein sheath was removed from the left and pressure was held. Sponge, needle, and instrument counts were reported as correct by the nursing staff. As the attending surgeon, I was present and scrubbed for all critical portions of this procedure. Final transesophageal echocardiogram demonstrated a well-positioned bioprosthetic transcatheter aortic valve with normal function and trace perivalvular leak.        SIGNATURE: Carmela Jim,   DATE: November 7, 2023  TIME: 10:08 AM

## 2023-11-07 NOTE — ANESTHESIA PROCEDURE NOTES
Arterial Line Insertion    Performed by: Gucci Stephens MD  Authorized by: Gucci Stephens MD  Consent: Verbal consent obtained. Written consent obtained. Risks and benefits: risks, benefits and alternatives were discussed  Consent given by: patient  Patient understanding: patient states understanding of the procedure being performed  Patient consent: the patient's understanding of the procedure matches consent given  Required items: required blood products, implants, devices, and special equipment available  Patient identity confirmed: arm band and verbally with patient  Time out: Immediately prior to procedure a "time out" was called to verify the correct patient, procedure, equipment, support staff and site/side marked as required. Preparation: Patient was prepped and draped in the usual sterile fashion.   Indications: multiple ABGs and hemodynamic monitoring  Orientation:  Left  Location: radial arterylidocaine (PF) (XYLOCAINE-MPF) 1 % - Infiltration   0.5 mL - 11/7/2023 9:02:00 AM  Sedation:  Patient sedated: no    Procedure Details:  Needle gauge: 20  Seldinger technique: Seldinger technique used  Number of attempts: 1    Post-procedure:  Post-procedure: dressing applied  Waveform: good waveform  Post-procedure CNS: unchanged  Patient tolerance: Patient tolerated the procedure well with no immediate complications and patient tolerated the procedure well with no immediate complications

## 2023-11-07 NOTE — TELEPHONE ENCOUNTER
Claudene Pebbles from [address]  Cardiac Surgery called in to schedule a TCM appt for the pt. Warm transferred her over to Suzan Ponce in the office.

## 2023-11-07 NOTE — ANESTHESIA PROCEDURE NOTES
Central Line Insertion    Performed by: Rabia Dominique CRNA  Authorized by: Vahid Chaney MD    Date/Time: 11/7/2023 9:18 AM  Catheter Type:  triple lumen  Consent: Verbal consent obtained. Written consent obtained. Consent given by: patient  Patient understanding: patient states understanding of the procedure being performed  Patient consent: the patient's understanding of the procedure matches consent given  Required items: required blood products, implants, devices, and special equipment available  Patient identity confirmed: arm band, provided demographic data and hospital-assigned identification number  Time out: Immediately prior to procedure a "time out" was called to verify the correct patient, procedure, equipment, support staff and site/side marked as required.   Indications: vascular access and central pressure monitoring  Catheter size: 7 Fr  Patient position: Trendelenburg  Assessment: blood return through all ports and free fluid flow  Preparation: skin prepped with 2% chlorhexidine  Skin prep agent dried: skin prep agent completely dried prior to procedure  Sterile barriers: all five maximum sterile barriers used - cap, mask, sterile gown, sterile gloves, and large sterile sheet  Hand hygiene: hand hygiene performed prior to central venous catheter insertion  sterile gel and probe cover used in ultrasound-guided central venous catheter insertionultrasound permanent image saved  Vessel of Catheter Tip End: svc  Number of attempts: 1  Successful placement: yes  Post-procedure: line sutured, dressing applied and chlorhexidine patch applied  Patient tolerance: Patient tolerated the procedure well with no immediate complications and patient tolerated the procedure well with no immediate complications

## 2023-11-08 ENCOUNTER — APPOINTMENT (INPATIENT)
Dept: RADIOLOGY | Facility: HOSPITAL | Age: 83
DRG: 267 | End: 2023-11-08
Payer: MEDICARE

## 2023-11-08 ENCOUNTER — HOME HEALTH ADMISSION (OUTPATIENT)
Dept: HOME HEALTH SERVICES | Facility: HOME HEALTHCARE | Age: 83
End: 2023-11-08
Payer: MEDICARE

## 2023-11-08 ENCOUNTER — APPOINTMENT (INPATIENT)
Dept: NON INVASIVE DIAGNOSTICS | Facility: HOSPITAL | Age: 83
DRG: 267 | End: 2023-11-08
Payer: MEDICARE

## 2023-11-08 ENCOUNTER — TRANSITIONAL CARE MANAGEMENT (OUTPATIENT)
Dept: FAMILY MEDICINE CLINIC | Facility: CLINIC | Age: 83
End: 2023-11-08

## 2023-11-08 VITALS
WEIGHT: 137 LBS | RESPIRATION RATE: 18 BRPM | HEIGHT: 61 IN | SYSTOLIC BLOOD PRESSURE: 137 MMHG | BODY MASS INDEX: 25.86 KG/M2 | OXYGEN SATURATION: 97 % | DIASTOLIC BLOOD PRESSURE: 53 MMHG | HEART RATE: 74 BPM | TEMPERATURE: 98.2 F

## 2023-11-08 PROBLEM — D62 POSTOPERATIVE ANEMIA DUE TO ACUTE BLOOD LOSS: Status: ACTIVE | Noted: 2023-11-08

## 2023-11-08 LAB
ANION GAP SERPL CALCULATED.3IONS-SCNC: 7 MMOL/L
AORTIC ROOT: 3 CM
AORTIC VALVE MEAN VELOCITY: 12.5 M/S
AORTIC VALVE MEAN VELOCITY: 29 M/S
APICAL FOUR CHAMBER EJECTION FRACTION: 75 %
AV AREA BY CONTINUOUS VTI: 0.7 CM2
AV AREA BY CONTINUOUS VTI: 2.2 CM2
AV AREA PEAK VELOCITY: 0.7 CM2
AV AREA PEAK VELOCITY: 2.2 CM2
AV LVOT MEAN GRADIENT: 1 MMHG
AV LVOT MEAN GRADIENT: 5 MMHG
AV LVOT PEAK GRADIENT: 2 MMHG
AV LVOT PEAK GRADIENT: 9 MMHG
AV MEAN GRADIENT: 42 MMHG
AV MEAN GRADIENT: 7 MMHG
AV PEAK GRADIENT: 14 MMHG
AV PEAK GRADIENT: 71 MMHG
AV VALVE AREA: 2.45 CM2
AV VELOCITY RATIO: 0.77
BUN SERPL-MCNC: 14 MG/DL (ref 5–25)
CALCIUM SERPL-MCNC: 8.1 MG/DL (ref 8.4–10.2)
CHLORIDE SERPL-SCNC: 102 MMOL/L (ref 96–108)
CO2 SERPL-SCNC: 26 MMOL/L (ref 21–32)
CREAT SERPL-MCNC: 0.89 MG/DL (ref 0.6–1.3)
DOP CALC AO PEAK VEL: 1.87 M/S
DOP CALC AO VTI: 32.49 CM
DOP CALC AO VTI: 95.4 CM
DOP CALC LVOT AREA: 3.14 CM2
DOP CALC LVOT AREA: 3.8
DOP CALC LVOT CARDIAC INDEX: 3.16 L/MIN/M2
DOP CALC LVOT CARDIAC OUTPUT: 5.08 L/MIN
DOP CALC LVOT DIAMETER: 2 CM
DOP CALC LVOT DIAMETER: 2.2 CM
DOP CALC LVOT PEAK VEL VTI: 18.4 CM
DOP CALC LVOT PEAK VEL VTI: 25.32 CM
DOP CALC LVOT PEAK VEL: 0.74 M/S
DOP CALC LVOT PEAK VEL: 1.44 M/S
DOP CALC LVOT STROKE INDEX: 42.2 ML/M2
DOP CALC LVOT STROKE INDEX: 44.1 ML/M2
DOP CALC LVOT STROKE VOLUME: 70
DOP CALC LVOT STROKE VOLUME: 79.5 CM3
E WAVE DECELERATION TIME: 383 MS
E/A RATIO: 0.54
ERYTHROCYTE [DISTWIDTH] IN BLOOD BY AUTOMATED COUNT: 15 % (ref 11.6–15.1)
FRACTIONAL SHORTENING: 28 (ref 28–44)
GFR SERPL CREATININE-BSD FRML MDRD: 59 ML/MIN/1.73SQ M
GLUCOSE SERPL-MCNC: 102 MG/DL (ref 65–140)
GLUCOSE SERPL-MCNC: 106 MG/DL (ref 65–140)
GLUCOSE SERPL-MCNC: 192 MG/DL (ref 65–140)
HCT VFR BLD AUTO: 22.4 % (ref 34.8–46.1)
HGB BLD-MCNC: 7.3 G/DL (ref 11.5–15.4)
INTERVENTRICULAR SEPTUM IN DIASTOLE (PARASTERNAL SHORT AXIS VIEW): 1.2 CM
INTERVENTRICULAR SEPTUM: 1.2 CM (ref 0.6–1.1)
LAAS-AP2: 12.2 CM2
LAAS-AP4: 10.9 CM2
LEFT ATRIUM SIZE: 3.8 CM
LEFT ATRIUM VOLUME (MOD BIPLANE): 22 ML
LEFT ATRIUM VOLUME INDEX (MOD BIPLANE): 13.7 ML/M2
LEFT INTERNAL DIMENSION IN SYSTOLE: 2.9 CM (ref 2.1–4)
LEFT VENTRICULAR INTERNAL DIMENSION IN DIASTOLE: 4 CM (ref 3.5–6)
LEFT VENTRICULAR POSTERIOR WALL IN END DIASTOLE: 1.2 CM
LEFT VENTRICULAR STROKE VOLUME: 37 ML
LVSV (TEICH): 37 ML
MAGNESIUM SERPL-MCNC: 1.7 MG/DL (ref 1.9–2.7)
MCH RBC QN AUTO: 34.3 PG (ref 26.8–34.3)
MCHC RBC AUTO-ENTMCNC: 32.6 G/DL (ref 31.4–37.4)
MCV RBC AUTO: 105 FL (ref 82–98)
MV E'TISSUE VEL-LAT: 9 CM/S
MV E'TISSUE VEL-SEP: 7 CM/S
MV PEAK A VEL: 1.34 M/S
MV PEAK E VEL: 72 CM/S
MV STENOSIS PRESSURE HALF TIME: 111 MS
MV VALVE AREA P 1/2 METHOD: 1.98
PLATELET # BLD AUTO: 253 THOUSANDS/UL (ref 149–390)
PMV BLD AUTO: 10 FL (ref 8.9–12.7)
POTASSIUM SERPL-SCNC: 3.6 MMOL/L (ref 3.5–5.3)
RA PRESSURE ESTIMATED: 3 MMHG
RBC # BLD AUTO: 2.13 MILLION/UL (ref 3.81–5.12)
RIGHT ATRIUM AREA SYSTOLE A4C: 10.5 CM2
RIGHT VENTRICLE ID DIMENSION: 2.8 CM
RV PSP: 29 MMHG
SL CV LEFT ATRIUM LENGTH A2C: 4.7 CM
SL CV LV EF: 65
SL CV PED ECHO LEFT VENTRICLE DIASTOLIC VOLUME (MOD BIPLANE) 2D: 69 ML
SL CV PED ECHO LEFT VENTRICLE SYSTOLIC VOLUME (MOD BIPLANE) 2D: 32 ML
SODIUM SERPL-SCNC: 135 MMOL/L (ref 135–147)
TR MAX PG: 26 MMHG
TR PEAK VELOCITY: 2.5 M/S
TRICUSPID ANNULAR PLANE SYSTOLIC EXCURSION: 2.1 CM
TRICUSPID VALVE PEAK REGURGITATION VELOCITY: 2.54 M/S
WBC # BLD AUTO: 10.46 THOUSAND/UL (ref 4.31–10.16)

## 2023-11-08 PROCEDURE — 80048 BASIC METABOLIC PNL TOTAL CA: CPT | Performed by: PHYSICIAN ASSISTANT

## 2023-11-08 PROCEDURE — 93306 TTE W/DOPPLER COMPLETE: CPT | Performed by: INTERNAL MEDICINE

## 2023-11-08 PROCEDURE — 71045 X-RAY EXAM CHEST 1 VIEW: CPT

## 2023-11-08 PROCEDURE — 82948 REAGENT STRIP/BLOOD GLUCOSE: CPT

## 2023-11-08 PROCEDURE — 99222 1ST HOSP IP/OBS MODERATE 55: CPT | Performed by: NURSE PRACTITIONER

## 2023-11-08 PROCEDURE — 85027 COMPLETE CBC AUTOMATED: CPT | Performed by: PHYSICIAN ASSISTANT

## 2023-11-08 PROCEDURE — 99223 1ST HOSP IP/OBS HIGH 75: CPT | Performed by: INTERNAL MEDICINE

## 2023-11-08 PROCEDURE — 83735 ASSAY OF MAGNESIUM: CPT | Performed by: PHYSICIAN ASSISTANT

## 2023-11-08 PROCEDURE — 97167 OT EVAL HIGH COMPLEX 60 MIN: CPT

## 2023-11-08 PROCEDURE — NC001 PR NO CHARGE: Performed by: PHYSICIAN ASSISTANT

## 2023-11-08 PROCEDURE — 93306 TTE W/DOPPLER COMPLETE: CPT

## 2023-11-08 PROCEDURE — 99238 HOSP IP/OBS DSCHRG MGMT 30/<: CPT | Performed by: PHYSICIAN ASSISTANT

## 2023-11-08 PROCEDURE — 97163 PT EVAL HIGH COMPLEX 45 MIN: CPT

## 2023-11-08 RX ORDER — POLYETHYLENE GLYCOL 3350 17 G/17G
17 POWDER, FOR SOLUTION ORAL DAILY PRN
Qty: 500 G | Refills: 0 | Status: SHIPPED | OUTPATIENT
Start: 2023-11-08 | End: 2023-11-13

## 2023-11-08 RX ORDER — CLOPIDOGREL BISULFATE 75 MG/1
75 TABLET ORAL DAILY
Qty: 30 TABLET | Refills: 2 | Status: SHIPPED | OUTPATIENT
Start: 2023-11-09

## 2023-11-08 RX ORDER — ASPIRIN 81 MG/1
81 TABLET, CHEWABLE ORAL DAILY
Qty: 30 TABLET | Refills: 5 | Status: SHIPPED | OUTPATIENT
Start: 2023-11-09

## 2023-11-08 RX ORDER — TORSEMIDE 10 MG/1
10 TABLET ORAL DAILY
Qty: 3 TABLET | Refills: 0 | Status: SHIPPED | OUTPATIENT
Start: 2023-11-08

## 2023-11-08 RX ORDER — ACETAMINOPHEN 325 MG/1
650 TABLET ORAL EVERY 6 HOURS PRN
Refills: 0
Start: 2023-11-08

## 2023-11-08 RX ORDER — POTASSIUM CHLORIDE 750 MG/1
10 TABLET, EXTENDED RELEASE ORAL DAILY
Qty: 3 TABLET | Refills: 0 | Status: SHIPPED | OUTPATIENT
Start: 2023-11-08

## 2023-11-08 RX ADMIN — MUPIROCIN 1 APPLICATION: 20 OINTMENT TOPICAL at 10:58

## 2023-11-08 RX ADMIN — LOSARTAN POTASSIUM 25 MG: 25 TABLET, FILM COATED ORAL at 08:45

## 2023-11-08 RX ADMIN — LEVOTHYROXINE SODIUM 75 MCG: 75 TABLET ORAL at 06:44

## 2023-11-08 RX ADMIN — PANTOPRAZOLE SODIUM 40 MG: 40 TABLET, DELAYED RELEASE ORAL at 06:44

## 2023-11-08 RX ADMIN — ASPIRIN 81 MG CHEWABLE TABLET 81 MG: 81 TABLET CHEWABLE at 08:46

## 2023-11-08 RX ADMIN — CLOPIDOGREL BISULFATE 75 MG: 75 TABLET ORAL at 08:45

## 2023-11-08 RX ADMIN — POLYETHYLENE GLYCOL 3350 17 G: 17 POWDER, FOR SOLUTION ORAL at 08:46

## 2023-11-08 RX ADMIN — CEFAZOLIN SODIUM 1000 MG: 1 SOLUTION INTRAVENOUS at 00:40

## 2023-11-08 RX ADMIN — FAMOTIDINE 40 MG: 20 TABLET, FILM COATED ORAL at 06:44

## 2023-11-08 RX ADMIN — CHLORHEXIDINE GLUCONATE 15 ML: 1.2 SOLUTION ORAL at 10:58

## 2023-11-08 RX ADMIN — AMLODIPINE BESYLATE 10 MG: 10 TABLET ORAL at 08:45

## 2023-11-08 RX ADMIN — SULFASALAZINE 1000 MG: 500 TABLET ORAL at 10:57

## 2023-11-08 RX ADMIN — CEFAZOLIN SODIUM 1000 MG: 1 SOLUTION INTRAVENOUS at 08:46

## 2023-11-08 RX ADMIN — FONDAPARINUX SODIUM 2.5 MG: 2.5 INJECTION, SOLUTION SUBCUTANEOUS at 08:46

## 2023-11-08 NOTE — DISCHARGE INSTRUCTIONS
Transcatheter Aortic Valve Replacement (TAVR)    What you need to know about a TAVR:     A TAVR is a procedure to replace your aortic valve. It is done without removing your old valve. The aortic valve opens and closes to let blood flow from your heart to the rest of your body. Your valve has been replaced with a tissue valve. The tissue has been made from the lining that surrounds the heart of a cow. Recovering from surgery: There may be bruising or pain in your groin, where the valve was inserted. You may take over the counter acetaminophen (Tylenol) as needed for discomfort. Your incision is sealed with surgical glue. This will fall off after a few weeks. Do not pick this off. Do not drive for two weeks  Do not lift over 25 pounds for two weeks. Shower every day. Keep your incision dry. Do not apply lotions, powders, or ointments to your incision. Blood thinners after surgery: You have been prescribed blood thinners to help prevent blood clots. Blood clots can cause strokes, heart attacks, and death. While taking any blood thinner, watch for bleeding and bruising. Watch for blood in your urine and bowel movements. Use a soft washcloth on your skin, and a soft toothbrush to brush your teeth. If you shave, use an electric shaver. Do not play contact sports. Do not start or stop any other medicines unless your healthcare provider tells you to do so. (Many medicines cannot be used with blood thinners)    Take your blood thinner exactly as prescribed by your healthcare provider. Do not skip a dose or take less than prescribed. Tell your provider right away if you forget to take your blood thinner, or if you took too much. Call your doctor if:     You develop any bleeding from the incisions in your groin. Your leg feels numb, cool, or looks pale. You feel dizzy or lightheaded  Your groin puncture is red, swollen, or draining pus.   Your groin puncture looks more bruised/swollen or you have new bruising on the side of your leg. You have nausea or are vomiting. Antibiotic Prophylaxis:     After TAVR, you are at an increased risk for developing a valve infection with many common dental procedures. Bacteria that normally lives in your mouth can cross into your bloodstream with any dental work (even cleanings). The immune system normally kills these bacteria, but antibiotic prophylaxis provides extra protection. Inform your dentist that you have had a TAVR  Do not schedule routine dental cleaning for six months following surgery.    Antibiotics will be prescribed by your dentist, prior to your procedure

## 2023-11-08 NOTE — PHYSICAL THERAPY NOTE
Physical Therapy Evaluation     Patient's Name: Shreya Settler    Admitting Diagnosis  Aortic stenosis, severe [I35.0]    Problem List  Patient Active Problem List   Diagnosis    Essential hypertension    Hypercholesterolemia    Hypothyroidism    Ulcerative colitis without complications ([address])    Diverticulosis    Low's esophagus    Colon polyp    Gastroesophageal reflux disease with esophagitis    Vitamin D deficiency    Anemia    Chronic cough    Dairy product intolerance    [place] allergy    B12 deficiency    Irritable larynx    Cough variant asthma     Dysphonia    Pharyngoesophageal dysphagia    Dysfunction of both eustachian tubes    Herpes zoster without complication    Aortic stenosis, severe    Calcified granuloma of lung (HCC)    Allergic rhinitis    Abnormal CT of the chest    Stage 3 chronic kidney disease, unspecified whether stage 3a or 3b CKD ([address])    S/P TAVR (transcatheter aortic valve replacement)    Postoperative anemia due to acute blood loss       Past Medical History  Past Medical History:   Diagnosis Date    Allergic rhinitis 3/21/2023    Anemia     Arthritis     osteoporosis, djd knees, pt denies osteoporosis on 2/21/22    Low's esophagus     Cancer ([address])     on face    Colon polyp     Cough 02/21/2022    cough for 3 years, expectorates yellow mucus    Disease of thyroid gland     low thyroid    Diverticulitis     gerd, diverticulitis    Dizziness     GERD (gastroesophageal reflux disease)     ulcerative colitis;  Low's esophagus, diverticulosis; hx of laryngopharyngeal reflux    Hyperlipidemia     Hypertension     Low vitamin D level     LPRD (laryngopharyngeal reflux disease) 02/21/2022    PONV (postoperative nausea and vomiting)     AFTER "BIG SURGERIES"    Postnasal drip     WITH COUGH    Ulcerative colitis [facility])        Past Surgical History  Past Surgical History:   Procedure Laterality Date    APPENDECTOMY      CARDIAC CATHETERIZATION N/A 9/7/2023    Procedure: Cardiac [facility]; Surgeon: Jayson Arroyo MD;  Location: [facility] CARDIAC CATH LAB; Service: Cardiology    CATARACT EXTRACTION Bilateral     COLONOSCOPY      colon polyps    COLONOSCOPY  07/28/2022    FOOT SURGERY Bilateral     bunionectomy    HEMORROIDECTOMY      hemorroid removal    JOINT REPLACEMENT Bilateral     knees    REPLACEMENT TOTAL KNEE Bilateral     b/ replacement    TONSILLECTOMY      TUBAL LIGATION      UPPER GASTROINTESTINAL ENDOSCOPY            11/08/23 0931   PT Last Visit   PT Visit Date 11/08/23   Note Type   Note type Evaluation   Pain Assessment   Pain Assessment Tool 0-10   Pain Score No Pain   Restrictions/Precautions   Braces or Orthoses   (denies)   Other Precautions Cardiac/sternal;Telemetry   Home Living   Type of [address] [place] [address]  One level  (5+1 AIDA w/ hand rail)   Home Equipment Walker;Cane   Prior Function   Level of [place] Independent with functional mobility  (amb w/ SPC)   Lives With Spouse   Falls in the last 6 months 1 to 4   General   Additional Pertinent History cleared for assessment by melisa   Cognition   Overall Cognitive Status WFL   Arousal/Participation Alert   Attention Within functional limits   Orientation Level Oriented to person;Oriented to place;Oriented to situation   Memory Within functional limits   Following Commands Follows one step commands without difficulty   Subjective   Subjective Alert; in the chair; agreeable to mobilize   RUE Assessment   RUE Assessment WFL  (AROM)   LUE Assessment   LUE Assessment WFL  (AROM)   RLE Assessment   RLE Assessment WFL  (AROM)   Strength RLE   RLE Overall Strength   (fair +/ good -)   LLE Assessment   LLE Assessment WFL  (AROM)   Strength LLE   LLE Overall Strength   (fair +/ good -)   Transfers   Sit to Stand 6  Modified independent   Stand to Sit 6  Modified independent   Ambulation/Elevation   Gait pattern Excessively slow; Short stride; Inconsistent [place]  (w/ SPC; improved gait patterns w/ rw)   Gait Assistance 6  Modified independent  (w/ rw; (S) w/ SPC)   Assistive Device Rolling walker;SPC  (rw --> SPC --> rw)   Distance 110 ft w/ rw + 2 x 10 ft w/ SPC + 110 ft w/ rw w/ seated rest periods and steps negotiation in between; advised the pt to cont using rw for longer distances at home   Stair Management Assistance 5  Supervision   Additional items Verbal cues  (reviewed sequencing)   Stair Management Technique One rail R;Step to pattern;Nonreciprocal;With cane   Number of Stairs 7   Balance   Static Sitting Good   Dynamic Sitting Fair +   Static Standing Fair +  (supported)   Dynamic Standing Fair   Ambulatory Fair  (w/ rw; fair - w/ SPC)   Activity Tolerance   Activity Tolerance Patient tolerated treatment well   Medical Staff Made Aware Co-eval performed w/ OTR due to complexity of medical status and multiple comorbidities   Nurse Made Aware spoke to [address]   Assessment   Prognosis Good   Problem List Decreased strength;Decreased endurance; Impaired balance;Decreased mobility   Assessment Pt is 79 y.o. female admitted with Dx of Aortic stenosis, severe and underwent Transcatheter aortic valve replacement with a 26 mm Blum DOROTHEA 3 Ultra Resilia bioprosthetic valve via a percutaneous left transfemoral approach on 11/7/2023. Pt 's comorbidities affecting POC include: Anemia, Arthritis, Cancer ([address]), Dizziness, and Hypertension and personal factors of: advanced age and AIDA. Pt's clinical presentation is currently unstable/unpredictable which is evident in ongoing telem monitoring and abn lab values. Pt presents w/ min overall weakness, decreased functional endurance and inconsistent amb balance and gait patterns w/ overall observed mobility status on level surfaces and elevations likely near premorbid level. Will cont to follow pt in PT for progressive mobilization max level of (I), endurance, and safety.  Otherwise, anticipate pt will return home w/ available family support upon D/C when medically cleared; home PT follow up is recommended; will follow   Goals   Patient Goals to return home   STG Expiration Date 11/15/23   Short Term Goal #1 5-7 days. Pt will amb 300 ft w/ least restrictive assistive device, mod (I) in order to facilitate safe return to community amb status. Pt will negotiate 5 steps w/ hand rail and SPC, mod (I) in order to navigate in and out of home environment safely. Pt will achieve (I) level w/ bed mob in order to facilitate safety with OOB and back to bed transitions in own living environment. Pt will participate in LE therex and balance activities to max progression w/ mobility skills. PT Treatment Day 0   Plan   Treatment/Interventions LE strengthening/ROM; Elevations; Therapeutic exercise; Endurance training;Equipment eval/education; Bed mobility;Gait training;Spoke to nursing;Spoke to case management;OT   PT Frequency 4-6x/wk   Discharge Recommendation   Rehab Resource Intensity Level, PT III (Minimum Resource Intensity)   Equipment Recommended Walker;Cane   AM-PAC Basic Mobility Inpatient   Turning in Flat Bed Without Bedrails 4   Lying on Back to Sitting on Edge of Flat Bed Without Bedrails 4   Moving Bed to Chair 4   Standing Up From Chair Using Arms 4   Walk in Room 4   Climb 3-5 Stairs With Railing 3   Basic Mobility Inpatient Raw Score 23   Basic Mobility Standardized Score 50.88   Highest Level Of Mobility   JH-HLM Goal 7: Walk 25 feet or more   JH-HLM Achieved 8: Walk 250 feet ot more   Modified Shahid Scale   Modified [place] Scale 2   End of Consult   Patient Position at End of Consult Bedside chair; All needs within reach       [facility], PT

## 2023-11-08 NOTE — RESTORATIVE TECHNICIAN NOTE
Restorative Technician Note      Patient Name: Jose Rios     Restorative Tech Visit Date: 11/08/23  Note Type: Mobility  Patient Position Upon Consult: Supine  Activity Performed: Transferred  Assistive Device: Other (Comment) (HHA x 1)  Patient Position at End of Consult: All needs within reach;  Bedside chair

## 2023-11-08 NOTE — DISCHARGE SUMMARY
Discharge Summary - Cardiothoracic Surgery   Vidal West 79 y.o. female MRN: [id number]  Unit/Bed#: [facility] 403-01 Encounter: [id number]    Admission Date: 11/7/2023     Discharge Date: 11/08/23    Admitting Diagnosis: Aortic stenosis, severe [I35.0]    Primary Discharge Diagnosis:   Aortic stenosis, Non-Rheumatic. S/P transfemoral transcatheter aortic valve replacement;    Secondary Discharge Diagnosis:    AS, chronic diastolic CHF, mild-mod nonobstructive CAD, HTN, HLD, hypothyroidism, pulmonary nodules, anemia, ulcerative colitis, colon polyps, diverticulitis, GERD, Low's esophagus, cholelithiasis and OA/DJD s/p b/l TKR. New endobronchial process for which she was seen by pulmonary medicine, underwent NM PET scan and subsequently cleared from a pulmonary standpoint to proceed with TAVR. She will be evaluated at a later date with elective diagnostic bronchoscopy after recovery from TAVR. Post op LBBB resolved    Attending: Zee Stewart D.O. Consulting Physician(s):   Cardiology  Gerontology    Procedures Performed:   Procedure(s):  REPLACEMENT AORTIC VALVE TRANSCATHETER (TAVR) TRANSFEMORAL W/ 26MM BUCKNER DOROTHEA S3 UR VALVE(ACCESS ON LEFT) GIOVANNY  Cardiac tavr     Hospital Course: The patient was seen in consultation prior to this admission for evaluation of Aortic stenosis, Non-Rheumatic. Risks and benefits of transfemoral transcatheter aortic valve replacement were discussed in detail, and patient was agreeable. Routine preoperative evaluation was completed and informed consent was obtained prior to admission. 11/7: Elective admission for TF TAVR # 26 mm via L approach. Extubated and transferred to PACU supported with Cardene at 8. DP pulses dopplerable b/l. Restarted on home norvasc 10, losartan 25 (home valsartan 80), sulfasalazine (for UC), protonix/pepcid, synthronid. ECG shows SR with new LBBB, no belle/heart block. EP consulted.  Repeat EKG ordered for 1330, shows resolution of conduction delay. EP consult not indicated. Gerontology and cardiology consulted. Hgb 7.8 (preop 9.0), recheck H/H at 1600.      11/8: VSS. NSR. ECHO ok. ASA/Plavix. Groins C/d/I. Cont Norvasc and losartan. Sats good on RA. Follow up cardiology and Gerontology consults. LBBB resolved on ECG, NSR. Torsemide for 3 days s/p TAVR. SSI. Ambulate. Home today. Condition at Discharge:   good     Discharge Physical Exam:    Please see the documented physical exam from this morning's progress note for details. Discharge Data:  Results from last 7 days   Lab Units 11/08/23  0532 11/07/23  1606 11/07/23  1034   WBC Thousand/uL 10.46*  --   --    HEMOGLOBIN g/dL 7.3* 8.2* 7.8*   HEMATOCRIT % 22.4* 25.3* 23.9*   PLATELETS Thousands/uL 253  --  217     Results from last 7 days   Lab Units 11/08/23  0532 11/07/23  1034 11/07/23  1008   POTASSIUM mmol/L 3.6 3.2*  --    CHLORIDE mmol/L 102 106  --    CO2 mmol/L 26 24  --    CO2, I-STAT mmol/L  --   --  24   BUN mg/dL 14 11  --    CREATININE mg/dL 0.89 0.72  --    GLUCOSE, ISTAT mg/dl  --   --  99   CALCIUM mg/dL 8.1* 8.3*  --            Discharge instructions/Information to patient and family:   See after visit summary for information provided to patient and family. Erica Resendez was educated on restrictions regarding driving and lifting, and techniques of proper incisional care. They were specifically counselled on signs and symptoms of an incisional infection, and advised to contact our service immediately should they develop fevers, sweats, chill, redness or drainage at the site of any incisions. Provisions for Follow-Up Care:  See after visit summary for information related to follow-up care and any pertinent home health orders. Disposition:  Home    Planned Readmission:   No    Discharge Medications:  See after visit summary for reconciled discharge medications provided to patient and family.       Erica Resendez was provided contact information and scheduled a follow up appointment with Abdirahman Del Castillo D.O. Additionally, follow up appointments have been scheduled for their primary care physician and primary cardiologist.  Contact information was provided. Maritza Cervantes was counseled on the importance of avoiding tobacco products. As with all patients whom have undergone open heart surgery, tobacco cessation medication was contraindicated at the time of discharge. ACE/ARB was Prescribed at discharge    Beta Blocker was Contraindicated secondary to hypotension    Aspirin was Prescribed at discharge    Statin was Prescribed at discharge      The patient was discharged on ongoing diuretic therapy with Torsemide, 10 mg, PO QD and Potassium Chloride 10 mEq, PO QD. They were advised to continue these medications for 3 days, unless otherwise directed. Narcotic pain medication was not prescribed. Cotninue tylenol PRN for pain    Plavix was prescribed for 90 days s/p TAVR    The patient was informed that following their postoperative surgical evaluation, they will be referred to outpatient cardiac rehabilitation. They were counseled that this program is run by specialists who will help them safely strengthen their heart and prevent more heart disease. Cardiac rehabilitation will include exercise, relaxation, stress management, and heart-healthy nutrition. Caregivers will also check to make sure their medication regimen is working. During this admission, the patient was questioned on their use of tobacco, alcohol, and illicit/non-prescription drug use in the  previous 24 months. Maritza Cervantes states that they have not used any of these substances in this time frame. I spent 30 minutes discharging the patient. This time was spent on the day of discharge. I had direct contact with the patient on the day of discharge. Additional documentation is required if more than 30 minutes were spent on discharge.      SIGNATURE: Sarah Prajapati  DATE: November 8, 2023  TIME: 10:21 AM

## 2023-11-08 NOTE — CASE MANAGEMENT
Case Management Assessment & Discharge Planning Note    Patient name Iveth Howell  Location [address] [place] [address] [id number] MRN [id number]  : 1940 Date 2023       Current Admission Date: 2023  Current Admission Diagnosis:Aortic stenosis, severe   Patient Active Problem List    Diagnosis Date Noted    Postoperative anemia due to acute blood loss 2023    S/P TAVR (transcatheter aortic valve replacement) 2023    Abnormal CT of the chest 2023    Stage 3 chronic kidney disease, unspecified whether stage 3a or 3b CKD ([address]) 2023    Calcified granuloma of lung ([address]) 2023    Allergic rhinitis 2023    Aortic stenosis, severe 2022    Herpes zoster without complication     Chronic cough 2021    Dairy product intolerance 2021    [facility] allergy 2021    B12 deficiency 2021    Irritable larynx 2021    Cough variant asthma  2021    Dysphonia 2021    Pharyngoesophageal dysphagia 2021    Dysfunction of both eustachian tubes 2021    Essential hypertension 2020    Hypercholesterolemia 2020    Hypothyroidism 2020    Ulcerative colitis without complications ([address])     Diverticulosis 2020    Low's esophagus 2020    Colon polyp 2020    Gastroesophageal reflux disease with esophagitis 2020    Vitamin D deficiency 2020    Anemia 2020      LOS (days): 1  Geometric Mean LOS (GMLOS) (days): 2.20  Days to GMLOS:1.1     OBJECTIVE:    Risk of Unplanned Readmission Score: 13.92         Current admission status: Inpatient       Preferred Pharmacy:   [facility] [place] [address] BELEN RITCHIE [address] [place] [address]  Phone: [phone] Fax: [address] [address]  Phone: [phone] Fax: [phone]    Primary Care Provider: Christophe Arredondo Belinda Howard MD    Primary Insurance: MEDICARE  Secondary Insurance: [id number]    ASSESSMENT:  Active Health Care Proxies       [facility], [address] - Spouse   Primary Phone: [phone] (Mobile)  Home Phone: [phone]                 Advance Directives  Does patient have a [address]?: Yes  Does patient have Advance Directives?: Yes  Advance Directives: Living will, Power of  for health care  Primary Contact:  Ronn Espinoza              Patient Information  Admitted from[de-identified] Home  Mental Status: Alert  During Assessment patient was accompanied by: Spouse  Assessment information provided by[de-identified] Patient, Spouse  Primary Caregiver: Self  Support Systems: Spouse/significant other, Daughter  [place] of Residence: [facility] [address] [place] [address] do you live in?: [facility])  In the last 12 months, was there a time when you were not able to pay the mortgage or rent on time?: No  In the last 12 months, how many places have you lived?: 1  In the last 12 months, was there a time when you did not have a steady place to sleep or slept in a shelter (including now)?: No  Homeless/housing insecurity resource given?: N/A  Living Arrangements: Lives w/ Spouse/significant other  Is patient a ?: No  Pt. Lives in a 1 story home with 5 AIDA from [facility]. Has RW.    Activities of Daily Living Prior to Admission  Completes ADLs independently?: Yes  Does patient have a history of Outpatient Therapy (PT/OT)?: No  Does the patient have a history of Short-Term Rehab?: No  Does patient have a history of HHC?: No  Does patient currently have [facility]?: No         Patient Information Continued  Income Source: Pension/MCFP  Does patient have prescription coverage?: Yes  Within the past 12 months, you worried that your food would run out before you got the money to buy more.: Never true  Within the past 12 months, the food you bought just didn't last and you didn't have money to get more.: Never true  Food insecurity resource given?: N/A  Does patient receive dialysis treatments?: No  Does patient have a history of substance abuse?: No  Does patient have a history of Mental Health Diagnosis?: No         Means of Transportation  Means of Transport to Appts[de-identified] Family transport  In the past 12 months, has lack of transportation kept you from medical appointments or from getting medications?: No  In the past 12 months, has lack of transportation kept you from meetings, work, or from getting things needed for daily living?: No  Was application for public transport provided?: N/A        DISCHARGE DETAILS:    Discharge planning discussed with[de-identified] pt and --pt is recommended for PT/OT at home.  They have no preference but would like to stay with [facility] referrral sent to [address] of Choice: Yes     CM contacted family/caregiver?: Yes  Were Treatment Team discharge recommendations reviewed with patient/caregiver?: Yes  Did patient/caregiver verbalize understanding of patient care needs?: Yes  Were patient/caregiver advised of the risks associated with not following Treatment Team discharge recommendations?: Yes    Contacts  Patient Contacts:  Kwan Holland  Relationship to Patient[de-identified] Family  Contact Method: Phone  Phone Number: [employer] [phone]  cell [phone]  Reason/Outcome: Continuity of Care, Emergency Contact, Discharge [address]         Is the patient interested in [facility] at discharge?: Yes  [facility] requested[de-identified] Nursing, Occupational Therapy, Physical [address] [facility] Name[de-identified] [place] Provider[de-identified] PCP  Home Health Services Needed[de-identified] Evaluate Functional Status and Safety, Gait/ADL Training, Strengthening/Theraputic Exercises to Improve Function, Post-Op Care and Assessment, Other (comment) (postop anemia)  Homebound Criteria Met[de-identified] Requires the Assistance of Another Person for Safe Ambulation or to Leave the Home  Supporting Clincal Findings[de-identified] Limited Endurance, Fatigues Easliy in [employer]         Other Referral/Resources/Interventions Provided:  Interventions: [facility]  Referral Comments: As requested referral sent to [address] nsg/PT/OT    Would you like to participate in our [address] service program?  : No - Declined    Treatment Team Recommendation: Home with [address]  Discharge Destination Plan[de-identified] [facility] Dis[facility] : Family                                   Family notified[de-identified]  here  Additional Comments: 80yo female is POD#1 TAVR, has postop anemia, hx ulcerative colitis and esophagitis. Needs home PT/OT, chose [facility], referral sent for nsg/PT/OT.

## 2023-11-08 NOTE — CONSULTS
Consultation - Cardiology Team One  Maritza Cervantes 79 y.o. female MRN: [id number]  Unit/Bed#: [facility] 403-01 Encounter: [id number]    Inpatient consult to Cardiology  Consult performed by: ALINA Tse  Consult ordered by: Phoebe Frances PA-C      Physician Requesting Consult: Abdirahman Del Castillo DO  Reason for Consult / Principal Problem: s/p TAVR    Assessment    Severe AS s/p TAVR 11/7/23  POD #1. Overall doing well. Only complication was transient LBBB post op which resolved 3 hours post op. Tele- NSR with very brief atrial tach, narrow QRS  Post op TTE read pending  BP stable   Euvolemic on exam  On ASA and Plavix    Transient LBBB post TAVR  Resolved 3 hours post op  No recurrence noted, narrow QRS on telemetry today    Nonobstructive CAD  Per pre op cath  On ASA and statin    HTN- stable, 24 hour average /55 on amlodipine 10 mg daily and losartan 25 mg daily. HLD-LDL 72 on atorvastatin 20 mg daily    Chronic anemia- hgb 7. 3. baseline 8-9. Plan  Doing well post op and hoping to go home pending TTE read  Continue ASA, Plavix and statin  BP controlled on amlodipine and losartan  Outpatient follow up with ALINA at [facility] office on 11/20. History of Present Illness   HPI: Maritza Cervantes is a 79y.o. year old female with severe AS, nonobstructive CAD, HTN, hypothyroidism, HTN, HLD, ulcerative colitis, and chronic anemia. She follows with cardiologist Dr. Darleen Melendez. She was first diagnosed with AS in 2022 at which time it was in the moderate to severe range. Over the course of the next year, she became symptomatic with reports of exertional dyspnea and fatigue with an updated echocardiogram showing preserved LVEF but progression to severe AS. She was referred to CT surgery for TAVR evaluation. Pre operative cardiac catheterization showed nonobstructive CAD. She underwent elective TAVR with a 26 mm Blum DOROTHEA 3 ultra resilia bioprosthetic valve via left transfemoral approach.  Intra op GIOVANNY showed a well seated bioprosthetic aortic valve with trace PVL. Post operatively she developed a LBBB and EP consulted. At time of their consultation, LBBB had resolved. Today, her blood pressures are controlled and she is maintaining adequate O2 sats on room air. Cardiology consulted as part of routine post operative management. EKG reviewed personally: 11/7 1348 NSR    Telemetry reviewed personally: NSR, very brief atrial tach    Review of Systems   Constitutional: Negative for chills, malaise/fatigue and weight gain. Cardiovascular:  Negative for chest pain, dyspnea on exertion, leg swelling, orthopnea, palpitations and syncope. Respiratory:  Negative for cough, shortness of breath, sleep disturbances due to breathing and sputum production. Endocrine: Negative. Hematologic/Lymphatic: Negative. Gastrointestinal:  Negative for bloating and nausea. Genitourinary: Negative. Neurological:  Negative for dizziness, light-headedness and weakness. Psychiatric/Behavioral:  Negative for altered mental status. All other systems reviewed and are negative. Historical Information   Past Medical History:   Diagnosis Date    Allergic rhinitis 3/21/2023    Anemia     Arthritis     osteoporosis, djd knees, pt denies osteoporosis on 2/21/22    Low's esophagus     Cancer ([address])     on face    Colon polyp     Cough 02/21/2022    cough for 3 years, expectorates yellow mucus    Disease of thyroid gland     low thyroid    Diverticulitis     gerd, diverticulitis    Dizziness     GERD (gastroesophageal reflux disease)     ulcerative colitis;  Low's esophagus, diverticulosis; hx of laryngopharyngeal reflux    Hyperlipidemia     Hypertension     Low vitamin D level     LPRD (laryngopharyngeal reflux disease) 02/21/2022    PONV (postoperative nausea and vomiting)     AFTER "BIG SURGERIES"    Postnasal drip     WITH COUGH    Ulcerative colitis [facility])      Past Surgical History:   Procedure Laterality Date APPENDECTOMY      CARDIAC CATHETERIZATION N/A 9/7/2023    Procedure: Cardiac RHC/LHC; Surgeon: Apoorva Ratliff MD;  Location: [facility] CARDIAC CATH LAB;   Service: Cardiology    CATARACT EXTRACTION Bilateral     COLONOSCOPY      colon polyps    COLONOSCOPY  07/28/2022    FOOT SURGERY Bilateral     bunionectomy    HEMORROIDECTOMY      hemorroid removal    JOINT REPLACEMENT Bilateral     knees    REPLACEMENT TOTAL KNEE Bilateral     b/ replacement    TONSILLECTOMY      TUBAL LIGATION      UPPER GASTROINTESTINAL ENDOSCOPY       Social History     Substance and Sexual Activity   Alcohol Use Not Currently     Social History     Substance and Sexual Activity   Drug Use Never     Social History     Tobacco Use   Smoking Status Never   Smokeless Tobacco Never     Family History:   Family History   Problem Relation Age of Onset    Heart disease Father     No Known Problems Sister     No Known Problems Sister     Cancer Daughter         unsure of type of cancer, it was female cancer and hysterectomy done by Dr. Sneha Edge    No Known Problems Maternal Grandmother     No Known Problems Paternal Grandmother     Heart disease Brother     Cancer Brother 54    Cancer Brother 79    No Known Problems Maternal Aunt     No Known Problems Paternal Aunt        Meds/Allergies   all current active meds have been reviewed and current meds:   Current Facility-Administered Medications   Medication Dose Route Frequency    acetaminophen (TYLENOL) rectal suppository 650 mg  650 mg Rectal Q4H PRN    acetaminophen (TYLENOL) tablet 650 mg  650 mg Oral Q4H PRN    amLODIPine (NORVASC) tablet 10 mg  10 mg Oral Daily    aspirin chewable tablet 81 mg  81 mg Oral Daily    atorvastatin (LIPITOR) tablet 20 mg  20 mg Oral Daily With Dinner    bisacodyl (DULCOLAX) rectal suppository 10 mg  10 mg Rectal Daily PRN    chlorhexidine (PERIDEX) 0.12 % oral rinse 15 mL  15 mL Mouth/Throat BID    clopidogrel (PLAVIX) tablet 75 mg  75 mg Oral Daily    famotidine (PEPCID) tablet 40 mg  40 mg Oral Daily Before Breakfast    fondaparinux (ARIXTRA) subcutaneous injection 2.5 mg  2.5 mg Subcutaneous Daily    hydrALAZINE (APRESOLINE) injection 10 mg  10 mg Intravenous Q6H PRN    insulin lispro (HumaLOG) 100 units/mL subcutaneous injection 1-5 Units  1-5 Units Subcutaneous TID AC    levothyroxine tablet 75 mcg  75 mcg Oral Early Morning    losartan (COZAAR) tablet 25 mg  25 mg Oral Daily    mupirocin (BACTROBAN) 2 % nasal ointment 1 Application  1 Application Nasal E31F LALO    niCARdipine (CARDENE) 25 mg (STANDARD CONCENTRATION) in sodium chloride 0.9% 250 mL  1-15 mg/hr Intravenous Titrated    ondansetron (ZOFRAN) injection 4 mg  4 mg Intravenous Q6H PRN    pantoprazole (PROTONIX) EC tablet 40 mg  40 mg Oral Early Morning    polyethylene glycol (MIRALAX) packet 17 g  17 g Oral Daily    sulfaSALAzine (AZULFIDINE) tablet 1,000 mg  1,000 mg Oral BID     niCARdipine, 1-15 mg/hr, Last Rate: Stopped (11/07/23 1808)        Allergies   Allergen Reactions    Sulfa Antibiotics Itching       Objective   Vitals: Blood pressure 122/74, pulse 85, temperature 98.4 °F (36.9 °C), temperature source Oral, resp. rate 20, height 5' 1" (1.549 m), weight 62.4 kg (137 lb 9.1 oz), SpO2 97 %. , Body mass index is 25.99 kg/m². ,     Systolic (69XKU), DAVID:201 , Min:106 , LXX:741     Diastolic (11ZNC), NIMO:62, Min:47, Max:81        Intake/Output Summary (Last 24 hours) at 11/8/2023 0849  Last data filed at 11/8/2023 0754  Gross per 24 hour   Intake 2461.58 ml   Output 2685 ml   Net -223.42 ml     Wt Readings from Last 3 Encounters:   11/08/23 62.4 kg (137 lb 9.1 oz)   10/30/23 65 kg (143 lb 3.2 oz)   10/27/23 64.9 kg (143 lb)     Invasive Devices       Central Venous Catheter Line  Duration             CVC Central Lines 11/07/23 Triple <1 day                    Physical Exam  Constitutional:       General: She is not in acute distress. Neck:      Vascular: No hepatojugular reflux or JVD.    Cardiovascular:      Rate and Rhythm: Normal rate and regular rhythm. Pulses: Normal pulses. Heart sounds: Normal heart sounds. No murmur heard. No friction rub. No gallop. Pulmonary:      Effort: Pulmonary effort is normal. No respiratory distress. Breath sounds: No rales. Abdominal:      General: Bowel sounds are normal. There is no distension. Palpations: Abdomen is soft. Tenderness: There is no abdominal tenderness. Musculoskeletal:         General: No tenderness. Normal range of motion. Cervical back: Neck supple. Right lower leg: No edema. Left lower leg: No edema. Skin:     General: Skin is warm and dry. Capillary Refill: Capillary refill takes less than 2 seconds. Findings: No erythema. Comments: Left groin site MOHAN, no active bleeding or erythema. Neurological:      Mental Status: She is alert and oriented to person, place, and time.    Psychiatric:         Mood and Affect: Mood normal.     LABORATORY RESULTS:      CBC with diff:   Results from last 7 days   Lab Units 11/08/23  0532 11/07/23  1606 11/07/23  1034 11/07/23  1008 11/07/23  0954 11/07/23  0918   WBC Thousand/uL 10.46*  --   --   --   --   --    HEMOGLOBIN g/dL 7.3* 8.2* 7.8*  --   --   --    I STAT HEMOGLOBIN g/dl  --   --   --  7.1* 7.8* 8.8*   HEMATOCRIT % 22.4* 25.3* 23.9*  --   --   --    HEMATOCRIT, ISTAT %  --   --   --  21* 23* 26*   MCV fL 105*  --   --   --   --   --    PLATELETS Thousands/uL 253  --  217  --   --   --    RBC Million/uL 2.13*  --   --   --   --   --    MCH pg 34.3  --   --   --   --   --    MCHC g/dL 32.6  --   --   --   --   --    RDW % 15.0  --   --   --   --   --    MPV fL 10.0  --  9.4  --   --   --        CMP:  Results from last 7 days   Lab Units 11/08/23  0532 11/07/23  1034 11/07/23  1008 11/07/23  0954 11/07/23  0918   POTASSIUM mmol/L 3.6 3.2*  --   --   --    CHLORIDE mmol/L 102 106  --   --   --    CO2 mmol/L 26 24  --   --   --    CO2, I-STAT mmol/L  --   --  24 23 25   BUN mg/dL 14 11  --   --   --    CREATININE mg/dL 0.89 0.72  --   --   --    GLUCOSE, ISTAT mg/dl  --   --  99 106 88   CALCIUM mg/dL 8.1* 8.3*  --   --   --    EGFR ml/min/1.73sq m 59 77  --   --   --        BMP:  Results from last 7 days   Lab Units 11/08/23  0532 11/07/23  1034 11/07/23  1008 11/07/23  0954 11/07/23  0918   POTASSIUM mmol/L 3.6 3.2*  --   --   --    CHLORIDE mmol/L 102 106  --   --   --    CO2 mmol/L 26 24  --   --   --    CO2, I-STAT mmol/L  --   --  24 23 25   BUN mg/dL 14 11  --   --   --    CREATININE mg/dL 0.89 0.72  --   --   --    GLUCOSE, ISTAT mg/dl  --   --  99 106 88   CALCIUM mg/dL 8.1* 8.3*  --   --   --        Lab Results   Component Value Date    CREATININE 0.89 11/08/2023    CREATININE 0.72 11/07/2023    CREATININE 0.77 11/01/2023       No results found for: "NTBNP"       Results from last 7 days   Lab Units 11/08/23  0532   MAGNESIUM mg/dL 1.7*                         Lipid Profile:   No results found for: "CHOL"  Lab Results   Component Value Date    HDL 48 (L) 09/20/2023    HDL 51 11/09/2022    HDL 57 04/12/2022     Lab Results   Component Value Date    LDLCALC 72 09/20/2023    LDLCALC 65 11/09/2022    LDLCALC 119 (H) 04/12/2022     Lab Results   Component Value Date    TRIG 68 09/20/2023    TRIG 89 11/09/2022    TRIG 64 04/12/2022     Imaging: I have personally reviewed pertinent reports. and I have personally reviewed pertinent films in PACS  GIOVANNY intraop interventional w/realtime guidance of cardiac procedures    Result Date: 11/8/2023  Narrative: This order contains the linked images for the GIOVANNY that was performed by the Anesthesiologist.  Please see the  CARDIAC GIOVANNY ANESTHESIA procedure for results. XR chest portable ICU    Result Date: 11/7/2023  Narrative: CHEST INDICATION:   S/P Transcatheter aortic valve replacement. COMPARISON: 2/4/2023 EXAM PERFORMED/VIEWS:  XR CHEST PORTABLE ICU FINDINGS: Cardiomediastinal silhouette appears unremarkable.  The patient is status post TAVR. There is a right IJ approach catheter terminating in the SVC. There is mild pulmonary vascular congestion. The lungs are otherwise clear. No pneumothorax or pleural effusion. Osseous structures appear within normal limits for patient age. Impression: Mild pulmonary vascular congestion. The patient is status post TAVR. The right IJ approach catheter terminating in the SVC. Workstation performed: [id number]     GIOVANNY Anesthesia    Result Date: 11/7/2023  Narrative: Dulce Sepulveda CRNA     11/7/2023 10:04 AM Procedure Performed: GIOVANNY Anesthesia Start Time:  11/7/2023 9:30 AM Preanesthesia Checklist Patient identified, IV assessed, risks and benefits discussed, monitors and equipment assessed, procedure being performed at surgeon's request and anesthesia consent obtained. Procedure Diagnostic Indications for GIOVANNY:  assessment of ascending aorta, assessment of surgical repair and hemodynamic monitoring. Type of GIOVANNY: interventional GIOVANNY with real time guidance of intracardiac procedure. Images Saved: ultrasound permanent image saved. Physician Requesting Echo: Paola Greer DO. Location performed: OR. Intubated. Heart visualized. Insertion of GIOVANNY Probe:  Easy. Probe Type:  Epiaortic and multiplane. Modalities:  Color flow mapping, 3D, continuous wave Doppler and pulse wave Doppler. Echocardiographic and Doppler Measurements PREPROCEDURE LEFT VENTRICLE: Systolic Function: normal. Ejection Fraction: 60-65%. Cavity size: normal.   Regional Wall Motion Abnormalities: none. RIGHT VENTRICLE: Systolic Function: normal.  Cavity size normal. No hypertrophy  AORTIC VALVE: Leaflets: trileaflet. Leaflet motions restricted. Stenosis: severe. Mean Gradient: 42 mmHg. Peak Gradient: 71 mmHg. Area: 0.61 cm². Regurgitation: trace. MITRAL VALVE: Leaflets: normal. Leaflet Motions: normal. Regurgitation: mild. Stenosis: none. TRICUSPID VALVE: Leaflets: normal. Leaflet Motions: normal. Stenosis: none. Regurgitation: mild. PULMONIC VALVE: Leaflets: normal. Regurgitation: none. Stenosis: none. ASCENDING AORTA: Size:  normal.  Dissection not present. AORTIC ARCH: Size:  normal.  dissection not present. Grade 3: atheroma protruding < 0.5 cm into lumen. DESCENDING AORTA: Size: normal.  Dissection not present. Grade 3: atheroma protruding < 0.5 cm into lumen. RIGHT ATRIUM: Size:  normal. No spontaneous echo contrast. LEFT ATRIUM: Size: normal. No spontaneous echo contrast. ATRIAL SEPTUM: Intra-atrial septal morphology: lipomatous hypertrophy. VENTRICULAR SEPTUM: Intra-ventricular septum morphology: normal. OTHER FINDINGS: Pericardium:  normal. Pleural Effusion:  none. POSTPROCEDURE LEFT VENTRICLE: Unchanged . RIGHT VENTRICLE: Unchanged . AORTIC VALVE: Leaflets: bioprosthetic. Stenosis: none. Mean Gradient: 3 mmHg. Regurgitation: 2 trace PVLs seen and trace. Valve Size: 26 mm. MITRAL VALVE: Unchanged . TRICUSPID VALVE: Unchanged . PULMONIC VALVE: Unchanged   ATRIA: Unchanged . AORTA: Unchanged . REMOVAL: Probe Removal: atraumatic. NM PET CT skull base to mid thigh    Result Date: 10/18/2023  Narrative: PET/CT SCAN INDICATION: D38.3: Neoplasm of uncertain behavior of mediastinum R93.89: Abnormal findings on diagnostic imaging of other specified body structures R91.8: Other nonspecific abnormal finding of lung field MODIFIER: PI COMPARISON: CT chest abdomen pelvis 8/30/2023 CELL TYPE: N/A TECHNIQUE:   8.8 mCi F-18-FDG administered IV. Multiplanar attenuation corrected and non attenuation corrected PET images are available for interpretation, and contiguous, low dose, axial CT sections were obtained from the skull vertex through the femurs. Intravenous contrast material was not utilized.  This examination, like all CT scans performed in the [facility], was performed utilizing techniques to minimize radiation dose exposure, including the use of iterative reconstruction and automated exposure control. Fasting serum glucose: 88 mg/dl FINDINGS: VISUALIZED BRAIN: No acute abnormalities are seen. HEAD/NECK: Fairly diffuse radiotracer uptake in the thyroid gland, SUV max of 4, nonspecific but can be seen with thyroiditis. CT images: Unremarkable. CHEST: Mildly increased uptake in the right hilar region, SUV max 3.1, nonspecific may be reactive. No suspicious focal uptake in the nodular soft tissue densities between the esophagus and mid descending thoracic aorta seen on prior CT examination. CT findings appear stable. These are probably small lymph nodes. Partial opacification of right lower lobe bronchi posteromedially similar to the prior CT. There is mild uptake in this region, SUV max of 2.8, question inflammatory. Mild variable linear radiotracer uptake of esophagus, may be physiologic or inflammatory. CT images: Calcified subcarinal and right perihilar lymph nodes. Right lower lobe calcified granuloma. Scattered coronary calcifications. Small hiatal hernia. ABDOMEN: No FDG avid soft tissue lesions are seen. CT images: Mild cholelithiasis. Colonic diverticulosis. There is moderate fecal retention in the colon. PELVIS: Fairly extensive activity at the mid to distal sigmoid colon, SUV max of 12. Questionable wall thickening versus underdistention here on limited CT. Patient does have a history of ulcerative colitis which may explain these findings. No FDG-avid lymph nodes. CT images: Colonic diverticular disease. OSSEOUS STRUCTURES: No FDG avid lesions are seen. Stable lucent lesion at the L1 vertebral body without focal uptake. CT images: Slight curvature of the lumbar spine to the left. Multilevel degenerative pairing spine. Impression: 1. No suspicious focal uptake in the nodular soft tissue densities between the esophagus and mid descending thoracic aorta seen on prior CT examination. 2.  Partial opacification of right lower lobe bronchi posteromedially similar to the prior CT.  There is mild uptake in this region, question inflammatory but given persistence this should be reassessed on follow-up CT in 2 to 3 months vs follow up with bronchoscopy. 3.  Mildly increased uptake in the right hilar region, nonspecific may be reactive. 4.  Fairly extensive activity at the mid to distal sigmoid colon. Questionable wall thickening versus underdistention here on limited CT. Patient does have a history of ulcerative colitis which may explain these findings. 5.  Fairly diffuse radiotracer uptake in the thyroid gland,  nonspecific but can be seen with thyroiditis. The study was marked in EPIC for significant notification. Workstation performed: [id number]     Thank you for allowing us to participate in this patient's care. This pt will follow up with Dr. Day Carrasco once discharged. Counseling / Coordination of Care  Total floor / unit time spent today 45 minutes. Greater than 50% of total time was spent with the patient and / or family counseling and / or coordination of care. A description of the counseling / coordination of care: Review of history, current assessment, development of a plan. Code Status: Level 1 - Full Code    ** Please Note: Dragon 360 Dictation voice to text software may have been used in the creation of this document.  **

## 2023-11-08 NOTE — PLAN OF CARE
Problem: OCCUPATIONAL THERAPY ADULT  Goal: Performs self-care activities at highest level of function for planned discharge setting. See evaluation for individualized goals. Description: Treatment Interventions: ADL retraining, Functional transfer training, Endurance training, Continued evaluation, Cardiac education, Energy conservation, Activityengagement          See flowsheet documentation for full assessment, interventions and recommendations. Outcome: Progressing  Note: Limitation: Decreased ADL status, Decreased Safe judgement during ADL, Decreased endurance, Decreased self-care trans, Decreased high-level ADLs  Prognosis: Good  Assessment: Pt is a 79 y.o. female who was admitted to [facility] on 11/7/2023 with Aortic stenosis, severe, s/p TAVR. Pt seen for an OT evaluation per active OT orders, cardiac pxns observed throughout session. Pt  has a past medical history of Allergic rhinitis, Anemia, Arthritis, Low's esophagus, Cancer ([address]), Colon polyp, Cough, Disease of thyroid gland, Diverticulitis, Dizziness, GERD (gastroesophageal reflux disease), Hyperlipidemia, Hypertension, Low vitamin D level, LPRD (laryngopharyngeal reflux disease), PONV (postoperative nausea and vomiting), Postnasal drip, and Ulcerative colitis ([address] Pt lives with spouse in a 1  with 5+1 AIDA, uses a walk-in shower with shower chair and raised toilet with GB. Pta, pt was independent w/ ADL/IADL and functional mobility, was (-) driving recently although can still drive and was using a cane and RW at baseline. Currently, pt is Mod I for UB ADL, Supervision for LB ADL, and completed transfers/FM w Mod I with RW and S with SPC. Pt received education packet, Recovering from Minimally Invasive Heart Surgery, verbalizes understanding. Pt currently presents with impairments in the following categories -steps to enter environment and limited insight into deficits activity tolerance, endurance, and insight.  These impairments, as well as pt's fatigue and cardiac/sternal precautions  limit pt's ability to safely engage in all baseline areas of occupation, includingbathing, dressing, toileting, functional mobility/transfers, and community mobility. The patient's raw score on the AM-PAC Daily Activity Inpatient Short Form is 21. A raw score of greater than or equal to 19 suggests the patient may benefit from discharge to home. Please refer to the recommendation of the Occupational Therapist for safe discharge planning. Pt would benefit from continued acute OT services throughout hospital course and following D/C. Plan for OT interventions 2-3x per week. From OT standpoint, recommend home with home OT services upon D/C. Pt was left seated in bedside chair with all needs within reach.      Rehab Resource Intensity Level, OT: III (Minimum Resource Intensity)

## 2023-11-08 NOTE — PLAN OF CARE
Problem: PHYSICAL THERAPY ADULT  Goal: Performs mobility at highest level of function for planned discharge setting. See evaluation for individualized goals. Description: Treatment/Interventions: LE strengthening/ROM, Elevations, Therapeutic exercise, Endurance training, Equipment eval/education, Bed mobility, Gait training, Spoke to nursing, Spoke to case management, OT  Equipment Recommended: Sindi Pérez       See flowsheet documentation for full assessment, interventions and recommendations. Note: Prognosis: Good  Problem List: Decreased strength, Decreased endurance, Impaired balance, Decreased mobility  Assessment: Pt is 79 y.o. female admitted with Dx of Aortic stenosis, severe and underwent Transcatheter aortic valve replacement with a 26 mm Blum DOROTHEA 3 Ultra Resilia bioprosthetic valve via a percutaneous left transfemoral approach on 11/7/2023. Pt 's comorbidities affecting POC include: Anemia, Arthritis, Cancer ([address]), Dizziness, and Hypertension and personal factors of: advanced age and AIDA. Pt's clinical presentation is currently unstable/unpredictable which is evident in ongoing telem monitoring and abn lab values. Pt presents w/ min overall weakness, decreased functional endurance and inconsistent amb balance and gait patterns w/ overall observed mobility status on level surfaces and elevations likely near premorbid level. Will cont to follow pt in PT for progressive mobilization max level of (I), endurance, and safety. Otherwise, anticipate pt will return home w/ available family support upon D/C when medically cleared; home PT follow up is recommended; will follow        Rehab Resource Intensity Level, PT: III (Minimum Resource Intensity)    See flowsheet documentation for full assessment.

## 2023-11-08 NOTE — OCCUPATIONAL THERAPY NOTE
Occupational Therapy Evaluation     Patient Name: Cherie Abdi  YBOPU'C Date: 11/8/2023  Problem List  Principal Problem: Aortic stenosis, severe  Active Problems:    Essential hypertension    Hypercholesterolemia    Hypothyroidism    Ulcerative colitis without complications (HCC)    Diverticulosis    Low's esophagus    Gastroesophageal reflux disease with esophagitis    Anemia    Chronic cough    Pharyngoesophageal dysphagia    Calcified granuloma of lung (HCC)    S/P TAVR (transcatheter aortic valve replacement)    Postoperative anemia due to acute blood loss    Past Medical History  Past Medical History:   Diagnosis Date    Allergic rhinitis 3/21/2023    Anemia     Arthritis     osteoporosis, djd knees, pt denies osteoporosis on 2/21/22    Low's esophagus     Cancer ([address])     on face    Colon polyp     Cough 02/21/2022    cough for 3 years, expectorates yellow mucus    Disease of thyroid gland     low thyroid    Diverticulitis     gerd, diverticulitis    Dizziness     GERD (gastroesophageal reflux disease)     ulcerative colitis; Low's esophagus, diverticulosis; hx of laryngopharyngeal reflux    Hyperlipidemia     Hypertension     Low vitamin D level     LPRD (laryngopharyngeal reflux disease) 02/21/2022    PONV (postoperative nausea and vomiting)     AFTER "BIG SURGERIES"    Postnasal drip     WITH COUGH    Ulcerative colitis [facility])      Past Surgical History  Past Surgical History:   Procedure Laterality Date    APPENDECTOMY      CARDIAC CATHETERIZATION N/A 9/7/2023    Procedure: Cardiac RHC/LHC; Surgeon: Ricardo Laura MD;  Location: [facility];   Service: Cardiology    CATARACT EXTRACTION Bilateral     COLONOSCOPY      colon polyps    COLONOSCOPY  07/28/2022    FOOT SURGERY Bilateral     bunionectomy    HEMORROIDECTOMY      hemorroid removal    JOINT REPLACEMENT Bilateral     knees    REPLACEMENT TOTAL KNEE Bilateral     b/ replacement    TONSILLECTOMY      TUBAL LIGATION      UPPER GASTROINTESTINAL ENDOSCOPY             11/08/23 0932   OT Last Visit   OT Visit Date 11/08/23   Note Type   Note type Evaluation   Pain Assessment   Pain Assessment Tool 0-10   Pain Score No Pain   Restrictions/Precautions   Weight Bearing Precautions Per Order No   Other Precautions Cardiac/sternal;Telemetry; Fall Risk   Home Living   Type of [facility]  One level;Stairs to enter with rails  (5+1 AIDA)   Bathroom Shower/Tub Walk-in shower   Bathroom Toilet Raised   Bathroom Equipment Shower chair;Grab bars around toilet   [address] Walker;Cane  (uses cane mostly pta)   Additional Comments Pt lives with  in a 1 SH with 5+1 AIDA, uses a walk-in shower with shower chair and raised toilet with GB   Prior Function   Level of [place] Independent with ADLs; Independent with functional mobility; Independent with IADLS   Lives With Spouse   Receives Help From Family   IADLs Independent with meal prep; Independent with medication management; Family/Friend/Other provides transportation  (pt drives although has not been driving recently, reports dtr and son drive)   Falls in the last 6 months 1 to 4  (reports 2 falls)   Vocational Retired   Lifestyle   Autonomy Pta pt I with ADL, IADL and fxnal mobility with cane, also has a RW, pt drives although has been having her son/dtr drive her recently   Reciprocal Relationships supportive spouse, dtr, son   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   Intrinsic Gratification enjoys puzzling   Subjective   Subjective "I have people who can help me if needed"   ADL   Where Assessed Chair   Eating Assistance 6  Modified independent   Grooming Assistance 6  Modified Independent   UB Bathing Assistance 6  Modified Independent    [address] [facility] 4  [address] [facility] 6  Modified independent   [address] Assistance 5  Supervision/Setup   Bed Mobility   Additional Comments Pt OOB in chair throughout session, left in chair with all needs within reach   Transfers   Sit to Stand 6  Modified independent   Stand to Sit 6  Modified independent   Additional Comments with RW   Functional Mobility   Functional Mobility 6  Modified independent   Additional Comments Pt performs short household distance mobility MOD I with RW, when using cane for short distances and steps requiring S for safety   Additional items Rolling walker;SPC  (RW for longer distances, SPC for short distances/steps)   Balance   Static Sitting Fair +   Dynamic Sitting Fair   Static Standing Fair   Dynamic Standing Fair -   Ambulatory Fair -   Activity Tolerance   Activity Tolerance Patient tolerated treatment well;Patient limited by fatigue   Medical Staff Made Aware Co-eval with DPT due to high medical complexity   Nurse Made Aware RN cleared pt for therapy   RUE Assessment   RUE Assessment WFL   LUE Assessment   LUE Assessment WFL   Hand Function   Gross Motor Coordination Functional   Fine Motor Coordination Functional   Sensation   Light Touch No apparent deficits   Psychosocial   Psychosocial (WDL) WDL   Cognition   Overall Cognitive Status WFL   Arousal/Participation Alert; Cooperative   Attention Within functional limits   Orientation Level Oriented to person;Oriented to place;Oriented to situation   Memory Within functional limits   Following Commands Follows one step commands without difficulty   Comments Pt cooperative to therapy, verbalizes understanding to all provided cardiac education although with some decreased insight to deficits at times   Assessment   Limitation Decreased ADL status; Decreased Safe judgement during ADL;Decreased endurance;Decreased self-care trans;Decreased high-level ADLs   Prognosis Good   Assessment Pt is a 79 y.o. female who was admitted to [facility] on 11/7/2023 with Aortic stenosis, severe, s/p TAVR.  Pt seen for an OT evaluation per active OT orders, cardiac pxns observed throughout session. Pt  has a past medical history of Allergic rhinitis, Anemia, Arthritis, Low's esophagus, Cancer ([address]), Colon polyp, Cough, Disease of thyroid gland, Diverticulitis, Dizziness, GERD (gastroesophageal reflux disease), Hyperlipidemia, Hypertension, Low vitamin D level, LPRD (laryngopharyngeal reflux disease), PONV (postoperative nausea and vomiting), Postnasal drip, and Ulcerative colitis ([address] Pt lives with spouse in a 1 SH with 5+1 AIDA, uses a walk-in shower with shower chair and raised toilet with GB. Pta, pt was independent w/ ADL/IADL and functional mobility, was (-) driving recently although can still drive and was using a cane and RW at baseline. Currently, pt is Mod I for UB ADL, Supervision-MIN A for LB ADL, and completed transfers/FM w Mod I with RW and S with SPC. Pt reporting her spouse will be able to assist as needed after returning home. Pt received education packet, Recovering from Minimally Invasive Heart Surgery, verbalizes understanding. Pt currently presents with impairments in the following categories -steps to enter environment and limited insight into deficits activity tolerance, endurance, and insight. These impairments, as well as pt's fatigue and cardiac/sternal precautions  limit pt's ability to safely engage in all baseline areas of occupation, includingbathing, dressing, toileting, functional mobility/transfers, and community mobility. The patient's raw score on the AM-PAC Daily Activity Inpatient Short Form is 21. A raw score of greater than or equal to 19 suggests the patient may benefit from discharge to home. Please refer to the recommendation of the Occupational Therapist for safe discharge planning. Pt would benefit from continued acute OT services throughout hospital course and following D/C. Plan for OT interventions 2-3x per week.  From OT standpoint, recommend home with home OT services upon D/C. Pt was left seated in bedside chair with all needs within reach. Goals   Patient Goals to go home   Plan   Treatment Interventions ADL retraining;Functional transfer training; Endurance training;Continued evaluation;Cardiac education; Energy conservation; Activityengagement   Goal Expiration Date 11/22/23   OT Frequency 2-3x/wk   Discharge Recommendation   Rehab Resource Intensity Level, OT III (Minimum Resource Intensity)   AM-PAC Daily Activity Inpatient   Lower Body Dressing 3   Bathing 3   Toileting 3   Upper Body Dressing 4   Grooming 4   Eating 4   Daily Activity Raw Score 21   Daily Activity Standardized Score (Calc for Raw Score >=11) 44.27   AM-PAC Applied Cognition Inpatient   Following a Speech/Presentation 3   Understanding Ordinary Conversation 4   Taking Medications 4   Remembering Where Things Are Placed or Put Away 4   Remembering List of 4-5 Errands 4   Taking Care of Complicated Tasks 3   Applied Cognition Raw Score 22   Applied Cognition Standardized Score 47.83   End of Consult   Education Provided Yes  (Pt received education packet, Recovering from Minimally Invasive Heart Surgery, verbalizes understanding.)   Patient Position at End of Consult Bedside chair; All needs within reach   Nurse Communication Nurse aware of consult     OT Goals    - Pt will be Mod I with LB ADL by end of hospital course. - Pt activity tolerance will increase to 30 minutes in order to safely engage in ADL and transfers.     - Pt standing tolerance will increase to 10 minutes  in order to promote sink side ADLs and IADL activities. - Pt will attend to functional tasks for 10 minutes with min to no vc for attention/redirection.    -Pt will perform cognitive recall exercise with 3/3 words recalled after 5 minutes in order to support pt cognition during fxnal tasks by end of hospital course.     - Pt will recall all cardiac precautions during OT sessions to promote safety following hospital stay.    - Pt will verbalize and demonstrate understanding to cardiac packet following education in order to promote safety following hospital stay.        KELLEE Gordillo, OTR/L

## 2023-11-08 NOTE — PROGRESS NOTES
Progress Note - Cardiothoracic Surgery   Erica Resendez 79 y.o. female MRN: [id number]  Unit/Bed#: [facility] 403-01 Encounter: [id number]    Aortic stenosis, Non-Rheumatic. S/P transfemoral transcatheter aortic valve replacement; POD # 1    24 Hour Events: Cardene weaned off last evening. Postoperative LBBB resolved. Feels well today, offers no complaints. Postop echo pending.     Medications:   Scheduled Meds:  Current Facility-Administered Medications   Medication Dose Route Frequency Provider Last Rate    acetaminophen  650 mg Rectal Q4H PRN Vania Gordon PA-C      acetaminophen  650 mg Oral Q4H PRN Vania Gordon PA-C      amLODIPine  10 mg Oral Daily Vania Gordon PA-C      aspirin  81 mg Oral Daily Vania Gordon PA-C      atorvastatin  20 mg Oral Daily With Rian Guero Nazario PA-C      bisacodyl  10 mg Rectal Daily PRN Vania Gordon PA-C      cefazolin  1,000 mg Intravenous Q8H Vania Gordon PA-C 1,000 mg (11/08/23 0040)    chlorhexidine  15 mL Mouth/Throat BID Vania Gordon PA-C      clopidogrel  75 mg Oral Daily Vania Gordon PA-C      famotidine  40 mg Oral Daily Before Breakfast Vania Gordon PA-C      fondaparinux  2.5 mg Subcutaneous Daily Vania Gordon PA-C      hydrALAZINE  10 mg Intravenous Q6H PRN Vania Gordon PA-C      insulin lispro  1-5 Units Subcutaneous TID AC Vania Gordon PA-C      levothyroxine  75 mcg Oral Early Morning Vania Gordon PA-C      losartan  25 mg Oral Daily Vania Gordon PA-C      mupirocin  1 Application Nasal H31P [facility] Vania Gordon PA-C      niCARdipine  1-15 mg/hr Intravenous Titrated Vania Gordon PA-C Stopped (11/07/23 1808)    ondansetron  4 mg Intravenous Q6H PRN Vania Gordon PA-C      pantoprazole  40 mg Oral Early Morning Vania Gordon PA-C      polyethylene glycol  17 g Oral Daily Vania Gordon PA-C      sulfaSALAzine  1,000 mg Oral BID Deo Nazario PA-C       Continuous Infusions:niCARdipine, 1-15 mg/hr, Last Rate: Stopped (11/07/23 1808)      PRN Meds:.  acetaminophen    acetaminophen    bisacodyl    hydrALAZINE    ondansetron    Vitals:   Vitals:    11/08/23 0300 11/08/23 0400 11/08/23 0542 11/08/23 0657   BP: 129/81 131/63  122/74   BP Location:    Left arm   Pulse: 68 68  85   Resp: 16 18  20   Temp:    98.4 °F (36.9 °C)   TempSrc:    Oral   SpO2: 95% 96%  97%   Weight:   62.4 kg (137 lb 9.1 oz)    Height:           Telemetry: NSR; Heart Rate: 86    Respiratory:   SpO2: SpO2: 97 %; Room Air    Intake/Output:     Intake/Output Summary (Last 24 hours) at 11/8/2023 0702  Last data filed at 11/8/2023 0543  Gross per 24 hour   Intake 2101.58 ml   Output 2685 ml   Net -583.42 ml        Weights:   Weight (last 2 days)       Date/Time Weight    11/08/23 0542 62.4 (137.57)    11/07/23 0757 60.7 (133.93)              Results:   Results from last 7 days   Lab Units 11/08/23  0532 11/07/23  1606 11/07/23  1034   WBC Thousand/uL 10.46*  --   --    HEMOGLOBIN g/dL 7.3* 8.2* 7.8*   HEMATOCRIT % 22.4* 25.3* 23.9*   PLATELETS Thousands/uL 253  --  217     Results from last 7 days   Lab Units 11/08/23  0532 11/07/23  1034 11/07/23  1008   POTASSIUM mmol/L 3.6 3.2*  --    CHLORIDE mmol/L 102 106  --    CO2 mmol/L 26 24  --    CO2, I-STAT mmol/L  --   --  24   BUN mg/dL 14 11  --    CREATININE mg/dL 0.89 0.72  --    GLUCOSE, ISTAT mg/dl  --   --  99   CALCIUM mg/dL 8.1* 8.3*  --          Point of care glucose: BS 98 - 135  No SSIC required    Studies:    EKG 11/8: NSR< nonspecific ST and T wave abnormality, QRS 86, rate 77    Echocardiogram 11/8: pending    /8: final report pending. Mild pulm vascular congestion, improved in comparison to yesterday. No effusion or ptx. I have personally reviewed pertinent reports.    and I have personally reviewed pertinent films in PACS    Invasive Lines/Tubes:  Invasive Devices       Central Venous Catheter Line  Duration             CVC Central Lines 11/07/23 Triple <1 day                    Physical Exam:    HEENT/NECK:  Normocephalic. Atraumatic. No jugular venous distention. Cardiac: Regular rate and rhythm and No murmurs/rubs/gallops  Pulmonary:  Breath sounds clear bilaterally and No rales/rhonchi/wheezes  Abdomen:  Non-tender, Non-distended, and Normal bowel sounds  Incisions: Groin puncture sites clean, dry, and intact without hematoma  Extremities: Extremities warm/dry and No edema B/L  Neuro: Alert and oriented X 3, Sensation is grossly intact, and No focal deficits  Skin: Warm/Dry, without rashes or lesions. Assessment:  Principal Problem: Aortic stenosis, severe  Active Problems:    Essential hypertension    Hypercholesterolemia    Hypothyroidism    Ulcerative colitis without complications (HCC)    Diverticulosis    Low's esophagus    Gastroesophageal reflux disease with esophagitis    Anemia    Chronic cough    Pharyngoesophageal dysphagia    Calcified granuloma of lung (HCC)    S/P TAVR (transcatheter aortic valve replacement)       Aortic stenosis, Non-Rheumatic. S/P transfemoral transcatheter aortic valve replacement; POD # 1    Plan:    Cardiac:     Normal ventricular systolic function, EF 57%    NSR; HR well-controlled  BP well-controlled   Continue Norvasc 10mg PO daily, Losartan 25mg daily  Not on BB preoperatively. Not indicated    Central IV access no longer required; Remove central venous catheter today    ASA/Plavix    Postoperative transthoracic echocardiogram:  Echo to be completed today    Continue Statin therapy    Continue Arixtra for DVT prophylaxis    Pulmonary:     Good Room air oxygen saturation; Continue incentive spirometry/Coughing/Deep breathing exercises    Renal:     Post op Creatinine stable; Follow up labs prn     Intake/Output net: -583 mL/24 hours    Diuretic Regimen:  Start PO Torsemide, 10 mg QD  Start Potassium Chloride 20 mEq PO QD    Neuro:    Neurologically intact;  No active issues     Incisional pain well-controlled; Continue prn Tylenol    GI:    Cardiac diet, with 1800 mL fluid restriction    Tolerating diet without complaint    Continue stool softeners and prn suppository    Continue GI prophylaxis    Endo:     Glucose well-controlled with insulin sliding scale coverage    7. Hematology:     Post-operative acute blood loss anemia; Hemoglobin 7.3; trend prn    8. Disposition:    Gerontology consultation ordered for routine assessment of TAVR patient over 72years old    Following daily PT/OT recommendations regarding home vs. rehab when medically cleared for discharge  Routine postoperative recovery to this point;  Anticipated discharge date: today, pending satisfactory echo      VTE Pharmacologic Prophylaxis: Fondaparinux (Arixtra)  VTE Mechanical Prophylaxis: sequential compression device    Collaborative rounds completed with supervising physician  Plan of care discussed with bedside nurse    SIGNATURE: SHREYA Chin  DATE: November 8, 2023  TIME: 7:02 AM

## 2023-11-08 NOTE — RESTORATIVE TECHNICIAN NOTE
Restorative Technician Note      Patient Name: Esther Delgado     Restorative Tech Visit Date: 11/08/23  Note Type: Mobility  Patient Position Upon Consult: Bedside chair  Activity Performed: Ambulated  Assistive Device: Roller walker  Patient Position at End of Consult: All needs within reach;  Bedside chair

## 2023-11-09 ENCOUNTER — TELEPHONE (OUTPATIENT)
Dept: HEMATOLOGY ONCOLOGY | Facility: CLINIC | Age: 83
End: 2023-11-09

## 2023-11-09 DIAGNOSIS — D50.0 IRON DEFICIENCY ANEMIA DUE TO CHRONIC BLOOD LOSS: ICD-10-CM

## 2023-11-09 DIAGNOSIS — E53.8 B12 DEFICIENCY: Primary | ICD-10-CM

## 2023-11-09 DIAGNOSIS — D53.9 MACROCYTIC ANEMIA: ICD-10-CM

## 2023-11-09 LAB
ABO GROUP BLD BPU: NORMAL
ATRIAL RATE: 70 BPM
BPU ID: NORMAL
CROSSMATCH: NORMAL
P AXIS: -9 DEGREES
PR INTERVAL: 175 MS
QRS AXIS: -54 DEGREES
QRSD INTERVAL: 154 MS
QT INTERVAL: 513 MS
QTC INTERVAL: 554 MS
T WAVE AXIS: 123 DEGREES
UNIT DISPENSE STATUS: NORMAL
UNIT PRODUCT CODE: NORMAL
UNIT PRODUCT VOLUME: 300 ML
UNIT RH: NORMAL
VENTRICULAR RATE: 70 BPM

## 2023-11-09 PROCEDURE — 93010 ELECTROCARDIOGRAM REPORT: CPT | Performed by: INTERNAL MEDICINE

## 2023-11-09 NOTE — TELEPHONE ENCOUNTER
Please cancel infusion appointment on 11/13/23. Per Joseph Palmer patient vitamin b12 level was elevated last checked. Call out to patient, reviewed appointment for Monday 11/13/23 to be cancelled. Reviewed labs are needed prior to seeing Dr. Mendoza [place] on 12/4/23. Patient verbalized understanding.

## 2023-11-10 ENCOUNTER — HOME CARE VISIT (OUTPATIENT)
Dept: HOME HEALTH SERVICES | Facility: HOME HEALTHCARE | Age: 83
End: 2023-11-10

## 2023-11-11 ENCOUNTER — HOME CARE VISIT (OUTPATIENT)
Dept: HOME HEALTH SERVICES | Facility: HOME HEALTHCARE | Age: 83
End: 2023-11-11
Payer: MEDICARE

## 2023-11-11 PROCEDURE — 10330081 VN NO-PAY CLAIM PROCEDURE

## 2023-11-11 PROCEDURE — G0299 HHS/HOSPICE OF RN EA 15 MIN: HCPCS

## 2023-11-11 PROCEDURE — 400013 VN SOC

## 2023-11-12 VITALS
BODY MASS INDEX: 25.86 KG/M2 | OXYGEN SATURATION: 99 % | TEMPERATURE: 98.7 F | SYSTOLIC BLOOD PRESSURE: 189 MMHG | WEIGHT: 137 LBS | RESPIRATION RATE: 18 BRPM | HEIGHT: 61 IN | HEART RATE: 87 BPM | DIASTOLIC BLOOD PRESSURE: 68 MMHG

## 2023-11-12 NOTE — CASE COMMUNICATION
ke's A has admitted your patient to [facility] with the following disciplines:      SN, PT and OT  This report is informational only, no responses is needed  Primary focus of home health care- Cardiac  Patient stated goals of care- improve mobility and resume previous activity. Anticipated visit pattern and next visit date- Next visit for 11/14 1w1 2w1 1w3   See medication list - meds in home differ from AVS- all medic ation in home at [facility]  Significant clinical findings- pt states she is having loose stools since being home. She has a hx of colitis. SN and pt reviewed diet. Potential barriers to goal achievement- fatigues easily    Thank you for allowing us to participate in the care of your patient.       [facility] Colon RN

## 2023-11-13 ENCOUNTER — HOSPITAL ENCOUNTER (OUTPATIENT)
Dept: INFUSION CENTER | Facility: CLINIC | Age: 83
Discharge: HOME | End: 2023-11-13

## 2023-11-13 ENCOUNTER — HOME CARE VISIT (OUTPATIENT)
Dept: HOME HEALTH SERVICES | Facility: HOME HEALTHCARE | Age: 83
End: 2023-11-13
Payer: MEDICARE

## 2023-11-13 ENCOUNTER — OFFICE VISIT (OUTPATIENT)
Dept: FAMILY MEDICINE CLINIC | Facility: CLINIC | Age: 83
End: 2023-11-13
Payer: MEDICARE

## 2023-11-13 VITALS
BODY MASS INDEX: 26.06 KG/M2 | DIASTOLIC BLOOD PRESSURE: 64 MMHG | WEIGHT: 138 LBS | RESPIRATION RATE: 20 BRPM | HEIGHT: 61 IN | OXYGEN SATURATION: 93 % | HEART RATE: 80 BPM | SYSTOLIC BLOOD PRESSURE: 128 MMHG

## 2023-11-13 DIAGNOSIS — I10 ESSENTIAL HYPERTENSION: ICD-10-CM

## 2023-11-13 DIAGNOSIS — E78.00 HYPERCHOLESTEROLEMIA: ICD-10-CM

## 2023-11-13 DIAGNOSIS — D64.9 ANEMIA, UNSPECIFIED TYPE: ICD-10-CM

## 2023-11-13 DIAGNOSIS — Z95.2 S/P TAVR (TRANSCATHETER AORTIC VALVE REPLACEMENT): Primary | ICD-10-CM

## 2023-11-13 DIAGNOSIS — N18.30 STAGE 3 CHRONIC KIDNEY DISEASE, UNSPECIFIED WHETHER STAGE 3A OR 3B CKD (HCC): ICD-10-CM

## 2023-11-13 DIAGNOSIS — K51.90 ULCERATIVE COLITIS WITHOUT COMPLICATIONS, UNSPECIFIED LOCATION (HCC): ICD-10-CM

## 2023-11-13 DIAGNOSIS — E03.9 ACQUIRED HYPOTHYROIDISM: ICD-10-CM

## 2023-11-13 PROCEDURE — 99496 TRANSJ CARE MGMT HIGH F2F 7D: CPT | Performed by: FAMILY MEDICINE

## 2023-11-13 RX ORDER — ATORVASTATIN CALCIUM 20 MG/1
20 TABLET, FILM COATED ORAL DAILY
Qty: 90 TABLET | Refills: 2 | Status: SHIPPED | OUTPATIENT
Start: 2023-11-13

## 2023-11-13 NOTE — ASSESSMENT & PLAN NOTE
LDL 72 in Sept 2023. Continue atorvastatin 20 mg qhs. Advised pt to follow a low cholesterol diet and to exercise on a regular basis.

## 2023-11-13 NOTE — ASSESSMENT & PLAN NOTE
Lab Results   Component Value Date    EGFR 59 11/08/2023    EGFR 77 11/07/2023    EGFR 71 11/01/2023    CREATININE 0.89 11/08/2023    CREATININE 0.72 11/07/2023    CREATININE 0.77 11/01/2023     GFR 59 in Nov 2023.

## 2023-11-13 NOTE — PROGRESS NOTES
Assessment/Plan:         Problem List Items Addressed This Visit        Digestive    Ulcerative colitis without complications ([address])     Still has pain at times and loose stools. No recent bleeding. Endocrine    Hypothyroidism     TSH 5.770 and Free T4 0.91 in Sept 2023. Continue levothyroxine 75 mcg qd. Cardiovascular and Mediastinum    Essential hypertension     Blood pressure good. Continue valsartan 80 mg daily and amlodipine 10 mg daily. Pt advised to continue low Na diet and to exercise on a regular basis. Genitourinary    Stage 3 chronic kidney disease, unspecified whether stage 3a or 3b CKD (HCC)     Lab Results   Component Value Date    EGFR 59 11/08/2023    EGFR 77 11/07/2023    EGFR 71 11/01/2023    CREATININE 0.89 11/08/2023    CREATININE 0.72 11/07/2023    CREATININE 0.77 11/01/2023     GFR 59 in Nov 2023. Other    S/P TAVR (transcatheter aortic valve replacement) - Primary     Patient had surgery on 11/7 and discharged on 11/8. Patient doing well. Has appointment with Cardiology on 10/20 and with CT Surgery on 12/4. Hypercholesterolemia     LDL 72 in Sept 2023. Continue atorvastatin 20 mg qhs. Advised pt to follow a low cholesterol diet and to exercise on a regular basis. Relevant Medications    atorvastatin (LIPITOR) 20 mg tablet    Anemia     Patient saw Hematology in July 2023. Hgb 7.3 in Nov 2023 after surgery. Relevant Orders    CBC and differential     TCM Call     Date and time call was made  11/8/2023  4:36 PM    Hospital care reviewed  Records reviewed    Patient was hospitialized at  [facility]    Date of Admission  11/04/23    Date of discharge  11/08/23    Diagnosis  Aortic stenosis, severe    Disposition  Home    Were the patients medications reviewed and updated  Yes    Current Symptoms  None      TCM Call     Post hospital issues  None    Scheduled for follow up?   Yes    Patients specialists Cardiologist    Did you obtain your prescribed medications  Yes    Do you need help managing your prescriptions or medications  No    Is transportation to your appointment needed  No    I have advised the patient to call PCP with any new or worsening symptoms  Sachi Stout            Subjective:      Patient ID: Karrie Del Castillo is a 79 y.o. female. Patient here for follow-up hospital stay for AVR. Patient admitted on 11/7 and discharged on 11/8. Patient doing ok. No chest pain or shortness of breath. No headaches. Still has cough and PND. Also has abdominal pain at times with UC. Has appointment with Cardiology next week and with CT Surgery in Dec 2023. Blood pressure has been good. Hgb still low and taking iron pill daily. The following portions of the patient's history were reviewed and updated as appropriate:   Past Medical History:  She has a past medical history of Allergic rhinitis (3/21/2023), Anemia, Arthritis, Low's esophagus, Cancer ([address]), Colon polyp, Cough (02/21/2022), Disease of thyroid gland, Diverticulitis, Dizziness, GERD (gastroesophageal reflux disease), Hyperlipidemia, Hypertension, Low vitamin D level, LPRD (laryngopharyngeal reflux disease) (02/21/2022), PONV (postoperative nausea and vomiting), Postnasal drip, and Ulcerative colitis ([address]). ,  _______________________________________________________________________  Medical Problems:  does not have any pertinent problems on file.,  _______________________________________________________________________  Past Surgical History:   has a past surgical history that includes Colonoscopy; Replacement total knee (Bilateral); Hemorroidectomy; Tonsillectomy; Tubal ligation; Foot surgery (Bilateral); Upper gastrointestinal endoscopy; Colonoscopy (07/28/2022); Joint replacement (Bilateral); Cataract extraction (Bilateral);  Appendectomy; Cardiac catheterization (N/A, 9/7/2023); pr replace aortic valve openfemoral artery approach (N/A, 11/7/2023); and Cardiac catheterization (N/A, 11/7/2023). ,  _______________________________________________________________________  Family History:  family history includes Cancer in her daughter; Cancer (age of onset: 54) in her brother; Cancer (age of onset: 79) in her brother; Heart disease in her brother and father; No Known Problems in her maternal aunt, maternal grandmother, paternal aunt, paternal grandmother, sister, and sister. ,  _______________________________________________________________________  Social History:   reports that she has never smoked. She has never used smokeless tobacco. She reports that she does not currently use alcohol. She reports that she does not use drugs. ,  _______________________________________________________________________  Allergies:  is allergic to sulfa antibiotics. .  _______________________________________________________________________  Current Outpatient Medications   Medication Sig Dispense Refill   • acetaminophen (TYLENOL) 325 mg tablet Take 2 tablets (650 mg total) by mouth every 6 (six) hours as needed for fever, mild pain, moderate pain or headaches (temperature greater than 101 F)  0   • amLODIPine (NORVASC) 10 mg tablet Take 1 tablet (10 mg total) by mouth daily 90 tablet 2   • aspirin 81 mg chewable tablet Chew 1 tablet (81 mg total) daily Do not start before November 9, 2023. 30 tablet 5   • atorvastatin (LIPITOR) 20 mg tablet Take 1 tablet (20 mg total) by mouth daily 90 tablet 2   • cholecalciferol (VITAMIN D3) 1,000 units tablet Take 1,000 Units by mouth daily     • clopidogrel (PLAVIX) 75 mg tablet Take 1 tablet (75 mg total) by mouth daily Take for 90 days total following TAVR Do not start before November 9, 2023. 30 tablet 2   • Coenzyme Q10 (Co Q 10) 100 MG CAPS Take by mouth     • Cyanocobalamin (VITAMIN B 12 PO) Take by mouth     • levothyroxine 75 mcg tablet Take 1 tablet by mouth once daily 90 tablet 1   • Multiple Vitamin (multivitamin) capsule Take 1 capsule by mouth daily     • NON FORMULARY Take 1 tablet by mouth daily after dinner slow fe 45 mg      • pantoprazole (PROTONIX) 40 mg tablet TAKE 1 TABLET BY MOUTH TWICE DAILY 30 MINUTES BEFORE MEALS (Patient taking differently: Take 40 mg by mouth daily) 60 tablet 0   • potassium chloride (K-DUR,KLOR-CON) 10 mEq tablet Take 1 tablet (10 mEq total) by mouth daily Take for 3 days then stop 3 tablet 0   • sulfaSALAzine (AZULFIDINE) 500 mg tablet Take 2 tablets (1,000 mg total) by mouth 2 (two) times a day 360 tablet 2   • torsemide (DEMADEX) 10 mg tablet Take 1 tablet (10 mg total) by mouth daily Take for 3 days then stop 3 tablet 0   • valsartan (DIOVAN) 80 mg tablet Take 1 tablet by mouth once daily 90 tablet 0   • vitamin A 2250 MCG (7500 UT) capsule Take 7,500 Units by mouth daily     • vitamin E, tocopherol, 1,000 units capsule Take 1,000 Units by mouth daily       No current facility-administered medications for this visit.     _______________________________________________________________________  Review of Systems   Constitutional:  Positive for fatigue. Negative for unexpected weight change. Respiratory:  Positive for cough. Negative for chest tightness and shortness of breath. Cardiovascular:  Negative for chest pain and palpitations. Gastrointestinal:  Negative for abdominal pain, constipation, diarrhea, nausea and vomiting. Genitourinary:  Negative for difficulty urinating. Musculoskeletal:  Negative for arthralgias. Skin:  Negative for rash. Neurological:  Negative for dizziness and headaches. Objective:  Vitals:    11/13/23 1459   BP: 128/64   BP Location: Left arm   Patient Position: Sitting   Cuff Size: Standard   Pulse: 80   Resp: 20   SpO2: 93%   Weight: 62.6 kg (138 lb)   Height: 5' 1" (1.549 m)     Body mass index is 26.07 kg/m². Physical Exam  Vitals and nursing note reviewed. Constitutional:       Appearance: Normal appearance. She is well-developed. Comments: Uses cane to ambulate. HENT:      Head: Normocephalic and atraumatic. Cardiovascular:      Rate and Rhythm: Normal rate and regular rhythm. Heart sounds: Normal heart sounds. No murmur heard. Pulmonary:      Effort: Pulmonary effort is normal. No respiratory distress. Breath sounds: Normal breath sounds. No wheezing. Musculoskeletal:      Cervical back: Normal range of motion and neck supple. Right lower leg: No edema. Left lower leg: No edema. Lymphadenopathy:      Cervical: No cervical adenopathy. Neurological:      Mental Status: She is alert and oriented to person, place, and time. Psychiatric:         Mood and Affect: Mood normal.         Behavior: Behavior normal.         Thought Content:  Thought content normal.         Judgment: Judgment normal.

## 2023-11-13 NOTE — ASSESSMENT & PLAN NOTE
Patient had surgery on 11/7 and discharged on 11/8. Patient doing well. Has appointment with Cardiology on 10/20 and with CT Surgery on 12/4.

## 2023-11-13 NOTE — ASSESSMENT & PLAN NOTE
Blood pressure good. Continue valsartan 80 mg daily and amlodipine 10 mg daily. Pt advised to continue low Na diet and to exercise on a regular basis.

## 2023-11-14 ENCOUNTER — HOME CARE VISIT (OUTPATIENT)
Dept: HOME HEALTH SERVICES | Facility: HOME HEALTHCARE | Age: 83
End: 2023-11-14
Payer: MEDICARE

## 2023-11-14 VITALS
TEMPERATURE: 96 F | DIASTOLIC BLOOD PRESSURE: 68 MMHG | OXYGEN SATURATION: 98 % | HEART RATE: 78 BPM | RESPIRATION RATE: 16 BRPM | SYSTOLIC BLOOD PRESSURE: 120 MMHG

## 2023-11-14 VITALS — OXYGEN SATURATION: 97 % | DIASTOLIC BLOOD PRESSURE: 62 MMHG | HEART RATE: 72 BPM | SYSTOLIC BLOOD PRESSURE: 118 MMHG

## 2023-11-14 VITALS — OXYGEN SATURATION: 98 % | HEART RATE: 87 BPM | SYSTOLIC BLOOD PRESSURE: 140 MMHG | DIASTOLIC BLOOD PRESSURE: 80 MMHG

## 2023-11-14 PROCEDURE — G0152 HHCP-SERV OF OT,EA 15 MIN: HCPCS

## 2023-11-14 PROCEDURE — G0151 HHCP-SERV OF PT,EA 15 MIN: HCPCS

## 2023-11-14 PROCEDURE — G0300 HHS/HOSPICE OF LPN EA 15 MIN: HCPCS

## 2023-11-15 ENCOUNTER — TELEPHONE (OUTPATIENT)
Dept: CARDIAC SURGERY | Facility: CLINIC | Age: 83
End: 2023-11-15

## 2023-11-15 NOTE — TELEPHONE ENCOUNTER
POST OP CALL    11/7/23: Elective TAVR  11/8/23: Discharge to home  11/13/23: PCP appt  11/15/23: Mildred Romero with patient. She states she is doing well, offers no complaints. She is tolerating ambulation and light activity without significant SOB or lightheadedness, Weight is stable, no edema. Groin site healing well. Patient understands her meds.   Questions answered  Appts reviewed

## 2023-11-17 ENCOUNTER — HOME CARE VISIT (OUTPATIENT)
Dept: HOME HEALTH SERVICES | Facility: HOME HEALTHCARE | Age: 83
End: 2023-11-17
Payer: MEDICARE

## 2023-11-17 ENCOUNTER — APPOINTMENT (OUTPATIENT)
Dept: LAB | Facility: HOSPITAL | Age: 83
End: 2023-11-17
Payer: MEDICARE

## 2023-11-17 VITALS
OXYGEN SATURATION: 98 % | DIASTOLIC BLOOD PRESSURE: 62 MMHG | TEMPERATURE: 98.4 F | RESPIRATION RATE: 18 BRPM | HEART RATE: 72 BPM | SYSTOLIC BLOOD PRESSURE: 142 MMHG

## 2023-11-17 DIAGNOSIS — D53.9 MACROCYTIC ANEMIA: ICD-10-CM

## 2023-11-17 DIAGNOSIS — Z95.2 S/P TAVR (TRANSCATHETER AORTIC VALVE REPLACEMENT): ICD-10-CM

## 2023-11-17 DIAGNOSIS — D50.0 IRON DEFICIENCY ANEMIA DUE TO CHRONIC BLOOD LOSS: ICD-10-CM

## 2023-11-17 DIAGNOSIS — E53.8 B12 DEFICIENCY: ICD-10-CM

## 2023-11-17 DIAGNOSIS — D64.9 ANEMIA, UNSPECIFIED TYPE: ICD-10-CM

## 2023-11-17 DIAGNOSIS — I35.0 AORTIC STENOSIS, SEVERE: ICD-10-CM

## 2023-11-17 LAB
ALBUMIN SERPL BCP-MCNC: 3.6 G/DL (ref 3.5–5)
ALP SERPL-CCNC: 69 U/L (ref 34–104)
ALT SERPL W P-5'-P-CCNC: 8 U/L (ref 7–52)
ANION GAP SERPL CALCULATED.3IONS-SCNC: 7 MMOL/L
AST SERPL W P-5'-P-CCNC: 20 U/L (ref 13–39)
BASOPHILS # BLD AUTO: 0.04 THOUSANDS/ÂΜL (ref 0–0.1)
BASOPHILS NFR BLD AUTO: 1 % (ref 0–1)
BILIRUB SERPL-MCNC: 0.52 MG/DL (ref 0.2–1)
BUN SERPL-MCNC: 10 MG/DL (ref 5–25)
CALCIUM SERPL-MCNC: 8.9 MG/DL (ref 8.4–10.2)
CHLORIDE SERPL-SCNC: 99 MMOL/L (ref 96–108)
CO2 SERPL-SCNC: 27 MMOL/L (ref 21–32)
CREAT SERPL-MCNC: 0.77 MG/DL (ref 0.6–1.3)
EOSINOPHIL # BLD AUTO: 0.27 THOUSAND/ÂΜL (ref 0–0.61)
EOSINOPHIL NFR BLD AUTO: 6 % (ref 0–6)
ERYTHROCYTE [DISTWIDTH] IN BLOOD BY AUTOMATED COUNT: 14.7 % (ref 11.6–15.1)
FERRITIN SERPL-MCNC: 50 NG/ML (ref 11–307)
FOLATE SERPL-MCNC: 10.4 NG/ML
GFR SERPL CREATININE-BSD FRML MDRD: 71 ML/MIN/1.73SQ M
GLUCOSE SERPL-MCNC: 84 MG/DL (ref 65–140)
HCT VFR BLD AUTO: 23.5 % (ref 34.8–46.1)
HGB BLD-MCNC: 7.7 G/DL (ref 11.5–15.4)
IMM GRANULOCYTES # BLD AUTO: 0.02 THOUSAND/UL (ref 0–0.2)
IMM GRANULOCYTES NFR BLD AUTO: 0 % (ref 0–2)
IRON SATN MFR SERPL: 25 % (ref 15–50)
IRON SERPL-MCNC: 66 UG/DL (ref 50–212)
LYMPHOCYTES # BLD AUTO: 1.52 THOUSANDS/ÂΜL (ref 0.6–4.47)
LYMPHOCYTES NFR BLD AUTO: 33 % (ref 14–44)
MCH RBC QN AUTO: 33.3 PG (ref 26.8–34.3)
MCHC RBC AUTO-ENTMCNC: 32.8 G/DL (ref 31.4–37.4)
MCV RBC AUTO: 102 FL (ref 82–98)
MONOCYTES # BLD AUTO: 0.51 THOUSAND/ÂΜL (ref 0.17–1.22)
MONOCYTES NFR BLD AUTO: 11 % (ref 4–12)
NEUTROPHILS # BLD AUTO: 2.32 THOUSANDS/ÂΜL (ref 1.85–7.62)
NEUTS SEG NFR BLD AUTO: 49 % (ref 43–75)
NRBC BLD AUTO-RTO: 0 /100 WBCS
PLATELET # BLD AUTO: 309 THOUSANDS/UL (ref 149–390)
PMV BLD AUTO: 10 FL (ref 8.9–12.7)
POTASSIUM SERPL-SCNC: 4.2 MMOL/L (ref 3.5–5.3)
PROT SERPL-MCNC: 6.4 G/DL (ref 6.4–8.4)
RBC # BLD AUTO: 2.31 MILLION/UL (ref 3.81–5.12)
RETICS # AUTO: ABNORMAL 10*3/UL (ref 14097–95744)
RETICS # CALC: 4.24 % (ref 0.37–1.87)
SODIUM SERPL-SCNC: 133 MMOL/L (ref 135–147)
TIBC SERPL-MCNC: 259 UG/DL (ref 250–450)
UIBC SERPL-MCNC: 193 UG/DL (ref 155–355)
VIT B12 SERPL-MCNC: 2124 PG/ML (ref 180–914)
WBC # BLD AUTO: 4.68 THOUSAND/UL (ref 4.31–10.16)

## 2023-11-17 PROCEDURE — 80053 COMPREHEN METABOLIC PANEL: CPT

## 2023-11-17 PROCEDURE — 82728 ASSAY OF FERRITIN: CPT

## 2023-11-17 PROCEDURE — 82746 ASSAY OF FOLIC ACID SERUM: CPT

## 2023-11-17 PROCEDURE — 85045 AUTOMATED RETICULOCYTE COUNT: CPT

## 2023-11-17 PROCEDURE — 83540 ASSAY OF IRON: CPT

## 2023-11-17 PROCEDURE — 82607 VITAMIN B-12: CPT

## 2023-11-17 PROCEDURE — 36415 COLL VENOUS BLD VENIPUNCTURE: CPT

## 2023-11-17 PROCEDURE — 83550 IRON BINDING TEST: CPT

## 2023-11-17 PROCEDURE — G0300 HHS/HOSPICE OF LPN EA 15 MIN: HCPCS

## 2023-11-17 PROCEDURE — 85025 COMPLETE CBC W/AUTO DIFF WBC: CPT

## 2023-11-18 PROBLEM — I44.7 NEW ONSET LEFT BUNDLE BRANCH BLOCK (LBBB): Status: ACTIVE | Noted: 2023-11-18

## 2023-11-20 PROCEDURE — G0180 MD CERTIFICATION HHA PATIENT: HCPCS | Performed by: PHYSICIAN ASSISTANT

## 2023-11-21 ENCOUNTER — HOME CARE VISIT (OUTPATIENT)
Dept: HOME HEALTH SERVICES | Facility: HOME HEALTHCARE | Age: 83
End: 2023-11-21
Payer: MEDICARE

## 2023-11-21 VITALS — SYSTOLIC BLOOD PRESSURE: 128 MMHG | DIASTOLIC BLOOD PRESSURE: 70 MMHG | OXYGEN SATURATION: 95 % | HEART RATE: 71 BPM

## 2023-11-21 PROCEDURE — G0157 HHC PT ASSISTANT EA 15: HCPCS

## 2023-11-22 ENCOUNTER — TELEPHONE (OUTPATIENT)
Dept: HEMATOLOGY ONCOLOGY | Facility: CLINIC | Age: 83
End: 2023-11-22

## 2023-11-22 ENCOUNTER — TELEPHONE (OUTPATIENT)
Dept: FAMILY MEDICINE CLINIC | Facility: CLINIC | Age: 83
End: 2023-11-22

## 2023-11-22 DIAGNOSIS — R11.0 NAUSEA: Primary | ICD-10-CM

## 2023-11-22 RX ORDER — ONDANSETRON 4 MG/1
4 TABLET, FILM COATED ORAL EVERY 8 HOURS PRN
Qty: 20 TABLET | Refills: 0 | Status: CANCELLED | OUTPATIENT
Start: 2023-11-22

## 2023-11-22 RX ORDER — ONDANSETRON 4 MG/1
4 TABLET, ORALLY DISINTEGRATING ORAL EVERY 6 HOURS PRN
Qty: 20 TABLET | Refills: 0 | Status: SHIPPED | OUTPATIENT
Start: 2023-11-22

## 2023-11-22 NOTE — TELEPHONE ENCOUNTER
Call out to patient, left vm on preferred phone number. RN teams number reviewed for further questions and concerns. Reviewed recommendations to keep appointment as scheduled and to go to ED if patient has any s/s. If patient presents with shortness of breath, chest pain, increase in fatigue, weakness to go to the ED. Per Dr. Jose Puga no further recommendations at this time.

## 2023-11-22 NOTE — TELEPHONE ENCOUNTER
Received vm from patient, "Hi, this is Marla Space calling him [facility] trying to get a hold of Eleonora back. Aylin Simons, or whatever her name is. My name is Marla Varela, birth date 1940 and the phone number is [phone]. Please give me a call. It's about the blood work results that came back. Thank you. Estevan Marshall."    Patient HMG 7.7, patient has RANDA appointment in [facility] on 12/4/23. PCP recommends sooner appointment.

## 2023-11-22 NOTE — TELEPHONE ENCOUNTER
Patient experiencing nausea after eating. Can she get a prescription for Ondansetrom-HCL 4 mg, tablets under tongue.       [address]

## 2023-11-22 NOTE — TELEPHONE ENCOUNTER
Patient walked into [facility]. States she was told to ask if she could wait until 12/4 for appointment with Dr. Rossana Delgado.  I explained that her labs were reviewed today by Dr. Jeffrey Bryan and that no intervention was suggested at this time. I asked the patient if she was feeling ok, and she stated she was very tired and just did not feel well at all. I asked her if she though she needed to be evaluated in the ED and she stated not at this time, but if she felt worse prior to her follow up, that she would go there to be evaluated.

## 2023-11-24 ENCOUNTER — HOME CARE VISIT (OUTPATIENT)
Dept: HOME HEALTH SERVICES | Facility: HOME HEALTHCARE | Age: 83
End: 2023-11-24
Payer: MEDICARE

## 2023-11-27 DIAGNOSIS — I10 ESSENTIAL HYPERTENSION: ICD-10-CM

## 2023-11-27 RX ORDER — VALSARTAN 80 MG/1
TABLET ORAL
Qty: 90 TABLET | Refills: 0 | Status: SHIPPED | OUTPATIENT
Start: 2023-11-27

## 2023-11-28 ENCOUNTER — HOME CARE VISIT (OUTPATIENT)
Dept: HOME HEALTH SERVICES | Facility: HOME HEALTHCARE | Age: 83
End: 2023-11-28
Payer: MEDICARE

## 2023-11-28 ENCOUNTER — LAB REQUISITION (OUTPATIENT)
Dept: LAB | Facility: HOSPITAL | Age: 83
DRG: 378 | End: 2023-11-28
Payer: MEDICARE

## 2023-11-28 VITALS
DIASTOLIC BLOOD PRESSURE: 64 MMHG | OXYGEN SATURATION: 97 % | TEMPERATURE: 97.3 F | SYSTOLIC BLOOD PRESSURE: 110 MMHG | RESPIRATION RATE: 16 BRPM | HEART RATE: 88 BPM

## 2023-11-28 DIAGNOSIS — D64.9 ANEMIA, UNSPECIFIED: ICD-10-CM

## 2023-11-28 LAB
BASOPHILS # BLD AUTO: 0.04 THOUSANDS/ÂΜL (ref 0–0.1)
BASOPHILS NFR BLD AUTO: 1 % (ref 0–1)
EOSINOPHIL # BLD AUTO: 0.23 THOUSAND/ÂΜL (ref 0–0.61)
EOSINOPHIL NFR BLD AUTO: 5 % (ref 0–6)
ERYTHROCYTE [DISTWIDTH] IN BLOOD BY AUTOMATED COUNT: 15.3 % (ref 11.6–15.1)
HCT VFR BLD AUTO: 24.2 % (ref 34.8–46.1)
HGB BLD-MCNC: 7.5 G/DL (ref 11.5–15.4)
IMM GRANULOCYTES # BLD AUTO: 0.01 THOUSAND/UL (ref 0–0.2)
IMM GRANULOCYTES NFR BLD AUTO: 0 % (ref 0–2)
LYMPHOCYTES # BLD AUTO: 1.52 THOUSANDS/ÂΜL (ref 0.6–4.47)
LYMPHOCYTES NFR BLD AUTO: 33 % (ref 14–44)
MCH RBC QN AUTO: 33.5 PG (ref 26.8–34.3)
MCHC RBC AUTO-ENTMCNC: 31 G/DL (ref 31.4–37.4)
MCV RBC AUTO: 108 FL (ref 82–98)
MONOCYTES # BLD AUTO: 0.63 THOUSAND/ÂΜL (ref 0.17–1.22)
MONOCYTES NFR BLD AUTO: 14 % (ref 4–12)
NEUTROPHILS # BLD AUTO: 2.12 THOUSANDS/ÂΜL (ref 1.85–7.62)
NEUTS SEG NFR BLD AUTO: 47 % (ref 43–75)
NRBC BLD AUTO-RTO: 0 /100 WBCS
PLATELET # BLD AUTO: 313 THOUSANDS/UL (ref 149–390)
PMV BLD AUTO: 9.6 FL (ref 8.9–12.7)
RBC # BLD AUTO: 2.24 MILLION/UL (ref 3.81–5.12)
WBC # BLD AUTO: 4.55 THOUSAND/UL (ref 4.31–10.16)

## 2023-11-28 PROCEDURE — 85025 COMPLETE CBC W/AUTO DIFF WBC: CPT | Performed by: FAMILY MEDICINE

## 2023-11-28 PROCEDURE — G0300 HHS/HOSPICE OF LPN EA 15 MIN: HCPCS

## 2023-11-29 ENCOUNTER — HOME CARE VISIT (OUTPATIENT)
Dept: HOME HEALTH SERVICES | Facility: HOME HEALTHCARE | Age: 83
End: 2023-11-29
Payer: MEDICARE

## 2023-11-29 ENCOUNTER — HOSPITAL ENCOUNTER (INPATIENT)
Facility: HOSPITAL | Age: 83
LOS: 3 days | Discharge: HOME HEALTH | DRG: 378 | End: 2023-12-02
Attending: EMERGENCY MEDICINE | Admitting: INTERNAL MEDICINE
Payer: MEDICARE

## 2023-11-29 ENCOUNTER — APPOINTMENT (EMERGENCY)
Dept: CT IMAGING | Facility: HOSPITAL | Age: 83
DRG: 378 | End: 2023-11-29
Payer: MEDICARE

## 2023-11-29 DIAGNOSIS — R10.9 ABDOMINAL PAIN: Primary | ICD-10-CM

## 2023-11-29 DIAGNOSIS — K92.1 BLACK TARRY STOOLS: ICD-10-CM

## 2023-11-29 DIAGNOSIS — K57.92 DIVERTICULITIS: ICD-10-CM

## 2023-11-29 DIAGNOSIS — K52.9 ACUTE COLITIS: ICD-10-CM

## 2023-11-29 DIAGNOSIS — D50.0 ANEMIA, BLOOD LOSS: ICD-10-CM

## 2023-11-29 DIAGNOSIS — D64.9 CHRONIC ANEMIA: ICD-10-CM

## 2023-11-29 DIAGNOSIS — R26.9 GAIT DIFFICULTY: ICD-10-CM

## 2023-11-29 DIAGNOSIS — K92.2 GI BLEED: ICD-10-CM

## 2023-11-29 LAB
2HR DELTA HS TROPONIN: 1 NG/L
ABO GROUP BLD: NORMAL
ALBUMIN SERPL BCP-MCNC: 3.7 G/DL (ref 3.5–5)
ALP SERPL-CCNC: 70 U/L (ref 34–104)
ALT SERPL W P-5'-P-CCNC: 8 U/L (ref 7–52)
ANION GAP SERPL CALCULATED.3IONS-SCNC: 9 MMOL/L
APTT PPP: 30 SECONDS (ref 23–37)
AST SERPL W P-5'-P-CCNC: 19 U/L (ref 13–39)
BASOPHILS # BLD AUTO: 0.05 THOUSANDS/ÂΜL (ref 0–0.1)
BASOPHILS NFR BLD AUTO: 1 % (ref 0–1)
BILIRUB SERPL-MCNC: 0.51 MG/DL (ref 0.2–1)
BLD GP AB SCN SERPL QL: NEGATIVE
BUN SERPL-MCNC: 10 MG/DL (ref 5–25)
CALCIUM SERPL-MCNC: 8.8 MG/DL (ref 8.4–10.2)
CARDIAC TROPONIN I PNL SERPL HS: 7 NG/L
CARDIAC TROPONIN I PNL SERPL HS: 8 NG/L
CHLORIDE SERPL-SCNC: 100 MMOL/L (ref 96–108)
CO2 SERPL-SCNC: 22 MMOL/L (ref 21–32)
CREAT SERPL-MCNC: 0.68 MG/DL (ref 0.6–1.3)
CRP SERPL QL: 2.1 MG/L
EOSINOPHIL # BLD AUTO: 0.25 THOUSAND/ÂΜL (ref 0–0.61)
EOSINOPHIL NFR BLD AUTO: 5 % (ref 0–6)
ERYTHROCYTE [DISTWIDTH] IN BLOOD BY AUTOMATED COUNT: 14.9 % (ref 11.6–15.1)
GFR SERPL CREATININE-BSD FRML MDRD: 80 ML/MIN/1.73SQ M
GLUCOSE SERPL-MCNC: 91 MG/DL (ref 65–140)
HCT VFR BLD AUTO: 23.3 % (ref 34.8–46.1)
HGB BLD-MCNC: 7.6 G/DL (ref 11.5–15.4)
IMM GRANULOCYTES # BLD AUTO: 0.02 THOUSAND/UL (ref 0–0.2)
IMM GRANULOCYTES NFR BLD AUTO: 0 % (ref 0–2)
INR PPP: 1.09 (ref 0.84–1.19)
LIPASE SERPL-CCNC: 13 U/L (ref 11–82)
LYMPHOCYTES # BLD AUTO: 1.72 THOUSANDS/ÂΜL (ref 0.6–4.47)
LYMPHOCYTES NFR BLD AUTO: 31 % (ref 14–44)
MCH RBC QN AUTO: 34.5 PG (ref 26.8–34.3)
MCHC RBC AUTO-ENTMCNC: 32.6 G/DL (ref 31.4–37.4)
MCV RBC AUTO: 106 FL (ref 82–98)
MONOCYTES # BLD AUTO: 0.67 THOUSAND/ÂΜL (ref 0.17–1.22)
MONOCYTES NFR BLD AUTO: 12 % (ref 4–12)
NEUTROPHILS # BLD AUTO: 2.86 THOUSANDS/ÂΜL (ref 1.85–7.62)
NEUTS SEG NFR BLD AUTO: 51 % (ref 43–75)
NRBC BLD AUTO-RTO: 0 /100 WBCS
PLATELET # BLD AUTO: 299 THOUSANDS/UL (ref 149–390)
PMV BLD AUTO: 9.8 FL (ref 8.9–12.7)
POTASSIUM SERPL-SCNC: 3.6 MMOL/L (ref 3.5–5.3)
PROT SERPL-MCNC: 6.5 G/DL (ref 6.4–8.4)
PROTHROMBIN TIME: 14.8 SECONDS (ref 11.6–14.5)
RBC # BLD AUTO: 2.2 MILLION/UL (ref 3.81–5.12)
RH BLD: POSITIVE
SODIUM SERPL-SCNC: 131 MMOL/L (ref 135–147)
SPECIMEN EXPIRATION DATE: NORMAL
WBC # BLD AUTO: 5.57 THOUSAND/UL (ref 4.31–10.16)

## 2023-11-29 PROCEDURE — 36415 COLL VENOUS BLD VENIPUNCTURE: CPT | Performed by: PHYSICIAN ASSISTANT

## 2023-11-29 PROCEDURE — 99223 1ST HOSP IP/OBS HIGH 75: CPT | Performed by: INTERNAL MEDICINE

## 2023-11-29 PROCEDURE — 93005 ELECTROCARDIOGRAM TRACING: CPT

## 2023-11-29 PROCEDURE — 86140 C-REACTIVE PROTEIN: CPT

## 2023-11-29 PROCEDURE — 86901 BLOOD TYPING SEROLOGIC RH(D): CPT | Performed by: PHYSICIAN ASSISTANT

## 2023-11-29 PROCEDURE — 84484 ASSAY OF TROPONIN QUANT: CPT | Performed by: PHYSICIAN ASSISTANT

## 2023-11-29 PROCEDURE — C9113 INJ PANTOPRAZOLE SODIUM, VIA: HCPCS | Performed by: PHYSICIAN ASSISTANT

## 2023-11-29 PROCEDURE — 86900 BLOOD TYPING SEROLOGIC ABO: CPT | Performed by: PHYSICIAN ASSISTANT

## 2023-11-29 PROCEDURE — 96375 TX/PRO/DX INJ NEW DRUG ADDON: CPT

## 2023-11-29 PROCEDURE — 86920 COMPATIBILITY TEST SPIN: CPT

## 2023-11-29 PROCEDURE — 80053 COMPREHEN METABOLIC PANEL: CPT | Performed by: PHYSICIAN ASSISTANT

## 2023-11-29 PROCEDURE — 86850 RBC ANTIBODY SCREEN: CPT | Performed by: PHYSICIAN ASSISTANT

## 2023-11-29 PROCEDURE — G1004 CDSM NDSC: HCPCS

## 2023-11-29 PROCEDURE — 99285 EMERGENCY DEPT VISIT HI MDM: CPT | Performed by: PHYSICIAN ASSISTANT

## 2023-11-29 PROCEDURE — 99285 EMERGENCY DEPT VISIT HI MDM: CPT

## 2023-11-29 PROCEDURE — 96365 THER/PROPH/DIAG IV INF INIT: CPT

## 2023-11-29 PROCEDURE — C9113 INJ PANTOPRAZOLE SODIUM, VIA: HCPCS

## 2023-11-29 PROCEDURE — 85025 COMPLETE CBC W/AUTO DIFF WBC: CPT | Performed by: PHYSICIAN ASSISTANT

## 2023-11-29 PROCEDURE — 85730 THROMBOPLASTIN TIME PARTIAL: CPT | Performed by: PHYSICIAN ASSISTANT

## 2023-11-29 PROCEDURE — 96376 TX/PRO/DX INJ SAME DRUG ADON: CPT

## 2023-11-29 PROCEDURE — 74177 CT ABD & PELVIS W/CONTRAST: CPT

## 2023-11-29 PROCEDURE — 83690 ASSAY OF LIPASE: CPT | Performed by: PHYSICIAN ASSISTANT

## 2023-11-29 PROCEDURE — 85610 PROTHROMBIN TIME: CPT | Performed by: PHYSICIAN ASSISTANT

## 2023-11-29 PROCEDURE — 96366 THER/PROPH/DIAG IV INF ADDON: CPT

## 2023-11-29 PROCEDURE — 87040 BLOOD CULTURE FOR BACTERIA: CPT | Performed by: PHYSICIAN ASSISTANT

## 2023-11-29 RX ORDER — ASPIRIN 81 MG/1
81 TABLET, CHEWABLE ORAL DAILY
Status: DISCONTINUED | OUTPATIENT
Start: 2023-11-30 | End: 2023-12-02 | Stop reason: HOSPADM

## 2023-11-29 RX ORDER — LEVOTHYROXINE SODIUM 0.07 MG/1
75 TABLET ORAL
Status: DISCONTINUED | OUTPATIENT
Start: 2023-11-30 | End: 2023-12-02 | Stop reason: HOSPADM

## 2023-11-29 RX ORDER — ONDANSETRON 2 MG/ML
4 INJECTION INTRAMUSCULAR; INTRAVENOUS EVERY 6 HOURS PRN
Status: DISCONTINUED | OUTPATIENT
Start: 2023-11-29 | End: 2023-12-02 | Stop reason: HOSPADM

## 2023-11-29 RX ORDER — METRONIDAZOLE 500 MG/1
500 TABLET ORAL ONCE
Status: COMPLETED | OUTPATIENT
Start: 2023-11-29 | End: 2023-11-29

## 2023-11-29 RX ORDER — LOSARTAN POTASSIUM 50 MG/1
50 TABLET ORAL DAILY
Status: DISCONTINUED | OUTPATIENT
Start: 2023-11-30 | End: 2023-12-02 | Stop reason: HOSPADM

## 2023-11-29 RX ORDER — ONDANSETRON 2 MG/ML
4 INJECTION INTRAMUSCULAR; INTRAVENOUS ONCE
Status: COMPLETED | OUTPATIENT
Start: 2023-11-29 | End: 2023-11-29

## 2023-11-29 RX ORDER — ATORVASTATIN CALCIUM 20 MG/1
20 TABLET, FILM COATED ORAL DAILY
Status: DISCONTINUED | OUTPATIENT
Start: 2023-11-30 | End: 2023-12-02 | Stop reason: HOSPADM

## 2023-11-29 RX ORDER — METRONIDAZOLE 500 MG/1
500 TABLET ORAL EVERY 8 HOURS
Status: DISCONTINUED | OUTPATIENT
Start: 2023-11-30 | End: 2023-12-01

## 2023-11-29 RX ORDER — AMLODIPINE BESYLATE 10 MG/1
10 TABLET ORAL DAILY
Status: DISCONTINUED | OUTPATIENT
Start: 2023-11-30 | End: 2023-12-02 | Stop reason: HOSPADM

## 2023-11-29 RX ORDER — PANTOPRAZOLE SODIUM 40 MG/10ML
40 INJECTION, POWDER, LYOPHILIZED, FOR SOLUTION INTRAVENOUS EVERY 12 HOURS SCHEDULED
Status: DISCONTINUED | OUTPATIENT
Start: 2023-11-29 | End: 2023-12-02

## 2023-11-29 RX ORDER — PANTOPRAZOLE SODIUM 40 MG/10ML
40 INJECTION, POWDER, LYOPHILIZED, FOR SOLUTION INTRAVENOUS ONCE
Status: COMPLETED | OUTPATIENT
Start: 2023-11-29 | End: 2023-11-29

## 2023-11-29 RX ORDER — SULFASALAZINE 500 MG/1
1000 TABLET ORAL 2 TIMES DAILY
Status: DISCONTINUED | OUTPATIENT
Start: 2023-11-29 | End: 2023-12-02 | Stop reason: HOSPADM

## 2023-11-29 RX ORDER — ACETAMINOPHEN 325 MG/1
650 TABLET ORAL EVERY 6 HOURS PRN
Status: DISCONTINUED | OUTPATIENT
Start: 2023-11-29 | End: 2023-12-02 | Stop reason: HOSPADM

## 2023-11-29 RX ADMIN — METRONIDAZOLE 500 MG: 500 TABLET ORAL at 16:43

## 2023-11-29 RX ADMIN — PANTOPRAZOLE SODIUM 40 MG: 40 INJECTION, POWDER, FOR SOLUTION INTRAVENOUS at 13:34

## 2023-11-29 RX ADMIN — PANTOPRAZOLE SODIUM 40 MG: 40 INJECTION, POWDER, FOR SOLUTION INTRAVENOUS at 23:34

## 2023-11-29 RX ADMIN — ONDANSETRON 4 MG: 2 INJECTION INTRAMUSCULAR; INTRAVENOUS at 13:34

## 2023-11-29 RX ADMIN — CEFTRIAXONE SODIUM 1000 MG: 10 INJECTION, POWDER, FOR SOLUTION INTRAVENOUS at 16:43

## 2023-11-29 RX ADMIN — SULFASALAZINE 1000 MG: 500 TABLET ORAL at 20:18

## 2023-11-29 RX ADMIN — IOHEXOL 100 ML: 350 INJECTION, SOLUTION INTRAVENOUS at 14:50

## 2023-11-29 RX ADMIN — SODIUM CHLORIDE 250 ML: 0.9 INJECTION, SOLUTION INTRAVENOUS at 14:35

## 2023-11-29 RX ADMIN — PANTOPRAZOLE SODIUM 8 MG/HR: 40 INJECTION, POWDER, FOR SOLUTION INTRAVENOUS at 14:35

## 2023-11-29 RX ADMIN — METRONIDAZOLE 500 MG: 500 TABLET ORAL at 23:35

## 2023-11-29 NOTE — H&P
[id number]  H&P  Name: Leanora Duane 79 y.o. female I MRN: [id number]  Unit/Bed#: W -01 I Date of Admission: 11/29/2023   Date of Service: 11/29/2023 I Hospital Day: 0      Assessment/Plan   * Acute colitis  Assessment & Plan  On presentation, reported left lower quadrant pain and black loose stools x1 week  Hx of ulcerative colitis in remission on sulfasalazine  Last colonoscopy 7/20/2022-redemonstrated left-sided UC on remission, extensive left sided diverticulosis, internal hemorrhoid  Hb 7.6  CT AP W/ - suggestive of infective versus inflammatory colitis, less likely diverticulitis  CRP 2.1  Etiology of colitis likely infectious, less likely acute flare of UC    Continue with ceftriaxone 1 g daily and metronidazole 500 mg every 8 hours  A.m. CBC, trend fevers  Follow-up blood cultures  Regular diet, n.p.o. at midnight in case of repeat dark BM or downtrending hemoglobin overnight. If these occur, consult GI. S/P TAVR (transcatheter aortic valve replacement)  Assessment & Plan  Hx of aortic stenosis, s/p TAVR 11/7/2023  Home regimen includes dual antiplatelet therapy with Plavix and aspirin    In the setting of dark stools and low Hb, hold home Plavix  Continue ASA 81 mg daily    Anemia  Assessment & Plan  Hx of anemia-downtrending hemoglobin since 2021 from hemoglobin in the 10s, to most recently in the 7s  Follows with hematology in the outpatient setting. Supplements with OTC iron 45 g daily, p.o. vitamin B12 and B12 infusions. Labs from November 17, 2023 - iron panel, vit B12, and folate level within normal limits  Reported black loose stools for 1 week. Patient reports starting OTC iron supplementation recently to assist with her weakness which she attributes to her anemia.   Consented for blood transfusions on 11/29/2023  Dark stools likely from iron supplementation, could also be from diverticular bleed or colitis    Hold home Plavix  Continue ASA  Hold DVT Ppx  PPI twice daily IV  AM CBC-transfuse if below 7  With repeat dark stool, check STAT Hb level  If ongoing dark stools or downtrending Hb, consult GI service       VTE Pharmacologic Prophylaxis: VTE Score: 5 Moderate Risk (Score 3-4) - Pharmacological DVT Prophylaxis Contraindicated. Sequential Compression Devices Ordered. Code Status: Level 1 - Full Code   Discussion with family: Updated  (daughter) at bedside. - AUBREY    Anticipated Length of Stay: Patient will be admitted on an inpatient basis with an anticipated length of stay of greater than 2 midnights secondary to GI bleed in the setting of colitis. Chief Complaint: "feeling weak"    History of Present Illness:  Salvatore Killian is a 79 y.o. female with a PMH of anemia, hypothyroid, GERD, UC in remission, and AS s/p TAVR (11/7/23), who presents with main complaint of weakness. Reports feeling weak over the last few months. Reports left lower quadrant pain and black loose stools for 1 week. Started on DAPT Plavix and ASA after TAVR. Also, recently started OTC iron supplementation to aid with her weakness which she attributes to anemia. On exam, abdominal exam benign without tenderness and +ve BS. Lab significant for hyponatremia (noted in the past), anemia Hb 7.6, and CRP WNL at 2.1. CT AP W/ showed findings suggestive of either infectious VS inflammatory colitis and less likely diverticulitis. Patient will be admitted for monitoring and management dark stools and antibiotics in the setting of colitis. Review of Systems:  Review of Systems   Constitutional:  Positive for fatigue. Negative for chills and fever. HENT:  Negative for ear pain and sore throat. Eyes:  Negative for pain and visual disturbance. Respiratory:  Negative for cough and shortness of breath. Cardiovascular:  Negative for chest pain and palpitations. Gastrointestinal:  Positive for abdominal pain (LLQ), nausea and vomiting (in the last week).  Negative for blood in stool. Black stools   Genitourinary:  Negative for dysuria and hematuria. Musculoskeletal:  Negative for arthralgias and back pain. Skin:  Negative for color change and rash. Neurological:  Positive for weakness. Negative for seizures and syncope. All other systems reviewed and are negative. Past Medical and Surgical History:   Past Medical History:   Diagnosis Date    Allergic rhinitis 3/21/2023    Anemia     Arthritis     osteoporosis, djd knees, pt denies osteoporosis on 2/21/22    Low's esophagus     Cancer ([address])     on face    Colon polyp     Cough 02/21/2022    cough for 3 years, expectorates yellow mucus    Disease of thyroid gland     low thyroid    Diverticulitis     gerd, diverticulitis    Dizziness     GERD (gastroesophageal reflux disease)     ulcerative colitis; Low's esophagus, diverticulosis; hx of laryngopharyngeal reflux    Hyperlipidemia     Hypertension     Low vitamin D level     LPRD (laryngopharyngeal reflux disease) 02/21/2022    PONV (postoperative nausea and vomiting)     AFTER "BIG SURGERIES"    Postnasal drip     WITH COUGH    Ulcerative colitis [facility])        Past Surgical History:   Procedure Laterality Date    APPENDECTOMY      CARDIAC CATHETERIZATION N/A 9/7/2023    Procedure: Cardiac RHC/LHC; Surgeon: Carmina Gipson MD;  Location: [facility] CARDIAC [facility]ATH [facility];   Service: Cardiology    CARDIAC CATHETERIZATION N/A 11/7/2023    Procedure: Cardiac tavr;  Surgeon: Claudette Bohr, MD;  Location: [facility] MAIN OR;  Service: Cardiology    CATARACT EXTRACTION Bilateral     COLONOSCOPY      colon polyps    COLONOSCOPY  07/28/2022    FOOT SURGERY Bilateral     bunionectomy    HEMORROIDECTOMY      hemorroid removal    JOINT REPLACEMENT Bilateral     knees    MA REPLACE AORTIC VALVE OPENFEMORAL ARTERY APPROACH N/A 11/7/2023    Procedure: REPLACEMENT AORTIC VALVE TRANSCATHETER (TAVR) TRANSFEMORAL W/ 26MM BUCKNER DOROTHEA S3 UR VALVE(ACCESS ON LEFT) GIOVANNY;  Surgeon: Asia Raza DO; Location: [facility] MAIN OR;  Service: Cardiac Surgery    REPLACEMENT TOTAL KNEE Bilateral     b/ replacement    TONSILLECTOMY      TUBAL LIGATION      UPPER GASTROINTESTINAL ENDOSCOPY         Meds/Allergies:  Prior to Admission medications    Medication Sig Start Date End Date Taking? Authorizing Provider   acetaminophen (TYLENOL) 325 mg tablet Take 2 tablets (650 mg total) by mouth every 6 (six) hours as needed for fever, mild pain, moderate pain or headaches (temperature greater than 101 F) 11/8/23   Hoang Cowart PA-C   amLODIPine (NORVASC) 10 mg tablet Take 1 tablet (10 mg total) by mouth daily 5/3/23   Jay Anderson MD   aspirin 81 mg chewable tablet Chew 1 tablet (81 mg total) daily Do not start before November 9, 2023. 11/9/23   Hay Guajardo PA-C   atorvastatin (LIPITOR) 20 mg tablet Take 1 tablet (20 mg total) by mouth daily 11/13/23   Jay Anderson MD   cholecalciferol (VITAMIN D3) 1,000 units tablet Take 1,000 Units by mouth daily    Historical Provider, MD   clopidogrel (PLAVIX) 75 mg tablet Take 1 tablet (75 mg total) by mouth daily Take for 90 days total following TAVR Do not start before November 9, 2023. 11/9/23   Hoang Cowart PA-C   Coenzyme Q10 (Co Q 10) 100 MG CAPS Take by mouth    Historical MD Khushbu   Cyanocobalamin (VITAMIN B 12 PO) Take by mouth    Historical Provider, MD   levothyroxine 75 mcg tablet Take 1 tablet by mouth once daily 6/2/23   Jay Anderson MD   Multiple Vitamin (multivitamin) capsule Take 1 capsule by mouth daily    Historical MD Khushbu   NON FORMULARY Take 1 tablet by mouth daily after dinner slow fe 45 mg     Historical MD Khushbu   NON FORMULARY Take 2 tablets by mouth daily after dinner.  slow fe 45 mg   To take 2 tablets as per PCP on revisit 11/13/23  Indications: Iron Deficiency 11/14/23   Jay Anderson MD   Omeprazole-Sodium Bicarbonate (Zegerid OTC)  MG CAPS Take 1 capsule by mouth daily at bedtime 11/27/23   Comfort Ac MD   ondansetron (ZOFRAN-ODT) 4 mg disintegrating tablet Take 1 tablet (4 mg total) by mouth every 6 (six) hours as needed for nausea or vomiting 11/22/23   Yaa Chapin MD   pantoprazole (PROTONIX) 40 mg tablet TAKE 1 TABLET BY MOUTH TWICE DAILY 30 MINUTES BEFORE MEALS  Patient taking differently: Take 40 mg by mouth daily 10/10/23   Mikey Carreon PA-C   potassium chloride (K-DUR,KLOR-CON) 10 mEq tablet Take 1 tablet (10 mEq total) by mouth daily Take for 3 days then stop 11/8/23   Nicho Cowart PA-C   sulfaSALAzine (AZULFIDINE) 500 mg tablet Take 2 tablets (1,000 mg total) by mouth 2 (two) times a day 10/11/23   Sue Andrews MD   torsemide (DEMADEX) 10 mg tablet Take 1 tablet (10 mg total) by mouth daily Take for 3 days then stop 11/8/23   Scott Fuentes PA-C   valsartan (DIOVAN) 80 mg tablet Take 1 tablet by mouth once daily 11/27/23   Yaa Chapin MD   vitamin A 2250 MCG (7500 UT) capsule Take 7,500 Units by mouth daily    Historical Provider, MD   vitamin E, tocopherol, 1,000 units capsule Take 1,000 Units by mouth daily    Historical Provider, MD     I have reviewed home medications with patient personally. Allergies:    Allergies   Allergen Reactions    Sulfa Antibiotics Itching       Social History:  Marital Status: /Civil Union   Occupation: Retired  Patient Pre-hospital Living Situation: Home  Patient Pre-hospital Level of Mobility: walks  Patient Pre-hospital Diet Restrictions: None   Substance Use History:   Social History     Substance and Sexual Activity   Alcohol Use Not Currently     Social History     Tobacco Use   Smoking Status Never   Smokeless Tobacco Never     Social History     Substance and Sexual Activity   Drug Use Never       Family History:  Family History   Problem Relation Age of Onset    Heart disease Father     No Known Problems Sister     No Known Problems Sister     Cancer Daughter         unsure of type of cancer, it was female cancer and hysterectomy done by Dr. Wayne Mueller    No Known Problems Maternal Grandmother     No Known Problems Paternal Grandmother     Heart disease Brother     Cancer Brother 54    Cancer Brother 79    No Known Problems Maternal Aunt     No Known Problems Paternal Aunt        Physical Exam:     Vitals:   Blood Pressure: 147/75 (11/29/23 1824)  Pulse: 78 (11/29/23 1824)  Temperature: 98.2 °F (36.8 °C) (11/29/23 1824)  Temp Source: Oral (11/29/23 1824)  Respirations: 16 (11/29/23 1824)  SpO2: 99 % (11/29/23 1824)    Physical Exam  Vitals reviewed. Constitutional:       Appearance: Normal appearance. She is overweight. She is not ill-appearing or diaphoretic. HENT:      Head: Normocephalic and atraumatic. Mouth/Throat:      Mouth: Mucous membranes are moist.      Pharynx: Oropharynx is clear. Eyes:      General: No scleral icterus. Conjunctiva/sclera: Conjunctivae normal.   Cardiovascular:      Rate and Rhythm: Normal rate and regular rhythm. Pulses: Normal pulses. Heart sounds: Murmur heard. No gallop. Pulmonary:      Effort: Pulmonary effort is normal. No respiratory distress. Breath sounds: Normal breath sounds. No wheezing or rales. Abdominal:      General: Bowel sounds are normal. There is no distension. Palpations: Abdomen is soft. There is no mass. Tenderness: There is no abdominal tenderness. Musculoskeletal:         General: Normal range of motion. Cervical back: Normal range of motion and neck supple. Right lower leg: No edema. Left lower leg: No edema. Skin:     General: Skin is warm and dry. Capillary Refill: Capillary refill takes 2 to 3 seconds. Coloration: Skin is pale. Skin is not jaundiced. Neurological:      Mental Status: She is alert and oriented to person, place, and time. Mental status is at baseline. Sensory: No sensory deficit.    Psychiatric:         Mood and Affect: Mood normal.         Behavior: Behavior normal.          Additional Data: Lab Results:  Results from last 7 days   Lab Units 11/29/23  1334   WBC Thousand/uL 5.57   HEMOGLOBIN g/dL 7.6*   HEMATOCRIT % 23.3*   PLATELETS Thousands/uL 299   NEUTROS PCT % 51   LYMPHS PCT % 31   MONOS PCT % 12   EOS PCT % 5     Results from last 7 days   Lab Units 11/29/23  1334   SODIUM mmol/L 131*   POTASSIUM mmol/L 3.6   CHLORIDE mmol/L 100   CO2 mmol/L 22   BUN mg/dL 10   CREATININE mg/dL 0.68   ANION GAP mmol/L 9   CALCIUM mg/dL 8.8   ALBUMIN g/dL 3.7   TOTAL BILIRUBIN mg/dL 0.51   ALK PHOS U/L 70   ALT U/L 8   AST U/L 19   GLUCOSE RANDOM mg/dL 91     Results from last 7 days   Lab Units 11/29/23  1334   INR  1.09                   Lines/Drains:  Invasive Devices       Peripheral Intravenous Line  Duration             Peripheral IV 11/29/23 Distal;Dorsal (posterior); Right Forearm <1 day    Peripheral IV 11/29/23 Right;Ventral (anterior) Forearm <1 day                        Imaging: Reviewed radiology reports from this admission including: abdominal/pelvic CT  CT abdomen pelvis with contrast   Final Result by Juan Ramon Roblero MD (11/29 1609)      Uncomplicated colitis of the transverse segment. Though there are diverticula arising from this portion of the colon, length of the affected segment and morphology of wall thickening are atypical for diverticulitis such that other infectious or    inflammatory causes of colitis should be considered. No free air, abscess, or obstruction. Location of inflamed colon in the upper abdomen does not necessarily explain focal left lower quadrant pain. More extensive distal diverticular findings in the sigmoid appear unchanged from remote priors without evidence for acute diverticulitis,    distally. Recommend follow-up with colonoscopy. Workstation performed: [id number]             EKG and Other Studies Reviewed on Admission:   EKG: NSR. HR 66.    ** Please Note: This note has been constructed using a voice recognition system.  **

## 2023-11-29 NOTE — ED NOTES
Awaiting protonix infusion from pharmacy.  Pharmacy made aware     Everardo Lockett RN  11/29/23 [id number]

## 2023-11-29 NOTE — ED PROVIDER NOTES
History  Chief Complaint   Patient presents with    Nausea     Pt to ED with c/o nausea starting this morning and abdominal pain. Denies d/v     This is an 79-year-old female patient who woke up this morning with left lower quadrant pain which she describes as black loose stools. She had several waves of nausea this morning no vomiting. She currently takes Plavix and aspirin for recent TAVR valve. Denies any fever chills headache blurred vision double vision cough Sore Throat No Chest Pain or Shortness of Breath. No Urinary Symptoms. I Did Note Looking at Previous Blood Work That Her Hemoglobin Has Been Trending down since July. She was just seen on the 27th of this month for GERD when she saw ENT for chronic cough and put on omeprazole. Nothing makes it better or worse she is nontoxic in no acute distress. Differential diagnose include not limited to upper GI bleed, diverticulitis, ulcerative colitis, Crohn's, perforation less likely        Prior to Admission Medications   Prescriptions Last Dose Informant Patient Reported? Taking? Coenzyme Q10 (Co Q 10) 100 MG CAPS  Self Yes No   Sig: Take by mouth   Cyanocobalamin (VITAMIN B 12 PO)  Self Yes No   Sig: Take by mouth   Multiple Vitamin (multivitamin) capsule  Self Yes No   Sig: Take 1 capsule by mouth daily   NON FORMULARY   Yes No   Sig: Take 1 tablet by mouth daily after dinner slow fe 45 mg    NON FORMULARY   Yes No   Sig: Take 2 tablets by mouth daily after dinner.  slow fe 45 mg   To take 2 tablets as per PCP on revisit 11/13/23  Indications: Iron Deficiency   Omeprazole-Sodium Bicarbonate (Zegerid OTC)  MG CAPS   No No   Sig: Take 1 capsule by mouth daily at bedtime   acetaminophen (TYLENOL) 325 mg tablet   No No   Sig: Take 2 tablets (650 mg total) by mouth every 6 (six) hours as needed for fever, mild pain, moderate pain or headaches (temperature greater than 101 F)   amLODIPine (NORVASC) 10 mg tablet  Self No No   Sig: Take 1 tablet (10 mg total) by mouth daily   aspirin 81 mg chewable tablet   No No   Sig: Chew 1 tablet (81 mg total) daily Do not start before November 9, 2023. atorvastatin (LIPITOR) 20 mg tablet   No No   Sig: Take 1 tablet (20 mg total) by mouth daily   cholecalciferol (VITAMIN D3) 1,000 units tablet  Self Yes No   Sig: Take 1,000 Units by mouth daily   clopidogrel (PLAVIX) 75 mg tablet   No No   Sig: Take 1 tablet (75 mg total) by mouth daily Take for 90 days total following TAVR Do not start before November 9, 2023.    levothyroxine 75 mcg tablet  Self No No   Sig: Take 1 tablet by mouth once daily   ondansetron (ZOFRAN-ODT) 4 mg disintegrating tablet   No No   Sig: Take 1 tablet (4 mg total) by mouth every 6 (six) hours as needed for nausea or vomiting   pantoprazole (PROTONIX) 40 mg tablet  Self No No   Sig: TAKE 1 TABLET BY MOUTH TWICE DAILY 30 MINUTES BEFORE MEALS   Patient taking differently: Take 40 mg by mouth daily   potassium chloride (K-DUR,KLOR-CON) 10 mEq tablet   No No   Sig: Take 1 tablet (10 mEq total) by mouth daily Take for 3 days then stop   sulfaSALAzine (AZULFIDINE) 500 mg tablet  Self No No   Sig: Take 2 tablets (1,000 mg total) by mouth 2 (two) times a day   torsemide (DEMADEX) 10 mg tablet   No No   Sig: Take 1 tablet (10 mg total) by mouth daily Take for 3 days then stop   valsartan (DIOVAN) 80 mg tablet   No No   Sig: Take 1 tablet by mouth once daily   vitamin A 2250 MCG (7500 UT) capsule  Self Yes No   Sig: Take 7,500 Units by mouth daily   vitamin E, tocopherol, 1,000 units capsule  Self Yes No   Sig: Take 1,000 Units by mouth daily      Facility-Administered Medications: None       Past Medical History:   Diagnosis Date    Allergic rhinitis 3/21/2023    Anemia     Arthritis     osteoporosis, djd knees, pt denies osteoporosis on 2/21/22    Low's esophagus     Cancer ([address])     on face    Colon polyp     Cough 02/21/2022    cough for 3 years, expectorates yellow mucus    Disease of thyroid gland low thyroid    Diverticulitis     gerd, diverticulitis    Dizziness     GERD (gastroesophageal reflux disease)     ulcerative colitis; Low's esophagus, diverticulosis; hx of laryngopharyngeal reflux    Hyperlipidemia     Hypertension     Low vitamin D level     LPRD (laryngopharyngeal reflux disease) 02/21/2022    PONV (postoperative nausea and vomiting)     AFTER "BIG SURGERIES"    Postnasal drip     WITH COUGH    Ulcerative colitis [facility])        Past Surgical History:   Procedure Laterality Date    APPENDECTOMY      CARDIAC CATHETERIZATION N/A 9/7/2023    Procedure: Cardiac RHC/LHC; Surgeon: Carmina Gipson MD;  Location: [facility];   Service: Cardiology    CARDIAC CATHETERIZATION N/A 11/7/2023    Procedure: Cardiac tavr;  Surgeon: Claudette Bohr, MD;  Location: [facility] MAIN [facility];  Service: Cardiology    CATARACT EXTRACTION Bilateral     COLONOSCOPY      colon polyps    COLONOSCOPY  07/28/2022    FOOT SURGERY Bilateral     bunionectomy    HEMORROIDECTOMY      hemorroid removal    JOINT REPLACEMENT Bilateral     knees    CO REPLACE AORTIC VALVE OPENFEMORAL ARTERY APPROACH N/A 11/7/2023    Procedure: REPLACEMENT AORTIC VALVE TRANSCATHETER (TAVR) TRANSFEMORAL W/ 26MM BUCKNER DOROTHEA S3 UR VALVE(ACCESS ON LEFT) GIOVANNY;  Surgeon: Asia Raza DO;  Location: [facility] MAIN [facility];  Service: Cardiac Surgery    REPLACEMENT TOTAL KNEE Bilateral     b/ replacement    TONSILLECTOMY      TUBAL LIGATION      UPPER GASTROINTESTINAL ENDOSCOPY         Family History   Problem Relation Age of Onset    Heart disease Father     No Known Problems Sister     No Known Problems Sister     Cancer Daughter         unsure of type of cancer, it was female cancer and hysterectomy done by Dr. Daniel Rendon    No Known Problems Maternal Grandmother     No Known Problems Paternal Grandmother     Heart disease Brother     Cancer Brother 54    Cancer Brother 79    No Known Problems Maternal Aunt     No Known Problems Paternal Aunt      I have reviewed and agree with the history as documented. E-Cigarette/Vaping    E-Cigarette Use Never User      E-Cigarette/Vaping Substances    Nicotine No     THC No     CBD No     Flavoring No     Other No     Unknown No      Social History     Tobacco Use    Smoking status: Never    Smokeless tobacco: Never   Vaping Use    Vaping Use: Never used   Substance Use Topics    Alcohol use: Not Currently    Drug use: Never       Review of Systems   Constitutional:  Negative for chills, diaphoresis, fatigue and fever. HENT:  Negative for congestion, ear pain, nosebleeds and sore throat. Eyes:  Negative for photophobia, pain, discharge and visual disturbance. Respiratory:  Negative for cough, choking, chest tightness, shortness of breath and wheezing. Cardiovascular:  Negative for chest pain and palpitations. Gastrointestinal:  Positive for diarrhea and nausea. Negative for abdominal distention, abdominal pain, anal bleeding, blood in stool, constipation, rectal pain and vomiting. Black stool   Genitourinary:  Negative for dysuria, flank pain, frequency and hematuria. Musculoskeletal:  Negative for arthralgias, back pain, gait problem and joint swelling. Skin:  Negative for color change and rash. Neurological:  Negative for dizziness, seizures, syncope and headaches. Psychiatric/Behavioral:  Negative for behavioral problems and confusion. The patient is not nervous/anxious. All other systems reviewed and are negative. Physical Exam  Physical Exam  Vitals and nursing note reviewed. Constitutional:       General: She is not in acute distress. Appearance: Normal appearance. She is not ill-appearing, toxic-appearing or diaphoretic. HENT:      Head: Normocephalic and atraumatic. Right Ear: Tympanic membrane, ear canal and external ear normal.      Left Ear: Tympanic membrane, ear canal and external ear normal.      Nose: Nose normal. No congestion or rhinorrhea.       Mouth/Throat:      Mouth: Mucous membranes are dry. Pharynx: Oropharynx is clear. No oropharyngeal exudate or posterior oropharyngeal erythema. Eyes:      Extraocular Movements: Extraocular movements intact. Conjunctiva/sclera: Conjunctivae normal.      Pupils: Pupils are equal, round, and reactive to light. Cardiovascular:      Rate and Rhythm: Normal rate and regular rhythm. Heart sounds: Murmur heard. Pulmonary:      Effort: Pulmonary effort is normal. No respiratory distress. Breath sounds: Normal breath sounds. Abdominal:      General: Bowel sounds are normal.      Palpations: Abdomen is soft. Tenderness: There is abdominal tenderness in the left lower quadrant. There is guarding. There is no rebound. Genitourinary:     Rectum: Guaiac result positive. Comments: Black stool, external hemorrhoid tissue not inflamed nor bleeding  Musculoskeletal:         General: Normal range of motion. Cervical back: Normal range of motion and neck supple. No rigidity or tenderness. Right lower leg: No edema. Left lower leg: No edema. Lymphadenopathy:      Cervical: No cervical adenopathy. Skin:     General: Skin is warm and dry. Capillary Refill: Capillary refill takes less than 2 seconds. Findings: No rash. Neurological:      General: No focal deficit present. Mental Status: She is alert and oriented to person, place, and time. Mental status is at baseline.    Psychiatric:         Mood and Affect: Mood normal.         Behavior: Behavior normal.         Vital Signs  ED Triage Vitals   Temperature Pulse Respirations Blood Pressure SpO2   11/29/23 1317 11/29/23 1317 11/29/23 1317 11/29/23 1317 11/29/23 1317   98.6 °F (37 °C) 74 18 (!) 186/85 97 %      Temp src Heart Rate Source Patient Position - Orthostatic VS BP Location FiO2 (%)   -- 11/29/23 1600 11/29/23 1600 11/29/23 1600 --    Monitor Lying Right arm       Pain Score       --                  Vitals:    11/29/23 1317 11/29/23 1600   BP: (!) 186/85 166/78   Pulse: 74 67   Patient Position - Orthostatic VS:  Lying         Visual Acuity      ED Medications  Medications   pantoprazole (PROTONIX) 80 mg in sodium chloride 0.9 % 100 mL infusion (8 mg/hr Intravenous New Bag 11/29/23 1435)   ondansetron (ZOFRAN) injection 4 mg (4 mg Intravenous Given 11/29/23 1334)   pantoprazole (PROTONIX) injection 40 mg (40 mg Intravenous Given 11/29/23 1334)   sodium chloride 0.9 % bolus 250 mL (0 mL Intravenous Stopped 11/29/23 1535)   iohexol (OMNIPAQUE) 350 MG/ML injection (MULTI-DOSE) 100 mL (100 mL Intravenous Given 11/29/23 1450)   ceftriaxone (ROCEPHIN) 1 g/50 mL in dextrose IVPB (1,000 mg Intravenous New Bag 11/29/23 1643)   metroNIDAZOLE (FLAGYL) tablet 500 mg (500 mg Oral Given 11/29/23 1643)       Diagnostic Studies  Results Reviewed       Procedure Component Value Units Date/Time    HS Troponin I 2hr [[id number]] Collected: 11/29/23 1632    Lab Status: In process Specimen: Blood from Arm, Right Updated: 11/29/23 1649    Blood culture #2 [[id number]] Collected: 11/29/23 1643    Lab Status: In process Specimen: Blood from Hand, Left Updated: 11/29/23 1648    Blood culture #1 [[id number]] Collected: 11/29/23 1643    Lab Status:  In process Specimen: Blood from Hand, Right Updated: 11/29/23 1648    HS Troponin I 4hr [[id number]]     Lab Status: No result Specimen: Blood     HS Troponin 0hr (reflex protocol) [[id number]]  (Normal) Collected: 11/29/23 1334    Lab Status: Final result Specimen: Blood from Arm, Right Updated: 11/29/23 1431     hs TnI 0hr 7 ng/L     Comprehensive metabolic panel [[id number]]  (Abnormal) Collected: 11/29/23 1334    Lab Status: Final result Specimen: Blood from Arm, Right Updated: 11/29/23 1426     Sodium 131 mmol/L      Potassium 3.6 mmol/L      Chloride 100 mmol/L      CO2 22 mmol/L      ANION GAP 9 mmol/L      BUN 10 mg/dL      Creatinine 0.68 mg/dL      Glucose 91 mg/dL      Calcium 8.8 mg/dL      AST 19 U/L      ALT 8 U/L Alkaline Phosphatase 70 U/L      Total Protein 6.5 g/dL      Albumin 3.7 g/dL      Total Bilirubin 0.51 mg/dL      eGFR 80 ml/min/1.73sq m     Narrative:      National Kid[employer] guidelines for Chronic Kidney Disease (CKD):     Stage 1 with normal or high GFR (GFR > 90 mL/min/1.73 square meters)    Stage 2 Mild CKD (GFR = 60-89 mL/min/1.73 square meters)    Stage 3A Moderate CKD (GFR = 45-59 mL/min/1.73 square meters)    Stage 3B Moderate CKD (GFR = 30-44 mL/min/1.73 square meters)    Stage 4 Severe CKD (GFR = 15-29 mL/min/1.73 square meters)    Stage 5 End Stage CKD (GFR <15 mL/min/1.73 square meters)  Note: GFR calculation is accurate only with a steady state creatinine    Lipase [[id number]]  (Normal) Collected: 11/29/23 1334    Lab Status: Final result Specimen: Blood from Arm, Right Updated: 11/29/23 1426     Lipase 13 u/L     Protime-INR [[id number]]  (Abnormal) Collected: 11/29/23 1334    Lab Status: Final result Specimen: Blood from Arm, Right Updated: 11/29/23 1423     Protime 14.8 seconds      INR 1.09    APTT [[id number]]  (Normal) Collected: 11/29/23 1334    Lab Status: Final result Specimen: Blood from Arm, Right Updated: 11/29/23 1423     PTT 30 seconds     CBC and differential [[id number]]  (Abnormal) Collected: 11/29/23 1334    Lab Status: Final result Specimen: Blood from Arm, Right Updated: 11/29/23 1408     WBC 5.57 Thousand/uL      RBC 2.20 Million/uL      Hemoglobin 7.6 g/dL      Hematocrit 23.3 %       fL      MCH 34.5 pg      MCHC 32.6 g/dL      RDW 14.9 %      MPV 9.8 fL      Platelets 839 Thousands/uL      nRBC 0 /100 WBCs      Neutrophils Relative 51 %      Immat GRANS % 0 %      Lymphocytes Relative 31 %      Monocytes Relative 12 %      Eosinophils Relative 5 %      Basophils Relative 1 %      Neutrophils Absolute 2.86 Thousands/µL      Immature Grans Absolute 0.02 Thousand/uL      Lymphocytes Absolute 1.72 Thousands/µL      Monocytes Absolute 0.67 Thousand/µL Eosinophils Absolute 0.25 Thousand/µL      Basophils Absolute 0.05 Thousands/µL                    CT abdomen pelvis with contrast   Final Result by Hesham Aleman MD (11/29 7129)      Uncomplicated colitis of the transverse segment. Though there are diverticula arising from this portion of the colon, length of the affected segment and morphology of wall thickening are atypical for diverticulitis such that other infectious or    inflammatory causes of colitis should be considered. No free air, abscess, or obstruction. Location of inflamed colon in the upper abdomen does not necessarily explain focal left lower quadrant pain. More extensive distal diverticular findings in the sigmoid appear unchanged from remote priors without evidence for acute diverticulitis,    distally. Recommend follow-up with colonoscopy. Workstation performed: [id number]                    Procedures  Procedures         ED Course                                             Medical Decision Making  All patient who started with abdominal pain left lower quadrant last night nausea and black stools this morning. She has had a trending down hemoglobin since July and seems to be baseline today at 7.6. Patient is on Plavix from a TAVR valve she received several weeks ago. There is been no fever chills headache blurred vision double vision cultures sore throat no chest pain or shortness of breath she has had some nausea no vomiting and some soft stools that have been black today.   No urinary symptoms differential diagnose includes not limited to diverticulitis, colitis, GI bleed    Problems Addressed:  Abdominal pain:     Details: Left lower quadrant pain declined pain medication but states she feels better after Zofran and some fluid  Chronic anemia: chronic illness or injury     Details: Patient has had chronic trending down on her hemoglobin it is 7.6 today which is basically what she has been the last few weeks.  Diverticulitis:     Details: There was diverticulitis and colitis seen on CAT scan and because she has pain I did treat her with Rocephin and Flagyl  GI bleed:     Details: With black stools I feel that is more of an upper GI bleed above the ligament of Treitz I did provide Protonix bolus and drip patient is being admitted    Amount and/or Complexity of Data Reviewed  External Data Reviewed: labs and notes. Details: I did review previous notes and labs to trend and obtain past medical history  Labs: ordered. Decision-making details documented in ED Course. Details: I reviewed and interpreted all labs hemoglobin today 7.6 which appears to be baseline no other labs that require immediate intervention  Radiology: ordered. Decision-making details documented in ED Course. Details: I did review and speak with the radiologist who feels that she has diverticulitis and some colitis  ECG/medicine tests: ordered and independent interpretation performed. Decision-making details documented in ED Course. Details: I interpreted EKG there is no ST elevation  Discussion of management or test interpretation with external provider(s): Using joint decision-making with the patient internal medicine doctor patient be admitted for GI bleed and diverticulitis and for further evaluation of her chronic anemia    Risk  Prescription drug management. Decision regarding hospitalization.              Disposition  Final diagnoses:   Abdominal pain   Diverticulitis   Chronic anemia   GI bleed     Time reflects when diagnosis was documented in both MDM as applicable and the Disposition within this note       Time User Action Codes Description Comment    11/29/2023  5:10 PM Arian Airam Add [R10.9] Abdominal pain     11/29/2023  5:10 PM Arian Airam Add [K57.92] Diverticulitis     11/29/2023  5:10 PM Bassem Youngblood Add [D64.9] Chronic anemia     11/29/2023  5:11 PM Bassem Youngblood Add [K92.2] GI bleed ED Disposition       ED Disposition   Admit    Condition   Stable    Date/Time   Wed Nov 29, 2023  4:28 PM    Comment   Case was discussed with Dr. Antonio Thakur   and the patient's admission status was agreed to be Admission Status: inpatient status to the service of Dr. Antonio Thakur . Follow-up Information    None         Patient's Medications   Discharge Prescriptions    No medications on file       No discharge procedures on file.     PDMP Review         Value Time User    PDMP Reviewed  Yes 11/8/2023 10:17 AM Jane Jiang PA-C            ED Provider  Electronically Signed by             Tish Lua PA-C  11/29/23 8329

## 2023-11-30 ENCOUNTER — TELEPHONE (OUTPATIENT)
Dept: FAMILY MEDICINE CLINIC | Facility: CLINIC | Age: 83
End: 2023-11-30

## 2023-11-30 ENCOUNTER — HOME CARE VISIT (OUTPATIENT)
Dept: HOME HEALTH SERVICES | Facility: HOME HEALTHCARE | Age: 83
End: 2023-11-30
Payer: MEDICARE

## 2023-11-30 LAB
ANION GAP SERPL CALCULATED.3IONS-SCNC: 6 MMOL/L
BUN SERPL-MCNC: 7 MG/DL (ref 5–25)
CALCIUM SERPL-MCNC: 8.1 MG/DL (ref 8.4–10.2)
CHLORIDE SERPL-SCNC: 103 MMOL/L (ref 96–108)
CO2 SERPL-SCNC: 24 MMOL/L (ref 21–32)
CREAT SERPL-MCNC: 0.72 MG/DL (ref 0.6–1.3)
ERYTHROCYTE [DISTWIDTH] IN BLOOD BY AUTOMATED COUNT: 15 % (ref 11.6–15.1)
GFR SERPL CREATININE-BSD FRML MDRD: 77 ML/MIN/1.73SQ M
GLUCOSE SERPL-MCNC: 89 MG/DL (ref 65–140)
HCT VFR BLD AUTO: 21.1 % (ref 34.8–46.1)
HCT VFR BLD AUTO: 25.3 % (ref 34.8–46.1)
HGB BLD-MCNC: 6.8 G/DL (ref 11.5–15.4)
HGB BLD-MCNC: 8.1 G/DL (ref 11.5–15.4)
MAGNESIUM SERPL-MCNC: 1.9 MG/DL (ref 1.9–2.7)
MCH RBC QN AUTO: 33.8 PG (ref 26.8–34.3)
MCHC RBC AUTO-ENTMCNC: 32.2 G/DL (ref 31.4–37.4)
MCV RBC AUTO: 105 FL (ref 82–98)
PLATELET # BLD AUTO: 236 THOUSANDS/UL (ref 149–390)
PMV BLD AUTO: 9.8 FL (ref 8.9–12.7)
POTASSIUM SERPL-SCNC: 3.4 MMOL/L (ref 3.5–5.3)
RBC # BLD AUTO: 2.01 MILLION/UL (ref 3.81–5.12)
SODIUM SERPL-SCNC: 133 MMOL/L (ref 135–147)
WBC # BLD AUTO: 4.12 THOUSAND/UL (ref 4.31–10.16)

## 2023-11-30 PROCEDURE — 30233N1 TRANSFUSION OF NONAUTOLOGOUS RED BLOOD CELLS INTO PERIPHERAL VEIN, PERCUTANEOUS APPROACH: ICD-10-PCS | Performed by: INTERNAL MEDICINE

## 2023-11-30 PROCEDURE — 85018 HEMOGLOBIN: CPT

## 2023-11-30 PROCEDURE — 85014 HEMATOCRIT: CPT

## 2023-11-30 PROCEDURE — 99222 1ST HOSP IP/OBS MODERATE 55: CPT | Performed by: INTERNAL MEDICINE

## 2023-11-30 PROCEDURE — 83735 ASSAY OF MAGNESIUM: CPT

## 2023-11-30 PROCEDURE — 99232 SBSQ HOSP IP/OBS MODERATE 35: CPT | Performed by: INTERNAL MEDICINE

## 2023-11-30 PROCEDURE — 85027 COMPLETE CBC AUTOMATED: CPT

## 2023-11-30 PROCEDURE — 80048 BASIC METABOLIC PNL TOTAL CA: CPT

## 2023-11-30 PROCEDURE — C9113 INJ PANTOPRAZOLE SODIUM, VIA: HCPCS

## 2023-11-30 RX ORDER — POTASSIUM CHLORIDE 20 MEQ/1
40 TABLET, EXTENDED RELEASE ORAL ONCE
Status: COMPLETED | OUTPATIENT
Start: 2023-11-30 | End: 2023-11-30

## 2023-11-30 RX ORDER — BENZONATATE 100 MG/1
100 CAPSULE ORAL 3 TIMES DAILY PRN
Status: DISCONTINUED | OUTPATIENT
Start: 2023-11-30 | End: 2023-12-02 | Stop reason: HOSPADM

## 2023-11-30 RX ADMIN — ASPIRIN 81 MG CHEWABLE TABLET 81 MG: 81 TABLET CHEWABLE at 08:03

## 2023-11-30 RX ADMIN — SULFASALAZINE 1000 MG: 500 TABLET ORAL at 17:03

## 2023-11-30 RX ADMIN — LOSARTAN POTASSIUM 50 MG: 50 TABLET, FILM COATED ORAL at 08:10

## 2023-11-30 RX ADMIN — METRONIDAZOLE 500 MG: 500 TABLET ORAL at 17:03

## 2023-11-30 RX ADMIN — PANTOPRAZOLE SODIUM 40 MG: 40 INJECTION, POWDER, FOR SOLUTION INTRAVENOUS at 08:33

## 2023-11-30 RX ADMIN — SULFASALAZINE 1000 MG: 500 TABLET ORAL at 08:03

## 2023-11-30 RX ADMIN — LEVOTHYROXINE SODIUM 75 MCG: 75 TABLET ORAL at 05:45

## 2023-11-30 RX ADMIN — POTASSIUM CHLORIDE 40 MEQ: 1500 TABLET, EXTENDED RELEASE ORAL at 08:03

## 2023-11-30 RX ADMIN — METRONIDAZOLE 500 MG: 500 TABLET ORAL at 08:02

## 2023-11-30 RX ADMIN — CEFTRIAXONE SODIUM 1000 MG: 10 INJECTION, POWDER, FOR SOLUTION INTRAVENOUS at 17:03

## 2023-11-30 RX ADMIN — PANTOPRAZOLE SODIUM 40 MG: 40 INJECTION, POWDER, FOR SOLUTION INTRAVENOUS at 21:29

## 2023-11-30 RX ADMIN — ATORVASTATIN CALCIUM 20 MG: 20 TABLET, FILM COATED ORAL at 08:03

## 2023-11-30 RX ADMIN — AMLODIPINE BESYLATE 10 MG: 10 TABLET ORAL at 08:03

## 2023-11-30 NOTE — PLAN OF CARE
Problem: PAIN - ADULT  Goal: Verbalizes/displays adequate comfort level or baseline comfort level  Description: Interventions:  - Encourage patient to monitor pain and request assistance  - Assess pain using appropriate pain scale  - Administer analgesics based on type and severity of pain and evaluate response  - Implement non-pharmacological measures as appropriate and evaluate response  - Consider cultural and social influences on pain and pain management  - Notify physician/advanced practitioner if interventions unsuccessful or patient reports new pain  Outcome: Progressing     Problem: INFECTION - ADULT  Goal: Absence or prevention of progression during hospitalization  Description: INTERVENTIONS:  - Assess and monitor for signs and symptoms of infection  - Monitor lab/diagnostic results  - Monitor all insertion sites, i.e. indwelling lines, tubes, and drains  - Monitor endotracheal if appropriate and nasal secretions for changes in amount and color  - Golden appropriate cooling/warming therapies per order  - Administer medications as ordered  - Instruct and encourage patient and family to use good hand hygiene technique  - Identify and instruct in appropriate isolation precautions for identified infection/condition  Outcome: Progressing  Goal: Absence of fever/infection during neutropenic period  Description: INTERVENTIONS:  - Monitor WBC    Outcome: Progressing     Problem: SAFETY ADULT  Goal: Patient will remain free of falls  Description: INTERVENTIONS:  - Educate patient/family on patient safety including physical limitations  - Instruct patient to call for assistance with activity   - Consult OT/PT to assist with strengthening/mobility   - Keep Call bell within reach  - Keep bed low and locked with side rails adjusted as appropriate  - Keep care items and personal belongings within reach  - Initiate and maintain comfort rounds  - Make Fall Risk Sign visible to staff  - Offer Toileting every  Hours, in advance of need  - Initiate/Maintain alarm  - Obtain necessary fall risk management equipment:   - Apply yellow socks and bracelet for high fall risk patients  - Consider moving patient to room near nurses station  Outcome: Progressing  Goal: Maintain or return to baseline ADL function  Description: INTERVENTIONS:  -  Assess patient's ability to carry out ADLs; assess patient's baseline for ADL function and identify physical deficits which impact ability to perform ADLs (bathing, care of mouth/teeth, toileting, grooming, dressing, etc.)  - Assess/evaluate cause of self-care deficits   - Assess range of motion  - Assess patient's mobility; develop plan if impaired  - Assess patient's need for assistive devices and provide as appropriate  - Encourage maximum independence but intervene and supervise when necessary  - Involve family in performance of ADLs  - Assess for home care needs following discharge   - Consider OT consult to assist with ADL evaluation and planning for discharge  - Provide patient education as appropriate  Outcome: Progressing  Goal: Maintains/Returns to pre admission functional level  Description: INTERVENTIONS:  - Perform AM-PAC 6 Click Basic Mobility/ Daily Activity assessment daily.  - Set and communicate daily mobility goal to care team and patient/family/caregiver. - Collaborate with rehabilitation services on mobility goals if consulted  - Perform Range of Motion times a day. - Reposition patient every  hours.   - Dangle patient  times a day  - Stand patient  times a day  - Ambulate patient  times a day  - Out of bed to chair  times a day   - Out of bed for meals  times a day  - Out of bed for toileting  - Record patient progress and toleration of activity level   Outcome: Progressing     Problem: DISCHARGE PLANNING  Goal: Discharge to home or other facility with appropriate resources  Description: INTERVENTIONS:  - Identify barriers to discharge w/patient and caregiver  - Arrange for needed discharge resources and transportation as appropriate  - Identify discharge learning needs (meds, wound care, etc.)  - Arrange for interpretive services to assist at discharge as needed  - Refer to Case Management Department for coordinating discharge planning if the patient needs post-hospital services based on physician/advanced practitioner order or complex needs related to functional status, cognitive ability, or social support system  Outcome: Progressing     Problem: Knowledge Deficit  Goal: Patient/family/caregiver demonstrates understanding of disease process, treatment plan, medications, and discharge instructions  Description: Complete learning assessment and assess knowledge base.   Interventions:  - Provide teaching at level of understanding  - Provide teaching via preferred learning methods  Outcome: Progressing

## 2023-11-30 NOTE — ASSESSMENT & PLAN NOTE
On presentation, reported left lower quadrant pain and black loose stools x1 week  Hx of ulcerative colitis in remission on sulfasalazine  Last colonoscopy 7/20/2022-redemonstrated left-sided UC on remission, extensive left sided diverticulosis, internal hemorrhoid  Hb 7.6  CT AP W/ - suggestive of infective versus inflammatory colitis, less likely diverticulitis  CRP 2.1  Etiology of colitis likely infectious, less likely acute flare of UC    Continue with ceftriaxone 1 g daily and metronidazole 500 mg every 8 hours  A.m. CBC, trend fevers  Follow-up blood cultures  Regular diet, n.p.o. at midnight in case of repeat dark BM or downtrending hemoglobin overnight. If these occur, consult GI.

## 2023-11-30 NOTE — ASSESSMENT & PLAN NOTE
Hx of anemia-downtrending hemoglobin since 2021 from hemoglobin in the 10s, to most recently in the 7s  Follows with hematology in the outpatient setting. Supplements with OTC iron 45 g daily, p.o. vitamin B12 and B12 infusions. Labs from November 17, 2023 - iron panel, vit B12, and folate level within normal limits  Reported black loose stools for 1 week. Patient reports starting OTC iron supplementation recently to assist with her weakness which she attributes to her anemia.   Consented for blood transfusions on 11/29/2023  Dark stools likely from iron supplementation, could also be from diverticular bleed or colitis    Hold home Plavix  Continue ASA  Hold DVT Ppx  PPI twice daily IV  AM CBC-transfuse if below 7  With repeat dark stool, check STAT Hb level  If ongoing dark stools or downtrending Hb, consult GI service

## 2023-11-30 NOTE — ASSESSMENT & PLAN NOTE
Hx of aortic stenosis, s/p TAVR 11/7/2023  Home regimen includes dual antiplatelet therapy with Plavix and aspirin    In the setting of dark stools and low Hb, hold home Plavix  Continue ASA 81 mg daily

## 2023-11-30 NOTE — ASSESSMENT & PLAN NOTE
On presentation, reported left lower quadrant pain and black loose stools x1 week  Hx of ulcerative colitis in remission on sulfasalazine  Last colonoscopy 7/20/2022-redemonstrated left-sided UC on remission, extensive left sided diverticulosis, internal hemorrhoid  Hb 7.6 on admission  CT AP W/ - suggestive of infective versus inflammatory colitis, less likely diverticulitis  CRP 2.1  Etiology of colitis likely infectious, less likely acute flare of UC      Plan:  Continue with ceftriaxone 1 g daily   Continue metronidazole 500 mg every 8 hours  Continue with CBC diff daily  Continue to trend fever  Follow-up blood cultures

## 2023-11-30 NOTE — CONSULTS
Physician Requesting Consult: Benny Barba MD    Reason for Consult / Principal Problem:   GI bleed, acute colitis    Assessment/Plan:  18-year-old female with a past medical history of anemia, arthritis, Low's, colon polyps, hypothyroidism, diverticular lightest, GERD, hyperlipidemia, hypertension, laryngopharyngeal reflux disease, ulcerative colitis and TAVR valve for which she is on Plavix and aspirin who presented with left lower quadrant abdominal pain associated with nausea and report of black loose stool. 1.  GI bleed  Patient presented with report of weakness associated with left lower quadrant abdominal pain and black loose stool. Patient report black stool for several weeks. Patient is on iron at home. Hemoglobin on admission 7.6 hemoglobin today 6.8. Patient received 1 unit packed red blood cell cells and responded appropriately. Repeat hemoglobin 8.1. Patient denies NSAID use. Patient was on Plavix and aspirin at home due to TAVR valve. Bleeding may be secondary to ulceration or AVM. Patient does have history of duodenal ulcer.    -Hold Plavix  -Continue pantoprazole IV BID  -Monitor hemoglobin  -Transfuse blood products as needed to keep hemoglobin greater than 7  -Schedule for EGD tomorrow. Prep and procedure explained to patient in detail. Further recommendations pending results of EGD. 2.  Abnormal CT of abdomen  3. Colitis  Patient resented with report of left lower quadrant abdominal pain x 1 week. CT and pelvis with IV contrast showed Uncomplicated colitis of the transverse segment. Though there are diverticula arising from this portion of the colon, length of the affected segment and morphology of wall thickening are atypical for diverticulitis such that other infectious or inflammatory causes of colitis should be considered.   Patient does have history of colitis but etiology of colitis likely infectious in light less likely acute flare patient reports no further bowel movements since yesterday. Patient has history of ulcerative colitis which has been in remission and controlled on current medication regiment of sulfasalazine. CRP WNL. Patient denies any bright red blood in stool on examination left lower quadrant abdominal pain has resolved. -Continue IV Rocephin and Flagyl  -Pain management per attending  -Continue sulfasalazine 500 mg 2 tablets twice daily  -Low fiber diet as tolerated. HPI: Myra Kidd is a 79 y.o. female  Acute colitis. This is an 79-year-old female with a past medical history of anemia, arthritis, Low's, colon polyps, hypothyroidism, diverticular lightest, GERD, hyperlipidemia, hypertension, laryngopharyngeal reflux disease, ulcerative colitis and TAVR valve for which she is on Plavix and aspirin who presented with left lower quadrant abdominal pain associated with nausea and report of black loose stool. Patient reported left lower quadrant pain for 1 week. Patient reports loose black stool over the last several weeks. Last episode of black stool yesterday. Patient also reports feeling weak over the last several months. Patient denies acid reflux, heartburn, vomiting, dysphagia, bright red blood in stool, bright red blood from rectal area or upper abdominal pain. CT and pelvis with IV contrast showed Uncomplicated colitis of the transverse segment. Though there are diverticula arising from this portion of the colon, length of the affected segment and morphology of wall thickening are atypical for diverticulitis such that other infectious or inflammatory causes of colitis should be considered. Location of inflamed colon in the upper abdomen does not necessarily explain focal left lower quadrant pain. More extensive distal diverticular findings in the sigmoid appear unchanged from remote priors without evidence for acute diverticulitis distally. Labs on admission:  hemoglobin 7.6, hemoglobin 6.8 today.   Repeat hemoglobin 8.1 after 1 unit packed red blood cells. No leukocytosis. LFTs within normal limits. C-reactive protein normal.  INR 1.09. Patient does not smoke. Patient does not drink alcohol. Patient denies illicit drug use or marijuana use. Colonoscopy done 7/28/2022 showed history of left-sided ulcerative colitis under remission with mild erythema in the rectosigmoid area. Extensive left-sided diverticulosis. Medium size internal hemorrhoid. EGD done 2/22/2022 showed small duodenal ulcer. Small sliding hiatal hernia. Mild erythema in the antrum. Allergies:    Allergies   Allergen Reactions    Sulfa Antibiotics Itching       Medications:  Current Facility-Administered Medications:     acetaminophen (TYLENOL) tablet 650 mg, 650 mg, Oral, Q6H PRN, Stanton Huang MD    amLODIPine (NORVASC) tablet 10 mg, 10 mg, Oral, Daily, Stanton Huang MD, 10 mg at 11/30/23 6948    aspirin chewable tablet 81 mg, 81 mg, Oral, Daily, Stanton Huang MD, 81 mg at 11/30/23 0803    atorvastatin (LIPITOR) tablet 20 mg, 20 mg, Oral, Daily, Stanton Huang MD, 20 mg at 11/30/23 4655    benzonatate (TESSALON PERLES) capsule 100 mg, 100 mg, Oral, TID PRN, Stanton Huang MD    ceftriaxone (ROCEPHIN) 1 g/50 mL in dextrose IVPB, 1,000 mg, Intravenous, Q24H, Stanton Huang MD    levothyroxine tablet 75 mcg, 75 mcg, Oral, Early Morning, Stanton Huang MD, 75 mcg at 11/30/23 0545    losartan (COZAAR) tablet 50 mg, 50 mg, Oral, Daily, Stanton Huang MD, 50 mg at 11/30/23 0810    metroNIDAZOLE (FLAGYL) tablet 500 mg, 500 mg, Oral, Q8H, Stanton Huang MD, 500 mg at 11/30/23 0802    ondansetron (ZOFRAN) injection 4 mg, 4 mg, Intravenous, Q6H PRN, Stanton Huang MD    pantoprazole (PROTONIX) injection 40 mg, 40 mg, Intravenous, Q12H [address], Stanton Huang MD, 40 mg at 11/30/23 2500    sulfaSALAzine (AZULFIDINE) tablet 1,000 mg, 1,000 mg, Oral, BID, Moses Rivera MD, 1,000 mg at 11/30/23 5186    Past Medical history:  Past Medical History:   Diagnosis Date    Allergic rhinitis 3/21/2023    Anemia     Arthritis     osteoporosis, djd knees, pt denies osteoporosis on 2/21/22    Low's esophagus     Cancer ([address])     on face    Colon polyp     Cough 02/21/2022    cough for 3 years, expectorates yellow mucus    Disease of thyroid gland     low thyroid    Diverticulitis     gerd, diverticulitis    Dizziness     GERD (gastroesophageal reflux disease)     ulcerative colitis; Low's esophagus, diverticulosis; hx of laryngopharyngeal reflux    Hyperlipidemia     Hypertension     Low vitamin D level     LPRD (laryngopharyngeal reflux disease) 02/21/2022    PONV (postoperative nausea and vomiting)     AFTER "BIG SURGERIES"    Postnasal drip     WITH COUGH    Ulcerative colitis [facility])        Past Surgical History:   Past Surgical History:   Procedure Laterality Date    APPENDECTOMY      CARDIAC CATHETERIZATION N/A 9/7/2023    Procedure: Cardiac RHC/LHC; Surgeon: Fang Gordon MD;  Location: [facility] CARD[facility]AC [facility];   Service: Cardiology    CARDIAC CATHETERIZATION N/A 11/7/2023    Procedure: Cardiac tavr;  Surgeon: Arleen Alaniz MD;  Location: [facility] MAIN [facility];  Service: Cardiology    CATARACT EXTRACTION Bilateral     COLONOSCOPY      colon polyps    COLONOSCOPY  07/28/2022    FOOT SURGERY Bilateral     bunionectomy    HEMORROIDECTOMY      hemorroid removal    JOINT REPLACEMENT Bilateral     knees    [place] REPLACE AORTIC VALVE OPENFEMORAL ARTERY APPROACH N/A 11/7/2023    Procedure: REPLACEMENT AORTIC VALVE TRANSCATHETER (TAVR) TRANSFEMORAL W/ 26MM BUCKNER DOROTHEA S3 UR VALVE(ACCESS ON LEFT) GIOVANNY;  Surgeon: Lo Vidal DO;  Location: [facility] MAIN OR;  Service: Cardiac Surgery    REPLACEMENT TOTAL KNEE Bilateral     b/ replacement    TONSILLECTOMY      TUBAL LIGATION      UPPER GASTROINTESTINAL ENDOSCOPY         Social history:   Social History     Tobacco Use    Smoking status: Never    Smokeless tobacco: Never   Vaping Use    Vaping Use: Never used   Substance Use Topics    Alcohol use: Not Currently    Drug use: Never       Family history:   Family History   Problem Relation Age of Onset    Heart disease Father     No Known Problems Sister     No Known Problems Sister     Cancer Daughter         unsure of type of cancer, it was female cancer and hysterectomy done by Dr. Sydney Heimlich    No Known Problems Maternal Grandmother     No Known Problems Paternal Grandmother     Heart disease Brother     Cancer Brother 54    Cancer Brother 79    No Known Problems Maternal Aunt     No Known Problems Paternal Aunt         Review of Systems: Review of Systems   Gastrointestinal:  Positive for abdominal pain and nausea. Neurological:  Positive for weakness. All other systems reviewed and are negative. Physical Exam: Physical Exam  Vitals and nursing note reviewed. Constitutional:       General: She is not in acute distress. HENT:      Head: Normocephalic and atraumatic. Cardiovascular:      Rate and Rhythm: Normal rate and regular rhythm. Pulses: Normal pulses. Heart sounds: Normal heart sounds. Pulmonary:      Effort: Pulmonary effort is normal. No respiratory distress. Breath sounds: Normal breath sounds. No stridor. No wheezing, rhonchi or rales. Abdominal:      General: Bowel sounds are normal. There is no distension. Palpations: Abdomen is soft. There is no mass. Tenderness: There is no abdominal tenderness. There is no guarding or rebound. Hernia: No hernia is present. Musculoskeletal:      Cervical back: Normal range of motion and neck supple. Right lower leg: No edema. Left lower leg: No edema. Skin:     General: Skin is warm and dry. Capillary Refill: Capillary refill takes less than 2 seconds. Coloration: Skin is pale. Skin is not jaundiced.    Neurological:      Mental Status: She is alert and oriented to person, place, and time.    Psychiatric:         Mood and Affect: Mood normal.           Lab Results:   Recent Results (from the past 24 hour(s))   CBC and differential    Collection Time: 11/29/23  1:34 PM   Result Value Ref Range    WBC 5.57 4.31 - 10.16 Thousand/uL    RBC 2.20 (L) 3.81 - 5.12 Million/uL    Hemoglobin 7.6 (L) 11.5 - 15.4 g/dL    Hematocrit 23.3 (L) 34.8 - 46.1 %     (H) 82 - 98 fL    MCH 34.5 (H) 26.8 - 34.3 pg    MCHC 32.6 31.4 - 37.4 g/dL    RDW 14.9 11.6 - 15.1 %    MPV 9.8 8.9 - 12.7 fL    Platelets 286 558 - 654 Thousands/uL    nRBC 0 /100 WBCs    Neutrophils Relative 51 43 - 75 %    Immat GRANS % 0 0 - 2 %    Lymphocytes Relative 31 14 - 44 %    Monocytes Relative 12 4 - 12 %    Eosinophils Relative 5 0 - 6 %    Basophils Relative 1 0 - 1 %    Neutrophils Absolute 2.86 1.85 - 7.62 Thousands/µL    Immature Grans Absolute 0.02 0.00 - 0.20 Thousand/uL    Lymphocytes Absolute 1.72 0.60 - 4.47 Thousands/µL    Monocytes Absolute 0.67 0.17 - 1.22 Thousand/µL    Eosinophils Absolute 0.25 0.00 - 0.61 Thousand/µL    Basophils Absolute 0.05 0.00 - 0.10 Thousands/µL   Comprehensive metabolic panel    Collection Time: 11/29/23  1:34 PM   Result Value Ref Range    Sodium 131 (L) 135 - 147 mmol/L    Potassium 3.6 3.5 - 5.3 mmol/L    Chloride 100 96 - 108 mmol/L    CO2 22 21 - 32 mmol/L    ANION GAP 9 mmol/L    BUN 10 5 - 25 mg/dL    Creatinine 0.68 0.60 - 1.30 mg/dL    Glucose 91 65 - 140 mg/dL    Calcium 8.8 8.4 - 10.2 mg/dL    AST 19 13 - 39 U/L    ALT 8 7 - 52 U/L    Alkaline Phosphatase 70 34 - 104 U/L    Total Protein 6.5 6.4 - 8.4 g/dL    Albumin 3.7 3.5 - 5.0 g/dL    Total Bilirubin 0.51 0.20 - 1.00 mg/dL    eGFR 80 ml/min/1.73sq m   HS Troponin 0hr (reflex protocol)    Collection Time: 11/29/23  1:34 PM   Result Value Ref Range    hs TnI 0hr 7 "Refer to ACS Flowchart"- see link ng/L   Protime-INR    Collection Time: 11/29/23  1:34 PM   Result Value Ref Range    Protime 14.8 (H) 11.6 - 14.5 seconds INR 1.09 0.84 - 1.19   APTT    Collection Time: 11/29/23  1:34 PM   Result Value Ref Range    PTT 30 23 - 37 seconds   Type and screen    Collection Time: 11/29/23  1:34 PM   Result Value Ref Range    ABO Grouping O     Rh Factor Positive     Antibody Screen Negative     Specimen Expiration Date 20231202    Lipase    Collection Time: 11/29/23  1:34 PM   Result Value Ref Range    Lipase 13 11 - 82 u/L   C-reactive protein    Collection Time: 11/29/23  1:34 PM   Result Value Ref Range    CRP 2.1 <3.0 mg/L   ECG 12 lead    Collection Time: 11/29/23  1:42 PM   Result Value Ref Range    Ventricular Rate 66 BPM    Atrial Rate 66 BPM    MI Interval 182 ms    QRSD Interval 88 ms    QT Interval 410 ms    QTC Interval 429 ms    P Axis -9 degrees    QRS Axis -8 degrees    T Wave [place] 58 degrees   HS Troponin I 2hr    Collection Time: 11/29/23  4:32 PM   Result Value Ref Range    hs TnI 2hr 8 "Refer to ACS Flowchart"- see link ng/L    Delta 2hr hsTnI 1 <20 ng/L   Blood culture #1    Collection Time: 11/29/23  4:43 PM    Specimen: Hand, Right; Blood   Result Value Ref Range    Blood Culture Received in Microbiology Lab. Culture in Progress. Blood culture #2    Collection Time: 11/29/23  4:43 PM    Specimen: Hand, Left; Blood   Result Value Ref Range    Blood Culture Received in Microbiology Lab. Culture in Progress.     Basic metabolic panel    Collection Time: 11/30/23  5:06 AM   Result Value Ref Range    Sodium 133 (L) 135 - 147 mmol/L    Potassium 3.4 (L) 3.5 - 5.3 mmol/L    Chloride 103 96 - 108 mmol/L    CO2 24 21 - 32 mmol/L    ANION GAP 6 mmol/L    BUN 7 5 - 25 mg/dL    Creatinine 0.72 0.60 - 1.30 mg/dL    Glucose 89 65 - 140 mg/dL    Calcium 8.1 (L) 8.4 - 10.2 mg/dL    eGFR 77 ml/min/1.73sq m   CBC (With Platelets)    Collection Time: 11/30/23  5:06 AM   Result Value Ref Range    WBC 4.12 (L) 4.31 - 10.16 Thousand/uL    RBC 2.01 (L) 3.81 - 5.12 Million/uL    Hemoglobin 6.8 (LL) 11.5 - 15.4 g/dL    Hematocrit 21.1 (L) 34.8 - 46.1 %     (H) 82 - 98 fL    MCH 33.8 26.8 - 34.3 pg    MCHC 32.2 31.4 - 37.4 g/dL    RDW 15.0 11.6 - 15.1 %    Platelets 702 262 - 694 Thousands/uL    MPV 9.8 8.9 - 12.7 fL   Magnesium    Collection Time: 11/30/23  5:06 AM   Result Value Ref Range    Magnesium 1.9 1.9 - 2.7 mg/dL   Prepare Leukoreduced RBC: 1 Units    Collection Time: 11/30/23  7:51 AM   Result Value Ref Range    Unit Product Code [id number]     Unit Number [id number]     Unit ABO O     Unit RH NEG     Crossmatch Compatible     Unit Dispense Status Issued     Unit Product Volume 219 mL           Imaging Studies: CT abdomen pelvis with contrast    Result Date: 11/29/2023  Narrative: CT ABDOMEN AND PELVIS WITH IV CONTRAST INDICATION:   Left lower quadrant pain. COMPARISON: CTA of the chest, abdomen, and pelvis dated 8/30/2023. CT of the abdomen/pelvis dated 1/17/2019. TECHNIQUE:  CT examination of the abdomen and pelvis was performed. Multiplanar 2D reformatted images were created from the source data. This examination, like all CT scans performed in the [facility], was performed utilizing techniques to minimize radiation dose exposure, including the use of iterative reconstruction and automated exposure control. Radiation dose length product (DLP) for this visit:  429 mGy-cm IV Contrast:  100 mL of iohexol (OMNIPAQUE) Enteric Contrast:  Enteric contrast was not administered. FINDINGS: ABDOMEN LOWER CHEST: Mild chronic scarring at the left base. No acute findings in the visualized portion of the lower chest. LIVER/BILIARY TREE:  Unremarkable. GALLBLADDER: Normal when accounting for degree of nondistention. SPLEEN:  Unremarkable. PANCREAS:  Unremarkable. ADRENAL GLANDS: Bilateral lobular adrenal hypertrophy--unchanged. No suspicious nodules on either side. KIDNEYS/URETERS:  Unremarkable. No hydronephrosis.  STOMACH AND BOWEL: Colonic diverticula are present from the hepatic flexure through the rectosigmoid junction, most marked distally. There is long segment circumferential wall thickening/thumbprinting involving the hepatic flexure and entire transverse colon with mild perienteric fat stranding. There are some scattered diverticula within the inflamed segment, but wall thickening is more diffuse/extensive and uniform than typical for diverticulitis. Descending colon wall thickness appears within normal limits. Extensive diverticulosis involving the sigmoid colon. There is a nondistensible segment of sigmoid colon with unchanged apparent wall thickening (301/118). However, appearance is relatively unchanged since at least 2019. No fat stranding around the sigmoid colon. Small hiatal hernia. Stomach is otherwise normal. No dilated or inflamed loops of small bowel. APPENDIX:  No findings to suggest appendicitis. ABDOMINOPELVIC CAVITY:  No ascites. No pneumoperitoneum. No lymphadenopathy. VESSELS:  Unremarkable for patient's age. PELVIS REPRODUCTIVE ORGANS:  Unremarkable for patient's age. URINARY BLADDER:  Unremarkable. ABDOMINAL WALL/INGUINAL REGIONS:  Unremarkable. 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 No acute fracture or destructive osseous lesion. Impression: Uncomplicated colitis of the transverse segment. Though there are diverticula arising from this portion of the colon, length of the affected segment and morphology of wall thickening are atypical for diverticulitis such that other infectious or inflammatory causes of colitis should be considered. No free air, abscess, or obstruction. Location of inflamed colon in the upper abdomen does not necessarily explain focal left lower quadrant pain. More extensive distal diverticular findings in the sigmoid appear unchanged from remote priors without evidence for acute diverticulitis, distally. Recommend follow-up with colonoscopy.  Workstation performed: [id number]     XR chest portable    Result Date: 11/10/2023  Narrative: CHEST INDICATION:   Post TAVR. COMPARISON:  CXR 2023 and PET/CT 10/18/2023. EXAM PERFORMED/VIEWS:  AP PORTABLE. FINDINGS: Right jugular catheter in SVC. Cardiomediastinal silhouette appears unremarkable. Post TAVR. Right paratracheal opacity due to benign ectatic vessels. Lungs clear. No effusion or pneumothorax. Osseous structures within normal limits for age. Impression: No acute cardiopulmonary disease. Post TAVR. Workstation performed: [id number]     Cardiac catheterization    Result Date: 2023  Narrative: OPERATIVE REPORT PATIENT NAME: Erica Resendez  :  1940 MRN: [id number] Pt Location: [facility] HYBRID OR ROOM 02 SURGERY DATE: 2023 SURGEON: Julian Keenan MD ASSISTANT: Vania Gordon PA-C CO SURGEON:  Luretha Osler, DO PREOPERATIVE DIAGNOSIS Symptomatic severe aortic stenosis. POSTOPERATIVE DIAGNOSIS Symptomatic severe aortic stenosis. NYHA CLASS: 3 CCS CLASS: 1 PROCEDURE Transcatheter aortic valve replacement with a 26 mm Blum DOROTHEA 3 Ultra Resilia bioprosthetic valve via a percutaneous left transfemoral approach. ANESTHESIA Dr. Army Vega, general endotracheal anesthesia with transesophageal echocardiogram guidance. CARDIOPULMONARY BYPASS TIME 0. PACKS/TUBES/DRAINS None. ESTIMATED BLOOD LOSS: 10 mL OPERATIVE TECHNIQUE The patient was taken to the operating room and placed supine on the operating table. Following the satisfactory induction of general anesthesia and placement of monitoring lines, the patient was prepped and draped in the usual sterile fashion. A time-out procedure was performed. The left common femoral artery was accessed percutaneously using Seldinger technique and fluoroscopy. Two (2) Perclose sutures were deployed in the standard fashion. The left common femoral vein was accessed in a similar fashion and was cannulated with a 6 [place] sheath. The femoral artery was cannulated with a 7 [place] sheath.  A pigtail catheter was inserted and advanced through the left common femoral artery sheath into the right coronary cusp. Using a series of injections, the angle of deployment was determined. Through the left common femoral vein sheath, a balloon tip temporary pacing catheter was inserted into the right ventricle and its capture was confirmed. The patient was systemically heparinized. The left common femoral artery sheath was then removed over an extra-stiff wire and the delivery sheath was inserted through the left common femoral artery and advanced into the aorta. The aortic valve was crossed with a wire. A 26 mm DOROTHEA 3 Ultra Resilia valve was then advanced through the sheath into the aorta and positioned onto the deployment balloon in the aorta and then advanced around the aortic arch and through the annulus of the aortic valve to the appropriate level. At this point, the catheter was desheathed in the standard fashion. The valve was positioned appropriately using a combination of fluoroscopy and transesophageal echocardiogram guidance. During an episode of rapid pacing, balloon deployment of the 26 mm DOROTHEA 3 Ultra Resilia valve was performed. Following deployment, the position of the valve was confirmed by fluoroscopy and echocardiography and its position appeared appropriate with trace perivalvular leak. The valve delivery system was subsequently removed and the sheath was removed from the left common femoral artery while the Perclose sutures were secured and direct pressure was held. Protamine was administered with normalization of the ACT. Pressure was released from the left groin and there was no active bleeding and no evidence of hematoma development. The common femoral vein sheath was removed from the left and pressure was held. Sponge, needle, and instrument counts were reported as correct by the nursing staff. As the attending surgeon, I was present and scrubbed for all critical portions of this procedure.  Final transesophageal echocardiogram demonstrated a well-positioned bioprosthetic transcatheter aortic valve with normal function and trace perivalvular leak. SIGNATURE: David Lehman MD DATE: November 8, 2023 TIME: 4:58 PM     Echo complete w/ contrast if indicated    Result Date: 11/8/2023  Narrative:   Left Ventricle: Left ventricular cavity size is normal. Wall thickness is normal. The left ventricular ejection fraction is 65%. Systolic function is normal. Wall motion is normal. Diastolic function is mildly abnormal, consistent with grade I (abnormal) relaxation. Aortic Valve: There is an Blum DOROTHEA 3 Ultra 26 mm TAVR bioprosthetic valve. The prosthetic valve appears well-seated. Prosthetic valve leaflets are not well visualized. There is no evidence of paravalvular regurgitation. The gradient recorded across the prosthetic aortic valve is within the expected range. The aortic valve peak velocity is 1.87 m/s. The aortic valve peak gradient is 14 mmHg. The aortic valve mean gradient is 7 mmHg. The dimensionless velocity index is 0.77. The aortic valve area is 2.45 cm2. Pericardium: There is a trivial pericardial effusion along the right ventricular free wall. The fluid exhibits no internal echoes. There is no echocardiographic evidence of tamponade. GIOVANNY intraop interventional w/realtime guidance of cardiac procedures    Result Date: 11/8/2023  Narrative: This order contains the linked images for the GIOVANNY that was performed by the Anesthesiologist.  Please see the  CARDIAC GIOVANNY ANESTHESIA procedure for results. XR chest portable ICU    Result Date: 11/7/2023  Narrative: CHEST INDICATION:   S/P Transcatheter aortic valve replacement. COMPARISON: 2/4/2023 EXAM PERFORMED/VIEWS:  XR CHEST PORTABLE ICU FINDINGS: Cardiomediastinal silhouette appears unremarkable. The patient is status post TAVR. There is a right IJ approach catheter terminating in the SVC. There is mild pulmonary vascular congestion.  The lungs are otherwise clear.  No pneumothorax or pleural effusion. Osseous structures appear within normal limits for patient age. Impression: Mild pulmonary vascular congestion. The patient is status post TAVR. The right IJ approach catheter terminating in the SVC. Workstation performed: [id number]     GIOVANNY Anesthesia    Result Date: 11/7/2023  Narrative: Olivia Rain GABI     11/7/2023 10:04 AM Procedure Performed: GIOVANNY Anesthesia Start Time:  11/7/2023 9:30 AM Preanesthesia Checklist Patient identified, IV assessed, risks and benefits discussed, monitors and equipment assessed, procedure being performed at surgeon's request and anesthesia consent obtained. Procedure Diagnostic Indications for GIOVANNY:  assessment of ascending aorta, assessment of surgical repair and hemodynamic monitoring. Type of GIOVANNY: interventional GIOVANNY with real time guidance of intracardiac procedure. Images Saved: ultrasound permanent image saved. Physician Requesting Echo: Cecilio Reese DO. Location performed: OR. Intubated. Heart visualized. Insertion of GIOVANNY Probe:  Easy. Probe Type:  Epiaortic and multiplane. Modalities:  Color flow mapping, 3D, continuous wave Doppler and pulse wave Doppler. Echocardiographic and Doppler Measurements PREPROCEDURE LEFT VENTRICLE: Systolic Function: normal. Ejection Fraction: 60-65%. Cavity size: normal.   Regional Wall Motion Abnormalities: none. RIGHT VENTRICLE: Systolic Function: normal.  Cavity size normal. No hypertrophy  AORTIC VALVE: Leaflets: trileaflet. Leaflet motions restricted. Stenosis: severe. Mean Gradient: 42 mmHg. Peak Gradient: 71 mmHg. Area: 0.61 cm². Regurgitation: trace. MITRAL VALVE: Leaflets: normal. Leaflet Motions: normal. Regurgitation: mild. Stenosis: none. TRICUSPID VALVE: Leaflets: normal. Leaflet Motions: normal. Stenosis: none. Regurgitation: mild. PULMONIC VALVE: Leaflets: normal. Regurgitation: none. Stenosis: none. ASCENDING AORTA: Size:  normal.  Dissection not present.   AORTIC ARCH: Size:  normal.  dissection not present. Grade 3: atheroma protruding < 0.5 cm into lumen. DESCENDING AORTA: Size: normal.  Dissection not present. Grade 3: atheroma protruding < 0.5 cm into lumen. RIGHT ATRIUM: Size:  normal. No spontaneous echo contrast. LEFT ATRIUM: Size: normal. No spontaneous echo contrast. ATRIAL SEPTUM: Intra-atrial septal morphology: lipomatous hypertrophy. VENTRICULAR SEPTUM: Intra-ventricular septum morphology: normal. OTHER FINDINGS: Pericardium:  normal. Pleural Effusion:  none. POSTPROCEDURE LEFT VENTRICLE: Unchanged . RIGHT VENTRICLE: Unchanged . AORTIC VALVE: Leaflets: bioprosthetic. Stenosis: none. Mean Gradient: 3 mmHg. Regurgitation: 2 trace PVLs seen and trace. Valve Size: 26 mm. MITRAL VALVE: Unchanged . TRICUSPID VALVE: Unchanged . PULMONIC VALVE: Unchanged   ATRIA: Unchanged . AORTA: Unchanged . REMOVAL: Probe Removal: atraumatic.         Problem List:   Patient Active Problem List   Diagnosis    Essential hypertension    Hypercholesterolemia    Hypothyroidism    Ulcerative colitis without complications ([address])    Diverticulosis    Low's esophagus    Colon polyp    Gastroesophageal reflux disease with esophagitis    Vitamin D deficiency    Anemia    Chronic cough    Dairy product intolerance    [place] allergy    B12 deficiency    Irritable larynx    Cough variant asthma     Dysphonia    Pharyngoesophageal dysphagia    Dysfunction of both eustachian tubes    Herpes zoster without complication    Aortic stenosis, severe    Calcified granuloma of lung (HCC)    Allergic rhinitis    Abnormal CT of the chest    Stage 3 chronic kidney disease, unspecified whether stage 3a or 3b CKD (HCC)    S/P TAVR (transcatheter aortic valve replacement)    Postoperative anemia due to acute blood loss    New onset left bundle branch block (LBBB)    Acute colitis         ALINA Desai      Please Note: "This note has been constructed using a voice recognition system. Therefore there may be syntax, spelling, and/or grammatical errors.  Please call if you have any questions. "**

## 2023-11-30 NOTE — PLAN OF CARE
Problem: PAIN - ADULT  Goal: Verbalizes/displays adequate comfort level or baseline comfort level  Description: Interventions:  - Encourage patient to monitor pain and request assistance  - Assess pain using appropriate pain scale  - Administer analgesics based on type and severity of pain and evaluate response  - Implement non-pharmacological measures as appropriate and evaluate response  - Consider cultural and social influences on pain and pain management  - Notify physician/advanced practitioner if interventions unsuccessful or patient reports new pain  Outcome: Progressing     Problem: INFECTION - ADULT  Goal: Absence or prevention of progression during hospitalization  Description: INTERVENTIONS:  - Assess and monitor for signs and symptoms of infection  - Monitor lab/diagnostic results  - Monitor all insertion sites, i.e. indwelling lines, tubes, and drains  - Monitor endotracheal if appropriate and nasal secretions for changes in amount and color  - [place] appropriate cooling/warming therapies per order  - Administer medications as ordered  - Instruct and encourage patient and family to use good hand hygiene technique  - Identify and instruct in appropriate isolation precautions for identified infection/condition  Outcome: Progressing  Goal: Absence of fever/infection during neutropenic period  Description: INTERVENTIONS:  - Monitor WBC    Outcome: Progressing     Problem: SAFETY ADULT  Goal: Patient will remain free of falls  Description: INTERVENTIONS:  - Educate patient/family on patient safety including physical limitations  - Instruct patient to call for assistance with activity   - Consult OT/PT to assist with strengthening/mobility   - Keep Call bell within reach  - Keep bed low and locked with side rails adjusted as appropriate  - Keep care items and personal belongings within reach  - Initiate and maintain comfort rounds  - Make Fall Risk Sign visible to staff  - Offer Toileting every  Hours, in advance of need  - Initiate/Maintain alarm  - Obtain necessary fall risk management equipment:   - Apply yellow socks and bracelet for high fall risk patients  - Consider moving patient to room near nurses station  Outcome: Progressing  Goal: Maintain or return to baseline ADL function  Description: INTERVENTIONS:  -  Assess patient's ability to carry out ADLs; assess patient's baseline for ADL function and identify physical deficits which impact ability to perform ADLs (bathing, care of mouth/teeth, toileting, grooming, dressing, etc.)  - Assess/evaluate cause of self-care deficits   - Assess range of motion  - Assess patient's mobility; develop plan if impaired  - Assess patient's need for assistive devices and provide as appropriate  - Encourage maximum independence but intervene and supervise when necessary  - Involve family in performance of ADLs  - Assess for home care needs following discharge   - Consider OT consult to assist with ADL evaluation and planning for discharge  - Provide patient education as appropriate  Outcome: Progressing  Goal: Maintains/Returns to pre admission functional level  Description: INTERVENTIONS:  - Perform AM-PAC 6 Click Basic Mobility/ Daily Activity assessment daily.  - Set and communicate daily mobility goal to care team and patient/family/caregiver. - Collaborate with rehabilitation services on mobility goals if consulted  - Perform Range of Motion  times a day. - Reposition patient every  hours.   - Dangle patient  times a day  - Stand patient  times a day  - Ambulate patient  times a day  - Out of bed to chair  times a day   - Out of bed for meals  times a day  - Out of bed for toileting  - Record patient progress and toleration of activity level   Outcome: Progressing     Problem: DISCHARGE PLANNING  Goal: Discharge to home or other facility with appropriate resources  Description: INTERVENTIONS:  - Identify barriers to discharge w/patient and caregiver  - Arrange for needed discharge resources and transportation as appropriate  - Identify discharge learning needs (meds, wound care, etc.)  - Arrange for interpretive services to assist at discharge as needed  - Refer to Case Management Department for coordinating discharge planning if the patient needs post-hospital services based on physician/advanced practitioner order or complex needs related to functional status, cognitive ability, or social support system  Outcome: Progressing

## 2023-11-30 NOTE — ASSESSMENT & PLAN NOTE
Hx of aortic stenosis, s/p TAVR 11/7/2023  Home regimen includes dual antiplatelet therapy with Plavix and aspirin      Plan:  In the setting of dark stools and low Hb,continue to hold home Plavix  Continue ASA 81 mg daily

## 2023-11-30 NOTE — ASSESSMENT & PLAN NOTE
Hx of anemia-downtrending hemoglobin since 2021 from hemoglobin in the 10s, to most recently in the 7s  Follows with hematology in the outpatient setting. Supplements with OTC iron 45 g daily, p.o. vitamin B12 and B12 infusions. Patient has a history of duodenal ulcers  Labs from November 17, 2023 - iron panel, vit B12, and folate level within normal limits  Reported black loose stools for 1 week. Patient reports starting OTC iron supplementation recently to assist with her weakness which she attributes to her anemia.   Consented for blood transfusions on 11/29/2023  Dark stools plus decreasing hemoglobin points to possible GI bleed  Per GI, bleeding may be due to AVM or ulceration  In the early morning of 11/30 patient's hemoglobin resulted at 6.8  Patient was given 1 L of blood this morning 11/30 and follow-up H&H was at 8.1    Plan:  Continue to hold home Plavix  Continue ASA  Hold DVT Ppx  Continue pantoprazole IV 40 mg twice daily  Daily AM CBC  transfuse if below 7  With repeat dark stool, check STAT Hb level  GI schedule patient for EGD tomorrow  With the recommendations will be made by GI pending the results of EGD  N.p.o. order set for midnight tonight

## 2023-11-30 NOTE — PROGRESS NOTES
[facility]  Progress Note  Name: Balta Castro  MRN: [id number]  Unit/Bed#: W -01 I Date of Admission: 11/29/2023   Date of Service: 11/30/2023 I Hospital Day: 1    Assessment/Plan   S/P TAVR (transcatheter aortic valve replacement)  Assessment & Plan  Hx of aortic stenosis, s/p TAVR 11/7/2023  Home regimen includes dual antiplatelet therapy with Plavix and aspirin      Plan:  In the setting of dark stools and low Hb,continue to hold home Plavix  Continue ASA 81 mg daily    Anemia  Assessment & Plan  Hx of anemia-downtrending hemoglobin since 2021 from hemoglobin in the 10s, to most recently in the 7s  Follows with hematology in the outpatient setting. Supplements with OTC iron 45 g daily, p.o. vitamin B12 and B12 infusions. Patient has a history of duodenal ulcers  Labs from November 17, 2023 - iron panel, vit B12, and folate level within normal limits  Reported black loose stools for 1 week. Patient reports starting OTC iron supplementation recently to assist with her weakness which she attributes to her anemia.   Consented for blood transfusions on 11/29/2023  Dark stools plus decreasing hemoglobin points to possible GI bleed  Per GI, bleeding may be due to AVM or ulceration  In the early morning of 11/30 patient's hemoglobin resulted at 6.8  Patient was given 1 L of blood this morning 11/30 and follow-up H&H was at 8.1    Plan:  Continue to hold home Plavix  Continue ASA  Hold DVT Ppx  Continue pantoprazole IV 40 mg twice daily  Daily AM CBC  transfuse if below 7  With repeat dark stool, check STAT Hb level  GI schedule patient for EGD tomorrow  With the recommendations will be made by GI pending the results of EGD  N.p.o. order set for midnight tonight    * Acute colitis  Assessment & Plan  On presentation, reported left lower quadrant pain and black loose stools x1 week  Hx of ulcerative colitis in remission on sulfasalazine  Last colonoscopy 7/20/2022-redemonstrated left-sided UC on remission, extensive left sided diverticulosis, internal hemorrhoid  Hb 7.6 on admission  CT AP W/ - suggestive of infective versus inflammatory colitis, less likely diverticulitis  CRP 2.1  Etiology of colitis likely infectious, less likely acute flare of UC      Plan:  Continue with ceftriaxone 1 g daily   Continue metronidazole 500 mg every 8 hours  Continue with CBC diff daily  Continue to trend fever  Follow-up blood cultures               VTE Pharmacologic Prophylaxis: VTE Score: 5  anticoagulation in the form of Plavix held at this time secondary to possible GI bleed. Patient remains on daily aspirin 81 mg. Mobility:   Basic Mobility Inpatient Raw Score: 19  JH-HLM Goal: 6: Walk 10 steps or more  JH-HLM Achieved: 6: Walk 10 steps or more  HLM Goal achieved. Continue to encourage appropriate mobility. Patient Centered Rounds: I evaluated the patient without nursing staff present due to inability to locate nurse at time of rounding. Discussions with Specialists or Other Care Team Provider: Senior resident and attending physician    Education and Discussions with Family / Patient: Updated  () at bedside. Current Length of Stay: 1 day(s)  Current Patient Status: Inpatient   Discharge Plan:  TBD depending on EGD results     Code Status: Level 1 - Full Code    Subjective:   Patient was seen and examined today at bedside for continuity of care. Patient's  was at bedside at the time of this writer's examination. Patient was calm and cooperative with examination. At the time of exam, patient was getting a transfusion of 1 unit of blood. Patient reports feeling better compared to when she was first admitted. Patient denies chest pain, shortness of breath, nausea, or vomiting. Patient reports some abdominal discomfort but says that it is not as bad as the time she had diverticulitis.   Patient reports having 1 bowel movement since being admitted but is unaware of whether it was dark or not. Patient endorses the continued presence of weakness however reports that it has improved since being admitted. Objective:     Vitals:   Temp (24hrs), Av.1 °F (36.7 °C), Min:98 °F (36.7 °C), Max:98.4 °F (36.9 °C)    Temp:  [98 °F (36.7 °C)-98.4 °F (36.9 °C)] 98 °F (36.7 °C)  HR:  [66-83] 70  Resp:  [16-18] 18  BP: (127-147)/(64-86) 137/65  SpO2:  [94 %-99 %] 97 %  There is no height or weight on file to calculate BMI. Input and Output Summary (last 24 hours): Intake/Output Summary (Last 24 hours) at 2023 1708  Last data filed at 2023 1232  Gross per 24 hour   Intake 1251.67 ml   Output --   Net 1251.67 ml       Physical Exam:   Physical Exam  Vitals reviewed. Constitutional:       General: She is not in acute distress. Appearance: She is well-developed. She is not ill-appearing. HENT:      Mouth/Throat:      Mouth: Mucous membranes are moist.   Cardiovascular:      Rate and Rhythm: Normal rate and regular rhythm. Pulses: Normal pulses. Heart sounds: Normal heart sounds. No murmur heard. Pulmonary:      Effort: Pulmonary effort is normal. No respiratory distress. Breath sounds: Normal breath sounds. Abdominal:      General: Bowel sounds are normal.      Palpations: Abdomen is soft. Tenderness: There is no abdominal tenderness. Musculoskeletal:         General: No swelling. Cervical back: Neck supple. Right lower leg: No edema. Left lower leg: No edema. Skin:     General: Skin is warm and dry. Capillary Refill: Capillary refill takes less than 2 seconds. Coloration: Skin is pale. Neurological:      Mental Status: She is alert.    Psychiatric:         Mood and Affect: Mood normal.          Additional Data:     Labs:  Results from last 7 days   Lab Units 23  1110 23  0506 23  1334   WBC Thousand/uL  --  4.12* 5.57   HEMOGLOBIN g/dL 8.1* 6.8* 7.6*   HEMATOCRIT % 25.3* 21.1* 23.3* PLATELETS Thousands/uL  --  236 299   NEUTROS PCT %  --   --  51   LYMPHS PCT %  --   --  31   MONOS PCT %  --   --  12   EOS PCT %  --   --  5     Results from last 7 days   Lab Units 11/30/23  0506 11/29/23  1334   SODIUM mmol/L 133* 131*   POTASSIUM mmol/L 3.4* 3.6   CHLORIDE mmol/L 103 100   CO2 mmol/L 24 22   BUN mg/dL 7 10   CREATININE mg/dL 0.72 0.68   ANION GAP mmol/L 6 9   CALCIUM mg/dL 8.1* 8.8   ALBUMIN g/dL  --  3.7   TOTAL BILIRUBIN mg/dL  --  0.51   ALK PHOS U/L  --  70   ALT U/L  --  8   AST U/L  --  19   GLUCOSE RANDOM mg/dL 89 91     Results from last 7 days   Lab Units 11/29/23  1334   INR  1.09                   Lines/Drains:  Invasive Devices       Peripheral Intravenous Line  Duration             Peripheral IV 11/29/23 Right;Ventral (anterior) Forearm 1 day                          Imaging: Reviewed radiology reports from this admission including: abdominal/pelvic CT    Recent Cultures (last 7 days):   Results from last 7 days   Lab Units 11/29/23  1643   BLOOD CULTURE  Received in Microbiology Lab. Culture in Progress. Received in Microbiology Lab. Culture in Progress.        Last 24 Hours Medication List:   Current Facility-Administered Medications   Medication Dose Route Frequency Provider Last Rate    acetaminophen  650 mg Oral Q6H PRN Jermaine Holt MD      amLODIPine  10 mg Oral Daily Jermaine Holt MD      aspirin  81 mg Oral Daily Jermaine Holt MD      atorvastatin  20 mg Oral Daily Jermaine Holt MD      benzonatate  100 mg Oral TID PRN Jermaine Holt MD      cefTRIAXone  1,000 mg Intravenous Q24H Jermaine Holt MD      levothyroxine  75 mcg Oral Early Morning Jermaine Holt MD      losartan  50 mg Oral Daily Jermaine Holt MD      metroNIDAZOLE  500 mg Oral Q8H Jermaine Holt MD      ondansetron  4 mg Intravenous Q6H PRN Jermaine Holt MD      pantoprazole  40 mg Intravenous Q12H [address] Rodriguez Romero MD      sulfaSALAzine  1,000 mg Oral BID Rodriguez Romero MD          Today, Patient Was Seen By: Joon Chase DO    **Please Note: This note may have been constructed using a voice recognition system. **

## 2023-11-30 NOTE — TELEPHONE ENCOUNTER
----- Message from Yohan Regan MD sent at 11/28/2023 12:50 PM EST -----  Call patient. Hgb a little lower at 7.5. She needs to keep her appointment next week with Hematology

## 2023-12-01 ENCOUNTER — ANESTHESIA (OUTPATIENT)
Dept: ANESTHESIOLOGY | Facility: HOSPITAL | Age: 83
End: 2023-12-01

## 2023-12-01 ENCOUNTER — ANESTHESIA EVENT (OUTPATIENT)
Dept: ANESTHESIOLOGY | Facility: HOSPITAL | Age: 83
End: 2023-12-01

## 2023-12-01 ENCOUNTER — APPOINTMENT (INPATIENT)
Dept: GASTROENTEROLOGY | Facility: HOSPITAL | Age: 83
DRG: 378 | End: 2023-12-01
Payer: MEDICARE

## 2023-12-01 ENCOUNTER — ANESTHESIA EVENT (INPATIENT)
Dept: GASTROENTEROLOGY | Facility: HOSPITAL | Age: 83
DRG: 378 | End: 2023-12-01
Payer: MEDICARE

## 2023-12-01 ENCOUNTER — ANESTHESIA (INPATIENT)
Dept: GASTROENTEROLOGY | Facility: HOSPITAL | Age: 83
DRG: 378 | End: 2023-12-01
Payer: MEDICARE

## 2023-12-01 LAB
ABO GROUP BLD BPU: NORMAL
ALBUMIN SERPL BCP-MCNC: 3.2 G/DL (ref 3.5–5)
ALP SERPL-CCNC: 57 U/L (ref 34–104)
ALT SERPL W P-5'-P-CCNC: 6 U/L (ref 7–52)
ANION GAP SERPL CALCULATED.3IONS-SCNC: 5 MMOL/L
AST SERPL W P-5'-P-CCNC: 15 U/L (ref 13–39)
BASOPHILS # BLD AUTO: 0.04 THOUSANDS/ÂΜL (ref 0–0.1)
BASOPHILS NFR BLD AUTO: 1 % (ref 0–1)
BILIRUB SERPL-MCNC: 0.47 MG/DL (ref 0.2–1)
BPU ID: NORMAL
BUN SERPL-MCNC: 8 MG/DL (ref 5–25)
CALCIUM ALBUM COR SERPL-MCNC: 8.8 MG/DL (ref 8.3–10.1)
CALCIUM SERPL-MCNC: 8.2 MG/DL (ref 8.4–10.2)
CHLORIDE SERPL-SCNC: 102 MMOL/L (ref 96–108)
CO2 SERPL-SCNC: 24 MMOL/L (ref 21–32)
CREAT SERPL-MCNC: 0.7 MG/DL (ref 0.6–1.3)
CROSSMATCH: NORMAL
EOSINOPHIL # BLD AUTO: 0.41 THOUSAND/ÂΜL (ref 0–0.61)
EOSINOPHIL NFR BLD AUTO: 9 % (ref 0–6)
ERYTHROCYTE [DISTWIDTH] IN BLOOD BY AUTOMATED COUNT: 17.5 % (ref 11.6–15.1)
GFR SERPL CREATININE-BSD FRML MDRD: 79 ML/MIN/1.73SQ M
GLUCOSE SERPL-MCNC: 91 MG/DL (ref 65–140)
HCT VFR BLD AUTO: 24.3 % (ref 34.8–46.1)
HGB BLD-MCNC: 7.9 G/DL (ref 11.5–15.4)
IMM GRANULOCYTES # BLD AUTO: 0.01 THOUSAND/UL (ref 0–0.2)
IMM GRANULOCYTES NFR BLD AUTO: 0 % (ref 0–2)
LYMPHOCYTES # BLD AUTO: 1.23 THOUSANDS/ÂΜL (ref 0.6–4.47)
LYMPHOCYTES NFR BLD AUTO: 27 % (ref 14–44)
MAGNESIUM SERPL-MCNC: 2 MG/DL (ref 1.9–2.7)
MCH RBC QN AUTO: 33.8 PG (ref 26.8–34.3)
MCHC RBC AUTO-ENTMCNC: 32.5 G/DL (ref 31.4–37.4)
MCV RBC AUTO: 104 FL (ref 82–98)
MONOCYTES # BLD AUTO: 0.48 THOUSAND/ÂΜL (ref 0.17–1.22)
MONOCYTES NFR BLD AUTO: 11 % (ref 4–12)
NEUTROPHILS # BLD AUTO: 2.38 THOUSANDS/ÂΜL (ref 1.85–7.62)
NEUTS SEG NFR BLD AUTO: 52 % (ref 43–75)
NRBC BLD AUTO-RTO: 0 /100 WBCS
PLATELET # BLD AUTO: 230 THOUSANDS/UL (ref 149–390)
PMV BLD AUTO: 9.3 FL (ref 8.9–12.7)
POTASSIUM SERPL-SCNC: 4 MMOL/L (ref 3.5–5.3)
PROT SERPL-MCNC: 5.6 G/DL (ref 6.4–8.4)
RBC # BLD AUTO: 2.34 MILLION/UL (ref 3.81–5.12)
SODIUM SERPL-SCNC: 131 MMOL/L (ref 135–147)
UNIT DISPENSE STATUS: NORMAL
UNIT PRODUCT CODE: NORMAL
UNIT PRODUCT VOLUME: 219 ML
UNIT RH: NORMAL
WBC # BLD AUTO: 4.55 THOUSAND/UL (ref 4.31–10.16)

## 2023-12-01 PROCEDURE — 85025 COMPLETE CBC W/AUTO DIFF WBC: CPT | Performed by: FAMILY MEDICINE

## 2023-12-01 PROCEDURE — 43235 EGD DIAGNOSTIC BRUSH WASH: CPT | Performed by: INTERNAL MEDICINE

## 2023-12-01 PROCEDURE — 80053 COMPREHEN METABOLIC PANEL: CPT | Performed by: FAMILY MEDICINE

## 2023-12-01 PROCEDURE — C9113 INJ PANTOPRAZOLE SODIUM, VIA: HCPCS

## 2023-12-01 PROCEDURE — C9113 INJ PANTOPRAZOLE SODIUM, VIA: HCPCS | Performed by: INTERNAL MEDICINE

## 2023-12-01 PROCEDURE — 0DJ08ZZ INSPECTION OF UPPER INTESTINAL TRACT, VIA NATURAL OR ARTIFICIAL OPENING ENDOSCOPIC: ICD-10-PCS | Performed by: INTERNAL MEDICINE

## 2023-12-01 PROCEDURE — 99232 SBSQ HOSP IP/OBS MODERATE 35: CPT | Performed by: INTERNAL MEDICINE

## 2023-12-01 PROCEDURE — 83735 ASSAY OF MAGNESIUM: CPT | Performed by: FAMILY MEDICINE

## 2023-12-01 RX ORDER — PROPOFOL 10 MG/ML
INJECTION, EMULSION INTRAVENOUS AS NEEDED
Status: DISCONTINUED | OUTPATIENT
Start: 2023-12-01 | End: 2023-12-01

## 2023-12-01 RX ORDER — SODIUM CHLORIDE 9 MG/ML
75 INJECTION, SOLUTION INTRAVENOUS CONTINUOUS
Status: DISCONTINUED | OUTPATIENT
Start: 2023-12-01 | End: 2023-12-01

## 2023-12-01 RX ORDER — LIDOCAINE HYDROCHLORIDE 20 MG/ML
INJECTION, SOLUTION EPIDURAL; INFILTRATION; INTRACAUDAL; PERINEURAL AS NEEDED
Status: DISCONTINUED | OUTPATIENT
Start: 2023-12-01 | End: 2023-12-01

## 2023-12-01 RX ORDER — SODIUM CHLORIDE 9 MG/ML
75 INJECTION, SOLUTION INTRAVENOUS CONTINUOUS
Status: DISPENSED | OUTPATIENT
Start: 2023-12-01 | End: 2023-12-01

## 2023-12-01 RX ORDER — SODIUM CHLORIDE, SODIUM LACTATE, POTASSIUM CHLORIDE, CALCIUM CHLORIDE 600; 310; 30; 20 MG/100ML; MG/100ML; MG/100ML; MG/100ML
INJECTION, SOLUTION INTRAVENOUS CONTINUOUS PRN
Status: DISCONTINUED | OUTPATIENT
Start: 2023-12-01 | End: 2023-12-01

## 2023-12-01 RX ADMIN — METRONIDAZOLE 500 MG: 500 TABLET ORAL at 08:30

## 2023-12-01 RX ADMIN — SULFASALAZINE 1000 MG: 500 TABLET ORAL at 08:31

## 2023-12-01 RX ADMIN — SULFASALAZINE 1000 MG: 500 TABLET ORAL at 17:12

## 2023-12-01 RX ADMIN — PROPOFOL 60 MG: 10 INJECTION, EMULSION INTRAVENOUS at 14:10

## 2023-12-01 RX ADMIN — ATORVASTATIN CALCIUM 20 MG: 20 TABLET, FILM COATED ORAL at 08:31

## 2023-12-01 RX ADMIN — LIDOCAINE HYDROCHLORIDE 60 MG: 20 INJECTION, SOLUTION EPIDURAL; INFILTRATION; INTRACAUDAL; PERINEURAL at 14:07

## 2023-12-01 RX ADMIN — LOSARTAN POTASSIUM 50 MG: 50 TABLET, FILM COATED ORAL at 08:30

## 2023-12-01 RX ADMIN — METRONIDAZOLE 500 MG: 500 TABLET ORAL at 00:30

## 2023-12-01 RX ADMIN — BENZONATATE 100 MG: 100 CAPSULE ORAL at 15:41

## 2023-12-01 RX ADMIN — SODIUM CHLORIDE, SODIUM LACTATE, POTASSIUM CHLORIDE, AND CALCIUM CHLORIDE: .6; .31; .03; .02 INJECTION, SOLUTION INTRAVENOUS at 14:04

## 2023-12-01 RX ADMIN — SODIUM CHLORIDE 75 ML/HR: 0.9 INJECTION, SOLUTION INTRAVENOUS at 09:16

## 2023-12-01 RX ADMIN — AMLODIPINE BESYLATE 10 MG: 10 TABLET ORAL at 08:31

## 2023-12-01 RX ADMIN — PROPOFOL 10 MG: 10 INJECTION, EMULSION INTRAVENOUS at 14:13

## 2023-12-01 RX ADMIN — LEVOTHYROXINE SODIUM 75 MCG: 75 TABLET ORAL at 06:03

## 2023-12-01 RX ADMIN — PANTOPRAZOLE SODIUM 40 MG: 40 INJECTION, POWDER, FOR SOLUTION INTRAVENOUS at 20:22

## 2023-12-01 RX ADMIN — PANTOPRAZOLE SODIUM 40 MG: 40 INJECTION, POWDER, FOR SOLUTION INTRAVENOUS at 08:40

## 2023-12-01 NOTE — CASE MANAGEMENT
Case Management Assessment & Discharge Planning Note    Patient name Leticia Craft  Location W MS 26/W -45 MRN [id number]  : 1940 Date 2023       Current Admission Date: 2023  Current Admission Diagnosis:Acute colitis   Patient Active Problem List    Diagnosis Date Noted    Acute colitis 2023    New onset left bundle branch block (LBBB) 2023    Postoperative anemia due to acute blood loss 2023    S/P TAVR (transcatheter aortic valve replacement) 2023    Abnormal CT of the chest 2023    Stage 3 chronic kidney disease, unspecified whether stage 3a or 3b CKD ([address]) 2023    Calcified granuloma of lung ([address]) 2023    Allergic rhinitis 2023    Aortic stenosis, severe 2022    Herpes zoster without complication 419    Chronic cough 2021    Dairy product intolerance 2021    [place] allergy 2021    B12 deficiency 2021    Irritable larynx 2021    Cough variant asthma  2021    Dysphonia 2021    Pharyngoesophageal dysphagia 2021    Dysfunction of both eustachian tubes 2021    Essential hypertension 2020    Hypercholesterolemia 2020    Hypothyroidism 2020    Ulcerative colitis without complications ([address])     Diverticulosis 2020    Low's esophagus 2020    Colon polyp 2020    Gastroesophageal reflux disease with esophagitis 2020    Vitamin D deficiency 2020    Anemia 2020      LOS (days): 2  Geometric Mean LOS (GMLOS) (days): 3.00  Days to GMLOS:1     OBJECTIVE:  PATIENT READMITTED TO HOSPITAL  Risk of Unplanned Readmission Score: 13.35         Current admission status: Inpatient       Preferred Pharmacy:   [address] [place] [address] ROAD  [address] [place], [place] [id number]  Phone: [phone] Fax: [address] [place], [place] [facility] [place] [address]  Phone: [phone] Fax: [phone]    Primary Care Provider: Elda Mendoza MD    Primary Insurance: MEDICARE  Secondary Insurance: [id number]    ASSESSMENT:  Michelle Proxies       [facility], [address]   Primary Phone: [phone] (Mobile)  Home Phone: [phone]                 Readmission Root Cause  30 Day Readmission: Yes    Patient Information  Admitted from[de-identified] Home  Mental Status: Alert  During Assessment patient was accompanied by: Daughter  Assessment information provided by[de-identified] Patient  Primary Caregiver: Self  Support Systems: Self, Son, Daughter, Spouse/significant other, [address] of Residence: [facility] [address] [place] [address] do you live in?: [facility] entry access options.  Select all that apply.: Stairs  Number of steps to enter home.: 5  Type of Current Residence: [facility]  Living Arrangements: Lives w/ Spouse/significant other    Activities of Daily Living Prior to Admission  Functional Status: Independent  Completes ADLs independently?: Yes  Ambulates independently?: Yes  Does patient use assisted devices?: Yes  Assisted Devices (DME) used: Ve[employer]   Does patient currently own DME?: Yes  What DME does the patient currently own?: [employer]   Does patient have a history of Outpatient Therapy (PT/OT)?: No  Does the patient have a history of Short-Term Rehab?: No  Does patient have a history of HHC?: Yes  Does patient currently have [address]?: Yes    Current Home Health Care  Type of Current Home Care Services: Home PT, Home OT, Home health aide  Current Home Health Agency[de-identified] [address] [place] [address] Provider[de-identified] PCP    Patient Information Continued  Income Source: Pension/penitentiary  Does patient have prescription coverage?: Yes  Does patient receive dialysis treatments?: No  Does patient have a history of substance abuse?: No    Housing Stability: Low Risk  (12/1/2023)    Housing Stability Vital Sign     Unable to Pay for Housing in the Last Year: No     Number of Places Lived in the Last Year: 1     Unstable Housing in the Last Year: No   Food Insecurity: No Food Insecurity (12/1/2023)    Hunger Vital Sign     Worried About Running Out of Food in the Last Year: Never true     Ran Out of Food in the Last Year: Never true   Transportation Needs: No Transportation Needs (12/1/2023)    PRAPARE - Transportation     Lack of Transportation (Medical): No     Lack of Transportation (Non-Medical): No   Utilities: Not At Risk (12/1/2023)    [facility] Utilities     Threatened with loss of utilities: No     DISCHARGE DETAILS:    Discharge planning discussed with[de-identified] patient and family at bedside  Freedom of Choice: Yes  Comments - Freedom of Choice: [place]         Is the patient interested in [address] [place] [address] at discharge?: Yes  [facility] requested[de-identified] Nursing, Occupational Therapy, Physical [facility] Name[de-identified] [place] Provider[de-identified] PCP  Home Health Services Needed[de-identified] Strengthening/Theraputic Exercises to Improve Function, Evaluate Functional Status and Safety, Gait/ADL Training  Homebound Criteria Met[de-identified] Uses an Assist Device (i.e. cane, walker, etc)  Supporting Clincal Findings[de-identified] Limited Endurance    DME Referral Provided  Referral made for DME?: No    Other Referral/Resources/Interventions Provided:  Interventions: [facility]  Referral Comments: [facility] with acute colitis. CM met with patient at bedside to discuss dcp. Patient confirms she is current with [facility], would like to continue services with them once d/c. Referral placed via [facility]- reserved and added to follow up providers. CM to follow up as needed.     Would you like to participate in our [id number] service program?  : No - Declined    Treatment Team Recommendation: Home with [facility]  Discharge Destination Plan[de-identified] Home with [facility] at Discharge : Family

## 2023-12-01 NOTE — ASSESSMENT & PLAN NOTE
On presentation, reported left lower quadrant pain and black loose stools x1 week  Hx of ulcerative colitis in remission on sulfasalazine  Last colonoscopy 7/20/2022-redemonstrated left-sided UC on remission, extensive left sided diverticulosis, internal hemorrhoid  Hb 7.6 on admission  CT AP W/ - suggestive of infective versus inflammatory colitis, less likely diverticulitis  CRP 2.1  Remains afebrile and her white blood cell count is within normal ranges at 4.55 on 12/1  As of 12/1 no growth on blood cultures      Plan:  Per GI, no indication for antibiotics at this time   Discontinue ceftriaxone 1 g daily   Discontinue metronidazole 500 mg every 8 hours  Continue with CBC diff daily  Continue to trend fever  Continue sulfasalazine 1000 mg twice daily  Continue to follow-up blood cultures

## 2023-12-01 NOTE — ANESTHESIA POSTPROCEDURE EVALUATION
Post-Op Assessment Note    CV Status:  Stable    Pain management: adequate       Mental Status:  Arousable   Hydration Status:  Euvolemic   PONV Controlled:  Controlled   Airway Patency:  Patent     Post Op Vitals Reviewed: Yes    No anethesia notable event occurred.     Staff: CRNA               BP   145/66   Temp     Pulse  80   Resp   16   SpO2   99

## 2023-12-01 NOTE — PROGRESS NOTES
[address] [facility]  Progress Note  Name: Sophie Scott  MRN: [id number]  Unit/Bed#: W -01 I Date of Admission: 11/29/2023   Date of Service: 12/1/2023 I Hospital Day: 2    Assessment/Plan   S/P TAVR (transcatheter aortic valve replacement)  Assessment & Plan  Hx of aortic stenosis, s/p TAVR 11/7/2023  Home regimen includes dual antiplatelet therapy with Plavix and aspirin      Plan:  In the setting of dark stools and low Hb,continue to hold home Plavix  Continue ASA 81 mg daily    Anemia  Assessment & Plan  Hx of anemia-downtrending hemoglobin since 2021 from hemoglobin in the 10s, to most recently in the 7s  Follows with hematology in the outpatient setting. Supplements with OTC iron 45 g daily, p.o. vitamin B12 and B12 infusions. Patient has a history of duodenal ulcers  Labs from November 17, 2023 - iron panel, vit B12, and folate level within normal limits  Reported black loose stools for 1 week. Patient reports starting OTC iron supplementation recently to assist with her weakness which she attributes to her anemia.   Consented for blood transfusions on 11/29/2023  Dark stools plus decreasing hemoglobin points to possible GI bleed  Per GI, bleeding may be due to AVM or ulceration  In the early morning of 11/30 patient's hemoglobin resulted at 6.8  Patient was given 1 L of blood this morning 11/30 and follow-up H&H was at 8.1  Hemoglobin on 12/1 was 7.9  Patient went for EGD today 12/1  Per discussion with GI, EGD was normal    Plan:  Continue to hold home Plavix  Continue ASA  Hold DVT Ppx  Continue pantoprazole IV 40 mg twice daily  Okay to resume diet after EGD, per GI   GI recommended watching hemoglobin overnight   Daily AM CBC  transfuse if below 7  With repeat dark stool, check STAT Hb level    * Acute colitis  Assessment & Plan  On presentation, reported left lower quadrant pain and black loose stools x1 week  Hx of ulcerative colitis in remission on sulfasalazine  Last colonoscopy 7/20/2022-redemonstrated left-sided UC on remission, extensive left sided diverticulosis, internal hemorrhoid  Hb 7.6 on admission  CT AP W/ - suggestive of infective versus inflammatory colitis, less likely diverticulitis  CRP 2.1  Remains afebrile and her white blood cell count is within normal ranges at 4.55 on 12/1  As of 12/1 no growth on blood cultures      Plan:  Per GI, no indication for antibiotics at this time   Discontinue ceftriaxone 1 g daily   Discontinue metronidazole 500 mg every 8 hours  Continue with CBC diff daily  Continue to trend fever  Continue sulfasalazine 1000 mg twice daily  Continue to follow-up blood cultures             VTE Pharmacologic Prophylaxis: VTE Score: 5 High Risk (Score >/= 5) - Pharmacological DVT Prophylaxis Contraindicated. Sequential Compression Devices Ordered. Mobility:   Basic Mobility Inpatient Raw Score: 19  JH-HLM Goal: 6: Walk 10 steps or more  JH-HLM Achieved: 6: Walk 10 steps or more  HLM Goal achieved. Continue to encourage appropriate mobility. Patient Centered Rounds:  Due to nurse via [facility] text  Discussions with Specialists or Other Care Team Provider: GI, senior resident, and attending visit    Education and Discussions with Family / Patient: Updated  (daughter) at bedside. Current Length of Stay: 2 day(s)  Current Patient Status: Inpatient   Discharge Plan:  TBD pending GI plan     Code Status: Level 1 - Full Code    Subjective:   Patient was seen and examined at bedside today. Patient was calm and cooperative with examination. Patient denies chest pain, lightheadedness/dizziness, nausea/vomiting, headache, constipation/diarrhea, abdominal pain. Patient reports some coughing and shortness of breath but states that this is been chronic since the beginning of pandemic. Patient reports that her last bowel movement was yesterday 11/30 however per chart review her last bowel movement was on 11/29 and was dark.     Objective: Vitals:   Temp (24hrs), Av °F (36.7 °C), Min:97.2 °F (36.2 °C), Max:98.4 °F (36.9 °C)    Temp:  [97.2 °F (36.2 °C)-98.4 °F (36.9 °C)] 98.2 °F (36.8 °C)  HR:  [67-77] 67  Resp:  [18-19] 18  BP: (112-161)/(53-89) 127/60  SpO2:  [95 %-98 %] 96 %  Body mass index is 26.07 kg/m². Input and Output Summary (last 24 hours):   No intake or output data in the 24 hours ending 23 1516    Physical Exam:   Physical Exam  Vitals reviewed. Constitutional:       General: She is not in acute distress. Appearance: She is well-developed. She is not ill-appearing. HENT:      Mouth/Throat:      Mouth: Mucous membranes are moist.   Cardiovascular:      Rate and Rhythm: Normal rate and regular rhythm. Pulses: Normal pulses. Heart sounds: Normal heart sounds. No murmur heard. Pulmonary:      Effort: Pulmonary effort is normal. No respiratory distress. Breath sounds: Normal breath sounds. Abdominal:      General: Abdomen is flat. Bowel sounds are normal.      Palpations: Abdomen is soft. Tenderness: There is no abdominal tenderness. Musculoskeletal:         General: No swelling. Right lower leg: No edema. Left lower leg: No edema. Skin:     General: Skin is warm and dry. Capillary Refill: Capillary refill takes less than 2 seconds. Comments: Color has improved today   Neurological:      Mental Status: She is alert.    Psychiatric:         Mood and Affect: Mood normal.          Additional Data:     Labs:  Results from last 7 days   Lab Units 23  0638   WBC Thousand/uL 4.55   HEMOGLOBIN g/dL 7.9*   HEMATOCRIT % 24.3*   PLATELETS Thousands/uL 230   NEUTROS PCT % 52   LYMPHS PCT % 27   MONOS PCT % 11   EOS PCT % 9*     Results from last 7 days   Lab Units 23  0638   SODIUM mmol/L 131*   POTASSIUM mmol/L 4.0   CHLORIDE mmol/L 102   CO2 mmol/L 24   BUN mg/dL 8   CREATININE mg/dL 0.70   ANION GAP mmol/L 5   CALCIUM mg/dL 8.2*   ALBUMIN g/dL 3.2*   TOTAL BILIRUBIN mg/dL 0.47   ALK PHOS U/L 57   ALT U/L 6*   AST U/L 15   GLUCOSE RANDOM mg/dL 91     Results from last 7 days   Lab Units 11/29/23  1334   INR  1.09                   Lines/Drains:  Invasive Devices       Peripheral Intravenous Line  Duration             Peripheral IV 11/30/23 Left;Proximal;Ventral (anterior) Forearm <1 day                          Imaging: Reviewed radiology reports from this admission including: abdominal/pelvic CT    Recent Cultures (last 7 days):   Results from last 7 days   Lab Units 11/29/23  1643   BLOOD CULTURE  No Growth at 24 hrs. No Growth at 24 hrs. Last 24 Hours Medication List:   Current Facility-Administered Medications   Medication Dose Route Frequency Provider Last Rate    acetaminophen  650 mg Oral Q6H PRN Rodriguez Romero, MD      amLODIPine  10 mg Oral Daily Colie Harder, MD      aspirin  81 mg Oral Daily Colie Harder, MD      atorvastatin  20 mg Oral Daily Anikete Harder, MD      benzonatate  100 mg Oral TID PRN Rodriguez Romero, MD      levothyroxine  75 mcg Oral Early Morning Anikete Heather, MD      losartan  50 mg Oral Daily Anikete Heather, MD      ondansetron  4 mg Intravenous Q6H PRN Rodriguez Romero, MD      pantoprazole  40 mg Intravenous Q12H [address] Rodriguez Romero, MD      sulfaSALAzine  1,000 mg Oral BID Rodriguez Romero MD          Today, Patient Was Seen By: Joon Chase DO    **Please Note: This note may have been constructed using a voice recognition system. **

## 2023-12-01 NOTE — ANESTHESIA PREPROCEDURE EVALUATION
Procedure:  EGD    Relevant Problems   CARDIO   (+) Aortic stenosis, severe   (+) Essential hypertension   (+) Hypercholesterolemia   (+) New onset left bundle branch block (LBBB)      ENDO   (+) Hypothyroidism      GI/HEPATIC   (+) Gastroesophageal reflux disease with esophagitis   (+) Pharyngoesophageal dysphagia      /RENAL   (+) Stage 3 chronic kidney disease, unspecified whether stage 3a or 3b CKD (HCC)      HEMATOLOGY   (+) Anemia   (+) Postoperative anemia due to acute blood loss      PULMONARY   (+) Cough variant asthma       11/7/23  TAVR    HB 7.9    Physical Exam    Airway    Mallampati score: II  TM Distance: >3 FB  Neck ROM: full     Dental   No notable dental hx     Cardiovascular  Rhythm: regular, Rate: normal    Pulmonary   Breath sounds clear to auscultation    Other Findings  post-pubertal.      Anesthesia Plan  ASA Score- 3     Anesthesia Type- IV sedation with anesthesia with ASA Monitors. Additional Monitors:     Airway Plan:            Plan Factors-Exercise tolerance (METS): >4 METS. Chart reviewed. EKG reviewed. Imaging results reviewed. Existing labs reviewed. Patient summary reviewed. Patient is not a current smoker. Patient not instructed to abstain from smoking on day of procedure. Patient did not smoke on day of surgery. Obstructive sleep apnea risk education given perioperatively. Induction-     Postoperative Plan-     Informed Consent- Anesthetic plan and risks discussed with patient. I personally reviewed this patient with the CRNA. Discussed and agreed on the Anesthesia Plan with the CRNA. Lucien Spine

## 2023-12-01 NOTE — PLAN OF CARE
Problem: PAIN - ADULT  Goal: Verbalizes/displays adequate comfort level or baseline comfort level  Description: Interventions:  - Encourage patient to monitor pain and request assistance  - Assess pain using appropriate pain scale  - Administer analgesics based on type and severity of pain and evaluate response  - Implement non-pharmacological measures as appropriate and evaluate response  - Consider cultural and social influences on pain and pain management  - Notify physician/advanced practitioner if interventions unsuccessful or patient reports new pain  Outcome: Progressing     Problem: INFECTION - ADULT  Goal: Absence or prevention of progression during hospitalization  Description: INTERVENTIONS:  - Assess and monitor for signs and symptoms of infection  - Monitor lab/diagnostic results  - Monitor all insertion sites, i.e. indwelling lines, tubes, and drains  - Monitor endotracheal if appropriate and nasal secretions for changes in amount and color  - [place] appropriate cooling/warming therapies per order  - Administer medications as ordered  - Instruct and encourage patient and family to use good hand hygiene technique  - Identify and instruct in appropriate isolation precautions for identified infection/condition  Outcome: Progressing  Goal: Absence of fever/infection during neutropenic period  Description: INTERVENTIONS:  - Monitor WBC    Outcome: Progressing     Problem: SAFETY ADULT  Goal: Patient will remain free of falls  Description: INTERVENTIONS:  - Educate patient/family on patient safety including physical limitations  - Instruct patient to call for assistance with activity   - Consult OT/PT to assist with strengthening/mobility   - Keep Call bell within reach  - Keep bed low and locked with side rails adjusted as appropriate  - Keep care items and personal belongings within reach  - Initiate and maintain comfort rounds  - Make Fall Risk Sign visible to staff  - Offer Toileting every  Hours, in advance of need  - Initiate/Maintain alarm  - Obtain necessary fall risk management equipment:   - Apply yellow socks and bracelet for high fall risk patients  - Consider moving patient to room near nurses station  Outcome: Progressing  Goal: Maintain or return to baseline ADL function  Description: INTERVENTIONS:  -  Assess patient's ability to carry out ADLs; assess patient's baseline for ADL function and identify physical deficits which impact ability to perform ADLs (bathing, care of mouth/teeth, toileting, grooming, dressing, etc.)  - Assess/evaluate cause of self-care deficits   - Assess range of motion  - Assess patient's mobility; develop plan if impaired  - Assess patient's need for assistive devices and provide as appropriate  - Encourage maximum independence but intervene and supervise when necessary  - Involve family in performance of ADLs  - Assess for home care needs following discharge   - Consider OT consult to assist with ADL evaluation and planning for discharge  - Provide patient education as appropriate  Outcome: Progressing  Goal: Maintains/Returns to pre admission functional level  Description: INTERVENTIONS:  - Perform AM-PAC 6 Click Basic Mobility/ Daily Activity assessment daily.  - Set and communicate daily mobility goal to care team and patient/family/caregiver. - Collaborate with rehabilitation services on mobility goals if consulted  - Perform Range of Motion  times a day. - Reposition patient every  hours.   - Dangle patient  times a day  - Stand patient  times a day  - Ambulate patient  times a day  - Out of bed to chair  times a day   - Out of bed for meals  times a day  - Out of bed for toileting  - Record patient progress and toleration of activity level   Outcome: Progressing

## 2023-12-01 NOTE — ASSESSMENT & PLAN NOTE
Hx of anemia-downtrending hemoglobin since 2021 from hemoglobin in the 10s, to most recently in the 7s  Follows with hematology in the outpatient setting. Supplements with OTC iron 45 g daily, p.o. vitamin B12 and B12 infusions. Patient has a history of duodenal ulcers  Labs from November 17, 2023 - iron panel, vit B12, and folate level within normal limits  Reported black loose stools for 1 week. Patient reports starting OTC iron supplementation recently to assist with her weakness which she attributes to her anemia.   Consented for blood transfusions on 11/29/2023  Dark stools plus decreasing hemoglobin points to possible GI bleed  Per GI, bleeding may be due to AVM or ulceration  In the early morning of 11/30 patient's hemoglobin resulted at 6.8  Patient was given 1 L of blood this morning 11/30 and follow-up H&H was at 8.1  Hemoglobin on 12/1 was 7.9  Patient went for EGD today 12/1  Per discussion with GI, EGD was normal    Plan:  Continue to hold home Plavix  Continue ASA  Hold DVT Ppx  Continue pantoprazole IV 40 mg twice daily  Okay to resume diet after EGD, per GI   GI recommended watching hemoglobin overnight   Daily AM CBC  transfuse if below 7  With repeat dark stool, check STAT Hb level

## 2023-12-01 NOTE — ANESTHESIA PREPROCEDURE EVALUATION
Procedure:  PRE-OP ONLY    Relevant Problems   CARDIO   (+) Aortic stenosis, severe   (+) Essential hypertension   (+) Hypercholesterolemia   (+) New onset left bundle branch block (LBBB)      ENDO   (+) Hypothyroidism      GI/HEPATIC   (+) Gastroesophageal reflux disease with esophagitis   (+) Pharyngoesophageal dysphagia      /RENAL   (+) Stage 3 chronic kidney disease, unspecified whether stage 3a or 3b CKD (HCC)      HEMATOLOGY   (+) Anemia   (+) Postoperative anemia due to acute blood loss      PULMONARY   (+) Cough variant asthma       11/7/23  TAVR    HB 7.9    Physical Exam    Airway    Mallampati score: II  TM Distance: >3 FB  Neck ROM: full     Dental   No notable dental hx     Cardiovascular  Rhythm: regular, Rate: normal    Pulmonary   Breath sounds clear to auscultation    Other Findings  post-pubertal.      Anesthesia Plan  ASA Score- 3     Anesthesia Type- IV sedation with anesthesia with ASA Monitors. Additional Monitors:     Airway Plan:            Plan Factors-Exercise tolerance (METS): >4 METS. Chart reviewed. EKG reviewed. Imaging results reviewed. Existing labs reviewed. Patient summary reviewed. Patient is not a current smoker. Patient not instructed to abstain from smoking on day of procedure. Patient did not smoke on day of surgery. Obstructive sleep apnea risk education given perioperatively. Induction-     Postoperative Plan-     Informed Consent- Anesthetic plan and risks discussed with patient. I personally reviewed this patient with the CRNA. Discussed and agreed on the Anesthesia Plan with the CRNA. Nina Marcos

## 2023-12-02 VITALS
SYSTOLIC BLOOD PRESSURE: 155 MMHG | OXYGEN SATURATION: 95 % | DIASTOLIC BLOOD PRESSURE: 85 MMHG | HEIGHT: 61 IN | TEMPERATURE: 98.1 F | BODY MASS INDEX: 26.07 KG/M2 | RESPIRATION RATE: 14 BRPM | HEART RATE: 77 BPM

## 2023-12-02 LAB
ANION GAP SERPL CALCULATED.3IONS-SCNC: 7 MMOL/L
BASOPHILS # BLD AUTO: 0.05 THOUSANDS/ÂΜL (ref 0–0.1)
BASOPHILS NFR BLD AUTO: 1 % (ref 0–1)
BUN SERPL-MCNC: 8 MG/DL (ref 5–25)
CALCIUM SERPL-MCNC: 8.7 MG/DL (ref 8.4–10.2)
CHLORIDE SERPL-SCNC: 101 MMOL/L (ref 96–108)
CO2 SERPL-SCNC: 24 MMOL/L (ref 21–32)
CREAT SERPL-MCNC: 0.75 MG/DL (ref 0.6–1.3)
EOSINOPHIL # BLD AUTO: 0.54 THOUSAND/ÂΜL (ref 0–0.61)
EOSINOPHIL NFR BLD AUTO: 9 % (ref 0–6)
ERYTHROCYTE [DISTWIDTH] IN BLOOD BY AUTOMATED COUNT: 17.2 % (ref 11.6–15.1)
GFR SERPL CREATININE-BSD FRML MDRD: 73 ML/MIN/1.73SQ M
GLUCOSE SERPL-MCNC: 90 MG/DL (ref 65–140)
HCT VFR BLD AUTO: 28.3 % (ref 34.8–46.1)
HGB BLD-MCNC: 9 G/DL (ref 11.5–15.4)
IMM GRANULOCYTES # BLD AUTO: 0.02 THOUSAND/UL (ref 0–0.2)
IMM GRANULOCYTES NFR BLD AUTO: 0 % (ref 0–2)
LYMPHOCYTES # BLD AUTO: 2.04 THOUSANDS/ÂΜL (ref 0.6–4.47)
LYMPHOCYTES NFR BLD AUTO: 34 % (ref 14–44)
MCH RBC QN AUTO: 33.5 PG (ref 26.8–34.3)
MCHC RBC AUTO-ENTMCNC: 31.8 G/DL (ref 31.4–37.4)
MCV RBC AUTO: 105 FL (ref 82–98)
MONOCYTES # BLD AUTO: 0.57 THOUSAND/ÂΜL (ref 0.17–1.22)
MONOCYTES NFR BLD AUTO: 10 % (ref 4–12)
NEUTROPHILS # BLD AUTO: 2.8 THOUSANDS/ÂΜL (ref 1.85–7.62)
NEUTS SEG NFR BLD AUTO: 46 % (ref 43–75)
NRBC BLD AUTO-RTO: 0 /100 WBCS
PLATELET # BLD AUTO: 257 THOUSANDS/UL (ref 149–390)
PMV BLD AUTO: 9.8 FL (ref 8.9–12.7)
POTASSIUM SERPL-SCNC: 4.1 MMOL/L (ref 3.5–5.3)
RBC # BLD AUTO: 2.69 MILLION/UL (ref 3.81–5.12)
SODIUM SERPL-SCNC: 132 MMOL/L (ref 135–147)
WBC # BLD AUTO: 6.02 THOUSAND/UL (ref 4.31–10.16)

## 2023-12-02 PROCEDURE — 99239 HOSP IP/OBS DSCHRG MGMT >30: CPT | Performed by: INTERNAL MEDICINE

## 2023-12-02 PROCEDURE — 80048 BASIC METABOLIC PNL TOTAL CA: CPT | Performed by: FAMILY MEDICINE

## 2023-12-02 PROCEDURE — C9113 INJ PANTOPRAZOLE SODIUM, VIA: HCPCS | Performed by: INTERNAL MEDICINE

## 2023-12-02 PROCEDURE — 99232 SBSQ HOSP IP/OBS MODERATE 35: CPT | Performed by: INTERNAL MEDICINE

## 2023-12-02 PROCEDURE — 85025 COMPLETE CBC W/AUTO DIFF WBC: CPT | Performed by: FAMILY MEDICINE

## 2023-12-02 RX ADMIN — LEVOTHYROXINE SODIUM 75 MCG: 75 TABLET ORAL at 05:40

## 2023-12-02 RX ADMIN — SULFASALAZINE 1000 MG: 500 TABLET ORAL at 08:31

## 2023-12-02 RX ADMIN — LOSARTAN POTASSIUM 50 MG: 50 TABLET, FILM COATED ORAL at 08:31

## 2023-12-02 RX ADMIN — ASPIRIN 81 MG CHEWABLE TABLET 81 MG: 81 TABLET CHEWABLE at 08:31

## 2023-12-02 RX ADMIN — PANTOPRAZOLE SODIUM 40 MG: 40 INJECTION, POWDER, FOR SOLUTION INTRAVENOUS at 09:40

## 2023-12-02 RX ADMIN — AMLODIPINE BESYLATE 10 MG: 10 TABLET ORAL at 08:31

## 2023-12-02 RX ADMIN — ATORVASTATIN CALCIUM 20 MG: 20 TABLET, FILM COATED ORAL at 08:32

## 2023-12-02 NOTE — PLAN OF CARE
Problem: PAIN - ADULT  Goal: Verbalizes/displays adequate comfort level or baseline comfort level  Description: Interventions:  - Encourage patient to monitor pain and request assistance  - Assess pain using appropriate pain scale  - Administer analgesics based on type and severity of pain and evaluate response  - Implement non-pharmacological measures as appropriate and evaluate response  - Consider cultural and social influences on pain and pain management  - Notify physician/advanced practitioner if interventions unsuccessful or patient reports new pain  Outcome: Progressing     Problem: INFECTION - ADULT  Goal: Absence or prevention of progression during hospitalization  Description: INTERVENTIONS:  - Assess and monitor for signs and symptoms of infection  - Monitor lab/diagnostic results  - Monitor all insertion sites, i.e. indwelling lines, tubes, and drains  - Monitor endotracheal if appropriate and nasal secretions for changes in amount and color  - [place] appropriate cooling/warming therapies per order  - Administer medications as ordered  - Instruct and encourage patient and family to use good hand hygiene technique  - Identify and instruct in appropriate isolation precautions for identified infection/condition  Outcome: Progressing  Goal: Absence of fever/infection during neutropenic period  Description: INTERVENTIONS:  - Monitor WBC    Outcome: Progressing     Problem: SAFETY ADULT  Goal: Patient will remain free of falls  Description: INTERVENTIONS:  - Educate patient/family on patient safety including physical limitations  - Instruct patient to call for assistance with activity   - Consult OT/PT to assist with strengthening/mobility   - Keep Call bell within reach  - Keep bed low and locked with side rails adjusted as appropriate  - Keep care items and personal belongings within reach  - Initiate and maintain comfort rounds  - Make Fall Risk Sign visible to staff  - Offer Toileting every  Hours, in advance of need  - Initiate/Maintain alarm  - Obtain necessary fall risk management equipment:   - Apply yellow socks and bracelet for high fall risk patients  - Consider moving patient to room near nurses station  Outcome: Progressing  Goal: Maintain or return to baseline ADL function  Description: INTERVENTIONS:  -  Assess patient's ability to carry out ADLs; assess patient's baseline for ADL function and identify physical deficits which impact ability to perform ADLs (bathing, care of mouth/teeth, toileting, grooming, dressing, etc.)  - Assess/evaluate cause of self-care deficits   - Assess range of motion  - Assess patient's mobility; develop plan if impaired  - Assess patient's need for assistive devices and provide as appropriate  - Encourage maximum independence but intervene and supervise when necessary  - Involve family in performance of ADLs  - Assess for home care needs following discharge   - Consider OT consult to assist with ADL evaluation and planning for discharge  - Provide patient education as appropriate  Outcome: Progressing  Goal: Maintains/Returns to pre admission functional level  Description: INTERVENTIONS:  - Perform AM-PAC 6 Click Basic Mobility/ Daily Activity assessment daily.  - Set and communicate daily mobility goal to care team and patient/family/caregiver. - Collaborate with rehabilitation services on mobility goals if consulted  - Perform Range of Motion  times a day. - Reposition patient every  hours.   - Dangle patient  times a day  - Stand patient  times a day  - Ambulate patient  times a day  - Out of bed to chair  times a day   - Out of bed for meals  times a day  - Out of bed for toileting  - Record patient progress and toleration of activity level   Outcome: Progressing     Problem: DISCHARGE PLANNING  Goal: Discharge to home or other facility with appropriate resources  Description: INTERVENTIONS:  - Identify barriers to discharge w/patient and caregiver  - Arrange for needed discharge resources and transportation as appropriate  - Identify discharge learning needs (meds, wound care, etc.)  - Arrange for interpretive services to assist at discharge as needed  - Refer to Case Management Department for coordinating discharge planning if the patient needs post-hospital services based on physician/advanced practitioner order or complex needs related to functional status, cognitive ability, or social support system  Outcome: Progressing     Problem: Knowledge Deficit  Goal: Patient/family/caregiver demonstrates understanding of disease process, treatment plan, medications, and discharge instructions  Description: Complete learning assessment and assess knowledge base. Interventions:  - Provide teaching at level of understanding  - Provide teaching via preferred learning methods  Outcome: Progressing     Problem: Nutrition/Hydration-ADULT  Goal: Nutrient/Hydration intake appropriate for improving, restoring or maintaining nutritional needs  Description: Monitor and assess patient's nutrition/hydration status for malnutrition. Collaborate with interdisciplinary team and initiate plan and interventions as ordered. Monitor patient's weight and dietary intake as ordered or per policy. Utilize nutrition screening tool and intervene as necessary. Determine patient's food preferences and provide high-protein, high-caloric foods as appropriate.      INTERVENTIONS:  - Monitor oral intake, urinary output, labs, and treatment plans  - Assess nutrition and hydration status and recommend course of action  - Evaluate amount of meals eaten  - Assist patient with eating if necessary   - Allow adequate time for meals  - Recommend/ encourage appropriate diets, oral nutritional supplements, and vitamin/mineral supplements  - Order, calculate, and assess calorie counts as needed  - Recommend, monitor, and adjust tube feedings and TPN/PPN based on assessed needs  - Assess need for intravenous fluids  - Provide specific nutrition/hydration education as appropriate  - Include patient/family/caregiver in decisions related to nutrition  Outcome: Progressing

## 2023-12-02 NOTE — ASSESSMENT & PLAN NOTE
Hx of anemia-downtrending hemoglobin since 2021 from hemoglobin in the 10s, to most recently in the 7s  Follows with hematology in the outpatient setting. Supplements with OTC iron 45 g daily, p.o. vitamin B12 and B12 infusions. Patient has a history of duodenal ulcers  Labs from November 17, 2023 - iron panel, vit B12, and folate level within normal limits  Reported black loose stools for 1 week. Patient reports starting OTC iron supplementation recently to assist with her weakness which she attributes to her anemia.   Consented for blood transfusions on 11/29/2023  Dark stools plus decreasing hemoglobin points to possible GI bleed  Per GI, bleeding may be due to AVM or ulceration  In the early morning of 11/30 patient's hemoglobin resulted at 6.8  Patient was given 1 L of blood this morning 11/30 and follow-up H&H was at 8.1  Hemoglobin on 12/1 was 7.9  Patient went for EGD today 12/1  Per discussion with GI, EGD was normal    Plan:  Continue ASA  Continue Plavix   Continue pantoprazole IV 40 mg twice daily for 2 weeks then stop per GI  F/u colonoscopy

## 2023-12-02 NOTE — ASSESSMENT & PLAN NOTE
On presentation, reported left lower quadrant pain and black loose stools x1 week  Hx of ulcerative colitis in remission on sulfasalazine  Last colonoscopy 7/20/2022-redemonstrated left-sided UC on remission, extensive left sided diverticulosis, internal hemorrhoid  Hb 7.6 on admission  CT AP W/ - suggestive of infective versus inflammatory colitis, less likely diverticulitis  CRP 2.1  Remains afebrile and her white blood cell count is within normal ranges at 4.55 on 12/1  As of 12/1 no growth on blood cultures      Plan:  Per GI, no indication for antibiotics at this time   Discontinue ceftriaxone 1 g daily   Discontinue metronidazole 500 mg every 8 hours  F/u GI outpatient

## 2023-12-02 NOTE — PLAN OF CARE
Problem: PAIN - ADULT  Goal: Verbalizes/displays adequate comfort level or baseline comfort level  Description: Interventions:  - Encourage patient to monitor pain and request assistance  - Assess pain using appropriate pain scale  - Administer analgesics based on type and severity of pain and evaluate response  - Implement non-pharmacological measures as appropriate and evaluate response  - Consider cultural and social influences on pain and pain management  - Notify physician/advanced practitioner if interventions unsuccessful or patient reports new pain  Outcome: Progressing     Problem: INFECTION - ADULT  Goal: Absence or prevention of progression during hospitalization  Description: INTERVENTIONS:  - Assess and monitor for signs and symptoms of infection  - Monitor lab/diagnostic results  - Monitor all insertion sites, i.e. indwelling lines, tubes, and drains  - Monitor endotracheal if appropriate and nasal secretions for changes in amount and color  - [place] appropriate cooling/warming therapies per order  - Administer medications as ordered  - Instruct and encourage patient and family to use good hand hygiene technique  - Identify and instruct in appropriate isolation precautions for identified infection/condition  Outcome: Progressing  Goal: Absence of fever/infection during neutropenic period  Description: INTERVENTIONS:  - Monitor WBC    Outcome: Progressing     Problem: SAFETY ADULT  Goal: Patient will remain free of falls  Description: INTERVENTIONS:  - Educate patient/family on patient safety including physical limitations  - Instruct patient to call for assistance with activity   - Consult OT/PT to assist with strengthening/mobility   - Keep Call bell within reach  - Keep bed low and locked with side rails adjusted as appropriate  - Keep care items and personal belongings within reach  - Initiate and maintain comfort rounds  - Make Fall Risk Sign visible to staff  - Apply yellow socks and bracelet for high fall risk patients  - Consider moving patient to room near nurses station  Outcome: Progressing  Goal: Maintain or return to baseline ADL function  Description: INTERVENTIONS:  -  Assess patient's ability to carry out ADLs; assess patient's baseline for ADL function and identify physical deficits which impact ability to perform ADLs (bathing, care of mouth/teeth, toileting, grooming, dressing, etc.)  - Assess/evaluate cause of self-care deficits   - Assess range of motion  - Assess patient's mobility; develop plan if impaired  - Assess patient's need for assistive devices and provide as appropriate  - Encourage maximum independence but intervene and supervise when necessary  - Involve family in performance of ADLs  - Assess for home care needs following discharge   - Consider OT consult to assist with ADL evaluation and planning for discharge  - Provide patient education as appropriate  Outcome: Progressing  Goal: Maintains/Returns to pre admission functional level  Description: INTERVENTIONS:  - Perform AM-PAC 6 Click Basic Mobility/ Daily Activity assessment daily.  - Set and communicate daily mobility goal to care team and patient/family/caregiver.    - Collaborate with rehabilitation services on mobility goals if consulted  - Out of bed for toileting  - Record patient progress and toleration of activity level   Outcome: Progressing     Problem: DISCHARGE PLANNING  Goal: Discharge to home or other facility with appropriate resources  Description: INTERVENTIONS:  - Identify barriers to discharge w/patient and caregiver  - Arrange for needed discharge resources and transportation as appropriate  - Identify discharge learning needs (meds, wound care, etc.)  - Arrange for interpretive services to assist at discharge as needed  - Refer to Case Management Department for coordinating discharge planning if the patient needs post-hospital services based on physician/advanced practitioner order or complex needs related to functional status, cognitive ability, or social support system  Outcome: Progressing     Problem: Knowledge Deficit  Goal: Patient/family/caregiver demonstrates understanding of disease process, treatment plan, medications, and discharge instructions  Description: Complete learning assessment and assess knowledge base. Interventions:  - Provide teaching at level of understanding  - Provide teaching via preferred learning methods  Outcome: Progressing     Problem: Nutrition/Hydration-ADULT  Goal: Nutrient/Hydration intake appropriate for improving, restoring or maintaining nutritional needs  Description: Monitor and assess patient's nutrition/hydration status for malnutrition. Collaborate with interdisciplinary team and initiate plan and interventions as ordered. Monitor patient's weight and dietary intake as ordered or per policy. Utilize nutrition screening tool and intervene as necessary. Determine patient's food preferences and provide high-protein, high-caloric foods as appropriate.      INTERVENTIONS:  - Monitor oral intake, urinary output, labs, and treatment plans  - Assess nutrition and hydration status and recommend course of action  - Evaluate amount of meals eaten  - Assist patient with eating if necessary   - Allow adequate time for meals  - Recommend/ encourage appropriate diets, oral nutritional supplements, and vitamin/mineral supplements  - Order, calculate, and assess calorie counts as needed  - Recommend, monitor, and adjust tube feedings and TPN/PPN based on assessed needs  - Assess need for intravenous fluids  - Provide specific nutrition/hydration education as appropriate  - Include patient/family/caregiver in decisions related to nutrition  Outcome: Progressing

## 2023-12-02 NOTE — PLAN OF CARE
Problem: SAFETY ADULT  Goal: Patient will remain free of falls  Description: INTERVENTIONS:  - Educate patient/family on patient safety including physical limitations  - Instruct patient to call for assistance with activity   - Consult OT/PT to assist with strengthening/mobility   - Keep Call bell within reach  - Keep bed low and locked with side rails adjusted as appropriate  - Keep care items and personal belongings within reach  - Initiate and maintain comfort rounds  - Make Fall Risk Sign visible to staff  - Offer Toileting every  Hours, in advance of need  - Initiate/Maintain alarm  - Obtain necessary fall risk management equipment:   - Apply yellow socks and bracelet for high fall risk patients  - Consider moving patient to room near nurses station  12/2/2023 0950 by Devendra Hughes LPN  Outcome: Progressing  12/2/2023 0949 by Devendra Hughes LPN  Outcome: Progressing  Goal: Maintain or return to baseline ADL function  Description: INTERVENTIONS:  -  Assess patient's ability to carry out ADLs; assess patient's baseline for ADL function and identify physical deficits which impact ability to perform ADLs (bathing, care of mouth/teeth, toileting, grooming, dressing, etc.)  - Assess/evaluate cause of self-care deficits   - Assess range of motion  - Assess patient's mobility; develop plan if impaired  - Assess patient's need for assistive devices and provide as appropriate  - Encourage maximum independence but intervene and supervise when necessary  - Involve family in performance of ADLs  - Assess for home care needs following discharge   - Consider OT consult to assist with ADL evaluation and planning for discharge  - Provide patient education as appropriate  12/2/2023 0950 by Devendra Hughes LPN  Outcome: Progressing  12/2/2023 0949 by Devendra Hughes LPN  Outcome: Progressing  Goal: Maintains/Returns to pre admission functional level  Description: INTERVENTIONS:  - Perform AM-PAC 6 Click Basic Mobility/ Daily Activity assessment daily.  - Set and communicate daily mobility goal to care team and patient/family/caregiver. - Collaborate with rehabilitation services on mobility goals if consulted  - Perform Range of Motion  times a day. - Reposition patient every  hours. - Dangle patient  times a day  - Stand patient  times a day  - Ambulate patient  times a day  - Out of bed to chair  times a day   - Out of bed for meals  times a day  - Out of bed for toileting  - Record patient progress and toleration of activity level   12/2/2023 0950 by Devendra Hughes LPN  Outcome: Progressing  12/2/2023 0949 by Devendra Hughes LPN  Outcome: Progressing     Problem: DISCHARGE PLANNING  Goal: Discharge to home or other facility with appropriate resources  Description: INTERVENTIONS:  - Identify barriers to discharge w/patient and caregiver  - Arrange for needed discharge resources and transportation as appropriate  - Identify discharge learning needs (meds, wound care, etc.)  - Arrange for interpretive services to assist at discharge as needed  - Refer to Case Management Department for coordinating discharge planning if the patient needs post-hospital services based on physician/advanced practitioner order or complex needs related to functional status, cognitive ability, or social support system  12/2/2023 0950 by Devendra Hughes LPN  Outcome: Progressing  12/2/2023 0949 by Devendra Hughes LPN  Outcome: Progressing     Problem: Knowledge Deficit  Goal: Patient/family/caregiver demonstrates understanding of disease process, treatment plan, medications, and discharge instructions  Description: Complete learning assessment and assess knowledge base.   Interventions:  - Provide teaching at level of understanding  - Provide teaching via preferred learning methods  12/2/2023 0950 by Devendra Hughes LPN  Outcome: Progressing  12/2/2023 0949 by Devendra Hughes LPN  Outcome: Progressing     Problem: Nutrition/Hydration-ADULT  Goal: Nutrient/Hydration intake appropriate for improving, restoring or maintaining nutritional needs  Description: Monitor and assess patient's nutrition/hydration status for malnutrition. Collaborate with interdisciplinary team and initiate plan and interventions as ordered. Monitor patient's weight and dietary intake as ordered or per policy. Utilize nutrition screening tool and intervene as necessary. Determine patient's food preferences and provide high-protein, high-caloric foods as appropriate.      INTERVENTIONS:  - Monitor oral intake, urinary output, labs, and treatment plans  - Assess nutrition and hydration status and recommend course of action  - Evaluate amount of meals eaten  - Assist patient with eating if necessary   - Allow adequate time for meals  - Recommend/ encourage appropriate diets, oral nutritional supplements, and vitamin/mineral supplements  - Order, calculate, and assess calorie counts as needed  - Recommend, monitor, and adjust tube feedings and TPN/PPN based on assessed needs  - Assess need for intravenous fluids  - Provide specific nutrition/hydration education as appropriate  - Include patient/family/caregiver in decisions related to nutrition  12/2/2023 0950 by Buster Monsivais LPN  Outcome: Progressing  12/2/2023 0949 by Buster Monsivais LPN  Outcome: Progressing

## 2023-12-02 NOTE — ASSESSMENT & PLAN NOTE
Hx of aortic stenosis, s/p TAVR 11/7/2023  Home regimen includes dual antiplatelet therapy with Plavix and aspirin  Hgb stable       Plan:  In the setting of dark stools and low Hb, Held home Plavix may resume at discharge   Continue ASA 81 mg daily

## 2023-12-02 NOTE — DISCHARGE INSTR - AVS FIRST PAGE
Dear Sukhi Perez,     It was our pleasure to care for you here at formerly [facility]. It is our hope that we were always able to exceed the expected standards for your care during your stay. You were hospitalized due to ***. You were cared for on the *** floor by Aron Jhaveri DO under the service of Lena Fenton MD with the [facility] who covers for your primary care physician (PCP), Jay Anderson MD, while you were hospitalized. If you have any questions or concerns related to this hospitalization, you may contact us at [phone]. For follow up as well as any medication refills, we recommend that you follow up with your primary care physician. A registered nurse will reach out to you by phone within a few days after your discharge to answer any additional questions that you may have after going home. However, at this time we provide for you here, the most important instructions / recommendations at discharge:     Notable Medication Adjustments -   Continue taking daily aspirin and Plavix   Discussed with in hospital GI team and recommend continuing your Protonix given for 2 weeks then stopping. There was an absence of any signs of reflux disease on EGD   Testing Required after Discharge -   Colonoscopy  ** Please contact your PCP to request testing orders for any of the testing recommended here **  Important follow up information -   Please follow up with your Gastroenterologist (GI)   Please follow up with your PCP in 1-2 weeks   Other Instructions -   None   Please review this entire after visit summary as additional general instructions including medication list, appointments, activity, diet, any pertinent wound care, and other additional recommendations from your care team that may be provided for you.       Sincerely,     Aron Jhaveri DO

## 2023-12-02 NOTE — DISCHARGE SUMMARY
[address] [facility]  Discharge- Yoselin Console 1940, 79 y.o. female MRN: [id number]  Unit/Bed#: W -01 Encounter: [id number]  Primary Care Provider: Elder Hassan MD   Date and time admitted to hospital: 11/29/2023  1:12 PM    * Acute colitis  Assessment & Plan  On presentation, reported left lower quadrant pain and black loose stools x1 week  Hx of ulcerative colitis in remission on sulfasalazine  Last colonoscopy 7/20/2022-redemonstrated left-sided UC on remission, extensive left sided diverticulosis, internal hemorrhoid  Hb 7.6 on admission  CT AP W/ - suggestive of infective versus inflammatory colitis, less likely diverticulitis  CRP 2.1  Remains afebrile and her white blood cell count is within normal ranges at 4.55 on 12/1  As of 12/1 no growth on blood cultures      Plan:  Per GI, no indication for antibiotics at this time   Discontinue ceftriaxone 1 g daily   Discontinue metronidazole 500 mg every 8 hours  F/u GI outpatient     S/P TAVR (transcatheter aortic valve replacement)  Assessment & Plan  Hx of aortic stenosis, s/p TAVR 11/7/2023  Home regimen includes dual antiplatelet therapy with Plavix and aspirin  Hgb stable       Plan:  In the setting of dark stools and low Hb, Held home Plavix may resume at discharge   Continue ASA 81 mg daily    Anemia  Assessment & Plan  Hx of anemia-downtrending hemoglobin since 2021 from hemoglobin in the 10s, to most recently in the 7s  Follows with hematology in the outpatient setting. Supplements with OTC iron 45 g daily, p.o. vitamin B12 and B12 infusions. Patient has a history of duodenal ulcers  Labs from November 17, 2023 - iron panel, vit B12, and folate level within normal limits  Reported black loose stools for 1 week. Patient reports starting OTC iron supplementation recently to assist with her weakness which she attributes to her anemia.   Consented for blood transfusions on 11/29/2023  Dark stools plus decreasing hemoglobin points to possible GI bleed  Per GI, bleeding may be due to AVM or ulceration  In the early morning of 11/30 patient's hemoglobin resulted at 6.8  Patient was given 1 L of blood this morning 11/30 and follow-up H&H was at 8.1  Hemoglobin on 12/1 was 7.9  Patient went for EGD today 12/1  Per discussion with GI, EGD was normal    Plan:  Continue ASA  Continue Plavix   Continue pantoprazole IV 40 mg twice daily for 2 weeks then stop per GI  F/u colonoscopy     Discharging Resident Physician: Eleno Finch DO  Attending: No att. providers found  PCP: Arianna Delaney MD  Admission Date: 11/29/2023  Discharge Date: 12/02/23    Disposition:     Home    Reason for Admission: weakness, melena     Consultations During Hospital Stay:  GI    Procedures Performed:     EGD    Significant Findings / Test Results:     None    Incidental Findings:   None      Test Results Pending at Discharge (will require follow up): None      Outpatient Tests Requested:  Colonoscopy     Complications:  Drop in Hgb requiring 1 unit PRBC transfusion     Hospital Course:     Dieudonne Alvarado is a 79 y.o. female patient who originally presented to the hospital on 11/29/2023 due to weakness for the last few weeks. Patient also noting dark stools during this time as well. In ED patient notable for hypertensive urgency with a BP of 186/53, all other vitals stable. Patient complaining of nausea and responding well to Zofran. Patients blood work notable for chronic anemia and mild hyponatremia of 131. ECG, troponins, lipase, CRP unremarkable. CT of the abdomen noting "Uncomplicated colitis of the transverse segment. Though there are diverticula arising from this portion of the colon, length of the affected segment and morphology of wall thickening are atypical for diverticulitis such that other infectious or inflammatory causes of colitis should be considered. No free air, abscess, or obstruction". Patient given 1g sulfasalazine.  Blood cultures would be obtained and patient would be temporarily started on antibiotics and GI consulted. Patients Hgb would drop down to 6.9 during admission requiring 1 unit of PRBC. GI would proceed with and EGD with no significant findings. Patient ultimately determined not to requiring antibiotics with GI noting CT imaging showing likely chronic changes and without evidence of acute infectious or inflammatory process, advised to continue sulfasalazine. Patient would be kept in hospital for further monitoring and once Hgb was noted to be stable patient would be deemed appropriate for discharge with an outpatient GI follow up and colonoscopy. Condition at Discharge: good     Discharge Day Visit / Exam:     Subjective:  Patient seen and examined in room while laying in bed. Patient without any acute complaints or concerns. States she would like to go home. Vitals: Blood Pressure: 155/85 (12/02/23 0831)  Pulse: 77 (12/02/23 0831)  Temperature: 98.1 °F (36.7 °C) (12/02/23 0831)  Temp Source: Temporal (12/01/23 1423)  Respirations: 14 (12/02/23 0831)  Height: 5' 1" (154.9 cm) (last ht assessment) (12/01/23 0949)  SpO2: 95 % (12/02/23 0831)  Exam:   Physical Exam  Constitutional:       General: She is not in acute distress. Appearance: Normal appearance. She is not ill-appearing. HENT:      Head: Normocephalic and atraumatic. Right Ear: External ear normal.      Left Ear: External ear normal.      Nose: Nose normal.   Eyes:      Extraocular Movements: Extraocular movements intact. Conjunctiva/sclera: Conjunctivae normal.   Cardiovascular:      Rate and Rhythm: Normal rate and regular rhythm. Pulses: Normal pulses. Heart sounds: Normal heart sounds. Pulmonary:      Effort: Pulmonary effort is normal.      Breath sounds: Normal breath sounds. No wheezing, rhonchi or rales. Abdominal:      General: Abdomen is flat. There is no distension. Palpations: Abdomen is soft. There is no mass. Tenderness: There is no abdominal tenderness. There is no guarding. Musculoskeletal:      Right lower leg: No edema. Left lower leg: No edema. Skin:     General: Skin is warm and dry. Neurological:      Mental Status: She is alert. Motor: No weakness. Gait: Gait normal.       Discussion with Family:      Discharge instructions/Information to patient and family:   See after visit summary for information provided to patient and family. Provisions for Follow-Up Care:  See after visit summary for information related to follow-up care and any pertinent home health orders. Planned Readmission: No     Discharge Medications:  See after visit summary for reconciled discharge medications provided to patient and family.       ** Please Note: This note has been constructed using a voice recognition system **

## 2023-12-02 NOTE — PROGRESS NOTES
Progress Note - Shreya Settler 79 y.o. female MRN: [id number]    Unit/Bed#: W -01 Encounter: [id number]      Assessment/Plan:     Anemia with reported melena; EGD yesterday showed no obvious source for bleeding; clinically no evidence of GI bleeding currently, hemoglobin 9 today    - hemoglobin appears stable; monitor    - continue PPI for now    - diet as tolerated      2. Colonic wall thickening noted on CT with reported LLQ abdominal pain for about 1 week. History of UC thought to be in remission    - no clinical evidence of UC flare at this time, can follow up outpatient for colonoscopy to confirm; rule out underlying colonic lesion also      Subjective:   Patient reports feeling well, no abdominal pain or nausea; tolerating diet well. Objective:     Vitals: Blood pressure 155/85, pulse 77, temperature 98.1 °F (36.7 °C), resp. rate 14, height 5' 1" (1.549 m), SpO2 95 %. ,Body mass index is 26.07 kg/m². No intake or output data in the 24 hours ending 12/02/23 1004    Physical Exam:   General appearance: alert, appears stated age and cooperative  Lungs: clear to auscultation bilaterally, no labored breathing/accessory muscle use  Heart: regular rate and rhythm, S1, S2 normal, no murmur, click, rub or gallop  Abdomen: soft, non-tender; bowel sounds normal; no masses,  no organomegaly  Extremities: no edema    Invasive Devices       Peripheral Intravenous Line  Duration             Peripheral IV 11/30/23 Left;Proximal;Ventral (anterior) Forearm 1 day                    Lab, Imaging and other studies: I have personally reviewed pertinent reports.     Admission on 11/29/2023   Component Date Value    WBC 11/29/2023 5.57     RBC 11/29/2023 2.20 (L)     Hemoglobin 11/29/2023 7.6 (L)     Hematocrit 11/29/2023 23.3 (L)     MCV 11/29/2023 106 (H)     MCH 11/29/2023 34.5 (H)     MCHC 11/29/2023 32.6     RDW 11/29/2023 14.9     MPV 11/29/2023 9.8     Platelets 51/09/9256 299     nRBC 11/29/2023 0     Neutrophils Relative 11/29/2023 51     Immat GRANS % 11/29/2023 0     Lymphocytes Relative 11/29/2023 31     Monocytes Relative 11/29/2023 12     Eosinophils Relative 11/29/2023 5     Basophils Relative 11/29/2023 1     Neutrophils Absolute 11/29/2023 2.86     Immature Grans Absolute 11/29/2023 0.02     Lymphocytes Absolute 11/29/2023 1.72     Monocytes Absolute 11/29/2023 0.67     Eosinophils Absolute 11/29/2023 0.25     Basophils Absolute 11/29/2023 0.05     Sodium 11/29/2023 131 (L)     Potassium 11/29/2023 3.6     Chloride 11/29/2023 100     CO2 11/29/2023 22     ANION GAP 11/29/2023 9     BUN 11/29/2023 10     Creatinine 11/29/2023 0.68     Glucose 11/29/2023 91     Calcium 11/29/2023 8.8     AST 11/29/2023 19     ALT 11/29/2023 8     Alkaline Phosphatase 11/29/2023 70     Total Protein 11/29/2023 6.5     Albumin 11/29/2023 3.7     Total Bilirubin 11/29/2023 0.51     eGFR 11/29/2023 80     hs TnI 0hr 11/29/2023 7     Protime 11/29/2023 14.8 (H)     INR 11/29/2023 1.09     PTT 11/29/2023 30     ABO Grouping 11/29/2023 O     Rh Factor 11/29/2023 Positive     Antibody Screen 11/29/2023 Negative     Specimen Expiration Date 11/29/2023 [id number]     Lipase 11/29/2023 13     Ventricular Rate 11/29/2023 66     Atrial Rate 11/29/2023 66     SD Interval 11/29/2023 182     QRSD Interval 11/29/2023 88     QT Interval 11/29/2023 410     QTC Interval 11/29/2023 429     P Axis 11/29/2023 -9     QRS [place] 11/29/2023 -8     T Wave [place] 11/29/2023 58     hs TnI 2hr 11/29/2023 8     Delta 2hr hsTnI 11/29/2023 1     Blood Culture 11/29/2023 No Growth at 48 hrs. Blood Culture 11/29/2023 No Growth at 48 hrs.      CRP 11/29/2023 2.1     Sodium 11/30/2023 133 (L)     Potassium 11/30/2023 3.4 (L)     Chloride 11/30/2023 103     CO2 11/30/2023 24     ANION GAP 11/30/2023 6     BUN 11/30/2023 7     Creatinine 11/30/2023 0.72     Glucose 11/30/2023 89     Calcium 11/30/2023 8.1 (L)     eGFR 11/30/2023 77     WBC 11/30/2023 4.12 (L)     RBC 11/30/2023 2.01 (L)     Hemoglobin 11/30/2023 6.8 (LL)     Hematocrit 11/30/2023 21.1 (L)     MCV 11/30/2023 105 (H)     MCH 11/30/2023 33.8     MCHC 11/30/2023 32.2     RDW 11/30/2023 15.0     Platelets 19/94/0509 236     MPV 11/30/2023 9.8     Unit Product Code 12/01/2023 [id number]     Unit Number 12/01/2023 [id number]     Unit ABO 12/01/2023 O     Unit RH 12/01/2023 NEG     Crossmatch 12/01/2023 Compatible     Unit Dispense Status 12/01/2023 Presumed Trans     Unit Product Volume 12/01/2023 219     Magnesium 11/30/2023 1.9     Hemoglobin 11/30/2023 8.1 (L)     Hematocrit 11/30/2023 25.3 (L)     Sodium 12/01/2023 131 (L)     Potassium 12/01/2023 4.0     Chloride 12/01/2023 102     CO2 12/01/2023 24     ANION GAP 12/01/2023 5     BUN 12/01/2023 8     Creatinine 12/01/2023 0.70     Glucose 12/01/2023 91     Calcium 12/01/2023 8.2 (L)     Corrected Calcium 12/01/2023 8.8     AST 12/01/2023 15     ALT 12/01/2023 6 (L)     Alkaline Phosphatase 12/01/2023 57     Total Protein 12/01/2023 5.6 (L)     Albumin 12/01/2023 3.2 (L)     Total Bilirubin 12/01/2023 0.47     eGFR 12/01/2023 79     WBC 12/01/2023 4.55     RBC 12/01/2023 2.34 (L)     Hemoglobin 12/01/2023 7.9 (L)     Hematocrit 12/01/2023 24.3 (L)     MCV 12/01/2023 104 (H)     MCH 12/01/2023 33.8     MCHC 12/01/2023 32.5     RDW 12/01/2023 17.5 (H)     MPV 12/01/2023 9.3     Platelets 75/13/1303 230     nRBC 12/01/2023 0     Neutrophils Relative 12/01/2023 52     Immat GRANS % 12/01/2023 0     Lymphocytes Relative 12/01/2023 27     Monocytes Relative 12/01/2023 11     Eosinophils Relative 12/01/2023 9 (H)     Basophils Relative 12/01/2023 1     Neutrophils Absolute 12/01/2023 2.38     Immature Grans Absolute 12/01/2023 0.01     Lymphocytes Absolute 12/01/2023 1.23     Monocytes Absolute 12/01/2023 0.48     Eosinophils Absolute 12/01/2023 0.41     Basophils Absolute 12/01/2023 0.04     Magnesium 12/01/2023 2.0     WBC 12/02/2023 6.02     RBC 12/02/2023 2.69 (L) Hemoglobin 12/02/2023 9.0 (L)     Hematocrit 12/02/2023 28.3 (L)     MCV 12/02/2023 105 (H)     MCH 12/02/2023 33.5     MCHC 12/02/2023 31.8     RDW 12/02/2023 17.2 (H)     MPV 12/02/2023 9.8     Platelets 67/13/7438 257     nRBC 12/02/2023 0     Neutrophils Relative 12/02/2023 46     Immat GRANS % 12/02/2023 0     Lymphocytes Relative 12/02/2023 34     Monocytes Relative 12/02/2023 10     Eosinophils Relative 12/02/2023 9 (H)     Basophils Relative 12/02/2023 1     Neutrophils Absolute 12/02/2023 2.80     Immature Grans Absolute 12/02/2023 0.02     Lymphocytes Absolute 12/02/2023 2.04     Monocytes Absolute 12/02/2023 0.57     Eosinophils Absolute 12/02/2023 0.54     Basophils Absolute 12/02/2023 0.05     Sodium 12/02/2023 132 (L)     Potassium 12/02/2023 4.1     Chloride 12/02/2023 101     CO2 12/02/2023 24     ANION GAP 12/02/2023 7     BUN 12/02/2023 8     Creatinine 12/02/2023 0.75     Glucose 12/02/2023 90     Calcium 12/02/2023 8.7     eGFR 12/02/2023 73       [facility] Reference Range & Units 12/01/23 06:38 12/02/23 04:58   Sodium 135 - 147 mmol/L 131 (L) 132 (L)   Potassium 3.5 - 5.3 mmol/L 4.0 4.1   Chloride 96 - 108 mmol/L 102 101   CO2 21 - 32 mmol/L 24 24   Anion Gap mmol/L 5 7   BUN 5 - 25 mg/dL 8 8   Creatinine 0.60 - 1.30 mg/dL 0.70 0.75   Glucose, Random 65 - 140 mg/dL 91 90   Calcium 8.4 - 10.2 mg/dL 8.2 (L) 8.7   CORRECTED CALCIUM 8.3 - 10.1 mg/dL 8.8    AST 13 - 39 U/L 15    ALT 7 - 52 U/L 6 (L)    Alkaline Phosphatase 34 - 104 U/L 57    Total Protein 6.4 - 8.4 g/dL 5.6 (L)    Albumin 3.5 - 5.0 g/dL 3.2 (L)    TOTAL BILIRUBIN 0.20 - 1.00 mg/dL 0.47    eGFR ml/min/1.73sq m 79 73   Magnesium 1.9 - 2.7 mg/dL 2.0    WBC 4.31 - 10.16 Thousand/uL 4.55 6.02   Red Blood Cell Count 3.81 - 5.12 Million/uL 2.34 (L) 2.69 (L)   Hemoglobin 11.5 - 15.4 g/dL 7.9 (L) 9.0 (L)   HCT 34.8 - 46.1 % 24.3 (L) 28.3 (L)   MCV 82 - 98 fL 104 (H) 105 (H)   MCH 26.8 - 34.3 pg 33.8 33.5   MCHC 31.4 - 37.4 g/dL 32.5 31.8 RDW 11.6 - 15.1 % 17.5 (H) 17.2 (H)   Platelet Count 795 - 390 Thousands/uL 230 257   MPV 8.9 - 12.7 fL 9.3 9.8   nRBC /100 WBCs 0 0   Neutrophils % 43 - 75 % 52 46   Immat GRANS % 0 - 2 % 0 0   (L): Data is abnormally low  (H): Data is abnormally high

## 2023-12-03 LAB
ATRIAL RATE: 66 BPM
P AXIS: -9 DEGREES
PR INTERVAL: 182 MS
QRS AXIS: -8 DEGREES
QRSD INTERVAL: 88 MS
QT INTERVAL: 410 MS
QTC INTERVAL: 429 MS
T WAVE AXIS: 58 DEGREES
VENTRICULAR RATE: 66 BPM

## 2023-12-04 ENCOUNTER — HOME CARE VISIT (OUTPATIENT)
Dept: HOME HEALTH SERVICES | Facility: HOME HEALTHCARE | Age: 83
End: 2023-12-04
Payer: MEDICARE

## 2023-12-04 ENCOUNTER — TELEPHONE (OUTPATIENT)
Dept: HEMATOLOGY ONCOLOGY | Facility: CLINIC | Age: 83
End: 2023-12-04

## 2023-12-04 VITALS
DIASTOLIC BLOOD PRESSURE: 72 MMHG | OXYGEN SATURATION: 98 % | RESPIRATION RATE: 20 BRPM | SYSTOLIC BLOOD PRESSURE: 126 MMHG | HEART RATE: 84 BPM | TEMPERATURE: 97.7 F

## 2023-12-04 DIAGNOSIS — E03.9 ACQUIRED HYPOTHYROIDISM: ICD-10-CM

## 2023-12-04 LAB
BACTERIA BLD CULT: NORMAL
BACTERIA BLD CULT: NORMAL

## 2023-12-04 PROCEDURE — G0299 HHS/HOSPICE OF RN EA 15 MIN: HCPCS

## 2023-12-04 RX ORDER — LEVOTHYROXINE SODIUM 0.07 MG/1
TABLET ORAL
Qty: 90 TABLET | Refills: 1 | Status: SHIPPED | OUTPATIENT
Start: 2023-12-04

## 2023-12-04 NOTE — TELEPHONE ENCOUNTER
She was just discharged from  hospital yesterday.  Rescheduled to January, but if needed to be seen sooner, please contact the patient

## 2023-12-05 ENCOUNTER — TRANSITIONAL CARE MANAGEMENT (OUTPATIENT)
Dept: FAMILY MEDICINE CLINIC | Facility: CLINIC | Age: 83
End: 2023-12-05

## 2023-12-06 ENCOUNTER — HOME CARE VISIT (OUTPATIENT)
Dept: HOME HEALTH SERVICES | Facility: HOME HEALTHCARE | Age: 83
End: 2023-12-06
Payer: MEDICARE

## 2023-12-06 ENCOUNTER — TELEPHONE (OUTPATIENT)
Dept: HOME HEALTH SERVICES | Facility: HOME HEALTHCARE | Age: 83
End: 2023-12-06

## 2023-12-06 VITALS — OXYGEN SATURATION: 97 % | HEART RATE: 85 BPM | SYSTOLIC BLOOD PRESSURE: 138 MMHG | DIASTOLIC BLOOD PRESSURE: 66 MMHG

## 2023-12-06 DIAGNOSIS — K51.80 OTHER ULCERATIVE COLITIS WITHOUT COMPLICATION (HCC): ICD-10-CM

## 2023-12-06 DIAGNOSIS — E53.8 B12 DEFICIENCY: Primary | ICD-10-CM

## 2023-12-06 PROCEDURE — G0152 HHCP-SERV OF OT,EA 15 MIN: HCPCS

## 2023-12-06 RX ORDER — SULFASALAZINE 500 MG/1
1000 TABLET ORAL 2 TIMES DAILY
Qty: 360 TABLET | Refills: 2 | Status: SHIPPED | OUTPATIENT
Start: 2023-12-06

## 2023-12-06 RX ORDER — CYANOCOBALAMIN 1000 UG/ML
1000 INJECTION, SOLUTION INTRAMUSCULAR; SUBCUTANEOUS ONCE
OUTPATIENT
Start: 2023-12-11

## 2023-12-07 ENCOUNTER — HOME CARE VISIT (OUTPATIENT)
Dept: HOME HEALTH SERVICES | Facility: HOME HEALTHCARE | Age: 83
End: 2023-12-07
Payer: MEDICARE

## 2023-12-07 VITALS
TEMPERATURE: 98.2 F | BODY MASS INDEX: 31.46 KG/M2 | RESPIRATION RATE: 17 BRPM | OXYGEN SATURATION: 94 % | WEIGHT: 166.5 LBS | SYSTOLIC BLOOD PRESSURE: 132 MMHG | HEART RATE: 71 BPM | DIASTOLIC BLOOD PRESSURE: 72 MMHG

## 2023-12-07 VITALS — HEART RATE: 76 BPM | DIASTOLIC BLOOD PRESSURE: 78 MMHG | OXYGEN SATURATION: 97 % | SYSTOLIC BLOOD PRESSURE: 122 MMHG

## 2023-12-07 PROCEDURE — G0151 HHCP-SERV OF PT,EA 15 MIN: HCPCS

## 2023-12-07 PROCEDURE — G0299 HHS/HOSPICE OF RN EA 15 MIN: HCPCS

## 2023-12-07 NOTE — CASE COMMUNICATION
Patient requested phone discharge from OT on 12.7. 23. Demonstrates good understanding of discharge.

## 2023-12-07 NOTE — CASE COMMUNICATION
Continue Occupational Therapy 1w4 starting 12.6.23.  OT to include UB ther ex and ADL training for Shower transfer safety and ADL training for Bathing to increase safety and independence during self care tasks. Instucted patient and caregiver on OT plan of care and frequency with good understanding demonstrated by patient and caregiver.

## 2023-12-08 ENCOUNTER — NURSE TRIAGE (OUTPATIENT)
Age: 83
End: 2023-12-08

## 2023-12-08 ENCOUNTER — OFFICE VISIT (OUTPATIENT)
Dept: GASTROENTEROLOGY | Facility: CLINIC | Age: 83
End: 2023-12-08
Payer: MEDICARE

## 2023-12-08 VITALS
HEART RATE: 81 BPM | DIASTOLIC BLOOD PRESSURE: 61 MMHG | BODY MASS INDEX: 25.3 KG/M2 | WEIGHT: 134 LBS | HEIGHT: 61 IN | SYSTOLIC BLOOD PRESSURE: 107 MMHG

## 2023-12-08 DIAGNOSIS — Z79.02 ENCOUNTER FOR CURRENT LONG TERM USE OF ANTIPLATELET DRUG: ICD-10-CM

## 2023-12-08 DIAGNOSIS — K51.80 OTHER ULCERATIVE COLITIS WITHOUT COMPLICATION (HCC): Primary | ICD-10-CM

## 2023-12-08 DIAGNOSIS — K92.1 MELENA: ICD-10-CM

## 2023-12-08 PROCEDURE — 99213 OFFICE O/P EST LOW 20 MIN: CPT | Performed by: INTERNAL MEDICINE

## 2023-12-08 NOTE — TELEPHONE ENCOUNTER
Pt inquiring if she could take dulcolax daily. Pt states she was seen by Dr. Yolande Harris today but forgot to ask this question. Pt says its ok to leave VM if necessary. Reason for Disposition   Information only question and nurse able to answer    Answer Assessment - Initial Assessment Questions  1. REASON FOR CALL or QUESTION: "What is your reason for calling today?" or "How can I best help you?" or "What question do you have that I can help answer?"      Pt inquiring if she could take dulcolax daily. Protocols used:  Information Only Call - No Triage-ADULT-[place]

## 2023-12-08 NOTE — PROGRESS NOTES
[facility] - Outpatient Follow-up Note  Cheikh Mitchell 79 y.o. female MRN: [id number]  Encounter: [id number]          ASSESSMENT AND PLAN:      1. Other ulcerative colitis without complication ([address])  2. Melena  3. Encounter for current long term use of antiplatelet drug    Continue sulfasalazine. Will hold off on colonoscopy at present as this would be unlikely to  given improving symptoms. Will evaluate further as below. If hemoglobin continues to fall, would recommend consideration of hematology consult. If bowel symptoms return may consider colonoscopy for further evaluation    - CBC; Future  - Calprotectin,Fecal; Future        ______________________________________________________________________    SUBJECTIVE: Patient with apparent long history of ulcerative colitis, quiescent on sulfasalazine with unremarkable biopsies on colonoscopy July 2022 has had slowly progressive macrocytic anemia with slight worsening after undergoing TAVR in early November and initiation of Plavix. Also noted black-colored stool around that time. Presented to the emergency room where CT scan suggested active inflammatory changes in the transverse colon chronic inflammatory changes more distally. Underwent EGD which was unremarkable and was transfused 1 unit of packed red blood cells with rise in hemoglobin from 6.8 to 9. Was discharged 6 days ago and has noted no further melenic stool. Continues to feel somewhat weak and fatigued. No significant abdominal pain or change in bowel habit.   B12 level has been elevated, folate normal, reticulocyte count elevated, iron panel normal      REVIEW OF SYSTEMS:    ROS       Historical Information   Past Medical History:   Diagnosis Date    Allergic rhinitis 3/21/2023    Anemia     Arthritis     osteoporosis, djd knees, pt denies osteoporosis on 2/21/22    Low's esophagus     Cancer ([address])     on face    Colon polyp     Cough 02/21/2022 cough for 3 years, expectorates yellow mucus    Disease of thyroid gland     low thyroid    Diverticulitis     gerd, diverticulitis    Dizziness     GERD (gastroesophageal reflux disease)     ulcerative colitis; Low's esophagus, diverticulosis; hx of laryngopharyngeal reflux    Hyperlipidemia     Hypertension     Low vitamin D level     LPRD (laryngopharyngeal reflux disease) 02/21/2022    PONV (postoperative nausea and vomiting)     AFTER "BIG SURGERIES"    Postnasal drip     WITH COUGH    Ulcerative colitis [facility])      Past Surgical History:   Procedure Laterality Date    APPENDECTOMY      CARDIAC CATHETERIZATION N/A 9/7/2023    Procedure: Cardiac RHC/LHC; Surgeon: Huey Lieberman MD;  Location: [facility] CARDIAC CATH LAB;   Service: Cardiology    CARDIAC CATHETERIZATION N/A 11/7/2023    Procedure: Cardiac tavr;  Surgeon: Rosemary Maldonado MD;  Location: [facility] MAIN OR;  Service: Cardiology    CATARACT EXTRACTION Bilateral     COLONOSCOPY      colon polyps    COLONOSCOPY  07/28/2022    FOOT SURGERY Bilateral     bunionectomy    HEMORROIDECTOMY      hemorroid removal    JOINT REPLACEMENT Bilateral     knees    TX REPLACE AORTIC VALVE OPENFEMORAL ARTERY APPROACH N/A 11/7/2023    Procedure: REPLACEMENT AORTIC VALVE TRANSCATHETER (TAVR) TRANSFEMORAL W/ 26MM BUCKNER DOROTHEA S3 UR VALVE(ACCESS ON LEFT) GIOVANNY;  Surgeon: Yara Rosales DO;  Location: [facility] MAIN OR;  Service: Cardiac Surgery    REPLACEMENT TOTAL KNEE Bilateral     b/ replacement    TONSILLECTOMY      TUBAL LIGATION      UPPER GASTROINTESTINAL ENDOSCOPY       Social History   Social History     Substance and Sexual Activity   Alcohol Use Not Currently     Social History     Substance and Sexual Activity   Drug Use Never     Social History     Tobacco Use   Smoking Status Never   Smokeless Tobacco Never     Family History   Problem Relation Age of Onset    Heart disease Father     No Known Problems Sister     No Known Problems Sister     Heart disease Brother     Colon cancer Brother 54    Cancer Brother 79    No Known Problems Maternal Grandmother     No Known Problems Paternal Grandmother     No Known Problems Maternal Aunt     No Known Problems Paternal Aunt     Cancer Daughter         unsure of type of cancer, it was female cancer and hysterectomy done by Dr. Wayne Mueller       Meds/Allergies       Current Outpatient Medications:     acetaminophen (TYLENOL) 325 mg tablet    amLODIPine (NORVASC) 10 mg tablet    aspirin 81 mg chewable tablet    atorvastatin (LIPITOR) 20 mg tablet    cholecalciferol (VITAMIN D3) 1,000 units tablet    clopidogrel (PLAVIX) 75 mg tablet    Coenzyme Q10 (Co Q 10) 100 MG CAPS    Cyanocobalamin (VITAMIN B 12 PO)    levothyroxine 75 mcg tablet    NON FORMULARY    pantoprazole (PROTONIX) 40 mg tablet    sulfaSALAzine (AZULFIDINE) 500 mg tablet    valsartan (DIOVAN) 80 mg tablet    vitamin A 2250 MCG (7500 UT) capsule    vitamin E, tocopherol, 1,000 units capsule    Multiple Vitamin (multivitamin) capsule    ondansetron (ZOFRAN-ODT) 4 mg disintegrating tablet    Allergies   Allergen Reactions    Sulfa Antibiotics Itching           Objective     Blood pressure 107/61, pulse 81, height 5' 1" (1.549 m), weight 60.8 kg (134 lb). Body mass index is 25.32 kg/m². PHYSICAL EXAM:      Physical Exam  Vitals and nursing note reviewed. Constitutional:       General: She is not in acute distress. Appearance: She is not ill-appearing. HENT:      Head: Normocephalic and atraumatic. Eyes:      General: No scleral icterus. Extraocular Movements: Extraocular movements intact. Cardiovascular:      Rate and Rhythm: Normal rate and regular rhythm. Pulmonary:      Effort: Pulmonary effort is normal. No respiratory distress. Abdominal:      General: There is no distension. Palpations: Abdomen is soft. Tenderness: There is no abdominal tenderness. There is no guarding or rebound. Musculoskeletal:      Right lower leg: No edema.       Left lower leg: No edema.   Skin:     General: Skin is warm and dry. Coloration: Skin is not cyanotic. Findings: No erythema. Neurological:      General: No focal deficit present. Mental Status: She is alert and oriented to person, place, and time. Psychiatric:         Mood and Affect: Mood normal.         Behavior: Behavior normal.          Lab Results:   No visits with results within 1 Day(s) from this visit.    Latest known visit with results is:   Admission on 11/29/2023, Discharged on 12/02/2023   Component Date Value    WBC 11/29/2023 5.57     RBC 11/29/2023 2.20 (L)     Hemoglobin 11/29/2023 7.6 (L)     Hematocrit 11/29/2023 23.3 (L)     MCV 11/29/2023 106 (H)     MCH 11/29/2023 34.5 (H)     MCHC 11/29/2023 32.6     RDW 11/29/2023 14.9     MPV 11/29/2023 9.8     Platelets 23/65/3495 299     nRBC 11/29/2023 0     Neutrophils Relative 11/29/2023 51     Immat GRANS % 11/29/2023 0     Lymphocytes Relative 11/29/2023 31     Monocytes Relative 11/29/2023 12     Eosinophils Relative 11/29/2023 5     Basophils Relative 11/29/2023 1     Neutrophils Absolute 11/29/2023 2.86     Immature Grans Absolute 11/29/2023 0.02     Lymphocytes Absolute 11/29/2023 1.72     Monocytes Absolute 11/29/2023 0.67     Eosinophils Absolute 11/29/2023 0.25     Basophils Absolute 11/29/2023 0.05     Sodium 11/29/2023 131 (L)     Potassium 11/29/2023 3.6     Chloride 11/29/2023 100     CO2 11/29/2023 22     ANION GAP 11/29/2023 9     BUN 11/29/2023 10     Creatinine 11/29/2023 0.68     Glucose 11/29/2023 91     Calcium 11/29/2023 8.8     AST 11/29/2023 19     ALT 11/29/2023 8     Alkaline Phosphatase 11/29/2023 70     Total Protein 11/29/2023 6.5     Albumin 11/29/2023 3.7     Total Bilirubin 11/29/2023 0.51     eGFR 11/29/2023 80     hs TnI 0hr 11/29/2023 7     Protime 11/29/2023 14.8 (H)     INR 11/29/2023 1.09     PTT 11/29/2023 30     ABO Grouping 11/29/2023 O     Rh Factor 11/29/2023 Positive     Antibody Screen 11/29/2023 Negative Specimen Expiration Date 11/29/2023 [id number]     Lipase 11/29/2023 13     Ventricular Rate 11/29/2023 66     Atrial Rate 11/29/2023 66     IN Interval 11/29/2023 182     QRSD Interval 11/29/2023 88     QT Interval 11/29/2023 410     QTC Interval 11/29/2023 429     P Axis 11/29/2023 -9     QRS [place] 11/29/2023 -8     T Wave [place] 11/29/2023 58     hs TnI 2hr 11/29/2023 8     Delta 2hr hsTnI 11/29/2023 1     Blood Culture 11/29/2023 No Growth After 5 Days. Blood Culture 11/29/2023 No Growth After 5 Days.      CRP 11/29/2023 2.1     Sodium 11/30/2023 133 (L)     Potassium 11/30/2023 3.4 (L)     Chloride 11/30/2023 103     CO2 11/30/2023 24     ANION GAP 11/30/2023 6     BUN 11/30/2023 7     Creatinine 11/30/2023 0.72     Glucose 11/30/2023 89     Calcium 11/30/2023 8.1 (L)     eGFR 11/30/2023 77     WBC 11/30/2023 4.12 (L)     RBC 11/30/2023 2.01 (L)     Hemoglobin 11/30/2023 6.8 (LL)     Hematocrit 11/30/2023 21.1 (L)     MCV 11/30/2023 105 (H)     MCH 11/30/2023 33.8     MCHC 11/30/2023 32.2     RDW 11/30/2023 15.0     Platelets 47/32/6997 236     MPV 11/30/2023 9.8     Unit Product Code 12/01/2023 [id number]     Unit Number 12/01/2023 [id number]     Unit ABO 12/01/2023 O     Unit RH 12/01/2023 NEG     Crossmatch 12/01/2023 Compatible     Unit Dispense Status 12/01/2023 Presumed Trans     Unit Product Volume 12/01/2023 219     Magnesium 11/30/2023 1.9     Hemoglobin 11/30/2023 8.1 (L)     Hematocrit 11/30/2023 25.3 (L)     Sodium 12/01/2023 131 (L)     Potassium 12/01/2023 4.0     Chloride 12/01/2023 102     CO2 12/01/2023 24     ANION GAP 12/01/2023 5     BUN 12/01/2023 8     Creatinine 12/01/2023 0.70     Glucose 12/01/2023 91     Calcium 12/01/2023 8.2 (L)     Corrected Calcium 12/01/2023 8.8     AST 12/01/2023 15     ALT 12/01/2023 6 (L)     Alkaline Phosphatase 12/01/2023 57     Total Protein 12/01/2023 5.6 (L)     Albumin 12/01/2023 3.2 (L)     Total Bilirubin 12/01/2023 0.47     eGFR 12/01/2023 79     WBC 12/01/2023 4.55     RBC 12/01/2023 2.34 (L)     Hemoglobin 12/01/2023 7.9 (L)     Hematocrit 12/01/2023 24.3 (L)     MCV 12/01/2023 104 (H)     MCH 12/01/2023 33.8     MCHC 12/01/2023 32.5     RDW 12/01/2023 17.5 (H)     MPV 12/01/2023 9.3     Platelets 97/04/6845 230     nRBC 12/01/2023 0     Neutrophils Relative 12/01/2023 52     Immat GRANS % 12/01/2023 0     Lymphocytes Relative 12/01/2023 27     Monocytes Relative 12/01/2023 11     Eosinophils Relative 12/01/2023 9 (H)     Basophils Relative 12/01/2023 1     Neutrophils Absolute 12/01/2023 2.38     Immature Grans Absolute 12/01/2023 0.01     Lymphocytes Absolute 12/01/2023 1.23     Monocytes Absolute 12/01/2023 0.48     Eosinophils Absolute 12/01/2023 0.41     Basophils Absolute 12/01/2023 0.04     Magnesium 12/01/2023 2.0     WBC 12/02/2023 6.02     RBC 12/02/2023 2.69 (L)     Hemoglobin 12/02/2023 9.0 (L)     Hematocrit 12/02/2023 28.3 (L)     MCV 12/02/2023 105 (H)     MCH 12/02/2023 33.5     MCHC 12/02/2023 31.8     RDW 12/02/2023 17.2 (H)     MPV 12/02/2023 9.8     Platelets 95/42/5522 257     nRBC 12/02/2023 0     Neutrophils Relative 12/02/2023 46     Immat GRANS % 12/02/2023 0     Lymphocytes Relative 12/02/2023 34     Monocytes Relative 12/02/2023 10     Eosinophils Relative 12/02/2023 9 (H)     Basophils Relative 12/02/2023 1     Neutrophils Absolute 12/02/2023 2.80     Immature Grans Absolute 12/02/2023 0.02     Lymphocytes Absolute 12/02/2023 2.04     Monocytes Absolute 12/02/2023 0.57     Eosinophils Absolute 12/02/2023 0.54     Basophils Absolute 12/02/2023 0.05     Sodium 12/02/2023 132 (L)     Potassium 12/02/2023 4.1     Chloride 12/02/2023 101     CO2 12/02/2023 24     ANION GAP 12/02/2023 7     BUN 12/02/2023 8     Creatinine 12/02/2023 0.75     Glucose 12/02/2023 90     Calcium 12/02/2023 8.7     eGFR 12/02/2023 73          Radiology Results:   EGD    Result Date: 12/1/2023  Narrative: Table formatting from the original result was not included. [address] [place] [address] Endoscopy [facility] [id number] [phone] DATE OF SERVICE: 12/01/23 PHYSICIAN(S): Attending: Carlene Parra MD Fellow: No Staff Documented INDICATION: Anemia, blood loss, Black tarry stools POST-OP DIAGNOSIS: See the impression below. PREPROCEDURE: Informed consent was obtained for the procedure, including sedation. Risks of perforation, hemorrhage, adverse drug reaction and aspiration were discussed. The patient was placed in the left lateral decubitus position. Patient was explained about the risks and benefits of the procedure. Risks including but not limited to bleeding, infection, and perforation were explained in detail. Also explained about less than 100% sensitivity with the exam and other alternatives. PROCEDURE: EGD DETAILS OF PROCEDURE: Patient was taken to the procedure room where a time out was performed to confirm correct patient and correct procedure. The patient underwent monitored anesthesia care, which was administered by an anesthesia professional. The patient's blood pressure, heart rate, level of consciousness, respirations and oxygen were monitored throughout the procedure. The scope was advanced to the second part of the duodenum. Retroflexion was performed in the fundus. The patient experienced no blood loss. The procedure was not difficult. The patient tolerated the procedure well. There were no apparent adverse events. ANESTHESIA INFORMATION: ASA: ASA status not filed in the log. Anesthesia Type: Anesthesia type not filed in the log.  MEDICATIONS: No administrations occurring from 1402 to 1417 on 12/01/23 FINDINGS: The duodenum appeared normal. The stomach appeared normal. Including retroflexed view of the cardia Small hiatal hernia - GE junction 35 cm from the incisors, diaphragmatic impression 36 cm from the incisors Extrinsic compression on the midesophagus, likely aorta or cardiac SPECIMENS: * No specimens in log * Impression: Normal duodenum stomach and esophagus, small hiatal hernia Extrinsic compression of the esophagus No source of bleeding RECOMMENDATION:  Follow up with PCP Return to floor Diet as tolerated Follow hemoglobin Could consider monitoring hemoglobin, if stable can consider outpatient colonoscopy versus inpatient examination return to floor  Keny Cespedes MD     CT abdomen pelvis with contrast    Result Date: 11/29/2023  Narrative: CT ABDOMEN AND PELVIS WITH IV CONTRAST INDICATION:   Left lower quadrant pain. COMPARISON: CTA of the chest, abdomen, and pelvis dated 8/30/2023. CT of the abdomen/pelvis dated 1/17/2019. TECHNIQUE:  CT examination of the abdomen and pelvis was performed. Multiplanar 2D reformatted images were created from the source data. This examination, like all CT scans performed in the [facility], was performed utilizing techniques to minimize radiation dose exposure, including the use of iterative reconstruction and automated exposure control. Radiation dose length product (DLP) for this visit:  429 mGy-cm IV Contrast:  100 mL of iohexol (OMNIPAQUE) Enteric Contrast:  Enteric contrast was not administered. FINDINGS: ABDOMEN LOWER CHEST: Mild chronic scarring at the left base. No acute findings in the visualized portion of the lower chest. LIVER/BILIARY TREE:  Unremarkable. GALLBLADDER: Normal when accounting for degree of nondistention. SPLEEN:  Unremarkable. PANCREAS:  Unremarkable. ADRENAL GLANDS: Bilateral lobular adrenal hypertrophy--unchanged. No suspicious nodules on either side. KIDNEYS/URETERS:  Unremarkable. No hydronephrosis. STOMACH AND BOWEL: Colonic diverticula are present from the hepatic flexure through the rectosigmoid junction, most marked distally. There is long segment circumferential wall thickening/thumbprinting involving the hepatic flexure and entire transverse colon with mild perienteric fat stranding.  There are some scattered diverticula within the inflamed segment, but wall thickening is more diffuse/extensive and uniform than typical for diverticulitis. Descending colon wall thickness appears within normal limits. Extensive diverticulosis involving the sigmoid colon. There is a nondistensible segment of sigmoid colon with unchanged apparent wall thickening (301/118). However, appearance is relatively unchanged since at least 2019. No fat stranding around the sigmoid colon. Small hiatal hernia. Stomach is otherwise normal. No dilated or inflamed loops of small bowel. APPENDIX:  No findings to suggest appendicitis. ABDOMINOPELVIC CAVITY:  No ascites. No pneumoperitoneum. No lymphadenopathy. VESSELS:  Unremarkable for patient's age. PELVIS REPRODUCTIVE ORGANS:  Unremarkable for patient's age. URINARY BLADDER:  Unremarkable. ABDOMINAL WALL/INGUINAL REGIONS:  Unremarkable. 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 No acute fracture or destructive osseous lesion. Impression: Uncomplicated colitis of the transverse segment. Though there are diverticula arising from this portion of the colon, length of the affected segment and morphology of wall thickening are atypical for diverticulitis such that other infectious or inflammatory causes of colitis should be considered. No free air, abscess, or obstruction. Location of inflamed colon in the upper abdomen does not necessarily explain focal left lower quadrant pain. More extensive distal diverticular findings in the sigmoid appear unchanged from remote priors without evidence for acute diverticulitis, distally. Recommend follow-up with colonoscopy.  Workstation performed: [id number]

## 2023-12-11 ENCOUNTER — HOME CARE VISIT (OUTPATIENT)
Dept: HOME HEALTH SERVICES | Facility: HOME HEALTHCARE | Age: 83
End: 2023-12-11
Payer: MEDICARE

## 2023-12-11 ENCOUNTER — APPOINTMENT (OUTPATIENT)
Dept: LAB | Facility: CLINIC | Age: 83
End: 2023-12-11
Payer: MEDICARE

## 2023-12-11 ENCOUNTER — HOSPITAL ENCOUNTER (OUTPATIENT)
Dept: INFUSION CENTER | Facility: CLINIC | Age: 83
Discharge: HOME | End: 2023-12-11
Payer: MEDICARE

## 2023-12-11 DIAGNOSIS — K92.1 MELENA: ICD-10-CM

## 2023-12-11 DIAGNOSIS — E53.8 B12 DEFICIENCY: Primary | ICD-10-CM

## 2023-12-11 DIAGNOSIS — K51.80 OTHER ULCERATIVE COLITIS WITHOUT COMPLICATION (HCC): ICD-10-CM

## 2023-12-11 LAB
ERYTHROCYTE [DISTWIDTH] IN BLOOD BY AUTOMATED COUNT: 15.2 % (ref 11.6–15.1)
HCT VFR BLD AUTO: 25.2 % (ref 34.8–46.1)
HGB BLD-MCNC: 8.2 G/DL (ref 11.5–15.4)
MCH RBC QN AUTO: 34 PG (ref 26.8–34.3)
MCHC RBC AUTO-ENTMCNC: 32.5 G/DL (ref 31.4–37.4)
MCV RBC AUTO: 105 FL (ref 82–98)
PLATELET # BLD AUTO: 282 THOUSANDS/UL (ref 149–390)
PMV BLD AUTO: 9.5 FL (ref 8.9–12.7)
RBC # BLD AUTO: 2.41 MILLION/UL (ref 3.81–5.12)
WBC # BLD AUTO: 3.73 THOUSAND/UL (ref 4.31–10.16)

## 2023-12-11 PROCEDURE — 96372 THER/PROPH/DIAG INJ SC/IM: CPT

## 2023-12-11 PROCEDURE — 36415 COLL VENOUS BLD VENIPUNCTURE: CPT

## 2023-12-11 PROCEDURE — 85027 COMPLETE CBC AUTOMATED: CPT

## 2023-12-11 RX ORDER — CYANOCOBALAMIN 1000 UG/ML
1000 INJECTION, SOLUTION INTRAMUSCULAR; SUBCUTANEOUS ONCE
Status: COMPLETED | OUTPATIENT
Start: 2023-12-11 | End: 2023-12-11

## 2023-12-11 RX ORDER — CYANOCOBALAMIN 1000 UG/ML
1000 INJECTION, SOLUTION INTRAMUSCULAR; SUBCUTANEOUS ONCE
OUTPATIENT
Start: 2024-01-08

## 2023-12-11 RX ADMIN — CYANOCOBALAMIN 1000 MCG: 1000 INJECTION INTRAMUSCULAR; SUBCUTANEOUS at 15:44

## 2023-12-11 NOTE — PROGRESS NOTES
Patient here for B12 injection. Administered in left deltoid. Confirmed next appointment 1/8 at 2:30, AVS printed.

## 2023-12-13 ENCOUNTER — DOCUMENTATION (OUTPATIENT)
Dept: CARDIAC REHAB | Facility: CLINIC | Age: 83
End: 2023-12-13

## 2023-12-13 ENCOUNTER — CLINICAL SUPPORT (OUTPATIENT)
Dept: CARDIAC REHAB | Facility: CLINIC | Age: 83
End: 2023-12-13
Payer: MEDICARE

## 2023-12-13 DIAGNOSIS — I35.0 AORTIC STENOSIS, SEVERE: ICD-10-CM

## 2023-12-13 DIAGNOSIS — Z95.2 S/P TAVR (TRANSCATHETER AORTIC VALVE REPLACEMENT): Primary | ICD-10-CM

## 2023-12-13 DIAGNOSIS — E78.00 HYPERCHOLESTEROLEMIA: ICD-10-CM

## 2023-12-13 DIAGNOSIS — N18.30 STAGE 3 CHRONIC KIDNEY DISEASE, UNSPECIFIED WHETHER STAGE 3A OR 3B CKD (HCC): ICD-10-CM

## 2023-12-13 DIAGNOSIS — I10 ESSENTIAL HYPERTENSION: ICD-10-CM

## 2023-12-13 DIAGNOSIS — I35.9 NONRHEUMATIC AORTIC VALVE DISORDER: ICD-10-CM

## 2023-12-13 PROCEDURE — 93797 PHYS/QHP OP CAR RHAB WO ECG: CPT

## 2023-12-13 RX ORDER — BISACODYL 5 MG/1
5 TABLET, DELAYED RELEASE ORAL DAILY PRN
COMMUNITY

## 2023-12-13 NOTE — PROGRESS NOTES
Cardiac Rehabilitation Plan of Care   Initial Care Plan          Today's date: 2023   # of Exercise Sessions Completed: 1- initial care plan  Patient name: Carlito Gonzalez      : 1940  Age: 79 y.o. MRN: [id number]  Referring Physician: Caden Eng DO  Cardiologist: Radha Torres MD  Provider: Katie  Clinician: Saul Mata MS, [facility], [facility]    Dx:   Encounter Diagnoses   Name Primary? Aortic stenosis, severe     Nonrheumatic aortic valve disorder     S/P TAVR (transcatheter aortic valve replacement)     Essential hypertension     Hypercholesterolemia     Stage 3 chronic kidney disease, unspecified whether stage 3a or 3b CKD ([address])      Date of onset: 2023      SUMMARY OF PROGRESS:  Today is Kevin Zapata initial evaluation to begin Cardiac Rehab post TAVR on 23 for symptomatic severe AS. The patient does not currently follow a formal exercise program at home. She has resumed light ADLs reporting weakness and fatigue. Kevin Zapata also UC and anemia which contributes to her fatigue. Depression screening using the PHQ-9 interprets the patient's score of  5  as  5-9 = Mild Depression. CHELSEA-7 screening tool for anxiety suggests 4  0-4  = Not anxious. When addressed, the patient denies having depression/anxiety. Patient reports excellent social/emotional support from her  and daughter. Information to utilize [employer] was provided as well as contact information for counseling through [employer]. PHQ-9 score will be reassessed in 30 days due to an initial score revealing concern for depression. They rate stress 4/10 with the following stressors: doctors appts and paperwork to keep up with. Stress management will be reviewed. The patient is a non-smoker. Patient admits to 100% medication compliance. They report the following physical limitations: bilateral knee replacements. The patient completed an initial 6MWT. The patient walked  540 feet/1.8 METs.  Resting /70 with Normal response to exercise reaching 146/68. Blood Pressure will be monitored throughout the program and cardiologist will be notified of elevated trends. Patient reported no symptoms with exercise. . Telemetry revealed NSR with rare PACs. Karlie Parra was counseled on exercise guidelines to achieve a minimum of 150 mins/wk of moderate intensity (RPE 4-6) exercise and encouraged to add 1-2 days of exercise on opposite days of cardiac rehab as tolerated. We discussed current dietary habits and goals of heart healthy eating for lipid management. Patient's personal goals include: being able to walk around her neighborhood. The patient's CAD risk factors include: inactivity, stress, hypertension, and hyperlipidemia. Her education will focus on lifestyle modification/education specific to Her needs. Patient will attend group education classes on heart healthy eating, reading food labels, stress management, risk factor reduction, understanding heart disease and common heart medications. Patient will attend 35 monitored exercise sessions, 3x/wk for 12-18 weeks following surgical clearance Patient has not had f/u with surgery or cardiology. Evaluation cleared to do with ALINA Foley. Patient vinayak set up f/u appt with surgery and receive clearance to start cardiac rehab.         Medication compliance: Yes   Comments: Pt reports to be compliant with medications  Fall Risk: High   Comments: Patient uses walking assist device (walker/cane/rollator) and Reports a fall in the past 6 months    EKG Interpretation: NSR with rare PACs      EXERCISE ASSESSMENT and PLAN    Exercise Prescription:      Frequency: 3 days/week   Supplement with home exercise 2+ days/wk as tolerated      Minutes: 30-35         METS: 1.5-2.5            HR: RHR +30bpm   RPE: 4-6         Modalities: UBE, NuStep, Recumbent bike, and Room walking      30 Day Goals for Exercise Progression:    Frequency: 3 days/week of cardiac rehab       Supplement with home exercise 2+ days/wk as tolerated    Minutes: 35-40                              >150 mins/wk of moderate intensity exercise   METS: 2.0-3.0   HR: RHR +30bpm    RPE: 4-5   Modalities: UBE, NuStep, Recumbent bike, and Room walking    Strength trainin-3 days / week  12-15 repetitions  1-2 sets per modality   Will be added following 2-3 weeks of monitored exercise sessions   Modalities: Arm Curl and Sit to Stands    Home Exercise: none    Goals: improved DASI score by 10%, Increase in exercise capacity by 40% - based on peak METs tolerated in cardiac rehab exercise session, Exercise 5 days/wk, >150mins/wk of moderate intensity exercise, Improved 6MWT distance by 10%, Resume ADLs with increased strength, Attend Rehab regularly, Decrease sitting time, and Start a walking program    Progression Toward Goals:  Reviewed Pt goals and determined plan of care, Will continue to educate and progress as tolerated.     Education: benefit of exercise for CAD risk factors, home exercise guidelines, AHA guidelines to achieve >150 mins/wk of moderate exercise, and RPE scale   Plan:education on home exercise guidelines, home exercise 30+ mins 2 days opposite CR, and Education class: Risk Factors for Heart Disease  Readiness to change: Preparation:  (Getting ready to change)       NUTRITION ASSESSMENT AND PLAN    Weight control:    Starting weight: 135 lbs   Current weight:         Diabetes: N/A  A1c:     last measured:     Lipid management: Discussed diet and lipid management and Last lipid profile 23  Chol 134  TRG 68  HDL 48  LDL 72    Goals:Improved Rate Your Plate score  >91, eat 6 or more servings of grain products per day, eat whole grain breads, brown rice and whole grain cereals, eat 5 or more servings of fruits and vegetables a day, dine out less than once per week or choose low fat restaurant meals, eat red meat once a week or less, Do not eat fried foods, use "light" tub margarine on bread, potatoes and vegetables, do not use added salts,  choose low sodium canned, frozen, packaged foods, and do not eat salty snacks    Measurable goals were based Rate Your Plate Dietary Self-Assessment. These are the areas in which the patient could score higher on the assessment. Goals include recommendations for a heart healthy diet based on American Heart Association. Progression Toward Goals: Reviewed Pt goals and determined plan of care, Will continue to educate and progress as tolerated. Patient is limited with diet d/t Ulcerative Colitis    Education: heart healthy eating  low sodium diet  hydration  nutrition for  lipid management  Plan: Education class: Reading Food Labels, Education Class: Heart Healthy Eating, eat red meat once a week or less, choose lean red meat, never/rarely eat fried foods, use "light" tub margarine, eat healthy snacks like fruit, pretzels, and low fat crackers, never/rarely add salt to food when cooking or at the table, choose low sodium canned, frozen, packaged foods or rarely eat these foods, and rarely/never eat salty snacks  Readiness to change: Preparation:  (Getting ready to change)       PSYCHOSOCIAL ASSESSMENT AND PLAN    Emotional:  Depression assessment:  PHQ-9 = 5  5-9 = Mild Depression            Anxiety measure:  CHELSEA-7 = 4  0-4  = Not anxious  Self-reported stress level:  4  Social support: Patient reports excellent emotional/social support from her family    Goals:  Reduce perceived stress to 1-3/10, PHQ-9 - reduced severity by one level, Physical Fitness in [facility] Score < 3, Overall Health in [place] Score < 3, and increased energy    Progression Toward Goals: Reviewed Pt goals and determined plan of care, Will continue to educate and progress as tolerated.     Education: signs/sxs of depression, benefits of a positive support system, and stress management techniques  Plan: Class: Stress and Your Health, Class: Relaxation, Exercise, Spend time outdoors, Keep a positive mindset, and Enjoy family  Readiness to change: Preparation:  (Getting ready to change)       OTHER CORE COMPONENTS     Tobacco:   Social History     Tobacco Use   Smoking Status Never   Smokeless Tobacco Never       Tobacco Use Intervention:   N/A:  Patient is a non-smoker     Anginal Symptoms:  None   NTG use: No prescription    Blood pressure:    Restin/70   Exercise: 146/68    Goals: consistent BP < 130/80, reduced dietary sodium <2300mg, moderate intensity exercise >150 mins/wk, and medication compliance    Progression Toward Goals: Reviewed Pt goals and determined plan of care, Patient will drink more water in the next 30 days, Will continue to educate and progress as tolerated.     Education:  understanding high blood pressure and it's relationship to CAD and low sodium diet and HTN  Plan: Class: Understanding Heart Disease, Class: Common Heart Medications, medication compliance, Avoid Processed foods, engage in regular exercise, check labels for sodium content, and monitor home BP  Readiness to change: Preparation:  (Getting ready to change)

## 2023-12-13 NOTE — PROGRESS NOTES
CARDIAC REHAB ASSESSMENT    Today's date: 2023  Patient name: Sukhi Perez     : 1940       MRN: [id number]  PCP: Jay Anderson MD  Referring Physician: Gaudencio Mac DO  Cardiologist: Aislinn Sunshine MD  Surgeon: Gaudencio Mac DO  Dx:   Encounter Diagnoses   Name Primary? Aortic stenosis, severe     Nonrheumatic aortic valve disorder     S/P TAVR (transcatheter aortic valve replacement)     Essential hypertension     Hypercholesterolemia     Stage 3 chronic kidney disease, unspecified whether stage 3a or 3b CKD ([address])        Date of onset: 23  Cultural needs: none    Weight    Wt Readings from Last 1 Encounters:   23 60.8 kg (134 lb)      Height:   Ht Readings from Last 1 Encounters:   23 5' 1" (1.549 m)     Medical History:   Past Medical History:   Diagnosis Date    Allergic rhinitis 3/21/2023    Anemia     Arthritis     osteoporosis, djd knees, pt denies osteoporosis on 22    Low's esophagus     Cancer ([address])     on face    Colon polyp     Cough 2022    cough for 3 years, expectorates yellow mucus    Disease of thyroid gland     low thyroid    Diverticulitis     gerd, diverticulitis    Dizziness     GERD (gastroesophageal reflux disease)     ulcerative colitis;  Low's esophagus, diverticulosis; hx of laryngopharyngeal reflux    Hyperlipidemia     Hypertension     Low vitamin D level     LPRD (laryngopharyngeal reflux disease) 2022    PONV (postoperative nausea and vomiting)     AFTER "BIG SURGERIES"    Postnasal drip     WITH COUGH    Ulcerative colitis ([address])          Physical Limitations: bilateral knee replacements     Fall Risk: High   Comments: Patient uses walking assist device (walker/cane/rollator) and Reports a fall in the past 6 months, reports falling up the stairs a few months ago    Anginal Equivalent: None/denies angina   NTG use: No prescription    Risk Factors   Cholesterol: Yes  Smoking: Never used  HTN: Yes  DM: No  Obesity: No Inactivity: Yes  Stress:  perceived  stress: 4/10   Stressors: lots of doctors appts and paperwork to keep up with    Goals for Stress Management:Practice Relaxation Techniques, Exercise, Keep a positive mindset, Enjoy a hobby, and Spend time with family    Family History:  Family History   Problem Relation Age of Onset    Heart disease Father     No Known Problems Sister     No Known Problems Sister     Heart disease Brother     Colon cancer Brother 54    Cancer Brother 79    No Known Problems Maternal Grandmother     No Known Problems Paternal Grandmother     No Known Problems Maternal Aunt     No Known Problems Paternal Aunt     Cancer Daughter         unsure of type of cancer, it was female cancer and hysterectomy done by Dr. Niru Nuñez       Allergies: Sulfa antibiotics  ETOH:   Social History     Substance and Sexual Activity   Alcohol Use Not Currently         Current Medications:   Current Outpatient Medications   Medication Sig Dispense Refill    acetaminophen (TYLENOL) 325 mg tablet Take 2 tablets (650 mg total) by mouth every 6 (six) hours as needed for fever, mild pain, moderate pain or headaches (temperature greater than 101 F)  0    amLODIPine (NORVASC) 10 mg tablet Take 1 tablet (10 mg total) by mouth daily 90 tablet 2    aspirin 81 mg chewable tablet Chew 1 tablet (81 mg total) daily Do not start before November 9, 2023. 30 tablet 5    atorvastatin (LIPITOR) 20 mg tablet Take 1 tablet (20 mg total) by mouth daily 90 tablet 2    cholecalciferol (VITAMIN D3) 1,000 units tablet Take 1,000 Units by mouth daily      clopidogrel (PLAVIX) 75 mg tablet Take 1 tablet (75 mg total) by mouth daily Take for 90 days total following TAVR Do not start before November 9, 2023. 30 tablet 2    Coenzyme Q10 (Co Q 10) 100 MG CAPS Take by mouth      Cyanocobalamin (VITAMIN B 12 PO) Take by mouth      levothyroxine 75 mcg tablet Take 1 tablet by mouth once daily 90 tablet 1    Multiple Vitamin (multivitamin) capsule Take 1 capsule by mouth daily (Patient not taking: Reported on 12/8/2023)      NON FORMULARY Take 2 tablets by mouth daily after dinner slow fe (Ferrous sulfate) 45 mg   To take 2 tablets as per PCP on revisit 11/13/23      ondansetron (ZOFRAN-ODT) 4 mg disintegrating tablet Take 1 tablet (4 mg total) by mouth every 6 (six) hours as needed for nausea or vomiting (Patient not taking: Reported on 12/8/2023) 20 tablet 0    pantoprazole (PROTONIX) 40 mg tablet TAKE 1 TABLET BY MOUTH TWICE DAILY 30 MINUTES BEFORE MEALS 60 tablet 0    sulfaSALAzine (AZULFIDINE) 500 mg tablet Take 2 tablets (1,000 mg total) by mouth 2 (two) times a day 360 tablet 2    valsartan (DIOVAN) 80 mg tablet Take 1 tablet by mouth once daily 90 tablet 0    vitamin A 2250 MCG (7500 UT) capsule Take 7,500 Units by mouth daily      vitamin E, tocopherol, 1,000 units capsule Take 1,000 Units by mouth daily       No current facility-administered medications for this visit.          Functional Status Prior to Diagnosis for Treatment   Occupation: retired  Recreation: walking  ADL’s: Capable of performing light ADLs only  Hill: No limitations  Exercise: none    Current Functional Status  Occupation: retired  Recreation: time with family  ADL’s:Capable of performing light,  ADLs only house   Hill: able to perform self-care  Exercise: Pt exercises that were shown by home nurse      Patient Specific Goals:  wants to be able to walk around the neighborhood     Short Term Program Goals: dietary modifications increased strength improved energy/stamina with ADLs exercise 120-150 mins/wk    Long Term Goals: increased maximal walking duration  increased intial training workload  Improved [facility] Activity Status score  improved exercise tolerance  Improved Quality of Life - [facility] score reduced  Improved lipid profile  improved Rate Your Plate Score    Ability to reach goals/rehabilitation potential:  Very good    Projected return to function: 12 weeks  Objective tests: 6 MWT      Nutritional   Reviewed details of Rate your Plate. Discussed key elements of heart healthy eating. Reviewed patient goals for dietary modifications and their clinical implications. Reviewed most recent lipid profile. Goals for dietary modification based on Rate Your Plate Dietary Assessment:  choose lean cuts of meat  increase whole grains  increase fruits and vegetables  eliminate butter  low sodium  heathier choices while dining out    Follows ulcerative colitis menu  Increase water intake       Emotional/Social  Patient reports they are coping well with good social support and denies depression or anxiety    Marital status:     Domestic Violence Screening: No    Comments: heart murmur, symptomatic severe AS s/p TAVR. Patient has not had only f/u since surgery.  Has UC, fatigue d/t anemia     9/7 cath: non-obstructive coronary atherosclerosis

## 2023-12-13 NOTE — PROGRESS NOTES
Patient has not had cardiac clearance/ follow-up since TAVR surgery. Spoke with Willena Hodgkin, to ok initial evaluation for today. Instructed patient to call cardiology surgical office for appt. Cardiac Rehab exercise sessions can be started after cardiac clearance is received.

## 2023-12-15 ENCOUNTER — LAB (OUTPATIENT)
Dept: LAB | Facility: CLINIC | Age: 83
End: 2023-12-15

## 2023-12-15 ENCOUNTER — HOSPITAL ENCOUNTER (OUTPATIENT)
Dept: NON INVASIVE DIAGNOSTICS | Facility: HOSPITAL | Age: 83
Discharge: HOME | End: 2023-12-15
Payer: MEDICARE

## 2023-12-15 VITALS
SYSTOLIC BLOOD PRESSURE: 107 MMHG | HEART RATE: 81 BPM | BODY MASS INDEX: 25.3 KG/M2 | HEIGHT: 61 IN | DIASTOLIC BLOOD PRESSURE: 61 MMHG | WEIGHT: 134 LBS

## 2023-12-15 DIAGNOSIS — K92.1 MELENA: ICD-10-CM

## 2023-12-15 DIAGNOSIS — Z95.2 S/P TAVR (TRANSCATHETER AORTIC VALVE REPLACEMENT): ICD-10-CM

## 2023-12-15 DIAGNOSIS — I35.0 AORTIC STENOSIS, SEVERE: ICD-10-CM

## 2023-12-15 DIAGNOSIS — K51.80 OTHER ULCERATIVE COLITIS WITHOUT COMPLICATION (HCC): ICD-10-CM

## 2023-12-15 LAB
AORTIC ROOT: 2.6 CM
AORTIC VALVE MEAN VELOCITY: 12.7 M/S
APICAL FOUR CHAMBER EJECTION FRACTION: 62 %
ASCENDING AORTA: 3.2 CM
AV AREA BY CONTINUOUS VTI: 1.9 CM2
AV AREA PEAK VELOCITY: 1.8 CM2
AV LVOT MEAN GRADIENT: 3 MMHG
AV LVOT PEAK GRADIENT: 5 MMHG
AV MEAN GRADIENT: 7 MMHG
AV PEAK GRADIENT: 14 MMHG
AV VALVE AREA: 2.55 CM2
AV VELOCITY RATIO: 0.58
DOP CALC AO PEAK VEL: 1.85 M/S
DOP CALC AO VTI: 37.55 CM
DOP CALC LVOT AREA: 4.15 CM2
DOP CALC LVOT CARDIAC INDEX: 2.84 L/MIN/M2
DOP CALC LVOT CARDIAC OUTPUT: 4.52 L/MIN
DOP CALC LVOT DIAMETER: 2.3 CM
DOP CALC LVOT PEAK VEL VTI: 23.02 CM
DOP CALC LVOT PEAK VEL: 1.07 M/S
DOP CALC LVOT STROKE INDEX: 44 ML/M2
DOP CALC LVOT STROKE VOLUME: 95.59 CM3
E WAVE DECELERATION TIME: 248 MS
E/A RATIO: 0.73
FRACTIONAL SHORTENING: 32 (ref 28–44)
INTERVENTRICULAR SEPTUM IN DIASTOLE (PARASTERNAL SHORT AXIS VIEW): 1.2 CM
INTERVENTRICULAR SEPTUM: 1.2 CM (ref 0.6–1.1)
IVC: 1.2 MM
LAAS-AP2: 19.7 CM2
LAAS-AP4: 17.6 CM2
LEFT ATRIUM SIZE: 3.6 CM
LEFT ATRIUM VOLUME (MOD BIPLANE): 52 ML
LEFT ATRIUM VOLUME INDEX (MOD BIPLANE): 32.7 ML/M2
LEFT INTERNAL DIMENSION IN SYSTOLE: 3 CM (ref 2.1–4)
LEFT VENTRICULAR INTERNAL DIMENSION IN DIASTOLE: 4.4 CM (ref 3.5–6)
LEFT VENTRICULAR POSTERIOR WALL IN END DIASTOLE: 1 CM
LEFT VENTRICULAR STROKE VOLUME: 50 ML
LVSV (TEICH): 50 ML
MV E'TISSUE VEL-LAT: 8 CM/S
MV E'TISSUE VEL-SEP: 5 CM/S
MV PEAK A VEL: 1.13 M/S
MV PEAK E VEL: 83 CM/S
MV STENOSIS PRESSURE HALF TIME: 72 MS
MV VALVE AREA P 1/2 METHOD: 3.06
RA PRESSURE ESTIMATED: 3 MMHG
RIGHT ATRIUM AREA SYSTOLE A4C: 16.1 CM2
RIGHT VENTRICLE ID DIMENSION: 3.9 CM
RV PSP: 22 MMHG
SL CV LEFT ATRIUM LENGTH A2C: 5.2 CM
SL CV LV EF: 60
SL CV PED ECHO LEFT VENTRICLE DIASTOLIC VOLUME (MOD BIPLANE) 2D: 87 ML
SL CV PED ECHO LEFT VENTRICLE SYSTOLIC VOLUME (MOD BIPLANE) 2D: 36 ML
TR MAX PG: 19 MMHG
TR PEAK VELOCITY: 2.2 M/S
TRICUSPID ANNULAR PLANE SYSTOLIC EXCURSION: 2.5 CM
TRICUSPID VALVE PEAK REGURGITATION VELOCITY: 2.2 M/S

## 2023-12-15 PROCEDURE — 93005 ELECTROCARDIOGRAM TRACING: CPT

## 2023-12-15 PROCEDURE — 83993 ASSAY FOR CALPROTECTIN FECAL: CPT

## 2023-12-15 PROCEDURE — 93306 TTE W/DOPPLER COMPLETE: CPT

## 2023-12-15 PROCEDURE — 93306 TTE W/DOPPLER COMPLETE: CPT | Performed by: INTERNAL MEDICINE

## 2023-12-16 LAB
ATRIAL RATE: 83 BPM
P AXIS: -8 DEGREES
PR INTERVAL: 206 MS
QRS AXIS: -29 DEGREES
QRSD INTERVAL: 76 MS
QT INTERVAL: 390 MS
QTC INTERVAL: 458 MS
T WAVE AXIS: 37 DEGREES
VENTRICULAR RATE: 83 BPM

## 2023-12-18 ENCOUNTER — APPOINTMENT (OUTPATIENT)
Dept: LAB | Facility: CLINIC | Age: 83
End: 2023-12-18
Payer: MEDICARE

## 2023-12-18 ENCOUNTER — OFFICE VISIT (OUTPATIENT)
Dept: CARDIAC SURGERY | Facility: CLINIC | Age: 83
End: 2023-12-18
Payer: MEDICARE

## 2023-12-18 VITALS
HEIGHT: 61 IN | HEART RATE: 86 BPM | DIASTOLIC BLOOD PRESSURE: 70 MMHG | OXYGEN SATURATION: 96 % | TEMPERATURE: 98.2 F | WEIGHT: 133.4 LBS | BODY MASS INDEX: 25.19 KG/M2 | SYSTOLIC BLOOD PRESSURE: 119 MMHG

## 2023-12-18 DIAGNOSIS — K51.90 ULCERATIVE COLITIS WITHOUT COMPLICATIONS, UNSPECIFIED LOCATION (HCC): ICD-10-CM

## 2023-12-18 DIAGNOSIS — Z95.2 S/P TAVR (TRANSCATHETER AORTIC VALVE REPLACEMENT): ICD-10-CM

## 2023-12-18 DIAGNOSIS — Z95.2 S/P TAVR (TRANSCATHETER AORTIC VALVE REPLACEMENT): Primary | ICD-10-CM

## 2023-12-18 DIAGNOSIS — D50.9 IRON DEFICIENCY ANEMIA, UNSPECIFIED IRON DEFICIENCY ANEMIA TYPE: ICD-10-CM

## 2023-12-18 LAB
BASOPHILS # BLD AUTO: 0.05 THOUSANDS/ÂΜL (ref 0–0.1)
BASOPHILS NFR BLD AUTO: 1 % (ref 0–1)
EOSINOPHIL # BLD AUTO: 0.1 THOUSAND/ÂΜL (ref 0–0.61)
EOSINOPHIL NFR BLD AUTO: 3 % (ref 0–6)
ERYTHROCYTE [DISTWIDTH] IN BLOOD BY AUTOMATED COUNT: 14.2 % (ref 11.6–15.1)
HCT VFR BLD AUTO: 26.9 % (ref 34.8–46.1)
HGB BLD-MCNC: 8.9 G/DL (ref 11.5–15.4)
IMM GRANULOCYTES # BLD AUTO: 0.01 THOUSAND/UL (ref 0–0.2)
IMM GRANULOCYTES NFR BLD AUTO: 0 % (ref 0–2)
LYMPHOCYTES # BLD AUTO: 1.56 THOUSANDS/ÂΜL (ref 0.6–4.47)
LYMPHOCYTES NFR BLD AUTO: 42 % (ref 14–44)
MCH RBC QN AUTO: 34 PG (ref 26.8–34.3)
MCHC RBC AUTO-ENTMCNC: 33.1 G/DL (ref 31.4–37.4)
MCV RBC AUTO: 103 FL (ref 82–98)
MONOCYTES # BLD AUTO: 0.63 THOUSAND/ÂΜL (ref 0.17–1.22)
MONOCYTES NFR BLD AUTO: 17 % (ref 4–12)
NEUTROPHILS # BLD AUTO: 1.36 THOUSANDS/ÂΜL (ref 1.85–7.62)
NEUTS SEG NFR BLD AUTO: 37 % (ref 43–75)
NRBC BLD AUTO-RTO: 0 /100 WBCS
PLATELET # BLD AUTO: 312 THOUSANDS/UL (ref 149–390)
PMV BLD AUTO: 9.3 FL (ref 8.9–12.7)
RBC # BLD AUTO: 2.62 MILLION/UL (ref 3.81–5.12)
WBC # BLD AUTO: 3.71 THOUSAND/UL (ref 4.31–10.16)

## 2023-12-18 PROCEDURE — 85025 COMPLETE CBC W/AUTO DIFF WBC: CPT

## 2023-12-18 PROCEDURE — 36415 COLL VENOUS BLD VENIPUNCTURE: CPT

## 2023-12-18 PROCEDURE — 99214 OFFICE O/P EST MOD 30 MIN: CPT | Performed by: THORACIC SURGERY (CARDIOTHORACIC VASCULAR SURGERY)

## 2023-12-18 NOTE — PROGRESS NOTES
"Procedure: S/P transfemoral transcatheter aortic valve replacement, performed on 11/7    History: Ning Rodrigues is a 83 y.o. year old female who presents to our office today for routine follow up care following transcatheter aortic valve replacement. Post op course was uneventful, discharged on POD 1 with ASA/plavix. However, patient did present to the ED on 11/29 due to abdominal pain and black stools, she was found to be anemic, hgb in 7s requiring 1U PRBC, GI consulted, endoscopy performed with no concerning findings (hx of UC and duodenal ulcers), recommended GI OP f/u for scope, continued on ASA/plavix with Fe and Vit. C. Patient did follow-up with GI which held off on scope at this time. Today, she reports darker than brown stools that began yesterday evening. She reports lightheaded/dizziness, but states it is no worse than how it has been for years, continues to ambulate with cane. She denies CP, SOB, worsening LE edema, weight gain, syncope, fever/chills, erythema or drainage from groin site. Patient is staying active with frequent ambulation. Tolerating diet. Abiding by weight lifting and diet restrictions. Taking all medication as prescribed.     Vital Signs:   Vitals:    12/18/23 0841 12/18/23 0850   BP: 119/68 119/70   BP Location: Right arm Left arm   Patient Position: Sitting Sitting   Cuff Size: Standard Standard   Pulse: 86    Temp: 98.2 °F (36.8 °C)    TempSrc: Tympanic    SpO2: 96%    Weight: 60.5 kg (133 lb 6.4 oz)    Height: 5' 1\" (1.549 m)        Home Medications:                                                      Coenzyme Q10 (Co Q 10) 100 MG CAPS Take by mouth   Yes Historical Provider, MD   Cyanocobalamin (VITAMIN B 12 PO) Take by mouth   Yes Historical Provider, MD   levothyroxine 75 mcg tablet Take 1 tablet by mouth once daily 12/4/23  Yes Yohan Regan MD   NON FORMULARY Take 2 tablets by mouth daily after dinner slow fe (Ferrous sulfate) 45 mg   To take 2 tablets as per PCP on revisit " 11/13/23 11/14/23  Yes Yohan Regan MD   ondansetron (ZOFRAN-ODT) 4 mg disintegrating tablet Take 1 tablet (4 mg total) by mouth every 6 (six) hours as needed for nausea or vomiting  Patient taking differently: Take 4 mg by mouth every 6 (six) hours as needed for nausea or vomiting AS NEEDED 11/22/23  Yes Yohan Regan MD   pantoprazole (PROTONIX) 40 mg tablet TAKE 1 TABLET BY MOUTH TWICE DAILY 30 MINUTES BEFORE MEALS 10/10/23  Yes Myrna Aguilar PA-C   sulfaSALAzine (AZULFIDINE) 500 mg tablet Take 2 tablets (1,000 mg total) by mouth 2 (two) times a day 12/6/23  Yes Dunia Guzman PA-C   valsartan (DIOVAN) 80 mg tablet Take 1 tablet by mouth once daily 11/27/23  Yes Yohan Regan MD   vitamin A 2250 MCG (7500 UT) capsule Take 7,500 Units by mouth daily   Yes Historical Provider, MD   vitamin E, tocopherol, 1,000 units capsule Take 1,000 Units by mouth daily   Yes Historical Provider, MD   acetaminophen (TYLENOL) 325 mg tablet Take 2 tablets (650 mg total) by mouth every 6 (six) hours as needed for fever, mild pain, moderate pain or headaches (temperature greater than 101 F) 11/8/23   Polly Cowart PA-C   bisacodyl (DULCOLAX) 5 mg EC tablet Take 5 mg by mouth daily as needed for constipation  Patient not taking: Reported on 12/18/2023    Historical Provider, MD   Multiple Vitamin (multivitamin) capsule Take 1 capsule by mouth daily  Patient not taking: Reported on 12/8/2023    Historical Provider, MD       Physical Exam:    HEENT/NECK:  Normocephalic. Atraumatic.  No jugular venous distention.    Cardiac: Regular rate and rhythm and No murmurs/rubs/gallops  Pulmonary:  Breath sounds clear bilaterally and No rales/rhonchi/wheezes  Abdomen:  Non-tender, Non-distended, and Normal bowel sounds  Incisions: Groin puncture sites dressed with sterile dressing.  No hematoma, erythema, or drainage  Extremities: Extremities warm/dry and Trace edema B/L  Neuro: Alert and oriented X 3 and No focal deficits  Skin:  "Warm/Dry, without rashes or lesions.    Lab Results:     Results from last 7 days   Lab Units 12/11/23  1530   WBC Thousand/uL 3.73*   HEMOGLOBIN g/dL 8.2*   HEMATOCRIT % 25.2*   PLATELETS Thousands/uL 282           Invalid input(s): \"LABGLOM\"    Imaging Studies:     Transthoracic Echocardiogram:        Left Ventricle: Left ventricular cavity size is normal. Wall thickness is mildly increased. The left ventricular ejection fraction is 60% by visual estimation. Systolic function is normal. Wall motion is normal. Diastolic function is mildly abnormal, consistent with grade I (abnormal) relaxation.    Left Atrium: The atrium is mildly dilated (35-41 mL/m2).    Aortic Valve: There is an Blum DOROTHEA 3 26 mm TAVR bioprosthetic valve. The prosthetic valve appears well-seated. Prosthetic valve leaflet motion is normal. The gradient recorded across the prosthetic aortic valve is within the expected range. The aortic valve peak velocity is 1.85 m/s. The aortic valve peak gradient is 14 mmHg. The aortic valve mean gradient is 7 mmHg. The aortic valve area is 2.55 cm2.    Mitral Valve: There is mild annular calcification. There is mild regurgitation.    Tricuspid Valve: There is mild regurgitation.    Pulmonary Artery: The pulmonary artery systolic pressure is normal.    I have personally reviewed pertinent films in PACS    TAVR evaluation Assessment:     [facility]: II    Assessment: Aortic stenosis, Non-Rheumatic. S/P transfemoral transcatheter aortic valve replacement;    Plan:   -Ordered CBC and advised patient to call GI doctor. Patient in agreement with plan.    Ning Rodrigues continues to recover well following transcatheter aortic valve replacement.  To date they have made progressive improvements with physical rehabilitation. At this point I have cleared them to begin outpatient cardiac rehabilitation and have encouraged them to to do so.  Ning Rodrigues has also been cleared to resume driving at this point. I have asked " that they do so in small, progressive increments.    Ning Rodrigues has already been evaluated by their primary care physician and their cardiologist for ongoing medical care. Plavix will be continued for a total of 3 months of therapy for TAVR prophylaxis.  Arrangements will be made for one year surveillance follow up with repeat ECG and echocardiogram.  I have advised them to notify their PCP with any new concerns that may arise in the interim. Ning Rodrigues was comfortable with our recommendations and their questions were answered to their satisfaction.    Finally, Ning Rodrigues was educated on their increased risk for developing a prosthetic valve infection with many common dental procedures. Subsequently, we advised them to inform their dentist that they have an implanted TAVR valve. They are not to schedule routine dental cleaning for six months following surgery and antibiotics will need to be prescribed by their dentist, prior to any dental procedures.     Merlyn Asher PA-C  12/18/23  9:09 AM    * This note was completed in part utilizing [employer] direct voice recognition software.   Grammatical errors, random word insertion, spelling mistakes, and incomplete sentences may be an occasional consequence of the system secondary to software limitations, ambient noise and hardware issues. At the time of dictation, efforts were made to edit, clarify and /or correct errors. Please read the chart carefully and recognize, using context, where substitutions have occurred.  If you have any questions or concerns about the context, text or information contained within the body of this dictation, please contact myself, the provider, for further clarification.

## 2023-12-19 ENCOUNTER — OFFICE VISIT (OUTPATIENT)
Dept: PULMONOLOGY | Facility: CLINIC | Age: 83
End: 2023-12-19
Payer: MEDICARE

## 2023-12-19 ENCOUNTER — OFFICE VISIT (OUTPATIENT)
Dept: GASTROENTEROLOGY | Facility: CLINIC | Age: 83
End: 2023-12-19
Payer: MEDICARE

## 2023-12-19 VITALS
RESPIRATION RATE: 18 BRPM | DIASTOLIC BLOOD PRESSURE: 68 MMHG | WEIGHT: 133 LBS | HEART RATE: 78 BPM | TEMPERATURE: 98.2 F | HEIGHT: 61 IN | SYSTOLIC BLOOD PRESSURE: 118 MMHG | OXYGEN SATURATION: 98 % | BODY MASS INDEX: 25.11 KG/M2

## 2023-12-19 VITALS
HEART RATE: 73 BPM | DIASTOLIC BLOOD PRESSURE: 83 MMHG | WEIGHT: 133.2 LBS | SYSTOLIC BLOOD PRESSURE: 137 MMHG | BODY MASS INDEX: 25.15 KG/M2 | HEIGHT: 61 IN

## 2023-12-19 DIAGNOSIS — R05.3 CHRONIC COUGH: ICD-10-CM

## 2023-12-19 DIAGNOSIS — D64.9 ANEMIA, UNSPECIFIED TYPE: ICD-10-CM

## 2023-12-19 DIAGNOSIS — K51.80 OTHER ULCERATIVE COLITIS WITHOUT COMPLICATION (HCC): Primary | ICD-10-CM

## 2023-12-19 DIAGNOSIS — R93.89 ABNORMAL CT OF THE CHEST: Primary | ICD-10-CM

## 2023-12-19 DIAGNOSIS — I35.0 AORTIC STENOSIS, SEVERE: ICD-10-CM

## 2023-12-19 LAB — CALPROTECTIN STL-MCNT: 483 UG/G (ref 0–120)

## 2023-12-19 PROCEDURE — 99213 OFFICE O/P EST LOW 20 MIN: CPT | Performed by: INTERNAL MEDICINE

## 2023-12-19 PROCEDURE — 99214 OFFICE O/P EST MOD 30 MIN: CPT | Performed by: INTERNAL MEDICINE

## 2023-12-19 RX ORDER — BUDESONIDE 9 MG/1
9 TABLET, FILM COATED, EXTENDED RELEASE ORAL DAILY
Qty: 30 TABLET | Refills: 3 | Status: SHIPPED | OUTPATIENT
Start: 2023-12-19

## 2023-12-19 RX ORDER — FLUTICASONE FUROATE AND VILANTEROL 100; 25 UG/1; UG/1
1 POWDER RESPIRATORY (INHALATION) DAILY
Qty: 60 BLISTER | Refills: 0 | Status: SHIPPED | OUTPATIENT
Start: 2023-12-19 | End: 2024-01-18

## 2023-12-19 NOTE — ASSESSMENT & PLAN NOTE
Mrs. Ning Rodrigues had a CT angiogram on 8/30/2023 as part of the work-up prior to TAVR for severe aortic stenosis.  It showed  abnormal soft tissue dense material interposed between the esophagus and aorta. . Similar  soft tissue was seen on the old study from November of last year. There were some small mediastinal nodes anterior to the maxime and adjacent to the arch. These were present previously. The presence of these other nodes raises the suspicion that the  process anterior to the aorta could be related to adenopathy.  He also had opacification of the medial basal and posterior basal bronchi likely due to mucous.  She had no previous history of cancer and is a never smoker.  On clinical examination, her chest was clear to auscultation.  She did not have any significant neck or supraclavicular or axillary lymphadenopathy.  She had systolic murmur of severe aortic stenosis.  The etiology of this soft tissue shadow is not clear.  This is most likely this is a lymph node. Her CT PET scan was unremarkable.  She subsequently underwent TAVR.  Currently she is doing well.  Her PFT is pending at this time.  I had a long discussion with her and her family and answered all their questions.

## 2023-12-19 NOTE — PROGRESS NOTES
[facility] - Outpatient Follow-up Note  Ning Rodrigues 83 y.o. female MRN: [id number]  Encounter: [id number]          ASSESSMENT AND PLAN:      1. Other ulcerative colitis without complication (HCC)  2. Anemia, unspecified type  Evidence of inflammation on CT scan in late November and elevated fecal calprotectin suggestive of colitis flare.  Most recent colonoscopy in July 2022 was unremarkable.  Has had continued gray/black stool suggestive possible melena but taking an iron supplement and hemoglobin has been essentially stable.  Discussed options.  Will plan to treat active flare with a course of oral budesonide, if covered, and continue sulfasalazine which has maintained her in remission for many years.  If she is unable to obtain budesonide, will likely give a short course of prednisone.  Will recheck hemoglobin next week.  If this falls again we will consider further evaluation with colonoscopy.    - budesonide 9 mg TB24; Take 9 mg by mouth in the morning  Dispense: 30 tablet; Refill: 3  - CBC; Future        ______________________________________________________________________    SUBJECTIVE: Ning returns in follow-up of ulcerative colitis.  She has had persistent gray-black stool and some constipation.  Hemoglobin has bounced from 9.0 to 8.2 and most recently 8.9 yesterday.      REVIEW OF SYSTEMS:    ROS       Historical Information   Past Medical History:   Diagnosis Date    Allergic rhinitis 03/21/2023    Anemia     Arthritis     osteoporosis, djd knees, pt denies osteoporosis on 2/21/22    Low's esophagus     Cancer (HCC)     on face    Colon polyp     Cough 02/21/2022    cough for 3 years, expectorates yellow mucus    Cough variant asthma  07/26/2021    Disease of thyroid gland     low thyroid    Diverticulitis     gerd, diverticulitis    Dizziness     GERD (gastroesophageal reflux disease)     ulcerative colitis; Low's esophagus, diverticulosis; hx of  "laryngopharyngeal reflux    Hyperlipidemia     Hypertension     Low vitamin D level     LPRD (laryngopharyngeal reflux disease) 02/21/2022    PONV (postoperative nausea and vomiting)     AFTER \"BIG SURGERIES\"    Postnasal drip     WITH COUGH    Ulcerative colitis (HCC)      Past Surgical History:   Procedure Laterality Date    APPENDECTOMY      CARDIAC CATHETERIZATION N/A 09/07/2023    Procedure: Cardiac RHC/LHC;  Surgeon: Rafael Sood MD;  Location: [facility];  Service: Cardiology    CARDIAC CATHETERIZATION N/A 11/07/2023    Procedure: Cardiac tavr;  Surgeon: Pete Hernández MD;  Location: [facility];  Service: Cardiology    CATARACT EXTRACTION Bilateral     COLONOSCOPY      colon polyps    COLONOSCOPY  07/28/2022    FOOT SURGERY Bilateral     bunionectomy    HEMORRHOID SURGERY      HEMORROIDECTOMY      hemorroid removal    JOINT REPLACEMENT Bilateral     knees    WI REPLACE AORTIC VALVE OPENFEMORAL ARTERY APPROACH N/A 11/07/2023    Procedure: REPLACEMENT AORTIC VALVE TRANSCATHETER (TAVR) TRANSFEMORAL W/ 26MM BUCKNER DOROTHEA S3 UR VALVE(ACCESS ON LEFT) GIOVANNY;  Surgeon: Felipe Buitrago DO;  Location: [facility];  Service: Cardiac Surgery    REPLACEMENT TOTAL KNEE Bilateral     b/ replacement    TONSILLECTOMY      TUBAL LIGATION      UPPER GASTROINTESTINAL ENDOSCOPY       Social History   Social History     Substance and Sexual Activity   Alcohol Use Not Currently     Social History     Substance and Sexual Activity   Drug Use Never     Social History     Tobacco Use   Smoking Status Never   Smokeless Tobacco Never     Family History   Problem Relation Age of Onset    Heart disease Father     No Known Problems Sister     No Known Problems Sister     Heart disease Brother     Colon cancer Brother 55    Cancer Brother 67    No Known Problems Maternal Grandmother     No Known Problems Paternal Grandmother     No Known Problems Maternal Aunt     No Known Problems Paternal Aunt     Cancer Daughter         unsure of " "type of cancer, it was female cancer and hysterectomy done by Dr. Fierro       Meds/Allergies       Current Outpatient Medications:     acetaminophen (TYLENOL) 325 mg tablet    amLODIPine (NORVASC) 10 mg tablet    aspirin 81 mg chewable tablet    atorvastatin (LIPITOR) 20 mg tablet    budesonide 9 mg TB24    cholecalciferol (VITAMIN D3) 1,000 units tablet    clopidogrel (PLAVIX) 75 mg tablet    Coenzyme Q10 (Co Q 10) 100 MG CAPS    Cyanocobalamin (VITAMIN B 12 PO)    Ferrous Sulfate 27 MG TABS    levothyroxine 75 mcg tablet    Multiple Vitamin (multivitamin) capsule    ondansetron (ZOFRAN-ODT) 4 mg disintegrating tablet    pantoprazole (PROTONIX) 40 mg tablet    sulfaSALAzine (AZULFIDINE) 500 mg tablet    valsartan (DIOVAN) 80 mg tablet    vitamin A 2250 MCG (7500 UT) capsule    vitamin E, tocopherol, 1,000 units capsule    bisacodyl (DULCOLAX) 5 mg EC tablet    Fluticasone Furoate-Vilanterol (Breo Ellipta) 100-25 mcg/actuation inhaler    NON FORMULARY    Allergies   Allergen Reactions    Sulfa Antibiotics Itching           Objective     Blood pressure 137/83, pulse 73, height 5' 1\" (1.549 m), weight 60.4 kg (133 lb 3.2 oz). Body mass index is 25.17 kg/m².      PHYSICAL EXAM:      Physical Exam  Vitals and nursing note reviewed.   Constitutional:       General: She is not in acute distress.     Appearance: She is not ill-appearing.   HENT:      Head: Normocephalic and atraumatic.   Eyes:      General: No scleral icterus.     Extraocular Movements: Extraocular movements intact.   Cardiovascular:      Rate and Rhythm: Normal rate and regular rhythm.   Pulmonary:      Effort: Pulmonary effort is normal. No respiratory distress.   Abdominal:      General: There is no distension.      Palpations: Abdomen is soft.      Tenderness: There is no abdominal tenderness. There is no guarding or rebound.   Skin:     General: Skin is warm and dry.      Coloration: Skin is not cyanotic.      Findings: No erythema. "   Neurological:      General: No focal deficit present.      Mental Status: She is alert and oriented to person, place, and time.   Psychiatric:         Mood and Affect: Mood normal.         Behavior: Behavior normal.          Lab Results:   No visits with results within 1 Day(s) from this visit.   Latest known visit with results is:   Appointment on 12/18/2023   Component Date Value    WBC 12/18/2023 3.71 (L)     RBC 12/18/2023 2.62 (L)     Hemoglobin 12/18/2023 8.9 (L)     Hematocrit 12/18/2023 26.9 (L)     MCV 12/18/2023 103 (H)     MCH 12/18/2023 34.0     MCHC 12/18/2023 33.1     RDW 12/18/2023 14.2     MPV 12/18/2023 9.3     Platelets 12/18/2023 312     nRBC 12/18/2023 0     Neutrophils Relative 12/18/2023 37 (L)     Immat GRANS % 12/18/2023 0     Lymphocytes Relative 12/18/2023 42     Monocytes Relative 12/18/2023 17 (H)     Eosinophils Relative 12/18/2023 3     Basophils Relative 12/18/2023 1     Neutrophils Absolute 12/18/2023 1.36 (L)     Immature Grans Absolute 12/18/2023 0.01     Lymphocytes Absolute 12/18/2023 1.56     Monocytes Absolute 12/18/2023 0.63     Eosinophils Absolute 12/18/2023 0.10     Basophils Absolute 12/18/2023 0.05          Radiology Results:   Echo complete w/ contrast if indicated    Result Date: 12/15/2023  Narrative:   Left Ventricle: Left ventricular cavity size is normal. Wall thickness is mildly increased. The left ventricular ejection fraction is 60% by visual estimation. Systolic function is normal. Wall motion is normal. Diastolic function is mildly abnormal, consistent with grade I (abnormal) relaxation.   Left Atrium: The atrium is mildly dilated (35-41 mL/m2).   Aortic Valve: There is an Blum DOROTHEA 3 26 mm TAVR bioprosthetic valve. The prosthetic valve appears well-seated. Prosthetic valve leaflet motion is normal. The gradient recorded across the prosthetic aortic valve is within the expected range. The aortic valve peak velocity is 1.85 m/s. The aortic valve peak  gradient is 14 mmHg. The aortic valve mean gradient is 7 mmHg. The aortic valve area is 2.55 cm2.   Mitral Valve: There is mild annular calcification. There is mild regurgitation.   Tricuspid Valve: There is mild regurgitation.   Pulmonary Artery: The pulmonary artery systolic pressure is normal.     EGD    Result Date: 12/1/2023  Narrative: Table formatting from the original result was not included. [facility] [address] [place] [address] [phone] DATE OF SERVICE: 12/01/23 PHYSICIAN(S): Attending: Edil Martines MD Fellow: No Staff Documented INDICATION: Anemia, blood loss, Black tarry stools POST-OP DIAGNOSIS: See the impression below. PREPROCEDURE: Informed consent was obtained for the procedure, including sedation.  Risks of perforation, hemorrhage, adverse drug reaction and aspiration were discussed. The patient was placed in the left lateral decubitus position. Patient was explained about the risks and benefits of the procedure. Risks including but not limited to bleeding, infection, and perforation were explained in detail. Also explained about less than 100% sensitivity with the exam and other alternatives. PROCEDURE: EGD DETAILS OF PROCEDURE: Patient was taken to the procedure room where a time out was performed to confirm correct patient and correct procedure. The patient underwent monitored anesthesia care, which was administered by an anesthesia professional. The patient's blood pressure, heart rate, level of consciousness, respirations and oxygen were monitored throughout the procedure. The scope was advanced to the second part of the duodenum. Retroflexion was performed in the fundus. The patient experienced no blood loss. The procedure was not difficult. The patient tolerated the procedure well. There were no apparent adverse events. ANESTHESIA INFORMATION: ASA: ASA status not filed in the log. Anesthesia Type: Anesthesia type not filed in the log.  MEDICATIONS: No administrations occurring from 1402 to 1417 on 12/01/23 FINDINGS: The duodenum appeared normal. The stomach appeared normal. Including retroflexed view of the cardia Small hiatal hernia - GE junction 35 cm from the incisors, diaphragmatic impression 36 cm from the incisors Extrinsic compression on the midesophagus, likely aorta or cardiac SPECIMENS: * No specimens in log *     Impression: Normal duodenum stomach and esophagus, small hiatal hernia Extrinsic compression of the esophagus No source of bleeding RECOMMENDATION:  Follow up with PCP Return to floor Diet as tolerated Follow hemoglobin Could consider monitoring hemoglobin, if stable can consider outpatient colonoscopy versus inpatient examination return to floor  Edil Martines MD     CT abdomen pelvis with contrast    Result Date: 11/29/2023  Narrative: CT ABDOMEN AND PELVIS WITH IV CONTRAST INDICATION:   Left lower quadrant pain. COMPARISON: CTA of the chest, abdomen, and pelvis dated 8/30/2023. CT of the abdomen/pelvis dated 1/17/2019. TECHNIQUE:  CT examination of the abdomen and pelvis was performed. Multiplanar 2D reformatted images were created from the source data. This examination, like all CT scans performed in the [facility], was performed utilizing techniques to minimize radiation dose exposure, including the use of iterative reconstruction and automated exposure control. Radiation dose length product (DLP) for this visit:  429 mGy-cm IV Contrast:  100 mL of iohexol (OMNIPAQUE) Enteric Contrast:  Enteric contrast was not administered. FINDINGS: ABDOMEN LOWER CHEST: Mild chronic scarring at the left base. No acute findings in the visualized portion of the lower chest. LIVER/BILIARY TREE:  Unremarkable. GALLBLADDER: Normal when accounting for degree of nondistention. SPLEEN:  Unremarkable. PANCREAS:  Unremarkable. ADRENAL GLANDS: Bilateral lobular adrenal hypertrophy--unchanged. No suspicious nodules on either side.  KIDNEYS/URETERS:  Unremarkable. No hydronephrosis. STOMACH AND BOWEL: Colonic diverticula are present from the hepatic flexure through the rectosigmoid junction, most marked distally. There is long segment circumferential wall thickening/thumbprinting involving the hepatic flexure and entire transverse colon with mild perienteric fat stranding. There are some scattered diverticula within the inflamed segment, but wall thickening is more diffuse/extensive and uniform than typical for diverticulitis. Descending colon wall thickness appears within normal limits. Extensive diverticulosis involving the sigmoid colon. There is a nondistensible segment of sigmoid colon with unchanged apparent wall thickening (301/118). However, appearance is relatively unchanged since at least 2019. No fat stranding around the sigmoid colon. Small hiatal hernia. Stomach is otherwise normal. No dilated or inflamed loops of small bowel. APPENDIX:  No findings to suggest appendicitis. ABDOMINOPELVIC CAVITY:  No ascites.  No pneumoperitoneum.  No lymphadenopathy. VESSELS:  Unremarkable for patient's age. PELVIS REPRODUCTIVE ORGANS:  Unremarkable for patient's age. URINARY BLADDER:  Unremarkable. ABDOMINAL WALL/INGUINAL REGIONS:  Unremarkable. 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 No acute fracture or destructive osseous lesion.     Impression: Uncomplicated colitis of the transverse segment. Though there are diverticula arising from this portion of the colon, length of the affected segment and morphology of wall thickening are atypical for diverticulitis such that other infectious or inflammatory causes of colitis should be considered. No free air, abscess, or obstruction. Location of inflamed colon in the upper abdomen does not necessarily explain focal left lower quadrant pain. More extensive distal diverticular findings in the sigmoid appear unchanged from remote priors without evidence for  acute diverticulitis, distally. Recommend follow-up with colonoscopy. Workstation performed: [id number]

## 2023-12-19 NOTE — PROGRESS NOTES
Assessment/Plan:    Abnormal CT of the chest  Mrs. Ning Rodrigues had a CT angiogram on 8/30/2023 as part of the work-up prior to TAVR for severe aortic stenosis.  It showed  abnormal soft tissue dense material interposed between the esophagus and aorta. . Similar  soft tissue was seen on the old study from November of last year. There were some small mediastinal nodes anterior to the maxime and adjacent to the arch. These were present previously. The presence of these other nodes raises the suspicion that the  process anterior to the aorta could be related to adenopathy.  He also had opacification of the medial basal and posterior basal bronchi likely due to mucous.  She had no previous history of cancer and is a never smoker.  On clinical examination, her chest was clear to auscultation.  She did not have any significant neck or supraclavicular or axillary lymphadenopathy.  She had systolic murmur of severe aortic stenosis.  The etiology of this soft tissue shadow is not clear.  This is most likely this is a lymph node. Her CT PET scan was unremarkable.  She subsequently underwent TAVR.  Currently she is doing well.  Her PFT is pending at this time.  I had a long discussion with her and her family and answered all their questions.       Chronic cough  She has chronic cough for about 4 years which is productive of phlegm which is occasionally yellow.  She has history of sinus issues and postnasal drip.  She also has shortness of breath on exertion and her exercise tolerance is about a block on level ground.  She had no wheezing.   She is a never smoker.  On clinical examination, her chest was clear to auscultation.  She has been Breo 100.  I have ordered a PFT which is waiting to be done.  I have advised her to continue with Breo regularly.    Aortic stenosis, severe  She had severe arctic stenosis.  Recently she underwent TAVR is currently doing well.       Diagnoses and all orders for this visit:    Abnormal CT of  the chest    Aortic stenosis, severe    Chronic cough  -     Complete PFT with post Bronchodilator and Six Minute walk; Future  -     Fluticasone Furoate-Vilanterol (Breo Ellipta) 100-25 mcg/actuation inhaler; Inhale 1 puff daily Rinse mouth after use. (Patient not taking: Reported on 12/19/2023)          Subjective:      Patient ID: Ning Rodrigues is a 83 y.o. female.    Mrs. Ning Rodrigues came for follow-up for her chronic cough.  She had severe aortic stenosis and underwent TAVR recently.  She continues to cough with occasional yellow phlegm.  She has no significant shortness of breath.  Her excise tolerance is at least 1 block.  She has no wheezing.  No chest pain no palpitation.  No swelling of feet.  I had ordered a lung function tests which she has not done yet.  She has no fever or chills.  She has chronic ulcerative colitis and has diarrhea.  She is currently not on any inhalers or oxygen.        The following portions of the patient's history were reviewed and updated as appropriate: allergies, current medications, past family history, past medical history, past social history, past surgical history, and problem list.    Review of Systems   Constitutional:  Negative for appetite change, chills, fatigue and fever.   HENT:  Positive for sneezing. Negative for hearing loss, rhinorrhea, sore throat and trouble swallowing.    Eyes:  Negative for visual disturbance.   Respiratory:  Positive for cough. Negative for shortness of breath and wheezing.    Cardiovascular:  Positive for leg swelling. Negative for chest pain and palpitations.   Gastrointestinal:  Positive for diarrhea (UC). Negative for abdominal pain, constipation, nausea and vomiting.   Genitourinary:  Negative for dysuria, frequency and urgency.   Musculoskeletal:  Negative for arthralgias, back pain and joint swelling.   Skin:  Negative for pallor and rash.   Allergic/Immunologic: Positive for environmental allergies.   Neurological:  Positive for  "light-headedness. Negative for dizziness, syncope and headaches.   Psychiatric/Behavioral:  Negative for agitation, confusion and sleep disturbance. The patient is nervous/anxious.          Objective:      /68 (BP Location: Left arm, Patient Position: Sitting, Cuff Size: Standard)   Pulse 78   Temp 98.2 °F (36.8 °C) (Tympanic)   Resp 18   Ht 5' 1\" (1.549 m)   Wt 60.3 kg (133 lb)   SpO2 98%   BMI 25.13 kg/m²          Physical Exam  Constitutional:       General: She is not in acute distress.     Appearance: She is not toxic-appearing.   HENT:      Head: Normocephalic.      Nose: Nose normal.      Mouth/Throat:      Mouth: Mucous membranes are moist.   Eyes:      General: No scleral icterus.     Conjunctiva/sclera: Conjunctivae normal.   Cardiovascular:      Rate and Rhythm: Normal rate and regular rhythm.      Heart sounds: Normal heart sounds. No murmur heard.  Pulmonary:      Effort: Pulmonary effort is normal. No respiratory distress.      Breath sounds: Normal breath sounds. No stridor. No wheezing, rhonchi or rales.   Chest:      Chest wall: No tenderness.   Abdominal:      General: Bowel sounds are normal.      Palpations: Abdomen is soft.      Tenderness: There is no abdominal tenderness. There is no guarding.   Musculoskeletal:      Cervical back: No rigidity.      Right lower leg: No edema.      Left lower leg: No edema.   Lymphadenopathy:      Cervical: No cervical adenopathy.   Skin:     Coloration: Skin is not jaundiced or pale.      Findings: No rash.   Neurological:      Mental Status: She is alert and oriented to person, place, and time.      Gait: Gait normal.   Psychiatric:         Mood and Affect: Mood normal.         Behavior: Behavior normal.         Thought Content: Thought content normal.         Judgment: Judgment normal.           "

## 2023-12-21 ENCOUNTER — CLINICAL SUPPORT (OUTPATIENT)
Dept: CARDIAC REHAB | Facility: CLINIC | Age: 83
End: 2023-12-21
Payer: MEDICARE

## 2023-12-21 ENCOUNTER — TELEPHONE (OUTPATIENT)
Dept: GASTROENTEROLOGY | Facility: CLINIC | Age: 83
End: 2023-12-21

## 2023-12-21 DIAGNOSIS — Z95.2 S/P TAVR (TRANSCATHETER AORTIC VALVE REPLACEMENT): Primary | ICD-10-CM

## 2023-12-21 PROCEDURE — 93798 PHYS/QHP OP CAR RHAB W/ECG: CPT

## 2023-12-21 NOTE — TELEPHONE ENCOUNTER
Received fax from [employer] my meds for prior auth for budesonide.  Key [id number]. I will fax to [place].

## 2023-12-21 NOTE — TELEPHONE ENCOUNTER
PA for Budesonide sent through [employer]Meds  Sent clinicals  Awaiting determination   Key:  [id number]

## 2023-12-22 ENCOUNTER — CLINICAL SUPPORT (OUTPATIENT)
Dept: CARDIAC REHAB | Facility: CLINIC | Age: 83
End: 2023-12-22
Payer: MEDICARE

## 2023-12-22 DIAGNOSIS — Z95.2 S/P TAVR (TRANSCATHETER AORTIC VALVE REPLACEMENT): Primary | ICD-10-CM

## 2023-12-22 PROCEDURE — 93798 PHYS/QHP OP CAR RHAB W/ECG: CPT

## 2023-12-22 NOTE — TELEPHONE ENCOUNTER
Rcvd fax  Budesonide approved until 12/31/2024  Scanned in media   Faxed to pharamcy where medication was sent

## 2023-12-22 NOTE — ASSESSMENT & PLAN NOTE
She has chronic cough for about 4 years which is productive of phlegm which is occasionally yellow.  She has history of sinus issues and postnasal drip.  She also has shortness of breath on exertion and her exercise tolerance is about a block on level ground.  She had no wheezing.   She is a never smoker.  On clinical examination, her chest was clear to auscultation.  She has been Breo 100.  I have ordered a PFT which is waiting to be done.  I have advised her to continue with Breo regularly.

## 2023-12-26 ENCOUNTER — CLINICAL SUPPORT (OUTPATIENT)
Dept: CARDIAC REHAB | Facility: CLINIC | Age: 83
End: 2023-12-26
Payer: MEDICARE

## 2023-12-26 DIAGNOSIS — Z95.2 S/P TAVR (TRANSCATHETER AORTIC VALVE REPLACEMENT): Primary | ICD-10-CM

## 2023-12-26 PROCEDURE — 93798 PHYS/QHP OP CAR RHAB W/ECG: CPT

## 2023-12-28 ENCOUNTER — CLINICAL SUPPORT (OUTPATIENT)
Dept: CARDIAC REHAB | Facility: CLINIC | Age: 83
End: 2023-12-28
Payer: MEDICARE

## 2023-12-28 DIAGNOSIS — Z95.2 S/P TAVR (TRANSCATHETER AORTIC VALVE REPLACEMENT): Primary | ICD-10-CM

## 2023-12-28 PROCEDURE — 93798 PHYS/QHP OP CAR RHAB W/ECG: CPT

## 2023-12-29 ENCOUNTER — CLINICAL SUPPORT (OUTPATIENT)
Dept: CARDIAC REHAB | Facility: CLINIC | Age: 83
End: 2023-12-29
Payer: MEDICARE

## 2023-12-29 DIAGNOSIS — Z95.2 S/P TAVR (TRANSCATHETER AORTIC VALVE REPLACEMENT): Primary | ICD-10-CM

## 2023-12-29 PROCEDURE — 93798 PHYS/QHP OP CAR RHAB W/ECG: CPT

## 2024-01-02 ENCOUNTER — TELEPHONE (OUTPATIENT)
Age: 84
End: 2024-01-02

## 2024-01-02 ENCOUNTER — CLINICAL SUPPORT (OUTPATIENT)
Dept: CARDIAC REHAB | Facility: CLINIC | Age: 84
End: 2024-01-02
Payer: MEDICARE

## 2024-01-02 ENCOUNTER — APPOINTMENT (OUTPATIENT)
Dept: LAB | Facility: CLINIC | Age: 84
End: 2024-01-02
Payer: MEDICARE

## 2024-01-02 DIAGNOSIS — D64.9 ANEMIA, UNSPECIFIED TYPE: ICD-10-CM

## 2024-01-02 DIAGNOSIS — Z95.2 S/P TAVR (TRANSCATHETER AORTIC VALVE REPLACEMENT): Primary | ICD-10-CM

## 2024-01-02 DIAGNOSIS — K51.80 OTHER ULCERATIVE COLITIS WITHOUT COMPLICATION (HCC): ICD-10-CM

## 2024-01-02 LAB
ERYTHROCYTE [DISTWIDTH] IN BLOOD BY AUTOMATED COUNT: 13.7 % (ref 11.6–15.1)
HCT VFR BLD AUTO: 28.6 % (ref 34.8–46.1)
HGB BLD-MCNC: 9.6 G/DL (ref 11.5–15.4)
MCH RBC QN AUTO: 34.9 PG (ref 26.8–34.3)
MCHC RBC AUTO-ENTMCNC: 33.6 G/DL (ref 31.4–37.4)
MCV RBC AUTO: 104 FL (ref 82–98)
PLATELET # BLD AUTO: 295 THOUSANDS/UL (ref 149–390)
PMV BLD AUTO: 9.4 FL (ref 8.9–12.7)
RBC # BLD AUTO: 2.75 MILLION/UL (ref 3.81–5.12)
WBC # BLD AUTO: 4.74 THOUSAND/UL (ref 4.31–10.16)

## 2024-01-02 PROCEDURE — 85027 COMPLETE CBC AUTOMATED: CPT

## 2024-01-02 PROCEDURE — 36415 COLL VENOUS BLD VENIPUNCTURE: CPT

## 2024-01-02 PROCEDURE — 93798 PHYS/QHP OP CAR RHAB W/ECG: CPT

## 2024-01-02 NOTE — TELEPHONE ENCOUNTER
Spoke to patient and she was not aware the Budesonide was approved on 12/31/23. She will call pharmacy and see what the price is now. If too costly she will to let us know. Dr. Slade's note stated he would then start Prednisone.

## 2024-01-02 NOTE — TELEPHONE ENCOUNTER
Patients GI provider:  Dr. Slade    Number to return call: [phone]    Reason for call: Pt calling to speak someone in regards the recent med that Dr. Slade prescribed her. Pt stated she couldn't remember the name or spelling but she stated that it is too expensive and she does wish to go forward with the med. Requesting to discuss further.     Scheduled procedure/appointment date if applicable: Apt/procedure N/A

## 2024-01-02 NOTE — TELEPHONE ENCOUNTER
Pt calling back stating she called pharmacy and it's now $600 and wants to speak w/ the office to see if can get prednisone prescribed. I warm transfer pt to [place].

## 2024-01-04 ENCOUNTER — CLINICAL SUPPORT (OUTPATIENT)
Dept: CARDIAC REHAB | Facility: CLINIC | Age: 84
End: 2024-01-04
Payer: MEDICARE

## 2024-01-04 DIAGNOSIS — Z95.2 S/P TAVR (TRANSCATHETER AORTIC VALVE REPLACEMENT): Primary | ICD-10-CM

## 2024-01-04 PROCEDURE — 93798 PHYS/QHP OP CAR RHAB W/ECG: CPT

## 2024-01-05 ENCOUNTER — CLINICAL SUPPORT (OUTPATIENT)
Dept: CARDIAC REHAB | Facility: CLINIC | Age: 84
End: 2024-01-05
Payer: MEDICARE

## 2024-01-05 DIAGNOSIS — Z95.2 S/P TAVR (TRANSCATHETER AORTIC VALVE REPLACEMENT): Primary | ICD-10-CM

## 2024-01-05 PROCEDURE — 93798 PHYS/QHP OP CAR RHAB W/ECG: CPT

## 2024-01-08 ENCOUNTER — OFFICE VISIT (OUTPATIENT)
Dept: FAMILY MEDICINE CLINIC | Facility: CLINIC | Age: 84
End: 2024-01-08
Payer: MEDICARE

## 2024-01-08 ENCOUNTER — HOSPITAL ENCOUNTER (OUTPATIENT)
Dept: INFUSION CENTER | Facility: CLINIC | Age: 84
Discharge: HOME | End: 2024-01-08
Payer: MEDICARE

## 2024-01-08 VITALS
SYSTOLIC BLOOD PRESSURE: 134 MMHG | RESPIRATION RATE: 16 BRPM | BODY MASS INDEX: 25.3 KG/M2 | DIASTOLIC BLOOD PRESSURE: 70 MMHG | HEART RATE: 76 BPM | HEIGHT: 61 IN | OXYGEN SATURATION: 96 % | WEIGHT: 134 LBS

## 2024-01-08 DIAGNOSIS — I10 ESSENTIAL HYPERTENSION: Primary | ICD-10-CM

## 2024-01-08 DIAGNOSIS — K51.90 ULCERATIVE COLITIS WITHOUT COMPLICATIONS, UNSPECIFIED LOCATION (HCC): ICD-10-CM

## 2024-01-08 DIAGNOSIS — J84.10 CALCIFIED GRANULOMA OF LUNG (HCC): ICD-10-CM

## 2024-01-08 DIAGNOSIS — D50.9 IRON DEFICIENCY ANEMIA, UNSPECIFIED IRON DEFICIENCY ANEMIA TYPE: ICD-10-CM

## 2024-01-08 DIAGNOSIS — E53.8 B12 DEFICIENCY: Primary | ICD-10-CM

## 2024-01-08 DIAGNOSIS — Z95.2 S/P TAVR (TRANSCATHETER AORTIC VALVE REPLACEMENT): ICD-10-CM

## 2024-01-08 DIAGNOSIS — E03.9 ACQUIRED HYPOTHYROIDISM: ICD-10-CM

## 2024-01-08 DIAGNOSIS — K21.00 GASTROESOPHAGEAL REFLUX DISEASE WITH ESOPHAGITIS, UNSPECIFIED WHETHER HEMORRHAGE: ICD-10-CM

## 2024-01-08 DIAGNOSIS — E78.00 HYPERCHOLESTEROLEMIA: ICD-10-CM

## 2024-01-08 PROBLEM — N18.30 STAGE 3 CHRONIC KIDNEY DISEASE, UNSPECIFIED WHETHER STAGE 3A OR 3B CKD (HCC): Status: RESOLVED | Noted: 2023-09-06 | Resolved: 2024-01-08

## 2024-01-08 PROCEDURE — 96372 THER/PROPH/DIAG INJ SC/IM: CPT

## 2024-01-08 PROCEDURE — 99214 OFFICE O/P EST MOD 30 MIN: CPT | Performed by: FAMILY MEDICINE

## 2024-01-08 RX ORDER — CYANOCOBALAMIN 1000 UG/ML
1000 INJECTION, SOLUTION INTRAMUSCULAR; SUBCUTANEOUS ONCE
OUTPATIENT
Start: 2024-02-05

## 2024-01-08 RX ORDER — CYANOCOBALAMIN 1000 UG/ML
1000 INJECTION, SOLUTION INTRAMUSCULAR; SUBCUTANEOUS ONCE
Status: COMPLETED | OUTPATIENT
Start: 2024-01-08 | End: 2024-01-08

## 2024-01-08 RX ADMIN — CYANOCOBALAMIN 1000 MCG: 1000 INJECTION, SOLUTION INTRAMUSCULAR at 14:28

## 2024-01-08 NOTE — ASSESSMENT & PLAN NOTE
Hgb higher at 9.6 in January 2024. Continue management per Hematology and next appointment is on 1/16/24.

## 2024-01-08 NOTE — ASSESSMENT & PLAN NOTE
Blood pressure remains good. Continue valsartan 80 mg daily and amlodipine 10 mg daily. Pt advised to continue low Na diet and to exercise on a regular basis.

## 2024-01-08 NOTE — ASSESSMENT & PLAN NOTE
Patient had surgery on 11/7 and discharged on 11/8. Patient doing well. Had echocardiogram in December 2023 and prosthetic AV working well. EF was normal. Sees Cardiology in January 2024 for follow-up.

## 2024-01-08 NOTE — PROGRESS NOTES
Patient is here for B 12. She offers no complaints. B12 given in left arm and she tolerated it well. Next appointment confirmed 2/5/24 at 1430 at [place]. AVS given

## 2024-01-08 NOTE — ASSESSMENT & PLAN NOTE
Stable. Pt had 48 hr pH monitor and EGD in Feb 2022 by Dr Milian. Continue pantoprazole 40 mg bid and famotidine 40 mg before supper.

## 2024-01-08 NOTE — PROGRESS NOTES
Depression Screening and Follow-up Plan: Patient was screened for depression during today's encounter. They screened negative with a PHQ-2 score of 0.    Assessment/Plan:         Problem List Items Addressed This Visit        Digestive    Ulcerative colitis without complications (HCC)     Still has pain at times and loose stools. No recent bleeding. Continue medications and management per Gastroenterology. Last appointment was in December 2023.          Gastroesophageal reflux disease with esophagitis     Stable. Pt had 48 hr pH monitor and EGD in Feb 2022 by Dr Milian. Continue pantoprazole 40 mg bid and famotidine 40 mg before supper.             Endocrine    Hypothyroidism     TSH 5.770 and Free T4 0.91 in Sept 2023. Continue levothyroxine 75 mcg qd. Recheck levels.          Relevant Orders    TSH, 3rd generation    T4, free       Respiratory    Calcified granuloma of lung (HCC)     Benign and stable.             Cardiovascular and Mediastinum    Essential hypertension - Primary     Blood pressure remains good. Continue valsartan 80 mg daily and amlodipine 10 mg daily. Pt advised to continue low Na diet and to exercise on a regular basis.                 Other    S/P TAVR (transcatheter aortic valve replacement)     Patient had surgery on 11/7 and discharged on 11/8. Patient doing well. Had echocardiogram in December 2023 and prosthetic AV working well. EF was normal. Sees Cardiology in January 2024 for follow-up.          Hypercholesterolemia     LDL 72 in Sept 2023. Continue atorvastatin 20 mg qhs. Advised pt to follow a low cholesterol diet and to exercise on a regular basis.          Anemia     Hgb higher at 9.6 in January 2024. Continue management per Hematology and next appointment is on 1/16/24.              Subjective:      Patient ID: Ning Rodrigues is a 83 y.o. female.    Patient here for follow-up Hypertension, Hyperlipidemia, status-post AVR, Colitis, GERD, Anemia. Patient doing ok. Still has  abdominal pain off and on left lower abdominal. No recent Gastroenterology bleeding. Patient had flare-up of colitis in November 2023 and sees Gastroenterology in February 2024. No recent GERD. Going to Cardiac Rehab 3 days per week.         The following portions of the patient's history were reviewed and updated as appropriate:   Past Medical History:  She has a past medical history of Allergic rhinitis (03/21/2023), Anemia, Arthritis, Low's esophagus, Cancer (HCC), Colon polyp, Cough (02/21/2022), Cough variant asthma  (07/26/2021), Disease of thyroid gland, Diverticulitis, Dizziness, GERD (gastroesophageal reflux disease), Hyperlipidemia, Hypertension, Low vitamin D level, LPRD (laryngopharyngeal reflux disease) (02/21/2022), PONV (postoperative nausea and vomiting), Postnasal drip, and Ulcerative colitis (HCC).,  _______________________________________________________________________  Medical Problems:  does not have any pertinent problems on file.,  _______________________________________________________________________  Past Surgical History:   has a past surgical history that includes Colonoscopy; Replacement total knee (Bilateral); Hemorroidectomy; Tonsillectomy; Tubal ligation; Foot surgery (Bilateral); Upper gastrointestinal endoscopy; Colonoscopy (07/28/2022); Joint replacement (Bilateral); Cataract extraction (Bilateral); Appendectomy; Cardiac catheterization (N/A, 09/07/2023); pr replace aortic valve openfemoral artery approach (N/A, 11/07/2023); Cardiac catheterization (N/A, 11/07/2023); and Hemorrhoid surgery.,  _______________________________________________________________________  Family History:  family history includes Cancer in her daughter; Cancer (age of onset: 67) in her brother; Colon cancer (age of onset: 55) in her brother; Heart disease in her brother and father; No Known Problems in her maternal aunt, maternal grandmother, paternal aunt, paternal grandmother, sister, and  sister.,  _______________________________________________________________________  Social History:   reports that she has never smoked. She has never used smokeless tobacco. She reports that she does not currently use alcohol. She reports that she does not use drugs.,  _______________________________________________________________________  Allergies:  is allergic to sulfa antibiotics..  _______________________________________________________________________  Current Outpatient Medications   Medication Sig Dispense Refill   • acetaminophen (TYLENOL) 325 mg tablet Take 2 tablets (650 mg total) by mouth every 6 (six) hours as needed for fever, mild pain, moderate pain or headaches (temperature greater than 101 F)  0   • amLODIPine (NORVASC) 10 mg tablet Take 1 tablet (10 mg total) by mouth daily 90 tablet 2   • aspirin 81 mg chewable tablet Chew 1 tablet (81 mg total) daily Do not start before November 9, 2023. 30 tablet 5   • atorvastatin (LIPITOR) 20 mg tablet Take 1 tablet (20 mg total) by mouth daily 90 tablet 2   • budesonide 9 mg TB24 Take 9 mg by mouth in the morning 30 tablet 3   • cholecalciferol (VITAMIN D3) 1,000 units tablet Take 1,000 Units by mouth daily     • clopidogrel (PLAVIX) 75 mg tablet Take 1 tablet (75 mg total) by mouth daily Take for 90 days total following TAVR Do not start before November 9, 2023. 30 tablet 2   • Coenzyme Q10 (Co Q 10) 100 MG CAPS Take by mouth     • Cyanocobalamin (VITAMIN B 12 PO) Take by mouth     • Ferrous Sulfate 27 MG TABS Take by mouth 2 (two) times a day     • levothyroxine 75 mcg tablet Take 1 tablet by mouth once daily 90 tablet 1   • Multiple Vitamin (multivitamin) capsule Take 1 capsule by mouth daily     • ondansetron (ZOFRAN-ODT) 4 mg disintegrating tablet Take 1 tablet (4 mg total) by mouth every 6 (six) hours as needed for nausea or vomiting 20 tablet 0   • bisacodyl (DULCOLAX) 5 mg EC tablet Take 5 mg by mouth daily as needed for constipation (Patient not  "taking: Reported on 12/18/2023)     • Fluticasone Furoate-Vilanterol (Breo Ellipta) 100-25 mcg/actuation inhaler Inhale 1 puff daily Rinse mouth after use. (Patient not taking: Reported on 12/19/2023) 60 blister 0   • pantoprazole (PROTONIX) 40 mg tablet TAKE 1 TABLET BY MOUTH TWICE DAILY 30 MINUTES BEFORE MEALS 60 tablet 0   • sulfaSALAzine (AZULFIDINE) 500 mg tablet Take 2 tablets (1,000 mg total) by mouth 2 (two) times a day 360 tablet 2   • valsartan (DIOVAN) 80 mg tablet Take 1 tablet by mouth once daily 90 tablet 0   • vitamin A 2250 MCG (7500 UT) capsule Take 7,500 Units by mouth daily     • vitamin E, tocopherol, 1,000 units capsule Take 1,000 Units by mouth daily       No current facility-administered medications for this visit.     _______________________________________________________________________  Review of Systems   Constitutional:  Negative for fatigue and unexpected weight change.   Respiratory:  Positive for cough. Negative for chest tightness and shortness of breath.    Cardiovascular:  Negative for chest pain and palpitations.   Gastrointestinal:  Positive for abdominal pain. Negative for constipation, diarrhea, nausea and vomiting.   Genitourinary:  Negative for difficulty urinating.   Musculoskeletal:  Negative for arthralgias.   Skin:  Negative for rash.   Neurological:  Negative for dizziness and headaches.         Objective:  Vitals:    01/08/24 1029   BP: 134/70   BP Location: Left arm   Patient Position: Sitting   Cuff Size: Standard   Pulse: 76   Resp: 16   SpO2: 96%   Weight: 60.8 kg (134 lb)   Height: 5' 1\" (1.549 m)     Body mass index is 25.32 kg/m².     Physical Exam  Vitals and nursing note reviewed.   Constitutional:       Appearance: Normal appearance. She is well-developed.      Comments: Uses cane to ambulate.    HENT:      Head: Normocephalic and atraumatic.   Cardiovascular:      Rate and Rhythm: Normal rate and regular rhythm.      Heart sounds: Normal heart sounds. No " murmur heard.  Pulmonary:      Effort: Pulmonary effort is normal. No respiratory distress.      Breath sounds: Normal breath sounds. No wheezing.   Musculoskeletal:      Cervical back: Normal range of motion and neck supple.      Right lower leg: No edema.      Left lower leg: No edema.   Lymphadenopathy:      Cervical: No cervical adenopathy.   Neurological:      Mental Status: She is alert and oriented to person, place, and time.   Psychiatric:         Mood and Affect: Mood normal.         Behavior: Behavior normal.         Thought Content: Thought content normal.         Judgment: Judgment normal.

## 2024-01-08 NOTE — ASSESSMENT & PLAN NOTE
Still has pain at times and loose stools. No recent bleeding. Continue medications and management per Gastroenterology. Last appointment was in December 2023.

## 2024-01-08 NOTE — PLAN OF CARE
Problem: Potential for Falls  Goal: Patient will remain free of falls  Description: INTERVENTIONS:  - Educate patient/family on patient safety including physical limitations  - Instruct patient to call for assistance with activity   - Consult OT/PT to assist with strengthening/mobility   - Keep Call bell within reach  - Keep bed low and locked with side rails adjusted as appropriate  - Keep care items and personal belongings within reach  - Initiate and maintain comfort rounds  - Make Fall Risk Sign visible to staff  -   - Apply yellow socks and bracelet for high fall risk patients  - Consider moving patient to room near nurses station  Outcome: Progressing     Problem: Knowledge Deficit  Goal: Patient/family/caregiver demonstrates understanding of disease process, treatment plan, medications, and discharge instructions  Description: Complete learning assessment and assess knowledge base.  Interventions:  - Provide teaching at level of understanding  - Provide teaching via preferred learning methods  Outcome: Progressing

## 2024-01-09 ENCOUNTER — CLINICAL SUPPORT (OUTPATIENT)
Dept: CARDIAC REHAB | Facility: CLINIC | Age: 84
End: 2024-01-09
Payer: MEDICARE

## 2024-01-09 ENCOUNTER — TELEPHONE (OUTPATIENT)
Dept: GASTROENTEROLOGY | Facility: CLINIC | Age: 84
End: 2024-01-09

## 2024-01-09 DIAGNOSIS — Z95.2 S/P TAVR (TRANSCATHETER AORTIC VALVE REPLACEMENT): Primary | ICD-10-CM

## 2024-01-09 PROCEDURE — 93798 PHYS/QHP OP CAR RHAB W/ECG: CPT

## 2024-01-09 NOTE — TELEPHONE ENCOUNTER
Dr. Slade, could you please review pt's last ov note and EGD and advise if should be scheduled for a recall EGD? It does not mention EGD in the office note. Thank you so much!

## 2024-01-11 ENCOUNTER — CLINICAL SUPPORT (OUTPATIENT)
Dept: CARDIAC REHAB | Facility: CLINIC | Age: 84
End: 2024-01-11
Payer: MEDICARE

## 2024-01-11 DIAGNOSIS — Z95.2 S/P TAVR (TRANSCATHETER AORTIC VALVE REPLACEMENT): Primary | ICD-10-CM

## 2024-01-11 PROCEDURE — 93798 PHYS/QHP OP CAR RHAB W/ECG: CPT

## 2024-01-11 NOTE — PROGRESS NOTES
Cardiac Rehabilitation Plan of Care   30 Day Reassessment          Today's date: 2024   # of Exercise Sessions Completed: 11  Patient name: Ning Rodrigues      : 1940  Age: 83 y.o.       MRN: [id number]  Referring Physician: Felipe Buitrago DO  Cardiologist: Nehemias Glover MD  Provider: Wayne  Clinician: Naa Nowak MS, CEP, EPC    Dx:   Encounter Diagnosis   Name Primary?    S/P TAVR (transcatheter aortic valve replacement) Yes     Date of onset: 2023      SUMMARY OF PROGRESS:  Ning is compliant attending cardiac rehab exercise sessions 3x/wk post TAVR on 23 for symptomatic severe AS. She tolerates 30-35 mins at 1.5 - 3.5 METs. A light strength training component has not been added to their exercise program. She is tolerating progression of intensity levels to maintain RPE 4-7. Resting /70 - 150/74 with Abnormal response to exercise reaching 130/78- 210/88. NSR on tele with rare PACs observed. RHR 76 - 93  with Normal response to exercise reaching 105 - 128. She has added home exercise which includes walking a block outside when the weather in nice. No cardiac complaints. She is maintaining her weight at 134 lbs. Patient has been working on dietary modifications with the goal of rare red/processed meats, low fat dairy, reduced added sugars and refined flours. She is limited with her diet due to having ulcerative colitis. The patient is a non-smoker. Depression was reassessed using the PHQ-9. The patient's score was 4 (1-4 = Minimal Depression) showing an IMPROVEMENT. Anxiety was reassessed using the CHELSEA-7. The patient's score was 6 (5-9 = Mild anxiety) showing an DECLINE.  When addressed, the patient denies depression/anxiety. Patient reports excellent social/emotional support from her  and daughter.  They were encouraged to discuss options for medical therapy and counseling with their PCP.  Ning rates their stress as 4/10. Stress management techniques were reinforced.  Patient attends group educational classes on cardiac risk factor modification. Her exercise program will be progressed as tolerated to maintain RPE 4-6. The patient has the following personal goals he hopes to achieved by discharge: being able to walk around her neighborhood. They will continue to be educated on lifestyle modification and encouraged to supplement with a home exercise program as tolerated to reach the following goals in the next 30 days: monitor sodium intake and BP and increase exercise as tolerated.        Medication compliance: Yes   Comments: Pt reports to be compliant with medications  Fall Risk: High   Comments: Patient uses walking assist device (walker/cane/rollator) and Reports a fall in the past 6 months    EKG Interpretation: NSR with rare PACs      EXERCISE ASSESSMENT and PLAN    Exercise Prescription:      Frequency: 3 days/week   Supplement with home exercise 2+ days/wk as tolerated     Minutes: 30-35         METS: 1.5-2.5            HR: RHR +30bpm   RPE: 4-7         Modalities: UBE, NuStep, and Recumbent bike      30 Day Goals for Exercise Progression:    Frequency: 3 days/week of cardiac rehab       Supplement with home exercise 2+ days/wk as tolerated    Minutes: 35-40                              >150 mins/wk of moderate intensity exercise   METS: 1.8-2.8   HR: RHR +30bpm    RPE: 4-5   Modalities: UBE, NuStep, and Room walking    Strength trainin-3 days / week  12-15 repetitions  1-2 sets per modality   Will be added following 2-3 weeks of monitored exercise sessions   Modalities: Arm Curl and Sit to Stands    Home Exercise:  walking around her block when the weather is nice enough    Goals: improved DASI score by 10%, Increase in exercise capacity by 40% - based on peak METs tolerated in cardiac rehab exercise session, Exercise 5 days/wk, >150mins/wk of moderate intensity exercise, Improved 6MWT distance by 10%, Resume ADLs with increased strength, Attend Rehab regularly,  "Decrease sitting time, and Start a walking program    Progression Toward Goals:  Pt is progressing and showing improvement  toward the following goals:  added home exercise, attending rehab regularly.  , Patient will increase exercise as tolerated in the next 30 days, Will continue to educate and progress as tolerated.    Education: benefit of exercise for CAD risk factors, home exercise guidelines, AHA guidelines to achieve >150 mins/wk of moderate exercise, RPE scale, and physical activity/exercise in extreme weather conditions   Plan:education on home exercise guidelines, home exercise 30+ mins 2 days opposite CR, and Education class: Risk Factors for Heart Disease  Readiness to change: Action:  (Changing behavior)      NUTRITION ASSESSMENT AND PLAN    Weight control:    Starting weight: 135 lbs   Current weight:   134 lbs       Diabetes: N/A  A1c:     last measured:     Lipid management: Discussed diet and lipid management and Last lipid profile 9/20/23  Chol 134  TRG 68  HDL 48  LDL 72    Goals:Improved Rate Your Plate score  >64, eat 6 or more servings of grain products per day, eat whole grain breads, brown rice and whole grain cereals, eat 5 or more servings of fruits and vegetables a day, dine out less than once per week or choose low fat restaurant meals, eat red meat once a week or less, Do not eat fried foods, use \"light\" tub margarine on bread, potatoes and vegetables, do not use added salts,  choose low sodium canned, frozen, packaged foods, and do not eat salty snacks    Measurable goals were based Rate Your Plate Dietary Self-Assessment. These are the areas in which the patient could score higher on the assessment.  Goals include recommendations for a heart healthy diet based on American Heart Association.    Progression Toward Goals: Pt is progressing and showing improvement  toward the following goals:  maintaining her weight, drinking oatmilk, not using butter.  , Patient will continue with " "dietary changes in the next 30 days, Will continue to educate and progress as tolerated. Patient is limited with diet d/t Ulcerative Colitis    Education: heart healthy eating  low sodium diet  hydration  nutrition for  lipid management  education class: Heart Healthy Eating  education class:  Label Reading  Plan: eat red meat once a week or less, choose lean red meat, never/rarely eat fried foods, use \"light\" tub margarine, eat healthy snacks like fruit, pretzels, and low fat crackers, never/rarely add salt to food when cooking or at the table, choose low sodium canned, frozen, packaged foods or rarely eat these foods, and rarely/never eat salty snacks  Readiness to change: Action:  (Changing behavior)      PSYCHOSOCIAL ASSESSMENT AND PLAN    Emotional:  Depression assessment:  PHQ-9 = 4  1-4 = Minimal Depression            Anxiety measure:  CHELSEA-7 = 6  5-9 = Mild anxiety  Self-reported stress level:  4  Social support: Patient reports excellent emotional/social support from her family    Goals:  Reduce perceived stress to 1-3/10, PHQ-9 - reduced severity by one level, Physical Fitness in [facility] Score < 3, Overall Health in [facility] Score < 3, and increased energy    Progression Toward Goals: Pt is progressing and showing improvement  toward the following goals:  improved PHQ-9 score, denies anxiety and depression, good social support.  , Pt has not made progress toward the following goals: CHELSEA-7 score declined. , Patient will maintain emotional status in the next 30 days, Will continue to educate and progress as tolerated.    Education: signs/sxs of depression, benefits of a positive support system, stress management techniques, class:  Relaxation, and class:  Stress and Your Health   Plan: Class: Stress and Your Health, Class: Relaxation, Exercise, Spend time outdoors, Keep a positive mindset, and Enjoy family  Readiness to change: Action:  (Changing behavior)      OTHER CORE COMPONENTS     Tobacco:   Social " History     Tobacco Use   Smoking Status Never   Smokeless Tobacco Never       Tobacco Use Intervention:   N/A:  Patient is a non-smoker     Anginal Symptoms:  None   NTG use: No prescription    Blood pressure:    Restin/70- 150/74   Exercise: 130/78- 210/88    Goals: consistent BP < 130/80, reduced dietary sodium <2300mg, moderate intensity exercise >150 mins/wk, and medication compliance    Progression Toward Goals: Pt is progressing and showing improvement  toward the following goals:  hydrating throughout the day.  , Pt has not made progress toward the following goals: BP elevated and hypertensive response to exercise on the recumbent bike. , Patient will monitor sodium intake in the next 30 days, Will continue to educate and progress as tolerated.    Education:  understanding high blood pressure and it's relationship to CAD and low sodium diet and HTN  Plan: Class: Understanding Heart Disease, Class: Common Heart Medications, medication compliance, Avoid Processed foods, engage in regular exercise, check labels for sodium content, and monitor home BP  Readiness to change: Preparation:  (Getting ready to change)

## 2024-01-12 ENCOUNTER — CLINICAL SUPPORT (OUTPATIENT)
Dept: CARDIAC REHAB | Facility: CLINIC | Age: 84
End: 2024-01-12
Payer: MEDICARE

## 2024-01-12 DIAGNOSIS — Z95.2 S/P TAVR (TRANSCATHETER AORTIC VALVE REPLACEMENT): Primary | ICD-10-CM

## 2024-01-12 PROCEDURE — 93798 PHYS/QHP OP CAR RHAB W/ECG: CPT

## 2024-01-16 ENCOUNTER — APPOINTMENT (OUTPATIENT)
Dept: CARDIAC REHAB | Facility: CLINIC | Age: 84
End: 2024-01-16
Payer: MEDICARE

## 2024-01-16 ENCOUNTER — TELEPHONE (OUTPATIENT)
Dept: SURGICAL ONCOLOGY | Facility: CLINIC | Age: 84
End: 2024-01-16

## 2024-01-16 NOTE — TELEPHONE ENCOUNTER
Spoke to patient regarding her no show appointment with Dr. Gongora 1/16/2024.  Rescheduled patient with Dr. Govea for 2/28/2024.  Patient wants to stay at the [facility].

## 2024-01-18 ENCOUNTER — CLINICAL SUPPORT (OUTPATIENT)
Dept: CARDIAC REHAB | Facility: CLINIC | Age: 84
End: 2024-01-18
Payer: MEDICARE

## 2024-01-18 DIAGNOSIS — Z95.2 S/P TAVR (TRANSCATHETER AORTIC VALVE REPLACEMENT): Primary | ICD-10-CM

## 2024-01-18 PROCEDURE — 93798 PHYS/QHP OP CAR RHAB W/ECG: CPT

## 2024-01-19 ENCOUNTER — CLINICAL SUPPORT (OUTPATIENT)
Dept: CARDIAC REHAB | Facility: CLINIC | Age: 84
End: 2024-01-19
Payer: MEDICARE

## 2024-01-19 DIAGNOSIS — Z95.2 S/P TAVR (TRANSCATHETER AORTIC VALVE REPLACEMENT): Primary | ICD-10-CM

## 2024-01-19 PROCEDURE — 93798 PHYS/QHP OP CAR RHAB W/ECG: CPT

## 2024-01-23 ENCOUNTER — CLINICAL SUPPORT (OUTPATIENT)
Dept: CARDIAC REHAB | Facility: CLINIC | Age: 84
End: 2024-01-23
Payer: MEDICARE

## 2024-01-23 DIAGNOSIS — Z95.2 S/P TAVR (TRANSCATHETER AORTIC VALVE REPLACEMENT): Primary | ICD-10-CM

## 2024-01-23 PROCEDURE — 93798 PHYS/QHP OP CAR RHAB W/ECG: CPT

## 2024-01-24 ENCOUNTER — HOSPITAL ENCOUNTER (OUTPATIENT)
Dept: MAMMOGRAPHY | Facility: CLINIC | Age: 84
Discharge: HOME | End: 2024-01-24
Payer: MEDICARE

## 2024-01-24 DIAGNOSIS — R92.8 ABNORMAL MAMMOGRAM: ICD-10-CM

## 2024-01-24 PROCEDURE — 77065 DX MAMMO INCL CAD UNI: CPT

## 2024-01-24 PROCEDURE — G0279 TOMOSYNTHESIS, MAMMO: HCPCS

## 2024-01-25 ENCOUNTER — CLINICAL SUPPORT (OUTPATIENT)
Dept: CARDIAC REHAB | Facility: CLINIC | Age: 84
End: 2024-01-25
Payer: MEDICARE

## 2024-01-25 DIAGNOSIS — Z95.2 S/P TAVR (TRANSCATHETER AORTIC VALVE REPLACEMENT): Primary | ICD-10-CM

## 2024-01-25 PROCEDURE — 93798 PHYS/QHP OP CAR RHAB W/ECG: CPT

## 2024-01-26 ENCOUNTER — CLINICAL SUPPORT (OUTPATIENT)
Dept: CARDIAC REHAB | Facility: CLINIC | Age: 84
End: 2024-01-26
Payer: MEDICARE

## 2024-01-26 DIAGNOSIS — Z95.2 S/P TAVR (TRANSCATHETER AORTIC VALVE REPLACEMENT): Primary | ICD-10-CM

## 2024-01-26 PROCEDURE — 93798 PHYS/QHP OP CAR RHAB W/ECG: CPT

## 2024-01-30 ENCOUNTER — OFFICE VISIT (OUTPATIENT)
Dept: CARDIOLOGY CLINIC | Facility: CLINIC | Age: 84
End: 2024-01-30
Payer: MEDICARE

## 2024-01-30 ENCOUNTER — CLINICAL SUPPORT (OUTPATIENT)
Dept: CARDIAC REHAB | Facility: CLINIC | Age: 84
End: 2024-01-30
Payer: MEDICARE

## 2024-01-30 VITALS
HEIGHT: 61 IN | SYSTOLIC BLOOD PRESSURE: 152 MMHG | HEART RATE: 70 BPM | OXYGEN SATURATION: 97 % | DIASTOLIC BLOOD PRESSURE: 70 MMHG | WEIGHT: 134 LBS | BODY MASS INDEX: 25.3 KG/M2

## 2024-01-30 DIAGNOSIS — I44.7 LBBB (LEFT BUNDLE BRANCH BLOCK): ICD-10-CM

## 2024-01-30 DIAGNOSIS — Z95.2 S/P TAVR (TRANSCATHETER AORTIC VALVE REPLACEMENT): ICD-10-CM

## 2024-01-30 DIAGNOSIS — I10 ESSENTIAL HYPERTENSION: ICD-10-CM

## 2024-01-30 DIAGNOSIS — I35.0 AORTIC STENOSIS, SEVERE: Primary | ICD-10-CM

## 2024-01-30 DIAGNOSIS — Z95.2 S/P TAVR (TRANSCATHETER AORTIC VALVE REPLACEMENT): Primary | ICD-10-CM

## 2024-01-30 PROCEDURE — 93798 PHYS/QHP OP CAR RHAB W/ECG: CPT

## 2024-01-30 PROCEDURE — 99214 OFFICE O/P EST MOD 30 MIN: CPT | Performed by: INTERNAL MEDICINE

## 2024-01-30 NOTE — PROGRESS NOTES
Cardiology Follow Up    Ning Rodrigues  [id number]  1940  [facility] [place] [id number] [facility] [address] [phone]      1. Aortic stenosis, severe        2. S/P TAVR (transcatheter aortic valve replacement)        3. LBBB (left bundle branch block)        4. Essential hypertension            Discussion/Summary:    AS -now status post TAVR. Normal bioprosthetic valve function. She will get her follow-up echo next year as per protocol. Continue dual antiplatelet therapy for now but she will ultimately taper down to aspirin alone. Antibiotics prior to dental work.    HTN -, her blood pressure is controlled considering her age. There are times her blood pressure goes a little bit elevated. This is being monitored at cardiac rehab, as well. When she completes cardiac rehab, she will check her blood pressure more regularly at home. If she is getting numbers consistently in the 150s we can increase the valsartan.    Nonobstructive CAD on cath. Medical management with atorvastatin, blood pressure control, antiplatelet agents.    Left bundle branch block noted post TAVR. No heart block. Subsequent ECG showed resolution of this        History of Present Illness:  84-year-old female discovered to have aortic stenosis after gastroenterologist heard aortic murmur at time of her procedure.    Initial echocardiogram showed moderate to severe aortic stenosis diagnosed in June of 2022.  LV function is normal.    She lives independently with her .  AS has progressed, and she has some symptoms.  She has undergone TAVR in Nov 2023.  On the presurgical CT scan, there was concern for malignancy but she has now undergon an evluation for this and the PET scan did not suggest cancer.    Recovered well post TAVR.     Interval history:    She returns for follow-up. She underwent her valve surgery in November. No complications. There was postoperative  left bundle branch block, but no heart block. She has been feeling well. She remains on the same medications that she took prior to her valve surgery other than the antiplatelet agents. She understands she will continue aspirin long-term and continue Plavix for about 3 months after the procedure. Her post TAVR GIOVANNY GIOVANNY showed normal bioprosthetic valve function and otherwise preserved LVEF.    She is doing cardiac rehab.    Her blood pressure is a little bit high in the office, but it is checked regularly at rehab and I reviewed these visits. On average, her blood pressure is controlled.    Patient Active Problem List   Diagnosis   • Essential hypertension   • Hypercholesterolemia   • Hypothyroidism   • Ulcerative colitis without complications (HCC)   • Diverticulosis   • Low's esophagus   • Colon polyp   • Gastroesophageal reflux disease with esophagitis   • Vitamin D deficiency   • Anemia   • Chronic cough   • Dairy product intolerance   • [place] allergy   • B12 deficiency   • Irritable larynx   • Cough variant asthma    • Dysphonia   • Pharyngoesophageal dysphagia   • Dysfunction of both eustachian tubes   • Herpes zoster without complication   • Aortic stenosis, severe   • Calcified granuloma of lung (HCC)   • Allergic rhinitis   • Abnormal CT of the chest   • S/P TAVR (transcatheter aortic valve replacement)   • Postoperative anemia due to acute blood loss   • Acute colitis     Past Medical History:   Diagnosis Date   • Allergic rhinitis 03/21/2023   • Anemia    • Arthritis     osteoporosis, djd knees, pt denies osteoporosis on 2/21/22   • Low's esophagus    • Cancer (HCC)     on face   • Colon polyp    • Cough 02/21/2022    cough for 3 years, expectorates yellow mucus   • Cough variant asthma  07/26/2021   • Disease of thyroid gland     low thyroid   • Diverticulitis     gerd, diverticulitis   • Dizziness    • GERD (gastroesophageal reflux disease)     ulcerative colitis; Low's esophagus,  "diverticulosis; hx of laryngopharyngeal reflux   • Hyperlipidemia    • Hypertension    • Low vitamin D level    • LPRD (laryngopharyngeal reflux disease) 02/21/2022   • PONV (postoperative nausea and vomiting)     AFTER \"BIG SURGERIES\"   • Postnasal drip     WITH COUGH   • Ulcerative colitis (HCC)      Social History     Tobacco Use   • Smoking status: Never   • Smokeless tobacco: Never   Vaping Use   • Vaping status: Never Used   Substance Use Topics   • Alcohol use: Not Currently   • Drug use: Never      Family History   Problem Relation Age of Onset   • Heart disease Father    • No Known Problems Sister    • No Known Problems Sister    • Heart disease Brother    • Colon cancer Brother 55   • Cancer Brother 67   • No Known Problems Maternal Grandmother    • No Known Problems Paternal Grandmother    • No Known Problems Maternal Aunt    • No Known Problems Paternal Aunt    • Cancer Daughter         unsure of type of cancer, it was female cancer and hysterectomy done by Dr. Fierro     Past Surgical History:   Procedure Laterality Date   • APPENDECTOMY     • CARDIAC CATHETERIZATION N/A 09/07/2023    Procedure: Cardiac RHC/LHC;  Surgeon: Rafael Sood MD;  [facility];  Service: Cardiology   • CARDIAC CATHETERIZATION N/A 11/07/2023    Procedure: Cardiac tavr;  Surgeon: Pete Hernández MD;  Location: [facility];  Service: Cardiology   • CATARACT EXTRACTION Bilateral    • COLONOSCOPY      colon polyps   • COLONOSCOPY  07/28/2022   • FOOT SURGERY Bilateral     bunionectomy   • HEMORRHOID SURGERY     • HEMORROIDECTOMY      hemorroid removal   • JOINT REPLACEMENT Bilateral     knees   • NV REPLACE AORTIC VALVE OPENFEMORAL ARTERY APPROACH N/A 11/07/2023    Procedure: REPLACEMENT AORTIC VALVE TRANSCATHETER (TAVR) TRANSFEMORAL W/ 26MM BUCKNER DOROTHEA S3 UR VALVE(ACCESS ON LEFT) GIOVANNY;  Surgeon: Felipe Buitrago DO;  Location[facility];  Service: Cardiac Surgery   • REPLACEMENT TOTAL KNEE Bilateral     b/ " replacement   • TONSILLECTOMY     • TUBAL LIGATION     • UPPER GASTROINTESTINAL ENDOSCOPY         Current Outpatient Medications:   •  acetaminophen (TYLENOL) 325 mg tablet, Take 2 tablets (650 mg total) by mouth every 6 (six) hours as needed for fever, mild pain, moderate pain or headaches (temperature greater than 101 F), Disp: , Rfl: 0  •  amLODIPine (NORVASC) 10 mg tablet, Take 1 tablet (10 mg total) by mouth daily, Disp: 90 tablet, Rfl: 2  •  aspirin 81 mg chewable tablet, Chew 1 tablet (81 mg total) daily Do not start before November 9, 2023., Disp: 30 tablet, Rfl: 5  •  atorvastatin (LIPITOR) 20 mg tablet, Take 1 tablet (20 mg total) by mouth daily, Disp: 90 tablet, Rfl: 2  •  cholecalciferol (VITAMIN D3) 1,000 units tablet, Take 1,000 Units by mouth daily, Disp: , Rfl:   •  clopidogrel (PLAVIX) 75 mg tablet, Take 1 tablet (75 mg total) by mouth daily Take for 90 days total following TAVR Do not start before November 9, 2023., Disp: 30 tablet, Rfl: 2  •  Coenzyme Q10 (Co Q 10) 100 MG CAPS, Take by mouth, Disp: , Rfl:   •  Cyanocobalamin (VITAMIN B 12 PO), Take by mouth, Disp: , Rfl:   •  Ferrous Sulfate 27 MG TABS, Take by mouth 2 (two) times a day, Disp: , Rfl:   •  levothyroxine 75 mcg tablet, Take 1 tablet by mouth once daily, Disp: 90 tablet, Rfl: 1  •  Multiple Vitamin (multivitamin) capsule, Take 1 capsule by mouth daily, Disp: , Rfl:   •  pantoprazole (PROTONIX) 40 mg tablet, TAKE 1 TABLET BY MOUTH TWICE DAILY 30 MINUTES BEFORE MEALS, Disp: 60 tablet, Rfl: 0  •  sulfaSALAzine (AZULFIDINE) 500 mg tablet, Take 2 tablets (1,000 mg total) by mouth 2 (two) times a day, Disp: 360 tablet, Rfl: 2  •  valsartan (DIOVAN) 80 mg tablet, Take 1 tablet by mouth once daily, Disp: 90 tablet, Rfl: 0  •  vitamin A 2250 MCG (7500 UT) capsule, Take 7,500 Units by mouth daily, Disp: , Rfl:   •  vitamin E, tocopherol, 1,000 units capsule, Take 1,000 Units by mouth daily, Disp: , Rfl:   •  bisacodyl (DULCOLAX) 5 mg EC  "tablet, Take 5 mg by mouth daily as needed for constipation (Patient not taking: Reported on 12/18/2023), Disp: , Rfl:   •  budesonide 9 mg TB24, Take 9 mg by mouth in the morning (Patient not taking: Reported on 1/30/2024), Disp: 30 tablet, Rfl: 3  •  Fluticasone Furoate-Vilanterol (Breo Ellipta) 100-25 mcg/actuation inhaler, Inhale 1 puff daily Rinse mouth after use. (Patient not taking: Reported on 12/19/2023), Disp: 60 blister, Rfl: 0  •  ondansetron (ZOFRAN-ODT) 4 mg disintegrating tablet, Take 1 tablet (4 mg total) by mouth every 6 (six) hours as needed for nausea or vomiting (Patient not taking: Reported on 1/30/2024), Disp: 20 tablet, Rfl: 0  Allergies   Allergen Reactions   • Sulfa Antibiotics Itching       Vitals:    01/30/24 1300   BP: 152/70   BP Location: Left arm   Patient Position: Sitting   Cuff Size: Standard   Pulse: 70   SpO2: 97%   Weight: 60.8 kg (134 lb)   Height: 5' 1\" (1.549 m)     Vitals:    01/30/24 1300   Weight: 60.8 kg (134 lb)      Height: 5' 1\" (154.9 cm)   Body mass index is 25.32 kg/m².    Physical Exam:  GEN: Ning Rodrigues appears well, alert and oriented x 3, pleasant and cooperative   HEENT: pupils equal, round, and reactive to light; extraocular muscles intact  NECK: supple, no carotid bruits   HEART: regular rhythm, bio AVR  LUNGS: clear to auscultation bilaterally; no wheezes, rales, or rhonchi   ABDOMEN: normal bowel sounds, soft, no tenderness, no distention  EXTREMITIES: peripheral pulses normal; no clubbing, cyanosis, or edema  NEURO: no focal findings   SKIN: normal without suspicious lesions on exposed skin    ROS:  Except as noted in HPI, is otherwise reviewed in detail and a 12 point review of systems is negative.    Labs:  Lab Results   Component Value Date    SODIUM 132 (L) 12/02/2023    K 4.1 12/02/2023     12/02/2023    CREATININE 0.75 12/02/2023    BUN 8 12/02/2023    CO2 24 12/02/2023    ALT 6 (L) 12/01/2023    AST 15 12/01/2023    TSH 2.71 02/05/2021    INR " "1.09 11/29/2023    GLUF 90 11/01/2023    WBC 4.74 01/02/2024    HGB 9.6 (L) 01/02/2024    HCT 28.6 (L) 01/02/2024     01/02/2024       No results found for: \"CHOL\"  Lab Results   Component Value Date    HDL 48 (L) 09/20/2023    HDL 51 11/09/2022    HDL 57 04/12/2022     Lab Results   Component Value Date    LDLCALC 72 09/20/2023    LDLCALC 65 11/09/2022    LDLCALC 119 (H) 04/12/2022     Lab Results   Component Value Date    TRIG 68 09/20/2023    TRIG 89 11/09/2022    TRIG 64 04/12/2022     Echo 6/2023  Left Ventricle: Left ventricular cavity size is normal. Wall thickness is moderately increased. The left ventricular ejection fraction is 65%. Systolic function is normal. Wall motion is normal. Diastolic function is mildly abnormal, consistent with grade I (abnormal) relaxation.  •  Aortic Valve: The aortic valve is trileaflet. The leaflets are not thickened. The leaflets are moderately calcified. There is moderately reduced mobility. There is severe stenosis.  •  Mitral Valve: There is mild regurgitation.  •  Tricuspid Valve: There is mild regurgitation.      Echo 6/30/22:  Left Ventricle: Left ventricular cavity size is normal. Wall thickness is normal. The left ventricular ejection fraction is 65%. Systolic function is normal. Wall motion is normal. Diastolic function is mildly abnormal, consistent with grade I (abnormal) relaxation.  Left atrial filling pressure is elevated.  •  Right Ventricle: Right ventricular cavity size is normal. Systolic function is normal.  •  Aortic Valve: The aortic valve is trileaflet. The leaflets are moderately thickened. The leaflets are moderately calcified. There is moderately reduced mobility. There is moderate and There is moderate to severe stenosis. The aortic valve peak velocity is 2.81 m/s. The aortic valve peak gradient is 32 mmHg. The aortic valve mean gradient is 21 mmHg. The DVI is 0.27. The aortic valve area is 0.76 cm2 using an LVOT diameter of 1.9 cm.  •  " Mitral Valve: There is mild regurgitation.  •  Tricuspid Valve: There is moderate regurgitation.

## 2024-02-01 ENCOUNTER — CLINICAL SUPPORT (OUTPATIENT)
Dept: CARDIAC REHAB | Facility: CLINIC | Age: 84
End: 2024-02-01
Payer: MEDICARE

## 2024-02-01 DIAGNOSIS — Z95.2 S/P TAVR (TRANSCATHETER AORTIC VALVE REPLACEMENT): Primary | ICD-10-CM

## 2024-02-01 PROCEDURE — 93798 PHYS/QHP OP CAR RHAB W/ECG: CPT

## 2024-02-02 ENCOUNTER — CLINICAL SUPPORT (OUTPATIENT)
Dept: CARDIAC REHAB | Facility: CLINIC | Age: 84
End: 2024-02-02
Payer: MEDICARE

## 2024-02-02 DIAGNOSIS — Z95.2 S/P TAVR (TRANSCATHETER AORTIC VALVE REPLACEMENT): Primary | ICD-10-CM

## 2024-02-02 PROCEDURE — 93798 PHYS/QHP OP CAR RHAB W/ECG: CPT

## 2024-02-05 ENCOUNTER — HOSPITAL ENCOUNTER (OUTPATIENT)
Dept: INFUSION CENTER | Facility: CLINIC | Age: 84
Discharge: HOME | End: 2024-02-05
Payer: MEDICARE

## 2024-02-05 DIAGNOSIS — E53.8 B12 DEFICIENCY: Primary | ICD-10-CM

## 2024-02-05 PROCEDURE — 96372 THER/PROPH/DIAG INJ SC/IM: CPT

## 2024-02-05 RX ORDER — CYANOCOBALAMIN 1000 UG/ML
1000 INJECTION, SOLUTION INTRAMUSCULAR; SUBCUTANEOUS ONCE
Status: COMPLETED | OUTPATIENT
Start: 2024-02-05 | End: 2024-02-05

## 2024-02-05 RX ORDER — CYANOCOBALAMIN 1000 UG/ML
1000 INJECTION, SOLUTION INTRAMUSCULAR; SUBCUTANEOUS ONCE
OUTPATIENT
Start: 2024-03-04

## 2024-02-05 RX ADMIN — CYANOCOBALAMIN 1000 MCG: 1000 INJECTION INTRAMUSCULAR; SUBCUTANEOUS at 14:18

## 2024-02-05 NOTE — PLAN OF CARE
Problem: Potential for Falls  Goal: Patient will remain free of falls  Description: INTERVENTIONS:  - Educate patient/family on patient safety including physical limitations  - Instruct patient to call for assistance with activity   - Consult OT/PT to assist with strengthening/mobility   - Keep Call bell within reach  - Keep bed low and locked with side rails adjusted as appropriate  - Keep care items and personal belongings within reach  - Initiate and maintain comfort rounds  - Make Fall Risk Sign visible to staff  - Consider moving patient to room near nurses station  Outcome: Completed     Problem: Knowledge Deficit  Goal: Patient/family/caregiver demonstrates understanding of disease process, treatment plan, medications, and discharge instructions  Description: Complete learning assessment and assess knowledge base.  Interventions:  - Provide teaching at level of understanding  - Provide teaching via preferred learning methods  Outcome: Completed

## 2024-02-06 ENCOUNTER — CLINICAL SUPPORT (OUTPATIENT)
Dept: CARDIAC REHAB | Facility: CLINIC | Age: 84
End: 2024-02-06
Payer: MEDICARE

## 2024-02-06 DIAGNOSIS — Z95.2 S/P TAVR (TRANSCATHETER AORTIC VALVE REPLACEMENT): Primary | ICD-10-CM

## 2024-02-06 PROCEDURE — 93798 PHYS/QHP OP CAR RHAB W/ECG: CPT

## 2024-02-08 ENCOUNTER — APPOINTMENT (OUTPATIENT)
Dept: CARDIAC REHAB | Facility: CLINIC | Age: 84
End: 2024-02-08
Payer: MEDICARE

## 2024-02-09 ENCOUNTER — APPOINTMENT (OUTPATIENT)
Dept: LAB | Facility: CLINIC | Age: 84
End: 2024-02-09
Payer: MEDICARE

## 2024-02-09 ENCOUNTER — OFFICE VISIT (OUTPATIENT)
Dept: GASTROENTEROLOGY | Facility: CLINIC | Age: 84
End: 2024-02-09
Payer: MEDICARE

## 2024-02-09 ENCOUNTER — CLINICAL SUPPORT (OUTPATIENT)
Dept: CARDIAC REHAB | Facility: CLINIC | Age: 84
End: 2024-02-09
Payer: MEDICARE

## 2024-02-09 VITALS
HEIGHT: 62 IN | SYSTOLIC BLOOD PRESSURE: 147 MMHG | BODY MASS INDEX: 24.84 KG/M2 | WEIGHT: 135 LBS | HEART RATE: 78 BPM | DIASTOLIC BLOOD PRESSURE: 68 MMHG

## 2024-02-09 DIAGNOSIS — K21.9 GASTROESOPHAGEAL REFLUX DISEASE WITHOUT ESOPHAGITIS: ICD-10-CM

## 2024-02-09 DIAGNOSIS — D64.9 ANEMIA, UNSPECIFIED TYPE: ICD-10-CM

## 2024-02-09 DIAGNOSIS — K51.80 OTHER ULCERATIVE COLITIS WITHOUT COMPLICATION (HCC): ICD-10-CM

## 2024-02-09 DIAGNOSIS — K51.80 OTHER ULCERATIVE COLITIS WITHOUT COMPLICATION (HCC): Primary | ICD-10-CM

## 2024-02-09 DIAGNOSIS — E87.1 HYPONATREMIA: ICD-10-CM

## 2024-02-09 DIAGNOSIS — Z95.2 S/P TAVR (TRANSCATHETER AORTIC VALVE REPLACEMENT): Primary | ICD-10-CM

## 2024-02-09 LAB
ALBUMIN SERPL BCP-MCNC: 3.9 G/DL (ref 3.5–5)
ALP SERPL-CCNC: 72 U/L (ref 34–104)
ALT SERPL W P-5'-P-CCNC: 9 U/L (ref 7–52)
ANION GAP SERPL CALCULATED.3IONS-SCNC: 4 MMOL/L
AST SERPL W P-5'-P-CCNC: 21 U/L (ref 13–39)
BASOPHILS # BLD AUTO: 0.04 THOUSANDS/ÂΜL (ref 0–0.1)
BASOPHILS NFR BLD AUTO: 1 % (ref 0–1)
BILIRUB SERPL-MCNC: 0.57 MG/DL (ref 0.2–1)
BUN SERPL-MCNC: 12 MG/DL (ref 5–25)
CALCIUM SERPL-MCNC: 9 MG/DL (ref 8.4–10.2)
CHLORIDE SERPL-SCNC: 98 MMOL/L (ref 96–108)
CO2 SERPL-SCNC: 26 MMOL/L (ref 21–32)
CREAT SERPL-MCNC: 0.68 MG/DL (ref 0.6–1.3)
EOSINOPHIL # BLD AUTO: 0.14 THOUSAND/ÂΜL (ref 0–0.61)
EOSINOPHIL NFR BLD AUTO: 4 % (ref 0–6)
ERYTHROCYTE [DISTWIDTH] IN BLOOD BY AUTOMATED COUNT: 13.4 % (ref 11.6–15.1)
GFR SERPL CREATININE-BSD FRML MDRD: 80 ML/MIN/1.73SQ M
GLUCOSE SERPL-MCNC: 85 MG/DL (ref 65–140)
HCT VFR BLD AUTO: 25.2 % (ref 34.8–46.1)
HGB BLD-MCNC: 9.1 G/DL (ref 11.5–15.4)
IMM GRANULOCYTES # BLD AUTO: 0.02 THOUSAND/UL (ref 0–0.2)
IMM GRANULOCYTES NFR BLD AUTO: 1 % (ref 0–2)
LYMPHOCYTES # BLD AUTO: 1.34 THOUSANDS/ÂΜL (ref 0.6–4.47)
LYMPHOCYTES NFR BLD AUTO: 34 % (ref 14–44)
MCH RBC QN AUTO: 37.4 PG (ref 26.8–34.3)
MCHC RBC AUTO-ENTMCNC: 36.1 G/DL (ref 31.4–37.4)
MCV RBC AUTO: 104 FL (ref 82–98)
MONOCYTES # BLD AUTO: 0.51 THOUSAND/ÂΜL (ref 0.17–1.22)
MONOCYTES NFR BLD AUTO: 13 % (ref 4–12)
NEUTROPHILS # BLD AUTO: 1.84 THOUSANDS/ÂΜL (ref 1.85–7.62)
NEUTS SEG NFR BLD AUTO: 47 % (ref 43–75)
NRBC BLD AUTO-RTO: 0 /100 WBCS
PLATELET # BLD AUTO: 267 THOUSANDS/UL (ref 149–390)
PMV BLD AUTO: 9.1 FL (ref 8.9–12.7)
POTASSIUM SERPL-SCNC: 4.1 MMOL/L (ref 3.5–5.3)
PROT SERPL-MCNC: 6.6 G/DL (ref 6.4–8.4)
RBC # BLD AUTO: 2.43 MILLION/UL (ref 3.81–5.12)
SODIUM SERPL-SCNC: 128 MMOL/L (ref 135–147)
WBC # BLD AUTO: 3.89 THOUSAND/UL (ref 4.31–10.16)

## 2024-02-09 PROCEDURE — 99213 OFFICE O/P EST LOW 20 MIN: CPT | Performed by: NURSE PRACTITIONER

## 2024-02-09 PROCEDURE — 36415 COLL VENOUS BLD VENIPUNCTURE: CPT

## 2024-02-09 PROCEDURE — 80053 COMPREHEN METABOLIC PANEL: CPT

## 2024-02-09 PROCEDURE — 93798 PHYS/QHP OP CAR RHAB W/ECG: CPT

## 2024-02-09 PROCEDURE — 85025 COMPLETE CBC W/AUTO DIFF WBC: CPT

## 2024-02-09 RX ORDER — PREDNISONE 10 MG/1
TABLET ORAL DAILY
Qty: 70 TABLET | Refills: 0 | Status: SHIPPED | OUTPATIENT
Start: 2024-02-09 | End: 2024-03-08

## 2024-02-09 NOTE — PROGRESS NOTES
[facility] [place] - Outpatient Follow-up Note  Ning Rodrigues 84 y.o. female MRN: [id number]  Encounter: [id number]          ASSESSMENT AND PLAN:      1. Other ulcerative colitis without complication (HCC)  Evidence of inflammation on CT scan in late November and elevated fecal calprotectin suggestive of colitis flare, unable to obtain Budesonide due to cost, will start on a fast Prednisone taper & continue sulfasalazine which has maintained her remission for several years.  Most recent colonoscopy in July 2022 was unremarkable. Will recheck labs/fecal calprotectin prior to next office visit and consider colonoscopy pending course.  -     Calprotectin,Fecal; Future  -     Comprehensive metabolic panel; Future  -     predniSONE 10 mg tablet; Take 4 tablets (40 mg total) by mouth daily for 7 days, THEN 3 tablets (30 mg total) daily for 7 days, THEN 2 tablets (20 mg total) daily for 7 days, THEN 1 tablet (10 mg total) daily for 7 days.    2. Anemia, unspecified type  Hgb trending up from 8.6 to 9.6 on most recent labs, she continues on iron/b12 supplements. Denies any black/bloody stool. Will repeat CBC prior to next office visit to ensure stability & consider further evaluation with colonoscopy pending course.  -     CBC (Includes Diff/Plt) (Refl); Future    3. Gastroesophageal reflux disease without esophagitis  Last EGD in Nov 2023 showed small HH with normal esophagus, stomach, and duodenum. There was extrinsic compression of the esophagus likely aorta or cardiac. On Pantoprazole      ______________________________________________________________________    SUBJECTIVE:  Ning Rodrigues is a 84 y.o. female with history of UC & anemia presenting for follow up. She last saw Dr. Slade in December and prescribed a course of Budesonide for treatment of a suspected UC flare with colon wall thickening on CT/elevated fecal calprotectin, however doesn't think she ever took this because it was too  "expensive. She is moving her bowels every day or every other day with formed stool, denies any bloody or black discoloration. Denies any abdominal pain or urgency. She is following a colitis diet, avoiding fatty foods. Had CBC repeated recently showing Hgb trending up from 8.9 to 9.6. Her last colonoscopy in 2022 showed extensive left sided diverticulosis and medium internal hemorrhoids. Bx from the sigmoid showed mild active colitis, rectal bx benign.      REVIEW OF SYSTEMS IS OTHERWISE NEGATIVE.      Historical Information   Past Medical History:   Diagnosis Date    Allergic rhinitis 03/21/2023    Anemia     Arthritis     osteoporosis, djd knees, pt denies osteoporosis on 2/21/22    Low's esophagus     Cancer (HCC)     on face    Colon polyp     Cough 02/21/2022    cough for 3 years, expectorates yellow mucus    Cough variant asthma  07/26/2021    Disease of thyroid gland     low thyroid    Diverticulitis     gerd, diverticulitis    Dizziness     GERD (gastroesophageal reflux disease)     ulcerative colitis; Low's esophagus, diverticulosis; hx of laryngopharyngeal reflux    Hyperlipidemia     Hypertension     Low vitamin D level     LPRD (laryngopharyngeal reflux disease) 02/21/2022    PONV (postoperative nausea and vomiting)     AFTER \"BIG SURGERIES\"    Postnasal drip     WITH COUGH    Ulcerative colitis (HCC)      Past Surgical History:   Procedure Laterality Date    APPENDECTOMY      CARDIAC CATHETERIZATION N/A 09/07/2023    Procedure: Cardiac RHC/LHC;  Surgeon: Rafael Sood MD;  [facility];  Service: Cardiology    CARDIAC CATHETERIZATION N/A 11/07/2023    Procedure: Cardiac tavr;  Surgeon: Pete Hernández MD;  Location[facility];  Service: Cardiology    CATARACT EXTRACTION Bilateral     COLONOSCOPY      colon polyps    COLONOSCOPY  07/28/2022    FOOT SURGERY Bilateral     bunionectomy    HEMORRHOID SURGERY      HEMORROIDECTOMY      hemorroid removal    JOINT REPLACEMENT Bilateral  "    knees    KS REPLACE AORTIC VALVE OPENFEMORAL ARTERY APPROACH N/A 11/07/2023    Procedure: REPLACEMENT AORTIC VALVE TRANSCATHETER (TAVR) TRANSFEMORAL W/ 26MM BUCKNER DOROTHEA S3 UR VALVE(ACCESS ON LEFT) GIOVANNY;  Surgeon: Felipe Buitrago DO;  [facility];  Service: Cardiac Surgery    REPLACEMENT TOTAL KNEE Bilateral     b/ replacement    TONSILLECTOMY      TUBAL LIGATION      UPPER GASTROINTESTINAL ENDOSCOPY       Social History   Social History     Substance and Sexual Activity   Alcohol Use Not Currently     Social History     Substance and Sexual Activity   Drug Use Never     Social History     Tobacco Use   Smoking Status Never   Smokeless Tobacco Never     Family History   Problem Relation Age of Onset    Heart disease Father     No Known Problems Sister     No Known Problems Sister     Heart disease Brother     Colon cancer Brother 55    Cancer Brother 67    No Known Problems Maternal Grandmother     No Known Problems Paternal Grandmother     No Known Problems Maternal Aunt     No Known Problems Paternal Aunt     Cancer Daughter         unsure of type of cancer, it was female cancer and hysterectomy done by Dr. Fierro       Meds/Allergies       Current Outpatient Medications:     acetaminophen (TYLENOL) 325 mg tablet    amLODIPine (NORVASC) 10 mg tablet    aspirin 81 mg chewable tablet    atorvastatin (LIPITOR) 20 mg tablet    cholecalciferol (VITAMIN D3) 1,000 units tablet    Coenzyme Q10 (Co Q 10) 100 MG CAPS    Cyanocobalamin (VITAMIN B 12 PO)    Ferrous Sulfate 27 MG TABS    levothyroxine 75 mcg tablet    Multiple Vitamin (multivitamin) capsule    ondansetron (ZOFRAN-ODT) 4 mg disintegrating tablet    pantoprazole (PROTONIX) 40 mg tablet    predniSONE 10 mg tablet    sulfaSALAzine (AZULFIDINE) 500 mg tablet    valsartan (DIOVAN) 80 mg tablet    vitamin A 2250 MCG (7500 UT) capsule    vitamin E, tocopherol, 1,000 units capsule    bisacodyl (DULCOLAX) 5 mg EC tablet    clopidogrel (PLAVIX) 75 mg  "tablet    Fluticasone Furoate-Vilanterol (Breo Ellipta) 100-25 mcg/actuation inhaler    Allergies   Allergen Reactions    Sulfa Antibiotics Itching           Objective     Blood pressure 147/68, pulse 78, height 5' 2\" (1.575 m), weight 61.2 kg (135 lb). Body mass index is 24.69 kg/m².      PHYSICAL EXAM:      General Appearance:   Alert, cooperative, no distress   HEENT:   Normocephalic, atraumatic, anicteric.     Neck:  Supple, symmetrical, trachea midline   Lungs:   Clear to auscultation bilaterally; no rales, rhonchi or wheezing; respirations unlabored    Heart::   Regular rate and rhythm; no murmur.   Abdomen:   Soft, non-tender, non-distended; normal bowel sounds; no masses, no organomegaly    Genitalia:   Deferred    Rectal:   Deferred    Extremities:  No cyanosis, clubbing or edema    Skin:  No jaundice, rashes, or lesions    Lymph nodes:  No palpable cervical lymphadenopathy        Lab Results:   No visits with results within 1 Day(s) from this visit.   Latest known visit with results is:   Appointment on 01/02/2024   Component Date Value    WBC 01/02/2024 4.74     RBC 01/02/2024 2.75 (L)     Hemoglobin 01/02/2024 9.6 (L)     Hematocrit 01/02/2024 28.6 (L)     MCV 01/02/2024 104 (H)     MCH 01/02/2024 34.9 (H)     MCHC 01/02/2024 33.6     RDW 01/02/2024 13.7     Platelets 01/02/2024 295     MPV 01/02/2024 9.4          Radiology Results:   Mammo diagnostic left w 3d & cad    Result Date: 1/24/2024  Narrative: DIAGNOSIS: Abnormal mammogram TECHNIQUE: Digital diagnostic mammography was performed. Computer Aided Detection (CAD) analyzed all applicable images. COMPARISONS: Prior breast imaging dated: 07/19/2023, 06/19/2023, 03/09/2022, 08/08/2019, 06/28/2018, 12/15/2016, 01/12/2012, and 09/18/2009 RELEVANT HISTORY: Family Breast Cancer History: No known family history of breast cancer. Family Medical History: Family medical history includes colon cancer in brother. Personal History: Hormone history includes birth " control and other. No known relevant surgical history. No known relevant medical history. RISK ASSESSMENT: 5 Year Tyrer-Cuzick: No Score 10 Year Tyrer-Cuzick: No Score Lifetime Tyrer-Cuzick: 0.18% TISSUE DENSITY: The breasts are heterogeneously dense, which may obscure small masses. INDICATION: Ning Rodrigues is a 83 y.o. female presenting for follow-up. FINDINGS: LEFT 1) CALCIFICATIONS [A]: There are coarse or 'popcorn-like' and large yanick-like calcifications in a grouped distribution seen in the upper outer quadrant of the left breast in the middle depth. Compared to the previous study, there are no significant changes. 2) CALCIFICATIONS [B]: There are large yanick-like calcifications in a grouped distribution seen in the lower central region of the left breast. Compared to the previous study, there are no significant changes.     Impression:  Stable probably benign calcifications in the left breast.  Continued follow-up is recommended with bilateral diagnostic mammogram in June 2024. ASSESSMENT/BI-RADS CATEGORY: Left: 3 - Probably Benign Overall: 3 - Probably Benign RECOMMENDATION:      - Diagnostic mammogram in June 2024 for both breasts. Workstation ID: [id number]

## 2024-02-09 NOTE — PATIENT INSTRUCTIONS
-Take the prednsione taper as prescribed    -Continue your sulfasalazine and protonix    -AFTER the prednisone taper, and BEFORE your next follow up visit please obtain blood work & repeat fecal calprotectin    -Call the office if you have any issues obtaining the prednisone or any side effects.

## 2024-02-09 NOTE — PROGRESS NOTES
Cardiac Rehabilitation Plan of Care   60 Day Reassessment          Today's date: 2024   # of Exercise Sessions Completed: 22  Patient name: Ning Rodrigues      : 1940  Age: 84 y.o.       MRN: [id number]  Referring Physician: Felipe Buitrago DO  Cardiologist: Nehemias Glover MD  Provider: Wayne  Clinician: No Rai MS, CEP    Dx:   Encounter Diagnosis   Name Primary?    S/P TAVR (transcatheter aortic valve replacement) Yes       Date of onset: 2023      SUMMARY OF PROGRESS:  Ning is compliant attending cardiac rehab exercise sessions 3x/wk post TAVR on 23 for symptomatic severe AS. She tolerates 30-35 mins at 1.5 - 3.5 METs. A light strength training component has been added to their exercise program. She is tolerating progression of intensity levels to maintain RPE 4-7. Resting /84 - 148/76 with Normal response to exercise reaching 134/86 - 166/70. BP has not been spiking since keeping off the recumbent bike. NSR on tele with rare PACs observed. RHR 74 - 95  with Normal response to exercise reaching . She has added home exercise which includes walking a block outside when the weather in nice. Ning reports feeling great from a cardiac standpoint. No cardiac complaints. She is maintaining her weight at 134 lbs. Patient has been working on dietary modifications with the goal of rare red/processed meats, low fat dairy, reduced added sugars and refined flours. She is limited with her diet due to having ulcerative colitis. The patient is a non-smoker. PHQ-9 and CHELSEA-7 were not reassessed. Most recent assessment found PHQ-9 at a 4 suggesting 1-4 = Minimal Depression and CHELSEA-7 at a 6 suggesting 5-9 = Mild anxiety.  When addressed, the patient denies depression/anxiety. Patient reports excellent social/emotional support from her  and daughter. They were encouraged to discuss options for medical therapy and counseling with their PCP. Ning rates their stress as 4/10.  Stress management techniques were reinforced. Patient attends group educational classes on cardiac risk factor modification. Her exercise program will be progressed as tolerated to maintain RPE 4-6. The patient has the following personal goals he hopes to achieved by discharge: being able to walk around her neighborhood more, increase strength/endurance. They will continue to be educated on lifestyle modification and encouraged to supplement with a home exercise program as tolerated to reach the following goals in the next 30 days: monitor sodium intake and BP and increase exercise as tolerated.      Medication compliance: Yes   Comments: Pt reports to be compliant with medications  Fall Risk: High   Comments: Patient uses walking assist device (walker/cane/rollator) and Reports a fall in the past 6 months    EKG Interpretation: NSR with rare PACs      EXERCISE ASSESSMENT and PLAN    Exercise Prescription:      Frequency: 3 days/week   Supplement with home exercise 2+ days/wk as tolerated     Minutes: 30-35         METS: 1.5-2.5            HR:    RPE: 4-7         Modalities: UBE, NuStep, and Recumbent bike      30 Day Goals for Exercise Progression:    Frequency: 3 days/week of cardiac rehab       Supplement with home exercise 2+ days/wk as tolerated    Minutes: 35-40                              >150 mins/wk of moderate intensity exercise   METS: 1.8-2.8   HR: RHR +30bpm    RPE: 4-5   Modalities: UBE, NuStep, and Room walking    Strength trainin-15 repetitions  1-2 sets per modality    Modalities: Arm Curl, Sit to Stands, and leg extensions    Home Exercise:  walking around her block when the weather is nice enough    Goals: improved DASI score by 10%, Increase in exercise capacity by 40% - based on peak METs tolerated in cardiac rehab exercise session, Exercise 5 days/wk, >150mins/wk of moderate intensity exercise, Improved 6MWT distance by 10%, Resume ADLs with increased strength, Attend Rehab  "regularly, Decrease sitting time, and Start a walking program    Progression Toward Goals:  Pt is progressing and showing improvement  toward the following goals:  added home exercise, attending rehab regularly, improved strength, improved BP response to exercise.  , Patient will increase exercise as tolerated in the next 30 days, Will continue to educate and progress as tolerated.    Education: benefit of exercise for CAD risk factors, home exercise guidelines, AHA guidelines to achieve >150 mins/wk of moderate exercise, RPE scale, Group class: Risk Factors for Heart Disease, and physical activity/exercise in extreme weather conditions   Plan:education on home exercise guidelines and home exercise 30+ mins 2 days opposite CR  Readiness to change: Action:  (Changing behavior)      NUTRITION ASSESSMENT AND PLAN    Weight control:    Starting weight: 135 lbs   Current weight:   134 lbs       Diabetes: N/A  A1c:     last measured:     Lipid management: Discussed diet and lipid management and Last lipid profile 9/20/23  Chol 134  TRG 68  HDL 48  LDL 72    Goals:Improved Rate Your Plate score  >64, eat 6 or more servings of grain products per day, eat whole grain breads, brown rice and whole grain cereals, eat 5 or more servings of fruits and vegetables a day, dine out less than once per week or choose low fat restaurant meals, eat red meat once a week or less, Do not eat fried foods, use \"light\" tub margarine on bread, potatoes and vegetables, do not use added salts,  choose low sodium canned, frozen, packaged foods, and do not eat salty snacks    Measurable goals were based Rate Your Plate Dietary Self-Assessment. These are the areas in which the patient could score higher on the assessment.  Goals include recommendations for a heart healthy diet based on American Heart Association.    Progression Toward Goals: Pt is progressing and showing improvement  toward the following goals:  maintaining her weight, drinking " "oatmilk, not using butter.  , Patient will continue with dietary changes in the next 30 days, Will continue to educate and progress as tolerated. Patient is limited with diet d/t Ulcerative Colitis    Education: heart healthy eating  low sodium diet  hydration  nutrition for  lipid management  education class: Heart Healthy Eating  education class:  Label Reading  Plan: eat red meat once a week or less, choose lean red meat, never/rarely eat fried foods, use \"light\" tub margarine, eat healthy snacks like fruit, pretzels, and low fat crackers, never/rarely add salt to food when cooking or at the table, choose low sodium canned, frozen, packaged foods or rarely eat these foods, and rarely/never eat salty snacks  Readiness to change: Action:  (Changing behavior)      PSYCHOSOCIAL ASSESSMENT AND PLAN    Emotional:  Depression assessment:  PHQ-9 = 4  1-4 = Minimal Depression            Anxiety measure:  CHELSEA-7 = 6  5-9 = Mild anxiety  Self-reported stress level:  4  Social support: Patient reports excellent emotional/social support from her family    Goals:  Reduce perceived stress to 1-3/10, PHQ-9 - reduced severity by one level, Physical Fitness in [facility] Score < 3, Overall Health in [facility] Score < 3, and increased energy    Progression Toward Goals: Pt is progressing and showing improvement  toward the following goals:  improved PHQ-9 score, denies anxiety and depression, good social support.  , Patient will maintain emotional status in the next 30 days, Will continue to educate and progress as tolerated.    Education: signs/sxs of depression, benefits of a positive support system, stress management techniques, class:  Relaxation, and class:  Stress and Your Health   Plan: Exercise, Spend time outdoors, Keep a positive mindset, and Enjoy family  Readiness to change: Action:  (Changing behavior)      OTHER CORE COMPONENTS     Tobacco:   Social History     Tobacco Use   Smoking Status Never   Smokeless Tobacco " Never       Tobacco Use Intervention:   N/A:  Patient is a non-smoker     Anginal Symptoms:  None   NTG use: No prescription    Blood pressure:    Restin/84 - 148/76    Exercise: 134/86 - 166/70    Goals: consistent BP < 130/80, reduced dietary sodium <2300mg, moderate intensity exercise >150 mins/wk, and medication compliance    Progression Toward Goals: Pt is progressing and showing improvement  toward the following goals:  hydrating throughout the day.  , Pt has not made progress toward the following goals: BP elevated and hypertensive response to exercise on the recumbent bike. , Patient will monitor sodium intake in the next 30 days, Will continue to educate and progress as tolerated.    Education:  understanding high blood pressure and it's relationship to CAD, low sodium diet and HTN, Education class: Understanding Heart Disease, and Education class:  Common Heart Medications  Plan: medication compliance, Avoid Processed foods, engage in regular exercise, check labels for sodium content, and monitor home BP  Readiness to change: Action:  (Changing behavior)

## 2024-02-12 ENCOUNTER — APPOINTMENT (OUTPATIENT)
Dept: LAB | Facility: CLINIC | Age: 84
End: 2024-02-12
Payer: MEDICARE

## 2024-02-12 DIAGNOSIS — K51.80 OTHER ULCERATIVE COLITIS WITHOUT COMPLICATION (HCC): ICD-10-CM

## 2024-02-12 PROCEDURE — 83993 ASSAY FOR CALPROTECTIN FECAL: CPT

## 2024-02-13 ENCOUNTER — APPOINTMENT (OUTPATIENT)
Dept: CARDIAC REHAB | Facility: CLINIC | Age: 84
End: 2024-02-13
Payer: MEDICARE

## 2024-02-14 ENCOUNTER — CLINICAL SUPPORT (OUTPATIENT)
Dept: CARDIAC REHAB | Facility: CLINIC | Age: 84
End: 2024-02-14
Payer: MEDICARE

## 2024-02-14 ENCOUNTER — APPOINTMENT (OUTPATIENT)
Dept: LAB | Facility: CLINIC | Age: 84
End: 2024-02-14
Payer: MEDICARE

## 2024-02-14 DIAGNOSIS — E87.1 HYPONATREMIA: ICD-10-CM

## 2024-02-14 DIAGNOSIS — Z95.2 S/P TAVR (TRANSCATHETER AORTIC VALVE REPLACEMENT): Primary | ICD-10-CM

## 2024-02-14 DIAGNOSIS — I10 ESSENTIAL HYPERTENSION: ICD-10-CM

## 2024-02-14 DIAGNOSIS — E03.9 ACQUIRED HYPOTHYROIDISM: ICD-10-CM

## 2024-02-14 LAB
SODIUM SERPL-SCNC: 134 MMOL/L (ref 135–147)
T4 FREE SERPL-MCNC: 0.87 NG/DL (ref 0.61–1.12)
TSH SERPL DL<=0.05 MIU/L-ACNC: 3.94 UIU/ML (ref 0.45–4.5)

## 2024-02-14 PROCEDURE — 84443 ASSAY THYROID STIM HORMONE: CPT

## 2024-02-14 PROCEDURE — 84439 ASSAY OF FREE THYROXINE: CPT

## 2024-02-14 PROCEDURE — 84295 ASSAY OF SERUM SODIUM: CPT

## 2024-02-14 PROCEDURE — 36415 COLL VENOUS BLD VENIPUNCTURE: CPT

## 2024-02-14 PROCEDURE — 93798 PHYS/QHP OP CAR RHAB W/ECG: CPT

## 2024-02-14 RX ORDER — AMLODIPINE BESYLATE 10 MG/1
10 TABLET ORAL DAILY
Qty: 90 TABLET | Refills: 1 | Status: SHIPPED | OUTPATIENT
Start: 2024-02-14

## 2024-02-15 ENCOUNTER — CLINICAL SUPPORT (OUTPATIENT)
Dept: CARDIAC REHAB | Facility: CLINIC | Age: 84
End: 2024-02-15
Payer: MEDICARE

## 2024-02-15 DIAGNOSIS — Z95.2 S/P TAVR (TRANSCATHETER AORTIC VALVE REPLACEMENT): Primary | ICD-10-CM

## 2024-02-15 LAB — CALPROTECTIN STL-MCNT: 447 UG/G (ref 0–120)

## 2024-02-15 PROCEDURE — 93798 PHYS/QHP OP CAR RHAB W/ECG: CPT

## 2024-02-16 ENCOUNTER — CLINICAL SUPPORT (OUTPATIENT)
Dept: CARDIAC REHAB | Facility: CLINIC | Age: 84
End: 2024-02-16
Payer: MEDICARE

## 2024-02-16 DIAGNOSIS — Z95.2 S/P TAVR (TRANSCATHETER AORTIC VALVE REPLACEMENT): Primary | ICD-10-CM

## 2024-02-16 PROCEDURE — 93798 PHYS/QHP OP CAR RHAB W/ECG: CPT

## 2024-02-20 ENCOUNTER — CLINICAL SUPPORT (OUTPATIENT)
Dept: CARDIAC REHAB | Facility: CLINIC | Age: 84
End: 2024-02-20
Payer: MEDICARE

## 2024-02-20 DIAGNOSIS — Z95.2 S/P TAVR (TRANSCATHETER AORTIC VALVE REPLACEMENT): Primary | ICD-10-CM

## 2024-02-20 PROCEDURE — 93798 PHYS/QHP OP CAR RHAB W/ECG: CPT

## 2024-02-21 DIAGNOSIS — I10 ESSENTIAL HYPERTENSION: ICD-10-CM

## 2024-02-21 RX ORDER — VALSARTAN 80 MG/1
TABLET ORAL
Qty: 90 TABLET | Refills: 1 | Status: SHIPPED | OUTPATIENT
Start: 2024-02-21

## 2024-02-22 ENCOUNTER — CLINICAL SUPPORT (OUTPATIENT)
Dept: CARDIAC REHAB | Facility: CLINIC | Age: 84
End: 2024-02-22
Payer: MEDICARE

## 2024-02-22 DIAGNOSIS — Z95.2 S/P TAVR (TRANSCATHETER AORTIC VALVE REPLACEMENT): Primary | ICD-10-CM

## 2024-02-22 PROCEDURE — 93798 PHYS/QHP OP CAR RHAB W/ECG: CPT

## 2024-02-23 ENCOUNTER — CLINICAL SUPPORT (OUTPATIENT)
Dept: CARDIAC REHAB | Facility: CLINIC | Age: 84
End: 2024-02-23
Payer: MEDICARE

## 2024-02-23 DIAGNOSIS — Z95.2 S/P TAVR (TRANSCATHETER AORTIC VALVE REPLACEMENT): Primary | ICD-10-CM

## 2024-02-23 PROCEDURE — 93798 PHYS/QHP OP CAR RHAB W/ECG: CPT

## 2024-02-26 ENCOUNTER — OFFICE VISIT (OUTPATIENT)
Dept: PULMONOLOGY | Facility: CLINIC | Age: 84
End: 2024-02-26
Payer: MEDICARE

## 2024-02-26 VITALS
BODY MASS INDEX: 25.36 KG/M2 | HEART RATE: 70 BPM | WEIGHT: 137.8 LBS | TEMPERATURE: 98.6 F | HEIGHT: 62 IN | OXYGEN SATURATION: 97 % | DIASTOLIC BLOOD PRESSURE: 70 MMHG | SYSTOLIC BLOOD PRESSURE: 140 MMHG

## 2024-02-26 DIAGNOSIS — I35.0 AORTIC STENOSIS, SEVERE: ICD-10-CM

## 2024-02-26 DIAGNOSIS — R05.3 CHRONIC COUGH: ICD-10-CM

## 2024-02-26 DIAGNOSIS — R93.89 ABNORMAL CT OF THE CHEST: Primary | ICD-10-CM

## 2024-02-26 PROCEDURE — 99214 OFFICE O/P EST MOD 30 MIN: CPT | Performed by: INTERNAL MEDICINE

## 2024-02-26 NOTE — ASSESSMENT & PLAN NOTE
Mrs. Ning Rodrigues had a CT angiogram on 8/30/2023 as part of the work-up prior to TAVR for severe aortic stenosis.  It showed  abnormal soft tissue dense material interposed between the esophagus and aorta. . Similar  soft tissue was seen on the old study from November of last year. There were some small mediastinal nodes anterior to the maxime and adjacent to the arch. These were present previously. The presence of these other nodes raised the suspicion that the  process anterior to the aorta could be related to adenopathy.  He also had opacification of the medial basal and posterior basal bronchi likely due to mucous.  She had no previous history of cancer and is a never smoker.  On clinical examination, her chest was clear to auscultation.  She did not have any significant neck or supraclavicular or axillary lymphadenopathy.The etiology of this soft tissue shadow was not clear.  This is most likely this is a lymph node. Her CT PET scan was unremarkable.  She subsequently underwent TAVR.  Currently she is doing well.  Her PFT is pending at this time.  I had a long discussion with her and her family and answered all their questions.

## 2024-02-26 NOTE — ASSESSMENT & PLAN NOTE
She has chronic cough for about 4 years which is productive of phlegm which is occasionally yellow.  She has history of sinus issues and postnasal drip.  She also has shortness of breath on exertion and her exercise tolerance is about a block on level ground.  She had no wheezing.   She is a never smoker.  On clinical examination, her chest was clear to auscultation.  She was on  Breo 100.  I had ordered a PFT which is waiting to be done.  I have advised her to continue with Breo regularly.

## 2024-02-26 NOTE — PROGRESS NOTES
Assessment/Plan:    Abnormal CT of the chest  Mrs. Ning Rodrigues had a CT angiogram on 8/30/2023 as part of the work-up prior to TAVR for severe aortic stenosis.  It showed  abnormal soft tissue dense material interposed between the esophagus and aorta. . Similar  soft tissue was seen on the old study from November of last year. There were some small mediastinal nodes anterior to the maxime and adjacent to the arch. These were present previously. The presence of these other nodes raised the suspicion that the  process anterior to the aorta could be related to adenopathy.  He also had opacification of the medial basal and posterior basal bronchi likely due to mucous.  She had no previous history of cancer and is a never smoker.  On clinical examination, her chest was clear to auscultation.  She did not have any significant neck or supraclavicular or axillary lymphadenopathy.The etiology of this soft tissue shadow was not clear.  This is most likely this is a lymph node. Her CT PET scan was unremarkable.  She subsequently underwent TAVR.  Currently she is doing well.  Her PFT is pending at this time.  I had a long discussion with her and her family and answered all their questions.     Chronic cough  She has chronic cough for about 4 years which is productive of phlegm which is occasionally yellow.  She has history of sinus issues and postnasal drip.  She also has shortness of breath on exertion and her exercise tolerance is about a block on level ground.  She had no wheezing.   She is a never smoker.  On clinical examination, her chest was clear to auscultation.  She was on  Breo 100.  I had ordered a PFT which is waiting to be done.  I have advised her to continue with Breo regularly.     Aortic stenosis, severe  She had severe arctic stenosis.  Recently she underwent TAVR is currently doing well.          Diagnoses and all orders for this visit:    Abnormal CT of the chest  -     CT chest without contrast;  Future    Chronic cough    Aortic stenosis, severe          Subjective:      Patient ID: Ning Rodrigues is a 84 y.o. female.    Mrs. Ning Rodrigues came for follow-up for her chronic cough and abnormal CT scan.  She had an abnormal CT scan and subsequent CT PET scan.  She needs a follow-up CT scan.  She denied any weight loss or anorexia.  She has inflammatory bowel disease and has been on treatment.  Currently she is on a tapering course of oral prednisone.  She is also on sulfasalazine.  Her cough is productive with occasional yellow phlegm.  She is not using any inhaler now.  She denied any undue shortness of breath.  No wheezing no chest pain.  She has occasional swelling of feet.  Her appetite has been good.  She has ambulatory dysfunction and uses cane.  She had severe aortic stenosis and underwent TAVR before.  She has no hoarseness of voice or dysphagia.        The following portions of the patient's history were reviewed and updated as appropriate: allergies, current medications, past family history, past medical history, past social history, past surgical history, and problem list.    Review of Systems   Constitutional:  Negative for appetite change, chills, fatigue and fever.   HENT:  Negative for hearing loss, rhinorrhea, sneezing, sore throat and trouble swallowing.    Respiratory:  Positive for cough (yellow phlegm). Negative for shortness of breath and wheezing.    Cardiovascular:  Negative for chest pain, palpitations and leg swelling.   Gastrointestinal:  Negative for abdominal pain, constipation, diarrhea, nausea and vomiting.   Genitourinary:  Negative for dysuria, frequency and urgency.   Musculoskeletal:  Positive for gait problem. Negative for arthralgias and back pain.   Skin:  Negative for rash.   Allergic/Immunologic: Negative for environmental allergies.   Neurological:  Positive for dizziness. Negative for syncope, light-headedness and headaches.   Psychiatric/Behavioral:  Negative for  "agitation, confusion and sleep disturbance. The patient is not nervous/anxious.          Objective:      /70 (BP Location: Right arm, Patient Position: Sitting, Cuff Size: Standard)   Pulse 70   Temp 98.6 °F (37 °C) (Tympanic)   Ht 5' 2\" (1.575 m)   Wt 62.5 kg (137 lb 12.8 oz)   SpO2 97%   BMI 25.20 kg/m²          Physical Exam  Constitutional:       General: She is not in acute distress.     Appearance: She is obese. She is not ill-appearing, toxic-appearing or diaphoretic.   HENT:      Head: Normocephalic.      Mouth/Throat:      Mouth: Mucous membranes are moist.   Eyes:      General: No scleral icterus.     Conjunctiva/sclera: Conjunctivae normal.   Cardiovascular:      Rate and Rhythm: Normal rate and regular rhythm.      Heart sounds: Normal heart sounds. No murmur heard.  Pulmonary:      Effort: Pulmonary effort is normal. No respiratory distress.      Breath sounds: Normal breath sounds. No stridor. No wheezing, rhonchi or rales.   Chest:      Chest wall: No tenderness.   Abdominal:      General: Bowel sounds are normal.      Palpations: Abdomen is soft.      Tenderness: There is no abdominal tenderness. There is no guarding.   Musculoskeletal:      Cervical back: Neck supple. No rigidity.      Right lower leg: No edema.      Left lower leg: No edema.   Lymphadenopathy:      Cervical: No cervical adenopathy.   Skin:     Coloration: Skin is not jaundiced or pale.      Findings: No rash.   Neurological:      Mental Status: She is alert and oriented to person, place, and time.      Gait: Gait abnormal (uses cane).   Psychiatric:         Mood and Affect: Mood normal.         Behavior: Behavior normal.         Thought Content: Thought content normal.         Judgment: Judgment normal.      I spent 30 minutes of time taking this patient with complex medical issues the majority of this time was spent directly with the patient counseling as well as coordinating care.    "

## 2024-02-27 ENCOUNTER — CLINICAL SUPPORT (OUTPATIENT)
Dept: CARDIAC REHAB | Facility: CLINIC | Age: 84
End: 2024-02-27
Payer: MEDICARE

## 2024-02-27 DIAGNOSIS — Z95.2 S/P TAVR (TRANSCATHETER AORTIC VALVE REPLACEMENT): Primary | ICD-10-CM

## 2024-02-27 PROCEDURE — 93798 PHYS/QHP OP CAR RHAB W/ECG: CPT

## 2024-02-28 ENCOUNTER — TELEPHONE (OUTPATIENT)
Dept: HEMATOLOGY ONCOLOGY | Facility: CLINIC | Age: 84
End: 2024-02-28

## 2024-02-28 ENCOUNTER — OFFICE VISIT (OUTPATIENT)
Dept: HEMATOLOGY ONCOLOGY | Facility: CLINIC | Age: 84
End: 2024-02-28
Payer: MEDICARE

## 2024-02-28 VITALS
OXYGEN SATURATION: 97 % | DIASTOLIC BLOOD PRESSURE: 68 MMHG | WEIGHT: 138 LBS | HEIGHT: 62 IN | BODY MASS INDEX: 25.4 KG/M2 | TEMPERATURE: 98 F | SYSTOLIC BLOOD PRESSURE: 138 MMHG | HEART RATE: 67 BPM | RESPIRATION RATE: 17 BRPM

## 2024-02-28 DIAGNOSIS — D50.0 IRON DEFICIENCY ANEMIA DUE TO CHRONIC BLOOD LOSS: ICD-10-CM

## 2024-02-28 DIAGNOSIS — K51.90 ULCERATIVE COLITIS WITHOUT COMPLICATIONS, UNSPECIFIED LOCATION (HCC): Primary | ICD-10-CM

## 2024-02-28 DIAGNOSIS — E53.8 B12 DEFICIENCY: ICD-10-CM

## 2024-02-28 PROCEDURE — 99214 OFFICE O/P EST MOD 30 MIN: CPT | Performed by: INTERNAL MEDICINE

## 2024-02-28 NOTE — TELEPHONE ENCOUNTER
Confirmed with patient appt. On 4/3/24 with Dr. Govea. Patient said that Dr. Govea told her she would need to take some pills to get her hemoglobin back up. She is not sure what she is supposed to take. Please call her. Thank you!

## 2024-02-28 NOTE — TELEPHONE ENCOUNTER
Returned call to patient.  Confirmed with Dr. Govea that patient does not need to start on any new medications at this time.  He did send a msg to GI about possible  adjusting medication.  Patient will repeat labs in 1 month and will see Dr. Govea on 4/3

## 2024-02-28 NOTE — PROGRESS NOTES
Hematology Outpatient Follow - Up Note  Ning Rodrigues 84 y.o. female MRN: @ Encounter: [id number]        Date:  2/28/2024        Assessment/ Plan:    84-year-old female with history of ulcerative colitis, diverticulosis/diverticulitis, aortic stenosis status post TAVR on 11/7/2023 with subsequent admission to the hospital with symptomatic anemia and hemoglobin of 6.5 with microcytosis, she had been anemic and evaluated in July 2023, no evidence of leukopenia or thrombocytopenia    In November she was put on Plavix and aspirin after TAVR    Plavix was discontinued, she has normal vitamin B12, iron studies, folate at 10, normal RDW, ALT, AST, creatinine, total protein and albumin    Reticulocyte count was 4.9%    She had been on sulfasalazine for many years which could cause hemolytic anemia    I will order MARK, LDH, reticulocyte count, CBC in 1 month, she might need to reduce sulfasalazine and if no improvement she might need a bone marrow biopsy and aspirate    She had been on prednisone for colitis and positive fecal calprotectin        Labs and imaging studies are reviewed by ordering provider once results are available. If there are findings that need immediate attention, you will be contacted when results available.   Discussing results and the implication on your healthcare is best discussed in person at your follow-up visit.       HPI:    84-year-old female with multiple medical problem including history of ulcerative colitis, diverticulitis/diverticulosis, GERD, Low's esophagus, hypothyroidism, hypertension, aortic stenosis status post TAVR    She was seen initially on July 2023 with macrocytic anemia detected back on 1/9/2021 with hemoglobin of 10.9, , normal WBC and platelets, this has been getting worse gradually over time as on 7/2023 hemoglobin of 9.1,     Admitted to the hospital on 11/2023 with weakness after TAVR in November 2023 and put on aspirin as well as Plavix, reported melena,  hemoglobin of 7.6, CT scan showed findings suggestive of either infection versus inflammatory colitis or diverticulitis    Status post 1 unit of packed RBC    Ferritin 50, folic acid 10, vitamin B12 2000, reticulocyte count 4% normal creatinine, AST, ALT, total protein, albumin and bilirubin    Also she was initiated on prednisone tapering dose    She took iron pills with constipation    Plavix was discontinued and she maintained on aspirin 81 mg p.o. daily    On 2/9/2024 WBC 3.8, hemoglobin 9.1, , platelets 267,000, RDW 13.4, normal differential, calprotectin fecal is positive with a value of 447 indicating underlying inflammation that is why she was put on prednisone  Interval History:        Previous Treatment:         Test Results:    Imaging: No results found.    Labs:   Lab Results   Component Value Date    WBC 3.89 (L) 02/09/2024    HGB 9.1 (L) 02/09/2024    HCT 25.2 (L) 02/09/2024     (H) 02/09/2024     02/09/2024     Lab Results   Component Value Date    K 4.1 02/09/2024    CL 98 02/09/2024    CO2 26 02/09/2024    BUN 12 02/09/2024    CREATININE 0.68 02/09/2024    GLUCOSE 99 11/07/2023    GLUF 90 11/01/2023    CALCIUM 9.0 02/09/2024    CORRECTEDCA 8.8 12/01/2023    AST 21 02/09/2024    ALT 9 02/09/2024    ALKPHOS 72 02/09/2024    EGFR 80 02/09/2024       Lab Results   Component Value Date    IRON 66 11/17/2023    TIBC 259 11/17/2023    FERRITIN 50 11/17/2023       Lab Results   Component Value Date    [id number] 2,124 (H) 11/17/2023         ROS: Review of Systems   Constitutional:  Positive for appetite change. Negative for chills, diaphoresis, fatigue and unexpected weight change.   HENT:   Negative for mouth sores, nosebleeds, sore throat, trouble swallowing and voice change.    Eyes:  Negative for eye problems and icterus.   Respiratory:  Negative for chest tightness, cough, hemoptysis and wheezing.    Cardiovascular:  Negative for chest pain, leg swelling and palpitations.    Gastrointestinal:  Positive for abdominal pain and constipation. Negative for abdominal distention, blood in stool, diarrhea, nausea and vomiting.   Endocrine: Negative for hot flashes.   Genitourinary:  Negative for bladder incontinence, difficulty urinating, dyspareunia, dysuria and frequency.    Musculoskeletal:  Positive for arthralgias, back pain and gait problem. Negative for neck pain and neck stiffness.   Skin:  Negative for itching and rash.   Neurological:  Positive for gait problem. Negative for dizziness, headaches, numbness, seizures and speech difficulty.   Hematological:  Negative for adenopathy. Does not bruise/bleed easily.   Psychiatric/Behavioral:  Negative for decreased concentration, depression, sleep disturbance and suicidal ideas. The patient is not nervous/anxious.           Current Medications: Reviewed  Allergies: Reviewed  PMH/FH/SH:  Reviewed      Physical Exam:    Body surface area is 1.63 meters squared.    Wt Readings from Last 3 Encounters:   02/28/24 62.6 kg (138 lb)   02/26/24 62.5 kg (137 lb 12.8 oz)   02/09/24 61.2 kg (135 lb)        Temp Readings from Last 3 Encounters:   02/28/24 98 °F (36.7 °C) (Temporal)   02/26/24 98.6 °F (37 °C) (Tympanic)   12/19/23 98.2 °F (36.8 °C) (Tympanic)        BP Readings from Last 3 Encounters:   02/28/24 138/68   02/26/24 140/70   02/09/24 147/68         Pulse Readings from Last 3 Encounters:   02/28/24 67   02/26/24 70   02/09/24 78        Physical Exam  Vitals reviewed.   Constitutional:       General: She is not in acute distress.     Appearance: She is well-developed. She is not diaphoretic.   HENT:      Head: Normocephalic and atraumatic.   Eyes:      Conjunctiva/sclera: Conjunctivae normal.      Comments: Pale conjunctivae   Neck:      Trachea: No tracheal deviation.   Cardiovascular:      Rate and Rhythm: Normal rate and regular rhythm.      Heart sounds: No murmur heard.     No friction rub. No gallop.   Pulmonary:      Effort: Pulmonary  effort is normal. No respiratory distress.      Breath sounds: Normal breath sounds. No wheezing or rales.   Chest:      Chest wall: No tenderness.   Abdominal:      General: There is no distension.      Palpations: Abdomen is soft.      Tenderness: There is no abdominal tenderness.   Musculoskeletal:      Cervical back: Normal range of motion and neck supple.   Lymphadenopathy:      Cervical: No cervical adenopathy.   Skin:     General: Skin is warm and dry.      Coloration: Skin is not pale.      Findings: No erythema.   Neurological:      Mental Status: She is alert and oriented to person, place, and time.   Psychiatric:         Behavior: Behavior normal.         Thought Content: Thought content normal.         Judgment: Judgment normal.         ECO    Goals and Barriers:  Current Goal: Minimize effects of disease.   Barriers: None.      Patient's Capacity to Self Care:  Patient is able to self care.    Code Status: [unfilled]

## 2024-02-29 ENCOUNTER — CLINICAL SUPPORT (OUTPATIENT)
Dept: CARDIAC REHAB | Facility: CLINIC | Age: 84
End: 2024-02-29
Payer: MEDICARE

## 2024-02-29 DIAGNOSIS — Z95.2 S/P TAVR (TRANSCATHETER AORTIC VALVE REPLACEMENT): Primary | ICD-10-CM

## 2024-02-29 PROCEDURE — 93798 PHYS/QHP OP CAR RHAB W/ECG: CPT

## 2024-03-01 ENCOUNTER — CLINICAL SUPPORT (OUTPATIENT)
Dept: CARDIAC REHAB | Facility: CLINIC | Age: 84
End: 2024-03-01
Payer: MEDICARE

## 2024-03-01 DIAGNOSIS — Z95.2 S/P TAVR (TRANSCATHETER AORTIC VALVE REPLACEMENT): Primary | ICD-10-CM

## 2024-03-01 PROCEDURE — 93798 PHYS/QHP OP CAR RHAB W/ECG: CPT

## 2024-03-04 ENCOUNTER — HOSPITAL ENCOUNTER (OUTPATIENT)
Dept: INFUSION CENTER | Facility: CLINIC | Age: 84
Discharge: HOME | End: 2024-03-04
Payer: MEDICARE

## 2024-03-04 DIAGNOSIS — E53.8 B12 DEFICIENCY: Primary | ICD-10-CM

## 2024-03-04 PROCEDURE — 96372 THER/PROPH/DIAG INJ SC/IM: CPT

## 2024-03-04 RX ORDER — CYANOCOBALAMIN 1000 UG/ML
1000 INJECTION, SOLUTION INTRAMUSCULAR; SUBCUTANEOUS ONCE
OUTPATIENT
Start: 2024-04-01

## 2024-03-04 RX ORDER — CYANOCOBALAMIN 1000 UG/ML
1000 INJECTION, SOLUTION INTRAMUSCULAR; SUBCUTANEOUS ONCE
Status: COMPLETED | OUTPATIENT
Start: 2024-03-04 | End: 2024-03-04

## 2024-03-04 RX ADMIN — CYANOCOBALAMIN 1000 MCG: 1000 INJECTION, SOLUTION INTRAMUSCULAR at 15:30

## 2024-03-04 NOTE — PROGRESS NOTES
Pt given Vitamin B12 injection as ordered. Confirmed pts next appt for 4/2/24 @ 3:30pm @ [facility]. Noemi [facility].

## 2024-03-05 ENCOUNTER — APPOINTMENT (OUTPATIENT)
Dept: CARDIAC REHAB | Facility: CLINIC | Age: 84
End: 2024-03-05
Payer: MEDICARE

## 2024-03-06 ENCOUNTER — TELEPHONE (OUTPATIENT)
Dept: CARDIOLOGY CLINIC | Facility: CLINIC | Age: 84
End: 2024-03-06

## 2024-03-06 ENCOUNTER — PATIENT MESSAGE (OUTPATIENT)
Dept: ENDOCRINOLOGY | Facility: CLINIC | Age: 84
End: 2024-03-06

## 2024-03-06 NOTE — TELEPHONE ENCOUNTER
Pt left v/m at [place] office trying to find out who her provider is.     Returned call and left v/m to call office.

## 2024-03-06 NOTE — PATIENT COMMUNICATION
Patient came into  office requesting prophylaxis antibiotic prior to her dental exam in June, 2024. Asking if Dr. Glover can please prescribe.

## 2024-03-07 ENCOUNTER — APPOINTMENT (OUTPATIENT)
Dept: CARDIAC REHAB | Facility: CLINIC | Age: 84
End: 2024-03-07
Payer: MEDICARE

## 2024-03-07 ENCOUNTER — TELEPHONE (OUTPATIENT)
Dept: CARDIOLOGY CLINIC | Facility: CLINIC | Age: 84
End: 2024-03-07

## 2024-03-07 DIAGNOSIS — Z95.2 S/P TAVR (TRANSCATHETER AORTIC VALVE REPLACEMENT): Primary | ICD-10-CM

## 2024-03-07 RX ORDER — AMOXICILLIN 500 MG/1
2000 TABLET, FILM COATED ORAL ONCE
Qty: 4 TABLET | Refills: 1 | Status: SHIPPED | OUTPATIENT
Start: 2024-03-07 | End: 2024-03-07

## 2024-03-07 NOTE — TELEPHONE ENCOUNTER
Regarding: FW: No message from patient  Please Advise Thank you   Sondra gallego Dental note in media  ----- Message -----  From: Sondra Hackett  Sent: 3/6/2024   3:12 PM EST  To: [facility]  Subject: No message from patient                          ----- Message from Sondra Hackett sent at 3/6/2024  3:12 PM EST -----    This encounter does not contain a message from the patient.

## 2024-03-07 NOTE — PROGRESS NOTES
Cardiac Rehabilitation Plan of Care   90 Day Reassessment          Today's date: 3/7/2024   # of Exercise Sessions Completed: 31  Patient name: Ning Rodrigues      : 1940  Age: 84 y.o.       MRN: [id number]  Referring Physician: Felipe Buitrago DO  Cardiologist: Nehemias Glover MD  Provider: [facility]  Clinician: Naa Nowak MS, CEP, EPC    Dx:   Encounter Diagnosis   Name Primary?    S/P TAVR (transcatheter aortic valve replacement) Yes       Date of onset: 2023      SUMMARY OF PROGRESS:  Ning is compliant attending cardiac rehab exercise sessions 3x/wk post TAVR on 23 for symptomatic severe AS. She tolerates 30-35 mins at 1.3 - 2.1 METs. A light strength training component has been added to their exercise program. She is tolerating progression of intensity levels to maintain RPE 4-7. Resting often elevated /60 - 148/78 with Normal response to exercise reaching 130/68 - 168/80. NSR on tele observed. RHR 69 - 87  with Normal response to exercise reaching 89 -112. She has added home exercise which includes walking a block outside when the weather in nice. Ning reports feeling great from a cardiac standpoint. No cardiac complaints. She is maintaining her weight at 135.8 lbs. Patient has been working on dietary modifications with the goal of rare red/processed meats, low fat dairy, reduced added sugars and refined flours. She is limited with her diet due to having ulcerative colitis. The patient is a non-smoker. PHQ-9 and CHELSEA-7 were not reassessed d/t absence today. Most recent assessment found PHQ-9 at a 4 suggesting 1-4 = Minimal Depression and CHELSEA-7 at a 6 suggesting 5-9 = Mild anxiety. When addressed, the patient denies depression/anxiety. Patient reports excellent social/emotional support from her  and daughter. They were encouraged to discuss options for medical therapy and counseling with their PCP. Ning rates their stress as 4/10. Stress management techniques were reinforced.  Patient attends group educational classes on cardiac risk factor modification. Her exercise program will be progressed as tolerated to maintain RPE 4-6. The patient has the following personal goals he hopes to achieved by discharge: being able to walk around her neighborhood more, increase strength/endurance. They will continue to be educated on lifestyle modification and encouraged to supplement with a home exercise program as tolerated to reach the following goals in the next 30 days: monitor sodium intake and BP and prepare for discharge.      Medication compliance: Yes   Comments: Pt reports to be compliant with medications  Fall Risk: High   Comments: Patient uses walking assist device (walker/cane/rollator) and Reports a fall in the past 6 months    EKG Interpretation: NSR      EXERCISE ASSESSMENT and PLAN    Exercise Prescription:      Frequency: 3 days/week   Supplement with home exercise 2+ days/wk as tolerated     Minutes: 35-39         METS: 1.3 - 2.1           HR:    RPE: 4-7         Modalities: UBE, NuStep, and Room walking      30 Day Goals for Exercise Progression:    Frequency: 3 days/week of cardiac rehab       Supplement with home exercise 2+ days/wk as tolerated    Minutes: 35-40                              >150 mins/wk of moderate intensity exercise   METS: 1.5-2.5   HR: RHR +30bpm    RPE: 4-5   Modalities: UBE, NuStep, and Room walking    Strength trainin-15 repetitions  1-2 sets per modality    Modalities: Arm Curl, Sit to Stands, and leg extensions    Home Exercise:  walking around her block when the weather is nice enough    Goals: improved DASI score by 10%, Increase in exercise capacity by 40% - based on peak METs tolerated in cardiac rehab exercise session, Exercise 5 days/wk, >150mins/wk of moderate intensity exercise, Improved 6MWT distance by 10%, Resume ADLs with increased strength, Attend Rehab regularly, Decrease sitting time, and Start a walking program    Progression  "Toward Goals:  Pt is progressing and showing improvement  toward the following goals:  walking at home, tolerating exercise well.  , Patient will prepare for discharge in the next 30 days, Will continue to educate and progress as tolerated.    Education: benefit of exercise for CAD risk factors, home exercise guidelines, AHA guidelines to achieve >150 mins/wk of moderate exercise, RPE scale, Group class: Risk Factors for Heart Disease, and physical activity/exercise in extreme weather conditions   Plan:education on home exercise guidelines and home exercise 30+ mins 2 days opposite CR  Readiness to change: Action:  (Changing behavior)      NUTRITION ASSESSMENT AND PLAN    Weight control:    Starting weight: 135 lbs   Current weight:   135.8 lbs       Diabetes: N/A  A1c:     last measured:     Lipid management: Discussed diet and lipid management and Last lipid profile 9/20/23  Chol 134  TRG 68  HDL 48  LDL 72    Goals:Improved Rate Your Plate score  >64, eat 6 or more servings of grain products per day, eat whole grain breads, brown rice and whole grain cereals, eat 5 or more servings of fruits and vegetables a day, dine out less than once per week or choose low fat restaurant meals, eat red meat once a week or less, Do not eat fried foods, use \"light\" tub margarine on bread, potatoes and vegetables, do not use added salts,  choose low sodium canned, frozen, packaged foods, and do not eat salty snacks    Measurable goals were based Rate Your Plate Dietary Self-Assessment. These are the areas in which the patient could score higher on the assessment.  Goals include recommendations for a heart healthy diet based on American Heart Association.    Progression Toward Goals: Pt is progressing and showing improvement  toward the following goals:  maintaining her weight, drinking oatmilk, not using butter.  , Patient will continue with dietary changes in the next 30 days, Will continue to educate and progress as tolerated. " "Patient is limited with diet d/t Ulcerative Colitis    Education: heart healthy eating  low sodium diet  hydration  nutrition for  lipid management  education class: Heart Healthy Eating  education class:  Label Reading  Plan: eat red meat once a week or less, choose lean red meat, never/rarely eat fried foods, use \"light\" tub margarine, eat healthy snacks like fruit, pretzels, and low fat crackers, never/rarely add salt to food when cooking or at the table, choose low sodium canned, frozen, packaged foods or rarely eat these foods, and rarely/never eat salty snacks  Readiness to change: Action:  (Changing behavior)      PSYCHOSOCIAL ASSESSMENT AND PLAN    Emotional:  Depression assessment:  PHQ-9 = 4  1-4 = Minimal Depression            Anxiety measure:  CHELSEA-7 = 6  5-9 = Mild anxiety  Self-reported stress level:  4  Social support: Patient reports excellent emotional/social support from her family    Goals:  Reduce perceived stress to 1-3/10, PHQ-9 - reduced severity by one level, Physical Fitness in [facility] Score < 3, Overall Health in [facility] Score < 3, and increased energy    Progression Toward Goals: Pt is progressing and showing improvement  toward the following goals:  denies anxiety and depression, good social support.  , Patient will have PHQ-9 and CHELSEA-7 reassessed in the next 30 days, Will continue to educate and progress as tolerated.    Education: signs/sxs of depression, benefits of a positive support system, stress management techniques, class:  Relaxation, and class:  Stress and Your Health   Plan: Exercise, Spend time outdoors, Keep a positive mindset, and Enjoy family  Readiness to change: Action:  (Changing behavior)      OTHER CORE COMPONENTS     Tobacco:   Social History     Tobacco Use   Smoking Status Never   Smokeless Tobacco Never       Tobacco Use Intervention:   N/A:  Patient is a non-smoker     Anginal Symptoms:  None   NTG use: No prescription    Blood pressure:    Restin/60- " 148/78    Exercise: 130/68- 168/80    Goals: consistent BP < 130/80, reduced dietary sodium <2300mg, moderate intensity exercise >150 mins/wk, and medication compliance    Progression Toward Goals: Pt is progressing and showing improvement  toward the following goals:  hydrating throughout the day.  , Pt has not made progress toward the following goals: BP often elevated. , Patient will monitor sodium intake and BP in the next 30 days, Will continue to educate and progress as tolerated.    Education:  understanding high blood pressure and it's relationship to CAD, low sodium diet and HTN, Education class: Understanding Heart Disease, and Education class:  Common Heart Medications  Plan: medication compliance, Avoid Processed foods, engage in regular exercise, check labels for sodium content, and monitor home BP  Readiness to change: Action:  (Changing behavior)

## 2024-03-08 ENCOUNTER — APPOINTMENT (OUTPATIENT)
Dept: CARDIAC REHAB | Facility: CLINIC | Age: 84
End: 2024-03-08
Payer: MEDICARE

## 2024-03-12 ENCOUNTER — CLINICAL SUPPORT (OUTPATIENT)
Dept: CARDIAC REHAB | Facility: CLINIC | Age: 84
End: 2024-03-12
Payer: MEDICARE

## 2024-03-12 DIAGNOSIS — Z95.2 S/P TAVR (TRANSCATHETER AORTIC VALVE REPLACEMENT): Primary | ICD-10-CM

## 2024-03-12 PROCEDURE — 93798 PHYS/QHP OP CAR RHAB W/ECG: CPT

## 2024-03-13 ENCOUNTER — HOSPITAL ENCOUNTER (OUTPATIENT)
Dept: CT IMAGING | Facility: HOSPITAL | Age: 84
Discharge: HOME | End: 2024-03-13
Attending: INTERNAL MEDICINE
Payer: MEDICARE

## 2024-03-13 DIAGNOSIS — R93.89 ABNORMAL CT OF THE CHEST: ICD-10-CM

## 2024-03-13 PROCEDURE — 71250 CT THORAX DX C-: CPT

## 2024-03-14 ENCOUNTER — CLINICAL SUPPORT (OUTPATIENT)
Dept: CARDIAC REHAB | Facility: CLINIC | Age: 84
End: 2024-03-14
Payer: MEDICARE

## 2024-03-14 DIAGNOSIS — Z95.2 S/P TAVR (TRANSCATHETER AORTIC VALVE REPLACEMENT): Primary | ICD-10-CM

## 2024-03-14 PROCEDURE — 93798 PHYS/QHP OP CAR RHAB W/ECG: CPT

## 2024-03-15 ENCOUNTER — CLINICAL SUPPORT (OUTPATIENT)
Dept: CARDIAC REHAB | Facility: CLINIC | Age: 84
End: 2024-03-15
Payer: MEDICARE

## 2024-03-15 DIAGNOSIS — Z95.2 S/P TAVR (TRANSCATHETER AORTIC VALVE REPLACEMENT): Primary | ICD-10-CM

## 2024-03-15 PROCEDURE — 93798 PHYS/QHP OP CAR RHAB W/ECG: CPT

## 2024-03-19 ENCOUNTER — CLINICAL SUPPORT (OUTPATIENT)
Dept: CARDIAC REHAB | Facility: CLINIC | Age: 84
End: 2024-03-19
Payer: MEDICARE

## 2024-03-19 DIAGNOSIS — Z95.2 S/P TAVR (TRANSCATHETER AORTIC VALVE REPLACEMENT): Primary | ICD-10-CM

## 2024-03-19 PROCEDURE — 93798 PHYS/QHP OP CAR RHAB W/ECG: CPT

## 2024-03-21 ENCOUNTER — CLINICAL SUPPORT (OUTPATIENT)
Dept: CARDIAC REHAB | Facility: CLINIC | Age: 84
End: 2024-03-21
Payer: MEDICARE

## 2024-03-21 DIAGNOSIS — Z95.2 S/P TAVR (TRANSCATHETER AORTIC VALVE REPLACEMENT): Primary | ICD-10-CM

## 2024-03-21 PROCEDURE — 93798 PHYS/QHP OP CAR RHAB W/ECG: CPT

## 2024-03-21 NOTE — PROGRESS NOTES
Cardiac Rehabilitation Plan of Care   DISCHARGE            Today's date: 3/21/2024   # of Exercise Sessions Completed: 36  Patient name: Ning Rodrigues      : 1940  Age: 84 y.o.       MRN: [id number]  Referring Physician: Felipe Buitrago DO  Cardiologist: Nehemias Glover MD  Provider: [facility]  Clinician: Naa Nowak MS, CEP, EPC    Dx:   Encounter Diagnosis   Name Primary?    S/P TAVR (transcatheter aortic valve replacement) Yes       Date of onset: 2023      SUMMARY OF PROGRESS:  Discharge note for Ning who attended post TAVR on 23 for symptomatic severe AS. She increased her 6 MWT distance by 5.6% walking 507 ft, 30 feet further than her initial test. Her exercise tolerance (max METs in tolerated in cardiac rehab) increased by 150%. She had a 23.3% improvement in the [facility] activity estimated MET level with ADLs and physical activity. Her [facility] QOL improved by 15.0%. PHQ-9 score decreased from 5 to 3. CHELSEA-7 decreased from 4 to 0. Her weight increased by 1 pounds, which is a healthy BMI range for Ning. Rate Your Plate score decreased from 64 to 60. Ning tolerates 40 mins at 1.34 - 2.05 METs plus wt training. NSR on telemetry. RHR 71 - 98, ExHR 97 - 107. Resting /66 - 150/74 with flat response to exercise reaching 130/80 - 150/76. The patient attended group educational classes on risk factor reduction and healthy eating. Discharge plans include continue walking around her block and going on walks with her . Encouraged the patient  to continue a disciplined exercise regimen: Frequency: 4-6 days/wk, Intensity: RPE 4-5, Time: 40-50 mins daily, 150-200 mins/wk.  The patient was encouraged to remain compliant with medications and follow up with cardiologist with any cardiac symptoms and medication management.        Medication compliance: Yes   Comments: Pt reports to be compliant with medications  Fall Risk: High   Comments: Patient uses walking assist device  (walker/cane/rollator) and Reports a fall in the past 6 months    EKG Interpretation: NSR      EXERCISE ASSESSMENT and PLAN    Exercise Prescription:      Frequency: 3 days/week   Supplement with home exercise 2+ days/wk as tolerated     Minutes: 40         METS: 1.34 - 2.05           HR: 97- 107   RPE: 4-6         Modalities: UBE, NuStep, and Room walking     Exercise Progression after Discharge:    Frequency: 3-5 days of gym or home exercise   Minutes: 30-50  >150 mins/wk of moderate intensity exercise   METS: 1 - 2.5   HR: 68 - 115    RPE: 4-6   Modalities: UBE, NuStep, and Room walking      Strength trainin-15 repetitions  1-2 sets per modality    Modalities: Arm Curl, Sit to Stands, and leg extensions    Home Exercise:  walking around her block when the weather is nice enough    Goals: improved DASI score by 10%, Increase in exercise capacity by 40% - based on peak METs tolerated in cardiac rehab exercise session, Exercise 5 days/wk, >150mins/wk of moderate intensity exercise, Improved 6MWT distance by 10%, Resume ADLs with increased strength, Attend Rehab regularly, Decrease sitting time, and Start a walking program    Progression Toward Goals:  Goals not met: 6MWT improved by 5.6% (30ft).  , Goals met: increased METs tolerated in CR, attended rehab regularly, increased ADLs, DASI score improved by 23.3%, walking at home., Patient will be encouraged to focus on lifestyle modifications following discharge.    Education: benefit of exercise for CAD risk factors, home exercise guidelines, AHA guidelines to achieve >150 mins/wk of moderate exercise, RPE scale, Group class: Risk Factors for Heart Disease, exercise instructions/guidelines for discharge , and physical activity/exercise in extreme weather conditions   Plan:education on home exercise guidelines and home exercise 30+ mins 2 days opposite CR  Readiness to change: Maintenance: (Maintaining the behavior change)      NUTRITION ASSESSMENT AND  "PLAN    Weight control:    Starting weight: 135 lbs   Current weight:   136.2 lbs       Diabetes: N/A  A1c:     last measured:     Lipid management: Discussed diet and lipid management and Last lipid profile 9/20/23  Chol 134  TRG 68  HDL 48  LDL 72    Goals:Improved Rate Your Plate score  >64, eat 6 or more servings of grain products per day, eat whole grain breads, brown rice and whole grain cereals, eat 5 or more servings of fruits and vegetables a day, dine out less than once per week or choose low fat restaurant meals, eat red meat once a week or less, Do not eat fried foods, use \"light\" tub margarine on bread, potatoes and vegetables, do not use added salts,  choose low sodium canned, frozen, packaged foods, and do not eat salty snacks    Measurable goals were based Rate Your Plate Dietary Self-Assessment. These are the areas in which the patient could score higher on the assessment.  Goals include recommendations for a heart healthy diet based on American Heart Association.    Progression Toward Goals: Goals not met: Rate Your Plate score declined from 64 to 60.  , Goals met: weight maintenance, following dietary restrictions., Patient will be encouraged to focus on lifestyle modifications following discharge. Patient is limited with diet d/t Ulcerative Colitis    Education: heart healthy eating  low sodium diet  hydration  nutrition for  lipid management  education class: Heart Healthy Eating  education class:  Label Reading  Plan: eat red meat once a week or less, choose lean red meat, never/rarely eat fried foods, use \"light\" tub margarine, eat healthy snacks like fruit, pretzels, and low fat crackers, never/rarely add salt to food when cooking or at the table, choose low sodium canned, frozen, packaged foods or rarely eat these foods, and rarely/never eat salty snacks  Readiness to change: Maintenance: (Maintaining the behavior change)      PSYCHOSOCIAL ASSESSMENT AND PLAN    Emotional:  Depression " assessment:  PHQ-9 = 3  1-4 = Minimal Depression            Anxiety measure:  CHELSEA-7 = 0  5-9 = Mild anxiety  Self-reported stress level:  4  Social support: Patient reports excellent emotional/social support from her family    Goals:  Reduce perceived stress to 1-3/10, PHQ-9 - reduced severity by one level, Physical Fitness in [facility] Score < 3, Overall Health in [facility] Score < 3, and increased energy    Progression Toward Goals: Goals met: improved PHQ-9 and CHELSEA-7 score, [place] score improved by 15%., Patient will be encouraged to focus on lifestyle modifications following discharge.    Education: signs/sxs of depression, benefits of a positive support system, stress management techniques, class:  Relaxation, and class:  Stress and Your Health   Plan: Exercise, Spend time outdoors, Keep a positive mindset, and Enjoy family  Readiness to change: Maintenance: (Maintaining the behavior change)      OTHER CORE COMPONENTS     Tobacco:   Social History     Tobacco Use   Smoking Status Never   Smokeless Tobacco Never       Tobacco Use Intervention:   N/A:  Patient is a non-smoker     Anginal Symptoms:  None   NTG use: No prescription    Blood pressure:    Restin/66 - 150/74   Exercise: 130/80 - 150/76    Goals: consistent BP < 130/80, reduced dietary sodium <2300mg, moderate intensity exercise >150 mins/wk, and medication compliance    Progression Toward Goals: Goals not met: BP often elevated.  , Goals met: hydrating throughout the day., Patient will be encouraged to focus on lifestyle modifications following discharge.    Education:  understanding high blood pressure and it's relationship to CAD, low sodium diet and HTN, Education class: Understanding Heart Disease, and Education class:  Common Heart Medications  Plan: medication compliance, Avoid Processed foods, engage in regular exercise, check labels for sodium content, and monitor home BP  Readiness to change: Maintenance: (Maintaining the behavior  change)

## 2024-03-22 ENCOUNTER — APPOINTMENT (OUTPATIENT)
Dept: LAB | Facility: CLINIC | Age: 84
End: 2024-03-22
Payer: MEDICARE

## 2024-03-22 DIAGNOSIS — D50.0 IRON DEFICIENCY ANEMIA DUE TO CHRONIC BLOOD LOSS: ICD-10-CM

## 2024-03-22 DIAGNOSIS — K51.90 ULCERATIVE COLITIS WITHOUT COMPLICATIONS, UNSPECIFIED LOCATION (HCC): ICD-10-CM

## 2024-03-22 DIAGNOSIS — E53.8 B12 DEFICIENCY: ICD-10-CM

## 2024-03-22 LAB
BASOPHILS # BLD AUTO: 0.03 THOUSANDS/ÂΜL (ref 0–0.1)
BASOPHILS NFR BLD AUTO: 1 % (ref 0–1)
DAT POLY-SP REAG RBC QL: NEGATIVE
EOSINOPHIL # BLD AUTO: 0.22 THOUSAND/ÂΜL (ref 0–0.61)
EOSINOPHIL NFR BLD AUTO: 6 % (ref 0–6)
ERYTHROCYTE [DISTWIDTH] IN BLOOD BY AUTOMATED COUNT: 13.4 % (ref 11.6–15.1)
FERRITIN SERPL-MCNC: 52 NG/ML (ref 11–307)
FOLATE SERPL-MCNC: 11.5 NG/ML
HCT VFR BLD AUTO: 28 % (ref 34.8–46.1)
HGB BLD-MCNC: 9 G/DL (ref 11.5–15.4)
IMM GRANULOCYTES # BLD AUTO: 0.02 THOUSAND/UL (ref 0–0.2)
IMM GRANULOCYTES NFR BLD AUTO: 1 % (ref 0–2)
IRON SATN MFR SERPL: 24 % (ref 15–50)
IRON SERPL-MCNC: 65 UG/DL (ref 50–212)
LYMPHOCYTES # BLD AUTO: 1.14 THOUSANDS/ÂΜL (ref 0.6–4.47)
LYMPHOCYTES NFR BLD AUTO: 29 % (ref 14–44)
MCH RBC QN AUTO: 35.4 PG (ref 26.8–34.3)
MCHC RBC AUTO-ENTMCNC: 32.1 G/DL (ref 31.4–37.4)
MCV RBC AUTO: 110 FL (ref 82–98)
MONOCYTES # BLD AUTO: 0.5 THOUSAND/ÂΜL (ref 0.17–1.22)
MONOCYTES NFR BLD AUTO: 13 % (ref 4–12)
NEUTROPHILS # BLD AUTO: 1.98 THOUSANDS/ÂΜL (ref 1.85–7.62)
NEUTS SEG NFR BLD AUTO: 50 % (ref 43–75)
NRBC BLD AUTO-RTO: 0 /100 WBCS
PLATELET # BLD AUTO: 230 THOUSANDS/UL (ref 149–390)
PMV BLD AUTO: 9.9 FL (ref 8.9–12.7)
RBC # BLD AUTO: 2.54 MILLION/UL (ref 3.81–5.12)
RETICS # AUTO: NORMAL 10*3/UL (ref 14097–95744)
RETICS # CALC: 1.55 % (ref 0.37–1.87)
TIBC SERPL-MCNC: 271 UG/DL (ref 250–450)
UIBC SERPL-MCNC: 206 UG/DL (ref 155–355)
WBC # BLD AUTO: 3.89 THOUSAND/UL (ref 4.31–10.16)

## 2024-03-22 PROCEDURE — 82746 ASSAY OF FOLIC ACID SERUM: CPT

## 2024-03-22 PROCEDURE — 83540 ASSAY OF IRON: CPT

## 2024-03-22 PROCEDURE — 82728 ASSAY OF FERRITIN: CPT

## 2024-03-22 PROCEDURE — 86880 COOMBS TEST DIRECT: CPT

## 2024-03-22 PROCEDURE — 83550 IRON BINDING TEST: CPT

## 2024-03-22 PROCEDURE — 85025 COMPLETE CBC W/AUTO DIFF WBC: CPT

## 2024-03-22 PROCEDURE — 85045 AUTOMATED RETICULOCYTE COUNT: CPT

## 2024-03-22 PROCEDURE — 36415 COLL VENOUS BLD VENIPUNCTURE: CPT

## 2024-04-02 ENCOUNTER — HOSPITAL ENCOUNTER (OUTPATIENT)
Dept: INFUSION CENTER | Facility: CLINIC | Age: 84
Discharge: HOME | End: 2024-04-02
Payer: MEDICARE

## 2024-04-02 DIAGNOSIS — E53.8 B12 DEFICIENCY: Primary | ICD-10-CM

## 2024-04-02 PROCEDURE — 96372 THER/PROPH/DIAG INJ SC/IM: CPT

## 2024-04-02 RX ORDER — CYANOCOBALAMIN 1000 UG/ML
1000 INJECTION, SOLUTION INTRAMUSCULAR; SUBCUTANEOUS ONCE
Status: COMPLETED | OUTPATIENT
Start: 2024-04-02 | End: 2024-04-02

## 2024-04-02 RX ORDER — CYANOCOBALAMIN 1000 UG/ML
1000 INJECTION, SOLUTION INTRAMUSCULAR; SUBCUTANEOUS ONCE
OUTPATIENT
Start: 2024-04-29

## 2024-04-02 RX ADMIN — CYANOCOBALAMIN 1000 MCG: 1000 INJECTION, SOLUTION INTRAMUSCULAR at 15:34

## 2024-04-02 NOTE — PROGRESS NOTES
Ning Rodrigues  tolerated treatment well with no complications.      Ning Rodrigues is aware of future appt on 4/30/24 @3:30pm  at [facility].     AVS printed and given to Ning Rodrigues  No (Declined by Ning Rodrigues)

## 2024-04-03 ENCOUNTER — OFFICE VISIT (OUTPATIENT)
Dept: HEMATOLOGY ONCOLOGY | Facility: CLINIC | Age: 84
End: 2024-04-03
Payer: MEDICARE

## 2024-04-03 ENCOUNTER — TELEPHONE (OUTPATIENT)
Dept: HEMATOLOGY ONCOLOGY | Facility: CLINIC | Age: 84
End: 2024-04-03

## 2024-04-03 VITALS
WEIGHT: 137 LBS | HEIGHT: 62 IN | DIASTOLIC BLOOD PRESSURE: 68 MMHG | TEMPERATURE: 97.9 F | HEART RATE: 80 BPM | OXYGEN SATURATION: 96 % | SYSTOLIC BLOOD PRESSURE: 126 MMHG | BODY MASS INDEX: 25.21 KG/M2 | RESPIRATION RATE: 17 BRPM

## 2024-04-03 DIAGNOSIS — D53.9 MACROCYTIC ANEMIA: Primary | ICD-10-CM

## 2024-04-03 DIAGNOSIS — K51.90 ULCERATIVE COLITIS WITHOUT COMPLICATIONS, UNSPECIFIED LOCATION (HCC): ICD-10-CM

## 2024-04-03 DIAGNOSIS — E53.8 B12 DEFICIENCY: ICD-10-CM

## 2024-04-03 PROCEDURE — 99213 OFFICE O/P EST LOW 20 MIN: CPT | Performed by: INTERNAL MEDICINE

## 2024-04-03 NOTE — PROGRESS NOTES
"  Hematology/Oncology Outpatient Follow- up Note  Ning Rodrigues 84 y.o. female MRN: @ Encounter: [id number]        Date:  4/3/2024        Assessment / Plan:     \"84-year-old female with history of ulcerative colitis, diverticulosis/diverticulitis, aortic stenosis status post TAVR on 11/7/2023 with subsequent admission to the hospital with symptomatic anemia and hemoglobin of 6.5 with microcytosis, she had been anemic and evaluated in July 2023, no evidence of leukopenia or thrombocytopenia\"     \"In November she was put on Plavix and aspirin after TAVR\"     \"Plavix was discontinued, she has normal vitamin B12, iron studies, folate at 10, normal RDW, ALT, AST, creatinine, total protein and albumin\"     \"Reticulocyte count was 4.24 % on 11/17/23\"     She had been on sulfasalazine for many years which could cause hemolytic anemia. Patient takes Sulfasalazine 500 mg ( 2 tablets by mouth twice daily) and this controls her Colitis. MARK was negative.   Reticulocyte count was 1.55 % on 3/22/24    Macrocytic anemia, B 12 deficiency and Ulcerative Colitis without complication   Continue B 12 monthly injections at Infusion center  Follow up in 6 months with repeat CBC, B 12 , Folate and Iron Panel.  Patient was told if her FE SAT and Ferritin become low it's possible in the future she may need IV Iron transfusions  Call with any questions or concerns.      HPI:  84-year-old female with multiple medical problem including history of ulcerative colitis, diverticulitis/diverticulosis, GERD, Low's esophagus, hypothyroidism, hypertension, aortic stenosis status post TAVR     Per Dr. Govea Last note: \"She was seen initially on July 2023 with macrocytic anemia detected back on 1/9/2021 with hemoglobin of 10.9, , normal WBC and platelets, this has been getting worse gradually over time as on 7/2023 hemoglobin of 9.1, \"     \"Admitted to the hospital on 11/2023 with weakness after TAVR in November 2023 and put on " "aspirin as well as Plavix, reported melena, hemoglobin of 7.6, CT scan showed findings suggestive of either infection versus inflammatory colitis or diverticulitis\"     \"Status post 1 unit of packed RBC\"     \"Ferritin 50, folic acid 10, vitamin B12 2000, reticulocyte count 4% normal creatinine, AST, ALT, total protein, albumin and bilirubin\"     \"Also she was initiated on prednisone tapering dose\"     \"She took iron pills with constipation\"     \"Plavix was discontinued and she maintained on aspirin 81 mg p.o. daily\"     \"On 2/9/2024 WBC 3.8, hemoglobin 9.1, , platelets 267,000, RDW 13.4, normal differential, calprotectin fecal is positive with a value of 447 indicating underlying inflammation that is why she was put on prednisone\"    3/13/24- CT chest with Contrast:1.  Mild to moderate diffuse bronchiectasis with bronchial mucous plugging in the medial basal segment of the right lower lobe. 2.  Stable sub-6 mm lung nodules as described above.  3.  Stable paraesophageal lymph nodes measuring up to 1.0 cm. 4.  Small hiatal hernia. 5.  Cholelithiasis.       3/22/24- MARK - Negative, Folate- 11.5, FE SAT- 24, Ferritin 52   CBC-  HG-9.0, HCT 28,  , Platelets 230.    Retic Ct PCT 1.55 %       Interval History:  Patient reports doing well. Denies any weight loss, decreased appetite, shortness of breath or chest pain. Patient is currently getting B 12 injections once a month. Patient is currently taking Baby Aspiring 81 mg daily.  Patient takes Sulfasalazine 500 mg  ( 2 tablets by mouth twice a day) for colitis. Admit she does have a cough that is wet but reports this has been for years.      Previous Hematologic/ Oncologic History:    Oncology History    No history exists.       Test Results:    Imaging: CT chest without contrast    Result Date: 3/19/2024  Narrative: CT CHEST WITHOUT IV CONTRAST INDICATION: R93.89: Abnormal findings on diagnostic imaging of other specified body structures. COMPARISON: CT dated " 8/30/2023 TECHNIQUE: CT examination of the chest was performed without intravenous contrast. Multiplanar 2D reformatted images were created from the source data. This examination, like all CT scans performed in the [facility], was performed utilizing techniques to minimize radiation dose exposure, including the use of iterative reconstruction and automated exposure control. Radiation dose length product (DLP) for this visit: 213.26 mGy-cm FINDINGS: LUNGS: Mild to moderate diffuse bronchiectasis with bronchial mucous plugging involving the medial basal segment of the right lower lobe (series 302, image #176). Images are degraded by respiratory motion. Lung nodules annotated on series 302): 3 mm right middle lobe juxtapleural, perivascular nodule on image #115, stable. 4 mm right middle lobe juxtapleural nodule on image #121, stable. Benign calcified granuloma at the medial right lower lobe. PLEURA: Unremarkable. HEART/GREAT VESSELS: Moderate to severe coronary artery calcification. No pericardial effusion. No thoracic aortic aneurysm. Status post TAVR. MEDIASTINUM AND SHALOM: Calcified subcarinal and hilar mediastinal lymph nodes, consistent with history of granulomatous infection. Stable 1.0 cm and 0.8 cm paraesophageal lymph nodes adjacent to the descending thoracic aorta (series 301, image #58). CHEST WALL AND LOWER NECK: Unremarkable. VISUALIZED STRUCTURES IN THE UPPER ABDOMEN: Small hiatal hernia. Cholelithiasis. OSSEOUS STRUCTURES: Spinal degenerative changes are noted. No acute fracture or destructive osseous lesion. Stable lucency at T12, unchanged and probably a hemangioma.     Impression: 1.  Mild to moderate diffuse bronchiectasis with bronchial mucous plugging in the medial basal segment of the right lower lobe. 2.  Stable sub-6 mm lung nodules as described above. 3.  Stable paraesophageal lymph nodes measuring up to 1.0 cm. 4.  Small hiatal hernia. 5.  Cholelithiasis. Workstation  performed: [id number]             Labs:   Lab Results   Component Value Date    WBC 3.89 (L) 03/22/2024    HGB 9.0 (L) 03/22/2024    HCT 28.0 (L) 03/22/2024     (H) 03/22/2024     03/22/2024     Lab Results   Component Value Date    K 4.1 02/09/2024    CL 98 02/09/2024    CO2 26 02/09/2024    BUN 12 02/09/2024    CREATININE 0.68 02/09/2024    GLUCOSE 99 11/07/2023    GLUF 90 11/01/2023    CALCIUM 9.0 02/09/2024    CORRECTEDCA 8.8 12/01/2023    AST 21 02/09/2024    ALT 9 02/09/2024    ALKPHOS 72 02/09/2024    EGFR 80 02/09/2024         Lab Results   Component Value Date    IRON 65 03/22/2024    TIBC 271 03/22/2024    FERRITIN 52 03/22/2024       Lab Results   Component Value Date    CXWBIRSI40 2,124 (H) 11/17/2023         ROS: Review of Systems   Constitutional:  Positive for fatigue.   Respiratory: Negative.     Cardiovascular: Negative.    Skin: Negative.    Neurological: Negative.    Psychiatric/Behavioral: Negative.           Current Medications: Reviewed  Allergies: Reviewed  PMH/FH/SH:  Reviewed      Physical Exam:    Body surface area is 1.63 meters squared.    Wt Readings from Last 3 Encounters:   04/03/24 62.1 kg (137 lb)   02/28/24 62.6 kg (138 lb)   02/26/24 62.5 kg (137 lb 12.8 oz)        Temp Readings from Last 3 Encounters:   04/03/24 97.9 °F (36.6 °C) (Temporal)   02/28/24 98 °F (36.7 °C) (Temporal)   02/26/24 98.6 °F (37 °C) (Tympanic)        BP Readings from Last 3 Encounters:   04/03/24 126/68   02/28/24 138/68   02/26/24 140/70         Pulse Readings from Last 3 Encounters:   04/03/24 80   02/28/24 67   02/26/24 70     @LASTSAO2(3)@      Physical Exam  Vitals reviewed.   HENT:      Head: Normocephalic.   Eyes:      Extraocular Movements: Extraocular movements intact.   Cardiovascular:      Rate and Rhythm: Normal rate and regular rhythm.      Heart sounds: Normal heart sounds.   Pulmonary:      Breath sounds: Normal breath sounds. No wheezing.   Musculoskeletal:      Comments: Very  mild swelling in B/L lower legs. NO pain over B/L calves.    Lymphadenopathy:      Cervical:      Right cervical: No superficial, deep or posterior cervical adenopathy.     Left cervical: No superficial, deep or posterior cervical adenopathy.   Neurological:      General: No focal deficit present.      Mental Status: She is alert and oriented to person, place, and time.   Psychiatric:         Mood and Affect: Mood normal.         Behavior: Behavior normal.           Goals and Barriers:  Current Goal: Prolong Survival from Cancer.   Barriers: None.      Patient's Capacity to Self Care:  Patient is able to self care.

## 2024-04-03 NOTE — TELEPHONE ENCOUNTER
Appointment Confirmation   Who are you speaking with? Patient   If it is not the patient, are they listed on an active communication consent form? N/A   Which provider is the appointment scheduled with?  Dr. Govea   When is the appointment scheduled?  Please list date and time 04/03/2024 @ 12PM    At which location is the appointment scheduled to take place? [place]   Did caller verbalize understanding of appointment details? Yes

## 2024-04-15 RX ORDER — AMOXICILLIN 500 MG/1
TABLET, FILM COATED ORAL
COMMUNITY
Start: 2024-03-08

## 2024-04-19 ENCOUNTER — OFFICE VISIT (OUTPATIENT)
Dept: GASTROENTEROLOGY | Facility: CLINIC | Age: 84
End: 2024-04-19
Payer: MEDICARE

## 2024-04-19 VITALS
BODY MASS INDEX: 23.83 KG/M2 | HEIGHT: 64 IN | WEIGHT: 139.6 LBS | HEART RATE: 74 BPM | SYSTOLIC BLOOD PRESSURE: 136 MMHG | DIASTOLIC BLOOD PRESSURE: 97 MMHG

## 2024-04-19 DIAGNOSIS — K21.00 GASTROESOPHAGEAL REFLUX DISEASE WITH ESOPHAGITIS WITHOUT HEMORRHAGE: ICD-10-CM

## 2024-04-19 DIAGNOSIS — K51.80 OTHER ULCERATIVE COLITIS WITHOUT COMPLICATION (HCC): Primary | ICD-10-CM

## 2024-04-19 DIAGNOSIS — D50.0 IRON DEFICIENCY ANEMIA DUE TO CHRONIC BLOOD LOSS: ICD-10-CM

## 2024-04-19 DIAGNOSIS — D51.9 ANEMIA DUE TO VITAMIN B12 DEFICIENCY, UNSPECIFIED B12 DEFICIENCY TYPE: ICD-10-CM

## 2024-04-19 PROCEDURE — 99214 OFFICE O/P EST MOD 30 MIN: CPT | Performed by: NURSE PRACTITIONER

## 2024-04-19 RX ORDER — PANTOPRAZOLE SODIUM 40 MG/1
40 TABLET, DELAYED RELEASE ORAL DAILY
Qty: 90 TABLET | Refills: 2 | Status: SHIPPED | OUTPATIENT
Start: 2024-04-19 | End: 2025-01-14

## 2024-04-19 NOTE — PROGRESS NOTES
[facility] [place] - Outpatient Follow-up Note  Ning Rodrigues 84 y.o. female MRN: [id number]  Encounter: [id number]          ASSESSMENT AND PLAN:      1. Other ulcerative colitis without complication (HCC)  Hx of UC in remission on Sulfasalazine for many years, recently had elevated fecal calprotectin and evidence of colitis on CT scan suggestive of flare of IBD in November, she was unable to afford Budesonide so is now s/p Prednisone taper. She doesn't note any difference in her symptoms. She's having 2-3 BM daily in the mornings with abdominal cramping relieved with BM, no blood/urgency. Abdominal exam benign.    - Continue Sulfasalazine 1,000 mg BID  -Call office if any worsening symptoms.  -Follow up in 6 months w/ Dr. Slade    2. Iron deficiency anemia due to chronic blood loss  3. Anemia due to vitamin B12 deficiency, unspecified B12 deficiency type  Following with heme/onc, getting B12 injections. Last Hgb stable at 9. She denies any melena/hematochezia    4. Gastroesophageal reflux disease with esophagitis without hemorrhage  Controlled on current regimen. Continue Pantoprazole daily. She had no improvement in cough with BID dosing.  -     pantoprazole (PROTONIX) 40 mg tablet; Take 1 tablet (40 mg total) by mouth daily        ______________________________________________________________________    SUBJECTIVE:  Ning Rodrigues is a 84 y.o. female with PMH hypothyroidism, arthritis, anemia, skin cancer, HLD, HTN, GERD, UC, and TAVR presenting for follow up. She was treated for a suspected UC flare with a prednisone taper last visit after being unable to afford Budesonide. Reports having 2-3 formed BM daily with associated cramping, no blood or urgency. Weight is stable. Abdominal exam benign. Reflux is well controlled on Pantoprazole 40mg daily but if she forgets a dose she has recurrent symptoms. Most recent labs show Hgb stable at 9.  She's on Sulfasalazine 1,000mg BID.     Last  "colonoscopy was July 2022 showing extensive left sided diverticulosis, medium internal hemorrhoids, & mild erythema in the rectosigmoid. Bx from rectum benign, and sigmoid showed mild active colitis. No further colonoscopy recommended due to age.        REVIEW OF SYSTEMS IS OTHERWISE NEGATIVE.      Historical Information   Past Medical History:   Diagnosis Date    Allergic rhinitis 03/21/2023    Anemia     Arthritis     osteoporosis, djd knees, pt denies osteoporosis on 2/21/22    Low's esophagus     Cancer (HCC)     on face    Colon polyp     Cough 02/21/2022    cough for 3 years, expectorates yellow mucus    Cough variant asthma  07/26/2021    Disease of thyroid gland     low thyroid    Diverticulitis     gerd, diverticulitis    Dizziness     GERD (gastroesophageal reflux disease)     ulcerative colitis; Low's esophagus, diverticulosis; hx of laryngopharyngeal reflux    Hyperlipidemia     Hypertension     Low vitamin D level     LPRD (laryngopharyngeal reflux disease) 02/21/2022    PONV (postoperative nausea and vomiting)     AFTER \"BIG SURGERIES\"    Postnasal drip     WITH COUGH    Ulcerative colitis (HCC)      Past Surgical History:   Procedure Laterality Date    APPENDECTOMY      CARDIAC CATHETERIZATION N/A 09/07/2023    Procedure: Cardiac RHC/LHC;  Surgeon: Rafael Sood MD;  Location: [facility];  Service: Cardiology    CARDIAC CATHETERIZATION N/A 11/07/2023    Procedure: Cardiac tavr;  Surgeon: Pete Hernández MD;  Location: [facility];  Service: Cardiology    CATARACT EXTRACTION Bilateral     COLONOSCOPY      colon polyps    COLONOSCOPY  07/28/2022    FOOT SURGERY Bilateral     bunionectomy    HEMORRHOID SURGERY      HEMORROIDECTOMY      hemorroid removal    JOINT REPLACEMENT Bilateral     knees    [place] REPLACE AORTIC VALVE OPENFEMORAL ARTERY APPROACH N/A 11/07/2023    Procedure: REPLACEMENT AORTIC VALVE TRANSCATHETER (TAVR) TRANSFEMORAL W/ 26MM BUCKNER DOROTHEA S3 UR VALVE(ACCESS ON LEFT) GIOVANNY;  " "Surgeon: Felipe Buitrago DO;  Location: [facility];  Service: Cardiac Surgery    REPLACEMENT TOTAL KNEE Bilateral     b/ replacement    TONSILLECTOMY      TUBAL LIGATION      UPPER GASTROINTESTINAL ENDOSCOPY       Social History   Social History     Substance and Sexual Activity   Alcohol Use Not Currently     Social History     Substance and Sexual Activity   Drug Use Never     Social History     Tobacco Use   Smoking Status Never   Smokeless Tobacco Never     Family History   Problem Relation Age of Onset    Heart disease Father     No Known Problems Sister     No Known Problems Sister     Heart disease Brother     Colon cancer Brother 55    Cancer Brother 67    No Known Problems Maternal Grandmother     No Known Problems Paternal Grandmother     No Known Problems Maternal Aunt     No Known Problems Paternal Aunt     Cancer Daughter         unsure of type of cancer, it was female cancer and hysterectomy done by Dr. Fierro       Meds/Allergies       Current Outpatient Medications:     acetaminophen (TYLENOL) 325 mg tablet    amLODIPine (NORVASC) 10 mg tablet    aspirin 81 mg chewable tablet    atorvastatin (LIPITOR) 20 mg tablet    cholecalciferol (VITAMIN D3) 1,000 units tablet    Coenzyme Q10 (Co Q 10) 100 MG CAPS    Cyanocobalamin (VITAMIN B 12 PO)    levothyroxine 75 mcg tablet    Multiple Vitamin (multivitamin) capsule    pantoprazole (PROTONIX) 40 mg tablet    sulfaSALAzine (AZULFIDINE) 500 mg tablet    valsartan (DIOVAN) 80 mg tablet    vitamin A 2250 MCG (7500 UT) capsule    vitamin E, tocopherol, 1,000 units capsule    amoxicillin (AMOXIL) 500 MG tablet    Allergies   Allergen Reactions    Sulfa Antibiotics Itching           Objective     Blood pressure 136/97, pulse 74, height 5' 4\" (1.626 m), weight 63.3 kg (139 lb 9.6 oz). Body mass index is 23.96 kg/m².      PHYSICAL EXAM:      General Appearance:   Alert, cooperative, no distress   HEENT:   Normocephalic, atraumatic, anicteric.     Neck:  Supple, " symmetrical, trachea midline   Lungs:   Clear to auscultation bilaterally; no rales, rhonchi or wheezing; respirations unlabored    Heart::   Regular rate and rhythm; no murmur.   Abdomen:   Soft, non-tender, non-distended; normal bowel sounds; no masses, no organomegaly    Genitalia:   Deferred    Rectal:   Deferred    Extremities:  No cyanosis, clubbing or edema    Skin:  No jaundice, rashes, or lesions    Lymph nodes:  No palpable cervical lymphadenopathy        Lab Results:   No visits with results within 1 Day(s) from this visit.   Latest known visit with results is:   Appointment on 03/22/2024   Component Date Value    WBC 03/22/2024 3.89 (L)     RBC 03/22/2024 2.54 (L)     Hemoglobin 03/22/2024 9.0 (L)     Hematocrit 03/22/2024 28.0 (L)     MCV 03/22/2024 110 (H)     MCH 03/22/2024 35.4 (H)     MCHC 03/22/2024 32.1     RDW 03/22/2024 13.4     MPV 03/22/2024 9.9     Platelets 03/22/2024 230     nRBC 03/22/2024 0     Segmented % 03/22/2024 50     Immature Grans % 03/22/2024 1     Lymphocytes % 03/22/2024 29     Monocytes % 03/22/2024 13 (H)     Eosinophils Relative 03/22/2024 6     Basophils Relative 03/22/2024 1     Absolute Neutrophils 03/22/2024 1.98     Absolute Immature Grans 03/22/2024 0.02     Absolute Lymphocytes 03/22/2024 1.14     Absolute Monocytes 03/22/2024 0.50     Eosinophils Absolute 03/22/2024 0.22     Basophils Absolute 03/22/2024 0.03     Retic Ct Abs 03/22/2024 39,400     Retic Ct Pct 03/22/2024 1.55     Folate 03/22/2024 11.5     DIRECT FAUSTO 03/22/2024 Negative     Iron Saturation 03/22/2024 24     TIBC 03/22/2024 271     Iron 03/22/2024 65     UIBC 03/22/2024 206     Ferritin 03/22/2024 52          Radiology Results:   No results found.

## 2024-04-30 ENCOUNTER — HOSPITAL ENCOUNTER (OUTPATIENT)
Dept: INFUSION CENTER | Facility: CLINIC | Age: 84
Discharge: HOME | End: 2024-04-30
Payer: MEDICARE

## 2024-04-30 ENCOUNTER — APPOINTMENT (OUTPATIENT)
Dept: LAB | Facility: CLINIC | Age: 84
End: 2024-04-30
Payer: MEDICARE

## 2024-04-30 DIAGNOSIS — E53.8 B12 DEFICIENCY: Primary | ICD-10-CM

## 2024-04-30 DIAGNOSIS — K51.90 ULCERATIVE COLITIS WITHOUT COMPLICATIONS, UNSPECIFIED LOCATION (HCC): ICD-10-CM

## 2024-04-30 DIAGNOSIS — D53.9 MACROCYTIC ANEMIA: ICD-10-CM

## 2024-04-30 DIAGNOSIS — E53.8 B12 DEFICIENCY: ICD-10-CM

## 2024-04-30 LAB
BASOPHILS # BLD AUTO: 0.05 THOUSANDS/ÂΜL (ref 0–0.1)
BASOPHILS NFR BLD AUTO: 1 % (ref 0–1)
EOSINOPHIL # BLD AUTO: 0.31 THOUSAND/ÂΜL (ref 0–0.61)
EOSINOPHIL NFR BLD AUTO: 7 % (ref 0–6)
ERYTHROCYTE [DISTWIDTH] IN BLOOD BY AUTOMATED COUNT: 13.8 % (ref 11.6–15.1)
FERRITIN SERPL-MCNC: 42 NG/ML (ref 11–307)
FOLATE SERPL-MCNC: 10.8 NG/ML
HCT VFR BLD AUTO: 26.7 % (ref 34.8–46.1)
HGB BLD-MCNC: 8.5 G/DL (ref 11.5–15.4)
IMM GRANULOCYTES # BLD AUTO: 0.01 THOUSAND/UL (ref 0–0.2)
IMM GRANULOCYTES NFR BLD AUTO: 0 % (ref 0–2)
IRON SATN MFR SERPL: 28 % (ref 15–50)
IRON SERPL-MCNC: 73 UG/DL (ref 50–212)
LYMPHOCYTES # BLD AUTO: 1.57 THOUSANDS/ÂΜL (ref 0.6–4.47)
LYMPHOCYTES NFR BLD AUTO: 34 % (ref 14–44)
MCH RBC QN AUTO: 34.1 PG (ref 26.8–34.3)
MCHC RBC AUTO-ENTMCNC: 31.8 G/DL (ref 31.4–37.4)
MCV RBC AUTO: 107 FL (ref 82–98)
MONOCYTES # BLD AUTO: 0.61 THOUSAND/ÂΜL (ref 0.17–1.22)
MONOCYTES NFR BLD AUTO: 13 % (ref 4–12)
NEUTROPHILS # BLD AUTO: 2.09 THOUSANDS/ÂΜL (ref 1.85–7.62)
NEUTS SEG NFR BLD AUTO: 45 % (ref 43–75)
NRBC BLD AUTO-RTO: 0 /100 WBCS
PLATELET # BLD AUTO: 241 THOUSANDS/UL (ref 149–390)
PMV BLD AUTO: 10.2 FL (ref 8.9–12.7)
RBC # BLD AUTO: 2.49 MILLION/UL (ref 3.81–5.12)
TIBC SERPL-MCNC: 265 UG/DL (ref 250–450)
UIBC SERPL-MCNC: 192 UG/DL (ref 155–355)
VIT B12 SERPL-MCNC: 1140 PG/ML (ref 180–914)
WBC # BLD AUTO: 4.64 THOUSAND/UL (ref 4.31–10.16)

## 2024-04-30 PROCEDURE — 83550 IRON BINDING TEST: CPT

## 2024-04-30 PROCEDURE — 85025 COMPLETE CBC W/AUTO DIFF WBC: CPT

## 2024-04-30 PROCEDURE — 82746 ASSAY OF FOLIC ACID SERUM: CPT

## 2024-04-30 PROCEDURE — 36415 COLL VENOUS BLD VENIPUNCTURE: CPT

## 2024-04-30 PROCEDURE — 82728 ASSAY OF FERRITIN: CPT

## 2024-04-30 PROCEDURE — 83540 ASSAY OF IRON: CPT

## 2024-04-30 PROCEDURE — 96372 THER/PROPH/DIAG INJ SC/IM: CPT

## 2024-04-30 PROCEDURE — 82607 VITAMIN B-12: CPT

## 2024-04-30 RX ORDER — CYANOCOBALAMIN 1000 UG/ML
1000 INJECTION, SOLUTION INTRAMUSCULAR; SUBCUTANEOUS ONCE
Status: COMPLETED | OUTPATIENT
Start: 2024-04-30 | End: 2024-04-30

## 2024-04-30 RX ORDER — CYANOCOBALAMIN 1000 UG/ML
1000 INJECTION, SOLUTION INTRAMUSCULAR; SUBCUTANEOUS ONCE
OUTPATIENT
Start: 2024-05-28

## 2024-04-30 RX ADMIN — CYANOCOBALAMIN 1000 MCG: 1000 INJECTION, SOLUTION INTRAMUSCULAR at 15:44

## 2024-04-30 NOTE — PROGRESS NOTES
Patient tolerated injection into left deltoid today without complications. Next appointment confirmed for 5/28 at 1530. Print out declined.

## 2024-05-08 ENCOUNTER — OFFICE VISIT (OUTPATIENT)
Dept: FAMILY MEDICINE CLINIC | Facility: CLINIC | Age: 84
End: 2024-05-08
Payer: MEDICARE

## 2024-05-08 VITALS
RESPIRATION RATE: 16 BRPM | SYSTOLIC BLOOD PRESSURE: 136 MMHG | HEIGHT: 64 IN | BODY MASS INDEX: 23.9 KG/M2 | HEART RATE: 66 BPM | DIASTOLIC BLOOD PRESSURE: 70 MMHG | WEIGHT: 140 LBS | OXYGEN SATURATION: 98 %

## 2024-05-08 DIAGNOSIS — I10 ESSENTIAL HYPERTENSION: Primary | ICD-10-CM

## 2024-05-08 DIAGNOSIS — K51.90 ULCERATIVE COLITIS WITHOUT COMPLICATIONS, UNSPECIFIED LOCATION (HCC): ICD-10-CM

## 2024-05-08 DIAGNOSIS — Z00.00 MEDICARE ANNUAL WELLNESS VISIT, SUBSEQUENT: ICD-10-CM

## 2024-05-08 DIAGNOSIS — D50.0 IRON DEFICIENCY ANEMIA DUE TO CHRONIC BLOOD LOSS: ICD-10-CM

## 2024-05-08 DIAGNOSIS — E78.00 HYPERCHOLESTEROLEMIA: ICD-10-CM

## 2024-05-08 DIAGNOSIS — E03.9 ACQUIRED HYPOTHYROIDISM: ICD-10-CM

## 2024-05-08 DIAGNOSIS — Z95.2 S/P TAVR (TRANSCATHETER AORTIC VALVE REPLACEMENT): ICD-10-CM

## 2024-05-08 DIAGNOSIS — K21.00 GASTROESOPHAGEAL REFLUX DISEASE WITH ESOPHAGITIS, UNSPECIFIED WHETHER HEMORRHAGE: ICD-10-CM

## 2024-05-08 DIAGNOSIS — E53.8 B12 DEFICIENCY: ICD-10-CM

## 2024-05-08 PROBLEM — D62 POSTOPERATIVE ANEMIA DUE TO ACUTE BLOOD LOSS: Status: RESOLVED | Noted: 2023-11-08 | Resolved: 2024-05-08

## 2024-05-08 PROBLEM — I35.0 AORTIC STENOSIS, SEVERE: Status: RESOLVED | Noted: 2022-11-30 | Resolved: 2024-05-08

## 2024-05-08 PROCEDURE — G0439 PPPS, SUBSEQ VISIT: HCPCS | Performed by: FAMILY MEDICINE

## 2024-05-08 PROCEDURE — 99214 OFFICE O/P EST MOD 30 MIN: CPT | Performed by: FAMILY MEDICINE

## 2024-05-08 NOTE — PROGRESS NOTES
Assessment and Plan:     Problem List Items Addressed This Visit        Cardiovascular and Mediastinum    Essential hypertension - Primary     Blood pressure good. Continue valsartan 80 mg daily and amlodipine 10 mg daily. Pt advised to continue low Na diet and to exercise on a regular basis.                 Digestive    Ulcerative colitis without complications (HCC)     Still has pain at times and loose stools. No recent bleeding. Continue medications and management per Gastroenterology. Last appointment was in April 2024.         Gastroesophageal reflux disease with esophagitis     No recent symptoms. Continue pantoprazole 40 mg daily and GERD diet. Patient last saw Gastroenterology in April 2024.             Endocrine    Hypothyroidism     TSH 3.942 and Free T4 0.87 in February 2024. Continue levothyroxine 75 mcg qd.            Blood    Anemia     Hgb 8.5 in April 2024. Continue management per Hematology. Recheck CBC in 3 weeks.          Relevant Orders    CBC and differential       Surgery/Wound/Pain    S/P TAVR (transcatheter aortic valve replacement)     Patient had surgery on 11/7 and discharged on 11/8. Patient doing well. Had echocardiogram in December 2023 and prosthetic AV working well. EF was normal. Patient done with Cardiac Rehab.             Other    Medicare annual wellness visit, subsequent     Wellness exam done. Had Shingrix series, COVID vaccines, Prevnar 13, and Pneumovacc 23. Recommend Tdap. Labs are up to date. For mammogram in June 2023. Last colonoscopy was in July 2022 and no further ones needed per GI. Mood good. No recent falls. Has living will.          Hypercholesterolemia     LDL was 72 in Sept 2023. Continue atorvastatin 20 mg qhs. Advised pt to follow a low cholesterol diet and to exercise on a regular basis.          B12 deficiency     Getting monthly B12 injections.             Depression Screening and Follow-up Plan: Patient was screened for depression during today's encounter.  They screened negative with a PHQ-2 score of 0.      Preventive health issues were discussed with patient, and age appropriate screening tests were ordered as noted in patient's After Visit Summary.  Personalized health advice and appropriate referrals for health education or preventive services given if needed, as noted in patient's After Visit Summary.     History of Present Illness:     Patient presents for a Medicare Wellness Visit    Patient here for wellness exam and follow-up Hypertension, Hyperlipidemia, GERD, Ulc Colitis, Hypothyroidism, Anemia. Patient doing well. No chest pain or shortness of breath. Blood pressure has been ok. No recent problems with GERD or Ulcerative Colitis. Done with Cardiac Rehab. Has been getting Vitamin B12 shots. Still has cough and sees Pulmonary later this month. No other complaints.        Patient Care Team:  Yohan Regan MD as PCP - General (Family Medicine)     Review of Systems:     Review of Systems   Constitutional:  Negative for fatigue and unexpected weight change.   Respiratory:  Positive for cough. Negative for chest tightness and shortness of breath.    Cardiovascular:  Negative for chest pain and palpitations.   Gastrointestinal:  Negative for abdominal pain, constipation, diarrhea, nausea and vomiting.   Genitourinary:  Negative for difficulty urinating.   Musculoskeletal:  Negative for arthralgias.   Skin:  Negative for rash.   Neurological:  Negative for dizziness and headaches.        Problem List:     Patient Active Problem List   Diagnosis   • Essential hypertension   • Hypercholesterolemia   • Hypothyroidism   • Ulcerative colitis without complications (HCC)   • Diverticulosis   • Low's esophagus   • Colon polyp   • Gastroesophageal reflux disease with esophagitis   • Vitamin D deficiency   • Anemia   • Medicare annual wellness visit, subsequent   • Chronic cough   • Dairy product intolerance   • [place] allergy   • B12 deficiency   • Irritable larynx   •  "Cough variant asthma    • Dysphonia   • Pharyngoesophageal dysphagia   • Dysfunction of both eustachian tubes   • Herpes zoster without complication   • Calcified granuloma of lung (HCC)   • Allergic rhinitis   • Abnormal CT of the chest   • S/P TAVR (transcatheter aortic valve replacement)   • Acute colitis      Past Medical and Surgical History:     Past Medical History:   Diagnosis Date   • Allergic rhinitis 03/21/2023   • Anemia    • Arthritis     osteoporosis, djd knees, pt denies osteoporosis on 2/21/22   • Low's esophagus    • Cancer (HCC)     on face   • Colon polyp    • Cough 02/21/2022    cough for 3 years, expectorates yellow mucus   • Cough variant asthma  07/26/2021   • Disease of thyroid gland     low thyroid   • Diverticulitis     gerd, diverticulitis   • Dizziness    • GERD (gastroesophageal reflux disease)     ulcerative colitis; Low's esophagus, diverticulosis; hx of laryngopharyngeal reflux   • Hyperlipidemia    • Hypertension    • Low vitamin D level    • LPRD (laryngopharyngeal reflux disease) 02/21/2022   • PONV (postoperative nausea and vomiting)     AFTER \"BIG SURGERIES\"   • Postnasal drip     WITH COUGH   • Ulcerative colitis (HCC)      Past Surgical History:   Procedure Laterality Date   • APPENDECTOMY     • CARDIAC CATHETERIZATION N/A 09/07/2023    Procedure: Cardiac RHC/LHC;  Surgeon: Rafael Sood MD;  Location: [facility];  Service: Cardiology   • CARDIAC CATHETERIZATION N/A 11/07/2023    Procedure: Cardiac tavr;  Surgeon: Pete Hernández MD;  Location: [facility];  Service: Cardiology   • CATARACT EXTRACTION Bilateral    • COLONOSCOPY      colon polyps   • COLONOSCOPY  07/28/2022   • FOOT SURGERY Bilateral     bunionectomy   • HEMORRHOID SURGERY     • HEMORROIDECTOMY      hemorroid removal   • JOINT REPLACEMENT Bilateral     knees   • NH REPLACE AORTIC VALVE OPENFEMORAL ARTERY APPROACH N/A 11/07/2023    Procedure: REPLACEMENT AORTIC VALVE TRANSCATHETER (TAVR) " TRANSFEMORAL W/ 26MM BUCKNER DOROTHEA S3 UR VALVE(ACCESS ON LEFT) GIOVANNY;  Surgeon: Felipe Buitrago DO;  Location: [facility];  Service: Cardiac Surgery   • REPLACEMENT TOTAL KNEE Bilateral     b/ replacement   • TONSILLECTOMY     • TUBAL LIGATION     • UPPER GASTROINTESTINAL ENDOSCOPY        Family History:     Family History   Problem Relation Age of Onset   • Heart disease Father    • No Known Problems Sister    • No Known Problems Sister    • Heart disease Brother    • Colon cancer Brother 55   • Cancer Brother 67   • No Known Problems Maternal Grandmother    • No Known Problems Paternal Grandmother    • No Known Problems Maternal Aunt    • No Known Problems Paternal Aunt    • Cancer Daughter         unsure of type of cancer, it was female cancer and hysterectomy done by Dr. Fierro      Social History:     Social History     Socioeconomic History   • Marital status: /Civil Union     Spouse name: None   • Number of children: None   • Years of education: None   • Highest education level: None   Occupational History   • None   Tobacco Use   • Smoking status: Never   • Smokeless tobacco: Never   Vaping Use   • Vaping status: Never Used   Substance and Sexual Activity   • Alcohol use: Not Currently   • Drug use: Never   • Sexual activity: Not Currently   Other Topics Concern   • None   Social History Narrative    · Occupation:   retired      · Marital status:         · Sexual orientation:   Heterosexual      · Alcohol intake:   Occasional      · Caffeine intake:   Moderate      · Chewing tobacco:   none      · Guns present in home:   No      · Live alone or with others:   with others      · Pets:   No         Per missy      Social Determinants of Health     Financial Resource Strain: Low Risk  (5/3/2023)    Overall Financial Resource Strain (CARDIA)    • Difficulty of Paying Living Expenses: Not very hard   Food Insecurity: No Food Insecurity (5/8/2024)    Hunger Vital Sign    • Worried About Running Out  of Food in the Last Year: Never true    • Ran Out of Food in the Last Year: Never true   Transportation Needs: No Transportation Needs (5/8/2024)    PRAPARE - Transportation    • Lack of Transportation (Medical): No    • Lack of Transportation (Non-Medical): No   Physical Activity: Not on file   Stress: Not on file   Social Connections: Not on file   Intimate Partner Violence: Not on file   Housing Stability: Low Risk  (5/8/2024)    Housing Stability Vital Sign    • Unable to Pay for Housing in the Last Year: No    • Number of Places Lived in the Last Year: 1    • Unstable Housing in the Last Year: No      Medications and Allergies:     Current Outpatient Medications   Medication Sig Dispense Refill   • acetaminophen (TYLENOL) 325 mg tablet Take 2 tablets (650 mg total) by mouth every 6 (six) hours as needed for fever, mild pain, moderate pain or headaches (temperature greater than 101 F)  0   • amLODIPine (NORVASC) 10 mg tablet Take 1 tablet by mouth once daily 90 tablet 1   • aspirin 81 mg chewable tablet Chew 1 tablet (81 mg total) daily Do not start before November 9, 2023. 30 tablet 5   • atorvastatin (LIPITOR) 20 mg tablet Take 1 tablet (20 mg total) by mouth daily 90 tablet 2   • cholecalciferol (VITAMIN D3) 1,000 units tablet Take 1,000 Units by mouth daily     • Coenzyme Q10 (Co Q 10) 100 MG CAPS Take by mouth     • Cyanocobalamin (VITAMIN B 12 PO) Take by mouth     • levothyroxine 75 mcg tablet Take 1 tablet by mouth once daily 90 tablet 1   • Multiple Vitamin (multivitamin) capsule Take 1 capsule by mouth daily     • pantoprazole (PROTONIX) 40 mg tablet Take 1 tablet (40 mg total) by mouth daily 90 tablet 2   • sulfaSALAzine (AZULFIDINE) 500 mg tablet Take 2 tablets (1,000 mg total) by mouth 2 (two) times a day 360 tablet 2   • valsartan (DIOVAN) 80 mg tablet Take 1 tablet by mouth once daily 90 tablet 1   • vitamin A 2250 MCG (7500 UT) capsule Take 7,500 Units by mouth daily     • vitamin E, tocopherol,  1,000 units capsule Take 1,000 Units by mouth daily       No current facility-administered medications for this visit.     Allergies   Allergen Reactions   • Sulfa Antibiotics Itching      Immunizations:     Immunization History   Administered Date(s) Administered   • COVID-19 MODERNA VACC 0.25 ML IM BOOSTER 11/22/2021   • COVID-19 MODERNA VACC 0.5 ML IM 02/12/2021, 03/11/2021   • H1N1, All Formulations 01/23/2010   • INFLUENZA 09/09/2010, 10/08/2012, 09/19/2013, 10/25/2014, 11/01/2014, 10/05/2015, 09/27/2016, 10/10/2017, 10/11/2017, 09/06/2023   • Influenza, high dose seasonal 0.7 mL 10/01/2020, 10/13/2021, 10/17/2022, 09/06/2023   • Pneumococcal Conjugate 13-Valent 01/08/2015   • Pneumococcal Polysaccharide PPV23 12/10/2004, 09/08/2009   • Zoster 01/01/2015   • Zoster Vaccine Recombinant 01/31/2022, 03/22/2022   • influenza, trivalent, adjuvanted 10/07/2019      Health Maintenance:     There are no preventive care reminders to display for this patient.      Topic Date Due   • COVID-19 Vaccine (4 - 2023-24 season) 09/01/2023      Medicare Screening Tests and Risk Assessments:     Ning is here for her Subsequent Wellness visit.     Health Risk Assessment:   Patient rates overall health as good. Patient feels that their physical health rating is same. Patient is very satisfied with their life. Eyesight was rated as same. Hearing was rated as same. Patient feels that their emotional and mental health rating is same. Patients states they are never, rarely angry. Patient states they are sometimes unusually tired/fatigued. Pain experienced in the last 7 days has been none. Patient states that she has experienced no weight loss or gain in last 6 months.     Depression Screening:   PHQ-2 Score: 0      Fall Risk Screening:   In the past year, patient has experienced: no history of falling in past year      Urinary Incontinence Screening:   Patient has not leaked urine accidently in the last six months.     Home  Safety:  Patient does not have trouble with stairs inside or outside of their home. Patient has working smoke alarms and has working carbon monoxide detector. Home safety hazards include: none.     Nutrition:   Current diet is Other (please comment). No red meat, no daily    Medications:   Patient is currently taking over-the-counter supplements. OTC medications include: see medication list. Patient is able to manage medications.     Activities of Daily Living (ADLs)/Instrumental Activities of Daily Living (IADLs):   Walk and transfer into and out of bed and chair?: Yes  Dress and groom yourself?: Yes    Bathe or shower yourself?: Yes    Feed yourself? Yes  Do your laundry/housekeeping?: No  Manage your money, pay your bills and track your expenses?: Yes  Make your own meals?: Yes    Do your own shopping?: Yes    Previous Hospitalizations:   Any hospitalizations or ED visits within the last 12 months?: Yes    How many hospitalizations have you had in the last year?: 1-2    Hospitalization Comments: Cardiac surgery    Advance Care Planning:   Living will: Yes    Durable POA for healthcare: Yes    Advanced directive: Yes    Advanced directive counseling given: Yes    Five wishes given: No    Patient declined ACP directive: Yes      Cognitive Screening:   Provider or family/friend/caregiver concerned regarding cognition?: No    PREVENTIVE SCREENINGS      Cardiovascular Screening:    General: Screening Not Indicated and History Lipid Disorder      Diabetes Screening:     General: Screening Current      Colorectal Cancer Screening:     General: Screening Not Indicated      Breast Cancer Screening:     General: Screening Current      Cervical Cancer Screening:    General: Screening Not Indicated      Osteoporosis Screening:    General: Screening Current      Abdominal Aortic Aneurysm (AAA) Screening:        General: Screening Not Indicated      Lung Cancer Screening:     General: Screening Not Indicated    Screening,  "Brief Intervention, and Referral to Treatment (SBIRT)    Screening  Typical number of drinks in a day: 0  Typical number of drinks in a week: 0  Interpretation: Low risk drinking behavior.    AUDIT-C Screenin) How often did you have a drink containing alcohol in the past year? never  2) How many drinks did you have on a typical day when you were drinking in the past year? 0  3) How often did you have 6 or more drinks on one occasion in the past year? never    AUDIT-C Score: 0  Interpretation: Score 0-2 (female): Negative screen for alcohol misuse    Single Item Drug Screening:  How often have you used an illegal drug (including marijuana) or a prescription medication for non-medical reasons in the past year? never    Single Item Drug Screen Score: 0  Interpretation: Negative screen for possible drug use disorder    Brief Intervention  Alcohol & drug use screenings were reviewed. No concerns regarding substance use disorder identified.     No results found.     Physical Exam:     /70 (BP Location: Left arm, Patient Position: Sitting, Cuff Size: Standard)   Pulse 66   Resp 16   Ht 5' 4\" (1.626 m)   Wt 63.5 kg (140 lb)   SpO2 98%   BMI 24.03 kg/m²     Physical Exam  Vitals and nursing note reviewed.   Constitutional:       General: She is not in acute distress.     Appearance: Normal appearance. She is well-developed.   Neck:      Vascular: No carotid bruit.   Cardiovascular:      Rate and Rhythm: Normal rate and regular rhythm.      Heart sounds: No murmur heard.  Pulmonary:      Effort: Pulmonary effort is normal. No respiratory distress.      Breath sounds: Normal breath sounds.   Musculoskeletal:      Cervical back: Normal range of motion and neck supple.      Right lower leg: No edema.      Left lower leg: No edema.   Lymphadenopathy:      Cervical: No cervical adenopathy.   Neurological:      Mental Status: She is alert.   Psychiatric:         Mood and Affect: Mood normal.         Behavior: " Behavior normal.         Thought Content: Thought content normal.         Judgment: Judgment normal.          Yohan Regan MD

## 2024-05-08 NOTE — ASSESSMENT & PLAN NOTE
Still has pain at times and loose stools. No recent bleeding. Continue medications and management per Gastroenterology. Last appointment was in April 2024.

## 2024-05-08 NOTE — ASSESSMENT & PLAN NOTE
Patient had surgery on 11/7 and discharged on 11/8. Patient doing well. Had echocardiogram in December 2023 and prosthetic AV working well. EF was normal. Patient done with Cardiac Rehab.

## 2024-05-08 NOTE — PATIENT INSTRUCTIONS
Medicare Preventive Visit Patient Instructions  Thank you for completing your Welcome to Medicare Visit or Medicare Annual Wellness Visit today. Your next wellness visit will be due in one year (5/9/2025).  The screening/preventive services that you may require over the next 5-10 years are detailed below. Some tests may not apply to you based off risk factors and/or age. Screening tests ordered at today's visit but not completed yet may show as past due. Also, please note that scanned in results may not display below.  Preventive Screenings:  Service Recommendations Previous Testing/Comments   Colorectal Cancer Screening  * Colonoscopy    * Fecal Occult Blood Test (FOBT)/Fecal Immunochemical Test (FIT)  * Fecal DNA/Cologuard Test  * Flexible Sigmoidoscopy Age: 45-75 years old   Colonoscopy: every 10 years (may be performed more frequently if at higher risk)  OR  FOBT/FIT: every 1 year  OR  Cologuard: every 3 years  OR  Sigmoidoscopy: every 5 years  Screening may be recommended earlier than age 45 if at higher risk for colorectal cancer. Also, an individualized decision between you and your healthcare provider will decide whether screening between the ages of 76-85 would be appropriate. Colonoscopy: 07/28/2022  FOBT/FIT: Not on file  Cologuard: Not on file  Sigmoidoscopy: Not on file    Screening Not Indicated     Breast Cancer Screening Age: 40+ years old  Frequency: every 1-2 years  Not required if history of left and right mastectomy Mammogram: 06/19/2023    Screening Current   Cervical Cancer Screening Between the ages of 21-29, pap smear recommended once every 3 years.   Between the ages of 30-65, can perform pap smear with HPV co-testing every 5 years.   Recommendations may differ for women with a history of total hysterectomy, cervical cancer, or abnormal pap smears in past. Pap Smear: Not on file    Screening Not Indicated   Hepatitis C Screening Once for adults born between 1945 and 1965  More frequently in  patients at high risk for Hepatitis C Hep C Antibody: Not on file        Diabetes Screening 1-2 times per year if you're at risk for diabetes or have pre-diabetes Fasting glucose: 90 mg/dL (11/1/2023)  A1C: No results in last 5 years (No results in last 5 years)  Screening Current   Cholesterol Screening Once every 5 years if you don't have a lipid disorder. May order more often based on risk factors. Lipid panel: 09/20/2023    Screening Not Indicated  History Lipid Disorder     Other Preventive Screenings Covered by Medicare:  Abdominal Aortic Aneurysm (AAA) Screening: covered once if your at risk. You're considered to be at risk if you have a family history of AAA.  Lung Cancer Screening: covers low dose CT scan once per year if you meet all of the following conditions: (1) Age 55-77; (2) No signs or symptoms of lung cancer; (3) Current smoker or have quit smoking within the last 15 years; (4) You have a tobacco smoking history of at least 20 pack years (packs per day multiplied by number of years you smoked); (5) You get a written order from a healthcare provider.  Glaucoma Screening: covered annually if you're considered high risk: (1) You have diabetes OR (2) Family history of glaucoma OR (3)  aged 50 and older OR (4)  American aged 65 and older  Osteoporosis Screening: covered every 2 years if you meet one of the following conditions: (1) You're estrogen deficient and at risk for osteoporosis based off medical history and other findings; (2) Have a vertebral abnormality; (3) On glucocorticoid therapy for more than 3 months; (4) Have primary hyperparathyroidism; (5) On osteoporosis medications and need to assess response to drug therapy.   Last bone density test (DXA Scan): 04/05/2022.  HIV Screening: covered annually if you're between the age of 15-65. Also covered annually if you are younger than 15 and older than 65 with risk factors for HIV infection. For pregnant patients, it is  covered up to 3 times per pregnancy.    Immunizations:  Immunization Recommendations   Influenza Vaccine Annual influenza vaccination during flu season is recommended for all persons aged >= 6 months who do not have contraindications   Pneumococcal Vaccine   * Pneumococcal conjugate vaccine = PCV13 (Prevnar 13), PCV15 (Vaxneuvance), PCV20 (Prevnar 20)  * Pneumococcal polysaccharide vaccine = PPSV23 (Pneumovax) Adults 19-63 yo with certain risk factors or if 65+ yo  If never received any pneumonia vaccine: recommend Prevnar 20 (PCV20)  Give PCV20 if previously received 1 dose of PCV13 or PPSV23   Hepatitis B Vaccine 3 dose series if at intermediate or high risk (ex: diabetes, end stage renal disease, liver disease)   Respiratory syncytial virus (RSV) Vaccine - COVERED BY MEDICARE PART D  * RSVPreF3 (Arexvy) CDC recommends that adults 60 years of age and older may receive a single dose of RSV vaccine using shared clinical decision-making (SCDM)   Tetanus (Td) Vaccine - COST NOT COVERED BY MEDICARE PART B Following completion of primary series, a booster dose should be given every 10 years to maintain immunity against tetanus. Td may also be given as tetanus wound prophylaxis.   Tdap Vaccine - COST NOT COVERED BY MEDICARE PART B Recommended at least once for all adults. For pregnant patients, recommended with each pregnancy.   Shingles Vaccine (Shingrix) - COST NOT COVERED BY MEDICARE PART B  2 shot series recommended in those 19 years and older who have or will have weakened immune systems or those 50 years and older     Health Maintenance Due:  There are no preventive care reminders to display for this patient.  Immunizations Due:      Topic Date Due   • COVID-19 Vaccine (4 - 2023-24 season) 09/01/2023     Advance Directives   What are advance directives?  Advance directives are legal documents that state your wishes and plans for medical care. These plans are made ahead of time in case you lose your ability to make  decisions for yourself. Advance directives can apply to any medical decision, such as the treatments you want, and if you want to donate organs.   What are the types of advance directives?  There are many types of advance directives, and each state has rules about how to use them. You may choose a combination of any of the following:  Living will:  This is a written record of the treatment you want. You can also choose which treatments you do not want, which to limit, and which to stop at a certain time. This includes surgery, medicine, IV fluid, and tube feedings.   Durable power of  for healthcare (DPAHC):  This is a written record that states who you want to make healthcare choices for you when you are unable to make them for yourself. This person, called a proxy, is usually a family member or a friend. You may choose more than 1 proxy.  Do not resuscitate (DNR) order:  A DNR order is used in case your heart stops beating or you stop breathing. It is a request not to have certain forms of treatment, such as CPR. A DNR order may be included in other types of advance directives.  Medical directive:  This covers the care that you want if you are in a coma, near death, or unable to make decisions for yourself. You can list the treatments you want for each condition. Treatment may include pain medicine, surgery, blood transfusions, dialysis, IV or tube feedings, and a ventilator (breathing machine).  Values history:  This document has questions about your views, beliefs, and how you feel and think about life. This information can help others choose the care that you would choose.  Why are advance directives important?  An advance directive helps you control your care. Although spoken wishes may be used, it is better to have your wishes written down. Spoken wishes can be misunderstood, or not followed. Treatments may be given even if you do not want them. An advance directive may make it easier for your family  to make difficult choices about your care.       © Copyright [employer] 2018 Information is for End User's use only and may not be sold, redistributed or otherwise used for commercial purposes. All illustrations and images included in CareNotes® are the copyrighted property of A.D.A.M., Inc. or [employer]

## 2024-05-08 NOTE — ASSESSMENT & PLAN NOTE
No recent symptoms. Continue pantoprazole 40 mg daily and GERD diet. Patient last saw Gastroenterology in April 2024.

## 2024-05-08 NOTE — ASSESSMENT & PLAN NOTE
LDL was 72 in Sept 2023. Continue atorvastatin 20 mg qhs. Advised pt to follow a low cholesterol diet and to exercise on a regular basis.

## 2024-05-08 NOTE — ASSESSMENT & PLAN NOTE
Wellness exam done. Had Shingrix series, COVID vaccines, Prevnar 13, and Pneumovacc 23. Recommend Tdap. Labs are up to date. For mammogram in June 2023. Last colonoscopy was in July 2022 and no further ones needed per GI. Mood good. No recent falls. Has living will.

## 2024-05-21 ENCOUNTER — TELEPHONE (OUTPATIENT)
Dept: CARDIOLOGY CLINIC | Facility: CLINIC | Age: 84
End: 2024-05-21

## 2024-05-21 DIAGNOSIS — Z95.2 S/P TAVR (TRANSCATHETER AORTIC VALVE REPLACEMENT): Primary | ICD-10-CM

## 2024-05-21 RX ORDER — AMOXICILLIN 500 MG/1
500 CAPSULE ORAL ONCE
Qty: 1 CAPSULE | Refills: 0 | Status: SHIPPED | OUTPATIENT
Start: 2024-05-21 | End: 2024-05-21

## 2024-05-21 NOTE — TELEPHONE ENCOUNTER
Patient came into office re: Amoxicillin 500 mg prescribed by Dr. Glover for upcoming Dental visit in June. Patient states she cannot find one out of the four tablets dispensed.    Patient is inquiring if 3 tablets would be enough or if she should request another refill from pharmacy. Please advise

## 2024-05-28 ENCOUNTER — HOSPITAL ENCOUNTER (OUTPATIENT)
Dept: INFUSION CENTER | Facility: CLINIC | Age: 84
Discharge: HOME | End: 2024-05-28
Payer: MEDICARE

## 2024-05-28 DIAGNOSIS — E53.8 B12 DEFICIENCY: Primary | ICD-10-CM

## 2024-05-28 RX ORDER — CYANOCOBALAMIN 1000 UG/ML
1000 INJECTION, SOLUTION INTRAMUSCULAR; SUBCUTANEOUS ONCE
Status: COMPLETED | OUTPATIENT
Start: 2024-05-28 | End: 2024-05-28

## 2024-05-28 RX ORDER — CYANOCOBALAMIN 1000 UG/ML
1000 INJECTION, SOLUTION INTRAMUSCULAR; SUBCUTANEOUS ONCE
OUTPATIENT
Start: 2024-06-25

## 2024-05-28 RX ADMIN — CYANOCOBALAMIN 1000 MCG: 1000 INJECTION, SOLUTION INTRAMUSCULAR at 15:33

## 2024-05-28 NOTE — PROGRESS NOTES
Patient here for B12 injection. Administered in right deltoid. Next appointment confirmed for 6/25 at 3:30 pm, declined AVS.

## 2024-05-31 ENCOUNTER — OFFICE VISIT (OUTPATIENT)
Dept: PULMONOLOGY | Facility: CLINIC | Age: 84
End: 2024-05-31
Payer: MEDICARE

## 2024-05-31 VITALS
TEMPERATURE: 98.3 F | HEART RATE: 78 BPM | OXYGEN SATURATION: 97 % | BODY MASS INDEX: 23.35 KG/M2 | SYSTOLIC BLOOD PRESSURE: 130 MMHG | HEIGHT: 64 IN | WEIGHT: 136.8 LBS | DIASTOLIC BLOOD PRESSURE: 70 MMHG

## 2024-05-31 DIAGNOSIS — R05.3 CHRONIC COUGH: ICD-10-CM

## 2024-05-31 DIAGNOSIS — R93.89 ABNORMAL CT OF THE CHEST: Primary | ICD-10-CM

## 2024-05-31 DIAGNOSIS — Z95.2 S/P TAVR (TRANSCATHETER AORTIC VALVE REPLACEMENT): ICD-10-CM

## 2024-05-31 DIAGNOSIS — J45.30 MILD PERSISTENT ASTHMA WITHOUT COMPLICATION: ICD-10-CM

## 2024-05-31 PROCEDURE — 99214 OFFICE O/P EST MOD 30 MIN: CPT | Performed by: INTERNAL MEDICINE

## 2024-05-31 RX ORDER — FLUTICASONE FUROATE AND VILANTEROL 100; 25 UG/1; UG/1
1 POWDER RESPIRATORY (INHALATION) DAILY
Qty: 60 BLISTER | Refills: 2 | Status: SHIPPED | OUTPATIENT
Start: 2024-05-31 | End: 2024-06-30

## 2024-05-31 NOTE — PROGRESS NOTES
Ambulatory Visit  Name: Ning Rodrigues      : 1940      MRN: [id number]  Encounter Provider: Julia Calloway MD  Encounter Date: 2024   Encounter department: [facility] PULMONARY & [facility] the [facility]    Assessment & Plan   1. Abnormal CT of the chest  Assessment & Plan:  Mrs. Ning Rodrigues had a CT angiogram on 2023 as part of the work-up prior to TAVR for severe aortic stenosis.  It showed  abnormal soft tissue dense material interposed between the esophagus and aorta. . Similar  soft tissue was seen on the old study from November of last year. There were some small mediastinal nodes anterior to the maxime and adjacent to the arch.  On clinical examination, her chest was clear to auscultation.  She did not have any significant neck or supraclavicular or axillary lymphadenopathy. Her CT PET scan was unremarkable.  She subsequently underwent TAVR.  Currently she is doing well.  Her PFT is pending at this time.  Her repeat CT scan showed stability of the lung nodule as well as paraesophageal lymph node.  She had mild to moderate bronchiectasis.  I had a long discussion with her and her family and answered all their questions.      2. Chronic cough  Assessment & Plan:  She has chronic cough for about 4 years which is productive of phlegm which is occasionally yellow.  She has history of sinus issues and postnasal drip.  She also has shortness of breath on exertion and her exercise tolerance is about a block on level ground.  She had no wheezing.   She is a never smoker.  On clinical examination, her chest was clear to auscultation.  She was on  Breo 100 which she has not been using.  She likely has a component of bronchial asthma.  I have represcribed Breo.  I had a long discussion with her and have answered all her questions.  3. S/P TAVR (transcatheter aortic valve replacement)  Assessment & Plan:  She had severe arctic stenosis.  She underwent TAVR is currently doing well.     4.  Mild persistent asthma without complication  Assessment & Plan:  She has her cough shortness of breath on exertion and history of allergies.  She likely she has mild persistent asthma.  She has cough with occasional yellow phlegm.  Her chest was clear to auscultation.  I have started her back on Breo 100.  Orders:  -     Fluticasone Furoate-Vilanterol (Breo Ellipta) 100-25 mcg/actuation inhaler; Inhale 1 puff daily Rinse mouth after use.      History of Present Illness     Ning Rodrigues is a 84 y.o. female with past medical history of aortic stenosis status post TAVR, chronic cough and abnormal CT scan who has come for follow-up.  She had a repeat CT scan of the chest which showed stability.  She had mild to moderate diffuse bronchiectasis with some mucous plugging.  She had a 6 mm lung nodule.  She had stable paraesophageal lymph node.  She also had cholelithiasis and small hiatal hernia.  Currently she has cough and this cough is productive with occasional yellow phlegm.  She has some shortness of breath on exertion.  No wheezing.  No chest pain no palpitation.  Her appetite has been good.  She was previously prescribed Breo which she has not been using.  She has a history of allergies and postnasal drip.  Her appetite has been good.  She has no fever or chills.    Review of Systems   Constitutional:  Negative for appetite change, chills, fatigue and fever.   HENT:  Positive for postnasal drip. Negative for hearing loss, rhinorrhea and sneezing.    Respiratory:  Positive for cough. Negative for shortness of breath and wheezing.    Cardiovascular:  Positive for leg swelling. Negative for chest pain and palpitations.   Gastrointestinal:  Negative for abdominal pain, constipation, diarrhea, nausea and vomiting.        Ulcerative colitis   Genitourinary:  Negative for dysuria, frequency and urgency.   Musculoskeletal:  Positive for arthralgias and gait problem (cane).   Skin:  Negative for rash.  "  Allergic/Immunologic: Positive for environmental allergies.   Neurological:  Positive for light-headedness. Negative for dizziness, syncope and headaches.   Psychiatric/Behavioral:  Negative for agitation, confusion and sleep disturbance. The patient is not nervous/anxious.        Objective     /70 (BP Location: Left arm, Patient Position: Sitting, Cuff Size: Standard)   Pulse 78   Temp 98.3 °F (36.8 °C) (Tympanic)   Ht 5' 4\" (1.626 m)   Wt 62.1 kg (136 lb 12.8 oz)   SpO2 97%   BMI 23.48 kg/m²     Physical Exam  Vitals reviewed.   Constitutional:       General: She is not in acute distress.     Appearance: She is not ill-appearing, toxic-appearing or diaphoretic.   HENT:      Head: Normocephalic.      Mouth/Throat:      Mouth: Mucous membranes are moist.   Eyes:      General: No scleral icterus.     Conjunctiva/sclera: Conjunctivae normal.   Cardiovascular:      Rate and Rhythm: Normal rate and regular rhythm.      Heart sounds: Normal heart sounds.   Pulmonary:      Effort: No respiratory distress.      Breath sounds: Normal breath sounds. No wheezing or rales.   Abdominal:      General: Bowel sounds are normal.      Tenderness: There is no abdominal tenderness. There is no guarding.   Musculoskeletal:      Cervical back: No rigidity.      Right lower leg: Edema present.      Left lower leg: Edema present.   Lymphadenopathy:      Cervical: No cervical adenopathy.   Skin:     Coloration: Skin is not jaundiced or pale.      Findings: No rash.   Neurological:      Mental Status: She is alert and oriented to person, place, and time.      Gait: Gait abnormal (uses cane).   Psychiatric:         Mood and Affect: Mood normal.         Behavior: Behavior normal.         Thought Content: Thought content normal.         Judgment: Judgment normal.       Administrative Statements           "

## 2024-05-31 NOTE — ASSESSMENT & PLAN NOTE
She has chronic cough for about 4 years which is productive of phlegm which is occasionally yellow.  She has history of sinus issues and postnasal drip.  She also has shortness of breath on exertion and her exercise tolerance is about a block on level ground.  She had no wheezing.   She is a never smoker.  On clinical examination, her chest was clear to auscultation.  She was on  Breo 100 which she has not been using.  She likely has a component of bronchial asthma.  I have represcribed Breo.  I had a long discussion with her and have answered all her questions.

## 2024-05-31 NOTE — ASSESSMENT & PLAN NOTE
Mrs. Ning Rodrigues had a CT angiogram on 8/30/2023 as part of the work-up prior to TAVR for severe aortic stenosis.  It showed  abnormal soft tissue dense material interposed between the esophagus and aorta. . Similar  soft tissue was seen on the old study from November of last year. There were some small mediastinal nodes anterior to the maxime and adjacent to the arch.  On clinical examination, her chest was clear to auscultation.  She did not have any significant neck or supraclavicular or axillary lymphadenopathy. Her CT PET scan was unremarkable.  She subsequently underwent TAVR.  Currently she is doing well.  Her PFT is pending at this time.  Her repeat CT scan showed stability of the lung nodule as well as paraesophageal lymph node.  She had mild to moderate bronchiectasis.  I had a long discussion with her and her family and answered all their questions.

## 2024-05-31 NOTE — ASSESSMENT & PLAN NOTE
She has her cough shortness of breath on exertion and history of allergies.  She likely she has mild persistent asthma.  She has cough with occasional yellow phlegm.  Her chest was clear to auscultation.  I have started her back on Breo 100.

## 2024-06-05 ENCOUNTER — TELEPHONE (OUTPATIENT)
Age: 84
End: 2024-06-05

## 2024-06-05 DIAGNOSIS — J45.30 MILD PERSISTENT ASTHMA WITHOUT COMPLICATION: ICD-10-CM

## 2024-06-05 NOTE — TELEPHONE ENCOUNTER
Called pt back to make her aware she should call her insurance provider to ask which alternative would be cheaper. She will call them and then call us back.

## 2024-06-05 NOTE — TELEPHONE ENCOUNTER
Pt said Breo Ellipta is too expensive.    She is asking for an alternative.    Pt requesting a call back/ ok to LM on phone if no answer.    Thank you

## 2024-06-06 DIAGNOSIS — E03.9 ACQUIRED HYPOTHYROIDISM: ICD-10-CM

## 2024-06-06 RX ORDER — FLUTICASONE FUROATE AND VILANTEROL 100; 25 UG/1; UG/1
1 POWDER RESPIRATORY (INHALATION) DAILY
Qty: 60 BLISTER | Refills: 2 | Status: CANCELLED | OUTPATIENT
Start: 2024-06-06 | End: 2024-07-06

## 2024-06-06 NOTE — TELEPHONE ENCOUNTER
She can call the pharmacy and tell them that she is not picking up.  Please tell the patient to call her insurance and find out which is the cheapest substitute for Breo which we can prescribe.

## 2024-06-06 NOTE — TELEPHONE ENCOUNTER
Patient called asking if we can cancel the Breo Ellipta script, her pharmacy has the script ready for her to  but she cannot afford it and asked if we can just cancel it. Please advise    She still has 1 Breo Inhaler from when it was originally prescribed in 12/2023 as she never had the chance to use it nor understood how to use it. She has started using the inhaler now and understands how too. She will use up this Breo inhaler and in the mean time she will try to reach out to her insurance to figure out alternatives and will call us with an update. Thank you

## 2024-06-07 PROBLEM — Z00.00 MEDICARE ANNUAL WELLNESS VISIT, SUBSEQUENT: Status: RESOLVED | Noted: 2021-02-10 | Resolved: 2024-06-07

## 2024-06-07 RX ORDER — LEVOTHYROXINE SODIUM 0.07 MG/1
TABLET ORAL
Qty: 90 TABLET | Refills: 1 | Status: SHIPPED | OUTPATIENT
Start: 2024-06-07

## 2024-06-20 ENCOUNTER — HOSPITAL ENCOUNTER (OUTPATIENT)
Dept: PULMONOLOGY | Facility: HOSPITAL | Age: 84
End: 2024-06-20
Attending: INTERNAL MEDICINE
Payer: MEDICARE

## 2024-06-20 DIAGNOSIS — R05.3 CHRONIC COUGH: ICD-10-CM

## 2024-06-20 PROCEDURE — 94729 DIFFUSING CAPACITY: CPT | Performed by: INTERNAL MEDICINE

## 2024-06-20 PROCEDURE — 94618 PULMONARY STRESS TESTING: CPT | Performed by: INTERNAL MEDICINE

## 2024-06-20 PROCEDURE — 94726 PLETHYSMOGRAPHY LUNG VOLUMES: CPT | Performed by: INTERNAL MEDICINE

## 2024-06-20 PROCEDURE — 94729 DIFFUSING CAPACITY: CPT

## 2024-06-20 PROCEDURE — 94060 EVALUATION OF WHEEZING: CPT

## 2024-06-20 PROCEDURE — 94726 PLETHYSMOGRAPHY LUNG VOLUMES: CPT

## 2024-06-20 PROCEDURE — 94618 PULMONARY STRESS TESTING: CPT

## 2024-06-20 PROCEDURE — 94060 EVALUATION OF WHEEZING: CPT | Performed by: INTERNAL MEDICINE

## 2024-06-20 RX ORDER — ALBUTEROL SULFATE 2.5 MG/3ML
2.5 SOLUTION RESPIRATORY (INHALATION) ONCE
Status: COMPLETED | OUTPATIENT
Start: 2024-06-20 | End: 2024-06-20

## 2024-06-20 RX ADMIN — ALBUTEROL SULFATE 2.5 MG: 2.5 SOLUTION RESPIRATORY (INHALATION) at 10:04

## 2024-06-24 ENCOUNTER — APPOINTMENT (OUTPATIENT)
Dept: LAB | Facility: CLINIC | Age: 84
End: 2024-06-24
Payer: MEDICARE

## 2024-06-24 DIAGNOSIS — K51.80 OTHER ULCERATIVE COLITIS WITHOUT COMPLICATION (HCC): ICD-10-CM

## 2024-06-24 DIAGNOSIS — D50.0 IRON DEFICIENCY ANEMIA DUE TO CHRONIC BLOOD LOSS: ICD-10-CM

## 2024-06-24 PROCEDURE — 83993 ASSAY FOR CALPROTECTIN FECAL: CPT

## 2024-06-25 ENCOUNTER — HOSPITAL ENCOUNTER (OUTPATIENT)
Dept: MAMMOGRAPHY | Facility: CLINIC | Age: 84
Discharge: HOME | End: 2024-06-25
Payer: MEDICARE

## 2024-06-25 DIAGNOSIS — R92.8 ABNORMAL MAMMOGRAM: ICD-10-CM

## 2024-06-25 PROCEDURE — G0279 TOMOSYNTHESIS, MAMMO: HCPCS

## 2024-06-25 PROCEDURE — 77066 DX MAMMO INCL CAD BI: CPT

## 2024-06-27 ENCOUNTER — HOSPITAL ENCOUNTER (OUTPATIENT)
Dept: INFUSION CENTER | Facility: CLINIC | Age: 84
Discharge: HOME | End: 2024-06-27
Payer: MEDICARE

## 2024-06-27 DIAGNOSIS — E53.8 B12 DEFICIENCY: Primary | ICD-10-CM

## 2024-06-27 LAB — CALPROTECTIN STL-MCNT: 674 UG/G (ref 0–120)

## 2024-06-27 PROCEDURE — 96372 THER/PROPH/DIAG INJ SC/IM: CPT

## 2024-06-27 RX ORDER — CYANOCOBALAMIN 1000 UG/ML
1000 INJECTION, SOLUTION INTRAMUSCULAR; SUBCUTANEOUS ONCE
OUTPATIENT
Start: 2024-07-23

## 2024-06-27 RX ORDER — CYANOCOBALAMIN 1000 UG/ML
1000 INJECTION, SOLUTION INTRAMUSCULAR; SUBCUTANEOUS ONCE
Status: COMPLETED | OUTPATIENT
Start: 2024-06-27 | End: 2024-06-27

## 2024-06-27 RX ADMIN — CYANOCOBALAMIN 1000 MCG: 1000 INJECTION INTRAMUSCULAR; SUBCUTANEOUS at 15:40

## 2024-06-27 NOTE — PROGRESS NOTES
Pt. offers no complaints.  B12 admin. IM in [place] without incident.  AVS declined, next appt. confirmed for 7/29 @ 3:30.

## 2024-07-03 ENCOUNTER — TELEPHONE (OUTPATIENT)
Dept: GASTROENTEROLOGY | Facility: CLINIC | Age: 84
End: 2024-07-03

## 2024-07-03 NOTE — TELEPHONE ENCOUNTER
Called and relayed results and recommendations to patient     She is still taking her medication, have on/off non severe abdominal pain and no rectal bleeding; she will contact the office if any symptoms change

## 2024-07-03 NOTE — TELEPHONE ENCOUNTER
----- Message from Lizzy ROLDAN sent at 7/1/2024  9:19 AM EDT -----    ----- Message -----  From: ALINA Ellis  Sent: 6/28/2024   6:28 PM EDT  To: [facility] [address] Clinical    Fecal Calprotectin is more elevated than it was 4 months ago from [phone]. This suggests underlying inflammation in her GI tract. Please see if she's having any worsening abdominal pain, diarrhea, rectal bleeding & make sure she's taking her Sulfasalazine.

## 2024-07-25 DIAGNOSIS — E53.8 B12 DEFICIENCY: Primary | ICD-10-CM

## 2024-07-25 RX ORDER — CYANOCOBALAMIN 1000 UG/ML
1000 INJECTION, SOLUTION INTRAMUSCULAR; SUBCUTANEOUS ONCE
Status: CANCELLED | OUTPATIENT
Start: 2024-07-29

## 2024-07-29 ENCOUNTER — HOSPITAL ENCOUNTER (OUTPATIENT)
Dept: INFUSION CENTER | Facility: CLINIC | Age: 84
Discharge: HOME | End: 2024-07-29
Payer: MEDICARE

## 2024-07-29 DIAGNOSIS — E53.8 B12 DEFICIENCY: Primary | ICD-10-CM

## 2024-07-29 PROCEDURE — 96372 THER/PROPH/DIAG INJ SC/IM: CPT

## 2024-07-29 RX ORDER — CYANOCOBALAMIN 1000 UG/ML
1000 INJECTION, SOLUTION INTRAMUSCULAR; SUBCUTANEOUS ONCE
Status: COMPLETED | OUTPATIENT
Start: 2024-07-29 | End: 2024-07-29

## 2024-07-29 RX ORDER — CYANOCOBALAMIN 1000 UG/ML
1000 INJECTION, SOLUTION INTRAMUSCULAR; SUBCUTANEOUS ONCE
OUTPATIENT
Start: 2024-08-26

## 2024-07-29 RX ADMIN — CYANOCOBALAMIN 1000 MCG: 1000 INJECTION, SOLUTION INTRAMUSCULAR at 15:29

## 2024-07-29 NOTE — PROGRESS NOTES
Patient here for B12 injection. Administered in left deltoid. Next appointment confirmed for 8/26 at 3:30pm, declined AVS.

## 2024-08-09 DIAGNOSIS — I10 ESSENTIAL HYPERTENSION: ICD-10-CM

## 2024-08-09 RX ORDER — AMLODIPINE BESYLATE 10 MG/1
10 TABLET ORAL DAILY
Qty: 90 TABLET | Refills: 1 | Status: SHIPPED | OUTPATIENT
Start: 2024-08-09

## 2024-08-14 ENCOUNTER — OFFICE VISIT (OUTPATIENT)
Dept: FAMILY MEDICINE CLINIC | Facility: CLINIC | Age: 84
End: 2024-08-14
Payer: MEDICARE

## 2024-08-14 VITALS
TEMPERATURE: 97.1 F | SYSTOLIC BLOOD PRESSURE: 126 MMHG | BODY MASS INDEX: 24.41 KG/M2 | OXYGEN SATURATION: 96 % | DIASTOLIC BLOOD PRESSURE: 74 MMHG | WEIGHT: 143 LBS | HEART RATE: 97 BPM | HEIGHT: 64 IN | RESPIRATION RATE: 14 BRPM

## 2024-08-14 DIAGNOSIS — R10.9 LEFT FLANK PAIN: Primary | ICD-10-CM

## 2024-08-14 DIAGNOSIS — N20.0 LEFT RENAL STONE: ICD-10-CM

## 2024-08-14 DIAGNOSIS — K51.90 ULCERATIVE COLITIS WITHOUT COMPLICATIONS, UNSPECIFIED LOCATION (HCC): ICD-10-CM

## 2024-08-14 LAB
BACTERIA UR QL AUTO: ABNORMAL /HPF
BILIRUB UR QL STRIP: NEGATIVE
CLARITY UR: CLEAR
COLOR UR: YELLOW
GLUCOSE UR STRIP-MCNC: NEGATIVE MG/DL
HGB UR QL STRIP.AUTO: NEGATIVE
KETONES UR STRIP-MCNC: NEGATIVE MG/DL
LEUKOCYTE ESTERASE UR QL STRIP: NEGATIVE
NITRITE UR QL STRIP: NEGATIVE
NON-SQ EPI CELLS URNS QL MICRO: ABNORMAL /HPF
PH UR STRIP.AUTO: 6 [PH]
PROT UR STRIP-MCNC: ABNORMAL MG/DL
RBC #/AREA URNS AUTO: ABNORMAL /HPF
SL AMB  POCT GLUCOSE, UA: NORMAL
SL AMB LEUKOCYTE ESTERASE,UA: NORMAL
SL AMB POCT BILIRUBIN,UA: NORMAL
SL AMB POCT BLOOD,UA: NORMAL
SL AMB POCT CLARITY,UA: CLEAR
SL AMB POCT COLOR,UA: YELLOW
SL AMB POCT KETONES,UA: NORMAL
SL AMB POCT NITRITE,UA: NORMAL
SL AMB POCT PH,UA: 5
SL AMB POCT SPECIFIC GRAVITY,UA: 1.01
SL AMB POCT URINE PROTEIN: NORMAL
SL AMB POCT UROBILINOGEN: 0.2
SP GR UR STRIP.AUTO: 1.02 (ref 1–1.03)
UROBILINOGEN UR STRIP-ACNC: <2 MG/DL
WBC #/AREA URNS AUTO: ABNORMAL /HPF

## 2024-08-14 PROCEDURE — 99214 OFFICE O/P EST MOD 30 MIN: CPT | Performed by: FAMILY MEDICINE

## 2024-08-14 PROCEDURE — 81002 URINALYSIS NONAUTO W/O SCOPE: CPT | Performed by: FAMILY MEDICINE

## 2024-08-14 PROCEDURE — 81001 URINALYSIS AUTO W/SCOPE: CPT | Performed by: FAMILY MEDICINE

## 2024-08-14 PROCEDURE — 87086 URINE CULTURE/COLONY COUNT: CPT | Performed by: FAMILY MEDICINE

## 2024-08-14 PROCEDURE — G2211 COMPLEX E/M VISIT ADD ON: HCPCS | Performed by: FAMILY MEDICINE

## 2024-08-14 RX ORDER — LEVOFLOXACIN 500 MG/1
500 TABLET, FILM COATED ORAL EVERY 24 HOURS
Qty: 5 TABLET | Refills: 0 | Status: SHIPPED | OUTPATIENT
Start: 2024-08-14 | End: 2024-08-19

## 2024-08-14 RX ORDER — TIZANIDINE 2 MG/1
2 TABLET ORAL
Qty: 20 TABLET | Refills: 0 | Status: SHIPPED | OUTPATIENT
Start: 2024-08-14

## 2024-08-14 NOTE — PROGRESS NOTES
"  Subjective:      Patient ID: Ning Rodrigues is a 84 y.o. female.    States for 2 days has been developing left flank pain radiating to the front of her abdomen and worsening.  She denies any urinary symptoms and denies any history of kidney stones        Past Medical History:   Diagnosis Date    Allergic rhinitis 03/21/2023    Anemia     Arthritis     osteoporosis, djd knees, pt denies osteoporosis on 2/21/22    Low's esophagus     Cancer (HCC)     on face    Colon polyp     Cough 02/21/2022    cough for 3 years, expectorates yellow mucus    Cough variant asthma  07/26/2021    Disease of thyroid gland     low thyroid    Diverticulitis     gerd, diverticulitis    Dizziness     GERD (gastroesophageal reflux disease)     ulcerative colitis; Low's esophagus, diverticulosis; hx of laryngopharyngeal reflux    Hyperlipidemia     Hypertension     Low vitamin D level     LPRD (laryngopharyngeal reflux disease) 02/21/2022    PONV (postoperative nausea and vomiting)     AFTER \"BIG SURGERIES\"    Postnasal drip     WITH COUGH    Ulcerative colitis (HCC)        Family History   Problem Relation Age of Onset    Heart disease Father     No Known Problems Sister     No Known Problems Sister     Heart disease Brother     Colon cancer Brother 55    Cancer Brother 67    No Known Problems Maternal Grandmother     No Known Problems Paternal Grandmother     No Known Problems Maternal Aunt     No Known Problems Paternal Aunt     Cancer Daughter         unsure of type of cancer, it was female cancer and hysterectomy done by Dr. Fierro       Past Surgical History:   Procedure Laterality Date    APPENDECTOMY      CARDIAC CATHETERIZATION N/A 09/07/2023    Procedure: Cardiac RHC/LHC;  Surgeon: Rafael Sood MD;  Location: [facility];  Service: Cardiology    CARDIAC CATHETERIZATION N/A 11/07/2023    Procedure: Cardiac tavr;  Surgeon: Pete Hernández MD;  Location: [facility];  Service: Cardiology    CATARACT EXTRACTION " Bilateral     COLONOSCOPY      colon polyps    COLONOSCOPY  07/28/2022    FOOT SURGERY Bilateral     bunionectomy    HEMORRHOID SURGERY      HEMORROIDECTOMY      hemorroid removal    JOINT REPLACEMENT Bilateral     knees    NC REPLACE AORTIC VALVE OPENFEMORAL ARTERY APPROACH N/A 11/07/2023    Procedure: REPLACEMENT AORTIC VALVE TRANSCATHETER (TAVR) TRANSFEMORAL W/ 26MM BUCKNER DOROTHEA S3 UR VALVE(ACCESS ON LEFT) GIOVANNY;  Surgeon: Felipe Buitrago DO;  Location: [facility];  Service: Cardiac Surgery    REPLACEMENT TOTAL KNEE Bilateral     b/ replacement    TONSILLECTOMY      TUBAL LIGATION      UPPER GASTROINTESTINAL ENDOSCOPY          reports that she has never smoked. She has never used smokeless tobacco. She reports that she does not currently use alcohol. She reports that she does not use drugs.      Current Outpatient Medications:     acetaminophen (TYLENOL) 325 mg tablet, Take 2 tablets (650 mg total) by mouth every 6 (six) hours as needed for fever, mild pain, moderate pain or headaches (temperature greater than 101 F), Disp: , Rfl: 0    amLODIPine (NORVASC) 10 mg tablet, Take 1 tablet by mouth once daily, Disp: 90 tablet, Rfl: 1    aspirin 81 mg chewable tablet, Chew 1 tablet (81 mg total) daily Do not start before November 9, 2023., Disp: 30 tablet, Rfl: 5    atorvastatin (LIPITOR) 20 mg tablet, Take 1 tablet (20 mg total) by mouth daily, Disp: 90 tablet, Rfl: 2    cholecalciferol (VITAMIN D3) 1,000 units tablet, Take 1,000 Units by mouth daily, Disp: , Rfl:     Coenzyme Q10 (Co Q 10) 100 MG CAPS, Take by mouth, Disp: , Rfl:     Cyanocobalamin (VITAMIN B 12 PO), Take by mouth, Disp: , Rfl:     Fluticasone Furoate-Vilanterol (Breo Ellipta) 100-25 mcg/actuation inhaler, Inhale 1 puff daily Rinse mouth after use., Disp: 60 blister, Rfl: 2    levofloxacin (LEVAQUIN) 500 mg tablet, Take 1 tablet (500 mg total) by mouth every 24 hours for 5 days, Disp: 5 tablet, Rfl: 0    levothyroxine 75 mcg tablet, Take 1 tablet by  mouth once daily, Disp: 90 tablet, Rfl: 1    Multiple Vitamin (multivitamin) capsule, Take 1 capsule by mouth daily, Disp: , Rfl:     pantoprazole (PROTONIX) 40 mg tablet, Take 1 tablet (40 mg total) by mouth daily, Disp: 90 tablet, Rfl: 2    sulfaSALAzine (AZULFIDINE) 500 mg tablet, Take 2 tablets (1,000 mg total) by mouth 2 (two) times a day, Disp: 360 tablet, Rfl: 2    tiZANidine (ZANAFLEX) 2 mg tablet, Take 1 tablet (2 mg total) by mouth daily at bedtime as needed for muscle spasms, Disp: 20 tablet, Rfl: 0    valsartan (DIOVAN) 80 mg tablet, Take 1 tablet by mouth once daily, Disp: 90 tablet, Rfl: 1    vitamin A 2250 MCG (7500 UT) capsule, Take 7,500 Units by mouth daily, Disp: , Rfl:     vitamin E, tocopherol, 1,000 units capsule, Take 1,000 Units by mouth daily, Disp: , Rfl:     The following portions of the patient's history were reviewed and updated as appropriate: allergies, current medications, past family history, past medical history, past social history, past surgical history and problem list.    Review of Systems   Constitutional:  Negative for fatigue and fever.   HENT:  Negative for congestion, facial swelling, mouth sores, rhinorrhea, sore throat and trouble swallowing.    Eyes:  Negative for pain and redness.   Respiratory:  Negative for cough, shortness of breath and wheezing.    Cardiovascular:  Negative for chest pain, palpitations and leg swelling.   Gastrointestinal:  Positive for abdominal pain. Negative for blood in stool, constipation, diarrhea and nausea.   Genitourinary:  Positive for flank pain. Negative for dysuria, hematuria and urgency.   Musculoskeletal:  Positive for back pain. Negative for arthralgias and myalgias.   Skin:  Negative for rash and wound.   Neurological:  Negative for seizures, syncope and headaches.   Hematological:  Negative for adenopathy.   Psychiatric/Behavioral:  Negative for agitation and behavioral problems.          PHQ-2/9 Depression Screening        "          Objective:    /74 (BP Location: Left arm, Patient Position: Sitting, Cuff Size: Standard)   Pulse 97   Temp (!) 97.1 °F (36.2 °C) (Tympanic)   Resp 14   Ht 5' 4\" (1.626 m)   Wt 64.9 kg (143 lb)   SpO2 96%   BMI 24.55 kg/m²      Physical Exam  Vitals and nursing note reviewed.   Constitutional:       Appearance: Normal appearance. She is well-developed.   HENT:      Head: Normocephalic and atraumatic.      Right Ear: External ear normal.      Left Ear: External ear normal.      Nose: Nose normal.   Eyes:      General: No scleral icterus.        Right eye: No discharge.         Left eye: No discharge.      Conjunctiva/sclera: Conjunctivae normal.      Pupils: Pupils are equal, round, and reactive to light.   Neck:      Thyroid: No thyromegaly.   Cardiovascular:      Rate and Rhythm: Normal rate and regular rhythm.      Heart sounds: Normal heart sounds. No murmur heard.     No gallop.   Pulmonary:      Effort: Pulmonary effort is normal.      Breath sounds: Normal breath sounds. No wheezing or rales.   Abdominal:      General: There is no distension.      Palpations: Abdomen is soft.      Tenderness: There is no abdominal tenderness. There is left CVA tenderness.   Musculoskeletal:         General: No tenderness or deformity.      Cervical back: Normal range of motion and neck supple.   Lymphadenopathy:      Cervical: No cervical adenopathy.   Skin:     Findings: No erythema or rash.   Neurological:      General: No focal deficit present.      Mental Status: She is alert. Mental status is at baseline.   Psychiatric:         Mood and Affect: Mood normal.         Behavior: Behavior normal.           Recent Results (from the past 8736 hour(s))   Comprehensive metabolic panel    Collection Time: 08/24/23  3:35 PM   Result Value Ref Range    Sodium 130 (L) 135 - 147 mmol/L    Potassium 4.4 3.5 - 5.3 mmol/L    Chloride 98 96 - 108 mmol/L    CO2 29 21 - 32 mmol/L    ANION GAP 3 mmol/L    BUN 14 5 - 25 " mg/dL    Creatinine 0.89 0.60 - 1.30 mg/dL    Glucose 95 65 - 140 mg/dL    Calcium 8.9 8.4 - 10.2 mg/dL    AST 19 13 - 39 U/L    ALT 9 7 - 52 U/L    Alkaline Phosphatase 73 34 - 104 U/L    Total Protein 6.8 6.4 - 8.4 g/dL    Albumin 3.9 3.5 - 5.0 g/dL    Total Bilirubin 0.35 0.20 - 1.00 mg/dL    eGFR 59 ml/min/1.73sq m   CBC and differential    Collection Time: 08/24/23  3:35 PM   Result Value Ref Range    WBC 5.34 4.31 - 10.16 Thousand/uL    RBC 2.59 (L) 3.81 - 5.12 Million/uL    Hemoglobin 8.6 (L) 11.5 - 15.4 g/dL    Hematocrit 26.5 (L) 34.8 - 46.1 %     (H) 82 - 98 fL    MCH 33.2 26.8 - 34.3 pg    MCHC 32.5 31.4 - 37.4 g/dL    RDW 13.6 11.6 - 15.1 %    MPV 9.5 8.9 - 12.7 fL    Platelets 275 149 - 390 Thousands/uL    nRBC 0 /100 WBCs    Segmented % 42 (L) 43 - 75 %    Immature Grans % 0 0 - 2 %    Lymphocytes % 37 14 - 44 %    Monocytes % 14 (H) 4 - 12 %    Eosinophils Relative 5 0 - 6 %    Basophils Relative 2 (H) 0 - 1 %    Absolute Neutrophils 2.33 1.85 - 7.62 Thousands/µL    Absolute Immature Grans 0.01 0.00 - 0.20 Thousand/uL    Absolute Lymphocytes 1.96 0.60 - 4.47 Thousands/µL    Absolute Monocytes 0.72 0.17 - 1.22 Thousand/µL    Eosinophils Absolute 0.24 0.00 - 0.61 Thousand/µL    Basophils Absolute 0.08 0.00 - 0.10 Thousands/µL   ECG 12 lead    Collection Time: 09/07/23  7:15 AM   Result Value Ref Range    Ventricular Rate 78 BPM    Atrial Rate 78 BPM    OH Interval 176 ms    QRSD Interval 82 ms    QT Interval 378 ms    QTC Interval 430 ms    P Axis -24 degrees    QRS Axis -7 degrees    T Wave Axis 32 degrees   Basic metabolic panel    Collection Time: 09/07/23  8:19 AM   Result Value Ref Range    Sodium 133 (L) 135 - 147 mmol/L    Potassium 3.9 3.5 - 5.3 mmol/L    Chloride 101 96 - 108 mmol/L    CO2 25 21 - 32 mmol/L    ANION GAP 7 mmol/L    BUN 14 5 - 25 mg/dL    Creatinine 0.77 0.60 - 1.30 mg/dL    Glucose 86 65 - 140 mg/dL    Glucose, Fasting 86 65 - 99 mg/dL    Calcium 8.6 8.4 - 10.2 mg/dL     eGFR 71 ml/min/1.73sq m   TSH, 3rd generation    Collection Time: 09/20/23  8:48 AM   Result Value Ref Range    TSH 3RD GENERATON 5.770 (H) 0.450 - 4.500 uIU/mL   T4, free    Collection Time: 09/20/23  8:48 AM   Result Value Ref Range    Free T4 0.91 0.61 - 1.12 ng/dL   Lipid panel    Collection Time: 09/20/23  8:48 AM   Result Value Ref Range    Cholesterol 134 See Comment mg/dL    Triglycerides 68 See Comment mg/dL    HDL, Direct 48 (L) >=50 mg/dL    LDL Calculated 72 0 - 100 mg/dL    Non-HDL-Chol (CHOL-HDL) 86 mg/dl   CBC and differential    Collection Time: 09/20/23  8:48 AM   Result Value Ref Range    WBC 4.54 4.31 - 10.16 Thousand/uL    RBC 2.61 (L) 3.81 - 5.12 Million/uL    Hemoglobin 8.9 (L) 11.5 - 15.4 g/dL    Hematocrit 26.5 (L) 34.8 - 46.1 %     (H) 82 - 98 fL    MCH 34.1 26.8 - 34.3 pg    MCHC 33.6 31.4 - 37.4 g/dL    RDW 13.9 11.6 - 15.1 %    MPV 9.4 8.9 - 12.7 fL    Platelets 283 149 - 390 Thousands/uL    nRBC 0 /100 WBCs    Segmented % 45 43 - 75 %    Immature Grans % 0 0 - 2 %    Lymphocytes % 36 14 - 44 %    Monocytes % 12 4 - 12 %    Eosinophils Relative 6 0 - 6 %    Basophils Relative 1 0 - 1 %    Absolute Neutrophils 2.03 1.85 - 7.62 Thousands/µL    Absolute Immature Grans 0.01 0.00 - 0.20 Thousand/uL    Absolute Lymphocytes 1.65 0.60 - 4.47 Thousands/µL    Absolute Monocytes 0.53 0.17 - 1.22 Thousand/µL    Eosinophils Absolute 0.26 0.00 - 0.61 Thousand/µL    Basophils Absolute 0.06 0.00 - 0.10 Thousands/µL   CBC and differential    Collection Time: 10/05/23  3:48 PM   Result Value Ref Range    WBC 4.96 4.31 - 10.16 Thousand/uL    RBC 2.46 (L) 3.81 - 5.12 Million/uL    Hemoglobin 8.3 (L) 11.5 - 15.4 g/dL    Hematocrit 24.3 (L) 34.8 - 46.1 %    MCV 99 (H) 82 - 98 fL    MCH 33.7 26.8 - 34.3 pg    MCHC 34.2 31.4 - 37.4 g/dL    RDW 14.2 11.6 - 15.1 %    MPV 9.4 8.9 - 12.7 fL    Platelets 260 149 - 390 Thousands/uL    nRBC 0 /100 WBCs    Segmented % 56 43 - 75 %    Immature Grans % 0 0 - 2 %     Lymphocytes % 29 14 - 44 %    Monocytes % 11 4 - 12 %    Eosinophils Relative 3 0 - 6 %    Basophils Relative 1 0 - 1 %    Absolute Neutrophils 2.74 1.85 - 7.62 Thousands/µL    Absolute Immature Grans 0.02 0.00 - 0.20 Thousand/uL    Absolute Lymphocytes 1.46 0.60 - 4.47 Thousands/µL    Absolute Monocytes 0.56 0.17 - 1.22 Thousand/µL    Eosinophils Absolute 0.13 0.00 - 0.61 Thousand/µL    Basophils Absolute 0.05 0.00 - 0.10 Thousands/µL   C-reactive protein    Collection Time: 10/05/23  3:48 PM   Result Value Ref Range    CRP <1.0 <3.0 mg/L   Sedimentation rate, automated    Collection Time: 10/05/23  3:48 PM   Result Value Ref Range    Sed Rate 11 0 - 29 mm/hour   Angiotensin converting enzyme    Collection Time: 10/05/23  3:48 PM   Result Value Ref Range    Angio Convert Enzyme 35 14 - 82 U/L   Comprehensive metabolic panel    Collection Time: 10/05/23  3:48 PM   Result Value Ref Range    Sodium 127 (L) 135 - 147 mmol/L    Potassium 4.1 3.5 - 5.3 mmol/L    Chloride 96 96 - 108 mmol/L    CO2 25 21 - 32 mmol/L    ANION GAP 6 mmol/L    BUN 15 5 - 25 mg/dL    Creatinine 0.86 0.60 - 1.30 mg/dL    Glucose 90 65 - 140 mg/dL    Calcium 8.7 8.4 - 10.2 mg/dL    AST 19 13 - 39 U/L    ALT 9 7 - 52 U/L    Alkaline Phosphatase 71 34 - 104 U/L    Total Protein 6.8 6.4 - 8.4 g/dL    Albumin 4.0 3.5 - 5.0 g/dL    Total Bilirubin 0.48 0.20 - 1.00 mg/dL    eGFR 62 ml/min/1.73sq m   Folate    Collection Time: 10/05/23  3:48 PM   Result Value Ref Range    Folate 11.4 >5.9 ng/mL   Vitamin B12    Collection Time: 10/05/23  3:48 PM   Result Value Ref Range    Vitamin B-12 1,242 (H) 180 - 914 pg/mL   Methylmalonic acid, serum    Collection Time: 10/05/23  3:48 PM   Result Value Ref Range    Methylmalonic Acid, S 315 0 - 378 nmol/L   TIBC Panel (incl. Iron, TIBC, % Iron Saturation)    Collection Time: 10/05/23  3:48 PM   Result Value Ref Range    Iron Saturation 26 15 - 50 %    TIBC 297 250 - 450 ug/dL    Iron 78 50 - 212 ug/dL    UIBC 219  155 - 355 ug/dL   Ferritin    Collection Time: 10/05/23  3:48 PM   Result Value Ref Range    Ferritin 19 11 - 307 ng/mL   Fingerstick Glucose (POCT)    Collection Time: 10/18/23 12:08 PM   Result Value Ref Range    POC Glucose 88 65 - 140 mg/dl   MRSA culture    Collection Time: 11/01/23  7:49 AM    Specimen: Nose; Nares   Result Value Ref Range    MRSA Culture Only       No Methicillin Resistant Staphlyococcus aureus (MRSA) isolated   Protime-INR    Collection Time: 11/01/23  7:49 AM   Result Value Ref Range    Protime 13.7 11.6 - 14.5 seconds    INR 0.99 0.84 - 1.19   CBC    Collection Time: 11/01/23  7:49 AM   Result Value Ref Range    WBC 5.34 4.31 - 10.16 Thousand/uL    RBC 2.68 (L) 3.81 - 5.12 Million/uL    Hemoglobin 9.0 (L) 11.5 - 15.4 g/dL    Hematocrit 27.7 (L) 34.8 - 46.1 %     (H) 82 - 98 fL    MCH 33.6 26.8 - 34.3 pg    MCHC 32.5 31.4 - 37.4 g/dL    RDW 15.1 11.6 - 15.1 %    Platelets 243 149 - 390 Thousands/uL    MPV 9.4 8.9 - 12.7 fL   Comprehensive metabolic panel    Collection Time: 11/01/23  7:49 AM   Result Value Ref Range    Sodium 135 135 - 147 mmol/L    Potassium 4.0 3.5 - 5.3 mmol/L    Chloride 104 96 - 108 mmol/L    CO2 27 21 - 32 mmol/L    ANION GAP 4 mmol/L    BUN 13 5 - 25 mg/dL    Creatinine 0.77 0.60 - 1.30 mg/dL    Glucose, Fasting 90 65 - 99 mg/dL    Calcium 9.1 8.4 - 10.2 mg/dL    AST 18 13 - 39 U/L    ALT 8 7 - 52 U/L    Alkaline Phosphatase 77 34 - 104 U/L    Total Protein 6.7 6.4 - 8.4 g/dL    Albumin 3.9 3.5 - 5.0 g/dL    Total Bilirubin 0.47 0.20 - 1.00 mg/dL    eGFR 71 ml/min/1.73sq m   Type and screen    Collection Time: 11/01/23  7:49 AM   Result Value Ref Range    ABO Grouping O     Rh Factor Positive     Antibody Screen Negative     Specimen Expiration Date [id number]    ABORh Recheck - Contact Blood Bank Prior to Collection    Collection Time: 11/07/23  8:04 AM   Result Value Ref Range    ABO Grouping O     Rh Factor Positive    POCT Blood Gas (CG8+)    Collection  Time: 11/07/23  9:18 AM   Result Value Ref Range    ph, Sly ISTAT 7.400 7.300 - 7.400    pCO2, Sly i-STAT 38.8 (L) 42.0 - 50.0 mm HG    pO2, Sly i-STAT 60.0 (H) 35.0 - 45.0 mm HG    BE, i-STAT -1 -2 - 3 mmol/L    HCO3, Sly i-STAT 24.0 24.0 - 30.0 mmol/L    CO2, i-STAT 25 21 - 32 mmol/L    O2 Sat, i-STAT 91 (H) 60 - 85 %    SODIUM, I-STAT 139 136 - 145 mmol/l    Potassium, i-STAT 3.4 (L) 3.5 - 5.3 mmol/L    Calcium, Ionized i-STAT 1.25 1.12 - 1.32 mmol/L    Hct, i-STAT 26 (L) 34.8 - 46.1 %    Hgb, i-STAT 8.8 (L) 11.5 - 15.4 g/dl    Glucose, i-STAT 88 65 - 140 mg/dl    Specimen Type VENOUS    POCT activated clotting time    Collection Time: 11/07/23  9:18 AM   Result Value Ref Range    Activated Clotting Time, i-STAT 169 (H) 89 - 137 sec    Specimen Type VENOUS    POCT Blood Gas (CG8+)    Collection Time: 11/07/23  9:54 AM   Result Value Ref Range    pH, Art i-STAT 7.457 (H) 7.350 - 7.450    pCO2, Art i-STAT 31.5 (L) 36.0 - 44.0 mm HG    pO2, ART i-STAT 167.0 (H) 75.0 - 129.0 mm HG    BE, i-STAT -1 -2 - 3 mmol/L    HCO3, Art i-STAT 22.2 22.0 - 28.0 mmol/L    CO2, i-STAT 23 21 - 32 mmol/L    O2 Sat, i-STAT 100 (H) 60 - 85 %    SODIUM, I-STAT 137 136 - 145 mmol/l    Potassium, i-STAT 3.2 (L) 3.5 - 5.3 mmol/L    Calcium, Ionized i-STAT 1.19 1.12 - 1.32 mmol/L    Hct, i-STAT 23 (L) 34.8 - 46.1 %    Hgb, i-STAT 7.8 (L) 11.5 - 15.4 g/dl    Glucose, i-STAT 106 65 - 140 mg/dl    Specimen Type ARTERIAL    POCT activated clotting time    Collection Time: 11/07/23  9:54 AM   Result Value Ref Range    Activated Clotting Time, i-STAT 279 (H) 89 - 137 sec    Specimen Type ARTERIAL    POCT Blood Gas (CG8+)    Collection Time: 11/07/23 10:08 AM   Result Value Ref Range    pH, Art i-STAT 7.400 7.350 - 7.450    pCO2, Art i-STAT 36.6 36.0 - 44.0 mm HG    pO2, ART i-STAT 106.0 75.0 - 129.0 mm HG    BE, i-STAT -2 -2 - 3 mmol/L    HCO3, Art i-STAT 22.7 22.0 - 28.0 mmol/L    CO2, i-STAT 24 21 - 32 mmol/L    O2 Sat, i-STAT 98 (H) 60 - 85 %     SODIUM, I-STAT 139 136 - 145 mmol/l    Potassium, i-STAT 2.9 (L) 3.5 - 5.3 mmol/L    Calcium, Ionized i-STAT 1.22 1.12 - 1.32 mmol/L    Hct, i-STAT 21 (L) 34.8 - 46.1 %    Hgb, i-STAT 7.1 (L) 11.5 - 15.4 g/dl    Glucose, i-STAT 99 65 - 140 mg/dl    Specimen Type ARTERIAL    POCT activated clotting time    Collection Time: 11/07/23 10:08 AM   Result Value Ref Range    Activated Clotting Time, i-STAT 151 (H) 89 - 137 sec    Specimen Type ARTERIAL    ECG 12 lead    Collection Time: 11/07/23 10:27 AM   Result Value Ref Range    Ventricular Rate 70 BPM    Atrial Rate 70 BPM    MT Interval 175 ms    QRSD Interval 154 ms    QT Interval 513 ms    QTC Interval 554 ms    P Axis -9 degrees    QRS Axis -54 degrees    T Wave Axis 123 degrees   Fingerstick Glucose (POCT)    Collection Time: 11/07/23 10:32 AM   Result Value Ref Range    POC Glucose 98 65 - 140 mg/dl   Basic metabolic panel    Collection Time: 11/07/23 10:34 AM   Result Value Ref Range    Sodium 138 135 - 147 mmol/L    Potassium 3.2 (L) 3.5 - 5.3 mmol/L    Chloride 106 96 - 108 mmol/L    CO2 24 21 - 32 mmol/L    ANION GAP 8 mmol/L    BUN 11 5 - 25 mg/dL    Creatinine 0.72 0.60 - 1.30 mg/dL    Glucose 102 65 - 140 mg/dL    Calcium 8.3 (L) 8.4 - 10.2 mg/dL    eGFR 77 ml/min/1.73sq m   Hemoglobin and hematocrit, blood    Collection Time: 11/07/23 10:34 AM   Result Value Ref Range    Hemoglobin 7.8 (L) 11.5 - 15.4 g/dL    Hematocrit 23.9 (L) 34.8 - 46.1 %   Platelet count    Collection Time: 11/07/23 10:34 AM   Result Value Ref Range    Platelets 217 149 - 390 Thousands/uL    MPV 9.4 8.9 - 12.7 fL   Fingerstick Glucose (POCT)    Collection Time: 11/07/23 11:57 AM   Result Value Ref Range    POC Glucose 112 65 - 140 mg/dl   ECG 12 lead    Collection Time: 11/07/23  1:48 PM   Result Value Ref Range    Ventricular Rate 81 BPM    Atrial Rate 81 BPM    MT Interval 166 ms    QRSD Interval 82 ms    QT Interval 420 ms    QTC Interval 487 ms    P Axis -28 degrees    QRS Axis  -27 degrees    T Wave Axis 46 degrees   Fingerstick Glucose (POCT)    Collection Time: 11/07/23  4:05 PM   Result Value Ref Range    POC Glucose 135 65 - 140 mg/dl   Hemoglobin and hematocrit, blood    Collection Time: 11/07/23  4:06 PM   Result Value Ref Range    Hemoglobin 8.2 (L) 11.5 - 15.4 g/dL    Hematocrit 25.3 (L) 34.8 - 46.1 %   Fingerstick Glucose (POCT)    Collection Time: 11/07/23  8:57 PM   Result Value Ref Range    POC Glucose 118 65 - 140 mg/dl   Basic Metabolic Panel -AM POD #1    Collection Time: 11/08/23  5:32 AM   Result Value Ref Range    Sodium 135 135 - 147 mmol/L    Potassium 3.6 3.5 - 5.3 mmol/L    Chloride 102 96 - 108 mmol/L    CO2 26 21 - 32 mmol/L    ANION GAP 7 mmol/L    BUN 14 5 - 25 mg/dL    Creatinine 0.89 0.60 - 1.30 mg/dL    Glucose 102 65 - 140 mg/dL    Calcium 8.1 (L) 8.4 - 10.2 mg/dL    eGFR 59 ml/min/1.73sq m   Magnesium -AM POD #1    Collection Time: 11/08/23  5:32 AM   Result Value Ref Range    Magnesium 1.7 (L) 1.9 - 2.7 mg/dL   CBC-AM POD #1    Collection Time: 11/08/23  5:32 AM   Result Value Ref Range    WBC 10.46 (H) 4.31 - 10.16 Thousand/uL    RBC 2.13 (L) 3.81 - 5.12 Million/uL    Hemoglobin 7.3 (L) 11.5 - 15.4 g/dL    Hematocrit 22.4 (L) 34.8 - 46.1 %     (H) 82 - 98 fL    MCH 34.3 26.8 - 34.3 pg    MCHC 32.6 31.4 - 37.4 g/dL    RDW 15.0 11.6 - 15.1 %    Platelets 253 149 - 390 Thousands/uL    MPV 10.0 8.9 - 12.7 fL   Fingerstick Glucose (POCT)    Collection Time: 11/08/23  5:40 AM   Result Value Ref Range    POC Glucose 106 65 - 140 mg/dl   GIOVANNY intraop interventional w/realtime guidance of cardiac procedures    Collection Time: 11/08/23  8:07 AM   Result Value Ref Range    AV peak gradient 71 mmHg    LVOT stroke volume 70.00     Ao VTI 95.4 cm    LVOT peak VTI 18.4 cm    LVOT peak didi 0.74 m/s    LVOT area 3.80     LVOT diameter 2.2 cm    AV LVOT peak gradient 2 mmHg    AV mean gradient 42 mmHg    AV area peak didi 0.7 cm2    AV area by cont VTI 0.7 cm2    LVOT  mn grad 1.0 mmHg    Aortic valve mean velocity 29.00 m/s    LVOT stroke volume index 42.20 ml/m2   Echo complete w/ contrast if indicated    Collection Time: 11/08/23  9:00 AM   Result Value Ref Range    A4C EF 75 %    LVOT stroke volume 79.50 cm3    LVOT stroke volume index 44.10 ml/m2    LVOT Cardiac Index 3.16 l/min/m2    LVOT Cardiac Output 5.08 l/min    LVIDd 4.00 cm    LVIDS 2.90 cm    IVSd 1.20 cm    LVPWd 1.20 cm    FS 28 28 - 44    MV E' Tissue Velocity Septal 7 cm/s    MV E' Tissue Velocity Lateral 9 cm/s    LA Volume Index (BP) 13.7 mL/m2    E/A ratio 0.54     E wave deceleration time 383 ms    MV Peak E Vasiliy 72 cm/s    MV Peak A Vasiliy 1.34 m/s    AV LVOT peak gradient 9 mmHg    LVOT peak VTI 25.32 cm    LVOT peak vasiliy 1.44 m/s    RVID d 2.8 cm    Tricuspid annular plane systolic excursion 2.10 cm    LA size 3.8 cm    LA length (A2C) 4.70 cm    LA volume (BP) 22 mL    RAA A4C 10.5 cm2    LVOT diameter 2.0 cm    Aortic valve peak velocity 1.87 m/s    Ao VTI 32.49 cm    AV mean gradient 7 mmHg    LVOT mn grad 5.0 mmHg    AV peak gradient 14 mmHg    AV area by cont VTI 2.2 cm2    AV area peak vasiliy 2.2 cm2    MV stenosis pressure 1/2 time 111 ms    MV valve area p 1/2 method 1.98     TR Peak Vasiliy 2.5 m/s    Triscuspid Valve Regurgitation Peak Gradient 26.0 mmHg    Ao root 3.00 cm    Aortic valve mean velocity 12.50 m/s    Tricuspid valve peak regurgitation velocity 2.54 m/s    Left ventricular stroke volume (2D) 37.00 mL    IVS 1.2 cm    LEFT VENTRICLE SYSTOLIC VOLUME (MOD BIPLANE) 2D 32 mL    LV DIASTOLIC VOLUME (MOD BIPLANE) 2D 69 mL    Left Atrium Area-systolic Four Chamber 10.9 cm2    Left Atrium Area-systolic Apical Two Chamber 12.2 cm2    LVSV, 2D 37 mL    LVOT area 3.14 cm2    DVI 0.77     AV valve area 2.45 cm2    Est. RA pres 3.0 mmHg    Right Ventricular Peak Systolic Pressure 29.00 mmHg    LV EF 65    Fingerstick Glucose (POCT)    Collection Time: 11/08/23 10:59 AM   Result Value Ref Range    POC Glucose  192 (H) 65 - 140 mg/dl   Prepare Leukoreduced RBC: 4 Units    Collection Time: 11/09/23  5:42 AM   Result Value Ref Range    Unit Product Code R1609L42     Unit Number [id number]     Unit ABO O     Unit RH POS     Crossmatch Compatible     Unit Dispense Status Return to Inv     Unit Product Volume 300 ml    Unit Product Code I6216A76     Unit Number [id number]     Unit ABO O     Unit RH POS     Crossmatch Compatible     Unit Dispense Status Return to Inv     Unit Product Volume 300 ml    Unit Product Code P2814G22     Unit Number [id number]     Unit ABO O     Unit RH POS     Crossmatch Compatible     Unit Dispense Status Return to Inv     Unit Product Volume 300 ml    Unit Product Code I0990R97     Unit Number [id number]     Unit ABO O     Unit RH POS     Crossmatch Compatible     Unit Dispense Status Return to Inv     Unit Product Volume 300 ml   Reticulocytes    Collection Time: 11/17/23  8:50 AM   Result Value Ref Range    Retic Ct Abs 97,900 (H) 14,097 - 95,744    Retic Ct Pct 4.24 (H) 0.37 - 1.87 %   Vitamin B12    Collection Time: 11/17/23  8:50 AM   Result Value Ref Range    Vitamin B-12 2,124 (H) 180 - 914 pg/mL   Folate    Collection Time: 11/17/23  8:50 AM   Result Value Ref Range    Folate 10.4 >5.9 ng/mL   Comprehensive metabolic panel    Collection Time: 11/17/23  8:50 AM   Result Value Ref Range    Sodium 133 (L) 135 - 147 mmol/L    Potassium 4.2 3.5 - 5.3 mmol/L    Chloride 99 96 - 108 mmol/L    CO2 27 21 - 32 mmol/L    ANION GAP 7 mmol/L    BUN 10 5 - 25 mg/dL    Creatinine 0.77 0.60 - 1.30 mg/dL    Glucose 84 65 - 140 mg/dL    Calcium 8.9 8.4 - 10.2 mg/dL    AST 20 13 - 39 U/L    ALT 8 7 - 52 U/L    Alkaline Phosphatase 69 34 - 104 U/L    Total Protein 6.4 6.4 - 8.4 g/dL    Albumin 3.6 3.5 - 5.0 g/dL    Total Bilirubin 0.52 0.20 - 1.00 mg/dL    eGFR 71 ml/min/1.73sq m   CBC and differential    Collection Time: 11/17/23  8:50 AM   Result Value Ref Range    WBC 4.68 4.31 - 10.16  Thousand/uL    RBC 2.31 (L) 3.81 - 5.12 Million/uL    Hemoglobin 7.7 (L) 11.5 - 15.4 g/dL    Hematocrit 23.5 (L) 34.8 - 46.1 %     (H) 82 - 98 fL    MCH 33.3 26.8 - 34.3 pg    MCHC 32.8 31.4 - 37.4 g/dL    RDW 14.7 11.6 - 15.1 %    MPV 10.0 8.9 - 12.7 fL    Platelets 309 149 - 390 Thousands/uL    nRBC 0 /100 WBCs    Segmented % 49 43 - 75 %    Immature Grans % 0 0 - 2 %    Lymphocytes % 33 14 - 44 %    Monocytes % 11 4 - 12 %    Eosinophils Relative 6 0 - 6 %    Basophils Relative 1 0 - 1 %    Absolute Neutrophils 2.32 1.85 - 7.62 Thousands/µL    Absolute Immature Grans 0.02 0.00 - 0.20 Thousand/uL    Absolute Lymphocytes 1.52 0.60 - 4.47 Thousands/µL    Absolute Monocytes 0.51 0.17 - 1.22 Thousand/µL    Eosinophils Absolute 0.27 0.00 - 0.61 Thousand/µL    Basophils Absolute 0.04 0.00 - 0.10 Thousands/µL   TIBC Panel (incl. Iron, TIBC, % Iron Saturation)    Collection Time: 11/17/23  8:50 AM   Result Value Ref Range    Iron Saturation 25 15 - 50 %    TIBC 259 250 - 450 ug/dL    Iron 66 50 - 212 ug/dL    UIBC 193 155 - 355 ug/dL   Ferritin    Collection Time: 11/17/23  8:50 AM   Result Value Ref Range    Ferritin 50 11 - 307 ng/mL   CBC and differential    Collection Time: 11/28/23 12:15 PM   Result Value Ref Range    WBC 4.55 4.31 - 10.16 Thousand/uL    RBC 2.24 (L) 3.81 - 5.12 Million/uL    Hemoglobin 7.5 (L) 11.5 - 15.4 g/dL    Hematocrit 24.2 (L) 34.8 - 46.1 %     (H) 82 - 98 fL    MCH 33.5 26.8 - 34.3 pg    MCHC 31.0 (L) 31.4 - 37.4 g/dL    RDW 15.3 (H) 11.6 - 15.1 %    MPV 9.6 8.9 - 12.7 fL    Platelets 313 149 - 390 Thousands/uL    nRBC 0 /100 WBCs    Segmented % 47 43 - 75 %    Immature Grans % 0 0 - 2 %    Lymphocytes % 33 14 - 44 %    Monocytes % 14 (H) 4 - 12 %    Eosinophils Relative 5 0 - 6 %    Basophils Relative 1 0 - 1 %    Absolute Neutrophils 2.12 1.85 - 7.62 Thousands/µL    Absolute Immature Grans 0.01 0.00 - 0.20 Thousand/uL    Absolute Lymphocytes 1.52 0.60 - 4.47 Thousands/µL     "Absolute Monocytes 0.63 0.17 - 1.22 Thousand/µL    Eosinophils Absolute 0.23 0.00 - 0.61 Thousand/µL    Basophils Absolute 0.04 0.00 - 0.10 Thousands/µL   CBC and differential    Collection Time: 11/29/23  1:34 PM   Result Value Ref Range    WBC 5.57 4.31 - 10.16 Thousand/uL    RBC 2.20 (L) 3.81 - 5.12 Million/uL    Hemoglobin 7.6 (L) 11.5 - 15.4 g/dL    Hematocrit 23.3 (L) 34.8 - 46.1 %     (H) 82 - 98 fL    MCH 34.5 (H) 26.8 - 34.3 pg    MCHC 32.6 31.4 - 37.4 g/dL    RDW 14.9 11.6 - 15.1 %    MPV 9.8 8.9 - 12.7 fL    Platelets 299 149 - 390 Thousands/uL    nRBC 0 /100 WBCs    Segmented % 51 43 - 75 %    Immature Grans % 0 0 - 2 %    Lymphocytes % 31 14 - 44 %    Monocytes % 12 4 - 12 %    Eosinophils Relative 5 0 - 6 %    Basophils Relative 1 0 - 1 %    Absolute Neutrophils 2.86 1.85 - 7.62 Thousands/µL    Absolute Immature Grans 0.02 0.00 - 0.20 Thousand/uL    Absolute Lymphocytes 1.72 0.60 - 4.47 Thousands/µL    Absolute Monocytes 0.67 0.17 - 1.22 Thousand/µL    Eosinophils Absolute 0.25 0.00 - 0.61 Thousand/µL    Basophils Absolute 0.05 0.00 - 0.10 Thousands/µL   Comprehensive metabolic panel    Collection Time: 11/29/23  1:34 PM   Result Value Ref Range    Sodium 131 (L) 135 - 147 mmol/L    Potassium 3.6 3.5 - 5.3 mmol/L    Chloride 100 96 - 108 mmol/L    CO2 22 21 - 32 mmol/L    ANION GAP 9 mmol/L    BUN 10 5 - 25 mg/dL    Creatinine 0.68 0.60 - 1.30 mg/dL    Glucose 91 65 - 140 mg/dL    Calcium 8.8 8.4 - 10.2 mg/dL    AST 19 13 - 39 U/L    ALT 8 7 - 52 U/L    Alkaline Phosphatase 70 34 - 104 U/L    Total Protein 6.5 6.4 - 8.4 g/dL    Albumin 3.7 3.5 - 5.0 g/dL    Total Bilirubin 0.51 0.20 - 1.00 mg/dL    eGFR 80 ml/min/1.73sq m   HS Troponin 0hr (reflex protocol)    Collection Time: 11/29/23  1:34 PM   Result Value Ref Range    hs TnI 0hr 7 \"Refer to ACS Flowchart\"- see link ng/L   Protime-INR    Collection Time: 11/29/23  1:34 PM   Result Value Ref Range    Protime 14.8 (H) 11.6 - 14.5 seconds    INR " "1.09 0.84 - 1.19   APTT    Collection Time: 11/29/23  1:34 PM   Result Value Ref Range    PTT 30 23 - 37 seconds   Type and screen    Collection Time: 11/29/23  1:34 PM   Result Value Ref Range    ABO Grouping O     Rh Factor Positive     Antibody Screen Negative     Specimen Expiration Date 20231202    Lipase    Collection Time: 11/29/23  1:34 PM   Result Value Ref Range    Lipase 13 11 - 82 u/L   C-reactive protein    Collection Time: 11/29/23  1:34 PM   Result Value Ref Range    CRP 2.1 <3.0 mg/L   ECG 12 lead    Collection Time: 11/29/23  1:42 PM   Result Value Ref Range    Ventricular Rate 66 BPM    Atrial Rate 66 BPM    DE Interval 182 ms    QRSD Interval 88 ms    QT Interval 410 ms    QTC Interval 429 ms    P Axis -9 degrees    QRS Axis -8 degrees    T [place] 58 degrees   HS Troponin I 2hr    Collection Time: 11/29/23  4:32 PM   Result Value Ref Range    hs TnI 2hr 8 \"Refer to ACS Flowchart\"- see link ng/L    Delta 2hr hsTnI 1 <20 ng/L   Blood culture #1    Collection Time: 11/29/23  4:43 PM    Specimen: Hand, Right; Blood   Result Value Ref Range    Blood Culture No Growth After 5 Days.    Blood culture #2    Collection Time: 11/29/23  4:43 PM    Specimen: Hand, Left; Blood   Result Value Ref Range    Blood Culture No Growth After 5 Days.    Basic metabolic panel    Collection Time: 11/30/23  5:06 AM   Result Value Ref Range    Sodium 133 (L) 135 - 147 mmol/L    Potassium 3.4 (L) 3.5 - 5.3 mmol/L    Chloride 103 96 - 108 mmol/L    CO2 24 21 - 32 mmol/L    ANION GAP 6 mmol/L    BUN 7 5 - 25 mg/dL    Creatinine 0.72 0.60 - 1.30 mg/dL    Glucose 89 65 - 140 mg/dL    Calcium 8.1 (L) 8.4 - 10.2 mg/dL    eGFR 77 ml/min/1.73sq m   CBC (With Platelets)    Collection Time: 11/30/23  5:06 AM   Result Value Ref Range    WBC 4.12 (L) 4.31 - 10.16 Thousand/uL    RBC 2.01 (L) 3.81 - 5.12 Million/uL    Hemoglobin 6.8 (LL) 11.5 - 15.4 g/dL    Hematocrit 21.1 (L) 34.8 - 46.1 %     (H) 82 - 98 fL    MCH 33.8 26.8 - " 34.3 pg    MCHC 32.2 31.4 - 37.4 g/dL    RDW 15.0 11.6 - 15.1 %    Platelets 236 149 - 390 Thousands/uL    MPV 9.8 8.9 - 12.7 fL   Magnesium    Collection Time: 11/30/23  5:06 AM   Result Value Ref Range    Magnesium 1.9 1.9 - 2.7 mg/dL   Hemoglobin and hematocrit, blood    Collection Time: 11/30/23 11:10 AM   Result Value Ref Range    Hemoglobin 8.1 (L) 11.5 - 15.4 g/dL    Hematocrit 25.3 (L) 34.8 - 46.1 %   Prepare Leukoreduced RBC: 1 Units    Collection Time: 12/01/23  5:47 AM   Result Value Ref Range    Unit Product Code [id number]     Unit Number [id number]     Unit ABO O     Unit RH NEG     Crossmatch Compatible     Unit Dispense Status Presumed Trans     Unit Product Volume 219 mL   Comprehensive metabolic panel    Collection Time: 12/01/23  6:38 AM   Result Value Ref Range    Sodium 131 (L) 135 - 147 mmol/L    Potassium 4.0 3.5 - 5.3 mmol/L    Chloride 102 96 - 108 mmol/L    CO2 24 21 - 32 mmol/L    ANION GAP 5 mmol/L    BUN 8 5 - 25 mg/dL    Creatinine 0.70 0.60 - 1.30 mg/dL    Glucose 91 65 - 140 mg/dL    Calcium 8.2 (L) 8.4 - 10.2 mg/dL    Corrected Calcium 8.8 8.3 - 10.1 mg/dL    AST 15 13 - 39 U/L    ALT 6 (L) 7 - 52 U/L    Alkaline Phosphatase 57 34 - 104 U/L    Total Protein 5.6 (L) 6.4 - 8.4 g/dL    Albumin 3.2 (L) 3.5 - 5.0 g/dL    Total Bilirubin 0.47 0.20 - 1.00 mg/dL    eGFR 79 ml/min/1.73sq m   CBC and differential    Collection Time: 12/01/23  6:38 AM   Result Value Ref Range    WBC 4.55 4.31 - 10.16 Thousand/uL    RBC 2.34 (L) 3.81 - 5.12 Million/uL    Hemoglobin 7.9 (L) 11.5 - 15.4 g/dL    Hematocrit 24.3 (L) 34.8 - 46.1 %     (H) 82 - 98 fL    MCH 33.8 26.8 - 34.3 pg    MCHC 32.5 31.4 - 37.4 g/dL    RDW 17.5 (H) 11.6 - 15.1 %    MPV 9.3 8.9 - 12.7 fL    Platelets 230 149 - 390 Thousands/uL    nRBC 0 /100 WBCs    Segmented % 52 43 - 75 %    Immature Grans % 0 0 - 2 %    Lymphocytes % 27 14 - 44 %    Monocytes % 11 4 - 12 %    Eosinophils Relative 9 (H) 0 - 6 %    Basophils Relative  1 0 - 1 %    Absolute Neutrophils 2.38 1.85 - 7.62 Thousands/µL    Absolute Immature Grans 0.01 0.00 - 0.20 Thousand/uL    Absolute Lymphocytes 1.23 0.60 - 4.47 Thousands/µL    Absolute Monocytes 0.48 0.17 - 1.22 Thousand/µL    Eosinophils Absolute 0.41 0.00 - 0.61 Thousand/µL    Basophils Absolute 0.04 0.00 - 0.10 Thousands/µL   Magnesium    Collection Time: 12/01/23  6:38 AM   Result Value Ref Range    Magnesium 2.0 1.9 - 2.7 mg/dL   CBC and differential    Collection Time: 12/02/23  4:58 AM   Result Value Ref Range    WBC 6.02 4.31 - 10.16 Thousand/uL    RBC 2.69 (L) 3.81 - 5.12 Million/uL    Hemoglobin 9.0 (L) 11.5 - 15.4 g/dL    Hematocrit 28.3 (L) 34.8 - 46.1 %     (H) 82 - 98 fL    MCH 33.5 26.8 - 34.3 pg    MCHC 31.8 31.4 - 37.4 g/dL    RDW 17.2 (H) 11.6 - 15.1 %    MPV 9.8 8.9 - 12.7 fL    Platelets 257 149 - 390 Thousands/uL    nRBC 0 /100 WBCs    Segmented % 46 43 - 75 %    Immature Grans % 0 0 - 2 %    Lymphocytes % 34 14 - 44 %    Monocytes % 10 4 - 12 %    Eosinophils Relative 9 (H) 0 - 6 %    Basophils Relative 1 0 - 1 %    Absolute Neutrophils 2.80 1.85 - 7.62 Thousands/µL    Absolute Immature Grans 0.02 0.00 - 0.20 Thousand/uL    Absolute Lymphocytes 2.04 0.60 - 4.47 Thousands/µL    Absolute Monocytes 0.57 0.17 - 1.22 Thousand/µL    Eosinophils Absolute 0.54 0.00 - 0.61 Thousand/µL    Basophils Absolute 0.05 0.00 - 0.10 Thousands/µL   Basic metabolic panel    Collection Time: 12/02/23  4:58 AM   Result Value Ref Range    Sodium 132 (L) 135 - 147 mmol/L    Potassium 4.1 3.5 - 5.3 mmol/L    Chloride 101 96 - 108 mmol/L    CO2 24 21 - 32 mmol/L    ANION GAP 7 mmol/L    BUN 8 5 - 25 mg/dL    Creatinine 0.75 0.60 - 1.30 mg/dL    Glucose 90 65 - 140 mg/dL    Calcium 8.7 8.4 - 10.2 mg/dL    eGFR 73 ml/min/1.73sq m   CBC    Collection Time: 12/11/23  3:30 PM   Result Value Ref Range    WBC 3.73 (L) 4.31 - 10.16 Thousand/uL    RBC 2.41 (L) 3.81 - 5.12 Million/uL    Hemoglobin 8.2 (L) 11.5 - 15.4 g/dL     Hematocrit 25.2 (L) 34.8 - 46.1 %     (H) 82 - 98 fL    MCH 34.0 26.8 - 34.3 pg    MCHC 32.5 31.4 - 37.4 g/dL    RDW 15.2 (H) 11.6 - 15.1 %    Platelets 282 149 - 390 Thousands/uL    MPV 9.5 8.9 - 12.7 fL   ECG 12 lead    Collection Time: 12/15/23  1:22 PM   Result Value Ref Range    Ventricular Rate 83 BPM    Atrial Rate 83 BPM    AL Interval 206 ms    QRSD Interval 76 ms    QT Interval 390 ms    QTC Interval 458 ms    P Axis -8 degrees    QRS Axis -29 degrees    T Wave [place] 37 degrees   Echo complete w/ contrast if indicated    Collection Time: 12/15/23  2:41 PM   Result Value Ref Range    Triscuspid Valve Regurgitation Peak Gradient 19.0 mmHg    RAA A4C 16.1 cm2    LA Volume Index (BP) 32.7 mL/m2    MV Peak A Vasiliy 1.13 m/s    MV stenosis pressure 1/2 time 72 ms    MV Peak E Vasiliy 83 cm/s    AV peak gradient 14 mmHg    LVOT stroke volume 95.59 cm3    Ao VTI 37.55 cm    Aortic valve peak velocity 1.85 m/s    LVOT peak VTI 23.02 cm    LVOT peak vasiliy 1.07 m/s    LVOT diameter 2.3 cm    E wave deceleration time 248 ms    E/A ratio 0.73     MV valve area p 1/2 method 3.06     AV LVOT peak gradient 5 mmHg    AV mean gradient 7 mmHg    TR Peak Vasiliy 2.2 m/s    AV area peak vasiliy 1.8 cm2    AV area by cont VTI 1.9 cm2    LVOT mn grad 3.0 mmHg    RVID d 3.9 cm    A4C EF 62 %    Aortic valve mean velocity 12.70 m/s    Tricuspid valve peak regurgitation velocity 2.20 m/s    Left ventricular stroke volume (2D) 50.00 mL    IVSd 1.20 cm    Tricuspid annular plane systolic excursion 2.50 cm    Ao root 2.60 cm    LVPWd 1.00 cm    LA size 3.6 cm    Asc Ao 3.2 cm    LA volume (BP) 52 mL    FS 32 28 - 44    LVIDS 3.00 cm    IVS 1.2 cm    LVIDd 4.40 cm    LA length (A2C) 5.20 cm    LEFT VENTRICLE SYSTOLIC VOLUME (MOD BIPLANE) 2D 36 mL    LV DIASTOLIC VOLUME (MOD BIPLANE) 2D 87 mL    LVOT Cardiac Index 2.84 l/min/m2    LVOT stroke volume index 44.00 ml/m2    LVOT Cardiac Output 4.52 l/min    Left Atrium Area-systolic Four Chamber  17.6 cm2    Left Atrium Area-systolic Apical Two Chamber 19.7 cm2    MV E' Tissue Velocity Lateral 8 cm/s    MV E' Tissue Velocity Septal 5 cm/s    LVSV, 2D 50 mL    LVOT area 4.15 cm2    DVI 0.58     AV valve area 2.55 cm2    LV EF 60     Est. RA pres 3.0 mmHg    Right Ventricular Peak Systolic Pressure 22.00 mmHg    IVC 1.2 mm   Calprotectin,Fecal    Collection Time: 12/15/23  4:01 PM   Result Value Ref Range    Calprotectin 483 (H) 0 - 120 ug/g   CBC and differential    Collection Time: 12/18/23  4:09 PM   Result Value Ref Range    WBC 3.71 (L) 4.31 - 10.16 Thousand/uL    RBC 2.62 (L) 3.81 - 5.12 Million/uL    Hemoglobin 8.9 (L) 11.5 - 15.4 g/dL    Hematocrit 26.9 (L) 34.8 - 46.1 %     (H) 82 - 98 fL    MCH 34.0 26.8 - 34.3 pg    MCHC 33.1 31.4 - 37.4 g/dL    RDW 14.2 11.6 - 15.1 %    MPV 9.3 8.9 - 12.7 fL    Platelets 312 149 - 390 Thousands/uL    nRBC 0 /100 WBCs    Segmented % 37 (L) 43 - 75 %    Immature Grans % 0 0 - 2 %    Lymphocytes % 42 14 - 44 %    Monocytes % 17 (H) 4 - 12 %    Eosinophils Relative 3 0 - 6 %    Basophils Relative 1 0 - 1 %    Absolute Neutrophils 1.36 (L) 1.85 - 7.62 Thousands/µL    Absolute Immature Grans 0.01 0.00 - 0.20 Thousand/uL    Absolute Lymphocytes 1.56 0.60 - 4.47 Thousands/µL    Absolute Monocytes 0.63 0.17 - 1.22 Thousand/µL    Eosinophils Absolute 0.10 0.00 - 0.61 Thousand/µL    Basophils Absolute 0.05 0.00 - 0.10 Thousands/µL   CBC    Collection Time: 01/02/24 10:56 AM   Result Value Ref Range    WBC 4.74 4.31 - 10.16 Thousand/uL    RBC 2.75 (L) 3.81 - 5.12 Million/uL    Hemoglobin 9.6 (L) 11.5 - 15.4 g/dL    Hematocrit 28.6 (L) 34.8 - 46.1 %     (H) 82 - 98 fL    MCH 34.9 (H) 26.8 - 34.3 pg    MCHC 33.6 31.4 - 37.4 g/dL    RDW 13.7 11.6 - 15.1 %    Platelets 295 149 - 390 Thousands/uL    MPV 9.4 8.9 - 12.7 fL   Comprehensive metabolic panel    Collection Time: 02/09/24  3:57 PM   Result Value Ref Range    Sodium 128 (L) 135 - 147 mmol/L    Potassium 4.1  3.5 - 5.3 mmol/L    Chloride 98 96 - 108 mmol/L    CO2 26 21 - 32 mmol/L    ANION GAP 4 mmol/L    BUN 12 5 - 25 mg/dL    Creatinine 0.68 0.60 - 1.30 mg/dL    Glucose 85 65 - 140 mg/dL    Calcium 9.0 8.4 - 10.2 mg/dL    AST 21 13 - 39 U/L    ALT 9 7 - 52 U/L    Alkaline Phosphatase 72 34 - 104 U/L    Total Protein 6.6 6.4 - 8.4 g/dL    Albumin 3.9 3.5 - 5.0 g/dL    Total Bilirubin 0.57 0.20 - 1.00 mg/dL    eGFR 80 ml/min/1.73sq m   CBC and differential    Collection Time: 02/09/24  3:57 PM   Result Value Ref Range    WBC 3.89 (L) 4.31 - 10.16 Thousand/uL    RBC 2.43 (L) 3.81 - 5.12 Million/uL    Hemoglobin 9.1 (L) 11.5 - 15.4 g/dL    Hematocrit 25.2 (L) 34.8 - 46.1 %     (H) 82 - 98 fL    MCH 37.4 (H) 26.8 - 34.3 pg    MCHC 36.1 31.4 - 37.4 g/dL    RDW 13.4 11.6 - 15.1 %    MPV 9.1 8.9 - 12.7 fL    Platelets 267 149 - 390 Thousands/uL    nRBC 0 /100 WBCs    Segmented % 47 43 - 75 %    Immature Grans % 1 0 - 2 %    Lymphocytes % 34 14 - 44 %    Monocytes % 13 (H) 4 - 12 %    Eosinophils Relative 4 0 - 6 %    Basophils Relative 1 0 - 1 %    Absolute Neutrophils 1.84 (L) 1.85 - 7.62 Thousands/µL    Absolute Immature Grans 0.02 0.00 - 0.20 Thousand/uL    Absolute Lymphocytes 1.34 0.60 - 4.47 Thousands/µL    Absolute Monocytes 0.51 0.17 - 1.22 Thousand/µL    Eosinophils Absolute 0.14 0.00 - 0.61 Thousand/µL    Basophils Absolute 0.04 0.00 - 0.10 Thousands/µL   Calprotectin,Fecal    Collection Time: 02/12/24 11:04 AM   Result Value Ref Range    Calprotectin 447 (H) 0 - 120 ug/g   TSH, 3rd generation    Collection Time: 02/14/24  3:56 PM   Result Value Ref Range    TSH 3RD GENERATON 3.942 0.450 - 4.500 uIU/mL   T4, free    Collection Time: 02/14/24  3:56 PM   Result Value Ref Range    Free T4 0.87 0.61 - 1.12 ng/dL   Sodium    Collection Time: 02/14/24  3:56 PM   Result Value Ref Range    Sodium 134 (L) 135 - 147 mmol/L   CBC and differential    Collection Time: 03/22/24  7:55 AM   Result Value Ref Range    WBC 3.89  (L) 4.31 - 10.16 Thousand/uL    RBC 2.54 (L) 3.81 - 5.12 Million/uL    Hemoglobin 9.0 (L) 11.5 - 15.4 g/dL    Hematocrit 28.0 (L) 34.8 - 46.1 %     (H) 82 - 98 fL    MCH 35.4 (H) 26.8 - 34.3 pg    MCHC 32.1 31.4 - 37.4 g/dL    RDW 13.4 11.6 - 15.1 %    MPV 9.9 8.9 - 12.7 fL    Platelets 230 149 - 390 Thousands/uL    nRBC 0 /100 WBCs    Segmented % 50 43 - 75 %    Immature Grans % 1 0 - 2 %    Lymphocytes % 29 14 - 44 %    Monocytes % 13 (H) 4 - 12 %    Eosinophils Relative 6 0 - 6 %    Basophils Relative 1 0 - 1 %    Absolute Neutrophils 1.98 1.85 - 7.62 Thousands/µL    Absolute Immature Grans 0.02 0.00 - 0.20 Thousand/uL    Absolute Lymphocytes 1.14 0.60 - 4.47 Thousands/µL    Absolute Monocytes 0.50 0.17 - 1.22 Thousand/µL    Eosinophils Absolute 0.22 0.00 - 0.61 Thousand/µL    Basophils Absolute 0.03 0.00 - 0.10 Thousands/µL   Reticulocytes    Collection Time: 03/22/24  7:55 AM   Result Value Ref Range    Retic Ct Abs 39,400 14,097 - 95,744    Retic Ct Pct 1.55 0.37 - 1.87 %   Folate    Collection Time: 03/22/24  7:55 AM   Result Value Ref Range    Folate 11.5 >5.9 ng/mL   Direct antiglobulin test    Collection Time: 03/22/24  7:55 AM   Result Value Ref Range    DIRECT FAUSTO Negative    TIBC Panel (incl. Iron, TIBC, % Iron Saturation)    Collection Time: 03/22/24  7:55 AM   Result Value Ref Range    Iron Saturation 24 15 - 50 %    TIBC 271 250 - 450 ug/dL    Iron 65 50 - 212 ug/dL    UIBC 206 155 - 355 ug/dL   Ferritin    Collection Time: 03/22/24  7:55 AM   Result Value Ref Range    Ferritin 52 11 - 307 ng/mL   CBC and differential    Collection Time: 04/30/24  3:14 PM   Result Value Ref Range    WBC 4.64 4.31 - 10.16 Thousand/uL    RBC 2.49 (L) 3.81 - 5.12 Million/uL    Hemoglobin 8.5 (L) 11.5 - 15.4 g/dL    Hematocrit 26.7 (L) 34.8 - 46.1 %     (H) 82 - 98 fL    MCH 34.1 26.8 - 34.3 pg    MCHC 31.8 31.4 - 37.4 g/dL    RDW 13.8 11.6 - 15.1 %    MPV 10.2 8.9 - 12.7 fL    Platelets 241 149 - 390  Thousands/uL    nRBC 0 /100 WBCs    Segmented % 45 43 - 75 %    Immature Grans % 0 0 - 2 %    Lymphocytes % 34 14 - 44 %    Monocytes % 13 (H) 4 - 12 %    Eosinophils Relative 7 (H) 0 - 6 %    Basophils Relative 1 0 - 1 %    Absolute Neutrophils 2.09 1.85 - 7.62 Thousands/µL    Absolute Immature Grans 0.01 0.00 - 0.20 Thousand/uL    Absolute Lymphocytes 1.57 0.60 - 4.47 Thousands/µL    Absolute Monocytes 0.61 0.17 - 1.22 Thousand/µL    Eosinophils Absolute 0.31 0.00 - 0.61 Thousand/µL    Basophils Absolute 0.05 0.00 - 0.10 Thousands/µL   Vitamin B12    Collection Time: 04/30/24  3:14 PM   Result Value Ref Range    Vitamin B-12 1,140 (H) 180 - 914 pg/mL   Folate    Collection Time: 04/30/24  3:14 PM   Result Value Ref Range    Folate 10.8 >5.9 ng/mL   TIBC Panel (incl. Iron, TIBC, % Iron Saturation)    Collection Time: 04/30/24  3:14 PM   Result Value Ref Range    Iron Saturation 28 15 - 50 %    TIBC 265 250 - 450 ug/dL    Iron 73 50 - 212 ug/dL    UIBC 192 155 - 355 ug/dL   Ferritin    Collection Time: 04/30/24  3:14 PM   Result Value Ref Range    Ferritin 42 11 - 307 ng/mL   Calprotectin,Fecal    Collection Time: 06/24/24 10:02 AM   Result Value Ref Range    Calprotectin 674 (H) 0 - 120 ug/g   POCT urine dip    Collection Time: 08/14/24 12:48 PM   Result Value Ref Range    LEUKOCYTE ESTERASE,UA neg     NITRITE,UA neg     SL AMB POCT UROBILINOGEN 0.2     POCT URINE PROTEIN neg      PH,UA 5.0     BLOOD,UA neg     SPECIFIC GRAVITY,UA 1.010     KETONES,UA neg     BILIRUBIN,UA neg     GLUCOSE, UA neg      COLOR,UA yellow     CLARITY,UA clear        Laboratory Results: I have personally reviewed the pertinent laboratory results/reports     Radiology/Other Diagnostic Testing Results: I have personally reviewed pertinent reports.    CT ABDOMEN AND PELVIS WITH IV CONTRAST     INDICATION:   Left lower quadrant pain.     COMPARISON: CTA of the chest, abdomen, and pelvis dated 8/30/2023. CT of the abdomen/pelvis dated  1/17/2019.     TECHNIQUE:  CT examination of the abdomen and pelvis was performed. Multiplanar 2D reformatted images were created from the source data.     This examination, like all CT scans performed in the [facility], was performed utilizing techniques to minimize radiation dose exposure, including the use of iterative reconstruction and automated exposure control. Radiation dose length   product (DLP) for this visit:  429 mGy-cm     IV Contrast:  100 mL of iohexol (OMNIPAQUE)  Enteric Contrast:  Enteric contrast was not administered.     FINDINGS:     ABDOMEN     LOWER CHEST: Mild chronic scarring at the left base. No acute findings in the visualized portion of the lower chest.     LIVER/BILIARY TREE:  Unremarkable.     GALLBLADDER: Normal when accounting for degree of nondistention.     SPLEEN:  Unremarkable.     PANCREAS:  Unremarkable.     ADRENAL GLANDS: Bilateral lobular adrenal hypertrophy--unchanged. No suspicious nodules on either side.     KIDNEYS/URETERS:  Unremarkable. No hydronephrosis.     STOMACH AND BOWEL: Colonic diverticula are present from the hepatic flexure through the rectosigmoid junction, most marked distally. There is long segment circumferential wall thickening/thumbprinting involving the hepatic flexure and entire transverse   colon with mild perienteric fat stranding. There are some scattered diverticula within the inflamed segment, but wall thickening is more diffuse/extensive and uniform than typical for diverticulitis.     Descending colon wall thickness appears within normal limits. Extensive diverticulosis involving the sigmoid colon. There is a nondistensible segment of sigmoid colon with unchanged apparent wall thickening (301/118). However, appearance is relatively   unchanged since at least 2019. No fat stranding around the sigmoid colon.     Small hiatal hernia. Stomach is otherwise normal. No dilated or inflamed loops of small bowel.     APPENDIX:  No  findings to suggest appendicitis.     ABDOMINOPELVIC CAVITY:  No ascites.  No pneumoperitoneum.  No lymphadenopathy.     VESSELS:  Unremarkable for patient's age.     PELVIS     REPRODUCTIVE ORGANS:  Unremarkable for patient's age.     URINARY BLADDER:  Unremarkable.     ABDOMINAL WALL/INGUINAL REGIONS:  Unremarkable.     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 No acute fracture or destructive osseous lesion.     IMPRESSION:     Uncomplicated colitis of the transverse segment. Though there are diverticula arising from this portion of the colon, length of the affected segment and morphology of wall thickening are atypical for diverticulitis such that other infectious or   inflammatory causes of colitis should be considered. No free air, abscess, or obstruction.     Location of inflamed colon in the upper abdomen does not necessarily explain focal left lower quadrant pain. More extensive distal diverticular findings in the sigmoid appear unchanged from remote priors without evidence for acute diverticulitis,   distally.      CT ABDOMEN AND PELVIS WITHOUT IV CONTRAST - LOW DOSE RENAL STONE      INDICATION:   Flank pain, kidney stone suspected  Flank pain radiating forward to groin on R side, r/o kidney stone.  Right flank pain beginning 2 days ago     COMPARISON:  1/17/2019     TECHNIQUE:  Low radiation dose thin section CT examination of the abdomen and pelvis was performed without intravenous or oral contrast according to a protocol specifically designed to evaluate for urinary tract calculus.  Axial, sagittal, and coronal 2D   reformatted images were created from the source data and submitted for interpretation.  Evaluation for pathology in the abdomen and pelvis that is unrelated to urinary tract calculi is limited.       Radiation dose length product (DLP) for this visit:  148 mGy-cm .  This examination, like all CT scans performed in the [facility],  was performed utilizing techniques to minimize radiation dose exposure, including the use of iterative   reconstruction and automated exposure control.     URINARY TRACT FINDINGS:     RIGHT KIDNEY AND URETER:  No urinary tract calculi.  No hydronephrosis or hydroureter.     LEFT KIDNEY AND URETER:  No urinary tract calculi.  No hydronephrosis or hydroureter.  There is intermediate density lesion anteriorly in the left kidney similar size to 2019 measuring 1.2 cm.  This could represent a proteinaceous cyst.  Nonobstructing   calculus is seen on the left.     URINARY BLADDER:  Unremarkable.        ADDITIONAL FINDINGS:     LOWER CHEST:  No clinically significant abnormality identified in the visualized lower chest.  Appears to be some dilatation of the IVC just prior to the right atrium similar to prior studies.     SOLID VISCERA: Limited low radiation dose noncontrast CT evaluation demonstrates no clinically significant abnormality of the imaged portions of the liver, spleen, pancreas, or adrenal glands.       GALLBLADDER/BILIARY TREE:  There are gallstone(s) within the gallbladder, without pericholecystic inflammatory changes.  No biliary dilatation.     STOMACH AND BOWEL:  Moderate distention stomach with fluid.  Small hiatal hernia.  No small bowel dilatation.  Mild fecalization of the terminal ileum may be related to an incompetent ileocecal valve.  Mild scattered fecal stasis.  Left colon   diverticulosis without CT evidence of diverticulitis.     APPENDIX:  No findings to suggest appendicitis.     ABDOMINOPELVIC CAVITY:  No ascites.  No pneumoperitoneum.  No lymphadenopathy.     REPRODUCTIVE ORGANS:  Unremarkable for patient's age.     ABDOMINAL WALL/INGUINAL REGIONS:  Small periumbilical hernia of fat.     OSSEOUS STRUCTURES:  No acute fracture or destructive osseous lesion.  Degenerative changes of the spine are noted with scoliosis.  Bone island is seen in the left acetabulum without change.  Hemangioma  appears to be present at L1 without change.     IMPRESSION:     Nonobstructing left renal stone.     No evidence of ureteral stone or hydronephrosis.     1.2 cm left renal lesion is similar in size to 2019 although slightly denser and may represent hemorrhagic cyst.  Nonemergent ultrasound follow-up is suggested.     Gallstones     Mild fecal stasis.     The study was marked in EPIC for immediate notification.     Assessment/Plan:  1. Left flank pain  -     POCT urine dip  -     tiZANidine (ZANAFLEX) 2 mg tablet; Take 1 tablet (2 mg total) by mouth daily at bedtime as needed for muscle spasms  -     levofloxacin (LEVAQUIN) 500 mg tablet; Take 1 tablet (500 mg total) by mouth every 24 hours for 5 days  -     US kidney and bladder; Future; Expected date: 08/14/2024  -     Basic metabolic panel; Future  -     CBC and differential; Future  -     Urinalysis with microscopic  -     Urine culture  2. Left renal stone  -     US kidney and bladder; Future; Expected date: 08/14/2024  -     Basic metabolic panel; Future  -     CBC and differential; Future  -     Urinalysis with microscopic  -     Urine culture  3. Ulcerative colitis without complications, unspecified location (HCC)      Based on CT abdomen and pelvis from February 2023 she did have renal stone which was nonobstructive on the left side as well as a possible hemorrhagic cyst of the left kidney.  She has had subsequent CT chest and abdomen with various abnormalities for which she follows with pulmonology, oncology and PCP.  Based on her symptoms today it is possible that she has ascending UTI recommend empiric treatment with levofloxacin.  It is possible that this is musculoskeletal will prescribe tizanidine as needed.  Follow-up if symptoms do not improve.  Recommend ultrasound of the kidney and bladder.             Read package inserts for all medications before starting a new medications, call me if you have any questions.    Patient was given opportunity to  "ask questions and all questions were answered.    Disclaimer: Portions of the record may have been created with voice recognition software. Occasional wrong word or \"sound a like\" substitutions may have occurred due to the inherent limitations of voice recognition software. Read the chart carefully and recognize, using context, where substitutions have occurred. I have used the Epic copy/forward function to compose this note. I have reviewed my current note to ensure it reflects the current patient status, exam, assessment and plan.    "

## 2024-08-15 ENCOUNTER — APPOINTMENT (OUTPATIENT)
Dept: LAB | Facility: CLINIC | Age: 84
End: 2024-08-15
Payer: MEDICARE

## 2024-08-15 ENCOUNTER — TELEPHONE (OUTPATIENT)
Age: 84
End: 2024-08-15

## 2024-08-15 DIAGNOSIS — N20.0 LEFT RENAL STONE: ICD-10-CM

## 2024-08-15 DIAGNOSIS — R10.9 LEFT FLANK PAIN: ICD-10-CM

## 2024-08-15 LAB
ANION GAP SERPL CALCULATED.3IONS-SCNC: 4 MMOL/L (ref 4–13)
BACTERIA UR CULT: NORMAL
BASOPHILS # BLD AUTO: 0.05 THOUSANDS/ÂΜL (ref 0–0.1)
BASOPHILS NFR BLD AUTO: 1 % (ref 0–1)
BUN SERPL-MCNC: 13 MG/DL (ref 5–25)
CALCIUM SERPL-MCNC: 9.1 MG/DL (ref 8.4–10.2)
CHLORIDE SERPL-SCNC: 96 MMOL/L (ref 96–108)
CO2 SERPL-SCNC: 27 MMOL/L (ref 21–32)
CREAT SERPL-MCNC: 0.65 MG/DL (ref 0.6–1.3)
EOSINOPHIL # BLD AUTO: 0.18 THOUSAND/ÂΜL (ref 0–0.61)
EOSINOPHIL NFR BLD AUTO: 4 % (ref 0–6)
ERYTHROCYTE [DISTWIDTH] IN BLOOD BY AUTOMATED COUNT: 13.6 % (ref 11.6–15.1)
GFR SERPL CREATININE-BSD FRML MDRD: 81 ML/MIN/1.73SQ M
GLUCOSE P FAST SERPL-MCNC: 90 MG/DL (ref 65–99)
HCT VFR BLD AUTO: 26.2 % (ref 34.8–46.1)
HGB BLD-MCNC: 8.7 G/DL (ref 11.5–15.4)
IMM GRANULOCYTES # BLD AUTO: 0.02 THOUSAND/UL (ref 0–0.2)
IMM GRANULOCYTES NFR BLD AUTO: 0 % (ref 0–2)
LYMPHOCYTES # BLD AUTO: 1.51 THOUSANDS/ÂΜL (ref 0.6–4.47)
LYMPHOCYTES NFR BLD AUTO: 33 % (ref 14–44)
MCH RBC QN AUTO: 34.3 PG (ref 26.8–34.3)
MCHC RBC AUTO-ENTMCNC: 33.2 G/DL (ref 31.4–37.4)
MCV RBC AUTO: 103 FL (ref 82–98)
MONOCYTES # BLD AUTO: 0.54 THOUSAND/ÂΜL (ref 0.17–1.22)
MONOCYTES NFR BLD AUTO: 12 % (ref 4–12)
NEUTROPHILS # BLD AUTO: 2.26 THOUSANDS/ÂΜL (ref 1.85–7.62)
NEUTS SEG NFR BLD AUTO: 50 % (ref 43–75)
NRBC BLD AUTO-RTO: 0 /100 WBCS
PLATELET # BLD AUTO: 272 THOUSANDS/UL (ref 149–390)
PMV BLD AUTO: 9.7 FL (ref 8.9–12.7)
POTASSIUM SERPL-SCNC: 4 MMOL/L (ref 3.5–5.3)
RBC # BLD AUTO: 2.54 MILLION/UL (ref 3.81–5.12)
SODIUM SERPL-SCNC: 127 MMOL/L (ref 135–147)
WBC # BLD AUTO: 4.56 THOUSAND/UL (ref 4.31–10.16)

## 2024-08-15 PROCEDURE — 80048 BASIC METABOLIC PNL TOTAL CA: CPT

## 2024-08-15 PROCEDURE — 36415 COLL VENOUS BLD VENIPUNCTURE: CPT

## 2024-08-15 PROCEDURE — 85025 COMPLETE CBC W/AUTO DIFF WBC: CPT

## 2024-08-15 NOTE — TELEPHONE ENCOUNTER
A. Relayed results to (patient/patient representative as listed on communication consent form) as per provider message. Patient/Patient Representative expressed understanding and did not have any further questions.      Patient stated she just started the pills she got and there is a little improvement.                                                B. If addending telephone encounter created by office, sign and close. If no existing encounter, Route to OFFICE CLINICAL POOL with request to: Please complete Result Management task.

## 2024-08-21 ENCOUNTER — HOSPITAL ENCOUNTER (OUTPATIENT)
Dept: ULTRASOUND IMAGING | Facility: HOSPITAL | Age: 84
Discharge: HOME | End: 2024-08-21
Payer: MEDICARE

## 2024-08-21 ENCOUNTER — TELEPHONE (OUTPATIENT)
Age: 84
End: 2024-08-21

## 2024-08-21 DIAGNOSIS — R10.9 LEFT FLANK PAIN: ICD-10-CM

## 2024-08-21 DIAGNOSIS — N20.0 LEFT RENAL STONE: ICD-10-CM

## 2024-08-21 PROCEDURE — 76775 US EXAM ABDO BACK WALL LIM: CPT

## 2024-08-21 NOTE — TELEPHONE ENCOUNTER
Patient called to advise that her Ultrasound has been completed and result  are in. Please review and give her call back.

## 2024-08-26 ENCOUNTER — HOSPITAL ENCOUNTER (OUTPATIENT)
Dept: INFUSION CENTER | Facility: CLINIC | Age: 84
Discharge: HOME | End: 2024-08-26
Payer: MEDICARE

## 2024-08-26 DIAGNOSIS — I10 ESSENTIAL HYPERTENSION: ICD-10-CM

## 2024-08-26 DIAGNOSIS — E78.00 HYPERCHOLESTEROLEMIA: ICD-10-CM

## 2024-08-26 DIAGNOSIS — E53.8 B12 DEFICIENCY: Primary | ICD-10-CM

## 2024-08-26 PROCEDURE — 96372 THER/PROPH/DIAG INJ SC/IM: CPT

## 2024-08-26 RX ORDER — CYANOCOBALAMIN 1000 UG/ML
1000 INJECTION, SOLUTION INTRAMUSCULAR; SUBCUTANEOUS ONCE
Status: COMPLETED | OUTPATIENT
Start: 2024-08-26 | End: 2024-08-26

## 2024-08-26 RX ORDER — CYANOCOBALAMIN 1000 UG/ML
1000 INJECTION, SOLUTION INTRAMUSCULAR; SUBCUTANEOUS ONCE
OUTPATIENT
Start: 2024-09-23

## 2024-08-26 RX ADMIN — CYANOCOBALAMIN 1000 MCG: 1000 INJECTION, SOLUTION INTRAMUSCULAR at 15:32

## 2024-08-26 NOTE — PROGRESS NOTES
Patient to infusion center for vitamin b12 injection. Patient offers no complaints. VSS. Vitamin b12 injection administered into right deltoid, tolerated without incident. Next appointment confirmed for 9/23/2024 at 1530. AVS offered and declined.

## 2024-08-27 RX ORDER — VALSARTAN 80 MG/1
TABLET ORAL
Qty: 90 TABLET | Refills: 0 | Status: SHIPPED | OUTPATIENT
Start: 2024-08-27

## 2024-08-27 RX ORDER — ATORVASTATIN CALCIUM 20 MG/1
20 TABLET, FILM COATED ORAL DAILY
Qty: 90 TABLET | Refills: 0 | Status: SHIPPED | OUTPATIENT
Start: 2024-08-27

## 2024-08-28 DIAGNOSIS — K51.80 OTHER ULCERATIVE COLITIS WITHOUT COMPLICATION (HCC): ICD-10-CM

## 2024-08-28 RX ORDER — SULFASALAZINE 500 MG/1
1000 TABLET ORAL 2 TIMES DAILY
Qty: 360 TABLET | Refills: 2 | Status: SHIPPED | OUTPATIENT
Start: 2024-08-28

## 2024-09-04 ENCOUNTER — RA CDI HCC (OUTPATIENT)
Dept: OTHER | Facility: HOSPITAL | Age: 84
End: 2024-09-04

## 2024-09-05 ENCOUNTER — APPOINTMENT (OUTPATIENT)
Dept: LAB | Facility: HOSPITAL | Age: 84
End: 2024-09-05
Attending: FAMILY MEDICINE
Payer: MEDICARE

## 2024-09-05 LAB
BASOPHILS # BLD AUTO: 0.07 THOUSANDS/ÂΜL (ref 0–0.1)
BASOPHILS NFR BLD AUTO: 1 % (ref 0–1)
EOSINOPHIL # BLD AUTO: 0.25 THOUSAND/ÂΜL (ref 0–0.61)
EOSINOPHIL NFR BLD AUTO: 5 % (ref 0–6)
ERYTHROCYTE [DISTWIDTH] IN BLOOD BY AUTOMATED COUNT: 13.1 % (ref 11.6–15.1)
HCT VFR BLD AUTO: 27.4 % (ref 34.8–46.1)
HGB BLD-MCNC: 8.9 G/DL (ref 11.5–15.4)
IMM GRANULOCYTES # BLD AUTO: 0.02 THOUSAND/UL (ref 0–0.2)
IMM GRANULOCYTES NFR BLD AUTO: 0 % (ref 0–2)
LYMPHOCYTES # BLD AUTO: 1.83 THOUSANDS/ÂΜL (ref 0.6–4.47)
LYMPHOCYTES NFR BLD AUTO: 33 % (ref 14–44)
MCH RBC QN AUTO: 34.5 PG (ref 26.8–34.3)
MCHC RBC AUTO-ENTMCNC: 32.5 G/DL (ref 31.4–37.4)
MCV RBC AUTO: 106 FL (ref 82–98)
MONOCYTES # BLD AUTO: 0.74 THOUSAND/ÂΜL (ref 0.17–1.22)
MONOCYTES NFR BLD AUTO: 14 % (ref 4–12)
NEUTROPHILS # BLD AUTO: 2.59 THOUSANDS/ÂΜL (ref 1.85–7.62)
NEUTS SEG NFR BLD AUTO: 47 % (ref 43–75)
NRBC BLD AUTO-RTO: 0 /100 WBCS
PLATELET # BLD AUTO: 272 THOUSANDS/UL (ref 149–390)
PMV BLD AUTO: 9.1 FL (ref 8.9–12.7)
RBC # BLD AUTO: 2.58 MILLION/UL (ref 3.81–5.12)
WBC # BLD AUTO: 5.5 THOUSAND/UL (ref 4.31–10.16)

## 2024-09-12 ENCOUNTER — OFFICE VISIT (OUTPATIENT)
Dept: FAMILY MEDICINE CLINIC | Facility: CLINIC | Age: 84
End: 2024-09-12
Payer: MEDICARE

## 2024-09-12 VITALS
OXYGEN SATURATION: 97 % | DIASTOLIC BLOOD PRESSURE: 74 MMHG | RESPIRATION RATE: 16 BRPM | HEART RATE: 80 BPM | HEIGHT: 64 IN | BODY MASS INDEX: 23.9 KG/M2 | SYSTOLIC BLOOD PRESSURE: 128 MMHG | WEIGHT: 140 LBS

## 2024-09-12 DIAGNOSIS — Z23 ENCOUNTER FOR IMMUNIZATION: ICD-10-CM

## 2024-09-12 DIAGNOSIS — I10 ESSENTIAL HYPERTENSION: Primary | ICD-10-CM

## 2024-09-12 DIAGNOSIS — K51.90 ULCERATIVE COLITIS WITHOUT COMPLICATIONS, UNSPECIFIED LOCATION (HCC): ICD-10-CM

## 2024-09-12 DIAGNOSIS — D64.9 ANEMIA, UNSPECIFIED TYPE: ICD-10-CM

## 2024-09-12 DIAGNOSIS — Z95.2 S/P TAVR (TRANSCATHETER AORTIC VALVE REPLACEMENT): ICD-10-CM

## 2024-09-12 DIAGNOSIS — E03.9 ACQUIRED HYPOTHYROIDISM: ICD-10-CM

## 2024-09-12 DIAGNOSIS — K21.00 GASTROESOPHAGEAL REFLUX DISEASE WITH ESOPHAGITIS WITHOUT HEMORRHAGE: ICD-10-CM

## 2024-09-12 DIAGNOSIS — E78.00 HYPERCHOLESTEROLEMIA: ICD-10-CM

## 2024-09-12 PROBLEM — K52.9 ACUTE COLITIS: Status: RESOLVED | Noted: 2023-11-29 | Resolved: 2024-09-12

## 2024-09-12 PROCEDURE — G0008 ADMIN INFLUENZA VIRUS VAC: HCPCS | Performed by: FAMILY MEDICINE

## 2024-09-12 PROCEDURE — 90662 IIV NO PRSV INCREASED AG IM: CPT | Performed by: FAMILY MEDICINE

## 2024-09-12 PROCEDURE — 99214 OFFICE O/P EST MOD 30 MIN: CPT | Performed by: FAMILY MEDICINE

## 2024-09-12 NOTE — ASSESSMENT & PLAN NOTE
Blood pressure remains good. Continue valsartan 80 mg daily and amlodipine 10 mg daily. Pt advised to continue low Na diet and to exercise on a regular basis.         Orders:    Lipid panel; Future    Comprehensive metabolic panel; Future

## 2024-09-12 NOTE — ASSESSMENT & PLAN NOTE
Recheck TSH and Free T4. Continue levothyroxine 75 mcg qd.    Orders:    TSH, 3rd generation; Future    T4, free; Future     Face Mask/EKG

## 2024-09-12 NOTE — ASSESSMENT & PLAN NOTE
Stable. No recent bleeding. Continue medications and management per Gastroenterology. Last appointment was in April 2024.

## 2024-09-12 NOTE — PROGRESS NOTES
Ambulatory Visit  Name: Ning Rodrigues      : 1940      MRN: [id number]  Encounter Provider: Yohan Regan MD  Encounter Date: 2024   Encounter department: [facility]    Assessment & Plan  Essential hypertension  Blood pressure remains good. Continue valsartan 80 mg daily and amlodipine 10 mg daily. Pt advised to continue low Na diet and to exercise on a regular basis.         Orders:    Lipid panel; Future    Comprehensive metabolic panel; Future    Hypercholesterolemia  Recheck lipids and LFTs. Continue atorvastatin 20 mg qhs. Advised pt to follow a low cholesterol diet and to exercise on a regular basis.     Orders:    Lipid panel; Future    Comprehensive metabolic panel; Future    Acquired hypothyroidism  Recheck TSH and Free T4. Continue levothyroxine 75 mcg qd.    Orders:    TSH, 3rd generation; Future    T4, free; Future    Ulcerative colitis without complications, unspecified location (HCC)  Stable. No recent bleeding. Continue medications and management per Gastroenterology. Last appointment was in 2024.         Gastroesophageal reflux disease with esophagitis without hemorrhage  Stable. Continue pantoprazole 40 mg daily and GERD diet. Patient last saw Gastroenterology in 2024.          Anemia, unspecified type  Hgb stable at 8.9 in 2024. Continue management per Hematology.          S/P TAVR (transcatheter aortic valve replacement)  Patient had surgery in 2023. Patient doing well. Had echocardiogram in 2023 and prosthetic AV working well. EF was normal. Patient done with Cardiac Rehab.          Encounter for immunization    Orders:    influenza vaccine, high-dose, PF 0.5 mL (Fluzone High Dose)      Depression Screening and Follow-up Plan: Patient was screened for depression during today's encounter. They screened negative with a PHQ-2 score of 0.        History of Present Illness     Patient here for follow-up Hypertension,  "Hyperlipidemia, Anemia, UC, GERD, Hypothyroidism. Patient doing ok. No chest pain or shortness of breath. No headaches. No abdominal pain. Blood pressure has been good. No recent stomach issues. Patient has appointment with Gastroenterology soon. Patient has been seeing Hematology and gets B12 shots every month and takes iron pill daily. No new complaints.       Review of Systems   Constitutional:  Negative for fatigue and unexpected weight change.   Respiratory:  Positive for cough. Negative for chest tightness and shortness of breath.    Cardiovascular:  Negative for chest pain and palpitations.   Gastrointestinal:  Negative for abdominal pain, constipation, diarrhea, nausea and vomiting.   Genitourinary:  Negative for difficulty urinating.   Musculoskeletal:  Negative for arthralgias.   Skin:  Negative for rash.   Neurological:  Negative for dizziness and headaches.     Past Medical History:   Diagnosis Date    Allergic rhinitis 03/21/2023    Anemia     Arthritis     osteoporosis, djd knees, pt denies osteoporosis on 2/21/22    Low's esophagus     Cancer (HCC)     on face    Colon polyp     Cough 02/21/2022    cough for 3 years, expectorates yellow mucus    Cough variant asthma  07/26/2021    Disease of thyroid gland     low thyroid    Diverticulitis     gerd, diverticulitis    Dizziness     GERD (gastroesophageal reflux disease)     ulcerative colitis; Low's esophagus, diverticulosis; hx of laryngopharyngeal reflux    Hyperlipidemia     Hypertension     Low vitamin D level     LPRD (laryngopharyngeal reflux disease) 02/21/2022    PONV (postoperative nausea and vomiting)     AFTER \"BIG SURGERIES\"    Postnasal drip     WITH COUGH    Ulcerative colitis (HCC)      Past Surgical History:   Procedure Laterality Date    APPENDECTOMY      CARDIAC CATHETERIZATION N/A 09/07/2023    Procedure: Cardiac RHC/LHC;  Surgeon: Rafael Sood MD;  [facility]: [facility];  Service: Cardiology    CARDIAC CATHETERIZATION " N/A 11/07/2023    Procedure: Cardiac tavr;  Surgeon: Pete Hernández MD;  Location: [facility];  Service: Cardiology    CATARACT EXTRACTION Bilateral     COLONOSCOPY      colon polyps    COLONOSCOPY  07/28/2022    FOOT SURGERY Bilateral     bunionectomy    HEMORRHOID SURGERY      HEMORROIDECTOMY      hemorroid removal    JOINT REPLACEMENT Bilateral     knees    IA REPLACE AORTIC VALVE OPENFEMORAL ARTERY APPROACH N/A 11/07/2023    Procedure: REPLACEMENT AORTIC VALVE TRANSCATHETER (TAVR) TRANSFEMORAL W/ 26MM BUCKNER DOROTHEA S3 UR VALVE(ACCESS ON LEFT) GIOVANNY;  Surgeon: Felipe Buitrago DO;  Location: [facility];  Service: Cardiac Surgery    REPLACEMENT TOTAL KNEE Bilateral     b/ replacement    TONSILLECTOMY      TUBAL LIGATION      UPPER GASTROINTESTINAL ENDOSCOPY       Family History   Problem Relation Age of Onset    Heart disease Father     No Known Problems Sister     No Known Problems Sister     Heart disease Brother     Colon cancer Brother 55    Cancer Brother 67    No Known Problems Maternal Grandmother     No Known Problems Paternal Grandmother     No Known Problems Maternal Aunt     No Known Problems Paternal Aunt     Cancer Daughter         unsure of type of cancer, it was female cancer and hysterectomy done by Dr. Fierro     Social History     Tobacco Use    Smoking status: Never    Smokeless tobacco: Never   Vaping Use    Vaping status: Never Used   Substance and Sexual Activity    Alcohol use: Not Currently    Drug use: Never    Sexual activity: Not Currently     Current Outpatient Medications on File Prior to Visit   Medication Sig    acetaminophen (TYLENOL) 325 mg tablet Take 2 tablets (650 mg total) by mouth every 6 (six) hours as needed for fever, mild pain, moderate pain or headaches (temperature greater than 101 F)    amLODIPine (NORVASC) 10 mg tablet Take 1 tablet by mouth once daily    aspirin 81 mg chewable tablet Chew 1 tablet (81 mg total) daily Do not start before November 9, 2023.     atorvastatin (LIPITOR) 20 mg tablet Take 1 tablet by mouth once daily    cholecalciferol (VITAMIN D3) 1,000 units tablet Take 1,000 Units by mouth daily    Coenzyme Q10 (Co Q 10) 100 MG CAPS Take by mouth    Cyanocobalamin (VITAMIN B 12 PO) Take by mouth    levothyroxine 75 mcg tablet Take 1 tablet by mouth once daily    Multiple Vitamin (multivitamin) capsule Take 1 capsule by mouth daily    pantoprazole (PROTONIX) 40 mg tablet Take 1 tablet (40 mg total) by mouth daily    sulfaSALAzine (AZULFIDINE) 500 mg tablet TAKE 2 TABLETS BY MOUTH TWICE A DAY    valsartan (DIOVAN) 80 mg tablet Take 1 tablet by mouth once daily    vitamin A 2250 MCG (7500 UT) capsule Take 7,500 Units by mouth daily    vitamin E, tocopherol, 1,000 units capsule Take 1,000 Units by mouth daily    Fluticasone Furoate-Vilanterol (Breo Ellipta) 100-25 mcg/actuation inhaler Inhale 1 puff daily Rinse mouth after use. (Patient not taking: Reported on 9/12/2024)    tiZANidine (ZANAFLEX) 2 mg tablet Take 1 tablet (2 mg total) by mouth daily at bedtime as needed for muscle spasms (Patient not taking: Reported on 9/12/2024)     Allergies   Allergen Reactions    Sulfa Antibiotics Itching     Immunization History   Administered Date(s) Administered    COVID-19 MODERNA VACC 0.25 ML IM BOOSTER 11/22/2021    COVID-19 MODERNA VACC 0.5 ML IM 02/12/2021, 03/11/2021    H1N1, All Formulations 01/23/2010    INFLUENZA 09/09/2010, 10/08/2012, 09/19/2013, 10/25/2014, 11/01/2014, 10/05/2015, 09/27/2016, 10/10/2017, 10/11/2017, 09/06/2023    Influenza, high dose seasonal 0.7 mL 10/01/2020, 10/13/2021, 10/17/2022, 09/06/2023    Pneumococcal Conjugate 13-Valent 01/08/2015    Pneumococcal Polysaccharide PPV23 12/10/2004, 09/08/2009    Zoster 01/01/2015    Zoster Vaccine Recombinant 01/31/2022, 03/22/2022    influenza, trivalent, adjuvanted 10/07/2019     Objective     /74 (BP Location: Left arm, Patient Position: Sitting, Cuff Size: Standard)   Pulse 80   Resp 16    "Ht 5' 4\" (1.626 m)   Wt 63.5 kg (140 lb)   SpO2 97%   BMI 24.03 kg/m²     Physical Exam  Vitals and nursing note reviewed.   Constitutional:       General: She is not in acute distress.     Appearance: Normal appearance. She is well-developed.      Comments: Walks with cane   Neck:      Vascular: No carotid bruit.   Cardiovascular:      Rate and Rhythm: Normal rate and regular rhythm.      Heart sounds: No murmur heard.  Pulmonary:      Effort: Pulmonary effort is normal. No respiratory distress.      Breath sounds: Normal breath sounds.   Musculoskeletal:      Cervical back: Normal range of motion and neck supple.      Right lower leg: No edema.      Left lower leg: No edema.   Lymphadenopathy:      Cervical: No cervical adenopathy.   Neurological:      Mental Status: She is alert.   Psychiatric:         Mood and Affect: Mood normal.         Behavior: Behavior normal.         Thought Content: Thought content normal.         Judgment: Judgment normal.         "

## 2024-09-12 NOTE — ASSESSMENT & PLAN NOTE
Recheck lipids and LFTs. Continue atorvastatin 20 mg qhs. Advised pt to follow a low cholesterol diet and to exercise on a regular basis.     Orders:    Lipid panel; Future    Comprehensive metabolic panel; Future

## 2024-09-12 NOTE — ASSESSMENT & PLAN NOTE
Stable. Continue pantoprazole 40 mg daily and GERD diet. Patient last saw Gastroenterology in April 2024.

## 2024-09-12 NOTE — ASSESSMENT & PLAN NOTE
Patient had surgery in November 2023. Patient doing well. Had echocardiogram in December 2023 and prosthetic AV working well. EF was normal. Patient done with Cardiac Rehab.

## 2024-09-13 ENCOUNTER — APPOINTMENT (OUTPATIENT)
Dept: LAB | Facility: HOSPITAL | Age: 84
End: 2024-09-13
Attending: FAMILY MEDICINE
Payer: MEDICARE

## 2024-09-13 DIAGNOSIS — E78.00 HYPERCHOLESTEROLEMIA: ICD-10-CM

## 2024-09-13 DIAGNOSIS — I10 ESSENTIAL HYPERTENSION: ICD-10-CM

## 2024-09-13 DIAGNOSIS — E03.9 ACQUIRED HYPOTHYROIDISM: ICD-10-CM

## 2024-09-13 LAB
ALBUMIN SERPL BCG-MCNC: 4 G/DL (ref 3.5–5)
ALP SERPL-CCNC: 67 U/L (ref 34–104)
ALT SERPL W P-5'-P-CCNC: 9 U/L (ref 7–52)
ANION GAP SERPL CALCULATED.3IONS-SCNC: 5 MMOL/L (ref 4–13)
AST SERPL W P-5'-P-CCNC: 22 U/L (ref 13–39)
BILIRUB SERPL-MCNC: 0.57 MG/DL (ref 0.2–1)
BUN SERPL-MCNC: 14 MG/DL (ref 5–25)
CALCIUM SERPL-MCNC: 9.3 MG/DL (ref 8.4–10.2)
CHLORIDE SERPL-SCNC: 95 MMOL/L (ref 96–108)
CHOLEST SERPL-MCNC: 133 MG/DL
CO2 SERPL-SCNC: 28 MMOL/L (ref 21–32)
CREAT SERPL-MCNC: 0.77 MG/DL (ref 0.6–1.3)
GFR SERPL CREATININE-BSD FRML MDRD: 70 ML/MIN/1.73SQ M
GLUCOSE P FAST SERPL-MCNC: 89 MG/DL (ref 65–99)
HDLC SERPL-MCNC: 50 MG/DL
LDLC SERPL CALC-MCNC: 63 MG/DL (ref 0–100)
NONHDLC SERPL-MCNC: 83 MG/DL
POTASSIUM SERPL-SCNC: 4.2 MMOL/L (ref 3.5–5.3)
PROT SERPL-MCNC: 7.1 G/DL (ref 6.4–8.4)
SODIUM SERPL-SCNC: 128 MMOL/L (ref 135–147)
T4 FREE SERPL-MCNC: 0.92 NG/DL (ref 0.61–1.12)
TRIGL SERPL-MCNC: 102 MG/DL
TSH SERPL DL<=0.05 MIU/L-ACNC: 3.62 UIU/ML (ref 0.45–4.5)

## 2024-09-13 PROCEDURE — 80053 COMPREHEN METABOLIC PANEL: CPT

## 2024-09-13 PROCEDURE — 80061 LIPID PANEL: CPT

## 2024-09-13 PROCEDURE — 84439 ASSAY OF FREE THYROXINE: CPT

## 2024-09-13 PROCEDURE — 36415 COLL VENOUS BLD VENIPUNCTURE: CPT

## 2024-09-13 PROCEDURE — 84443 ASSAY THYROID STIM HORMONE: CPT

## 2024-09-23 ENCOUNTER — HOSPITAL ENCOUNTER (OUTPATIENT)
Dept: INFUSION CENTER | Facility: CLINIC | Age: 84
Discharge: HOME | End: 2024-09-23
Payer: MEDICARE

## 2024-09-23 DIAGNOSIS — E53.8 B12 DEFICIENCY: Primary | ICD-10-CM

## 2024-09-23 PROCEDURE — 96372 THER/PROPH/DIAG INJ SC/IM: CPT

## 2024-09-23 RX ORDER — CYANOCOBALAMIN 1000 UG/ML
1000 INJECTION, SOLUTION INTRAMUSCULAR; SUBCUTANEOUS ONCE
Status: COMPLETED | OUTPATIENT
Start: 2024-09-23 | End: 2024-09-23

## 2024-09-23 RX ORDER — CYANOCOBALAMIN 1000 UG/ML
1000 INJECTION, SOLUTION INTRAMUSCULAR; SUBCUTANEOUS ONCE
OUTPATIENT
Start: 2024-10-21

## 2024-09-23 RX ADMIN — CYANOCOBALAMIN 1000 MCG: 1000 INJECTION INTRAMUSCULAR; SUBCUTANEOUS at 16:18

## 2024-09-23 NOTE — PROGRESS NOTES
Patient to infusion for B12.  She tolerated injection in Left Arm.  Next appointment 10/21 9750, copy of AVS provided.  
No

## 2024-09-25 ENCOUNTER — OFFICE VISIT (OUTPATIENT)
Dept: OBGYN CLINIC | Facility: CLINIC | Age: 84
End: 2024-09-25
Payer: MEDICARE

## 2024-09-25 VITALS
HEIGHT: 64 IN | BODY MASS INDEX: 24.24 KG/M2 | SYSTOLIC BLOOD PRESSURE: 120 MMHG | WEIGHT: 142 LBS | DIASTOLIC BLOOD PRESSURE: 78 MMHG

## 2024-09-25 DIAGNOSIS — Z01.419 ENCOUNTER FOR ANNUAL ROUTINE GYNECOLOGICAL EXAMINATION: Primary | ICD-10-CM

## 2024-09-25 PROCEDURE — G0101 CA SCREEN;PELVIC/BREAST EXAM: HCPCS | Performed by: OBSTETRICS & GYNECOLOGY

## 2024-09-25 NOTE — PROGRESS NOTES
"Subjective      Ning Rodrigues is a 84 y.o. female who presents for annual GYN exam.     GYN:  She denies any postmenopausal bleeding.    She denies vaginal discharge, labial erythema or lesions, dyspareunia.  Contraception: Tubal ligation. PMW  Patient is not sexually active due to her 's health.    OB:  OB History    Para Term  AB Living   2 2 2     2   SAB IAB Ectopic Multiple Live Births           2      # Outcome Date GA Lbr Luis Manuel/2nd Weight Sex Type Anes PTL Lv   2 Term     F Vag-Spont   ALEX   1 Term     M Vag-Spont   ALEX     :  She denies dysuria, urinary frequency or urgency.  She denies hematuria, flank pain.   GERARDO sometimes; not all the time;   If holding for longer incontinence.    Breast:  She reports lumps of the left breast which she thinks are associated with the calcifications noted on mammography.   She denies new breast mass, skin changes, dimpling, reddening, nipple retraction.  She denies breast discharge.     Cancer-related family history includes Cancer in her daughter; Cancer (age of onset: 67) in her brother; Colon cancer (age of onset: 55) in her brother.  Her daughter had GYN cancer (leiomyosarcoma now s/p hysterectomy.    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    Colon polyp     Cough 2022    cough for 3 years, expectorates yellow mucus    Cough variant asthma  2021    Disease of thyroid gland     low thyroid    Diverticulitis     gerd, diverticulitis    Dizziness     GERD (gastroesophageal reflux disease)     ulcerative colitis; Low's esophagus, diverticulosis; hx of laryngopharyngeal reflux    Hyperlipidemia     Hypertension     Low vitamin D level     LPRD (laryngopharyngeal reflux disease) 2022    PONV (postoperative nausea and vomiting)     AFTER \"BIG SURGERIES\"    Postnasal " drip     WITH COUGH    Ulcerative colitis (HCC)      Past Surgical History:   Procedure Laterality Date    APPENDECTOMY      CARDIAC CATHETERIZATION N/A 09/07/2023    Procedure: Cardiac RHC/LHC;  Surgeon: Rafael Sood MD;  [facility];  Service: Cardiology    CARDIAC CATHETERIZATION N/A 11/07/2023    Procedure: Cardiac tavr;  Surgeon: Pete Hernández MD;  Location: [facility];  Service: Cardiology    CATARACT EXTRACTION Bilateral     COLONOSCOPY      colon polyps    COLONOSCOPY  07/28/2022    FOOT SURGERY Bilateral     bunionectomy    HEMORRHOID SURGERY      HEMORROIDECTOMY      hemorroid removal    JOINT REPLACEMENT Bilateral     knees    IA REPLACE AORTIC VALVE OPENFEMORAL ARTERY APPROACH N/A 11/07/2023    Procedure: REPLACEMENT AORTIC VALVE TRANSCATHETER (TAVR) TRANSFEMORAL W/ 26MM BUCKNER DOROTHEA S3 UR VALVE(ACCESS ON LEFT) GIOVANNY;  Surgeon: Felipe Buitrago DO;  Location: [facility];  Service: Cardiac Surgery    REPLACEMENT TOTAL KNEE Bilateral     b/ replacement    TONSILLECTOMY      TUBAL LIGATION      UPPER GASTROINTESTINAL ENDOSCOPY       General:  Diet: Ok; needs to avoid sweets for UC  Exercise: Walks; has exercise bike  Has been  for >60 years. Lives with her  and feels safe. Daughter lives 5 minutes away. Her granddaughter is getting  on Friday.   She does not drive much. Her  does most of the driving.     Social History     Tobacco Use    Smoking status: Never    Smokeless tobacco: Never   Vaping Use    Vaping status: Never Used   Substance Use Topics    Alcohol use: Not Currently    Drug use: Never       Screening:  Cervical cancer: She does not remember ever having an abnormal pap test.    Breast cancer: last mammogram in 06/25/2024. Results were Birads 2-3.  Colon cancer: last colonoscopy in 07/28/2022. Results were stable (hx left sided US under remission, left sided extensive diverticulosis, internal hemorrhoids)  Bone density: Last DXA in 4/2022 =  "normal  STD screening: None.    Review of Systems   Constitutional:  Negative for appetite change, chills and fever.   HENT:  Positive for postnasal drip. Negative for trouble swallowing.    Respiratory:  Positive for cough (Chronic; Saw ENT for year; Productive w/ PND). Negative for shortness of breath.    Cardiovascular:  Negative for chest pain.   Gastrointestinal:  Negative for abdominal pain, constipation, diarrhea, nausea and vomiting.   Genitourinary:  Negative for decreased urine volume, difficulty urinating, dyspareunia, dysuria, flank pain, frequency, genital sores, hematuria, menstrual problem, pelvic pain, urgency, vaginal bleeding, vaginal discharge and vaginal pain.          Objective      /78 (BP Location: Left arm, Patient Position: Sitting, Cuff Size: Standard)   Ht 5' 4\" (1.626 m)   Wt 64.4 kg (142 lb)   BMI 24.37 kg/m²   Physical Exam  Vitals reviewed.   Constitutional:       General: She is not in acute distress.     Appearance: Normal appearance. She is well-developed. She is not ill-appearing, toxic-appearing or diaphoretic.   Neck:      Thyroid: No thyroid mass, thyromegaly or thyroid tenderness.   Cardiovascular:      Rate and Rhythm: Normal rate and regular rhythm.      Heart sounds: Normal heart sounds. No murmur heard.     No friction rub. No gallop.   Pulmonary:      Effort: Pulmonary effort is normal. No respiratory distress.      Breath sounds: No stridor. Wheezing (Ralls in the basilar region) present. No rhonchi or rales.   Chest:      Chest wall: No tenderness.   Breasts:     Breasts are symmetrical.      Right: No swelling, bleeding, inverted nipple, mass, nipple discharge, skin change or tenderness.      Left: No swelling, bleeding, inverted nipple, mass, nipple discharge, skin change or tenderness.   Abdominal:      General: There is no distension.      Palpations: Abdomen is soft.      Tenderness: There is no abdominal tenderness.   Genitourinary:     General: Normal " vulva.      Exam position: Lithotomy position.      Labia:         Right: No rash, tenderness, lesion or injury.         Left: No rash, tenderness, lesion or injury.       Urethra: No prolapse or urethral lesion.      Vagina: Normal. No signs of injury and foreign body. No vaginal discharge, erythema, tenderness, bleeding, lesions or prolapsed vaginal walls.      Cervix: No cervical motion tenderness, discharge, friability, lesion, erythema, cervical bleeding or eversion.      Uterus: Not deviated, not enlarged, not fixed, not tender and no uterine prolapse.       Adnexa:         Right: No mass, tenderness or fullness.          Left: No mass, tenderness or fullness.        Comments: Moderate vulvovaginal atrophy noted  Introitus with appropriate calipher for normal graves speculum (needed to visualize the cervix in the posterior aspect)  Grade 2 cystocele  Lymphadenopathy:      Cervical: No cervical adenopathy.      Upper Body:      Right upper body: No supraclavicular, axillary or pectoral adenopathy.      Left upper body: No supraclavicular, axillary or pectoral adenopathy.   Skin:     General: Skin is warm and dry.   Neurological:      Mental Status: She is alert and oriented to person, place, and time. Mental status is at baseline.   Psychiatric:         Mood and Affect: Mood normal.         Behavior: Behavior normal. Behavior is cooperative.                 Assessment & Plan  Encounter for annual routine gynecological examination  No GYN concerns today  Birth control: N/A  Cervical cancer screening: N/A  Breast Cancer screening: Diagnostic mammogram scheduled for 7/2/2025  Colon cancer Screening: No longer indicated  STD screening: N/A  Reviewed healthy lifestyle and safe sex practices  RTO for annual exam (2026) or PRN             Milli Oliva MD  OB/GYN  9/25/2024  12:15 PM

## 2024-10-02 ENCOUNTER — TELEPHONE (OUTPATIENT)
Age: 84
End: 2024-10-02

## 2024-10-02 NOTE — TELEPHONE ENCOUNTER
Caller: Ning Rodrigues    Doctor: Minna    Reason for call: Called to schedule appointment; received annual letter. Spoke with Emmie; patient needs orders. They will contact patient once the orders are placed and advise of next steps.     Call back#: [phone]

## 2024-10-04 ENCOUNTER — TELEPHONE (OUTPATIENT)
Dept: HEMATOLOGY ONCOLOGY | Facility: CLINIC | Age: 84
End: 2024-10-04

## 2024-10-04 ENCOUNTER — OFFICE VISIT (OUTPATIENT)
Dept: HEMATOLOGY ONCOLOGY | Facility: CLINIC | Age: 84
End: 2024-10-04
Payer: MEDICARE

## 2024-10-04 VITALS
TEMPERATURE: 97 F | HEART RATE: 73 BPM | WEIGHT: 143 LBS | DIASTOLIC BLOOD PRESSURE: 70 MMHG | OXYGEN SATURATION: 97 % | SYSTOLIC BLOOD PRESSURE: 122 MMHG | BODY MASS INDEX: 24.55 KG/M2

## 2024-10-04 DIAGNOSIS — E87.1 HYPONATREMIA: ICD-10-CM

## 2024-10-04 DIAGNOSIS — E61.1 HYPOFERREMIA: ICD-10-CM

## 2024-10-04 DIAGNOSIS — D50.0 IRON DEFICIENCY ANEMIA DUE TO CHRONIC BLOOD LOSS: Primary | ICD-10-CM

## 2024-10-04 DIAGNOSIS — D50.0 IRON DEFICIENCY ANEMIA DUE TO CHRONIC BLOOD LOSS: ICD-10-CM

## 2024-10-04 DIAGNOSIS — K51.90 ULCERATIVE COLITIS WITHOUT COMPLICATIONS, UNSPECIFIED LOCATION (HCC): ICD-10-CM

## 2024-10-04 DIAGNOSIS — E53.8 B12 DEFICIENCY: Primary | ICD-10-CM

## 2024-10-04 PROCEDURE — 99215 OFFICE O/P EST HI 40 MIN: CPT | Performed by: PHYSICIAN ASSISTANT

## 2024-10-04 PROCEDURE — G2211 COMPLEX E/M VISIT ADD ON: HCPCS | Performed by: PHYSICIAN ASSISTANT

## 2024-10-04 RX ORDER — SODIUM CHLORIDE 9 MG/ML
20 INJECTION, SOLUTION INTRAVENOUS ONCE
OUTPATIENT
Start: 2024-10-21

## 2024-10-04 NOTE — PROGRESS NOTES
Hematology/Oncology Outpatient Follow- up Note  Ning Rodrigues 84 y.o. female MRN: @ Encounter: [id number]        Date:  10/4/2024      Assessment / Plan:      Macrocytic anemia since at least September 2021.  Hemoglobin 10.9,     EGFR 70s- 80s    2023 hemoglobin 9 g/dL range => 7 g/dL range November 2023 2024 hemoglobin has been 8.5-9.6, -110, monocytosis around 13%    No bili elevation, MARK normal 3/24; 7/2023 no schistocytes on smear; normal LDH, retic 2.6%;   haptoglobin 42 (LLN 41)        2.  B12 deficiency.  July 2023 she had normal B12 with elevated MMA.  Folate has been normal.    She has received B12 IM since 9/18/23 - 1100 4/24    3.  Chronic low normal Ferritin 19- 50 since at least 7/2023.  She had taken oral iron previously        4.  UC on sulfasalazine.     11/2023 placed on steroids.  Hgb did improve from 7 range -> 9 g/dL range.    2/2024 placed on tapering dose of steroids.  40mg, decreasing by 10 mg weekly. Hgb remained stable at 9 g/dL range 3/2024.     4.  chronic hyponatremia. 127- 134 since at least 11/23.  F/U with PCP.  Consider renal evaluation.        Will recheck haptoglobin, retic,  for possible mild hemolysis.    Anemia might be 2nd to CMML/ MDS given macrocytosis, monocytosis.   Continue monthly B12  Recommend Feraheme Monthly x 2.              HPI:   Ning Rodrigues is an 84-year-old female seen initially 7/2023 by ALINA Cheng re anemia.    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    7/11/23 hemoglobin 9.1, , normal white blood cell count, differential,  "platelets normal creatinine, total protein albumin, calcium, haptoglobin 42 (LLN 41), no schistocytes on hemolysis smear, normal LDH, folate, reticulocyte 2.6%, B12 576, MMA elevated at 435, ferritin 35  7/2023 B12 1000mcg IM x 4 ordered.    8/24/23 hgb 8.6    10/5/23 hgb 8.3; ferritin 19; B12 1200,     11/7/2023 TAVR    11/17/23 hgb 7.7, ,  ferritin 50; iron saturation 25%    11/28/23 admitted secondary to dark stool and weakness.  Admitting hemoglobin 7.5, , , otherwise normal.  Hemoglobin down to 6.8 November 30, 2023.  She received 1 unit packed red blood cells.     CT of the abdomen noting \"Uncomplicated colitis of the transverse segment. Though there are diverticula arising from this portion of the colon, length of the affected segment and morphology of wall thickening are atypical for diverticulitis such that other infectious or inflammatory causes of colitis should be considered. No free air, abscess, or obstruction     11/2023 folic acid 10, vitamin B12 2000, reticulocyte count 4% normal creatinine, AST, ALT, total protein, albumin and bilirubin     Also she was initiated on prednisone tapering dose     She took iron pills with constipation     Plavix was discontinued and she maintained on aspirin 81 mg p.o. daily    12/1/23 EGD  normal duodenum, stomach, small hiatal hernia, Extrinsic compression on the midesophagus, likely aorta or cardiac     On 2/9/2024 WBC 3.8, hemoglobin 9.1, , platelets 267,000, RDW 13.4, normal differential, calprotectin fecal is positive with a value of 447 indicating underlying inflammation. That is why she was put on prednisone (40mg tapering by 10mg every 7 days)  Completed 3/8/24    3/13/24 CT chest - Mild to moderate diffuse bronchiectasis with bronchial mucous plugging in the medial basal segment of the right lower lobe.  2. Stable sub-6 mm lung nodules as described above.  3. Stable paraesophageal lymph nodes measuring up to 1.0 cm.  4. " Small hiatal hernia.  5. Cholelithiasis.      3/24 ferritin 52, MARK negative.  Hgb 9 (stable despite tapered dose of prednisone)    4/30/24 hemoglobin 8.5, , white blood cell count 4.64, 45% segs, 34% lymphocytes, 13% monocytes, platelets 241, ferritin 42, iron saturation 28%, B12 1100, normal folate    8/15/24 hemoglobin 8.7, , 4% monocytes    9/5/24 hemoglobin 8.9, , white blood cell count 5.5, 14% monocytes    Fatigue.  She reports that her ulcerative colitis is under control.        Review of Systems   Constitutional:  Positive for fatigue. Negative for appetite change, chills, diaphoresis, fever and unexpected weight change.   HENT:   Negative for mouth sores, nosebleeds, sore throat, tinnitus and voice change.    Eyes:  Negative for eye problems.   Respiratory:  Negative for chest tightness, cough, shortness of breath and wheezing.    Cardiovascular:  Negative for chest pain, leg swelling and palpitations.   Gastrointestinal:  Negative for abdominal distention, abdominal pain, blood in stool, constipation, diarrhea, nausea, rectal pain and vomiting.   Endocrine: Negative for hot flashes.   Genitourinary: Negative.     Musculoskeletal:  Positive for arthralgias. Negative for gait problem.   Skin:  Negative for itching and rash.   Neurological:  Negative for dizziness, gait problem, headaches, light-headedness and numbness.   Hematological:  Negative for adenopathy.   Psychiatric/Behavioral:  Negative for confusion and sleep disturbance. The patient is not nervous/anxious.         Test Results:        Labs:   Lab Results   Component Value Date    HGB 8.9 (L) 09/05/2024    HCT 27.4 (L) 09/05/2024     (H) 09/05/2024     09/05/2024    WBC 5.50 09/05/2024    NRBC 0 09/05/2024     Lab Results   Component Value Date    K 4.2 09/13/2024    CL 95 (L) 09/13/2024    CO2 28 09/13/2024    BUN 14 09/13/2024    CREATININE 0.77 09/13/2024    GLUCOSE 99 11/07/2023    GLUF 89 09/13/2024     "CALCIUM 9.3 2024    CORRECTEDCA 8.8 2023    AST 22 2024    ALT 9 2024    ALKPHOS 67 2024    EGFR 70 2024           Imaging: No results found.          Allergies:   Allergies   Allergen Reactions    Sulfa Antibiotics Itching     Current Medications: Reviewed  PMH/FH/SH:  Reviewed      Physical Exam:    There is no height or weight on file to calculate BSA.    Ht Readings from Last 3 Encounters:   24 5' 4\" (1.626 m)   24 5' 4\" (1.626 m)   24 5' 4\" (1.626 m)        Wt Readings from Last 3 Encounters:   24 64.4 kg (142 lb)   24 63.5 kg (140 lb)   24 64.9 kg (143 lb)        Temp Readings from Last 3 Encounters:   24 (!) 97.1 °F (36.2 °C) (Tympanic)   24 98.3 °F (36.8 °C) (Tympanic)   24 97.9 °F (36.6 °C) (Temporal)        BP Readings from Last 3 Encounters:   24 120/78   24 128/74   24 126/74             Physical Exam  Constitutional:       Appearance: She is well-developed.   HENT:      Head: Normocephalic and atraumatic.   Cardiovascular:      Rate and Rhythm: Regular rhythm.      Heart sounds: Normal heart sounds.   Pulmonary:      Effort: Pulmonary effort is normal. No respiratory distress.      Breath sounds: Normal breath sounds.   Abdominal:      Palpations: Abdomen is soft.   Neurological:      Mental Status: She is alert.   Psychiatric:         Behavior: Behavior normal.         ECO      Emergency Contacts:    Extended Emergency Contact Information  Primary Emergency Contact: JACOBO LUKE  Address: [address] [place] [id number] [facility]  Home Phone: [phone]  Mobile Phone: [phone]  Relation: Spouse  Secondary Emergency Contact: AUBREY ANDUJAR  Mobile Phone: [phone]  Relation: Daughter   "

## 2024-10-07 ENCOUNTER — TELEPHONE (OUTPATIENT)
Age: 84
End: 2024-10-07

## 2024-10-07 NOTE — TELEPHONE ENCOUNTER
Patient called asking if PCP is willing to order a chest xray at the request of Dr. Benjamin Villegas. She states he wants her to have this done because of her cough.

## 2024-10-08 ENCOUNTER — HOSPITAL ENCOUNTER (OUTPATIENT)
Dept: RADIOLOGY | Facility: HOSPITAL | Age: 84
Discharge: HOME | End: 2024-10-08
Payer: MEDICARE

## 2024-10-08 DIAGNOSIS — R05.3 CHRONIC COUGH: ICD-10-CM

## 2024-10-08 DIAGNOSIS — R05.3 CHRONIC COUGH: Primary | ICD-10-CM

## 2024-10-08 PROCEDURE — 71046 X-RAY EXAM CHEST 2 VIEWS: CPT

## 2024-10-08 NOTE — TELEPHONE ENCOUNTER
Emmie from [facility] imaging department called in stating the pt is there now to have this XRAY done but there are no orders in the chart. Emmie is requesting if orders can be put in as soon as possible so pt can complete them.    Please advise.

## 2024-10-11 DIAGNOSIS — Z95.2 S/P TAVR (TRANSCATHETER AORTIC VALVE REPLACEMENT): ICD-10-CM

## 2024-10-11 DIAGNOSIS — I35.0 AORTIC STENOSIS, SEVERE: ICD-10-CM

## 2024-10-11 DIAGNOSIS — K51.90 ULCERATIVE COLITIS WITHOUT COMPLICATIONS, UNSPECIFIED LOCATION (HCC): Primary | ICD-10-CM

## 2024-10-21 ENCOUNTER — HOSPITAL ENCOUNTER (OUTPATIENT)
Dept: INFUSION CENTER | Facility: CLINIC | Age: 84
Discharge: HOME | End: 2024-10-21
Payer: MEDICARE

## 2024-10-21 VITALS
SYSTOLIC BLOOD PRESSURE: 126 MMHG | HEART RATE: 69 BPM | TEMPERATURE: 97.6 F | DIASTOLIC BLOOD PRESSURE: 68 MMHG | RESPIRATION RATE: 18 BRPM | OXYGEN SATURATION: 96 %

## 2024-10-21 DIAGNOSIS — E61.1 HYPOFERREMIA: Primary | ICD-10-CM

## 2024-10-21 DIAGNOSIS — D50.0 IRON DEFICIENCY ANEMIA DUE TO CHRONIC BLOOD LOSS: ICD-10-CM

## 2024-10-21 DIAGNOSIS — E53.8 B12 DEFICIENCY: ICD-10-CM

## 2024-10-21 PROCEDURE — 96365 THER/PROPH/DIAG IV INF INIT: CPT

## 2024-10-21 PROCEDURE — 96372 THER/PROPH/DIAG INJ SC/IM: CPT

## 2024-10-21 RX ORDER — CYANOCOBALAMIN 1000 UG/ML
1000 INJECTION, SOLUTION INTRAMUSCULAR; SUBCUTANEOUS ONCE
Status: COMPLETED | OUTPATIENT
Start: 2024-10-21 | End: 2024-10-21

## 2024-10-21 RX ORDER — SODIUM CHLORIDE 9 MG/ML
20 INJECTION, SOLUTION INTRAVENOUS ONCE
OUTPATIENT
Start: 2024-11-18

## 2024-10-21 RX ORDER — SODIUM CHLORIDE 9 MG/ML
20 INJECTION, SOLUTION INTRAVENOUS ONCE
Status: COMPLETED | OUTPATIENT
Start: 2024-10-21 | End: 2024-10-21

## 2024-10-21 RX ORDER — CYANOCOBALAMIN 1000 UG/ML
1000 INJECTION, SOLUTION INTRAMUSCULAR; SUBCUTANEOUS ONCE
OUTPATIENT
Start: 2024-11-18

## 2024-10-21 RX ADMIN — FERUMOXYTOL 510 MG: 510 INJECTION INTRAVENOUS at 13:11

## 2024-10-21 RX ADMIN — CYANOCOBALAMIN 1000 MCG: 1000 INJECTION, SOLUTION INTRAMUSCULAR at 12:55

## 2024-10-21 RX ADMIN — SODIUM CHLORIDE 20 ML/HR: 0.9 INJECTION, SOLUTION INTRAVENOUS at 12:56

## 2024-10-21 NOTE — PATIENT INSTRUCTIONS
October 2024 Sunday Monday Tuesday Wednesday Thursday Friday Saturday             1     2     3     4    OFFICE VISIT SHORT PG  10:25 AM   (20 min.)   Marcy Frazier PA-C   [facility] [place] 5                6     7     8    XR OMW  12:45 PM   (15 min.)   EA XR 2   [facility] 9     10     11     12                13     14     15     16     17     18     19                20     21    INF THERAPY PLAN  12:30 PM   (90 min.)   AN INF CHAIR 2   [facility] 22     23     24     25     26        Cycle 1, Treatment 14  + Add'l treatments        27     28     29     30     31                                 Treatment Details         10/21/2024 - Cycle 1, Treatment 14 + Additional treatments      Supportive Care: cyanocobalamin      Supportive Care: ferumoxytol (FERAHEME)        November 2024 Sunday Monday Tuesday Wednesday Thursday Friday Saturday                            1    FOLLOW UP PG   1:45 PM   (20 min.)   Tristin Slade MD   [facility] [place] [address] 2                3     4     5     6     7     8    ECHO COMPLETE  10:00 AM   (60 min.)   AN HV ECHO 1   [facility] [place] 9                10     11     12     13     14     15     16                17     18    INF THERAPY PLAN   3:30 PM   (90 min.)   AN INF CHAIR 5   [facility] 19    CARDIAC STRUCT HEART OVS PG  12:45 PM   (30 min.)   Kathy Peck PA-C   [facility] [place] 20     21     22     23                24     25     26     27     28     29     30

## 2024-10-21 NOTE — PROGRESS NOTES
Patient tolerated treatment well without incident. Vitamin B12 given in the Left arm per patient's request. AVS provided. Confirmed next appointment at the [facility] for 11/18/2024 @ [id number].

## 2024-10-21 NOTE — PROGRESS NOTES
Patient presents to the Infusion Center for the treatment of Feraheme / Vitamin B12. She offers no concerns at this time. Patient is resting comfortably in the chair, call bell within reach.

## 2024-11-01 ENCOUNTER — OFFICE VISIT (OUTPATIENT)
Dept: GASTROENTEROLOGY | Facility: CLINIC | Age: 84
End: 2024-11-01
Payer: MEDICARE

## 2024-11-01 VITALS
HEART RATE: 69 BPM | WEIGHT: 142.2 LBS | SYSTOLIC BLOOD PRESSURE: 146 MMHG | DIASTOLIC BLOOD PRESSURE: 82 MMHG | HEIGHT: 64 IN | BODY MASS INDEX: 24.28 KG/M2

## 2024-11-01 DIAGNOSIS — D53.9 MACROCYTIC ANEMIA: ICD-10-CM

## 2024-11-01 DIAGNOSIS — K51.30 ULCERATIVE RECTOSIGMOIDITIS WITHOUT COMPLICATION (HCC): Primary | ICD-10-CM

## 2024-11-01 PROCEDURE — 99213 OFFICE O/P EST LOW 20 MIN: CPT | Performed by: INTERNAL MEDICINE

## 2024-11-01 PROCEDURE — G2211 COMPLEX E/M VISIT ADD ON: HCPCS | Performed by: INTERNAL MEDICINE

## 2024-11-01 RX ORDER — FOLIC ACID 1 MG/1
1 TABLET ORAL DAILY
Qty: 90 TABLET | Refills: 3 | Status: SHIPPED | OUTPATIENT
Start: 2024-11-01

## 2024-11-01 NOTE — PROGRESS NOTES
"Ambulatory Visit  Name: Ning Rodrigues      : 1940      MRN: [id number]  Encounter Provider: Tristin Slade MD  Encounter Date: 2024   Encounter department: [facility] [address]    Assessment & Plan  Ulcerative rectosigmoiditis without complication (HCC)  In symptomatic remission and feeling well on sulfasalazine 2 g daily in divided doses.  Will plan to continue this.  If symptoms return we will likely plan further evaluation with colonoscopy with evaluation of the terminal ileum    Orders:    folic acid ( Folic Acid) 1 mg tablet; Take 1 tablet (1 mg total) by mouth daily    Macrocytic anemia  Iron studies, B12 and folate have recently been normal.  She has been on long-term sulfasalazine but does not appear to have been taking a folate supplement.  Will add this to her regimen.  No signs of hemolysis.         History of Present Illness     Ning Rodrigues is a 84 y.o. female who presents in follow-up of ulcerative colitis.  Had symptoms suggestive of a flare approximate 1 year ago with gradual improvement after a course of prednisone.  Currently feeling very well with no complaints.  Labs reviewed including CBC, CMP, B12, folate, iron and calprotectin.  The latter was still elevated in  of this year suggesting possible ongoing inflammation.  Has persistent macrocytic anemia though stable.  Denies blood in her stool, abdominal pain, nausea or vomiting, fever or chills, jaundice or rash, arthralgias, aphthous ulcers, unexplained weight loss      Review of Systems        Objective     /82 (BP Location: Left arm, Patient Position: Sitting, Cuff Size: Standard)   Pulse 69   Ht 5' 4\" (1.626 m)   Wt 64.5 kg (142 lb 3.2 oz)   BMI 24.41 kg/m²     Physical Exam  Vitals and nursing note reviewed.   Constitutional:       General: She is not in acute distress.     Appearance: She is not ill-appearing.   HENT:      Head: Normocephalic and atraumatic.   Eyes: "      General: No scleral icterus.     Extraocular Movements: Extraocular movements intact.   Cardiovascular:      Rate and Rhythm: Normal rate.   Pulmonary:      Effort: Pulmonary effort is normal. No respiratory distress.   Abdominal:      General: There is no distension.      Palpations: Abdomen is soft. There is no mass.      Tenderness: There is no abdominal tenderness. There is no guarding or rebound.   Musculoskeletal:      Right lower leg: No edema.      Left lower leg: No edema.      Comments: Ambulates with a cane   Skin:     General: Skin is warm and dry.      Coloration: Skin is not cyanotic.      Findings: No erythema.   Neurological:      General: No focal deficit present.      Mental Status: She is alert and oriented to person, place, and time.   Psychiatric:         Mood and Affect: Mood normal.         Behavior: Behavior normal.

## 2024-11-01 NOTE — ASSESSMENT & PLAN NOTE
In symptomatic remission and feeling well on sulfasalazine 2 g daily in divided doses.  Will plan to continue this.  If symptoms return we will likely plan further evaluation with colonoscopy with evaluation of the terminal ileum    Orders:    folic acid ( Folic Acid) 1 mg tablet; Take 1 tablet (1 mg total) by mouth daily

## 2024-11-08 ENCOUNTER — HOSPITAL ENCOUNTER (OUTPATIENT)
Dept: NON INVASIVE DIAGNOSTICS | Facility: CLINIC | Age: 84
Discharge: HOME | End: 2024-11-08
Payer: MEDICARE

## 2024-11-08 VITALS
DIASTOLIC BLOOD PRESSURE: 82 MMHG | HEIGHT: 64 IN | SYSTOLIC BLOOD PRESSURE: 146 MMHG | BODY MASS INDEX: 24.24 KG/M2 | HEART RATE: 69 BPM | WEIGHT: 142 LBS

## 2024-11-08 DIAGNOSIS — Z95.2 S/P TAVR (TRANSCATHETER AORTIC VALVE REPLACEMENT): ICD-10-CM

## 2024-11-08 DIAGNOSIS — I35.0 AORTIC STENOSIS, SEVERE: ICD-10-CM

## 2024-11-08 LAB
AORTIC ROOT: 3.1 CM
AORTIC VALVE MEAN VELOCITY: 11.2 M/S
APICAL FOUR CHAMBER EJECTION FRACTION: 73 %
ASCENDING AORTA: 3.3 CM
AV AREA BY CONTINUOUS VTI: 3 CM2
AV AREA PEAK VELOCITY: 2.6 CM2
AV LVOT MEAN GRADIENT: 2 MMHG
AV LVOT PEAK GRADIENT: 4 MMHG
AV MEAN GRADIENT: 6 MMHG
AV PEAK GRADIENT: 11 MMHG
AV VALVE AREA: 3.02 CM2
AV VELOCITY RATIO: 0.63
BSA FOR ECHO PROCEDURE: 1.69 M2
DOP CALC AO PEAK VEL: 1.65 M/S
DOP CALC AO VTI: 30.63 CM
DOP CALC LVOT AREA: 4.15 CM2
DOP CALC LVOT CARDIAC INDEX: 3.55 L/MIN/M2
DOP CALC LVOT CARDIAC OUTPUT: 5.99 L/MIN
DOP CALC LVOT DIAMETER: 2.3 CM
DOP CALC LVOT PEAK VEL VTI: 22.31 CM
DOP CALC LVOT PEAK VEL: 1.04 M/S
DOP CALC LVOT STROKE INDEX: 55.6 ML/M2
DOP CALC LVOT STROKE VOLUME: 92.65
E WAVE DECELERATION TIME: 253 MS
E/A RATIO: 0.8
FRACTIONAL SHORTENING: 37 (ref 28–44)
INTERVENTRICULAR SEPTUM IN DIASTOLE (PARASTERNAL SHORT AXIS VIEW): 1.6 CM
INTERVENTRICULAR SEPTUM: 1.6 CM (ref 0.6–1.1)
LAAS-AP2: 18.4 CM2
LAAS-AP4: 15.9 CM2
LEFT ATRIUM SIZE: 4.8 CM
LEFT ATRIUM VOLUME (MOD BIPLANE): 48 ML
LEFT ATRIUM VOLUME INDEX (MOD BIPLANE): 28.4 ML/M2
LEFT INTERNAL DIMENSION IN SYSTOLE: 2.4 CM (ref 2.1–4)
LEFT VENTRICULAR INTERNAL DIMENSION IN DIASTOLE: 3.8 CM (ref 3.5–6)
LEFT VENTRICULAR POSTERIOR WALL IN END DIASTOLE: 1.2 CM
LEFT VENTRICULAR STROKE VOLUME: 43 ML
LVSV (TEICH): 43 ML
MITRAL REGURGITATION PEAK VELOCITY: 4.33 M/S
MITRAL VALVE MEAN INFLOW VELOCITY: 3.65 M/S
MITRAL VALVE REGURGITANT PEAK GRADIENT: 75 MMHG
MV E'TISSUE VEL-LAT: 6 CM/S
MV E'TISSUE VEL-SEP: 4 CM/S
MV PEAK A VEL: 1.01 M/S
MV PEAK E VEL: 81 CM/S
MV STENOSIS PRESSURE HALF TIME: 73 MS
MV VALVE AREA P 1/2 METHOD: 3.01
PISA MRMAX VEL: 0.36 M/S
PISA RADIUS: 0.4 CM
RIGHT ATRIUM AREA SYSTOLE A4C: 18.6 CM2
RIGHT VENTRICLE ID DIMENSION: 5 CM
SL CV DOP CALC MV VTI RETROGRADE: 136.1 CM
SL CV LEFT ATRIUM LENGTH A2C: 5.4 CM
SL CV LV EF: 60
SL CV MV MEAN GRADIENT RETROGRADE: 56 MMHG
SL CV PED ECHO LEFT VENTRICLE DIASTOLIC VOLUME (MOD BIPLANE) 2D: 63 ML
SL CV PED ECHO LEFT VENTRICLE SYSTOLIC VOLUME (MOD BIPLANE) 2D: 20 ML
TR MAX PG: 21 MMHG
TR PEAK VELOCITY: 2.3 M/S
TRICUSPID ANNULAR PLANE SYSTOLIC EXCURSION: 2 CM
TRICUSPID VALVE PEAK REGURGITATION VELOCITY: 2.3 M/S

## 2024-11-08 PROCEDURE — 93306 TTE W/DOPPLER COMPLETE: CPT | Performed by: INTERNAL MEDICINE

## 2024-11-08 PROCEDURE — 93306 TTE W/DOPPLER COMPLETE: CPT

## 2024-11-12 ENCOUNTER — HOSPITAL ENCOUNTER (OUTPATIENT)
Dept: RADIOLOGY | Facility: HOSPITAL | Age: 84
Discharge: HOME | End: 2024-11-12
Attending: INTERNAL MEDICINE
Payer: MEDICARE

## 2024-11-12 DIAGNOSIS — R05.3 CHRONIC COUGH: ICD-10-CM

## 2024-11-12 PROCEDURE — 92611 MOTION FLUOROSCOPY/SWALLOW: CPT

## 2024-11-12 PROCEDURE — 74230 X-RAY XM SWLNG FUNCJ C+: CPT

## 2024-11-12 NOTE — PROCEDURES
"                                   Video Swallow Study      Patient Name: Ning Rodrigues  Today's Date: 11/12/2024        Past Medical History  Past Medical History:   Diagnosis Date    Allergic rhinitis 03/21/2023    Anemia     Arthritis     osteoporosis, djd knees, pt denies osteoporosis on 2/21/22    Low's esophagus     Cancer (HCC)     on face    Colon polyp     Cough 02/21/2022    cough for 3 years, expectorates yellow mucus    Cough variant asthma  07/26/2021    Disease of thyroid gland     low thyroid    Diverticulitis     gerd, diverticulitis    Dizziness     GERD (gastroesophageal reflux disease)     ulcerative colitis; Low's esophagus, diverticulosis; hx of laryngopharyngeal reflux    Hyperlipidemia     Hypertension     Low vitamin D level     LPRD (laryngopharyngeal reflux disease) 02/21/2022    PONV (postoperative nausea and vomiting)     AFTER \"BIG SURGERIES\"    Postnasal drip     WITH COUGH    Ulcerative colitis (HCC)         Past Surgical History  Past Surgical History:   Procedure Laterality Date    APPENDECTOMY      CARDIAC CATHETERIZATION N/A 09/07/2023    Procedure: Cardiac RHC/LHC;  Surgeon: Rafael Sood MD;  Location: [facility];  Service: Cardiology    CARDIAC CATHETERIZATION N/A 11/07/2023    Procedure: Cardiac tavr;  Surgeon: Pete Hernández MD;  Location: [facility];  Service: Cardiology    CATARACT EXTRACTION Bilateral     COLONOSCOPY      colon polyps    COLONOSCOPY  07/28/2022    FOOT SURGERY Bilateral     bunionectomy    HEMORRHOID SURGERY      HEMORROIDECTOMY      hemorroid removal    JOINT REPLACEMENT Bilateral     knees    NV REPLACE AORTIC VALVE OPENFEMORAL ARTERY APPROACH N/A 11/07/2023    Procedure: REPLACEMENT AORTIC VALVE TRANSCATHETER (TAVR) TRANSFEMORAL W/ 26MM BUCKNER DOROTHEA S3 UR VALVE(ACCESS ON LEFT) GIOVANNY;  Surgeon: Felipe Buitrago DO;  Location: [facility];  Service: Cardiac Surgery    REPLACEMENT TOTAL KNEE Bilateral     b/ replacement    TONSILLECTOMY      " TUBAL LIGATION      UPPER GASTROINTESTINAL ENDOSCOPY       Modified (Video) Barium Swallow Study    Summary:  Images are on PACS for review.     Pt presents w/ min-mild oropharyngeal dysphagia.     Min prolonged rotary mastication ultimately functional, though noted anterior munching at times. Reduced bolus control w/ premature spill over BOT. Min reduced BOT retraction, trace-min oral residue noted on BOT. Delayed swallow initiation w/ bolus head in the valleculae w/ majority of trials. Reduced laryngeal elevation, anterior hyoid excursion, and vestibule closure. Penetration noted during the swallow w/ thin and nectar thick liquids. No aspiration visualized on today's study. WFL epiglottic inversion. Adequate pharyngeal stripping wave w/ trace pharyngeal residue noted at times. Momentary pill stasis noted in proximal esophagus, cleared w/ liquid wash, noted retrograde backflow at times    VBS findings and recommendations immediately reviewed w/ pt w/ use of images to aid in understanding. Education provided on strategies to optimize swallow safety, including the importance of oral care and s/s aspiration to monitor for and notify medical team of should they arise. Pt verbalized understanding and denied questions at this time.      Recommendations:  Diet: Regular textures, added moisture as needed  Liquids: Thin liquids   Meds: as tolerated   Strategies: small bites/sips, added moisture, single sips   Frequent oral care  Upright position  F/u ST tx: -  Aspiration Precautions  Reflux Precautions  Consider consult with: f/u w/ GI as needed  Results reviewed with: pt, physician  Repeat MBS as necessary  If a dedicated assessment of the esophagus is desired, consider esophagram/barium swallow or EGD.      H&P/pertinent provider notes: (PMH noted above)  Ning Rodrigues is a 84 y.o. female w/ a PMHx significant for but not limited to HTN, bone's esophagus, ulcerative colitis w/o complications, GERD, chronic cough,  presenting to [facility] for OP VFSS 2/2 frequent throat clearing, post nasal drip, and coughing on foods and liquids per MD Jose Ru 10/31/24.     Special Studies:  N/A     Previous VBS:  N/A     Does the pt have pain? No  If yes, was nursing made aware/was it addressed? N/A     Swallow Mechanism Exam  Facial: symmetrical  Labial: WFL  Lingual: WFL  Velum: symmetrical  Mandible: adequate ROM  Dentition: adequate  Vocal quality:clear/adequate   Volitional Cough: strong/productive   Respiratory Status: on RA      Swallow Information   Current Diet/Baseline Diet: regular diet and thin liquids- per pt report       Consistencies Administered:  Pt was viewed sitting upright in the lateral and AP positions. Trials administered were comparable to MBSImP Validated Protocol: tsp thin liquid x2, cup sip thin, sequential swallow cup sip thin, tsp nectar thick, cup sip nectar thick, sequential swallow cup sip nectar thick, tsp Honey thick, tsp pudding, ½ cookie coated with pudding coating, tsp nectar thick in the AP position, and tsp pudding in the AP position. Pt was also given thin liquids by straw, as well as a barium tablet with thin liquid.       Oral Phase:  Lip Closure: Complete   Tongue Control During Bolus Hold: reduced w/ premature spill over BOT   Bolus Preparation/Mastication: Min prolonged, ultimately functional w/ anterior munching noted at times   Bolus Transport/Lingual Motion: WFL  Oral Residue: bandar-min noted on BOT   Initiation of the Pharyngeal Swallow: delayed w/ bolus head in the valleculae w/ majority of trials    Pharyngeal Phase:  Soft Palate Elevation: Complete   Laryngeal Elevation: Reduced   Anterior Hyoid Excursion: Reduced   Epiglottic Movement: WFL  Laryngeal Vestibular Closure: Reduced   Pharyngeal Stripping Wave: Adequate  PES Opening: Adequate   Tongue Base Retraction: min reduced   Pharyngeal Residue: trace at times in the valleculae and pyriforms       Penetration/Aspiration:  Thin: tsp- no  penetration/aspiration (PAS-1), cup- flash penetration during the swallow (PAS-2), sequential cup/straw/barium tablet- penetration noted during the swallow w/ epiglottic undercoating (PAS-3)  Nectar: tsp/cup/sequential- penetration noted during the swallow w/ epiglottic undercoating (PAS-3)  Honey: tsp- no penetration/aspiration (PAS-1)  Puree: no penetration/aspiration (PAS-1)  Solid: no penetration/aspiration (PAS-1)  Response to Aspiration: N/A   Strategies/Efficacy: N/A     8-Point Penetration-Aspiration Scale   1 Material does not enter the airway   2 Material enters the airway, remains above the vocal folds, and is ejected  from the  airway    3 Material enters the airway, remains above the vocal folds, and is not ejected from the airway   4 Material enters the airway, contacts the vocal folds, and is ejected from the airway   5 Material enters the airway, contacts the vocal folds, and is not ejected from the airway    6 Material enters the airway, passes below the vocal folds and is ejected into the larynx or out of the airway    7 Material enters the airway, passes below the vocal folds, and is not ejected from the trachea despite effort    8 Material enters the airway, passes below the vocal folds, and no effort is made to eject         Screening of Esophageal Phase  Esophageal Clearance: momentary pill stasis noted in proximal esophagus, cleared w/ liquid wash, noted retrograde backflow at times

## 2024-11-18 ENCOUNTER — HOSPITAL ENCOUNTER (OUTPATIENT)
Dept: INFUSION CENTER | Facility: CLINIC | Age: 84
Discharge: HOME | End: 2024-11-18
Payer: MEDICARE

## 2024-11-18 VITALS
OXYGEN SATURATION: 97 % | SYSTOLIC BLOOD PRESSURE: 148 MMHG | TEMPERATURE: 97 F | RESPIRATION RATE: 18 BRPM | DIASTOLIC BLOOD PRESSURE: 75 MMHG | HEART RATE: 64 BPM

## 2024-11-18 DIAGNOSIS — D50.0 IRON DEFICIENCY ANEMIA DUE TO CHRONIC BLOOD LOSS: ICD-10-CM

## 2024-11-18 DIAGNOSIS — E61.1 HYPOFERREMIA: Primary | ICD-10-CM

## 2024-11-18 DIAGNOSIS — E53.8 B12 DEFICIENCY: ICD-10-CM

## 2024-11-18 PROCEDURE — 96365 THER/PROPH/DIAG IV INF INIT: CPT

## 2024-11-18 PROCEDURE — 96372 THER/PROPH/DIAG INJ SC/IM: CPT

## 2024-11-18 RX ORDER — SODIUM CHLORIDE 9 MG/ML
20 INJECTION, SOLUTION INTRAVENOUS ONCE
Status: CANCELLED | OUTPATIENT
Start: 2024-11-18

## 2024-11-18 RX ORDER — CYANOCOBALAMIN 1000 UG/ML
1000 INJECTION, SOLUTION INTRAMUSCULAR; SUBCUTANEOUS ONCE
Status: COMPLETED | OUTPATIENT
Start: 2024-11-18 | End: 2024-11-18

## 2024-11-18 RX ORDER — CYANOCOBALAMIN 1000 UG/ML
1000 INJECTION, SOLUTION INTRAMUSCULAR; SUBCUTANEOUS ONCE
OUTPATIENT
Start: 2024-12-16

## 2024-11-18 RX ORDER — SODIUM CHLORIDE 9 MG/ML
20 INJECTION, SOLUTION INTRAVENOUS ONCE
Status: COMPLETED | OUTPATIENT
Start: 2024-11-18 | End: 2024-11-18

## 2024-11-18 RX ADMIN — CYANOCOBALAMIN 1000 MCG: 1000 INJECTION, SOLUTION INTRAMUSCULAR at 15:57

## 2024-11-18 RX ADMIN — SODIUM CHLORIDE 20 ML/HR: 9 INJECTION, SOLUTION INTRAVENOUS at 16:00

## 2024-11-18 RX ADMIN — FERUMOXYTOL 510 MG: 510 INJECTION INTRAVENOUS at 16:01

## 2024-11-18 NOTE — PROGRESS NOTES
Patient tolerated all treatment today without complications. Patient has completed all ordered doses of feraheme. Next B12 injection scheduled for 12/16 at 1400. Appointment card provided.

## 2024-11-19 ENCOUNTER — OFFICE VISIT (OUTPATIENT)
Dept: CARDIAC SURGERY | Facility: CLINIC | Age: 84
End: 2024-11-19
Payer: MEDICARE

## 2024-11-19 VITALS
OXYGEN SATURATION: 94 % | SYSTOLIC BLOOD PRESSURE: 128 MMHG | WEIGHT: 139.9 LBS | HEART RATE: 75 BPM | DIASTOLIC BLOOD PRESSURE: 66 MMHG | BODY MASS INDEX: 24.01 KG/M2

## 2024-11-19 DIAGNOSIS — Z95.2 S/P TAVR (TRANSCATHETER AORTIC VALVE REPLACEMENT): Primary | ICD-10-CM

## 2024-11-19 PROCEDURE — 99213 OFFICE O/P EST LOW 20 MIN: CPT | Performed by: PHYSICIAN ASSISTANT

## 2024-11-19 PROCEDURE — 93000 ELECTROCARDIOGRAM COMPLETE: CPT | Performed by: PHYSICIAN ASSISTANT

## 2024-11-19 NOTE — PROGRESS NOTES
1 YEAR FOLLOW UP VISIT S/P TAVR    Procedure: S/P transcatheter aortic valve replacement, performed on 11/7/23    History of Present Illness: Ning Rodrigues is a 84 y.o. year old female who presents to our office today for one year follow up care from transfemoral transcatheter aortic valve replacement. She was evaluated in our office in December of last year after surgery, and was recovering well at that time. She attended cardiac rehab, and she found it to be quite beneficial. She is accompanied today by her .     Upon interview, patient reports that she feels generally well. She has had an ongoing cough for several years, and sees pulmonology for this. She denies shortness of breath, dizziness, SAVAGE, chest pain, edema. She maintains follow up with Dr. Glover, and continues to take her ASA and statin as directed.     Review of Systems:  Review of Systems - History obtained from the patient  General ROS: negative  Psychological ROS: negative  Ophthalmic ROS: negative  ENT ROS: negative  Allergy and Immunology ROS: negative  Hematological and Lymphatic ROS: negative  Endocrine ROS: negative  Respiratory ROS: positive for - cough  Cardiovascular ROS: no chest pain or dyspnea on exertion  Gastrointestinal ROS: no abdominal pain, change in bowel habits, or black or bloody stools  Genito-Urinary ROS: no dysuria, trouble voiding, or hematuria  Musculoskeletal ROS: negative  Neurological ROS: no TIA or stroke symptoms  Dermatological ROS: negative     Past Medical History:    Past Medical History:   Diagnosis Date    Allergic rhinitis 03/21/2023    Anemia     Arthritis     osteoporosis, djd knees, pt denies osteoporosis on 2/21/22    Low's esophagus     Cancer (HCC)     on face    Colon polyp     Cough 02/21/2022    cough for 3 years, expectorates yellow mucus    Cough variant asthma  07/26/2021    Disease of thyroid gland     low thyroid    Diverticulitis     gerd, diverticulitis    Dizziness     GERD  "(gastroesophageal reflux disease)     ulcerative colitis; Low's esophagus, diverticulosis; hx of laryngopharyngeal reflux    Hyperlipidemia     Hypertension     Low vitamin D level     LPRD (laryngopharyngeal reflux disease) 02/21/2022    PONV (postoperative nausea and vomiting)     AFTER \"BIG SURGERIES\"    Postnasal drip     WITH COUGH    Ulcerative colitis (HCC)        Past Surgical History:    Past Surgical History:   Procedure Laterality Date    APPENDECTOMY      CARDIAC CATHETERIZATION N/A 09/07/2023    Procedure: Cardiac RHC/LHC;  Surgeon: Rafael Sood MD;  Location: [facility];  Service: Cardiology    CARDIAC CATHETERIZATION N/A 11/07/2023    Procedure: Cardiac tavr;  Surgeon: Pete Hernández MD;  Location: [facility];  Service: Cardiology    CATARACT EXTRACTION Bilateral     COLONOSCOPY      colon polyps    COLONOSCOPY  07/28/2022    FOOT SURGERY Bilateral     bunionectomy    HEMORRHOID SURGERY      HEMORROIDECTOMY      hemorroid removal    JOINT REPLACEMENT Bilateral     knees    WI REPLACE AORTIC VALVE OPENFEMORAL ARTERY APPROACH N/A 11/07/2023    Procedure: REPLACEMENT AORTIC VALVE TRANSCATHETER (TAVR) TRANSFEMORAL W/ 26MM BUCKNER DOROTHEA S3 UR VALVE(ACCESS ON LEFT) GIOVANNY;  Surgeon: Felipe Buitrago DO;  Location: [facility];  Service: Cardiac Surgery    REPLACEMENT TOTAL KNEE Bilateral     b/ replacement    TONSILLECTOMY      TUBAL LIGATION      UPPER GASTROINTESTINAL ENDOSCOPY         Vital Signs:     Vitals:    11/19/24 1258   Weight: 63.5 kg (139 lb 14.4 oz)         Home Medications:     Prior to Admission medications    Medication Sig Start Date End Date Taking? Authorizing Provider   acetaminophen (TYLENOL) 325 mg tablet Take 2 tablets (650 mg total) by mouth every 6 (six) hours as needed for fever, mild pain, moderate pain or headaches (temperature greater than 101 F) 11/8/23  Yes Polly Cowart PA-C   amLODIPine (NORVASC) 10 mg tablet Take 1 tablet by mouth once daily 8/9/24  Yes Yohan " MD Shereen   aspirin 81 mg chewable tablet Chew 1 tablet (81 mg total) daily Do not start before November 9, 2023. 11/9/23  Yes Polly Cowart PA-C   atorvastatin (LIPITOR) 20 mg tablet Take 1 tablet by mouth once daily 8/27/24  Yes Yohan Regan MD   cholecalciferol (VITAMIN D3) 1,000 units tablet Take 1,000 Units by mouth daily   Yes Historical Provider, MD   Coenzyme Q10 (Co Q 10) 100 MG CAPS Take by mouth   Yes Historical Provider, MD   Cyanocobalamin (VITAMIN B 12 PO) Take by mouth   Yes Historical Provider, MD   folic acid ( Folic Acid) 1 mg tablet Take 1 tablet (1 mg total) by mouth daily 11/1/24  Yes Tristin Slade MD   levothyroxine 75 mcg tablet Take 1 tablet by mouth once daily 6/7/24  Yes Yohan Regan MD   montelukast (SINGULAIR) 10 mg tablet Take 1 tablet (10 mg total) by mouth every morning  Patient taking differently: Take 10 mg by mouth every morning Takes as needed. 10/31/24  Yes Benjamin Villegas MD   Multiple Vitamin (multivitamin) capsule Take 1 capsule by mouth daily   Yes Historical Provider, MD   pantoprazole (PROTONIX) 40 mg tablet Take 1 tablet (40 mg total) by mouth daily 4/19/24 1/14/25 Yes ALINA Ellis   sulfaSALAzine (AZULFIDINE) 500 mg tablet TAKE 2 TABLETS BY MOUTH TWICE A DAY 8/28/24  Yes Dunia Guzman PA-C   valsartan (DIOVAN) 80 mg tablet Take 1 tablet by mouth once daily 8/27/24  Yes Yohan Regan MD   Fluticasone Furoate-Vilanterol (Breo Ellipta) 100-25 mcg/actuation inhaler Inhale 1 puff daily Rinse mouth after use.  Patient not taking: Reported on 11/19/2024 5/31/24 11/19/24  Julia Calloway MD   tiZANidine (ZANAFLEX) 2 mg tablet Take 1 tablet (2 mg total) by mouth daily at bedtime as needed for muscle spasms  Patient not taking: Reported on 11/1/2024 8/14/24 11/19/24  Maddy Palomino MD   vitamin A 2250 MCG (7500 UT) capsule Take 7,500 Units by mouth daily  Patient not taking: Reported on 11/19/2024 11/19/24  Historical Provider, MD   vitamin E,  tocopherol, 1,000 units capsule Take 1,000 Units by mouth daily  Patient not taking: Reported on 11/19/2024 11/19/24  Historical Provider, MD       Allergies:    Allergies   Allergen Reactions    Sulfa Antibiotics Itching       Physical Exam:    General: alert, oriented, NAD   HEENT/NECK:  PERRL.  No jugular venous distention.    Cardiac:Regular rate and rhythm.  Pulmonary:Breath sounds clear bilaterally  Abdomen:  Non-tender, Non-distended.  Positive bowel sounds.  Upper extremities: 2+ radial pulses; brisk capillary refill  Lower extremities: Extremities warm/dry. PT/DP pulses 2+ bilaterally.  No edema B/L  Musculoskeletal: BHANDARI   Neuro: Alert and oriented X 3.  Sensation is grossly intact.  No focal deficits  Skin: Warm/Dry, without rashes or lesions.    Lab Results:   Lab Results   Component Value Date    WBC 5.50 09/05/2024    HGB 8.9 (L) 09/05/2024    HCT 27.4 (L) 09/05/2024     (H) 09/05/2024     09/05/2024     Lab Results   Component Value Date    GLUCOSE 99 11/07/2023    CALCIUM 9.3 09/13/2024    K 4.2 09/13/2024    CO2 28 09/13/2024    CL 95 (L) 09/13/2024    BUN 14 09/13/2024    CREATININE 0.77 09/13/2024       Imaging Studies:     Echocardiogram:11/8/24  Left Ventricle Left ventricular cavity size is normal. Wall thickness is mildly increased. There is mild concentric hypertrophy. The left ventricular ejection fraction is 60%. Systolic function is normal. Wall motion is normal. Diastolic function is mildly abnormal, consistent with grade I (abnormal) relaxation.   Interventricular Septum There is sigmoid appearance of the septum.   Right Ventricle Right ventricular cavity size is normal. Systolic function is normal. Wall thickness is normal.   Left Atrium The atrium is normal in size.   Right Atrium The atrium is normal in size.   Aortic Valve There is a TAVR bioprosthetic valve. The prosthetic valve appears well-seated and appears to be functioning normally. There is no evidence of  paravalvular regurgitation. There is trace transvalvular regurgitation. The aortic valve has no significant stenosis. The gradient recorded across the prosthetic aortic valve is within the expected range. The aortic valve mean gradient is 6 mmHg. The dimensionless velocity index is 0.63.   Mitral Valve Mitral valve structure is normal.  There is trace regurgitation. There is no evidence of stenosis.   Tricuspid Valve Tricuspid valve structure is normal. There is trace regurgitation. There is no evidence of stenosis.   Pulmonic Valve Pulmonic valve structure is normal. There is trace regurgitation. There is no evidence of stenosis.   Ascending Aorta The aortic root is normal in size.   IVC/SVC The inferior vena cava is normal in size.   Pericardium There is no pericardial effusion. The pericardium is normal in appearance.       [id number]      Results Review Statement: I personally reviewed the following image studies in PACS and associated radiology reports: Echocardiogram. My interpretation of the radiology images/reports is: as above.    TAVR evaluation Assessment:     [facility]: I    [id number] completed     Assessment:     Aortic stenosis, Non-Rheumatic.   S/P transfemoral transcatheter aortic valve replacement;      Ning Rodrigues returns to our office today for 1 year routine follow up s/p  transfemoral transcatheter aortic valve replacement .  Their NYHA functional status is class I.   Recent echocardiogram demonstrates well seated TAVR valve with normal function, trace transvalvular regurgitation.   ECG pending, CBC and BMP not drawn prior to appointment, but patient given lab slips to obtain this week.     Plan:     I reviewed test results with patient.  I reviewed medications and made no changes. Continue Aspirin 81 mg daily, lifelong, for antiplatelet therapy for bioprosthetic valve.      Ning Rodrigues does not need to return to our office for follow up in the future but will continue to be managed by their  primary care physician and cardiologist for ongoing medical care. We recommend yearly echocardiograms which can be ordered and monitored by their cardiologist.  Ning Rodrigues was comfortable with our recommendations and their questions were answered to their satisfaction.    Routine referral to gastroenterology for colonoscopy screening was not indicated, as the patient is over 74 years old    Kathy Peck PA-C  [unfilled]  1:03 PM

## 2024-11-20 ENCOUNTER — APPOINTMENT (OUTPATIENT)
Dept: LAB | Facility: HOSPITAL | Age: 84
End: 2024-11-20
Payer: MEDICARE

## 2024-11-20 DIAGNOSIS — Z95.2 S/P TAVR (TRANSCATHETER AORTIC VALVE REPLACEMENT): ICD-10-CM

## 2024-11-20 DIAGNOSIS — I35.0 AORTIC STENOSIS, SEVERE: ICD-10-CM

## 2024-11-20 LAB
ANION GAP SERPL CALCULATED.3IONS-SCNC: 6 MMOL/L (ref 4–13)
BASOPHILS # BLD AUTO: 0.03 THOUSANDS/ÂΜL (ref 0–0.1)
BASOPHILS NFR BLD AUTO: 1 % (ref 0–1)
BUN SERPL-MCNC: 19 MG/DL (ref 5–25)
CALCIUM SERPL-MCNC: 9.3 MG/DL (ref 8.4–10.2)
CHLORIDE SERPL-SCNC: 98 MMOL/L (ref 96–108)
CO2 SERPL-SCNC: 28 MMOL/L (ref 21–32)
CREAT SERPL-MCNC: 0.82 MG/DL (ref 0.6–1.3)
EOSINOPHIL # BLD AUTO: 0.18 THOUSAND/ÂΜL (ref 0–0.61)
EOSINOPHIL NFR BLD AUTO: 5 % (ref 0–6)
ERYTHROCYTE [DISTWIDTH] IN BLOOD BY AUTOMATED COUNT: 13.2 % (ref 11.6–15.1)
GFR SERPL CREATININE-BSD FRML MDRD: 65 ML/MIN/1.73SQ M
GLUCOSE SERPL-MCNC: 120 MG/DL (ref 65–140)
HCT VFR BLD AUTO: 27.5 % (ref 34.8–46.1)
HGB BLD-MCNC: 8.9 G/DL (ref 11.5–15.4)
IMM GRANULOCYTES # BLD AUTO: 0.01 THOUSAND/UL (ref 0–0.2)
IMM GRANULOCYTES NFR BLD AUTO: 0 % (ref 0–2)
LYMPHOCYTES # BLD AUTO: 1.4 THOUSANDS/ÂΜL (ref 0.6–4.47)
LYMPHOCYTES NFR BLD AUTO: 36 % (ref 14–44)
MCH RBC QN AUTO: 34.6 PG (ref 26.8–34.3)
MCHC RBC AUTO-ENTMCNC: 32.4 G/DL (ref 31.4–37.4)
MCV RBC AUTO: 107 FL (ref 82–98)
MONOCYTES # BLD AUTO: 0.56 THOUSAND/ÂΜL (ref 0.17–1.22)
MONOCYTES NFR BLD AUTO: 15 % (ref 4–12)
NEUTROPHILS # BLD AUTO: 1.68 THOUSANDS/ÂΜL (ref 1.85–7.62)
NEUTS SEG NFR BLD AUTO: 43 % (ref 43–75)
NRBC BLD AUTO-RTO: 0 /100 WBCS
PLATELET # BLD AUTO: 276 THOUSANDS/UL (ref 149–390)
PMV BLD AUTO: 9.6 FL (ref 8.9–12.7)
POTASSIUM SERPL-SCNC: 3.9 MMOL/L (ref 3.5–5.3)
RBC # BLD AUTO: 2.57 MILLION/UL (ref 3.81–5.12)
SODIUM SERPL-SCNC: 132 MMOL/L (ref 135–147)
WBC # BLD AUTO: 3.86 THOUSAND/UL (ref 4.31–10.16)

## 2024-11-20 PROCEDURE — 36415 COLL VENOUS BLD VENIPUNCTURE: CPT

## 2024-11-20 PROCEDURE — 85025 COMPLETE CBC W/AUTO DIFF WBC: CPT

## 2024-11-20 PROCEDURE — 80048 BASIC METABOLIC PNL TOTAL CA: CPT

## 2024-11-23 DIAGNOSIS — E78.00 HYPERCHOLESTEROLEMIA: ICD-10-CM

## 2024-11-24 RX ORDER — ATORVASTATIN CALCIUM 20 MG/1
20 TABLET, FILM COATED ORAL DAILY
Qty: 90 TABLET | Refills: 1 | Status: SHIPPED | OUTPATIENT
Start: 2024-11-24

## 2024-12-09 DIAGNOSIS — E03.9 ACQUIRED HYPOTHYROIDISM: ICD-10-CM

## 2024-12-10 RX ORDER — LEVOTHYROXINE SODIUM 75 UG/1
75 TABLET ORAL DAILY
Qty: 90 TABLET | Refills: 1 | Status: SHIPPED | OUTPATIENT
Start: 2024-12-10

## 2024-12-16 ENCOUNTER — HOSPITAL ENCOUNTER (OUTPATIENT)
Dept: INFUSION CENTER | Facility: CLINIC | Age: 84
Discharge: HOME | End: 2024-12-16
Payer: MEDICARE

## 2024-12-16 DIAGNOSIS — E53.8 B12 DEFICIENCY: Primary | ICD-10-CM

## 2024-12-16 PROCEDURE — 96372 THER/PROPH/DIAG INJ SC/IM: CPT

## 2024-12-16 RX ORDER — CYANOCOBALAMIN 1000 UG/ML
1000 INJECTION, SOLUTION INTRAMUSCULAR; SUBCUTANEOUS ONCE
OUTPATIENT
Start: 2025-01-13

## 2024-12-16 RX ORDER — CYANOCOBALAMIN 1000 UG/ML
1000 INJECTION, SOLUTION INTRAMUSCULAR; SUBCUTANEOUS ONCE
Status: COMPLETED | OUTPATIENT
Start: 2024-12-16 | End: 2024-12-16

## 2024-12-16 RX ADMIN — CYANOCOBALAMIN 1000 MCG: 1000 INJECTION INTRAMUSCULAR; SUBCUTANEOUS at 14:06

## 2024-12-16 NOTE — PROGRESS NOTES
B12 tolerated in right deltoid today without complications. Patient aware to schedule next appointment before leaving.

## 2024-12-21 ENCOUNTER — HOSPITAL ENCOUNTER (OUTPATIENT)
Dept: CT IMAGING | Facility: HOSPITAL | Age: 84
Discharge: HOME | End: 2024-12-21
Attending: INTERNAL MEDICINE
Payer: MEDICARE

## 2024-12-21 DIAGNOSIS — J47.9 BRONCHIECTASIS WITHOUT COMPLICATION (HCC): ICD-10-CM

## 2024-12-21 PROCEDURE — 71250 CT THORAX DX C-: CPT

## 2025-01-03 ENCOUNTER — TELEPHONE (OUTPATIENT)
Dept: HEMATOLOGY ONCOLOGY | Facility: CLINIC | Age: 85
End: 2025-01-03

## 2025-01-03 ENCOUNTER — APPOINTMENT (OUTPATIENT)
Dept: LAB | Facility: HOSPITAL | Age: 85
End: 2025-01-03
Payer: MEDICARE

## 2025-01-03 DIAGNOSIS — K51.90 ULCERATIVE COLITIS WITHOUT COMPLICATIONS, UNSPECIFIED LOCATION (HCC): ICD-10-CM

## 2025-01-03 DIAGNOSIS — D50.0 IRON DEFICIENCY ANEMIA DUE TO CHRONIC BLOOD LOSS: ICD-10-CM

## 2025-01-03 DIAGNOSIS — E61.1 HYPOFERREMIA: ICD-10-CM

## 2025-01-03 DIAGNOSIS — E87.1 HYPONATREMIA: ICD-10-CM

## 2025-01-03 DIAGNOSIS — E53.8 B12 DEFICIENCY: ICD-10-CM

## 2025-01-03 LAB
ALBUMIN SERPL BCG-MCNC: 4.1 G/DL (ref 3.5–5)
ALP SERPL-CCNC: 77 U/L (ref 34–104)
ALT SERPL W P-5'-P-CCNC: 14 U/L (ref 7–52)
ANION GAP SERPL CALCULATED.3IONS-SCNC: 4 MMOL/L (ref 4–13)
AST SERPL W P-5'-P-CCNC: 24 U/L (ref 13–39)
BASOPHILS # BLD AUTO: 0.04 THOUSANDS/ΜL (ref 0–0.1)
BASOPHILS NFR BLD AUTO: 1 % (ref 0–1)
BILIRUB SERPL-MCNC: 0.43 MG/DL (ref 0.2–1)
BUN SERPL-MCNC: 12 MG/DL (ref 5–25)
CALCIUM SERPL-MCNC: 9.3 MG/DL (ref 8.4–10.2)
CHLORIDE SERPL-SCNC: 99 MMOL/L (ref 96–108)
CO2 SERPL-SCNC: 30 MMOL/L (ref 21–32)
CREAT SERPL-MCNC: 1.2 MG/DL (ref 0.6–1.3)
EOSINOPHIL # BLD AUTO: 0.17 THOUSAND/ΜL (ref 0–0.61)
EOSINOPHIL NFR BLD AUTO: 4 % (ref 0–6)
ERYTHROCYTE [DISTWIDTH] IN BLOOD BY AUTOMATED COUNT: 13 % (ref 11.6–15.1)
GFR SERPL CREATININE-BSD FRML MDRD: 41 ML/MIN/1.73SQ M
GLUCOSE SERPL-MCNC: 103 MG/DL (ref 65–140)
HCT VFR BLD AUTO: 27.7 % (ref 34.8–46.1)
HGB BLD-MCNC: 8.9 G/DL (ref 11.5–15.4)
IMM GRANULOCYTES # BLD AUTO: 0.01 THOUSAND/UL (ref 0–0.2)
IMM GRANULOCYTES NFR BLD AUTO: 0 % (ref 0–2)
LYMPHOCYTES # BLD AUTO: 1.62 THOUSANDS/ΜL (ref 0.6–4.47)
LYMPHOCYTES NFR BLD AUTO: 35 % (ref 14–44)
MCH RBC QN AUTO: 35.7 PG (ref 26.8–34.3)
MCHC RBC AUTO-ENTMCNC: 32.1 G/DL (ref 31.4–37.4)
MCV RBC AUTO: 111 FL (ref 82–98)
MONOCYTES # BLD AUTO: 0.61 THOUSAND/ΜL (ref 0.17–1.22)
MONOCYTES NFR BLD AUTO: 13 % (ref 4–12)
NEUTROPHILS # BLD AUTO: 2.15 THOUSANDS/ΜL (ref 1.85–7.62)
NEUTS SEG NFR BLD AUTO: 47 % (ref 43–75)
NRBC BLD AUTO-RTO: 0 /100 WBCS
PLATELET # BLD AUTO: 261 THOUSANDS/UL (ref 149–390)
PMV BLD AUTO: 9.8 FL (ref 8.9–12.7)
POTASSIUM SERPL-SCNC: 4 MMOL/L (ref 3.5–5.3)
PROT SERPL-MCNC: 7.2 G/DL (ref 6.4–8.4)
RBC # BLD AUTO: 2.49 MILLION/UL (ref 3.81–5.12)
RETICS # AUTO: ABNORMAL 10*3/UL (ref 14097–95744)
RETICS # CALC: 2.44 % (ref 0.37–1.87)
SODIUM SERPL-SCNC: 133 MMOL/L (ref 135–147)
WBC # BLD AUTO: 4.6 THOUSAND/UL (ref 4.31–10.16)

## 2025-01-03 PROCEDURE — 80053 COMPREHEN METABOLIC PANEL: CPT

## 2025-01-03 PROCEDURE — 85045 AUTOMATED RETICULOCYTE COUNT: CPT

## 2025-01-03 PROCEDURE — 36415 COLL VENOUS BLD VENIPUNCTURE: CPT

## 2025-01-03 PROCEDURE — 83540 ASSAY OF IRON: CPT

## 2025-01-03 PROCEDURE — 83010 ASSAY OF HAPTOGLOBIN QUANT: CPT

## 2025-01-03 PROCEDURE — 83550 IRON BINDING TEST: CPT

## 2025-01-03 PROCEDURE — 82728 ASSAY OF FERRITIN: CPT

## 2025-01-03 PROCEDURE — 82746 ASSAY OF FOLIC ACID SERUM: CPT

## 2025-01-03 PROCEDURE — 85025 COMPLETE CBC W/AUTO DIFF WBC: CPT

## 2025-01-03 PROCEDURE — 82607 VITAMIN B-12: CPT

## 2025-01-03 NOTE — TELEPHONE ENCOUNTER
Spoke with patient and advised blood work needed to be completed for Tuesday Visit with Marcy Frazier.  Patient verbalized understanding.

## 2025-01-04 LAB
FERRITIN SERPL-MCNC: 427 NG/ML (ref 11–307)
FOLATE SERPL-MCNC: >22.3 NG/ML
HAPTOGLOB SERPL-MCNC: 36 MG/DL (ref 41–333)
IRON SATN MFR SERPL: 32 % (ref 15–50)
IRON SERPL-MCNC: 70 UG/DL (ref 50–212)
TIBC SERPL-MCNC: 221.2 UG/DL (ref 250–450)
TRANSFERRIN SERPL-MCNC: 158 MG/DL (ref 203–362)
UIBC SERPL-MCNC: 151 UG/DL (ref 155–355)
VIT B12 SERPL-MCNC: 2058 PG/ML (ref 180–914)

## 2025-01-07 ENCOUNTER — TELEMEDICINE (OUTPATIENT)
Dept: HEMATOLOGY ONCOLOGY | Facility: CLINIC | Age: 85
End: 2025-01-07
Payer: MEDICARE

## 2025-01-07 ENCOUNTER — TELEPHONE (OUTPATIENT)
Dept: HEMATOLOGY ONCOLOGY | Facility: CLINIC | Age: 85
End: 2025-01-07

## 2025-01-07 DIAGNOSIS — K51.90 ULCERATIVE COLITIS WITHOUT COMPLICATIONS, UNSPECIFIED LOCATION (HCC): ICD-10-CM

## 2025-01-07 DIAGNOSIS — R77.8 LOW SERUM HAPTOGLOBIN: ICD-10-CM

## 2025-01-07 DIAGNOSIS — D53.9 MACROCYTIC ANEMIA: Primary | ICD-10-CM

## 2025-01-07 DIAGNOSIS — E61.1 HYPOFERREMIA: ICD-10-CM

## 2025-01-07 DIAGNOSIS — E53.8 B12 DEFICIENCY: ICD-10-CM

## 2025-01-07 PROBLEM — R79.89: Status: ACTIVE | Noted: 2025-01-07

## 2025-01-07 PROCEDURE — 99213 OFFICE O/P EST LOW 20 MIN: CPT | Performed by: PHYSICIAN ASSISTANT

## 2025-01-07 NOTE — PROGRESS NOTES
Name: Ning Rodrigues      : 1940      MRN: [id number]  Encounter Provider: Marcy Frazier PA-C  Encounter Date: 2025   Encounter department: [facility]  :  Assessment & Plan  Macrocytic anemia    Macrocytic anemia since at least 2021.   Hemoglobin 10.9,    EGFR 70s- 80s      hemoglobin 9 g/dL range => 7 g/dL range  hemoglobin has been 8.5-9.6, -110, monocytosis around 13%     2023 no schistocytes on smear; normal LDH, retic 2.6%; haptoglobin 42 (LLN 41)  No bili elevation, MARK normal 3/24     11/23 Retic 4.2%     2025 CMP stable,  retic 2.4%, haptoglobin 36  Hgb 8.9,  (stable since 2023)    2023 1 unit PRBC during 23 admission for hgb 6.9    2023 placed on steroids (budesonide 9mg po daily). Though this is more selective to GI tract and Liver,    Hgb did improve from 8.2 23 -> 9.6 g/dL 24 placed on tapering dose of steroids per GI.   Prednisone 40mg, decreasing by 10 mg weekly. Hgb remained stable at 9 g/dL range 3/2024.Orders:    Leukemia/Lymphoma flow cytometry; Future    CBC and differential; Future    Iron Panel (Includes Ferritin, Iron Sat%, Iron, and TIBC); Future    Vitamin B12; Future    Haptoglobin; Future    Comprehensive metabolic panel; Future    IgG, IgA, IgM; Future    LD,Blood    Reticulocytes    Direct antiglobulin test; Future    B12 deficiency    2023 she had normal B12 with elevated MMA. Folate has been normal.   She has received B12 IM since 23 -     B12 1100   2025  B12     Will hold B12 IM injections.Orders:    Leukemia/Lymphoma flow cytometry; Future    CBC and differential; Future    Iron Panel (Includes Ferritin, Iron Sat%, Iron, and TIBC); Future    Vitamin B12; Future    Haptoglobin; Future    Comprehensive metabolic panel; Future    IgG, IgA, IgM; Future    LD,Blood    Reticulocytes    Direct antiglobulin test;  Future    Ulcerative colitis without complications, unspecified location (HCC)    on sulfasalazine.   11/2023 placed on steroids (budesonide 9mg po daily). Though this is more selective to GI tract and Liver,  Hgb did improve from 7 range -> 9 g/dL range.   2/2024 placed on tapering dose of steroids. 40mg, decreasing by 10 mg weekly. Hgb remained stable at 9 g/dL range 3/2024.Orders:    Leukemia/Lymphoma flow cytometry; Future    CBC and differential; Future    Iron Panel (Includes Ferritin, Iron Sat%, Iron, and TIBC); Future    Vitamin B12; Future    Haptoglobin; Future    Comprehensive metabolic panel; Future    IgG, IgA, IgM; Future    LD,Blood    Reticulocytes    Direct antiglobulin test; Future    Hypoferremia    Chronic low normal Ferritin 19- 50 since at least 7/2023. She had taken oral iron previously  10/2024 Feraheme x 2  1/3/2025 ferritin 427; iron saturation 32%    Orders:    Leukemia/Lymphoma flow cytometry; Future    CBC and differential; Future    Iron Panel (Includes Ferritin, Iron Sat%, Iron, and TIBC); Future    Vitamin B12; Future    Haptoglobin; Future    Comprehensive metabolic panel; Future    IgG, IgA, IgM; Future    LD,Blood    Reticulocytes    Direct antiglobulin test; Future    Low serum haptoglobin    30-40 range.  stableOrders:    Leukemia/Lymphoma flow cytometry; Future    CBC and differential; Future    Iron Panel (Includes Ferritin, Iron Sat%, Iron, and TIBC); Future    Vitamin B12; Future    Haptoglobin; Future    Comprehensive metabolic panel; Future    IgG, IgA, IgM; Future    LD,Blood    Reticulocytes    Direct antiglobulin test; Future      Since she has improved energy post iron infusions though she did not have improvement in her hemoglobin.  She is maintaining an active lifestyle.  This time, we will continue to monitor.               History of Present Illness   No chief complaint on file.    Pertinent Medical History   01/07/25: Ning Rodrigues is an 84-year-old female seen  "initially 7/2023 by ALINA Cheng re anemia.     History of ulcerative colitis, diverticulitis/diverticulosis, GERD, Low's esophagus, hypothyroidism, hypertension, aortic stenosis status post TAVR     1/9/2021 hemoglobin of 10.9, , normal WBC and platelets, this has been getting worse gradually over time as on 7/2023 hemoglobin of 9.1,      She also has malabsorption due to chronic PPI use that she takes Pepcid and Protonix daily as well as has a lactose intolerance.     7/11/23 hemoglobin 9.1, , normal white blood cell count, differential, platelets normal creatinine, total protein albumin, calcium, haptoglobin 42 (LLN 41), no schistocytes on hemolysis smear, normal LDH, folate, reticulocyte 2.6%, B12 576, MMA elevated at 435, ferritin 35  7/2023 B12 1000mcg IM x 4 ordered.     8/24/23 hgb 8.6     10/5/23 hgb 8.3; ferritin 19; B12 1200,      11/7/2023 TAVR     11/17/23 hgb 7.7, , ferritin 50; iron saturation 25%     11/28/23 admitted secondary to dark stool and weakness.  Admitting hemoglobin 7.5, , , otherwise normal.  Hemoglobin down to 6.8 November 30, 2023. She received 1 unit packed red blood cells.      CT of the abdomen noting \"Uncomplicated colitis of the transverse segment. Though there are diverticula arising from this portion of the colon, length of the affected segment and morphology of wall thickening are atypical for diverticulitis such that other infectious or inflammatory causes of colitis should be considered. No free air, abscess, or obstruction     11/2023 folic acid 10, vitamin B12 2000, reticulocyte count 4% normal creatinine, AST, ALT, total protein, albumin and bilirubin  1 unit PRBC during 11/29/23 admission for hgb 6.9    She took iron pills with constipation     Plavix was discontinued and she maintained on aspirin 81 mg p.o. daily     12/1/23 EGD normal duodenum, stomach, small hiatal hernia, Extrinsic compression on the " midesophagus, likely aorta or cardiac     On 2/9/2024 WBC 3.8, hemoglobin 9.1, , platelets 267,000, RDW 13.4, normal differential, calprotectin fecal is positive with a value of 447 indicating underlying inflammation. That is why she was put on prednisone (40mg tapering by 10mg every 7 days) Completed 3/8/24     3/13/24 CT chest - Mild to moderate diffuse bronchiectasis with bronchial mucous plugging in the medial basal segment of the right lower lobe.  2. Stable sub-6 mm lung nodules as described above.  3. Stable paraesophageal lymph nodes measuring up to 1.0 cm.  4. Small hiatal hernia.  5. Cholelithiasis.        3/24 ferritin 52, MARK negative. Hgb 9 (stable despite tapered dose of prednisone)     4/30/24 hemoglobin 8.5, , white blood cell count 4.64, 45% segs, 34% lymphocytes, 13% monocytes, platelets 241, ferritin 42, iron saturation 28%, B12 1100, normal folate     8/15/24 hemoglobin 8.7, , 4% monocytes     9/5/24 hemoglobin 8.9, , white blood cell count 5.5, 14% monocytes     Review of Systems        Objective   There were no vitals taken for this visit.    Physical Exam    Labs: I have reviewed the following labs:  Results for orders placed or performed in visit on 01/03/25   CBC and differential   Result Value Ref Range    WBC 4.60 4.31 - 10.16 Thousand/uL    RBC 2.49 (L) 3.81 - 5.12 Million/uL    Hemoglobin 8.9 (L) 11.5 - 15.4 g/dL    Hematocrit 27.7 (L) 34.8 - 46.1 %     (H) 82 - 98 fL    MCH 35.7 (H) 26.8 - 34.3 pg    MCHC 32.1 31.4 - 37.4 g/dL    RDW 13.0 11.6 - 15.1 %    MPV 9.8 8.9 - 12.7 fL    Platelets 261 149 - 390 Thousands/uL    nRBC 0 /100 WBCs    Segmented % 47 43 - 75 %    Immature Grans % 0 0 - 2 %    Lymphocytes % 35 14 - 44 %    Monocytes % 13 (H) 4 - 12 %    Eosinophils Relative 4 0 - 6 %    Basophils Relative 1 0 - 1 %    Absolute Neutrophils 2.15 1.85 - 7.62 Thousands/µL    Absolute Immature Grans 0.01 0.00 - 0.20 Thousand/uL    Absolute Lymphocytes  1.62 0.60 - 4.47 Thousands/µL    Absolute Monocytes 0.61 0.17 - 1.22 Thousand/µL    Eosinophils Absolute 0.17 0.00 - 0.61 Thousand/µL    Basophils Absolute 0.04 0.00 - 0.10 Thousands/µL   Vitamin B12   Result Value Ref Range    Vitamin B-12 2,058 (H) 180 - 914 pg/mL   Result Value Ref Range    Folate >22.3 >5.9 ng/mL   Comprehensive metabolic panel   Result Value Ref Range    Sodium 133 (L) 135 - 147 mmol/L    Potassium 4.0 3.5 - 5.3 mmol/L    Chloride 99 96 - 108 mmol/L    CO2 30 21 - 32 mmol/L    ANION GAP 4 4 - 13 mmol/L    BUN 12 5 - 25 mg/dL    Creatinine 1.20 0.60 - 1.30 mg/dL    Glucose 103 65 - 140 mg/dL    Calcium 9.3 8.4 - 10.2 mg/dL    AST 24 13 - 39 U/L    ALT 14 7 - 52 U/L    Alkaline Phosphatase 77 34 - 104 U/L    Total Protein 7.2 6.4 - 8.4 g/dL    Albumin 4.1 3.5 - 5.0 g/dL    Total Bilirubin 0.43 0.20 - 1.00 mg/dL    eGFR 41 ml/min/1.73sq m   Reticulocytes   Result Value Ref Range    Retic Ct Abs 60,800 14,097 - 95,744    Retic Ct Pct 2.44 (H) 0.37 - 1.87 %   Result Value Ref Range    Haptoglobin 36 (L) 41 - 333 mg/dL   TIBC Panel (incl. Iron, TIBC, % Iron Saturation)   Result Value Ref Range    Iron Saturation 32 15 - 50 %    TIBC 221.2 (L) 250 - 450 ug/dL    Iron 70 50 - 212 ug/dL    Transferrin 158 (L) 203 - 362 mg/dL    UIBC 151 (L) 155 - 355 ug/dL   Result Value Ref Range    Ferritin 427 (H) 11 - 307 ng/mL             Administrative Statements   Encounter provider Marcy Frazier PA-C    The Patient is located at Home and in the following state in which I hold an active license [place].    The patient was identified by name and date of birth. Ning Rodrigues was informed that this is a telemedicine visit and that the visit is being conducted through Telephone.  My office door was closed. No one else was in the room.  She acknowledged consent and understanding of privacy and security of the video platform. The patient has agreed to participate and understands they can discontinue the visit at  any time.    I have spent a total time of 20 minutes in caring for this patient on the day of the visit/encounter including Diagnostic results, Patient and family education, Impressions, Documenting in the medical record, Reviewing / ordering tests, medicine, procedures  , and Obtaining or reviewing history  .

## 2025-01-07 NOTE — ASSESSMENT & PLAN NOTE
on sulfasalazine.   11/2023 placed on steroids (budesonide 9mg po daily). Though this is more selective to GI tract and Liver,  Hgb did improve from 7 range -> 9 g/dL range.   2/2024 placed on tapering dose of steroids. 40mg, decreasing by 10 mg weekly. Hgb remained stable at 9 g/dL range 3/2024.Orders:    Leukemia/Lymphoma flow cytometry; Future    CBC and differential; Future    Iron Panel (Includes Ferritin, Iron Sat%, Iron, and TIBC); Future    Vitamin B12; Future    Haptoglobin; Future    Comprehensive metabolic panel; Future    IgG, IgA, IgM; Future    LD,Blood    Reticulocytes    Direct antiglobulin test; Future

## 2025-01-07 NOTE — ASSESSMENT & PLAN NOTE
Chronic low normal Ferritin 19- 50 since at least 7/2023. She had taken oral iron previously  10/2024 Feraheme x 2  1/3/2025 ferritin 427; iron saturation 32%    Orders:    Leukemia/Lymphoma flow cytometry; Future    CBC and differential; Future    Iron Panel (Includes Ferritin, Iron Sat%, Iron, and TIBC); Future    Vitamin B12; Future    Haptoglobin; Future    Comprehensive metabolic panel; Future    IgG, IgA, IgM; Future    LD,Blood    Reticulocytes    Direct antiglobulin test; Future

## 2025-01-07 NOTE — ASSESSMENT & PLAN NOTE
30-40 range.  stableOrders:    Leukemia/Lymphoma flow cytometry; Future    CBC and differential; Future    Iron Panel (Includes Ferritin, Iron Sat%, Iron, and TIBC); Future    Vitamin B12; Future    Haptoglobin; Future    Comprehensive metabolic panel; Future    IgG, IgA, IgM; Future    LD,Blood    Reticulocytes    Direct antiglobulin test; Future

## 2025-01-07 NOTE — ASSESSMENT & PLAN NOTE
July 2023 she had normal B12 with elevated MMA. Folate has been normal.   She has received B12 IM since 9/18/23 -   4/24  B12 1100   1/2025  B12 2000    Will hold B12 IM injections.Orders:    Leukemia/Lymphoma flow cytometry; Future    CBC and differential; Future    Iron Panel (Includes Ferritin, Iron Sat%, Iron, and TIBC); Future    Vitamin B12; Future    Haptoglobin; Future    Comprehensive metabolic panel; Future    IgG, IgA, IgM; Future    LD,Blood    Reticulocytes    Direct antiglobulin test; Future

## 2025-01-07 NOTE — TELEPHONE ENCOUNTER
Per Marcy Frazier PA-C, B12 on hold at this time due to elevated values. Pt should have repeat labs prior to follow up with Dr. Govea.      left for patient asking for a call back to discuss treatment plan. Can you please notify patient if she returns call.     AN INFUSION: Please cancel appointments

## 2025-01-07 NOTE — ASSESSMENT & PLAN NOTE
Macrocytic anemia since at least September 2021.   Hemoglobin 10.9,    EGFR 70s- 80s     2023 hemoglobin 9 g/dL range => 7 g/dL range November 2023 2024 hemoglobin has been 8.5-9.6, -110, monocytosis around 13%     7/2023 no schistocytes on smear; normal LDH, retic 2.6%; haptoglobin 42 (LLN 41)  No bili elevation, MARK normal 3/24     11/23 Retic 4.2%     1/2025 CMP stable,  retic 2.4%, haptoglobin 36  Hgb 8.9,  (stable since 12/2023)    11/2023 1 unit PRBC during 11/29/23 admission for hgb 6.9    12/2023 placed on steroids (budesonide 9mg po daily). Though this is more selective to GI tract and Liver,    Hgb did improve from 8.2 12/11/23 -> 9.6 g/dL 1/2/24 2/2024 placed on tapering dose of steroids per GI.   Prednisone 40mg, decreasing by 10 mg weekly. Hgb remained stable at 9 g/dL range 3/2024.Orders:    Leukemia/Lymphoma flow cytometry; Future    CBC and differential; Future    Iron Panel (Includes Ferritin, Iron Sat%, Iron, and TIBC); Future    Vitamin B12; Future    Haptoglobin; Future    Comprehensive metabolic panel; Future    IgG, IgA, IgM; Future    LD,Blood    Reticulocytes    Direct antiglobulin test; Future

## 2025-01-08 ENCOUNTER — TELEPHONE (OUTPATIENT)
Age: 85
End: 2025-01-08

## 2025-01-08 NOTE — TELEPHONE ENCOUNTER
"Patient returned call to HEMA Gandhi and I relayed her message.    \"Per Marcy Frazier PA-C, B12 on hold at this time due to elevated values. Pt should have repeat labs prior to follow up with Dr. Govea.\"     Also advised all B12 appointments have been cancelled. Patient verbalized understanding and had no further questions at this time,  "

## 2025-01-09 ENCOUNTER — RA CDI HCC (OUTPATIENT)
Dept: OTHER | Facility: HOSPITAL | Age: 85
End: 2025-01-09

## 2025-01-09 NOTE — PROGRESS NOTES
HCC coding opportunities       Chart reviewed, no opportunity found: CHART REVIEWED, NO OPPORTUNITY FOUND        Patients Insurance     Medicare Insurance: Medicare           SUBJECTIVE:    Patient ID: Rashi is a 19 year old male.    Chief Complaint   Patient presents with   • Follow-up     Reviewed PHQ9 and ARIANNA,   He is feeling better, less often sad, less often anxious, more hopeful.  He started with CBT and is practicing steps daily.  His therapist is Dr Adam Augustine.  He sleeps earlier and eats more regularly, less abdominal pain.  He walks or jogs and is busy signing up for next semester at [employer].  His sleep is bothered with nasal congestion, he is taking Zyrtec but did not try Flonase.  Clear secretions, no fever.  He has a question if can try Melatonin if needed.  He is playing video games less often.  No problems with medication, taking daily, no thoughts of self harm, SI/HI.  No other complaints.  Now taking Prozac for 1 month and doing much better.        Patient Active Problem List   Diagnosis   • Dysgraphia   • Acne   • Anxiety   • Depression   • Seasonal allergic rhinitis due to pollen     History reviewed. No pertinent surgical history.  ALLERGIES:   Allergen Reactions   • Penicillins HIVES     Past Medical History:   Diagnosis Date   • Burping 3/6/2019   • Dyspepsia 3/6/2019   • Epididymal cyst 10/16/2016     Social History     Social History Narrative    Parents are  for many years, sees both, older brother ( sister)    cat     Family History   Problem Relation Age of Onset   • Other Brother         autism, now sister   • Depression Maternal Uncle         SI   • Depression Maternal Grandmother    • Depression Maternal Cousin         SI     Current Outpatient Medications   Medication Sig Dispense Refill   • fluoxetine (PROzac) 10 MG tablet Take 1 tablet by mouth daily. 30 tablet 0   • cetirizine (ZYRTEC) 10 MG tablet      • minocycline (MINOCIN) 100 MG capsule TAKE 1 CAPSULE BY MOUTH TWICE A DAY 60 capsule 0   • omeprazole (PRILOSEC) 20 MG capsule Take 20 mg by mouth.       No current facility-administered medications for this visit.         Review  of Systems   Constitutional: Negative.    HENT: Positive for congestion.    Eyes: Negative.    Respiratory: Negative.    Cardiovascular: Negative.    Gastrointestinal: Negative.    Skin:        Acne getting better with more routine care   Allergic/Immunologic: Positive for environmental allergies.   Neurological: Negative.        OBJECTIVE:  Visit Vitals  /81   Pulse 69   Temp 97.7 °F (36.5 °C) (Temporal)   Ht 5' 7.91\" (1.725 m)   Wt 50 kg (110 lb 1.9 oz)   BMI 16.79 kg/m²     Physical Exam  Vitals reviewed.   Constitutional:       General: He is not in acute distress.     Appearance: Normal appearance.   HENT:      Nose: Congestion present.      Comments: Clear secretions  Eyes:      General:         Right eye: No discharge.         Left eye: No discharge.      Conjunctiva/sclera: Conjunctivae normal.   Pulmonary:      Effort: Pulmonary effort is normal. No respiratory distress.      Breath sounds: Normal breath sounds.   Abdominal:      General: There is no distension.      Palpations: Abdomen is soft.      Tenderness: There is no abdominal tenderness.   Musculoskeletal:      Cervical back: Normal range of motion and neck supple.   Skin:     Comments: Pink smaller scars on face , no new lesions   Neurological:      Mental Status: He is alert.   Psychiatric:         Mood and Affect: Mood normal.         Behavior: Behavior normal.         No results found for this or any previous visit (from the past 168 hour(s)).    ASSESSMENT & PLAN:    Encounter for long-term (current) use of medications  Anxiety  - fluoxetine (PROzac) 10 MG tablet; Take 1 tablet by mouth daily.  Dispense: 30 tablet; Refill: 0  He is feeling much better, after 1 month on lowest dose and also starting CBT  PHQ9 and ARIANNA improving  PHQ9 initially 13/17 now 7  ARIANNA initially 15 now 10  He is more active, stable weight, thinking and planning for his future.  No problems with medication, never had SI, he would like to stay on this dose and keep  working with therapist.  He will return in 1 month for follow up  He will call anytime with concerns  Seasonal allergic rhinitis due to pollen  Add Flonase to antihistamines, observe for worsening, purulent discharge or other concerns.        Corrina June MD

## 2025-01-16 ENCOUNTER — OFFICE VISIT (OUTPATIENT)
Dept: FAMILY MEDICINE CLINIC | Facility: CLINIC | Age: 85
End: 2025-01-16
Payer: MEDICARE

## 2025-01-16 VITALS
RESPIRATION RATE: 16 BRPM | HEIGHT: 64 IN | OXYGEN SATURATION: 96 % | WEIGHT: 145 LBS | BODY MASS INDEX: 24.75 KG/M2 | HEART RATE: 76 BPM | SYSTOLIC BLOOD PRESSURE: 138 MMHG | DIASTOLIC BLOOD PRESSURE: 70 MMHG

## 2025-01-16 DIAGNOSIS — I10 ESSENTIAL HYPERTENSION: Primary | ICD-10-CM

## 2025-01-16 DIAGNOSIS — K21.00 GASTROESOPHAGEAL REFLUX DISEASE WITH ESOPHAGITIS, UNSPECIFIED WHETHER HEMORRHAGE: ICD-10-CM

## 2025-01-16 DIAGNOSIS — K51.90 ULCERATIVE COLITIS WITHOUT COMPLICATIONS, UNSPECIFIED LOCATION (HCC): ICD-10-CM

## 2025-01-16 DIAGNOSIS — Z76.0 ENCOUNTER FOR MEDICATION REFILL: ICD-10-CM

## 2025-01-16 DIAGNOSIS — E03.9 ACQUIRED HYPOTHYROIDISM: ICD-10-CM

## 2025-01-16 DIAGNOSIS — Z23 ENCOUNTER FOR IMMUNIZATION: ICD-10-CM

## 2025-01-16 DIAGNOSIS — E78.00 HYPERCHOLESTEROLEMIA: ICD-10-CM

## 2025-01-16 DIAGNOSIS — D53.9 MACROCYTIC ANEMIA: ICD-10-CM

## 2025-01-16 PROCEDURE — 90677 PCV20 VACCINE IM: CPT | Performed by: FAMILY MEDICINE

## 2025-01-16 PROCEDURE — 99214 OFFICE O/P EST MOD 30 MIN: CPT | Performed by: FAMILY MEDICINE

## 2025-01-16 PROCEDURE — G0009 ADMIN PNEUMOCOCCAL VACCINE: HCPCS | Performed by: FAMILY MEDICINE

## 2025-01-16 RX ORDER — ALBUTEROL SULFATE 90 UG/1
2 INHALANT RESPIRATORY (INHALATION) EVERY 4 HOURS PRN
Qty: 18 G | Refills: 3 | Status: SHIPPED | OUTPATIENT
Start: 2025-01-16

## 2025-01-16 RX ORDER — VALSARTAN 80 MG/1
80 TABLET ORAL DAILY
Qty: 90 TABLET | Refills: 2 | Status: SHIPPED | OUTPATIENT
Start: 2025-01-16

## 2025-01-16 RX ORDER — VALSARTAN 80 MG/1
80 TABLET ORAL DAILY
COMMUNITY
End: 2025-01-16 | Stop reason: SDUPTHER

## 2025-01-16 NOTE — ASSESSMENT & PLAN NOTE
Blood pressure higher today. Patient to monitor at home and call if remains high. Continue valsartan 80 mg daily and amlodipine 10 mg daily. Pt advised to continue low Na diet and to exercise on a regular basis.

## 2025-01-16 NOTE — TELEPHONE ENCOUNTER
Adriana Duran is a 79 y.o. female on day 0 of admission presenting with Delirium.       09/22/24 1542   Physical Activity   On average, how many days per week do you engage in moderate to strenuous exercise (like a brisk walk)? 0 days   On average, how many minutes do you engage in exercise at this level? 0 min   Financial Resource Strain   How hard is it for you to pay for the very basics like food, housing, medical care, and heating? Not hard   Housing Stability   In the last 12 months, was there a time when you were not able to pay the mortgage or rent on time? N   In the past 12 months, how many times have you moved where you were living? 0   At any time in the past 12 months, were you homeless or living in a shelter (including now)? N   Transportation Needs   In the past 12 months, has lack of transportation kept you from medical appointments or from getting medications? no   In the past 12 months, has lack of transportation kept you from meetings, work, or from getting things needed for daily living? No   Food Insecurity   Within the past 12 months, you worried that your food would run out before you got the money to buy more. Never true   Within the past 12 months, the food you bought just didn't last and you didn't have money to get more. Never true   Stress   Do you feel stress - tense, restless, nervous, or anxious, or unable to sleep at night because your mind is troubled all the time - these days? Not at all   Social Connections   In a typical week, how many times do you talk on the phone with family, friends, or neighbors? More than 3   How often do you get together with friends or relatives? Once   How often do you attend Ca[employer]ism or Episcopalian services? Never   Do you belong to any clubs or organizations such as Ca[employer] groups, unions, fraternal or athletic groups, or school groups? No   How often do you attend meetings of the clubs or organizations you belong to? Never   Are you , ,  Needs to start taking this again per Dr. Regan when patient saw him today. Needs new script.   , , never , or living with a partner?    Intimate Partner Violence   Within the last year, have you been afraid of your partner or ex-partner? No   Within the last year, have you been humiliated or emotionally abused in other ways by your partner or ex-partner? No   Within the last year, have you been kicked, hit, slapped, or otherwise physically hurt by your partner or ex-partner? No   Within the last year, have you been raped or forced to have any kind of sexual activity by your partner or ex-partner? No   Alcohol Use   Q1: How often do you have a drink containing alcohol? Never   Q2: How many drinks containing alcohol do you have on a typical day when you are drinking? None   Q3: How often do you have six or more drinks on one occasion? Never   Utilities   In the past 12 months has the electric, gas, oil, or water company threatened to shut off services in your home? No   Health Literacy   How often do you need to have someone help you when you read instructions, pamphlets, or other written material from your doctor or pharmacy? Rarely         Janet Cruz RN

## 2025-01-16 NOTE — PROGRESS NOTES
Name: Ning Rodrigues      : 1940      MRN: [id number]  Encounter Provider: Yohan Regan MD  Encounter Date: 2025   Encounter department: [facility] MEDICINE  :  Assessment & Plan  Essential hypertension  Blood pressure higher today. Patient to monitor at home and call if remains high. Continue valsartan 80 mg daily and amlodipine 10 mg daily. Pt advised to continue low Na diet and to exercise on a regular basis.        Hypercholesterolemia  LDL 63 and LFTs normal in 2024. Continue atorvastatin 20 mg qhs. Advised pt to follow a low cholesterol diet and to exercise on a regular basis.        Acquired hypothyroidism  TSH 3.625 and Free T4 0.92 in 2024. Continue levothyroxine 75 mcg qd.         Ulcerative colitis without complications, unspecified location (HCC)  Stable. No recent bleeding. Continue medications and management per Gastroenterology. Last appointment was in 2024.       Gastroesophageal reflux disease with esophagitis, unspecified whether hemorrhage  No recent symptoms. Continue pantoprazole 40 mg daily and GERD diet. Patient last saw Gastroenterology in 2024.        Macrocytic anemia  Hgb stable at 8.9 in 2025. Continue management per Hematology.        Encounter for immunization    Orders:  •  Pneumococcal Conjugate Vaccine 20-valent (Pcv20)    Encounter for medication refill    Orders:  •  albuterol (Ventolin HFA) 90 mcg/act inhaler; Inhale 2 puffs every 4 (four) hours as needed for wheezing or shortness of breath          Depression Screening and Follow-up Plan: Patient was screened for depression during today's encounter. They screened negative with a PHQ-2 score of 0.      History of Present Illness     Patient here for follow-up Hypertension, Hyperlipidemia, Hypothyroidism, UC, Anemia, GERD. Patient doing well. No chest pain or shortness of breath. No headaches. No recent abdominal issues. Blood pressure has been good at  "home. No recent GERD. No new symptoms.       Review of Systems   Constitutional:  Negative for fatigue and unexpected weight change.   Respiratory:  Positive for cough. Negative for chest tightness and shortness of breath.    Cardiovascular:  Negative for chest pain and palpitations.   Gastrointestinal:  Negative for abdominal pain, constipation, diarrhea, nausea and vomiting.   Genitourinary:  Negative for difficulty urinating.   Musculoskeletal:  Negative for arthralgias.   Skin:  Negative for rash.   Neurological:  Negative for dizziness and headaches.       Objective   /70 (BP Location: Left arm, Patient Position: Sitting, Cuff Size: Standard)   Pulse 76   Resp 16   Ht 5' 4\" (1.626 m)   Wt 65.8 kg (145 lb)   SpO2 96%   BMI 24.89 kg/m²      Physical Exam  Vitals and nursing note reviewed.   Constitutional:       General: She is not in acute distress.     Appearance: Normal appearance. She is well-developed.      Comments: Walks with cane   Neck:      Vascular: No carotid bruit.   Cardiovascular:      Rate and Rhythm: Normal rate and regular rhythm.      Heart sounds: No murmur heard.  Pulmonary:      Effort: Pulmonary effort is normal. No respiratory distress.      Breath sounds: Normal breath sounds.   Musculoskeletal:      Cervical back: Normal range of motion and neck supple.      Right lower leg: No edema.      Left lower leg: No edema.   Lymphadenopathy:      Cervical: No cervical adenopathy.   Neurological:      Mental Status: She is alert.   Psychiatric:         Mood and Affect: Mood normal.         Behavior: Behavior normal.         Thought Content: Thought content normal.         Judgment: Judgment normal.         "

## 2025-01-16 NOTE — ASSESSMENT & PLAN NOTE
No recent symptoms. Continue pantoprazole 40 mg daily and GERD diet. Patient last saw Gastroenterology in November 2024.

## 2025-01-16 NOTE — ASSESSMENT & PLAN NOTE
Stable. No recent bleeding. Continue medications and management per Gastroenterology. Last appointment was in November 2024.

## 2025-01-16 NOTE — ASSESSMENT & PLAN NOTE
LDL 63 and LFTs normal in September 2024. Continue atorvastatin 20 mg qhs. Advised pt to follow a low cholesterol diet and to exercise on a regular basis.

## 2025-01-17 DIAGNOSIS — K21.00 GASTROESOPHAGEAL REFLUX DISEASE WITH ESOPHAGITIS WITHOUT HEMORRHAGE: ICD-10-CM

## 2025-01-17 RX ORDER — PANTOPRAZOLE SODIUM 40 MG/1
40 TABLET, DELAYED RELEASE ORAL DAILY
Qty: 90 TABLET | Refills: 1 | Status: SHIPPED | OUTPATIENT
Start: 2025-01-17

## 2025-01-31 DIAGNOSIS — I10 ESSENTIAL HYPERTENSION: ICD-10-CM

## 2025-01-31 RX ORDER — AMLODIPINE BESYLATE 10 MG/1
10 TABLET ORAL DAILY
Qty: 90 TABLET | Refills: 1 | Status: SHIPPED | OUTPATIENT
Start: 2025-01-31

## 2025-02-10 ENCOUNTER — HOSPITAL ENCOUNTER (OUTPATIENT)
Dept: INFUSION CENTER | Facility: CLINIC | Age: 85
End: 2025-02-10

## 2025-04-21 ENCOUNTER — APPOINTMENT (OUTPATIENT)
Dept: LAB | Facility: CLINIC | Age: 85
End: 2025-04-21
Attending: PHYSICIAN ASSISTANT
Payer: MEDICARE

## 2025-04-21 DIAGNOSIS — K51.90 ULCERATIVE COLITIS WITHOUT COMPLICATIONS, UNSPECIFIED LOCATION (HCC): ICD-10-CM

## 2025-04-21 DIAGNOSIS — E61.1 HYPOFERREMIA: ICD-10-CM

## 2025-04-21 DIAGNOSIS — D53.9 MACROCYTIC ANEMIA: ICD-10-CM

## 2025-04-21 DIAGNOSIS — E53.8 B12 DEFICIENCY: ICD-10-CM

## 2025-04-21 DIAGNOSIS — R77.8 LOW SERUM HAPTOGLOBIN: ICD-10-CM

## 2025-04-21 LAB
BASOPHILS # BLD AUTO: 0.06 THOUSANDS/ÂΜL (ref 0–0.1)
BASOPHILS NFR BLD AUTO: 2 % (ref 0–1)
DAT POLY-SP REAG RBC QL: NEGATIVE
EOSINOPHIL # BLD AUTO: 0.32 THOUSAND/ÂΜL (ref 0–0.61)
EOSINOPHIL NFR BLD AUTO: 8 % (ref 0–6)
ERYTHROCYTE [DISTWIDTH] IN BLOOD BY AUTOMATED COUNT: 13.2 % (ref 11.6–15.1)
FERRITIN SERPL-MCNC: 393 NG/ML (ref 30–307)
HCT VFR BLD AUTO: 26 % (ref 34.8–46.1)
HGB BLD-MCNC: 8.4 G/DL (ref 11.5–15.4)
IGA SERPL-MCNC: 328 MG/DL (ref 66–433)
IGG SERPL-MCNC: 1199 MG/DL (ref 635–1741)
IGM SERPL-MCNC: 116 MG/DL (ref 45–281)
IMM GRANULOCYTES # BLD AUTO: 0.01 THOUSAND/UL (ref 0–0.2)
IMM GRANULOCYTES NFR BLD AUTO: 0 % (ref 0–2)
IRON SATN MFR SERPL: 41 % (ref 15–50)
IRON SERPL-MCNC: 93 UG/DL (ref 50–212)
LDH SERPL-CCNC: 205 U/L (ref 140–271)
LYMPHOCYTES # BLD AUTO: 1.72 THOUSANDS/ÂΜL (ref 0.6–4.47)
LYMPHOCYTES NFR BLD AUTO: 44 % (ref 14–44)
MCH RBC QN AUTO: 35.6 PG (ref 26.8–34.3)
MCHC RBC AUTO-ENTMCNC: 32.3 G/DL (ref 31.4–37.4)
MCV RBC AUTO: 110 FL (ref 82–98)
MONOCYTES # BLD AUTO: 0.55 THOUSAND/ÂΜL (ref 0.17–1.22)
MONOCYTES NFR BLD AUTO: 14 % (ref 4–12)
NEUTROPHILS # BLD AUTO: 1.27 THOUSANDS/ÂΜL (ref 1.85–7.62)
NEUTS SEG NFR BLD AUTO: 32 % (ref 43–75)
NRBC BLD AUTO-RTO: 1 /100 WBCS
PLATELET # BLD AUTO: 284 THOUSANDS/UL (ref 149–390)
PMV BLD AUTO: 10.2 FL (ref 8.9–12.7)
RBC # BLD AUTO: 2.36 MILLION/UL (ref 3.81–5.12)
RETICS # AUTO: ABNORMAL 10*3/UL (ref 14097–95744)
RETICS # CALC: 2.38 % (ref 0.37–1.87)
TIBC SERPL-MCNC: 226.8 UG/DL (ref 250–450)
TRANSFERRIN SERPL-MCNC: 162 MG/DL (ref 203–362)
UIBC SERPL-MCNC: 134 UG/DL (ref 155–355)
VIT B12 SERPL-MCNC: 1363 PG/ML (ref 180–914)
WBC # BLD AUTO: 3.93 THOUSAND/UL (ref 4.31–10.16)

## 2025-04-21 PROCEDURE — 83540 ASSAY OF IRON: CPT

## 2025-04-21 PROCEDURE — 86880 COOMBS TEST DIRECT: CPT

## 2025-04-21 PROCEDURE — 88184 FLOWCYTOMETRY/ TC 1 MARKER: CPT

## 2025-04-21 PROCEDURE — 83010 ASSAY OF HAPTOGLOBIN QUANT: CPT

## 2025-04-21 PROCEDURE — 85025 COMPLETE CBC W/AUTO DIFF WBC: CPT

## 2025-04-21 PROCEDURE — 88185 FLOWCYTOMETRY/TC ADD-ON: CPT

## 2025-04-21 PROCEDURE — 82728 ASSAY OF FERRITIN: CPT

## 2025-04-21 PROCEDURE — 82607 VITAMIN B-12: CPT

## 2025-04-21 PROCEDURE — 82784 ASSAY IGA/IGD/IGG/IGM EACH: CPT

## 2025-04-21 PROCEDURE — 83550 IRON BINDING TEST: CPT

## 2025-04-22 LAB — HAPTOGLOB SERPL-MCNC: 40 MG/DL (ref 41–333)

## 2025-04-25 LAB — SCAN RESULT: NORMAL

## 2025-05-07 ENCOUNTER — TELEPHONE (OUTPATIENT)
Dept: HEMATOLOGY ONCOLOGY | Facility: CLINIC | Age: 85
End: 2025-05-07

## 2025-05-07 ENCOUNTER — OFFICE VISIT (OUTPATIENT)
Dept: HEMATOLOGY ONCOLOGY | Facility: CLINIC | Age: 85
End: 2025-05-07
Payer: MEDICARE

## 2025-05-07 VITALS
HEIGHT: 64 IN | SYSTOLIC BLOOD PRESSURE: 141 MMHG | WEIGHT: 144 LBS | TEMPERATURE: 97.9 F | OXYGEN SATURATION: 97 % | HEART RATE: 71 BPM | DIASTOLIC BLOOD PRESSURE: 85 MMHG | RESPIRATION RATE: 17 BRPM | BODY MASS INDEX: 24.59 KG/M2

## 2025-05-07 DIAGNOSIS — Z95.2 S/P TAVR (TRANSCATHETER AORTIC VALVE REPLACEMENT): Primary | ICD-10-CM

## 2025-05-07 DIAGNOSIS — N18.4 ANEMIA DUE TO STAGE 4 CHRONIC KIDNEY DISEASE  (HCC): ICD-10-CM

## 2025-05-07 DIAGNOSIS — D63.1 ANEMIA DUE TO STAGE 4 CHRONIC KIDNEY DISEASE  (HCC): ICD-10-CM

## 2025-05-07 PROBLEM — N18.9 ANEMIA DUE TO CHRONIC KIDNEY DISEASE: Status: ACTIVE | Noted: 2025-05-07

## 2025-05-07 PROCEDURE — 99214 OFFICE O/P EST MOD 30 MIN: CPT | Performed by: INTERNAL MEDICINE

## 2025-05-07 NOTE — PROGRESS NOTES
Name: Ning Rodrigues      : 1940      MRN: [id number]  Encounter Provider: Kamaljit Govea MD  Encounter Date: 2025   Encounter department: [facility]  :  Assessment & Plan  S/P TAVR (transcatheter aortic valve replacement)  She received IV iron and vitamin B12 no improvement       Anemia due to stage 4 chronic kidney disease  (HCC)  Creatinine is increasing to 1.1 from 0.7 her baseline, most likely consistent with anemia secondary to grade 4 chronic kidney disease, she has normal iron studies, TSH, vitamin B12, folate, no evidence of hemolysis with normal Leigh test    Will arrange for Aranesp 100 mcg every month to keep hemoglobin between 9.9-10         Assessment & Plan        No follow-ups on file.    History of Present Illness   Chief Complaint   Patient presents with    Follow-up     History of Present Illness  84-year-old female seen initially 2023 by ALINA Cheng re anemia.     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     23 hemoglobin 9.1, , normal white blood cell count, differential, platelets normal creatinine, total protein albumin, calcium, haptoglobin 42 (LLN 41), no schistocytes on hemolysis smear, normal LDH, folate, reticulocyte 2.6%, B12 576, MMA elevated at 435, ferritin 35  2023 B12 1000mcg IM x 4 ordered.     23 hgb 8.6     10/5/23 hgb 8.3; ferritin 19; B12 1200,      2023 TAVR     23 hgb 7.7, , ferritin 50; iron saturation 25%     23 admitted secondary to dark stool and weakness.  Admitting  "hemoglobin 7.5, , , otherwise normal.    EGD on 12/2023 normal duodenum, stomach, small hiatal hernia    She had bronchial stasis    She received IV iron intermittently as well as vitamin B12    No improvement with hemoglobin of 8.6 on 4/2025       Review of Systems   Constitutional:  Positive for fatigue.   Respiratory:  Positive for shortness of breath.            Objective   /85 (Patient Position: Sitting, Cuff Size: Adult)   Pulse 71   Temp 97.9 °F (36.6 °C) (Temporal)   Resp 17   Ht 5' 4\" (1.626 m)   Wt 65.3 kg (144 lb)   SpO2 97%   BMI 24.72 kg/m²     Physical Exam  Vitals reviewed.   Constitutional:       General: She is not in acute distress.     Appearance: She is well-developed. She is not diaphoretic.   HENT:      Head: Normocephalic and atraumatic.   Eyes:      Conjunctiva/sclera: Conjunctivae normal.   Neck:      Trachea: No tracheal deviation.   Cardiovascular:      Rate and Rhythm: Normal rate and regular rhythm.      Heart sounds: Murmur heard.      No friction rub. No gallop.   Pulmonary:      Effort: Pulmonary effort is normal. No respiratory distress.      Breath sounds: Rhonchi present. No wheezing or rales.   Chest:      Chest wall: No tenderness.   Abdominal:      General: There is no distension.      Palpations: Abdomen is soft.      Tenderness: There is no abdominal tenderness.   Musculoskeletal:      Cervical back: Normal range of motion and neck supple.      Right lower leg: Edema present.      Left lower leg: Edema present.   Lymphadenopathy:      Cervical: No cervical adenopathy.   Skin:     General: Skin is warm and dry.      Coloration: Skin is not pale.      Findings: No erythema.   Neurological:      General: No focal deficit present.      Mental Status: She is alert.   Psychiatric:         Behavior: Behavior normal.         Thought Content: Thought content normal.       Physical Exam      Results    Labs: I have reviewed the following labs:  Results for " orders placed or performed in visit on 04/21/25   Leukemia/Lymphoma flow cytometry   Result Value Ref Range    Scan Result SEE WRITTEN REPORT    CBC and differential   Result Value Ref Range    WBC 3.93 (L) 4.31 - 10.16 Thousand/uL    RBC 2.36 (L) 3.81 - 5.12 Million/uL    Hemoglobin 8.4 (L) 11.5 - 15.4 g/dL    Hematocrit 26.0 (L) 34.8 - 46.1 %     (H) 82 - 98 fL    MCH 35.6 (H) 26.8 - 34.3 pg    MCHC 32.3 31.4 - 37.4 g/dL    RDW 13.2 11.6 - 15.1 %    MPV 10.2 8.9 - 12.7 fL    Platelets 284 149 - 390 Thousands/uL    nRBC 1 /100 WBCs    Segmented % 32 (L) 43 - 75 %    Immature Grans % 0 0 - 2 %    Lymphocytes % 44 14 - 44 %    Monocytes % 14 (H) 4 - 12 %    Eosinophils Relative 8 (H) 0 - 6 %    Basophils Relative 2 (H) 0 - 1 %    Absolute Neutrophils 1.27 (L) 1.85 - 7.62 Thousands/µL    Absolute Immature Grans 0.01 0.00 - 0.20 Thousand/uL    Absolute Lymphocytes 1.72 0.60 - 4.47 Thousands/µL    Absolute Monocytes 0.55 0.17 - 1.22 Thousand/µL    Eosinophils Absolute 0.32 0.00 - 0.61 Thousand/µL    Basophils Absolute 0.06 0.00 - 0.10 Thousands/µL   Vitamin B12   Result Value Ref Range    Vitamin B-12 1,363 (H) 180 - 914 pg/mL   Result Value Ref Range    Haptoglobin 40 (L) 41 - 333 mg/dL   IgG, IgA, IgM   Result Value Ref Range     66 - 433 mg/dL    IGG 1,199 635 - 1,741 mg/dL     45 - 281 mg/dL   TIBC Panel (incl. Iron, TIBC, % Iron Saturation)   Result Value Ref Range    Iron Saturation 41 15 - 50 %    TIBC 226.8 (L) 250 - 450 ug/dL    Iron 93 50 - 212 ug/dL    Transferrin 162 (L) 203 - 362 mg/dL    UIBC 134 (L) 155 - 355 ug/dL   Result Value Ref Range    Ferritin 393 (H) 30 - 307 ng/mL   Direct antiglobulin test   Result Value Ref Range    DIRECT FAUSTO Negative      *Note: Due to a large number of results and/or encounters for the requested time period, some results have not been displayed. A complete set of results can be found in Results Review.

## 2025-05-07 NOTE — ASSESSMENT & PLAN NOTE
Creatinine is increasing to 1.1 from 0.7 her baseline, most likely consistent with anemia secondary to grade 4 chronic kidney disease, she has normal iron studies, TSH, vitamin B12, folate, no evidence of hemolysis with normal Leigh test    Will arrange for Aranesp 100 mcg every month to keep hemoglobin between 9.9-10

## 2025-05-19 ENCOUNTER — OFFICE VISIT (OUTPATIENT)
Dept: FAMILY MEDICINE CLINIC | Facility: CLINIC | Age: 85
End: 2025-05-19
Payer: MEDICARE

## 2025-05-19 VITALS
WEIGHT: 136 LBS | DIASTOLIC BLOOD PRESSURE: 62 MMHG | BODY MASS INDEX: 23.22 KG/M2 | OXYGEN SATURATION: 95 % | RESPIRATION RATE: 16 BRPM | SYSTOLIC BLOOD PRESSURE: 130 MMHG | HEART RATE: 72 BPM | HEIGHT: 64 IN

## 2025-05-19 DIAGNOSIS — E03.9 ACQUIRED HYPOTHYROIDISM: ICD-10-CM

## 2025-05-19 DIAGNOSIS — K51.90 ULCERATIVE COLITIS WITHOUT COMPLICATIONS, UNSPECIFIED LOCATION (HCC): ICD-10-CM

## 2025-05-19 DIAGNOSIS — K21.00 GASTROESOPHAGEAL REFLUX DISEASE WITH ESOPHAGITIS, UNSPECIFIED WHETHER HEMORRHAGE: ICD-10-CM

## 2025-05-19 DIAGNOSIS — D53.9 MACROCYTIC ANEMIA: ICD-10-CM

## 2025-05-19 DIAGNOSIS — Z00.00 MEDICARE ANNUAL WELLNESS VISIT, SUBSEQUENT: ICD-10-CM

## 2025-05-19 DIAGNOSIS — I10 ESSENTIAL HYPERTENSION: Primary | ICD-10-CM

## 2025-05-19 DIAGNOSIS — E78.00 HYPERCHOLESTEROLEMIA: ICD-10-CM

## 2025-05-19 PROBLEM — J38.7 IRRITABLE LARYNX: Status: RESOLVED | Noted: 2021-07-26 | Resolved: 2025-05-19

## 2025-05-19 PROCEDURE — G0439 PPPS, SUBSEQ VISIT: HCPCS | Performed by: FAMILY MEDICINE

## 2025-05-19 PROCEDURE — 99214 OFFICE O/P EST MOD 30 MIN: CPT | Performed by: FAMILY MEDICINE

## 2025-05-19 PROCEDURE — G2211 COMPLEX E/M VISIT ADD ON: HCPCS | Performed by: FAMILY MEDICINE

## 2025-05-19 NOTE — PATIENT INSTRUCTIONS
Medicare Preventive Visit Patient Instructions  Thank you for completing your Welcome to Medicare Visit or Medicare Annual Wellness Visit today. Your next wellness visit will be due in one year (5/20/2026).  The screening/preventive services that you may require over the next 5-10 years are detailed below. Some tests may not apply to you based off risk factors and/or age. Screening tests ordered at today's visit but not completed yet may show as past due. Also, please note that scanned in results may not display below.  Preventive Screenings:  Service Recommendations Previous Testing/Comments   Colorectal Cancer Screening  * Colonoscopy    * Fecal Occult Blood Test (FOBT)/Fecal Immunochemical Test (FIT)  * Fecal DNA/Cologuard Test  * Flexible Sigmoidoscopy Age: 45-75 years old   Colonoscopy: every 10 years (may be performed more frequently if at higher risk)  OR  FOBT/FIT: every 1 year  OR  Cologuard: every 3 years  OR  Sigmoidoscopy: every 5 years  Screening may be recommended earlier than age 45 if at higher risk for colorectal cancer. Also, an individualized decision between you and your healthcare provider will decide whether screening between the ages of 76-85 would be appropriate. Colonoscopy: 07/28/2022  FOBT/FIT: Not on file  Cologuard: Not on file  Sigmoidoscopy: Not on file    Screening Not Indicated     Breast Cancer Screening Age: 40+ years old  Frequency: every 1-2 years  Not required if history of left and right mastectomy Mammogram: 06/25/2024    Screening Current   Cervical Cancer Screening Between the ages of 21-29, pap smear recommended once every 3 years.   Between the ages of 30-65, can perform pap smear with HPV co-testing every 5 years.   Recommendations may differ for women with a history of total hysterectomy, cervical cancer, or abnormal pap smears in past. Pap Smear: 09/25/2024    Screening Not Indicated   Hepatitis C Screening Once for adults born between 1945 and 1965  More frequently in  patients at high risk for Hepatitis C Hep C Antibody: Not on file        Diabetes Screening 1-2 times per year if you're at risk for diabetes or have pre-diabetes Fasting glucose: 89 mg/dL (9/13/2024)  A1C: No results in last 5 years (No results in last 5 years)  Screening Current   Cholesterol Screening Once every 5 years if you don't have a lipid disorder. May order more often based on risk factors. Lipid panel: 09/13/2024    Screening Not Indicated  History Lipid Disorder     Other Preventive Screenings Covered by Medicare:  Abdominal Aortic Aneurysm (AAA) Screening: covered once if your at risk. You're considered to be at risk if you have a family history of AAA.  Lung Cancer Screening: covers low dose CT scan once per year if you meet all of the following conditions: (1) Age 55-77; (2) No signs or symptoms of lung cancer; (3) Current smoker or have quit smoking within the last 15 years; (4) You have a tobacco smoking history of at least 20 pack years (packs per day multiplied by number of years you smoked); (5) You get a written order from a healthcare provider.  Glaucoma Screening: covered annually if you're considered high risk: (1) You have diabetes OR (2) Family history of glaucoma OR (3)  aged 50 and older OR (4)  American aged 65 and older  Osteoporosis Screening: covered every 2 years if you meet one of the following conditions: (1) You're estrogen deficient and at risk for osteoporosis based off medical history and other findings; (2) Have a vertebral abnormality; (3) On glucocorticoid therapy for more than 3 months; (4) Have primary hyperparathyroidism; (5) On osteoporosis medications and need to assess response to drug therapy.   Last bone density test (DXA Scan): 04/05/2022.  HIV Screening: covered annually if you're between the age of 15-65. Also covered annually if you are younger than 15 and older than 65 with risk factors for HIV infection. For pregnant patients, it is  covered up to 3 times per pregnancy.    Immunizations:  Immunization Recommendations   Influenza Vaccine Annual influenza vaccination during flu season is recommended for all persons aged >= 6 months who do not have contraindications   Pneumococcal Vaccine   * Pneumococcal conjugate vaccine = PCV13 (Prevnar 13), PCV15 (Vaxneuvance), PCV20 (Prevnar 20)  * Pneumococcal polysaccharide vaccine = PPSV23 (Pneumovax) Adults 19-63 yo with certain risk factors or if 65+ yo  If never received any pneumonia vaccine: recommend Prevnar 20 (PCV20)  Give PCV20 if previously received 1 dose of PCV13 or PPSV23   Hepatitis B Vaccine 3 dose series if at intermediate or high risk (ex: diabetes, end stage renal disease, liver disease)   Respiratory syncytial virus (RSV) Vaccine - COVERED BY MEDICARE PART D  * RSVPreF3 (Arexvy) CDC recommends that adults 60 years of age and older may receive a single dose of RSV vaccine using shared clinical decision-making (SCDM)   Tetanus (Td) Vaccine - COST NOT COVERED BY MEDICARE PART B Following completion of primary series, a booster dose should be given every 10 years to maintain immunity against tetanus. Td may also be given as tetanus wound prophylaxis.   Tdap Vaccine - COST NOT COVERED BY MEDICARE PART B Recommended at least once for all adults. For pregnant patients, recommended with each pregnancy.   Shingles Vaccine (Shingrix) - COST NOT COVERED BY MEDICARE PART B  2 shot series recommended in those 19 years and older who have or will have weakened immune systems or those 50 years and older     Health Maintenance Due:  There are no preventive care reminders to display for this patient.  Immunizations Due:      Topic Date Due   • COVID-19 Vaccine (4 - 2024-25 season) 09/01/2024     Advance Directives   What are advance directives?  Advance directives are legal documents that state your wishes and plans for medical care. These plans are made ahead of time in case you lose your ability to make  decisions for yourself. Advance directives can apply to any medical decision, such as the treatments you want, and if you want to donate organs.   What are the types of advance directives?  There are many types of advance directives, and each state has rules about how to use them. You may choose a combination of any of the following:  Living will:  This is a written record of the treatment you want. You can also choose which treatments you do not want, which to limit, and which to stop at a certain time. This includes surgery, medicine, IV fluid, and tube feedings.   Durable power of  for healthcare (DPAHC):  This is a written record that states who you want to make healthcare choices for you when you are unable to make them for yourself. This person, called a proxy, is usually a family member or a friend. You may choose more than 1 proxy.  Do not resuscitate (DNR) order:  A DNR order is used in case your heart stops beating or you stop breathing. It is a request not to have certain forms of treatment, such as CPR. A DNR order may be included in other types of advance directives.  Medical directive:  This covers the care that you want if you are in a coma, near death, or unable to make decisions for yourself. You can list the treatments you want for each condition. Treatment may include pain medicine, surgery, blood transfusions, dialysis, IV or tube feedings, and a ventilator (breathing machine).  Values history:  This document has questions about your views, beliefs, and how you feel and think about life. This information can help others choose the care that you would choose.  Why are advance directives important?  An advance directive helps you control your care. Although spoken wishes may be used, it is better to have your wishes written down. Spoken wishes can be misunderstood, or not followed. Treatments may be given even if you do not want them. An advance directive may make it easier for your family  to make difficult choices about your care.       © Copyright [employer] 2018 Information is for End User's use only and may not be sold, redistributed or otherwise used for commercial purposes. All illustrations and images included in CareNotes® are the copyrighted property of A.D.A[employer]M., Inc. or [employer]

## 2025-05-19 NOTE — ASSESSMENT & PLAN NOTE
Wellness exam done. Had flu shot, Shingrix series, COVID vaccines, and Prevnar 20. Recommend Tdap. Labs are up to date. For mammogram in July 2025. Last colonoscopy was in July 2022 and no further ones needed per GI. Mood good. No recent falls. Has living will.

## 2025-05-19 NOTE — ASSESSMENT & PLAN NOTE
Recheck lipids. Continue atorvastatin 20 mg qhs. Advised pt to follow a low cholesterol diet and to exercise on a regular basis.  Orders:  •  Lipid panel; Future

## 2025-05-19 NOTE — ASSESSMENT & PLAN NOTE
Recheck TSH and Free T4. Continue levothyroxine 75 mcg qd.  Orders:  •  TSH, 3rd generation; Future  •  T4, free; Future

## 2025-05-19 NOTE — PROGRESS NOTES
Name: Ning Rodrigues      : 1940      MRN: [id number]  Encounter Provider: Yohan Regan MD  Encounter Date: 2025   Encounter department: [facility] MEDICINE  :  Assessment & Plan  Medicare annual wellness visit, subsequent  Wellness exam done. Had flu shot, Shingrix series, COVID vaccines, and Prevnar 20. Recommend Tdap. Labs are up to date. For mammogram in 2025. Last colonoscopy was in 2022 and no further ones needed per GI. Mood good. No recent falls. Has living will.        Essential hypertension  Blood pressure ok. Continue valsartan 80 mg daily and amlodipine 10 mg daily. Pt advised to continue low Na diet and to exercise on a regular basis.        Hypercholesterolemia  Recheck lipids. Continue atorvastatin 20 mg qhs. Advised pt to follow a low cholesterol diet and to exercise on a regular basis.  Orders:  •  Lipid panel; Future    Acquired hypothyroidism  Recheck TSH and Free T4. Continue levothyroxine 75 mcg qd.  Orders:  •  TSH, 3rd generation; Future  •  T4, free; Future    Ulcerative colitis without complications, unspecified location (HCC)  Stable. No recent bleeding. Continue medications and management per Gastroenterology. Next appointment is in 2025.       Gastroesophageal reflux disease with esophagitis, unspecified whether hemorrhage  Stable. Continue pantoprazole 40 mg daily and GERD diet.       Macrocytic anemia  Hgb stable at 8.4 in 2025. Continue management per Hematology.          Depression Screening and Follow-up Plan: Patient was screened for depression during today's encounter. They screened negative with a PHQ-2 score of 0.        Preventive health issues were discussed with patient, and age appropriate screening tests were ordered as noted in patient's After Visit Summary. Personalized health advice and appropriate referrals for health education or preventive services given if needed, as noted in patient's After Visit  Summary.    History of Present Illness     Patient here for wellness exam and follow-up Hypertension, Hyperlipidemia, Hypothyroidism, UC, GERD, Anemia. Patient doing ok. No chest pain or shortness of breath. No headaches. No diarrhea or rectal bleeding. Still has cough. Blood pressure has been good. Follows low Na diet.        Patient Care Team:  Yohan Regan MD as PCP - General (Family Medicine)  Marcy Frazier PA-C (Hematology and Oncology)  Kamaljit Govea MD as Medical Oncologist (Hematology and Oncology)    Review of Systems   Constitutional:  Negative for fatigue and unexpected weight change.   Respiratory:  Positive for cough. Negative for chest tightness and shortness of breath.    Cardiovascular:  Negative for chest pain and palpitations.   Gastrointestinal:  Negative for abdominal pain, constipation, diarrhea, nausea and vomiting.   Genitourinary:  Negative for difficulty urinating.   Musculoskeletal:  Negative for arthralgias.   Skin:  Negative for rash.   Neurological:  Negative for dizziness and headaches.     Medical History Reviewed by provider this encounter:  Tobacco  Allergies  Meds  Problems  Med Hx  Surg Hx  Fam Hx       Annual Wellness Visit Questionnaire   Ning is here for her Subsequent Wellness visit.     Health Risk Assessment:   Patient rates overall health as good. Patient feels that their physical health rating is same. Patient is very satisfied with their life. Eyesight was rated as same. Hearing was rated as same. Patient feels that their emotional and mental health rating is same. Patients states they are never, rarely angry. Patient states they are sometimes unusually tired/fatigued. Pain experienced in the last 7 days has been none. Patient states that she has experienced no weight loss or gain in last 6 months.     Depression Screening:   PHQ-2 Score: 0      Fall Risk Screening:   In the past year, patient has experienced: no history of falling in past year       Urinary Incontinence Screening:   Patient has not leaked urine accidently in the last six months.     Home Safety:  Patient does not have trouble with stairs inside or outside of their home. Patient has working smoke alarms and has no working carbon monoxide detector. Home safety hazards include: none.     Nutrition:   Current diet is Regular.     Medications:   Patient is currently taking over-the-counter supplements. OTC medications include: see medication list. Patient is able to manage medications.     Activities of Daily Living (ADLs)/Instrumental Activities of Daily Living (IADLs):   Walk and transfer into and out of bed and chair?: Yes  Dress and groom yourself?: Yes    Bathe or shower yourself?: Yes    Feed yourself? Yes  Do your laundry/housekeeping?: No  Manage your money, pay your bills and track your expenses?: Yes  Make your own meals?: No    Do your own shopping?: Yes    Previous Hospitalizations:   Any hospitalizations or ED visits within the last 12 months?: No      Advance Care Planning:   Living will: Yes    Durable POA for healthcare: Yes    Advanced directive: Yes    Advanced directive counseling given: Yes    Five wishes given: No    Patient declined ACP directive: Yes      Cognitive Screening:   Provider or family/friend/caregiver concerned regarding cognition?: No    Preventive Screenings      Cardiovascular Screening:    General: Screening Not Indicated and History Lipid Disorder      Diabetes Screening:     General: Screening Current      Colorectal Cancer Screening:     General: Screening Not Indicated      Breast Cancer Screening:     General: Screening Current      Cervical Cancer Screening:    General: Screening Not Indicated      Osteoporosis Screening:    General: Screening Current      Abdominal Aortic Aneurysm (AAA) Screening:        General: Screening Not Indicated      Lung Cancer Screening:     General: Screening Not Indicated    Screening, Brief Intervention, and Referral to  "Treatment (SBIRT)     Screening  Typical number of drinks in a day: 0  Typical number of drinks in a week: 0  Interpretation: Low risk drinking behavior.    AUDIT-C Screenin) How often did you have a drink containing alcohol in the past year? never  2) How many drinks did you have on a typical day when you were drinking in the past year? 0  3) How often did you have 6 or more drinks on one occasion in the past year? never    AUDIT-C Score: 0  Interpretation: Score 0-2 (female): Negative screen for alcohol misuse    Single Item Drug Screening:  How often have you used an illegal drug (including marijuana) or a prescription medication for non-medical reasons in the past year? never    Single Item Drug Screen Score: 0  Interpretation: Negative screen for possible drug use disorder    Brief Intervention  Alcohol & drug use screenings were reviewed. No concerns regarding substance use disorder identified.     Social Drivers of Health     Financial Resource Strain: Low Risk  (5/3/2023)    Overall Financial Resource Strain (CARDIA)    • Difficulty of Paying Living Expenses: Not very hard   Food Insecurity: No Food Insecurity (2025)    Nursing - Inadequate Food Risk Classification    • Worried About Running Out of Food in the Last Year: Never true    • Ran Out of Food in the Last Year: Never true   Transportation Needs: No Transportation Needs (2025)    PRAPARE - Transportation    • Lack of Transportation (Medical): No    • Lack of Transportation (Non-Medical): No   Housing Stability: Unknown (2025)    Housing Stability Vital Sign    • Unable to Pay for Housing in the Last Year: No    • Homeless in the Last Year: No   Utilities: Not At Risk (2025)    [facility] Utilities    • Threatened with loss of utilities: No     No results found.    Objective   /62 (BP Location: Left arm, Patient Position: Sitting, Cuff Size: Standard)   Pulse 72   Resp 16   Ht 5' 4\" (1.626 m)   Wt 61.7 kg (136 lb)   SpO2 " 95%   BMI 23.34 kg/m²     Physical Exam  Vitals and nursing note reviewed.   Constitutional:       General: She is not in acute distress.     Appearance: Normal appearance. She is well-developed.      Comments: Walks with cane   Neck:      Vascular: No carotid bruit.     Cardiovascular:      Rate and Rhythm: Normal rate and regular rhythm.      Heart sounds: No murmur heard.  Pulmonary:      Effort: Pulmonary effort is normal. No respiratory distress.      Breath sounds: Normal breath sounds.     Musculoskeletal:      Cervical back: Normal range of motion and neck supple.      Right lower leg: No edema.      Left lower leg: No edema.   Lymphadenopathy:      Cervical: No cervical adenopathy.     Neurological:      Mental Status: She is alert.     Psychiatric:         Mood and Affect: Mood normal.         Behavior: Behavior normal.         Thought Content: Thought content normal.         Judgment: Judgment normal.

## 2025-05-19 NOTE — ASSESSMENT & PLAN NOTE
Stable. No recent bleeding. Continue medications and management per Gastroenterology. Next appointment is in June 2025.

## 2025-05-23 DIAGNOSIS — K51.80 OTHER ULCERATIVE COLITIS WITHOUT COMPLICATION (HCC): ICD-10-CM

## 2025-05-23 RX ORDER — SULFASALAZINE 500 MG/1
1000 TABLET ORAL 2 TIMES DAILY
Qty: 360 TABLET | Refills: 2 | Status: SHIPPED | OUTPATIENT
Start: 2025-05-23

## 2025-05-23 NOTE — TELEPHONE ENCOUNTER
Reason for call:   [x] Refill   [] Prior Auth  [] Other:     Office:   [] PCP/Provider -   [x] Specialty/Provider - [place]/ [place]    Medication: sulfaSALAzine (AZULFIDINE) 500 mg tablet     Dose/Frequency: TAKE 2 TABLETS BY MOUTH TWICE A DAY    Quantity: 360    Pharmacy: [facility]/pharmacy #[id number] - [place] - [address] [phone]    Local Pharmacy   Does the patient have enough for 3 days?   [] Yes   [x] No - Send as HP to POD    Mail Away Pharmacy   Does the patient have enough for 10 days?   [] Yes   [] No - Send as HP to POD

## 2025-05-24 ENCOUNTER — RESULTS FOLLOW-UP (OUTPATIENT)
Dept: FAMILY MEDICINE CLINIC | Facility: CLINIC | Age: 85
End: 2025-05-24

## 2025-05-24 ENCOUNTER — APPOINTMENT (OUTPATIENT)
Dept: LAB | Facility: HOSPITAL | Age: 85
End: 2025-05-24
Payer: MEDICARE

## 2025-05-24 DIAGNOSIS — E78.00 HYPERCHOLESTEROLEMIA: ICD-10-CM

## 2025-05-24 DIAGNOSIS — E03.9 ACQUIRED HYPOTHYROIDISM: ICD-10-CM

## 2025-05-24 LAB
CHOLEST SERPL-MCNC: 137 MG/DL (ref ?–200)
HDLC SERPL-MCNC: 55 MG/DL
LDLC SERPL CALC-MCNC: 71 MG/DL (ref 0–100)
NONHDLC SERPL-MCNC: 82 MG/DL
T4 FREE SERPL-MCNC: 0.84 NG/DL (ref 0.61–1.12)
TRIGL SERPL-MCNC: 57 MG/DL (ref ?–150)
TSH SERPL DL<=0.05 MIU/L-ACNC: 4.55 UIU/ML (ref 0.45–4.5)

## 2025-05-24 PROCEDURE — 84443 ASSAY THYROID STIM HORMONE: CPT

## 2025-05-24 PROCEDURE — 80061 LIPID PANEL: CPT

## 2025-05-24 PROCEDURE — 36415 COLL VENOUS BLD VENIPUNCTURE: CPT

## 2025-05-24 PROCEDURE — 84439 ASSAY OF FREE THYROXINE: CPT

## 2025-05-29 NOTE — TELEPHONE ENCOUNTER
Pt called in stating she missed a call and then Chloe disconnected us as I was trying to connect her to the office. Please contact pt and let her know how she should go from 75 mcg to 88mcg.

## 2025-05-29 NOTE — TELEPHONE ENCOUNTER
----- Message from Yohan Regan MD sent at 5/24/2025  6:28 PM EDT -----  Call patient. Increase levothyroxine to 88 mcg daily #90 R2. Recheck TSH and Free T4 in 3 months.   ----- Message -----  From: Lab, Background User  Sent: 5/24/2025   8:32 AM EDT  To: Yohan Regan MD

## 2025-06-02 DIAGNOSIS — E78.00 HYPERCHOLESTEROLEMIA: ICD-10-CM

## 2025-06-02 DIAGNOSIS — E03.9 ACQUIRED HYPOTHYROIDISM: Primary | ICD-10-CM

## 2025-06-02 DIAGNOSIS — E03.9 ACQUIRED HYPOTHYROIDISM: ICD-10-CM

## 2025-06-02 RX ORDER — LEVOTHYROXINE SODIUM 88 UG/1
88 TABLET ORAL
Qty: 100 TABLET | Refills: 3 | Status: SHIPPED | OUTPATIENT
Start: 2025-06-02

## 2025-06-02 RX ORDER — ATORVASTATIN CALCIUM 20 MG/1
20 TABLET, FILM COATED ORAL DAILY
Qty: 90 TABLET | Refills: 0 | Status: SHIPPED | OUTPATIENT
Start: 2025-06-02

## 2025-06-06 ENCOUNTER — APPOINTMENT (OUTPATIENT)
Dept: LAB | Facility: HOSPITAL | Age: 85
End: 2025-06-06
Attending: INTERNAL MEDICINE
Payer: MEDICARE

## 2025-06-06 DIAGNOSIS — N18.4 ANEMIA DUE TO STAGE 4 CHRONIC KIDNEY DISEASE  (HCC): ICD-10-CM

## 2025-06-06 DIAGNOSIS — D63.1 ANEMIA DUE TO STAGE 4 CHRONIC KIDNEY DISEASE  (HCC): ICD-10-CM

## 2025-06-06 DIAGNOSIS — Z95.2 S/P TAVR (TRANSCATHETER AORTIC VALVE REPLACEMENT): ICD-10-CM

## 2025-06-06 LAB
BASOPHILS # BLD AUTO: 0.07 THOUSANDS/ÂΜL (ref 0–0.1)
BASOPHILS NFR BLD AUTO: 1 % (ref 0–1)
EOSINOPHIL # BLD AUTO: 0.19 THOUSAND/ÂΜL (ref 0–0.61)
EOSINOPHIL NFR BLD AUTO: 4 % (ref 0–6)
ERYTHROCYTE [DISTWIDTH] IN BLOOD BY AUTOMATED COUNT: 13 % (ref 11.6–15.1)
HCT VFR BLD AUTO: 26.4 % (ref 34.8–46.1)
HGB BLD-MCNC: 9 G/DL (ref 11.5–15.4)
IMM GRANULOCYTES # BLD AUTO: 0.01 THOUSAND/UL (ref 0–0.2)
IMM GRANULOCYTES NFR BLD AUTO: 0 % (ref 0–2)
LYMPHOCYTES # BLD AUTO: 1.68 THOUSANDS/ÂΜL (ref 0.6–4.47)
LYMPHOCYTES NFR BLD AUTO: 34 % (ref 14–44)
MCH RBC QN AUTO: 35.7 PG (ref 26.8–34.3)
MCHC RBC AUTO-ENTMCNC: 34.1 G/DL (ref 31.4–37.4)
MCV RBC AUTO: 105 FL (ref 82–98)
MONOCYTES # BLD AUTO: 0.65 THOUSAND/ÂΜL (ref 0.17–1.22)
MONOCYTES NFR BLD AUTO: 13 % (ref 4–12)
NEUTROPHILS # BLD AUTO: 2.36 THOUSANDS/ÂΜL (ref 1.85–7.62)
NEUTS SEG NFR BLD AUTO: 48 % (ref 43–75)
NRBC BLD AUTO-RTO: 0 /100 WBCS
PLATELET # BLD AUTO: 280 THOUSANDS/UL (ref 149–390)
PMV BLD AUTO: 9.6 FL (ref 8.9–12.7)
RBC # BLD AUTO: 2.52 MILLION/UL (ref 3.81–5.12)
WBC # BLD AUTO: 4.96 THOUSAND/UL (ref 4.31–10.16)

## 2025-06-06 PROCEDURE — 36415 COLL VENOUS BLD VENIPUNCTURE: CPT

## 2025-06-06 PROCEDURE — 85025 COMPLETE CBC W/AUTO DIFF WBC: CPT

## 2025-06-10 ENCOUNTER — APPOINTMENT (OUTPATIENT)
Dept: LAB | Facility: HOSPITAL | Age: 85
End: 2025-06-10
Attending: INTERNAL MEDICINE
Payer: MEDICARE

## 2025-06-10 DIAGNOSIS — N18.4 ANEMIA DUE TO STAGE 4 CHRONIC KIDNEY DISEASE  (HCC): ICD-10-CM

## 2025-06-10 DIAGNOSIS — D63.1 ANEMIA DUE TO STAGE 4 CHRONIC KIDNEY DISEASE  (HCC): ICD-10-CM

## 2025-06-10 DIAGNOSIS — Z95.2 S/P TAVR (TRANSCATHETER AORTIC VALVE REPLACEMENT): ICD-10-CM

## 2025-06-10 LAB
BASOPHILS # BLD AUTO: 0.07 THOUSANDS/ÂΜL (ref 0–0.1)
BASOPHILS NFR BLD AUTO: 2 % (ref 0–1)
EOSINOPHIL # BLD AUTO: 0.22 THOUSAND/ÂΜL (ref 0–0.61)
EOSINOPHIL NFR BLD AUTO: 5 % (ref 0–6)
ERYTHROCYTE [DISTWIDTH] IN BLOOD BY AUTOMATED COUNT: 13.3 % (ref 11.6–15.1)
HCT VFR BLD AUTO: 25.5 % (ref 34.8–46.1)
HGB BLD-MCNC: 8.4 G/DL (ref 11.5–15.4)
IMM GRANULOCYTES # BLD AUTO: 0.01 THOUSAND/UL (ref 0–0.2)
IMM GRANULOCYTES NFR BLD AUTO: 0 % (ref 0–2)
LYMPHOCYTES # BLD AUTO: 1.86 THOUSANDS/ÂΜL (ref 0.6–4.47)
LYMPHOCYTES NFR BLD AUTO: 41 % (ref 14–44)
MCH RBC QN AUTO: 35 PG (ref 26.8–34.3)
MCHC RBC AUTO-ENTMCNC: 32.9 G/DL (ref 31.4–37.4)
MCV RBC AUTO: 106 FL (ref 82–98)
MONOCYTES # BLD AUTO: 0.65 THOUSAND/ÂΜL (ref 0.17–1.22)
MONOCYTES NFR BLD AUTO: 14 % (ref 4–12)
NEUTROPHILS # BLD AUTO: 1.73 THOUSANDS/ÂΜL (ref 1.85–7.62)
NEUTS SEG NFR BLD AUTO: 38 % (ref 43–75)
NRBC BLD AUTO-RTO: 0 /100 WBCS
PLATELET # BLD AUTO: 272 THOUSANDS/UL (ref 149–390)
PMV BLD AUTO: 9.4 FL (ref 8.9–12.7)
RBC # BLD AUTO: 2.4 MILLION/UL (ref 3.81–5.12)
WBC # BLD AUTO: 4.54 THOUSAND/UL (ref 4.31–10.16)

## 2025-06-10 PROCEDURE — 36415 COLL VENOUS BLD VENIPUNCTURE: CPT

## 2025-06-10 PROCEDURE — 85025 COMPLETE CBC W/AUTO DIFF WBC: CPT

## 2025-06-13 ENCOUNTER — HOSPITAL ENCOUNTER (OUTPATIENT)
Dept: INFUSION CENTER | Facility: CLINIC | Age: 85
End: 2025-06-13
Attending: INTERNAL MEDICINE
Payer: MEDICARE

## 2025-06-13 VITALS — SYSTOLIC BLOOD PRESSURE: 136 MMHG | DIASTOLIC BLOOD PRESSURE: 74 MMHG | HEART RATE: 71 BPM

## 2025-06-13 DIAGNOSIS — D63.1 ANEMIA DUE TO STAGE 4 CHRONIC KIDNEY DISEASE  (HCC): ICD-10-CM

## 2025-06-13 DIAGNOSIS — N18.4 ANEMIA DUE TO STAGE 4 CHRONIC KIDNEY DISEASE  (HCC): ICD-10-CM

## 2025-06-13 DIAGNOSIS — Z95.2 S/P TAVR (TRANSCATHETER AORTIC VALVE REPLACEMENT): Primary | ICD-10-CM

## 2025-06-13 PROCEDURE — 96372 THER/PROPH/DIAG INJ SC/IM: CPT

## 2025-06-13 RX ADMIN — DARBEPOETIN ALFA 100 MCG: 100 INJECTION, SOLUTION INTRAVENOUS; SUBCUTANEOUS at 14:46

## 2025-06-13 NOTE — PROGRESS NOTES
Patient is here for aranesp and offers no complaints. Labs from 6/10/25 reviewed. Hgn 8.4 which meets parameters. Aranesp given in left arm and she tolerated it well. Next appointment confirmed 7/14/25 at 1600 at [place]. Copy of her schedule given

## 2025-06-13 NOTE — PLAN OF CARE
Problem: Potential for Falls  Goal: Patient will remain free of falls  Description: INTERVENTIONS:  - Educate patient/family on patient safety including physical limitations  - Instruct patient to call for assistance with activity   - Consider consulting OT/PT to assist with strengthening/mobility based on AM PAC & JH-HLM score  - Consult OT/PT to assist with strengthening/mobility   - Keep Call bell within reach  - Keep bed low and locked with side rails adjusted as appropriate  - Keep care items and personal belongings within reach  - Initiate and maintain comfort rounds  - Make Fall Risk Sign visible to staff    - Consider moving patient to room near nurses station  Outcome: Progressing     Problem: Knowledge Deficit  Goal: Patient/family/caregiver demonstrates understanding of disease process, treatment plan, medications, and discharge instructions  Description: Complete learning assessment and assess knowledge base.  Interventions:  - Provide teaching at level of understanding  - Provide teaching via preferred learning methods  Outcome: Progressing

## 2025-06-18 PROBLEM — Z00.00 MEDICARE ANNUAL WELLNESS VISIT, SUBSEQUENT: Status: RESOLVED | Noted: 2021-02-10 | Resolved: 2025-06-18

## 2025-06-22 ENCOUNTER — HOSPITAL ENCOUNTER (INPATIENT)
Facility: HOSPITAL | Age: 85
LOS: 5 days | Discharge: HOME HEALTH | DRG: 644 | End: 2025-06-27
Attending: EMERGENCY MEDICINE | Admitting: INTERNAL MEDICINE
Payer: MEDICARE

## 2025-06-22 ENCOUNTER — APPOINTMENT (EMERGENCY)
Dept: CT IMAGING | Facility: HOSPITAL | Age: 85
DRG: 644 | End: 2025-06-22
Payer: MEDICARE

## 2025-06-22 DIAGNOSIS — E87.1 HYPONATREMIA: Primary | ICD-10-CM

## 2025-06-22 DIAGNOSIS — I10 ESSENTIAL HYPERTENSION: ICD-10-CM

## 2025-06-22 DIAGNOSIS — R42 LIGHTHEADEDNESS: ICD-10-CM

## 2025-06-22 DIAGNOSIS — R03.0 ELEVATED BLOOD PRESSURE READING: ICD-10-CM

## 2025-06-22 DIAGNOSIS — R10.11 RIGHT UPPER QUADRANT ABDOMINAL PAIN: ICD-10-CM

## 2025-06-22 DIAGNOSIS — R10.9 RIGHT FLANK PAIN: ICD-10-CM

## 2025-06-22 LAB
2HR DELTA HS TROPONIN: -3 NG/L
ALBUMIN SERPL BCG-MCNC: 4.2 G/DL (ref 3.5–5)
ALP SERPL-CCNC: 67 U/L (ref 34–104)
ALT SERPL W P-5'-P-CCNC: 11 U/L (ref 7–52)
ANION GAP SERPL CALCULATED.3IONS-SCNC: 7 MMOL/L (ref 4–13)
ANION GAP SERPL CALCULATED.3IONS-SCNC: 9 MMOL/L (ref 4–13)
AST SERPL W P-5'-P-CCNC: 25 U/L (ref 13–39)
BASOPHILS # BLD AUTO: 0.04 THOUSANDS/ÂΜL (ref 0–0.1)
BASOPHILS NFR BLD AUTO: 1 % (ref 0–1)
BILIRUB SERPL-MCNC: 0.91 MG/DL (ref 0.2–1)
BILIRUB UR QL STRIP: NEGATIVE
BUN SERPL-MCNC: 6 MG/DL (ref 5–25)
BUN SERPL-MCNC: 8 MG/DL (ref 5–25)
CALCIUM SERPL-MCNC: 8.5 MG/DL (ref 8.4–10.2)
CALCIUM SERPL-MCNC: 9.2 MG/DL (ref 8.4–10.2)
CARDIAC TROPONIN I PNL SERPL HS: 5 NG/L (ref ?–50)
CARDIAC TROPONIN I PNL SERPL HS: 8 NG/L (ref ?–50)
CHLORIDE SERPL-SCNC: 94 MMOL/L (ref 96–108)
CHLORIDE SERPL-SCNC: 98 MMOL/L (ref 96–108)
CLARITY UR: CLEAR
CO2 SERPL-SCNC: 21 MMOL/L (ref 21–32)
CO2 SERPL-SCNC: 23 MMOL/L (ref 21–32)
COLOR UR: YELLOW
CREAT SERPL-MCNC: 0.53 MG/DL (ref 0.6–1.3)
CREAT SERPL-MCNC: 0.59 MG/DL (ref 0.6–1.3)
CRP SERPL QL: <1 MG/L
EOSINOPHIL # BLD AUTO: 0.05 THOUSAND/ÂΜL (ref 0–0.61)
EOSINOPHIL NFR BLD AUTO: 1 % (ref 0–6)
ERYTHROCYTE [DISTWIDTH] IN BLOOD BY AUTOMATED COUNT: 14.1 % (ref 11.6–15.1)
GFR SERPL CREATININE-BSD FRML MDRD: 83 ML/MIN/1.73SQ M
GFR SERPL CREATININE-BSD FRML MDRD: 86 ML/MIN/1.73SQ M
GLUCOSE SERPL-MCNC: 103 MG/DL (ref 65–140)
GLUCOSE SERPL-MCNC: 96 MG/DL (ref 65–140)
GLUCOSE UR STRIP-MCNC: NEGATIVE MG/DL
HCT VFR BLD AUTO: 28.5 % (ref 34.8–46.1)
HGB BLD-MCNC: 9.8 G/DL (ref 11.5–15.4)
HGB UR QL STRIP.AUTO: NEGATIVE
IMM GRANULOCYTES # BLD AUTO: 0 THOUSAND/UL (ref 0–0.2)
IMM GRANULOCYTES NFR BLD AUTO: 0 % (ref 0–2)
KETONES UR STRIP-MCNC: NEGATIVE MG/DL
LEUKOCYTE ESTERASE UR QL STRIP: NEGATIVE
LIPASE SERPL-CCNC: <6 U/L (ref 11–82)
LYMPHOCYTES # BLD AUTO: 1.26 THOUSANDS/ÂΜL (ref 0.6–4.47)
LYMPHOCYTES NFR BLD AUTO: 36 % (ref 14–44)
MCH RBC QN AUTO: 36.3 PG (ref 26.8–34.3)
MCHC RBC AUTO-ENTMCNC: 34.4 G/DL (ref 31.4–37.4)
MCV RBC AUTO: 106 FL (ref 82–98)
MONOCYTES # BLD AUTO: 0.46 THOUSAND/ÂΜL (ref 0.17–1.22)
MONOCYTES NFR BLD AUTO: 13 % (ref 4–12)
NEUTROPHILS # BLD AUTO: 1.68 THOUSANDS/ÂΜL (ref 1.85–7.62)
NEUTS SEG NFR BLD AUTO: 49 % (ref 43–75)
NITRITE UR QL STRIP: NEGATIVE
NRBC BLD AUTO-RTO: 1 /100 WBCS
PH UR STRIP.AUTO: 8 [PH]
PLATELET # BLD AUTO: 301 THOUSANDS/UL (ref 149–390)
PMV BLD AUTO: 9.3 FL (ref 8.9–12.7)
POTASSIUM SERPL-SCNC: 3.6 MMOL/L (ref 3.5–5.3)
POTASSIUM SERPL-SCNC: 3.7 MMOL/L (ref 3.5–5.3)
PROT SERPL-MCNC: 7.2 G/DL (ref 6.4–8.4)
PROT UR STRIP-MCNC: NEGATIVE MG/DL
RBC # BLD AUTO: 2.7 MILLION/UL (ref 3.81–5.12)
SODIUM SERPL-SCNC: 126 MMOL/L (ref 135–147)
SODIUM SERPL-SCNC: 126 MMOL/L (ref 135–147)
SP GR UR STRIP.AUTO: 1.01 (ref 1–1.03)
UROBILINOGEN UR QL STRIP.AUTO: 0.2 E.U./DL
WBC # BLD AUTO: 3.49 THOUSAND/UL (ref 4.31–10.16)

## 2025-06-22 PROCEDURE — 96375 TX/PRO/DX INJ NEW DRUG ADDON: CPT

## 2025-06-22 PROCEDURE — 80053 COMPREHEN METABOLIC PANEL: CPT | Performed by: EMERGENCY MEDICINE

## 2025-06-22 PROCEDURE — 83690 ASSAY OF LIPASE: CPT | Performed by: EMERGENCY MEDICINE

## 2025-06-22 PROCEDURE — 80048 BASIC METABOLIC PNL TOTAL CA: CPT | Performed by: EMERGENCY MEDICINE

## 2025-06-22 PROCEDURE — 84484 ASSAY OF TROPONIN QUANT: CPT | Performed by: EMERGENCY MEDICINE

## 2025-06-22 PROCEDURE — 96365 THER/PROPH/DIAG IV INF INIT: CPT

## 2025-06-22 PROCEDURE — 36415 COLL VENOUS BLD VENIPUNCTURE: CPT | Performed by: EMERGENCY MEDICINE

## 2025-06-22 PROCEDURE — 86140 C-REACTIVE PROTEIN: CPT | Performed by: INTERNAL MEDICINE

## 2025-06-22 PROCEDURE — 74177 CT ABD & PELVIS W/CONTRAST: CPT

## 2025-06-22 PROCEDURE — 99285 EMERGENCY DEPT VISIT HI MDM: CPT

## 2025-06-22 PROCEDURE — 99285 EMERGENCY DEPT VISIT HI MDM: CPT | Performed by: EMERGENCY MEDICINE

## 2025-06-22 PROCEDURE — 93005 ELECTROCARDIOGRAM TRACING: CPT

## 2025-06-22 PROCEDURE — 99222 1ST HOSP IP/OBS MODERATE 55: CPT | Performed by: INTERNAL MEDICINE

## 2025-06-22 PROCEDURE — 85025 COMPLETE CBC W/AUTO DIFF WBC: CPT | Performed by: EMERGENCY MEDICINE

## 2025-06-22 PROCEDURE — 81003 URINALYSIS AUTO W/O SCOPE: CPT | Performed by: EMERGENCY MEDICINE

## 2025-06-22 RX ORDER — KETOROLAC TROMETHAMINE 30 MG/ML
15 INJECTION, SOLUTION INTRAMUSCULAR; INTRAVENOUS ONCE
Status: COMPLETED | OUTPATIENT
Start: 2025-06-22 | End: 2025-06-22

## 2025-06-22 RX ORDER — POLYETHYLENE GLYCOL 3350 17 G/17G
17 POWDER, FOR SOLUTION ORAL DAILY
Status: DISCONTINUED | OUTPATIENT
Start: 2025-06-22 | End: 2025-06-27 | Stop reason: HOSPADM

## 2025-06-22 RX ORDER — ENOXAPARIN SODIUM 100 MG/ML
40 INJECTION SUBCUTANEOUS DAILY
Status: DISCONTINUED | OUTPATIENT
Start: 2025-06-23 | End: 2025-06-27 | Stop reason: HOSPADM

## 2025-06-22 RX ORDER — METHOCARBAMOL 500 MG/1
500 TABLET, FILM COATED ORAL EVERY 6 HOURS SCHEDULED
Status: DISCONTINUED | OUTPATIENT
Start: 2025-06-22 | End: 2025-06-27 | Stop reason: HOSPADM

## 2025-06-22 RX ORDER — ONDANSETRON 2 MG/ML
4 INJECTION INTRAMUSCULAR; INTRAVENOUS ONCE
Status: COMPLETED | OUTPATIENT
Start: 2025-06-22 | End: 2025-06-22

## 2025-06-22 RX ORDER — METHOCARBAMOL 500 MG/1
500 TABLET, FILM COATED ORAL ONCE
Status: COMPLETED | OUTPATIENT
Start: 2025-06-22 | End: 2025-06-22

## 2025-06-22 RX ORDER — LIDOCAINE 50 MG/G
1 PATCH TOPICAL ONCE
Status: COMPLETED | OUTPATIENT
Start: 2025-06-22 | End: 2025-06-23

## 2025-06-22 RX ORDER — HYDROMORPHONE HCL IN WATER/PF 6 MG/30 ML
0.2 PATIENT CONTROLLED ANALGESIA SYRINGE INTRAVENOUS ONCE
Status: COMPLETED | OUTPATIENT
Start: 2025-06-22 | End: 2025-06-22

## 2025-06-22 RX ORDER — ACETAMINOPHEN 325 MG/1
975 TABLET ORAL EVERY 8 HOURS
Status: DISCONTINUED | OUTPATIENT
Start: 2025-06-22 | End: 2025-06-27 | Stop reason: HOSPADM

## 2025-06-22 RX ORDER — ACETAMINOPHEN 10 MG/ML
1000 INJECTION, SOLUTION INTRAVENOUS ONCE
Status: COMPLETED | OUTPATIENT
Start: 2025-06-22 | End: 2025-06-22

## 2025-06-22 RX ORDER — AMOXICILLIN 250 MG
1 CAPSULE ORAL 2 TIMES DAILY
Status: DISCONTINUED | OUTPATIENT
Start: 2025-06-22 | End: 2025-06-27 | Stop reason: HOSPADM

## 2025-06-22 RX ORDER — HYDRALAZINE HYDROCHLORIDE 20 MG/ML
10 INJECTION INTRAMUSCULAR; INTRAVENOUS ONCE
Status: COMPLETED | OUTPATIENT
Start: 2025-06-22 | End: 2025-06-22

## 2025-06-22 RX ADMIN — IOHEXOL 80 ML: 350 INJECTION, SOLUTION INTRAVENOUS at 10:12

## 2025-06-22 RX ADMIN — ACETAMINOPHEN 975 MG: 325 TABLET, FILM COATED ORAL at 17:48

## 2025-06-22 RX ADMIN — KETOROLAC TROMETHAMINE 15 MG: 30 INJECTION, SOLUTION INTRAMUSCULAR at 08:48

## 2025-06-22 RX ADMIN — METHOCARBAMOL 500 MG: 500 TABLET ORAL at 20:43

## 2025-06-22 RX ADMIN — ACETAMINOPHEN 1000 MG: 10 INJECTION, SOLUTION INTRAVENOUS at 08:52

## 2025-06-22 RX ADMIN — ONDANSETRON 4 MG: 2 INJECTION INTRAMUSCULAR; INTRAVENOUS at 08:48

## 2025-06-22 RX ADMIN — SODIUM CHLORIDE 1000 ML: 0.9 INJECTION, SOLUTION INTRAVENOUS at 08:47

## 2025-06-22 RX ADMIN — SENNOSIDES AND DOCUSATE SODIUM 1 TABLET: 50; 8.6 TABLET ORAL at 17:45

## 2025-06-22 RX ADMIN — HYDROMORPHONE HYDROCHLORIDE 0.2 MG: 0.2 INJECTION, SOLUTION INTRAMUSCULAR; INTRAVENOUS; SUBCUTANEOUS at 20:43

## 2025-06-22 RX ADMIN — LIDOCAINE 1 PATCH: 50 PATCH CUTANEOUS at 12:39

## 2025-06-22 RX ADMIN — METHOCARBAMOL 500 MG: 500 TABLET ORAL at 12:39

## 2025-06-22 RX ADMIN — HYDRALAZINE HYDROCHLORIDE 10 MG: 20 INJECTION INTRAMUSCULAR; INTRAVENOUS at 11:59

## 2025-06-22 NOTE — ASSESSMENT & PLAN NOTE
Patient with a history of hyponatremia, did not respond to 1 L normal saline bolus we will fluid restrict

## 2025-06-22 NOTE — H&P
H&P - Hospitalist   Name: Ning Rodrigues 85 y.o. female I MRN: [id number]  Unit/Bed#: ED 16 I Date of Admission: 6/22/2025   Date of Service: 6/22/2025 I Hospital Day: 0     Assessment & Plan  Hyponatremia  Patient with a history of hyponatremia, did not respond to 1 L normal saline bolus we will fluid restrict  Essential hypertension  Continue home blood pressure meds  Ulcerative colitis without complications (HCC)  Patient with history of UC, denies any change in her bowel habits, during my evaluation her abdominal pain is resolved she states most of her pain is in her right flank  Abdominal pain  Patient reports she is not having abdominal pain at this time she does have some right-sided flank pain  Abdominal pain is resolved  Her pain is likely musculoskeletal in nature will manage conservatively  Start on bowel regiment as she does report feeling constipated      VTE Pharmacologic Prophylaxis:   Moderate Risk (Score 3-4) - Pharmacological DVT Prophylaxis Ordered: enoxaparin (Lovenox).  Code Status: Prior level 1  Discussion with family: Patient declined call to .     Anticipated Length of Stay: Patient will be admitted on an inpatient basis with an anticipated length of stay of greater than 2 midnights secondary to hyponatremia.    History of Present Illness   Chief Complaint: flank pain     Ning Rodrigues is a 85 y.o. female with a PMH of hypertension, ulcerative colitis, hyponatremia who presents with right flank pain.  Patient reports pain on his right flank denies any changes in her urination bowel habits denies fevers chills nausea.  She reported feeling lightheaded during the episode of her pain today.  CT imaging was unremarkable.  She was found to have hyponatremia to 126.  During my evaluation she reports her right-sided flank pain is currently rated a 4 out of 10, she denies any abdominal pain.     Review of Systems   Constitutional:  Negative for chills and fever.   HENT:  Negative for  ear pain and sore throat.    Eyes:  Negative for pain and visual disturbance.   Respiratory:  Negative for cough and shortness of breath.    Cardiovascular:  Negative for chest pain and palpitations.   Gastrointestinal:  Negative for abdominal pain and vomiting.   Genitourinary:  Positive for flank pain. Negative for dysuria and hematuria.   Musculoskeletal:  Negative for arthralgias and back pain.   Skin:  Negative for color change and rash.   Neurological:  Negative for seizures and syncope.   All other systems reviewed and are negative.      Historical Information   Past Medical History[1]  Past Surgical History[2]  Social History[3]  E-Cigarette/Vaping    E-Cigarette Use Never User      E-Cigarette/Vaping Substances    Nicotine No     THC No     CBD No     Flavoring No     Other No     Unknown No      Family History[4]  Social History:  Marital Status: /Civil Union   Occupation:   Patient Pre-hospital Living Situation: Home  Patient Pre-hospital Level of Mobility: walks  Patient Pre-hospital Diet Restrictions:     Meds/Allergies   I have reviewed home medications with patient personally.  Prior to Admission medications    Medication Sig Start Date End Date Taking? Authorizing Provider   acetaminophen (TYLENOL) 325 mg tablet Take 2 tablets (650 mg total) by mouth every 6 (six) hours as needed for fever, mild pain, moderate pain or headaches (temperature greater than 101 F) 11/8/23   Polly Cowart PA-C   amLODIPine (NORVASC) 10 mg tablet Take 1 tablet by mouth once daily 1/31/25   Yohan Regan MD   aspirin 81 mg chewable tablet Chew 1 tablet (81 mg total) daily Do not start before November 9, 2023. 11/9/23   Polly Cowart PA-C   atorvastatin (LIPITOR) 20 mg tablet Take 1 tablet by mouth once daily 6/2/25   Yohan Regan MD   cholecalciferol (VITAMIN D3) 1,000 units tablet Take 1,000 Units by mouth in the morning.    Historical Provider, MD   Coenzyme Q10 (Co Q 10) 100 MG CAPS Take by mouth     Historical Provider, MD   Cyanocobalamin (VITAMIN B 12 PO) Take by mouth    Historical Provider, MD   folic acid ( Folic Acid) 1 mg tablet Take 1 tablet (1 mg total) by mouth daily 11/1/24   Tristin Slade MD   levothyroxine 88 mcg tablet Take 1 tablet (88 mcg total) by mouth daily in the early morning 6/2/25   Yohan Regan MD   pantoprazole (PROTONIX) 40 mg tablet Take 1 tablet by mouth once daily 1/17/25   ALINA Ellis   sulfaSALAzine (AZULFIDINE) 500 mg tablet Take 2 tablets (1,000 mg total) by mouth 2 (two) times a day 5/23/25   Gordon Lind PA-C   valsartan (DIOVAN) 80 mg tablet Take 1 tablet (80 mg total) by mouth daily 1/16/25   Yohan Regan MD     Allergies   Allergen Reactions    Sulfa Antibiotics Itching       Objective :  Temp:  [97.8 °F (36.6 °C)] 97.8 °F (36.6 °C)  HR:  [54-99] 64  BP: (163-222)/() 176/86  Resp:  [16-31] 20  SpO2:  [91 %-99 %] 96 %  O2 Device: None (Room air)    Physical Exam  Vitals and nursing note reviewed.   Constitutional:       General: She is not in acute distress.     Appearance: She is well-developed. She is not toxic-appearing or diaphoretic.   HENT:      Head: Normocephalic and atraumatic.     Eyes:      General: No scleral icterus.     Conjunctiva/sclera: Conjunctivae normal.       Cardiovascular:      Rate and Rhythm: Normal rate and regular rhythm.      Heart sounds: No murmur heard.     No friction rub. No gallop.   Pulmonary:      Effort: Pulmonary effort is normal. No respiratory distress.      Breath sounds: Normal breath sounds. No stridor. No wheezing, rhonchi or rales.   Chest:      Chest wall: No tenderness.   Abdominal:      General: There is no distension.      Palpations: Abdomen is soft. There is no mass.      Tenderness: There is no abdominal tenderness. There is no guarding or rebound.      Hernia: No hernia is present.     Musculoskeletal:         General: Tenderness present. No swelling.      Cervical back: Neck supple.     Skin:    "  General: Skin is warm and dry.      Capillary Refill: Capillary refill takes less than 2 seconds.      Coloration: Skin is not jaundiced or pale.     Neurological:      Mental Status: She is alert and oriented to person, place, and time.     Psychiatric:         Mood and Affect: Mood normal.          Lines/Drains:            Lab Results: I have reviewed the following results:  Results from last 7 days   Lab Units 06/22/25  0845   WBC Thousand/uL 3.49*   HEMOGLOBIN g/dL 9.8*   HEMATOCRIT % 28.5*   PLATELETS Thousands/uL 301   SEGS PCT % 49   LYMPHO PCT % 36   MONO PCT % 13*   EOS PCT % 1     Results from last 7 days   Lab Units 06/22/25  1329 06/22/25  0845   SODIUM mmol/L 126* 126*   POTASSIUM mmol/L 3.7 3.6   CHLORIDE mmol/L 98 94*   CO2 mmol/L 21 23   BUN mg/dL 6 8   CREATININE mg/dL 0.53* 0.59*   ANION GAP mmol/L 7 9   CALCIUM mg/dL 8.5 9.2   ALBUMIN g/dL  --  4.2   TOTAL BILIRUBIN mg/dL  --  0.91   ALK PHOS U/L  --  67   ALT U/L  --  11   AST U/L  --  25   GLUCOSE RANDOM mg/dL 96 103             No results found for: \"HGBA1C\"        Imaging Results Review: I personally reviewed the following image studies in PACS and associated radiology reports: CT abdomen/pelvis. My interpretation of the radiology images/reports is:  .  Other Study Results Review: EKG was reviewed.     Administrative Statements       ** Please Note: This note has been constructed using a voice recognition system. **         [1]   Past Medical History:  Diagnosis Date    Allergic rhinitis 03/21/2023    Anemia     Arthritis     osteoporosis, djd knees, pt denies osteoporosis on 2/21/22    Low's esophagus     Cancer (HCC)     on face    Colon polyp     Cough 02/21/2022    cough for 3 years, expectorates yellow mucus    Cough variant asthma  07/26/2021    Disease of thyroid gland     low thyroid    Diverticulitis     gerd, diverticulitis    Dizziness     GERD (gastroesophageal reflux disease)     ulcerative colitis; Low's esophagus, " "diverticulosis; hx of laryngopharyngeal reflux    Hyperlipidemia     Hypertension     Hypoferremia 10/04/2024    Low vitamin D level     LPRD (laryngopharyngeal reflux disease) 02/21/2022    PONV (postoperative nausea and vomiting)     AFTER \"BIG SURGERIES\"    Postnasal drip     WITH COUGH    Ulcerative colitis (HCC)    [2]   Past Surgical History:  Procedure Laterality Date    APPENDECTOMY      CARDIAC CATHETERIZATION N/A 09/07/2023    Procedure: Cardiac RHC/LHC;  Surgeon: Rafael Sood MD;  Location: [facility];  Service: Cardiology    CARDIAC CATHETERIZATION N/A 11/07/2023    Procedure: Cardiac tavr;  Surgeon: Pete Hernández MD;  Location: [facility];  Service: Cardiology    CATARACT EXTRACTION Bilateral     COLONOSCOPY      colon polyps    COLONOSCOPY  07/28/2022    FOOT SURGERY Bilateral     bunionectomy    HEMORRHOID SURGERY      HEMORROIDECTOMY      hemorroid removal    JOINT REPLACEMENT Bilateral     knees    NE REPLACE AORTIC VALVE OPENFEMORAL ARTERY APPROACH N/A 11/07/2023    Procedure: REPLACEMENT AORTIC VALVE TRANSCATHETER (TAVR) TRANSFEMORAL W/ 26MM BUCKNER DOROTHEA S3 UR VALVE(ACCESS ON LEFT) GIOVANNY;  Surgeon: Felipe Buitrago DO;  Location: [facility];  Service: Cardiac Surgery    REPLACEMENT TOTAL KNEE Bilateral     b/ replacement    TONSILLECTOMY      TUBAL LIGATION      UPPER GASTROINTESTINAL ENDOSCOPY     [3]   Social History  Tobacco Use    Smoking status: Never    Smokeless tobacco: Never   Vaping Use    Vaping status: Never Used   Substance and Sexual Activity    Alcohol use: Not Currently    Drug use: Never    Sexual activity: Not Currently     Partners: Male     Birth control/protection: Post-menopausal   [4]   Family History  Problem Relation Name Age of Onset    Heart disease Father      No Known Problems Sister      No Known Problems Sister      Heart disease Brother      Colon cancer Brother  55    Cancer Brother  67    No Known Problems Maternal Grandmother      No Known Problems " Paternal Grandmother      No Known Problems Maternal Aunt      No Known Problems Paternal Aunt      Cancer Daughter          unsure of type of cancer, it was female cancer and hysterectomy done by Dr. Fierro

## 2025-06-22 NOTE — Clinical Note
Case was discussed with MARIELENA and the patient's admission status was agreed to be Admission Status: observation status to the service of Dr. Taveras .

## 2025-06-22 NOTE — ED PROVIDER NOTES
Time reflects when diagnosis was documented in both MDM as applicable and the Disposition within this note       Time User Action Codes Description Comment    6/22/2025  1:59 PM Arabella Fair Add [E87.1] Hyponatremia     6/22/2025  1:59 PM Arabella Fair Add [R10.11] Right upper quadrant abdominal pain     6/22/2025  1:59 PM Arabella Fair Add [R10.9] Right flank pain     6/22/2025  1:59 PM Arabella Fair Add [R42] Lightheadedness     6/22/2025  4:06 PM Arabella Fair Add [R03.0] Elevated blood pressure reading           ED Disposition       ED Disposition   Admit    Condition   Stable    Date/Time   Sun Jun 22, 2025  2:33 PM    Comment   Case was discussed with ALVARO and the patient's admission status was agreed to be Admission Status: inpatient status to the service of Dr. Taveras .               Assessment & Plan       Medical Decision Making  85-year-old female presenting for abdominal/flank pain, lightheadedness.  Differential diagnoses include but not limited to ureterolithiasis, UTI, appendicitis, cholecystitis, symptomatic cholelithiasis, colitis, obstruction, perforation, atypical ACS, dehydration, arrhythmia, anemia.  Lab work is notable for hyponatremia.  Abdominal workup overall unremarkable.  Cardiac workup stable.  Pain seems to be musculoskeletal in nature.  Treated with Robaxin and Lidoderm patch with improvement.  1 L of IV fluids given to patient and sodium was rechecked but unchanged.  Discussed with Alvaro and admitted to their service for further evaluation and management.    Problems Addressed:  Elevated blood pressure reading: acute illness or injury  Hyponatremia: acute illness or injury  Lightheadedness: acute illness or injury  Right flank pain: acute illness or injury  Right upper quadrant abdominal pain: acute illness or injury    Amount and/or Complexity of Data Reviewed  Labs: ordered. Decision-making details documented in ED Course.  Radiology: ordered.  ECG/medicine tests: ordered and  "independent interpretation performed. Decision-making details documented in ED Course.    Risk  Prescription drug management.  Decision regarding hospitalization.        ED Course as of 06/22/25 1607   Sun Jun 22, 2025   0842 Procedure Note: EKG  Date/Time: 06/22/25 8:25 AM   Interpreted by: Arabella Fair  Indications / Diagnosis: lightheadedness  ECG reviewed by me, the ED Provider: yes   The EKG demonstrates:  Rhythm: rate 62, normal sinus  Intervals: normal intervals  Axis: normal axis  QRS/Blocks: normal QRS  ST Changes: No acute ST Changes, no STD/AIDA.    0918 hs TnI 0hr: 8   0948 LIPASE(!): <6   0948 Comprehensive metabolic panel(!)   0957 UA w Reflex to Microscopic w Reflex to Culture       Medications   lidocaine (LIDODERM) 5 % patch 1 patch (1 patch Topical Medication Applied 6/22/25 1239)   ondansetron (ZOFRAN) injection 4 mg (4 mg Intravenous Given 6/22/25 0848)   ketorolac (TORADOL) injection 15 mg (15 mg Intravenous Given 6/22/25 0848)   acetaminophen (Ofirmev) injection 1,000 mg (0 mg Intravenous Stopped 6/22/25 0922)   sodium chloride 0.9 % bolus 1,000 mL (0 mL Intravenous Stopped 6/22/25 0948)   iohexol (OMNIPAQUE) 350 MG/ML injection (SINGLE-DOSE) 100 mL (80 mL Intravenous Given 6/22/25 1012)   hydrALAZINE (APRESOLINE) injection 10 mg (10 mg Intravenous Given 6/22/25 1159)   methocarbamol (ROBAXIN) tablet 500 mg (500 mg Oral Given 6/22/25 1239)       ED Risk Strat Scores                    No data recorded                            History of Present Illness       Chief Complaint   Patient presents with    Abdominal Pain     Pt reports RLQ abdominal pain with vomiting starting this morning, dizziness. Pt reports several episodes of feeling like \"I am going to pass out\". Pt states this feels similar to last time she had diverticulitis       Past Medical History[1]   Past Surgical History[2]   Family History[3]   Social History[4]   E-Cigarette/Vaping    E-Cigarette Use Never User     "   E-Cigarette/Vaping Substances    Nicotine No     THC No     CBD No     Flavoring No     Other No     Unknown No       I have reviewed and agree with the history as documented.     85-year-old female with history of arthritis, diverticulitis, GERD, hypertension, hyperlipidemia who presents for evaluation of abdominal pain.  Patient reports intermittent pain over the past 3 to 4 days.  The pain is primarily in the right lower quadrant of her abdomen.  The pain seems to be worsening this morning prompting her to come in.  She has had some associated nausea and vomiting.  No diarrhea, urinary symptoms, fevers.  She thought she may have been constipated and tried a stool softener yesterday with minimal relief.  She also has experienced a couple of episodes of lightheadedness this morning in which she feels like she might pass out.  This seems to be associated with the pain.  She has had episodes like this in the past as well.  She has not lost consciousness.  No chest pain or shortness of breath.        Review of Systems   Constitutional:  Negative for chills and fever.   Respiratory:  Negative for shortness of breath.    Cardiovascular:  Negative for chest pain.   Gastrointestinal:  Positive for abdominal pain, constipation, nausea and vomiting. Negative for diarrhea.   Genitourinary:  Negative for dysuria, flank pain and frequency.   Musculoskeletal:  Negative for gait problem.   Skin:  Negative for rash.   Neurological:  Positive for light-headedness. Negative for syncope and weakness.   All other systems reviewed and are negative.          Objective       ED Triage Vitals   Temperature Pulse Blood Pressure Respirations SpO2 Patient Position - Orthostatic VS   06/22/25 0828 06/22/25 0827 06/22/25 0828 06/22/25 0827 06/22/25 0827 06/22/25 1000   97.8 °F (36.6 °C) 60 (!) 203/86 16 98 % Lying      Temp src Heart Rate Source BP Location FiO2 (%) Pain Score    -- 06/22/25 1000 06/22/25 1000 -- --     Monitor Left arm         Vitals      Date and Time Temp Pulse SpO2 Resp BP Pain Score FACES Pain Rating User   06/22/25 1430 -- 64 96 % 20 176/86 -- -- SM   06/22/25 1400 -- 84 97 % 22 163/78 -- -- SM   06/22/25 1330 -- 66 95 % 23 168/74 -- -- SM   06/22/25 1300 -- 99 95 % -- 163/76 -- -- SM   06/22/25 1230 -- 74 91 % 31 171/76 -- -- SM   06/22/25 1130 -- 63 96 % 22 222/102 -- -- SM   06/22/25 1100 -- 54 96 % 19 189/92 -- -- SM   06/22/25 1000 -- 61 96 % -- 197/90 -- -- SM   06/22/25 0900 -- 55 96 % 20 200/94 -- -- SM   06/22/25 0830 -- 60 99 % 20 203/86 -- -- SM   06/22/25 0828 97.8 °F (36.6 °C) -- -- -- 203/86 -- -- BG   06/22/25 0827 -- 60 98 % 16 -- -- -- BG            Physical Exam  Vitals and nursing note reviewed.   Constitutional:       General: She is not in acute distress.     Appearance: She is well-developed. She is not ill-appearing.   HENT:      Head: Normocephalic and atraumatic.      Nose: Nose normal.      Mouth/Throat:      Mouth: Mucous membranes are moist.     Eyes:      Conjunctiva/sclera: Conjunctivae normal.       Cardiovascular:      Rate and Rhythm: Normal rate and regular rhythm.      Heart sounds: No murmur heard.     No friction rub. No gallop.   Pulmonary:      Effort: Pulmonary effort is normal.      Breath sounds: Normal breath sounds. No wheezing, rhonchi or rales.   Abdominal:      General: Bowel sounds are normal. There is no distension.      Palpations: Abdomen is soft.      Tenderness: There is abdominal tenderness in the right upper quadrant and right lower quadrant. There is no right CVA tenderness, left CVA tenderness, guarding or rebound.     Musculoskeletal:         General: No swelling or tenderness. Normal range of motion.      Cervical back: Normal range of motion and neck supple.     Skin:     General: Skin is warm and dry.      Coloration: Skin is not pale.      Findings: No rash.     Neurological:      General: No focal deficit present.      Mental Status: She is alert and oriented to  person, place, and time.     Psychiatric:         Behavior: Behavior normal.         Results Reviewed       Procedure Component Value Units Date/Time    C-reactive protein [[id number]]  (Normal) Collected: 06/22/25 1329    Lab Status: Final result Specimen: Blood from Arm, Left Updated: 06/22/25 1523     CRP <1.0 mg/L     Basic metabolic panel [[id number]]  (Abnormal) Collected: 06/22/25 1329    Lab Status: Final result Specimen: Blood from Arm, Left Updated: 06/22/25 1349     Sodium 126 mmol/L      Potassium 3.7 mmol/L      Chloride 98 mmol/L      CO2 21 mmol/L      ANION GAP 7 mmol/L      BUN 6 mg/dL      Creatinine 0.53 mg/dL      Glucose 96 mg/dL      Calcium 8.5 mg/dL      eGFR 86 ml/min/1.73sq m     Narrative:      National Kid[employer] Foundation guidelines for Chronic Kidney Disease (CKD):     Stage 1 with normal or high GFR (GFR > 90 mL/min/1.73 square meters)    Stage 2 Mild CKD (GFR = 60-89 mL/min/1.73 square meters)    Stage 3A Moderate CKD (GFR = 45-59 mL/min/1.73 square meters)    Stage 3B Moderate CKD (GFR = 30-44 mL/min/1.73 square meters)    Stage 4 Severe CKD (GFR = 15-29 mL/min/1.73 square meters)    Stage 5 End Stage CKD (GFR <15 mL/min/1.73 square meters)  Note: GFR calculation is accurate only with a steady state creatinine    HS Troponin I 2hr [[id number]]  (Normal) Collected: 06/22/25 1038    Lab Status: Final result Specimen: Blood from Arm, Left Updated: 06/22/25 1123     hs TnI 2hr 5 ng/L      Delta 2hr hsTnI -3 ng/L     UA w Reflex to Microscopic w Reflex to Culture [[id number]]  (Normal) Collected: 06/22/25 0948    Lab Status: Final result Specimen: Urine Updated: 06/22/25 0956     Color, UA Yellow     Clarity, UA Clear     Specific Gravity, UA 1.015     pH, UA 8.0     Leukocytes, UA Negative     Nitrite, UA Negative     Protein, UA Negative mg/dl      Glucose, UA Negative mg/dl      Ketones, UA Negative mg/dl      Urobilinogen, UA 0.2 E.U./dl      Bilirubin, UA Negative     Occult Blood,  UA Negative    Comprehensive metabolic panel [[id number]]  (Abnormal) Collected: 06/22/25 0845    Lab Status: Final result Specimen: Blood from Arm, Left Updated: 06/22/25 0946     Sodium 126 mmol/L      Potassium 3.6 mmol/L      Chloride 94 mmol/L      CO2 23 mmol/L      ANION GAP 9 mmol/L      BUN 8 mg/dL      Creatinine 0.59 mg/dL      Glucose 103 mg/dL      Calcium 9.2 mg/dL      AST 25 U/L      ALT 11 U/L      Alkaline Phosphatase 67 U/L      Total Protein 7.2 g/dL      Albumin 4.2 g/dL      Total Bilirubin 0.91 mg/dL      eGFR 83 ml/min/1.73sq m     Narrative:      National Kidney Disease Foundation guidelines for Chronic Kidney Disease (CKD):     Stage 1 with normal or high GFR (GFR > 90 mL/min/1.73 square meters)    Stage 2 Mild CKD (GFR = 60-89 mL/min/1.73 square meters)    Stage 3A Moderate CKD (GFR = 45-59 mL/min/1.73 square meters)    Stage 3B Moderate CKD (GFR = 30-44 mL/min/1.73 square meters)    Stage 4 Severe CKD (GFR = 15-29 mL/min/1.73 square meters)    Stage 5 End Stage CKD (GFR <15 mL/min/1.73 square meters)  Note: GFR calculation is accurate only with a steady state creatinine    Lipase [[id number]]  (Abnormal) Collected: 06/22/25 0845    Lab Status: Final result Specimen: Blood from Arm, Left Updated: 06/22/25 0946     Lipase <6 u/L     HS Troponin 0hr (reflex protocol) [[id number]]  (Normal) Collected: 06/22/25 0845    Lab Status: Final result Specimen: Blood from Arm, Left Updated: 06/22/25 0918     hs TnI 0hr 8 ng/L     CBC and differential [[id number]]  (Abnormal) Collected: 06/22/25 0845    Lab Status: Final result Specimen: Blood from Arm, Left Updated: 06/22/25 0852     WBC 3.49 Thousand/uL      RBC 2.70 Million/uL      Hemoglobin 9.8 g/dL      Hematocrit 28.5 %       fL      MCH 36.3 pg      MCHC 34.4 g/dL      RDW 14.1 %      MPV 9.3 fL      Platelets 301 Thousands/uL      nRBC 1 /100 WBCs      Segmented % 49 %      Immature Grans % 0 %      Lymphocytes % 36 %      Monocytes % 13 %       Eosinophils Relative 1 %      Basophils Relative 1 %      Absolute Neutrophils 1.68 Thousands/µL      Absolute Immature Grans 0.00 Thousand/uL      Absolute Lymphocytes 1.26 Thousands/µL      Absolute Monocytes 0.46 Thousand/µL      Eosinophils Absolute 0.05 Thousand/µL      Basophils Absolute 0.04 Thousands/µL             CT abdomen pelvis with contrast   Final Interpretation by Roque Savage MD (06/22 1204)      No acute findings in the abdomen or pelvis.         Workstation performed: [id number]             Procedures    ED Medication and Procedure Management   Prior to Admission Medications   Prescriptions Last Dose Informant Patient Reported? Taking?   Coenzyme Q10 (Co Q 10) 100 MG CAPS  Self Yes No   Sig: Take by mouth   Cyanocobalamin (VITAMIN B 12 PO)  Self Yes No   Sig: Take by mouth   acetaminophen (TYLENOL) 325 mg tablet  Self No No   Sig: Take 2 tablets (650 mg total) by mouth every 6 (six) hours as needed for fever, mild pain, moderate pain or headaches (temperature greater than 101 F)   amLODIPine (NORVASC) 10 mg tablet   No No   Sig: Take 1 tablet by mouth once daily   aspirin 81 mg chewable tablet  Self No No   Sig: Chew 1 tablet (81 mg total) daily Do not start before November 9, 2023.   atorvastatin (LIPITOR) 20 mg tablet   No No   Sig: Take 1 tablet by mouth once daily   cholecalciferol (VITAMIN D3) 1,000 units tablet  Self Yes No   Sig: Take 1,000 Units by mouth in the morning.   folic acid (KP Folic Acid) 1 mg tablet  Self No No   Sig: Take 1 tablet (1 mg total) by mouth daily   levothyroxine 88 mcg tablet   No No   Sig: Take 1 tablet (88 mcg total) by mouth daily in the early morning   pantoprazole (PROTONIX) 40 mg tablet   No No   Sig: Take 1 tablet by mouth once daily   sulfaSALAzine (AZULFIDINE) 500 mg tablet   No No   Sig: Take 2 tablets (1,000 mg total) by mouth 2 (two) times a day   valsartan (DIOVAN) 80 mg tablet   No No   Sig: Take 1 tablet (80 mg total) by mouth daily  "     Facility-Administered Medications: None     Patient's Medications   Discharge Prescriptions    No medications on file     No discharge procedures on file.  ED SEPSIS DOCUMENTATION   Time reflects when diagnosis was documented in both MDM as applicable and the Disposition within this note       Time User Action Codes Description Comment    6/22/2025  1:59 PM Arabella Fair [E87.1] Hyponatremia     6/22/2025  1:59 PM Arabella Fair Add [R10.11] Right upper quadrant abdominal pain     6/22/2025  1:59 PM Arabella Fair Add [R10.9] Right flank pain     6/22/2025  1:59 PM Arabella Fair Add [R42] Lightheadedness     6/22/2025  4:06 PM Arabella Fair [R03.0] Elevated blood pressure reading                    [1]   Past Medical History:  Diagnosis Date    Allergic rhinitis 03/21/2023    Anemia     Arthritis     osteoporosis, djd knees, pt denies osteoporosis on 2/21/22    Low's esophagus     Cancer (HCC)     on face    Colon polyp     Cough 02/21/2022    cough for 3 years, expectorates yellow mucus    Cough variant asthma  07/26/2021    Disease of thyroid gland     low thyroid    Diverticulitis     gerd, diverticulitis    Dizziness     GERD (gastroesophageal reflux disease)     ulcerative colitis; Low's esophagus, diverticulosis; hx of laryngopharyngeal reflux    Hyperlipidemia     Hypertension     Hypoferremia 10/04/2024    Low vitamin D level     LPRD (laryngopharyngeal reflux disease) 02/21/2022    PONV (postoperative nausea and vomiting)     AFTER \"BIG SURGERIES\"    Postnasal drip     WITH COUGH    Ulcerative colitis (HCC)    [2]   Past Surgical History:  Procedure Laterality Date    APPENDECTOMY      CARDIAC CATHETERIZATION N/A 09/07/2023    Procedure: Cardiac RHC/LHC;  Surgeon: Rafael Sood MD;  Location: [facility];  Service: Cardiology    CARDIAC CATHETERIZATION N/A 11/07/2023    Procedure: Cardiac tavr;  Surgeon: Pete Hernández MD;  Location: [facility];  Service: Cardiology    " CATARACT EXTRACTION Bilateral     COLONOSCOPY      colon polyps    COLONOSCOPY  07/28/2022    FOOT SURGERY Bilateral     bunionectomy    HEMORRHOID SURGERY      HEMORROIDECTOMY      hemorroid removal    JOINT REPLACEMENT Bilateral     knees    W[place] REPLACE AORTIC VALVE OPENFEMORAL ARTERY APPROACH N/A 11/07/2023    Procedure: REPLACEMENT AORTIC VALVE TRANSCATHETER (TAVR) TRANSFEMORAL W/ 26MM BUCKNER DOROTHEA S3 UR VALVE(ACCESS ON LEFT) GIOVANNY;  Surgeon: Felipe Buitrago DO;  Location: [facility];  Service: Cardiac Surgery    REPLACEMENT TOTAL KNEE Bilateral     b/ replacement    TONSILLECTOMY      TUBAL LIGATION      UPPER GASTROINTESTINAL ENDOSCOPY     [3]   Family History  Problem Relation Name Age of Onset    Heart disease Father      No Known Problems Sister      No Known Problems Sister      Heart disease Brother      Colon cancer Brother  55    Cancer Brother  67    No Known Problems Maternal Grandmother      No Known Problems Paternal Grandmother      No Known Problems Maternal Aunt      No Known Problems Paternal Aunt      Cancer Daughter          unsure of type of cancer, it was female cancer and hysterectomy done by Dr. Fierro   [4]   Social History  Tobacco Use    Smoking status: Never    Smokeless tobacco: Never   Vaping Use    Vaping status: Never Used   Substance Use Topics    Alcohol use: Not Currently    Drug use: Never        Arabella Fair MD  06/22/25 7497

## 2025-06-22 NOTE — ASSESSMENT & PLAN NOTE
Patient reports she is not having abdominal pain at this time she does have some right-sided flank pain  Abdominal pain is resolved  Her pain is likely musculoskeletal in nature will manage conservatively  Start on bowel regiment as she does report feeling constipated

## 2025-06-22 NOTE — ASSESSMENT & PLAN NOTE
Patient with history of UC, denies any change in her bowel habits, during my evaluation her abdominal pain is resolved she states most of her pain is in her right flank

## 2025-06-23 LAB
ALBUMIN SERPL BCG-MCNC: 3.6 G/DL (ref 3.5–5)
ALP SERPL-CCNC: 59 U/L (ref 34–104)
ALT SERPL W P-5'-P-CCNC: 9 U/L (ref 7–52)
ANION GAP SERPL CALCULATED.3IONS-SCNC: 6 MMOL/L (ref 4–13)
AST SERPL W P-5'-P-CCNC: 23 U/L (ref 13–39)
ATRIAL RATE: 62 BPM
BILIRUB SERPL-MCNC: 0.8 MG/DL (ref 0.2–1)
BUN SERPL-MCNC: 9 MG/DL (ref 5–25)
CALCIUM SERPL-MCNC: 8.8 MG/DL (ref 8.4–10.2)
CHLORIDE SERPL-SCNC: 97 MMOL/L (ref 96–108)
CO2 SERPL-SCNC: 25 MMOL/L (ref 21–32)
CREAT SERPL-MCNC: 0.67 MG/DL (ref 0.6–1.3)
ERYTHROCYTE [DISTWIDTH] IN BLOOD BY AUTOMATED COUNT: 14.1 % (ref 11.6–15.1)
GFR SERPL CREATININE-BSD FRML MDRD: 79 ML/MIN/1.73SQ M
GLUCOSE SERPL-MCNC: 88 MG/DL (ref 65–140)
HCT VFR BLD AUTO: 26.5 % (ref 34.8–46.1)
HGB BLD-MCNC: 9.1 G/DL (ref 11.5–15.4)
MCH RBC QN AUTO: 36.1 PG (ref 26.8–34.3)
MCHC RBC AUTO-ENTMCNC: 34.3 G/DL (ref 31.4–37.4)
MCV RBC AUTO: 105 FL (ref 82–98)
P AXIS: 53 DEGREES
PLATELET # BLD AUTO: 294 THOUSANDS/UL (ref 149–390)
PMV BLD AUTO: 9.3 FL (ref 8.9–12.7)
POTASSIUM SERPL-SCNC: 3.7 MMOL/L (ref 3.5–5.3)
PR INTERVAL: 166 MS
PROT SERPL-MCNC: 6.1 G/DL (ref 6.4–8.4)
QRS AXIS: -17 DEGREES
QRSD INTERVAL: 86 MS
QT INTERVAL: 434 MS
QTC INTERVAL: 440 MS
RBC # BLD AUTO: 2.52 MILLION/UL (ref 3.81–5.12)
SODIUM SERPL-SCNC: 128 MMOL/L (ref 135–147)
T WAVE AXIS: 77 DEGREES
VENTRICULAR RATE: 62 BPM
WBC # BLD AUTO: 5.1 THOUSAND/UL (ref 4.31–10.16)

## 2025-06-23 PROCEDURE — 80053 COMPREHEN METABOLIC PANEL: CPT | Performed by: INTERNAL MEDICINE

## 2025-06-23 PROCEDURE — 99232 SBSQ HOSP IP/OBS MODERATE 35: CPT | Performed by: NURSE PRACTITIONER

## 2025-06-23 PROCEDURE — 85027 COMPLETE CBC AUTOMATED: CPT | Performed by: INTERNAL MEDICINE

## 2025-06-23 PROCEDURE — 93010 ELECTROCARDIOGRAM REPORT: CPT | Performed by: INTERNAL MEDICINE

## 2025-06-23 RX ORDER — LOSARTAN POTASSIUM 50 MG/1
50 TABLET ORAL DAILY
Status: DISCONTINUED | OUTPATIENT
Start: 2025-06-23 | End: 2025-06-25

## 2025-06-23 RX ORDER — ASPIRIN 81 MG/1
81 TABLET, CHEWABLE ORAL DAILY
Status: DISCONTINUED | OUTPATIENT
Start: 2025-06-23 | End: 2025-06-27 | Stop reason: HOSPADM

## 2025-06-23 RX ORDER — PANTOPRAZOLE SODIUM 40 MG/1
40 TABLET, DELAYED RELEASE ORAL
Status: DISCONTINUED | OUTPATIENT
Start: 2025-06-23 | End: 2025-06-27 | Stop reason: HOSPADM

## 2025-06-23 RX ORDER — LEVOTHYROXINE SODIUM 88 UG/1
88 TABLET ORAL
Status: DISCONTINUED | OUTPATIENT
Start: 2025-06-23 | End: 2025-06-27 | Stop reason: HOSPADM

## 2025-06-23 RX ORDER — SULFASALAZINE 500 MG/1
1000 TABLET ORAL 2 TIMES DAILY
Status: DISCONTINUED | OUTPATIENT
Start: 2025-06-23 | End: 2025-06-27 | Stop reason: HOSPADM

## 2025-06-23 RX ORDER — KETOROLAC TROMETHAMINE 30 MG/ML
15 INJECTION, SOLUTION INTRAMUSCULAR; INTRAVENOUS ONCE
Status: COMPLETED | OUTPATIENT
Start: 2025-06-23 | End: 2025-06-23

## 2025-06-23 RX ORDER — AMLODIPINE BESYLATE 10 MG/1
10 TABLET ORAL DAILY
Status: DISCONTINUED | OUTPATIENT
Start: 2025-06-23 | End: 2025-06-27 | Stop reason: HOSPADM

## 2025-06-23 RX ORDER — ATORVASTATIN CALCIUM 20 MG/1
20 TABLET, FILM COATED ORAL
Status: DISCONTINUED | OUTPATIENT
Start: 2025-06-23 | End: 2025-06-27 | Stop reason: HOSPADM

## 2025-06-23 RX ADMIN — METHOCARBAMOL 500 MG: 500 TABLET ORAL at 13:47

## 2025-06-23 RX ADMIN — LEVOTHYROXINE SODIUM 88 MCG: 0.09 TABLET ORAL at 09:34

## 2025-06-23 RX ADMIN — POLYETHYLENE GLYCOL 3350 17 G: 17 POWDER, FOR SOLUTION ORAL at 09:23

## 2025-06-23 RX ADMIN — ENOXAPARIN SODIUM 40 MG: 40 INJECTION SUBCUTANEOUS at 09:22

## 2025-06-23 RX ADMIN — ACETAMINOPHEN 975 MG: 325 TABLET, FILM COATED ORAL at 17:51

## 2025-06-23 RX ADMIN — AMLODIPINE BESYLATE 10 MG: 10 TABLET ORAL at 09:34

## 2025-06-23 RX ADMIN — DICLOFENAC SODIUM 2 G: 10 GEL TOPICAL at 17:53

## 2025-06-23 RX ADMIN — PANTOPRAZOLE SODIUM 40 MG: 40 TABLET, DELAYED RELEASE ORAL at 09:34

## 2025-06-23 RX ADMIN — ASPIRIN 81 MG 81 MG: 81 TABLET ORAL at 09:34

## 2025-06-23 RX ADMIN — METHOCARBAMOL 500 MG: 500 TABLET ORAL at 09:22

## 2025-06-23 RX ADMIN — SULFASALAZINE 1000 MG: 500 TABLET ORAL at 10:05

## 2025-06-23 RX ADMIN — LOSARTAN POTASSIUM 50 MG: 50 TABLET, FILM COATED ORAL at 09:34

## 2025-06-23 RX ADMIN — ACETAMINOPHEN 975 MG: 325 TABLET, FILM COATED ORAL at 09:22

## 2025-06-23 RX ADMIN — SENNOSIDES AND DOCUSATE SODIUM 1 TABLET: 50; 8.6 TABLET ORAL at 17:52

## 2025-06-23 RX ADMIN — KETOROLAC TROMETHAMINE 15 MG: 30 INJECTION, SOLUTION INTRAMUSCULAR at 22:27

## 2025-06-23 RX ADMIN — SULFASALAZINE 1000 MG: 500 TABLET ORAL at 17:52

## 2025-06-23 RX ADMIN — DICLOFENAC SODIUM 2 G: 10 GEL TOPICAL at 13:51

## 2025-06-23 RX ADMIN — ACETAMINOPHEN 975 MG: 325 TABLET, FILM COATED ORAL at 01:55

## 2025-06-23 RX ADMIN — METHOCARBAMOL 500 MG: 500 TABLET ORAL at 01:55

## 2025-06-23 RX ADMIN — METHOCARBAMOL 500 MG: 500 TABLET ORAL at 21:41

## 2025-06-23 RX ADMIN — SENNOSIDES AND DOCUSATE SODIUM 1 TABLET: 50; 8.6 TABLET ORAL at 09:22

## 2025-06-23 RX ADMIN — ATORVASTATIN CALCIUM 20 MG: 20 TABLET, FILM COATED ORAL at 17:52

## 2025-06-23 RX ADMIN — DICLOFENAC SODIUM 2 G: 10 GEL TOPICAL at 21:41

## 2025-06-23 NOTE — PROGRESS NOTES
Progress Note - Hospitalist   Name: Ning Rodrigues 85 y.o. 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 Improvement noted.   Essential hypertension  Continue home blood pressure meds of diovan and norvasc  BP was elevated and now 155/71 after receiving her meds   Ulcerative colitis without complications (HCC)  Patient with history of UC, denies any change in her bowel habits.   Follow up with GI as an outpatient   Acute right flank pain  Patient reports she is not having abdominal pain at this time. She endorses right flank pain but does not recall injury or strain. Reviewed CT scan with her and no acute findings noted to explain pain.  Pain is reproducible on exam likely from MSK causes.   Continue on bowel regimen as she does report feeling constipated  Add heat and voltaren gel  Continue tylenol scheduled   Encourage OOB     VTE Pharmacologic Prophylaxis:   Moderate Risk (Score 3-4) - Pharmacological DVT Prophylaxis Ordered: enoxaparin (Lovenox).    Mobility:   Basic Mobility Inpatient Raw Score: 17  JH-HLM Goal: 5: Stand one or more mins  JH-HLM Achieved: 6: Walk 10 steps or more  JH-HLM Goal achieved. Continue to encourage appropriate mobility.    Patient Centered Rounds: I performed bedside rounds with nursing staff today.   Discussions with Specialists or Other Care Team Provider: Primary RN     Education and Discussions with Family / Patient: Patient declined call to .     Current Length of Stay: 1 day(s)  Current Patient Status: Inpatient   Certification Statement: The patient will continue to  require additional inpatient hospital stay due to hyponatremia   Discharge Plan: Anticipate discharge tomorrow to home.    Code Status: Level 1 - Full Code    Subjective   Still with right sided back pain today. She thinks she feels a little better but she is still sore. Has not been eating as she should be because she went off of her strict diet for her UC and then got abdominal pains. Then she stopped eating in hopes that it would go away.     Objective :  Temp:  [97.8 °F (36.6 °C)-98.7 °F (37.1 °C)] 98.7 °F (37.1 °C)  HR:  [59-84] 64  BP: (149-177)/(69-86) 156/71  Resp:  [16-22] 16  SpO2:  [93 %-97 %] 95 %  O2 Device: None (Room air)    Body mass index is 25.79 kg/m².     Input and Output Summary (last 24 hours):     Intake/Output Summary (Last 24 hours) at 6/23/2025 1334  Last data filed at 6/23/2025 1228  Gross per 24 hour   Intake 1017 ml   Output --   Net 1017 ml       Physical Exam  Constitutional:       Appearance: She is not ill-appearing.   HENT:      Nose: Nose normal.     Cardiovascular:      Rate and Rhythm: Normal rate and regular rhythm.      Pulses: Normal pulses.      Heart sounds: Normal heart sounds.   Pulmonary:      Effort: Pulmonary effort is normal.      Breath sounds: Normal breath sounds.   Abdominal:      General: Abdomen is flat.      Palpations: Abdomen is soft.     Musculoskeletal:         General: Normal range of motion.     Skin:     General: Skin is warm and dry.      Capillary Refill: Capillary refill takes less than 2 seconds.     Neurological:      General: No focal deficit present.      Mental Status: She is alert and oriented to person, place, and time.     Psychiatric:         Mood and Affect: Mood normal.         Behavior: Behavior normal.           Lines/Drains:              Lab Results: I have reviewed the following results:   Results from last 7 days   Lab Units 06/23/25  0525 06/22/25  0845   WBC Thousand/uL 5.10 3.49*   HEMOGLOBIN g/dL 9.1* 9.8*   HEMATOCRIT % 26.5* 28.5*    PLATELETS Thousands/uL 294 301   SEGS PCT %  --  49   LYMPHO PCT %  --  36   MONO PCT %  --  13*   EOS PCT %  --  1     Results from last 7 days   Lab Units 06/23/25  0525   SODIUM mmol/L 128*   POTASSIUM mmol/L 3.7   CHLORIDE mmol/L 97   CO2 mmol/L 25   BUN mg/dL 9   CREATININE mg/dL 0.67   ANION GAP mmol/L 6   CALCIUM mg/dL 8.8   ALBUMIN g/dL 3.6   TOTAL BILIRUBIN mg/dL 0.80   ALK PHOS U/L 59   ALT U/L 9   AST U/L 23   GLUCOSE RANDOM mg/dL 88                       Recent Cultures (last 7 days):         Imaging Results Review: I reviewed radiology reports from this admission including: CT abdomen/pelvis.  Other Study Results Review: No additional pertinent studies reviewed.    Last 24 Hours Medication List:     Current Facility-Administered Medications:     acetaminophen (TYLENOL) tablet 975 mg, Q8H    amLODIPine (NORVASC) tablet 10 mg, Daily    aspirin chewable tablet 81 mg, Daily    atorvastatin (LIPITOR) tablet 20 mg, Daily With Dinner    Diclofenac Sodium (VOLTAREN) 1 % topical gel 2 g, 4x Daily    enoxaparin (LOVENOX) subcutaneous injection 40 mg, Daily    levothyroxine tablet 88 mcg, Early Morning    losartan (COZAAR) tablet 50 mg, Daily    methocarbamol (ROBAXIN) tablet 500 mg, Q6H LALO    pantoprazole (PROTONIX) EC tablet 40 mg, Early Morning    polyethylene glycol (MIRALAX) packet 17 g, Daily    senna-docusate sodium (SENOKOT S) 8.6-50 mg per tablet 1 tablet, BID    sulfaSALAzine (AZULFIDINE) tablet 1,000 mg, BID    Administrative Statements   Today, Patient Was Seen By: ALINA Hernandez      **Please Note: This note may have been constructed using a voice recognition system.**

## 2025-06-23 NOTE — ASSESSMENT & PLAN NOTE
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 Improvement noted.

## 2025-06-23 NOTE — ASSESSMENT & PLAN NOTE
Patient with history of UC, denies any change in her bowel habits.   Follow up with GI as an outpatient

## 2025-06-23 NOTE — PLAN OF CARE
Problem: PAIN - ADULT  Goal: Verbalizes/displays adequate comfort level or baseline comfort level  Description: Interventions:  - Encourage patient to monitor pain and request assistance  - Assess pain using appropriate pain scale  - Administer analgesics as ordered based on type and severity of pain and evaluate response  - Implement non-pharmacological measures as appropriate and evaluate response  - Consider cultural and social influences on pain and pain management  - Notify physician/advanced practitioner if interventions unsuccessful or patient reports new pain  - Educate patient/family on pain management process including their role and importance of  reporting pain   - Provide non-pharmacologic/complimentary pain relief interventions  Outcome: Not Progressing     Problem: INFECTION - ADULT  Goal: Absence or prevention of progression during hospitalization  Description: INTERVENTIONS:  - Assess and monitor for signs and symptoms of infection  - Monitor lab/diagnostic results  - Monitor all insertion sites, i.e. indwelling lines, tubes, and drains  - Monitor endotracheal if appropriate and nasal secretions for changes in amount and color  - [place] appropriate cooling/warming therapies per order  - Administer medications as ordered  - Instruct and encourage patient and family to use good hand hygiene technique  - Identify and instruct in appropriate isolation precautions for identified infection/condition  Outcome: Not Progressing     Problem: SAFETY ADULT  Goal: Patient will remain free of falls  Description: INTERVENTIONS:  - Educate patient/family on patient safety including physical limitations  - Instruct patient to call for assistance with activity   - Consider consulting OT/PT to assist with strengthening/mobility based on AM PAC & JH-HLM score  - Consult OT/PT to assist with strengthening/mobility   - Keep Call bell within reach  - Keep bed low and locked with side rails adjusted as appropriate  -  Keep care items and personal belongings within reach  - Initiate and maintain comfort rounds  - Make Fall Risk Sign visible to staff  - Offer Toileting every prn Hours, in advance of need  - Initiate/Maintain bed alarm  - Obtain necessary fall risk management equipment: n/a  - Apply yellow socks and bracelet for high fall risk patients  - Consider moving patient to room near nurses station  Outcome: Not Progressing     Problem: DISCHARGE PLANNING  Goal: Discharge to home or other facility with appropriate resources  Description: INTERVENTIONS:  - Identify barriers to discharge w/patient and caregiver  - Arrange for needed discharge resources and transportation as appropriate  - Identify discharge learning needs (meds, wound care, etc.)  - Arrange for interpretive services to assist at discharge as needed  - Refer to Case Management Department for coordinating discharge planning if the patient needs post-hospital services based on physician/advanced practitioner order or complex needs related to functional status, cognitive ability, or social support system  Outcome: Not Progressing     Problem: Knowledge Deficit  Goal: Patient/family/caregiver demonstrates understanding of disease process, treatment plan, medications, and discharge instructions  Description: Complete learning assessment and assess knowledge base.  Interventions:  - Provide teaching at level of understanding  - Provide teaching via preferred learning methods  Outcome: Not Progressing

## 2025-06-23 NOTE — PLAN OF CARE
Problem: PAIN - ADULT  Goal: Verbalizes/displays adequate comfort level or baseline comfort level  Description: Interventions:  - Encourage patient to monitor pain and request assistance  - Assess pain using appropriate pain scale  - Administer analgesics as ordered based on type and severity of pain and evaluate response  - Implement non-pharmacological measures as appropriate and evaluate response  - Consider cultural and social influences on pain and pain management  - Notify physician/advanced practitioner if interventions unsuccessful or patient reports new pain  - Educate patient/family on pain management process including their role and importance of  reporting pain   - Provide non-pharmacologic/complimentary pain relief interventions  6/23/2025 1015 by Vesna Ordaz RN  Outcome: Progressing  6/23/2025 1015 by Vesna Ordaz RN  Outcome: Progressing     Problem: INFECTION - ADULT  Goal: Absence or prevention of progression during hospitalization  Description: INTERVENTIONS:  - Assess and monitor for signs and symptoms of infection  - Monitor lab/diagnostic results  - Monitor all insertion sites, i.e. indwelling lines, tubes, and drains  - Monitor endotracheal if appropriate and nasal secretions for changes in amount and color  - [place] appropriate cooling/warming therapies per order  - Administer medications as ordered  - Instruct and encourage patient and family to use good hand hygiene technique  - Identify and instruct in appropriate isolation precautions for identified infection/condition  6/23/2025 1015 by Vesna Ordaz RN  Outcome: Progressing  6/23/2025 1015 by Vesna Ordaz RN  Outcome: Progressing     Problem: INFECTION - ADULT  Goal: Absence of fever/infection during neutropenic period  Description: INTERVENTIONS:  - Monitor WBC  - Perform strict hand hygiene  - Limit to healthy visitors only  - No plants, dried, fresh or silk flowers with huang in patient  room  6/23/2025 1015 by Vesna Ordaz RN  Outcome: Progressing  6/23/2025 1015 by Vesna Ordaz RN  Outcome: Progressing     Problem: SAFETY ADULT  Goal: Maintain or return to baseline ADL function  Description: INTERVENTIONS:  -  Assess patient's ability to carry out ADLs; assess patient's baseline for ADL function and identify physical deficits which impact ability to perform ADLs (bathing, care of mouth/teeth, toileting, grooming, dressing, etc.)  - Assess/evaluate cause of self-care deficits   - Assess range of motion  - Assess patient's mobility; develop plan if impaired  - Assess patient's need for assistive devices and provide as appropriate  - Encourage maximum independence but intervene and supervise when necessary  - Involve family in performance of ADLs  - Assess for home care needs following discharge   - Consider OT consult to assist with ADL evaluation and planning for discharge  - Provide patient education as appropriate  - Monitor functional capacity and physical performance, use of AM PAC & JH-HLM   - Monitor gait, balance and fatigue with ambulation    6/23/2025 1015 by Vesna Ordaz RN  Outcome: Progressing  6/23/2025 1015 by Vesna Ordaz RN  Outcome: Progressing

## 2025-06-23 NOTE — CASE MANAGEMENT
Case Management Assessment    Patient name Ning Rodrigues  Location /-01 MRN [id number]  : 1940 Date 2025       Current Admission Date: 2025  Current Admission Diagnosis:Hyponatremia   Patient Active Problem List    Diagnosis Date Noted    Abdominal pain 2025    Anemia due to chronic kidney disease 2025    Low serum haptoglobin 2025    Hyponatremia 10/04/2024    Mild persistent asthma without complication 2024    S/P TAVR (transcatheter aortic valve replacement) 2023    Abnormal CT of the chest 2023    Calcified granuloma of lung 2023    Allergic rhinitis 2023    Herpes zoster without complication 10/13/2021    Chronic cough 2021    Dairy product intolerance 2021    Glenoma allergy 2021    B12 deficiency 2021    Cough variant asthma  2021    Dysphonia 2021    Pharyngoesophageal dysphagia 2021    Dysfunction of both eustachian tubes 2021    Essential hypertension 2020    Hypercholesterolemia 2020    Hypothyroidism 2020    Ulcerative colitis without complications (HCC) 2020    Diverticulosis 2020    Low's esophagus 2020    Colon polyp 2020    Gastroesophageal reflux disease with esophagitis 2020    Vitamin D deficiency 2020    Macrocytic anemia 2020      LOS (days): 1  Geometric Mean LOS (GMLOS) (days):   Days to GMLOS:     OBJECTIVE:    Risk of Unplanned Readmission Score: 9.63     Current admission status: Inpatient     Preferred Pharmacy:   [facility] [address] [facility]  [address] [place], [place] [address]  Phone: [phone] Fax: [phone]    [id number] [place] - [address] [address] [facility] [address]  Phone: [phone] Fax: [phone]    Primary Care Provider: Yohan Regan MD    Primary Insurance: MEDICARE  Secondary Insurance: [employer]  [place]    ASSESSMENT:  Active Health Care Proxies       RENARD LUKE Health Care Agent - Spouse   Primary Phone: [phone] (Mobile)  Home Phone: [phone]            Advance Directives  Does patient have a Health Care POA?: Yes  Does patient have Advance Directives?: Yes  Advance Directives: Power of  for health care  Primary Contact: Renard Luke (spouse)    Readmission Root Cause  30 Day Readmission: No    Patient Information  Admitted from:: Home  Mental Status: Alert  During Assessment patient was accompanied by: Not accompanied during assessment  Assessment information provided by:: Patient  Primary Caregiver: Self  Support Systems: Son, Daughter, Spouse/significant other, Family members  County of Residence: [place]  What city do you live in?: [place]  Home entry access options. Select all that apply.: Stairs  Number of steps to enter home.: 5  Do the steps have railings?: Yes  Type of Current Residence: [facility]  Living Arrangements: Lives w/ Spouse/significant other  Is patient a ?: No    Activities of Daily Living Prior to Admission  Functional Status: Independent  Completes ADLs independently?: Yes  Ambulates independently?: Yes  Does patient use assisted devices?: Yes  Assisted Devices (DME) used: Walker  Does patient currently own DME?: Yes  What DME does the patient currently own?: Walker  Does patient have a history of Outpatient Therapy (PT/OT)?: No  Does the patient have a history of Short-Term Rehab?: No  Does patient have a history of HHC?: Yes  Does patient currently have HHC?: No    Patient Information Continued  Income Source: Pension/skilled nursing  Does patient have prescription coverage?: Yes  Can the patient afford their medications and any related supplies (such as glucometers or test strips)?: Yes  Does patient receive dialysis treatments?: No  Does patient have a history of substance abuse?: No  Does patient have a history of Mental Health Diagnosis?: No    Means of  Transportation  Means of Transport to Appts:: Family transport    The patient is admitted for hyponatremia. PT/OT evals pending for level of care recommendations. CM will continue to follow the patient through discharge.

## 2025-06-23 NOTE — ASSESSMENT & PLAN NOTE
Patient reports she is not having abdominal pain at this time. She endorses right flank pain but does not recall injury or strain. Reviewed CT scan with her and no acute findings noted to explain pain.  Pain is reproducible on exam likely from MSK causes.   Continue on bowel regimen as she does report feeling constipated  Add heat and voltaren gel  Continue tylenol scheduled   Encourage OOB

## 2025-06-23 NOTE — ASSESSMENT & PLAN NOTE
Continue home blood pressure meds of diovan and norvasc  BP was elevated and now 155/71 after receiving her meds

## 2025-06-24 LAB
ANION GAP SERPL CALCULATED.3IONS-SCNC: 7 MMOL/L (ref 4–13)
BUN SERPL-MCNC: 10 MG/DL (ref 5–25)
CALCIUM SERPL-MCNC: 9 MG/DL (ref 8.4–10.2)
CHLORIDE SERPL-SCNC: 96 MMOL/L (ref 96–108)
CO2 SERPL-SCNC: 24 MMOL/L (ref 21–32)
CREAT SERPL-MCNC: 0.63 MG/DL (ref 0.6–1.3)
GFR SERPL CREATININE-BSD FRML MDRD: 81 ML/MIN/1.73SQ M
GLUCOSE SERPL-MCNC: 87 MG/DL (ref 65–140)
OSMOLALITY UR/SERPL-RTO: 267 MMOL/KG (ref 282–298)
OSMOLALITY UR: 325 MMOL/KG (ref 250–900)
POTASSIUM SERPL-SCNC: 3.6 MMOL/L (ref 3.5–5.3)
SODIUM SERPL-SCNC: 127 MMOL/L (ref 135–147)
SODIUM UR-SCNC: 53 MMOL/L
URATE SERPL-MCNC: 2.8 MG/DL (ref 2–7.5)

## 2025-06-24 PROCEDURE — 84550 ASSAY OF BLOOD/URIC ACID: CPT | Performed by: INTERNAL MEDICINE

## 2025-06-24 PROCEDURE — 80048 BASIC METABOLIC PNL TOTAL CA: CPT | Performed by: NURSE PRACTITIONER

## 2025-06-24 PROCEDURE — 83935 ASSAY OF URINE OSMOLALITY: CPT | Performed by: NURSE PRACTITIONER

## 2025-06-24 PROCEDURE — 97535 SELF CARE MNGMENT TRAINING: CPT

## 2025-06-24 PROCEDURE — 97162 PT EVAL MOD COMPLEX 30 MIN: CPT

## 2025-06-24 PROCEDURE — 83930 ASSAY OF BLOOD OSMOLALITY: CPT | Performed by: NURSE PRACTITIONER

## 2025-06-24 PROCEDURE — 99232 SBSQ HOSP IP/OBS MODERATE 35: CPT | Performed by: NURSE PRACTITIONER

## 2025-06-24 PROCEDURE — 97116 GAIT TRAINING THERAPY: CPT

## 2025-06-24 PROCEDURE — 84300 ASSAY OF URINE SODIUM: CPT | Performed by: NURSE PRACTITIONER

## 2025-06-24 PROCEDURE — 97167 OT EVAL HIGH COMPLEX 60 MIN: CPT

## 2025-06-24 RX ORDER — TRAMADOL HYDROCHLORIDE 50 MG/1
50 TABLET ORAL EVERY 6 HOURS PRN
Status: DISCONTINUED | OUTPATIENT
Start: 2025-06-24 | End: 2025-06-26

## 2025-06-24 RX ADMIN — SULFASALAZINE 1000 MG: 500 TABLET ORAL at 08:31

## 2025-06-24 RX ADMIN — LEVOTHYROXINE SODIUM 88 MCG: 0.09 TABLET ORAL at 05:07

## 2025-06-24 RX ADMIN — METHOCARBAMOL 500 MG: 500 TABLET ORAL at 02:08

## 2025-06-24 RX ADMIN — LOSARTAN POTASSIUM 50 MG: 50 TABLET, FILM COATED ORAL at 08:33

## 2025-06-24 RX ADMIN — DICLOFENAC SODIUM 2 G: 10 GEL TOPICAL at 12:16

## 2025-06-24 RX ADMIN — SULFASALAZINE 1000 MG: 500 TABLET ORAL at 17:05

## 2025-06-24 RX ADMIN — DICLOFENAC SODIUM 2 G: 10 GEL TOPICAL at 22:15

## 2025-06-24 RX ADMIN — ACETAMINOPHEN 975 MG: 325 TABLET, FILM COATED ORAL at 10:15

## 2025-06-24 RX ADMIN — ACETAMINOPHEN 975 MG: 325 TABLET, FILM COATED ORAL at 02:08

## 2025-06-24 RX ADMIN — SENNOSIDES AND DOCUSATE SODIUM 1 TABLET: 50; 8.6 TABLET ORAL at 08:32

## 2025-06-24 RX ADMIN — ASPIRIN 81 MG 81 MG: 81 TABLET ORAL at 08:32

## 2025-06-24 RX ADMIN — AMLODIPINE BESYLATE 10 MG: 10 TABLET ORAL at 08:33

## 2025-06-24 RX ADMIN — PANTOPRAZOLE SODIUM 40 MG: 40 TABLET, DELAYED RELEASE ORAL at 05:07

## 2025-06-24 RX ADMIN — ACETAMINOPHEN 975 MG: 325 TABLET, FILM COATED ORAL at 17:05

## 2025-06-24 RX ADMIN — ENOXAPARIN SODIUM 40 MG: 40 INJECTION SUBCUTANEOUS at 08:31

## 2025-06-24 RX ADMIN — POLYETHYLENE GLYCOL 3350 17 G: 17 POWDER, FOR SOLUTION ORAL at 08:33

## 2025-06-24 RX ADMIN — METHOCARBAMOL 500 MG: 500 TABLET ORAL at 20:24

## 2025-06-24 RX ADMIN — DICLOFENAC SODIUM 2 G: 10 GEL TOPICAL at 17:06

## 2025-06-24 RX ADMIN — METHOCARBAMOL 500 MG: 500 TABLET ORAL at 14:05

## 2025-06-24 RX ADMIN — ATORVASTATIN CALCIUM 20 MG: 20 TABLET, FILM COATED ORAL at 17:06

## 2025-06-24 RX ADMIN — METHOCARBAMOL 500 MG: 500 TABLET ORAL at 08:32

## 2025-06-24 RX ADMIN — DICLOFENAC SODIUM 2 G: 10 GEL TOPICAL at 08:31

## 2025-06-24 RX ADMIN — SENNOSIDES AND DOCUSATE SODIUM 1 TABLET: 50; 8.6 TABLET ORAL at 17:06

## 2025-06-24 NOTE — ASSESSMENT & PLAN NOTE
Patient with a history of hyponatremia, did not respond to 1 L normal saline bolus.   Will continue FR for now  Suspect SIADH as she has had hyponatremia in the past. Was recommended she be seen by nephrology but patient does not recall if that was done or not.   Will ask for their opinion given her Na of 127 again today.   Check urine osmo, serum osmo and urine sodium and those are pending

## 2025-06-24 NOTE — PLAN OF CARE
Problem: PAIN - ADULT  Goal: Verbalizes/displays adequate comfort level or baseline comfort level  Description: Interventions:  - Encourage patient to monitor pain and request assistance  - Assess pain using appropriate pain scale  - Administer analgesics as ordered based on type and severity of pain and evaluate response  - Implement non-pharmacological measures as appropriate and evaluate response  - Consider cultural and social influences on pain and pain management  - Notify physician/advanced practitioner if interventions unsuccessful or patient reports new pain  - Educate patient/family on pain management process including their role and importance of  reporting pain   - Provide non-pharmacologic/complimentary pain relief interventions  Outcome: Progressing     Problem: INFECTION - ADULT  Goal: Absence of fever/infection during neutropenic period  Description: INTERVENTIONS:  - Monitor WBC  - Perform strict hand hygiene  - Limit to healthy visitors only  - No plants, dried, fresh or silk flowers with huang in patient room  Outcome: Progressing     Problem: SAFETY ADULT  Goal: Maintain or return to baseline ADL function  Description: INTERVENTIONS:  -  Assess patient's ability to carry out ADLs; assess patient's baseline for ADL function and identify physical deficits which impact ability to perform ADLs (bathing, care of mouth/teeth, toileting, grooming, dressing, etc.)  - Assess/evaluate cause of self-care deficits   - Assess range of motion  - Assess patient's mobility; develop plan if impaired  - Assess patient's need for assistive devices and provide as appropriate  - Encourage maximum independence but intervene and supervise when necessary  - Involve family in performance of ADLs  - Assess for home care needs following discharge   - Consider OT consult to assist with ADL evaluation and planning for discharge  - Provide patient education as appropriate  - Monitor functional capacity and physical  performance, use of AM PAC & JH-HLM   - Monitor gait, balance and fatigue with ambulation    Outcome: Progressing     Problem: Knowledge Deficit  Goal: Patient/family/caregiver demonstrates understanding of disease process, treatment plan, medications, and discharge instructions  Description: Complete learning assessment and assess knowledge base.  Interventions:  - Provide teaching at level of understanding  - Provide teaching via preferred learning methods  Outcome: Progressing

## 2025-06-24 NOTE — PHYSICAL THERAPY NOTE
PHYSICAL THERAPY Evaluation and Treatment  DATE: 06/24/25  TIME: 6238-4449    NAME:  Ning Rodrigues  AGE:   85 y.o.  Mrn:   [id number]  Length Of Stay: 2    ADMIT DX:  Lightheadedness [R42]  Hyponatremia [E87.1]  Elevated blood pressure reading [R03.0]  Right flank pain [R10.9]  Right upper quadrant abdominal pain [R10.11]    Past Medical History[1]  Past Surgical History[2]     06/24/25 1033   PT Last Visit   PT Visit Date 06/24/25   Note Type   Note type Evaluation   Additional Comments 86 y/o presents due to abdominal/flank pain, lightheaded, vomiting, presyncopal symptoms.  Upon assessment: hyponatremia, HTN. Ulcerative colitis   Pain Assessment   Pain Assessment Tool 0-10   Pain Score 5   Pain Location/Orientation Orientation: Right;Orientation: Lower;Location: Back   Hospital Pain Intervention(s) Repositioned;Ambulation/increased activity;Rest   Restrictions/Precautions   Other Precautions Fall Risk;Pain;Chair Alarm   Home Living   Additional Comments Pt lives with  in 1-level house, 5 AIDA (lexi handrail).  DME: SPC, RW   Prior Function   Comments Prior to admission: ambulates with SPC or RW.  mod I for ADL.  assist for heavy duty IADLs.  only drives short distance.   General   Family/Caregiver Present Yes   Cognition   Overall Cognitive Status WFL   Arousal/Participation Alert   Orientation Level Oriented X4   Subjective   Subjective Pt pleasant and agreeable, but continues to report right low back pain, but is tolerable for activity. Pt expresses no significant change in pain with movement.   RUE Assessment   RUE Assessment WFL   LUE Assessment   LUE Assessment WFL   RLE Assessment   RLE Assessment WFL   LLE Assessment   LLE Assessment WFL   Coordination   Movements are Fluid and Coordinated 1   Sensation WFL   Transfers   Sit to Stand 6  Modified independent   Additional items Armrests;Increased time required;Verbal cues  (SPC or RW)   Stand to Sit 6  Modified independent   Additional items  Bedrails;Armrests;Increased time required;Verbal cues   Ambulation/Elevation   Gait Assistance 4  Minimal assist  (Min A with SPC. mod I with RW)   Additional items Assist x 1;Verbal cues   Assistive Device Rolling walker;SPC   Distance 15' (SPC), 250' (RW)   Stair Management Assistance 5  Supervision   Additional items Assist x 1   Stair Management Technique Two rails;Alternating pattern;Foreward   Ambulation/Elevation Additional Comments cues for walker/postural management.  Pt displaying much improved balance with RW   Balance   Static Sitting Good   Dynamic Sitting Fair +   Static Standing Fair +  (RW)   Dynamic Standing Fair +  (RW)   Ambulatory Fair +  (RW)   Endurance Deficit   Endurance Deficit No   Activity Tolerance   Activity Tolerance Patient tolerated treatment well   Medical Staff Made Aware collaborated with OT   Assessment   Prognosis Good   Problem List Impaired balance;Pain   Assessment Pt's PMHx: anemia, osteoporosis, cx, lexi bunionectomy, lexi TKA, aortic valve replacement. Pt displays mod I when mobilizing with RW, although does express some lightheadedness.  No significant balance or safety concerns when ambulating with that RW, but this is not her typical AD utilized.  Pt may benefit from OP PT to address prolonged balance issues and progress to higher level safe functional activity.  Pt does not currently need ongoing acute inpatient PT services, and can return home safely with family. The AM-PAC Mobility Score was used to assist in determining pt safety w/ mobility/self care & appropriate d/c recommendations, see above for scores. Patient's clinical presentation is evolving due to significant acute change in functional independence, uncontrolled pain, significant need for care assistance compared to baseline, and ongoing medical management needs.   Barriers to Discharge None   Plan   PT Frequency   (Evaluation and discharge Summary)   Discharge Recommendation   Rehab Resource Intensity Level,  "PT III (Minimum Resource Intensity)   AM-PAC Basic Mobility Inpatient   Turning in Flat Bed Without Bedrails 4   Lying on Back to Sitting on Edge of Flat Bed Without Bedrails 4   Moving Bed to Chair 3   Standing Up From Chair Using Arms 3   Walk in Room 3   Climb 3-5 Stairs With Railing 3   Basic Mobility Inpatient Raw Score 20   Basic Mobility Standardized Score 43.99   [facility] Highest Level Of Mobility   JH-HLM Goal 6: Walk 10 steps or more   JH-HLM Achieved 8: Walk 250 feet ot more   Additional Treatment Session   Start Time 1043   End Time 1055   Treatment Assessment Educated and instructed pt walker and postural management.  Progressed OOB activity tolerance and ambulation as able.   End of Consult   Patient Position at End of Consult Bedside chair;Bed/Chair alarm activated;All needs within reach   The patient's AM-PAC Basic Mobility Inpatient Short Form Raw Score is 20. A Raw score of greater than or equal to 16 suggests the patient may benefit from discharge to home. Please also refer to the recommendation of the Physical Therapist for safe discharge planning.    Hakeem Chin, PT, DPT  [place] Licensure #[id number]         [1]   Past Medical History:  Diagnosis Date    Allergic rhinitis 03/21/2023    Anemia     Arthritis     osteoporosis, djd knees, pt denies osteoporosis on 2/21/22    Low's esophagus     Cancer (HCC)     on face    Colon polyp     Cough 02/21/2022    cough for 3 years, expectorates yellow mucus    Cough variant asthma  07/26/2021    Disease of thyroid gland     low thyroid    Diverticulitis     gerd, diverticulitis    Dizziness     GERD (gastroesophageal reflux disease)     ulcerative colitis; Low's esophagus, diverticulosis; hx of laryngopharyngeal reflux    Hyperlipidemia     Hypertension     Hypoferremia 10/04/2024    Low vitamin D level     LPRD (laryngopharyngeal reflux disease) 02/21/2022    PONV (postoperative nausea and vomiting)     AFTER \"BIG SURGERIES\"    Postnasal " drip     WITH COUGH    Ulcerative colitis (HCC)    [2]   Past Surgical History:  Procedure Laterality Date    APPENDECTOMY      CARDIAC CATHETERIZATION N/A 09/07/2023    Procedure: Cardiac RHC/LHC;  Surgeon: Rafael Sood MD;  [facility]: [facility];  Service: Cardiology    CARDIAC CATHETERIZATION N/A 11/07/2023    Procedure: Cardiac tavr;  Surgeon: Pete Hernández MD;  Location: [facility];  Service: Cardiology    CATARACT EXTRACTION Bilateral     COLONOSCOPY      colon polyps    COLONOSCOPY  07/28/2022    FOOT SURGERY Bilateral     bunionectomy    HEMORRHOID SURGERY      HEMORROIDECTOMY      hemorroid removal    JOINT REPLACEMENT Bilateral     knees    IN REPLACE AORTIC VALVE OPENFEMORAL ARTERY APPROACH N/A 11/07/2023    Procedure: REPLACEMENT AORTIC VALVE TRANSCATHETER (TAVR) TRANSFEMORAL W/ 26MM BUCKNER DOROTHEA S3 UR VALVE(ACCESS ON LEFT) GIOVANNY;  Surgeon: Felipe Buitrago DO;  Location: [facility];  Service: Cardiac Surgery    REPLACEMENT TOTAL KNEE Bilateral     b/ replacement    TONSILLECTOMY      TUBAL LIGATION      UPPER GASTROINTESTINAL ENDOSCOPY

## 2025-06-24 NOTE — ASSESSMENT & PLAN NOTE
Patient reports she is not having abdominal pain at this time. She endorses right flank pain but does not recall injury or strain. Reviewed CT scan with her today (6/24/25) and no acute findings noted to explain pain.  Pain is reproducible on exam likely from MSK causes. It is mildly improved with voltaren she states.   Continue tylenol and robaxin scheduled and aqua K pad   Add ultram for moderate pain   Encourage OOB

## 2025-06-24 NOTE — PLAN OF CARE
Problem: PAIN - ADULT  Goal: Verbalizes/displays adequate comfort level or baseline comfort level  Description: Interventions:  - Encourage patient to monitor pain and request assistance  - Assess pain using appropriate pain scale  - Administer analgesics as ordered based on type and severity of pain and evaluate response  - Implement non-pharmacological measures as appropriate and evaluate response  - Consider cultural and social influences on pain and pain management  - Notify physician/advanced practitioner if interventions unsuccessful or patient reports new pain  - Educate patient/family on pain management process including their role and importance of  reporting pain   - Provide non-pharmacologic/complimentary pain relief interventions  6/24/2025 0637 by Delmis Gates RN  Outcome: Progressing  6/24/2025 0146 by Delmis Gates RN  Outcome: Progressing     Problem: Knowledge Deficit  Goal: Patient/family/caregiver demonstrates understanding of disease process, treatment plan, medications, and discharge instructions  Description: Complete learning assessment and assess knowledge base.  Interventions:  - Provide teaching at level of understanding  - Provide teaching via preferred learning methods  Outcome: Progressing

## 2025-06-24 NOTE — ASSESSMENT & PLAN NOTE
Patient with history of UC, denies any change in her bowel habits.   Follow up with GI as an outpatient   Continue sulfasalazine

## 2025-06-24 NOTE — OCCUPATIONAL THERAPY NOTE
Occupational Therapy Evaluation & Treat     Patient Name: Ning Rodrigues  Today's Date: 6/24/2025  Problem List  Principal Problem:    Hyponatremia  Active Problems:    Essential hypertension    Ulcerative colitis without complications (HCC)    Acute right flank pain    Past Medical History  Past Medical History[1]  Past Surgical History  Past Surgical History[2]        06/24/25 1055   OT Last Visit   OT Visit Date 06/24/25   Note Type   Note type Evaluation  (& Treat)   Pain Assessment   Pain Assessment Tool 0-10   Pain Score 5   Pain Location/Orientation Orientation: Right  (flank)   Restrictions/Precautions   Weight Bearing Precautions Per Order No   Other Precautions Fall Risk;Pain;Chair Alarm;Bed Alarm   Home Living   Type of Home House   Home Layout One level;Stairs to enter with rails   Bathroom Shower/Tub Walk-in shower   Bathroom Toilet Standard   Bathroom Equipment Shower chair   Bathroom Accessibility Accessible   Home Equipment Cane;Walker  (Pt typically relies on cane)   Prior Function   Level of [place] Independent with ADLs;Independent with functional mobility;Needs assistance with IADLS   Lives With Spouse   IADLs Independent with driving;Independent with meal prep;Independent with medication management   Vocational Retired   ADL   Eating Assistance 7  Independent   Grooming Assistance 7  Independent   UB Bathing Assistance 5  Supervision/Setup   LB Bathing Assistance 5  Supervision/Setup   UB Dressing Assistance 5  Supervision/Setup   LB Dressing Assistance 4  Minimal Assistance   Toileting Assistance  5  Supervision/Setup   Bed Mobility   Additional Comments Pt received sitting in recliner.   Transfers   Sit to Stand 6  Modified independent   Additional items Armrests   Stand to Sit 6  Modified independent   Additional items Armrests   Functional Mobility   Functional Mobility 5  Supervision   Additional Comments cane. Pt noted to occassionally reach for furniture. Improved with use of RW    Balance   Static Sitting Good   Dynamic Sitting Fair +   Static Standing Fair +   Dynamic Standing Fair +   Activity Tolerance   Activity Tolerance Patient tolerated treatment well   Medical Staff Made Aware PT Carroll   RUE Assessment   RUE Assessment WFL   LUE Assessment   LUE Assessment WFL   Cognition   Overall Cognitive Status WFL   Arousal/Participation Alert   Attention Within functional limits   Orientation Level Oriented X4   Memory Within functional limits   Following Commands Follows all commands and directions without difficulty   Assessment   Assessment Pt is a 85 y.o. female seen for OT evaluation at [facility], admitted 6/22/2025 w/ acute right flank pain. Pt found to have Hyponatremia.  Comorbidities affecting pt's functional performance at time of assessment include: essential HTN, hx of TAVR, dysphonia  .  Prior to admission, pt was living with spouse in house with steps to manage.  Pt was I w/  ADLS and IADLS, (+) drove, & required cane PTA. Upon evaluation, pt presents at/close to functional baseline, but may be limited due to symptomology caused by medical issues.    This evaluation required an extensive review of medical and/or therapy records and additional review of physical, cognitive and psychosocial history related to functional performance. Based upon functional performance deficits and assessments, this evaluation has been identified as a high complexity evaluation.The patient's raw score on the AM-PAC Daily Activity inpatient short form is 23, standardized score is 51.12, greater than 39.4. Patients at this level are likely to benefit from DC to home. Please refer to the recommendation of the Occupational Therapist for safe DC planning. At this time, OT recommendations at time of discharge are level 3 low intensity services. No further acute OT needs indicated at this time - Recommend pt continue to be OOB for meals, ambulation to/from BR, perform self care tasks, and  mobility in hallway with nursing. D/C from OT caseload with above recommendations.   Goals   Patient Goals Pt wishes to get home   Plan   OT Frequency Eval only   Discharge Recommendation   Rehab Resource Intensity Level, OT III (Minimum Resource Intensity)   AM-PAC Daily Activity Inpatient   Lower Body Dressing 3   Bathing 4   Toileting 4   Upper Body Dressing 4   Grooming 4   Eating 4   Daily Activity Raw Score 23   Daily Activity Standardized Score (Calc for Raw Score >=11) 51.12   AM-PAC Applied Cognition Inpatient   Following a Speech/Presentation 3   Understanding Ordinary Conversation 4   Taking Medications 4   Remembering Where Things Are Placed or Put Away 4   Remembering List of 4-5 Errands 3   Taking Care of Complicated Tasks 3   Applied Cognition Raw Score 21   Applied Cognition Standardized Score 44.3   Additional Treatment Session   Start Time 1040   End Time 1055   Treatment Assessment Pt performed functional mobility into bathroom with use of cane with supervision however noted to reach for furniture at times. Improved with use of RW. Performed toilet transfer with supervision. Performed LB dressing with min A to manage tying pants at waist. Performed pericare with supervision. Pt went on to hand washing at sink with supervision. Pt returned to bedside chair wit supervision.   End of Consult   Education Provided Yes   Patient Position at End of Consult Bedside chair;Bed/Chair alarm activated;All needs within reach   Nurse Communication Nurse aware of consult         Lisy Johnson OTR/L        [1]   Past Medical History:  Diagnosis Date    Allergic rhinitis 03/21/2023    Anemia     Arthritis     osteoporosis, djd knees, pt denies osteoporosis on 2/21/22    Low's esophagus     Cancer (HCC)     on face    Colon polyp     Cough 02/21/2022    cough for 3 years, expectorates yellow mucus    Cough variant asthma  07/26/2021    Disease of thyroid gland     low thyroid    Diverticulitis     gerd,  "diverticulitis    Dizziness     GERD (gastroesophageal reflux disease)     ulcerative colitis; Low's esophagus, diverticulosis; hx of laryngopharyngeal reflux    Hyperlipidemia     Hypertension     Hypoferremia 10/04/2024    Low vitamin D level     LPRD (laryngopharyngeal reflux disease) 02/21/2022    PONV (postoperative nausea and vomiting)     AFTER \"BIG SURGERIES\"    Postnasal drip     WITH COUGH    Ulcerative colitis (HCC)    [2]   Past Surgical History:  Procedure Laterality Date    APPENDECTOMY      CARDIAC CATHETERIZATION N/A 09/07/2023    Procedure: Cardiac RHC/LHC;  Surgeon: Rafael Sood MD;  [facility];  Service: Cardiology    CARDIAC CATHETERIZATION N/A 11/07/2023    Procedure: Cardiac tavr;  Surgeon: Pete Hernández MD;  [facility];  Service: Cardiology    CATARACT EXTRACTION Bilateral     COLONOSCOPY      colon polyps    COLONOSCOPY  07/28/2022    FOOT SURGERY Bilateral     bunionectomy    HEMORRHOID SURGERY      HEMORROIDECTOMY      hemorroid removal    JOINT REPLACEMENT Bilateral     knees    MD REPLACE AORTIC VALVE OPENFEMORAL ARTERY APPROACH N/A 11/07/2023    Procedure: REPLACEMENT AORTIC VALVE TRANSCATHETER (TAVR) TRANSFEMORAL W/ 26MM BUCKNER DOROTHEA S3 UR VALVE(ACCESS ON LEFT) GIOVANNY;  Surgeon: Felipe Buitrago DO;  [facility];  Service: Cardiac Surgery    REPLACEMENT TOTAL KNEE Bilateral     b/ replacement    TONSILLECTOMY      TUBAL LIGATION      UPPER GASTROINTESTINAL ENDOSCOPY       "

## 2025-06-24 NOTE — PROGRESS NOTES
Progress Note - Hospitalist   Name: Ning Rodrigues 85 y.o. female I MRN: [id number]  Unit/Bed#: -01 I Date of Admission: 6/22/2025   Date of Service: 6/24/2025 I Hospital Day: 2    Assessment & Plan  Hyponatremia  Patient with a history of hyponatremia, did not respond to 1 L normal saline bolus.   Will continue FR for now  Suspect SIADH as she has had hyponatremia in the past. Was recommended she be seen by nephrology but patient does not recall if that was done or not.   Will ask for their opinion given her Na of 127 again today.   Check urine osmo, serum osmo and urine sodium and those are pending   Essential hypertension  Continue home blood pressure meds of diovan and norvasc  BP was elevated at 192/84 today   Ulcerative colitis without complications (HCC)  Patient with history of UC, denies any change in her bowel habits.   Follow up with GI as an outpatient   Continue sulfasalazine   Acute right flank pain  Patient reports she is not having abdominal pain at this time. She endorses right flank pain but does not recall injury or strain. Reviewed CT scan with her today (6/24/25) and no acute findings noted to explain pain.  Pain is reproducible on exam likely from MSK causes. It is mildly improved with voltaren she states.   Continue tylenol and robaxin scheduled and aqua K pad   Add ultram for moderate pain   Encourage OOB     VTE Pharmacologic Prophylaxis:   Moderate Risk (Score 3-4) - Pharmacological DVT Prophylaxis Ordered: enoxaparin (Lovenox).    Mobility:   Basic Mobility Inpatient Raw Score: 20  JH-HLM Goal: 6: Walk 10 steps or more  JH-HLM Achieved: 8: Walk 250 feet ot more  JH-HLM Goal achieved. Continue to encourage appropriate mobility.    Patient Centered Rounds: I performed bedside rounds with nursing staff today.   Discussions with Specialists or Other Care Team Provider: Primary RN     Education and Discussions with Family / Patient: Updated  () at bedside.    Current  Length of Stay: 2 day(s)  Current Patient Status: Inpatient   Certification Statement: The patient will continue to require additional inpatient hospital stay due to hyponatremia and back pain  Discharge Plan: Anticipate discharge in 24-48 hrs to home.    Code Status: Level 1 - Full Code    Subjective   Patient's still has right back pain but does not radiate down to her legs.  She has been able to get up and move around.  She thinks the Voltaren is helping.  She also thinks the aqua K-pad helped.    Objective :  Temp:  [97.6 °F (36.4 °C)-98.3 °F (36.8 °C)] 98.1 °F (36.7 °C)  HR:  [55-66] 66  BP: (135-192)/(60-92) 192/84  Resp:  [16-17] 16  SpO2:  [94 %-96 %] 94 %  O2 Device: None (Room air)    Body mass index is 25.79 kg/m².     Input and Output Summary (last 24 hours):     Intake/Output Summary (Last 24 hours) at 6/24/2025 1350  Last data filed at 6/24/2025 1201  Gross per 24 hour   Intake 685 ml   Output 1725 ml   Net -1040 ml       Physical Exam  Constitutional:       General: She is not in acute distress.  HENT:      Nose: Nose normal.     Cardiovascular:      Rate and Rhythm: Normal rate and regular rhythm.      Pulses: Normal pulses.      Heart sounds: Normal heart sounds.   Pulmonary:      Effort: Pulmonary effort is normal.      Breath sounds: Normal breath sounds.   Abdominal:      General: Abdomen is flat.     Musculoskeletal:         General: No swelling. Normal range of motion.     Skin:     General: Skin is warm.      Capillary Refill: Capillary refill takes less than 2 seconds.     Neurological:      General: No focal deficit present.      Mental Status: She is alert and oriented to person, place, and time.     Psychiatric:         Mood and Affect: Mood normal.         Behavior: Behavior normal.             Lines/Drains:              Lab Results: I have reviewed the following results:   Results from last 7 days   Lab Units 06/23/25  0525 06/22/25  0845   WBC Thousand/uL 5.10 3.49*   HEMOGLOBIN g/dL  9.1* 9.8*   HEMATOCRIT % 26.5* 28.5*   PLATELETS Thousands/uL 294 301   SEGS PCT %  --  49   LYMPHO PCT %  --  36   MONO PCT %  --  13*   EOS PCT %  --  1     Results from last 7 days   Lab Units 06/24/25  0457 06/23/25  0525   SODIUM mmol/L 127* 128*   POTASSIUM mmol/L 3.6 3.7   CHLORIDE mmol/L 96 97   CO2 mmol/L 24 25   BUN mg/dL 10 9   CREATININE mg/dL 0.63 0.67   ANION GAP mmol/L 7 6   CALCIUM mg/dL 9.0 8.8   ALBUMIN g/dL  --  3.6   TOTAL BILIRUBIN mg/dL  --  0.80   ALK PHOS U/L  --  59   ALT U/L  --  9   AST U/L  --  23   GLUCOSE RANDOM mg/dL 87 88                       Recent Cultures (last 7 days):         Imaging Results Review: I reviewed radiology reports from this admission including: CT abdomen/pelvis.  Other Study Results Review: No additional pertinent studies reviewed.    Last 24 Hours Medication List:     Current Facility-Administered Medications:     acetaminophen (TYLENOL) tablet 975 mg, Q8H    amLODIPine (NORVASC) tablet 10 mg, Daily    aspirin chewable tablet 81 mg, Daily    atorvastatin (LIPITOR) tablet 20 mg, Daily With Dinner    Diclofenac Sodium (VOLTAREN) 1 % topical gel 2 g, 4x Daily    enoxaparin (LOVENOX) subcutaneous injection 40 mg, Daily    levothyroxine tablet 88 mcg, Early Morning    losartan (COZAAR) tablet 50 mg, Daily    methocarbamol (ROBAXIN) tablet 500 mg, Q6H LALO    pantoprazole (PROTONIX) EC tablet 40 mg, Early Morning    polyethylene glycol (MIRALAX) packet 17 g, Daily    senna-docusate sodium (SENOKOT S) 8.6-50 mg per tablet 1 tablet, BID    sulfaSALAzine (AZULFIDINE) tablet 1,000 mg, BID    traMADol (ULTRAM) tablet 50 mg, Q6H PRN    Administrative Statements   Today, Patient Was Seen By: ALINA Hernandez      **Please Note: This note may have been constructed using a voice recognition system.**

## 2025-06-25 LAB
ANION GAP SERPL CALCULATED.3IONS-SCNC: 5 MMOL/L (ref 4–13)
ANION GAP SERPL CALCULATED.3IONS-SCNC: 6 MMOL/L (ref 4–13)
ANION GAP SERPL CALCULATED.3IONS-SCNC: 6 MMOL/L (ref 4–13)
BUN SERPL-MCNC: 11 MG/DL (ref 5–25)
CALCIUM SERPL-MCNC: 8.7 MG/DL (ref 8.4–10.2)
CALCIUM SERPL-MCNC: 8.9 MG/DL (ref 8.4–10.2)
CALCIUM SERPL-MCNC: 9.1 MG/DL (ref 8.4–10.2)
CHLORIDE SERPL-SCNC: 90 MMOL/L (ref 96–108)
CHLORIDE SERPL-SCNC: 90 MMOL/L (ref 96–108)
CHLORIDE SERPL-SCNC: 93 MMOL/L (ref 96–108)
CHLORIDE UR-SCNC: 48 MMOL/L
CO2 SERPL-SCNC: 25 MMOL/L (ref 21–32)
CREAT SERPL-MCNC: 0.6 MG/DL (ref 0.6–1.3)
CREAT SERPL-MCNC: 0.77 MG/DL (ref 0.6–1.3)
CREAT SERPL-MCNC: 0.79 MG/DL (ref 0.6–1.3)
ERYTHROCYTE [DISTWIDTH] IN BLOOD BY AUTOMATED COUNT: 13.6 % (ref 11.6–15.1)
GFR SERPL CREATININE-BSD FRML MDRD: 67 ML/MIN/1.73SQ M
GFR SERPL CREATININE-BSD FRML MDRD: 70 ML/MIN/1.73SQ M
GFR SERPL CREATININE-BSD FRML MDRD: 82 ML/MIN/1.73SQ M
GLUCOSE SERPL-MCNC: 100 MG/DL (ref 65–140)
GLUCOSE SERPL-MCNC: 106 MG/DL (ref 65–140)
GLUCOSE SERPL-MCNC: 97 MG/DL (ref 65–140)
HCT VFR BLD AUTO: 29.1 % (ref 34.8–46.1)
HGB BLD-MCNC: 10.3 G/DL (ref 11.5–15.4)
MCH RBC QN AUTO: 36.3 PG (ref 26.8–34.3)
MCHC RBC AUTO-ENTMCNC: 35.4 G/DL (ref 31.4–37.4)
MCV RBC AUTO: 103 FL (ref 82–98)
PLATELET # BLD AUTO: 317 THOUSANDS/UL (ref 149–390)
PMV BLD AUTO: 9.4 FL (ref 8.9–12.7)
POTASSIUM SERPL-SCNC: 3.9 MMOL/L (ref 3.5–5.3)
POTASSIUM SERPL-SCNC: 4 MMOL/L (ref 3.5–5.3)
POTASSIUM SERPL-SCNC: 4.4 MMOL/L (ref 3.5–5.3)
RBC # BLD AUTO: 2.84 MILLION/UL (ref 3.81–5.12)
SODIUM SERPL-SCNC: 121 MMOL/L (ref 135–147)
SODIUM SERPL-SCNC: 121 MMOL/L (ref 135–147)
SODIUM SERPL-SCNC: 123 MMOL/L (ref 135–147)
SODIUM UR-SCNC: 49 MMOL/L
T4 FREE SERPL-MCNC: 1.02 NG/DL (ref 0.61–1.12)
TSH SERPL DL<=0.05 MIU/L-ACNC: 5.86 UIU/ML (ref 0.45–4.5)
WBC # BLD AUTO: 6.07 THOUSAND/UL (ref 4.31–10.16)

## 2025-06-25 PROCEDURE — 83935 ASSAY OF URINE OSMOLALITY: CPT | Performed by: INTERNAL MEDICINE

## 2025-06-25 PROCEDURE — 82436 ASSAY OF URINE CHLORIDE: CPT | Performed by: INTERNAL MEDICINE

## 2025-06-25 PROCEDURE — 99223 1ST HOSP IP/OBS HIGH 75: CPT | Performed by: INTERNAL MEDICINE

## 2025-06-25 PROCEDURE — 99232 SBSQ HOSP IP/OBS MODERATE 35: CPT | Performed by: PHYSICIAN ASSISTANT

## 2025-06-25 PROCEDURE — 84300 ASSAY OF URINE SODIUM: CPT | Performed by: INTERNAL MEDICINE

## 2025-06-25 PROCEDURE — 85027 COMPLETE CBC AUTOMATED: CPT | Performed by: PHYSICIAN ASSISTANT

## 2025-06-25 PROCEDURE — 84443 ASSAY THYROID STIM HORMONE: CPT | Performed by: PHYSICIAN ASSISTANT

## 2025-06-25 PROCEDURE — 80048 BASIC METABOLIC PNL TOTAL CA: CPT | Performed by: PHYSICIAN ASSISTANT

## 2025-06-25 PROCEDURE — 84439 ASSAY OF FREE THYROXINE: CPT | Performed by: PHYSICIAN ASSISTANT

## 2025-06-25 RX ORDER — SODIUM CHLORIDE 1 G/1
2 TABLET ORAL
Status: DISCONTINUED | OUTPATIENT
Start: 2025-06-25 | End: 2025-06-25

## 2025-06-25 RX ORDER — LOSARTAN POTASSIUM 50 MG/1
50 TABLET ORAL 2 TIMES DAILY
Status: DISCONTINUED | OUTPATIENT
Start: 2025-06-25 | End: 2025-06-27 | Stop reason: HOSPADM

## 2025-06-25 RX ORDER — SODIUM CHLORIDE 1 G/1
1 TABLET ORAL
Status: DISCONTINUED | OUTPATIENT
Start: 2025-06-25 | End: 2025-06-25

## 2025-06-25 RX ADMIN — LEVOTHYROXINE SODIUM 88 MCG: 0.09 TABLET ORAL at 05:30

## 2025-06-25 RX ADMIN — SULFASALAZINE 1000 MG: 500 TABLET ORAL at 09:23

## 2025-06-25 RX ADMIN — ACETAMINOPHEN 975 MG: 325 TABLET, FILM COATED ORAL at 10:20

## 2025-06-25 RX ADMIN — ACETAMINOPHEN 975 MG: 325 TABLET, FILM COATED ORAL at 17:40

## 2025-06-25 RX ADMIN — DICLOFENAC SODIUM 2 G: 10 GEL TOPICAL at 09:22

## 2025-06-25 RX ADMIN — DICLOFENAC SODIUM 2 G: 10 GEL TOPICAL at 13:02

## 2025-06-25 RX ADMIN — Medication 2 G: at 13:02

## 2025-06-25 RX ADMIN — Medication 1 G: at 09:23

## 2025-06-25 RX ADMIN — SULFASALAZINE 1000 MG: 500 TABLET ORAL at 17:40

## 2025-06-25 RX ADMIN — LOSARTAN POTASSIUM 50 MG: 50 TABLET, FILM COATED ORAL at 09:23

## 2025-06-25 RX ADMIN — ENOXAPARIN SODIUM 40 MG: 40 INJECTION SUBCUTANEOUS at 09:23

## 2025-06-25 RX ADMIN — DICLOFENAC SODIUM 2 G: 10 GEL TOPICAL at 17:41

## 2025-06-25 RX ADMIN — METHOCARBAMOL 500 MG: 500 TABLET ORAL at 21:06

## 2025-06-25 RX ADMIN — METHOCARBAMOL 500 MG: 500 TABLET ORAL at 02:50

## 2025-06-25 RX ADMIN — METHOCARBAMOL 500 MG: 500 TABLET ORAL at 09:23

## 2025-06-25 RX ADMIN — ASPIRIN 81 MG 81 MG: 81 TABLET ORAL at 10:20

## 2025-06-25 RX ADMIN — AMLODIPINE BESYLATE 10 MG: 10 TABLET ORAL at 09:23

## 2025-06-25 RX ADMIN — ACETAMINOPHEN 975 MG: 325 TABLET, FILM COATED ORAL at 02:50

## 2025-06-25 RX ADMIN — SODIUM CHLORIDE 60 ML/HR: 4 INJECTION, SOLUTION, CONCENTRATE INTRAVENOUS at 16:20

## 2025-06-25 RX ADMIN — LOSARTAN POTASSIUM 50 MG: 50 TABLET, FILM COATED ORAL at 21:06

## 2025-06-25 RX ADMIN — ATORVASTATIN CALCIUM 20 MG: 20 TABLET, FILM COATED ORAL at 17:40

## 2025-06-25 RX ADMIN — PANTOPRAZOLE SODIUM 40 MG: 40 TABLET, DELAYED RELEASE ORAL at 05:30

## 2025-06-25 RX ADMIN — DICLOFENAC SODIUM 2 G: 10 GEL TOPICAL at 21:06

## 2025-06-25 NOTE — ASSESSMENT & PLAN NOTE
Acute on chronic hyponatremia, Na 126 in ED with baseline Na closer to low 130s.  Suspect SIADH as she has had hyponatremia in the past, was recommended she be seen by nephrology but patient does not recall if that was done or not  Hyponatremia workup: serum osmol 267, urine osmol 325, urine Na 53, uric acid wnl. Checking TSH/T4.  Received 1L NS bolus in ED with minimal improvement  Na declined to 121 this a.m., starting salt tablets 2g TID per nephrology but may need hypertonic saline  Monitor BMP closely, avoid overcorrection

## 2025-06-25 NOTE — TREATMENT PLAN
Noted AM blood work resulted with Na 121 which is a significant drop.  Increase salt tablets to 2g TID.  Consider 1.8% saline if no improvement with salt tablets.

## 2025-06-25 NOTE — CONSULTS
NEPHROLOGY HOSPITAL CONSULTATION   Ning Rodrigues 85 y.o. 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, possibly due to pain vs other  - noted she is on protonix  - did not respond to 1L IVF  - workup: serum osm 267, urine osm 53, serum osm 325, uric acid 2.8, tsh 4.55  - imaging: negative CT abd/pelvis, CT chest from December showing mild bronchiectasis with chronic mucous plugging  - treatment: fluid restriction 1500ml/day, salt tablets 1g TID  - BMP pending this AM  Essential hypertension  - BP elevated  - increase losartan to 50mg BID  Ulcerative colitis without complications (HCC)  - per primary team  Acute right flank pain  - per primary team    I have reviewed the nephrology recommendations including anithypertensive adjustment, salt tablets, fluid restriction, with slim ap, and we are in agreement with renal plan including the information outlined above.    HISTORY OF PRESENT ILLNESS:  Requesting Physician: Cortes Loving MD  Reason for Consult: hyponatremia    Ning Rodrigues is a 85 y.o. female who was admitted to [facility] after presenting with right flank pain. A renal consultation is requested today for assistance in the management of hyponatremia.  Looking back at past records, it appears this is chronic since at least 2021 but does seem that it has worsened slightly over the past 2 years dipping into the high 120s as opposed to staying stable in the low 130s.  She is currently eating breakfast but states she cannot eat any more due to food feeling like it is getting stuck.  She denies sob.  She states she feels she lost weight but usual weight as of a few months ago was 140 lbs and she remains that weight inpatient.     PAST MEDICAL HISTORY:  Past Medical History[1]    PAST SURGICAL HISTORY:  Past Surgical History[2]    ALLERGIES:  Allergies[3]    SOCIAL  HISTORY:  Social History     Substance and Sexual Activity   Alcohol Use Not Currently     Social History     Substance and Sexual Activity   Drug Use Never     Tobacco Use History[4]    FAMILY HISTORY:  Family History[5]    MEDICATIONS:  Current Medications[6]    REVIEW OF SYSTEMS:  All the systems were reviewed and were negative except as documented on the HPI.    PHYSICAL EXAM:  Current Weight: Weight - Scale: 64 kg (141 lb)  First Weight: Weight - Scale: 64 kg (141 lb)  Vitals:    06/24/25 0453 06/24/25 0750 06/25/25 0000 06/25/25 0738   BP: 148/78 (!) 192/84 (!) 178/79 (!) 178/69   BP Location: Right arm Right arm Left arm Right arm   Pulse:  66 62 59   Resp:  16 16 16   Temp:  98.1 °F (36.7 °C) 98.5 °F (36.9 °C) 98.2 °F (36.8 °C)   TempSrc:  Oral Tympanic Oral   SpO2:  94% 95% 93%   Weight:       Height:           Intake/Output Summary (Last 24 hours) at 6/25/2025 0747  Last data filed at 6/25/2025 0501  Gross per 24 hour   Intake 956 ml   Output 1070 ml   Net -114 ml     Physical Exam  General: NAD  Neuro: alert awake  Psych: mood and affect appropriate  Skin: no rash  Eyes: anicteric  ENMT: mm moist  Neck: no masses  Respiratory: ctab  Cardiovascular: rrr  Extremities: no edema  Gastrointestinal: soft nt nd     Lab Results:   Results from last 7 days   Lab Units 06/24/25  0457 06/23/25  0525 06/22/25  1329 06/22/25  0845   WBC Thousand/uL  --  5.10  --  3.49*   HEMOGLOBIN g/dL  --  9.1*  --  9.8*   HEMATOCRIT %  --  26.5*  --  28.5*   PLATELETS Thousands/uL  --  294  --  301   POTASSIUM mmol/L 3.6 3.7 3.7 3.6   CHLORIDE mmol/L 96 97 98 94*   CO2 mmol/L 24 25 21 23   BUN mg/dL 10 9 6 8   CREATININE mg/dL 0.63 0.67 0.53* 0.59*   CALCIUM mg/dL 9.0 8.8 8.5 9.2   ALK PHOS U/L  --  59  --  67   ALT U/L  --  9  --  11   AST U/L  --  23  --  25          [1]   Past Medical History:  Diagnosis Date    Allergic rhinitis 03/21/2023    Anemia     Arthritis     osteoporosis, djd knees, pt denies osteoporosis on 2/21/22     "Low's esophagus     Cancer (HCC)     on face    Colon polyp     Cough 02/21/2022    cough for 3 years, expectorates yellow mucus    Cough variant asthma  07/26/2021    Disease of thyroid gland     low thyroid    Diverticulitis     gerd, diverticulitis    Dizziness     GERD (gastroesophageal reflux disease)     ulcerative colitis; Low's esophagus, diverticulosis; hx of laryngopharyngeal reflux    Hyperlipidemia     Hypertension     Hypoferremia 10/04/2024    Low vitamin D level     LPRD (laryngopharyngeal reflux disease) 02/21/2022    PONV (postoperative nausea and vomiting)     AFTER \"BIG SURGERIES\"    Postnasal drip     WITH COUGH    Ulcerative colitis (HCC)    [2]   Past Surgical History:  Procedure Laterality Date    APPENDECTOMY      CARDIAC CATHETERIZATION N/A 09/07/2023    Procedure: Cardiac RHC/LHC;  Surgeon: Rafael Sood MD;  [facility];  Service: Cardiology    CARDIAC CATHETERIZATION N/A 11/07/2023    Procedure: Cardiac tavr;  Surgeon: Pete Hernández MD;  [facility];  Service: Cardiology    CATARACT EXTRACTION Bilateral     COLONOSCOPY      colon polyps    COLONOSCOPY  07/28/2022    FOOT SURGERY Bilateral     bunionectomy    HEMORRHOID SURGERY      HEMORROIDECTOMY      hemorroid removal    JOINT REPLACEMENT Bilateral     knees    TN REPLACE AORTIC VALVE OPENFEMORAL ARTERY APPROACH N/A 11/07/2023    Procedure: REPLACEMENT AORTIC VALVE TRANSCATHETER (TAVR) TRANSFEMORAL W/ 26MM BUCKNER DOROTHEA S3 UR VALVE(ACCESS ON LEFT) GIOVANNY;  Surgeon: Felipe Buitrago DO;  [facility];  Service: Cardiac Surgery    REPLACEMENT TOTAL KNEE Bilateral     b/ replacement    TONSILLECTOMY      TUBAL LIGATION      UPPER GASTROINTESTINAL ENDOSCOPY     [3]   Allergies  Allergen Reactions    Sulfa Antibiotics Itching   [4]   Social History  Tobacco Use   Smoking Status Never   Smokeless Tobacco Never   [5]   Family History  Problem Relation Name Age of Onset    Heart disease Father      No " Known Problems Sister      No Known Problems Sister      Heart disease Brother      Colon cancer Brother  55    Cancer Brother  67    No Known Problems Maternal Grandmother      No Known Problems Paternal Grandmother      No Known Problems Maternal Aunt      No Known Problems Paternal Aunt      Cancer Daughter          unsure of type of cancer, it was female cancer and hysterectomy done by Dr. Fierro   [6]   Current Facility-Administered Medications:     acetaminophen (TYLENOL) tablet 975 mg, 975 mg, Oral, Q8H, Junior Banai, DO, 975 mg at 06/25/25 0250    amLODIPine (NORVASC) tablet 10 mg, 10 mg, Oral, Daily, Junior Banai, DO, 10 mg at 06/24/25 0833    aspirin chewable tablet 81 mg, 81 mg, Oral, Daily, Junior Banai, DO, 81 mg at 06/24/25 0832    atorvastatin (LIPITOR) tablet 20 mg, 20 mg, Oral, Daily With Dinner, Junior Banai, DO, 20 mg at 06/24/25 1706    Diclofenac Sodium (VOLTAREN) 1 % topical gel 2 g, 2 g, Topical, 4x Daily, Beata Turpin, CRNP, 2 g at 06/24/25 2215    enoxaparin (LOVENOX) subcutaneous injection 40 mg, 40 mg, Subcutaneous, Daily, Junior Banai, DO, 40 mg at 06/24/25 0831    levothyroxine tablet 88 mcg, 88 mcg, Oral, Early Morning, Junior Banai, DO, 88 mcg at 06/25/25 0530    losartan (COZAAR) tablet 50 mg, 50 mg, Oral, Daily, Junior Banai, DO, 50 mg at 06/24/25 0833    methocarbamol (ROBAXIN) tablet 500 mg, 500 mg, Oral, Q6H LALO, Eliu Rivero, DO, 500 mg at 06/25/25 0250    pantoprazole (PROTONIX) EC tablet 40 mg, 40 mg, Oral, Early Morning, Junior Banai, DO, 40 mg at 06/25/25 0530    polyethylene glycol (MIRALAX) packet 17 g, 17 g, Oral, Daily, Junior Banai, DO, 17 g at 06/24/25 0833    senna-docusate sodium (SENOKOT S) 8.6-50 mg per tablet 1 tablet, 1 tablet, Oral, BID, Junior Banai, DO, 1 tablet at 06/24/25 1706    sulfaSALAzine (AZULFIDINE) tablet 1,000 mg, 1,000 mg, Oral, BID, Junior Taveras DO, 1,000 mg at 06/24/25 1705    traMADol (ULTRAM) tablet 50 mg, 50 mg, Oral, Q6H PRN, Beata  ALINA Turk

## 2025-06-25 NOTE — PLAN OF CARE
Problem: PAIN - ADULT  Goal: Verbalizes/displays adequate comfort level or baseline comfort level  Description: Interventions:  - Encourage patient to monitor pain and request assistance  - Assess pain using appropriate pain scale  - Administer analgesics as ordered based on type and severity of pain and evaluate response  - Implement non-pharmacological measures as appropriate and evaluate response  - Consider cultural and social influences on pain and pain management  - Notify physician/advanced practitioner if interventions unsuccessful or patient reports new pain  - Educate patient/family on pain management process including their role and importance of  reporting pain   - Provide non-pharmacologic/complimentary pain relief interventions  Outcome: Progressing     Problem: INFECTION - ADULT  Goal: Absence or prevention of progression during hospitalization  Description: INTERVENTIONS:  - Assess and monitor for signs and symptoms of infection  - Monitor lab/diagnostic results  - Monitor all insertion sites, i.e. indwelling lines, tubes, and drains  - Monitor endotracheal if appropriate and nasal secretions for changes in amount and color  - [place] appropriate cooling/warming therapies per order  - Administer medications as ordered  - Instruct and encourage patient and family to use good hand hygiene technique  - Identify and instruct in appropriate isolation precautions for identified infection/condition  Outcome: Progressing     Problem: SAFETY ADULT  Goal: Patient will remain free of falls  Description: INTERVENTIONS:  - Educate patient/family on patient safety including physical limitations  - Instruct patient to call for assistance with activity   - Consider consulting OT/PT to assist with strengthening/mobility based on AM PAC & JH-HLM score  - Consult OT/PT to assist with strengthening/mobility   - Keep Call bell within reach  - Keep bed low and locked with side rails adjusted as appropriate  - Keep  care items and personal belongings within reach  - Initiate and maintain comfort rounds  - Make Fall Risk Sign visible to staff  - Offer Toileting every 2 Hours, in advance of need  - Initiate/Maintain bed alarm  - Obtain necessary fall risk management equipment: walker  - Apply yellow socks and bracelet for high fall risk patients  - Consider moving patient to room near nurses station  Outcome: Progressing     Problem: DISCHARGE PLANNING  Goal: Discharge to home or other facility with appropriate resources  Description: INTERVENTIONS:  - Identify barriers to discharge w/patient and caregiver  - Arrange for needed discharge resources and transportation as appropriate  - Identify discharge learning needs (meds, wound care, etc.)  - Arrange for interpretive services to assist at discharge as needed  - Refer to Case Management Department for coordinating discharge planning if the patient needs post-hospital services based on physician/advanced practitioner order or complex needs related to functional status, cognitive ability, or social support system  Outcome: Progressing     Problem: Knowledge Deficit  Goal: Patient/family/caregiver demonstrates understanding of disease process, treatment plan, medications, and discharge instructions  Description: Complete learning assessment and assess knowledge base.  Interventions:  - Provide teaching at level of understanding  - Provide teaching via preferred learning methods  Outcome: Progressing     Problem: Prexisting or High Potential for Compromised Skin Integrity  Goal: Skin integrity is maintained or improved  Description: INTERVENTIONS:  - Identify patients at risk for skin breakdown  - Assess and monitor skin integrity including under and around medical devices   - Assess and monitor nutrition and hydration status  - Monitor labs  - Assess for incontinence   - Turn and reposition patient  - Assist with mobility/ambulation  - Relieve pressure over erik prominences   -  Avoid friction and shearing  - Provide appropriate hygiene as needed including keeping skin clean and dry  - Evaluate need for skin moisturizer/barrier cream  - Collaborate with interdisciplinary team  - Patient/family teaching  - Consider wound care consult  Outcome: Progressing

## 2025-06-25 NOTE — ASSESSMENT & PLAN NOTE
Not in acute exacerbation, denies changes in bowel habits  Continue sulfasalazine, F/U with GI outpatient

## 2025-06-25 NOTE — ASSESSMENT & PLAN NOTE
Patient initially with right flank pain, does not recall recent injury or strain. Denies abdominal pain associated.   Pain is reproducible on exam, likely from MSK causes & is mildly improved with Voltaren  CT a/p showed no acute abnormalities  Continue scheduled Tylenol, Robaxin, Voltaren gel; PRN tramadol, Aqua K; OOB as tolerated  Bowel regimen to prevent constipation

## 2025-06-25 NOTE — ASSESSMENT & PLAN NOTE
BP reviewed, intermittently hypertensive but asymptomatic; 178/69 this a.m.  Increase losartan from 50 mg daily->BID  Continue home Norvasc 10 mg daily

## 2025-06-25 NOTE — ASSESSMENT & PLAN NOTE
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

## 2025-06-25 NOTE — PROGRESS NOTES
Progress Note - Hospitalist   Name: Ning Rodrigues 85 y.o. female I MRN: [id number]  Unit/Bed#: -01 I Date of Admission: 6/22/2025   Date of Service: 6/25/2025 I Hospital Day: 3    Assessment & Plan  Hyponatremia  Acute on chronic hyponatremia, Na 126 in ED with baseline Na closer to low 130s.  Suspect SIADH as she has had hyponatremia in the past, was recommended she be seen by nephrology but patient does not recall if that was done or not  Hyponatremia workup: serum osmol 267, urine osmol 325, urine Na 53, uric acid wnl. Checking TSH/T4.  Received 1L NS bolus in ED with minimal improvement  Na declined to 121 this a.m., starting salt tablets 2g TID per nephrology but may need hypertonic saline  Monitor BMP closely, avoid overcorrection  Acute right flank pain  Patient initially with right flank pain, does not recall recent injury or strain. Denies abdominal pain associated.   Pain is reproducible on exam, likely from MSK causes & is mildly improved with Voltaren  CT a/p showed no acute abnormalities  Continue scheduled Tylenol, Robaxin, Voltaren gel; PRN tramadol, Aqua K; OOB as tolerated  Bowel regimen to prevent constipation  Essential hypertension  BP reviewed, intermittently hypertensive but asymptomatic; 178/69 this a.m.  Increase losartan from 50 mg daily->BID  Continue home Norvasc 10 mg daily  Ulcerative colitis without complications (HCC)  Not in acute exacerbation, denies changes in bowel habits  Continue sulfasalazine, F/U with GI outpatient    VTE Pharmacologic Prophylaxis: VTE Score: 4 Moderate Risk (Score 3-4) - Pharmacological DVT Prophylaxis Ordered: enoxaparin (Lovenox).    Mobility:   Basic Mobility Inpatient Raw Score: 20  JH-HLM Goal: 6: Walk 10 steps or more  JH-HLM Achieved: 7: Walk 25 feet or more  JH-HLM Goal NOT achieved. Continue with multidisciplinary rounding and encourage appropriate mobility to improve upon JH-HLM goals.    Patient Centered Rounds: I performed bedside rounds  with nursing staff today.   Discussions with Specialists or Other Care Team Provider: Nephrology, case management    Education and Discussions with Family / Patient: Updated  () at bedside.    Current Length of Stay: 3 day(s)  Current Patient Status: Inpatient   Certification Statement: The patient will continue to require additional inpatient hospital stay due to hyponatremia treatment/monitoring  Discharge Plan: Anticipate discharge in 48-72 hrs to home.    Code Status: Level 1 - Full Code    Subjective   Patient is seen at bedside this a.m., reports overall feeling unwell, upon further questioning only complaint was sensation of her food getting stuck in her esophagus from breakfast that resolved after drinking fluids.     Objective :  Temp:  [98.2 °F (36.8 °C)-98.5 °F (36.9 °C)] 98.2 °F (36.8 °C)  HR:  [59-62] 59  BP: (164-178)/(68-79) 164/68  Resp:  [16] 16  SpO2:  [93 %-95 %] 93 %  O2 Device: None (Room air)    Body mass index is 25.79 kg/m².     Input and Output Summary (last 24 hours):     Intake/Output Summary (Last 24 hours) at 6/25/2025 1521  Last data filed at 6/25/2025 1310  Gross per 24 hour   Intake 1198 ml   Output 1270 ml   Net -72 ml       Physical Exam  Constitutional:       General: She is not in acute distress.     Appearance: She is not ill-appearing, toxic-appearing or diaphoretic.     Cardiovascular:      Rate and Rhythm: Normal rate and regular rhythm.      Pulses: Normal pulses.      Heart sounds: Normal heart sounds.   Pulmonary:      Effort: Pulmonary effort is normal. No respiratory distress.      Breath sounds: Normal breath sounds.   Abdominal:      General: There is no distension.      Palpations: Abdomen is soft.      Tenderness: There is no abdominal tenderness.     Musculoskeletal:         General: No swelling or tenderness.     Skin:     General: Skin is warm.     Neurological:      General: No focal deficit present.      Mental Status: She is alert.      Psychiatric:         Mood and Affect: Mood normal.         Behavior: Behavior normal.           Lines/Drains:              Lab Results: I have reviewed the following results:   Results from last 7 days   Lab Units 06/25/25  0902 06/23/25  0525 06/22/25  0845   WBC Thousand/uL 6.07   < > 3.49*   HEMOGLOBIN g/dL 10.3*   < > 9.8*   HEMATOCRIT % 29.1*   < > 28.5*   PLATELETS Thousands/uL 317   < > 301   SEGS PCT %  --   --  49   LYMPHO PCT %  --   --  36   MONO PCT %  --   --  13*   EOS PCT %  --   --  1    < > = values in this interval not displayed.     Results from last 7 days   Lab Units 06/25/25  1408 06/24/25  0457 06/23/25  0525   SODIUM mmol/L 121*   < > 128*   POTASSIUM mmol/L 4.4   < > 3.7   CHLORIDE mmol/L 90*   < > 97   CO2 mmol/L 25   < > 25   BUN mg/dL 11   < > 9   CREATININE mg/dL 0.79   < > 0.67   ANION GAP mmol/L 6   < > 6   CALCIUM mg/dL 8.9   < > 8.8   ALBUMIN g/dL  --   --  3.6   TOTAL BILIRUBIN mg/dL  --   --  0.80   ALK PHOS U/L  --   --  59   ALT U/L  --   --  9   AST U/L  --   --  23   GLUCOSE RANDOM mg/dL 97   < > 88    < > = values in this interval not displayed.                       Recent Cultures (last 7 days):         Imaging Results Review: I reviewed radiology reports from this admission including: CT abdomen/pelvis.  Other Study Results Review: No additional pertinent studies reviewed.    Last 24 Hours Medication List:     Current Facility-Administered Medications:     acetaminophen (TYLENOL) tablet 975 mg, Q8H    amLODIPine (NORVASC) tablet 10 mg, Daily    aspirin chewable tablet 81 mg, Daily    atorvastatin (LIPITOR) tablet 20 mg, Daily With Dinner    Diclofenac Sodium (VOLTAREN) 1 % topical gel 2 g, 4x Daily    enoxaparin (LOVENOX) subcutaneous injection 40 mg, Daily    levothyroxine tablet 88 mcg, Early Morning    losartan (COZAAR) tablet 50 mg, BID    methocarbamol (ROBAXIN) tablet 500 mg, Q6H LALO    pantoprazole (PROTONIX) EC tablet 40 mg, Early Morning    polyethylene glycol  (MIRALAX) packet 17 g, Daily    senna-docusate sodium (SENOKOT S) 8.6-50 mg per tablet 1 tablet, BID    sodium chloride (HYPERTONIC) infusion 1.8%, Continuous    sulfaSALAzine (AZULFIDINE) tablet 1,000 mg, BID    traMADol (ULTRAM) tablet 50 mg, Q6H PRN    Administrative Statements   Today, Patient Was Seen By: Pooja Oh PA-C  I have spent a total time of 45 minutes in caring for this patient on the day of the visit/encounter including Patient and family education, Documenting in the medical record, Reviewing/placing orders in the medical record (including tests, medications, and/or procedures), Obtaining or reviewing history  , and Communicating with other healthcare professionals .    **Please Note: This note may have been constructed using a voice recognition system.**

## 2025-06-26 LAB
ANION GAP SERPL CALCULATED.3IONS-SCNC: 5 MMOL/L (ref 4–13)
ANION GAP SERPL CALCULATED.3IONS-SCNC: 5 MMOL/L (ref 4–13)
ANION GAP SERPL CALCULATED.3IONS-SCNC: 6 MMOL/L (ref 4–13)
ANION GAP SERPL CALCULATED.3IONS-SCNC: 6 MMOL/L (ref 4–13)
BUN SERPL-MCNC: 10 MG/DL (ref 5–25)
BUN SERPL-MCNC: 11 MG/DL (ref 5–25)
BUN SERPL-MCNC: 11 MG/DL (ref 5–25)
BUN SERPL-MCNC: 13 MG/DL (ref 5–25)
CALCIUM SERPL-MCNC: 8.6 MG/DL (ref 8.4–10.2)
CALCIUM SERPL-MCNC: 8.6 MG/DL (ref 8.4–10.2)
CALCIUM SERPL-MCNC: 8.7 MG/DL (ref 8.4–10.2)
CALCIUM SERPL-MCNC: 8.9 MG/DL (ref 8.4–10.2)
CHLORIDE SERPL-SCNC: 96 MMOL/L (ref 96–108)
CHLORIDE SERPL-SCNC: 98 MMOL/L (ref 96–108)
CHLORIDE SERPL-SCNC: 99 MMOL/L (ref 96–108)
CHLORIDE SERPL-SCNC: 99 MMOL/L (ref 96–108)
CO2 SERPL-SCNC: 24 MMOL/L (ref 21–32)
CO2 SERPL-SCNC: 25 MMOL/L (ref 21–32)
CO2 SERPL-SCNC: 25 MMOL/L (ref 21–32)
CO2 SERPL-SCNC: 26 MMOL/L (ref 21–32)
CREAT SERPL-MCNC: 0.63 MG/DL (ref 0.6–1.3)
CREAT SERPL-MCNC: 0.65 MG/DL (ref 0.6–1.3)
CREAT SERPL-MCNC: 0.73 MG/DL (ref 0.6–1.3)
CREAT SERPL-MCNC: 0.81 MG/DL (ref 0.6–1.3)
GFR SERPL CREATININE-BSD FRML MDRD: 65 ML/MIN/1.73SQ M
GFR SERPL CREATININE-BSD FRML MDRD: 74 ML/MIN/1.73SQ M
GFR SERPL CREATININE-BSD FRML MDRD: 80 ML/MIN/1.73SQ M
GFR SERPL CREATININE-BSD FRML MDRD: 81 ML/MIN/1.73SQ M
GLUCOSE SERPL-MCNC: 124 MG/DL (ref 65–140)
GLUCOSE SERPL-MCNC: 86 MG/DL (ref 65–140)
GLUCOSE SERPL-MCNC: 88 MG/DL (ref 65–140)
GLUCOSE SERPL-MCNC: 94 MG/DL (ref 65–140)
OSMOLALITY UR: 377 MMOL/KG (ref 250–900)
POTASSIUM SERPL-SCNC: 3.6 MMOL/L (ref 3.5–5.3)
POTASSIUM SERPL-SCNC: 3.8 MMOL/L (ref 3.5–5.3)
POTASSIUM SERPL-SCNC: 3.9 MMOL/L (ref 3.5–5.3)
POTASSIUM SERPL-SCNC: 3.9 MMOL/L (ref 3.5–5.3)
SODIUM SERPL-SCNC: 127 MMOL/L (ref 135–147)
SODIUM SERPL-SCNC: 128 MMOL/L (ref 135–147)
SODIUM SERPL-SCNC: 129 MMOL/L (ref 135–147)
SODIUM SERPL-SCNC: 130 MMOL/L (ref 135–147)

## 2025-06-26 PROCEDURE — 80048 BASIC METABOLIC PNL TOTAL CA: CPT | Performed by: PHYSICIAN ASSISTANT

## 2025-06-26 PROCEDURE — 80048 BASIC METABOLIC PNL TOTAL CA: CPT | Performed by: INTERNAL MEDICINE

## 2025-06-26 PROCEDURE — 99232 SBSQ HOSP IP/OBS MODERATE 35: CPT

## 2025-06-26 PROCEDURE — 99232 SBSQ HOSP IP/OBS MODERATE 35: CPT | Performed by: INTERNAL MEDICINE

## 2025-06-26 RX ORDER — SODIUM CHLORIDE 1 G/1
2 TABLET ORAL
Status: DISCONTINUED | OUTPATIENT
Start: 2025-06-26 | End: 2025-06-27 | Stop reason: HOSPADM

## 2025-06-26 RX ORDER — OXYCODONE HYDROCHLORIDE 5 MG/1
2.5 TABLET ORAL EVERY 6 HOURS PRN
Refills: 0 | Status: DISCONTINUED | OUTPATIENT
Start: 2025-06-26 | End: 2025-06-27 | Stop reason: HOSPADM

## 2025-06-26 RX ORDER — LIDOCAINE 50 MG/G
1 PATCH TOPICAL DAILY
Status: DISCONTINUED | OUTPATIENT
Start: 2025-06-26 | End: 2025-06-27 | Stop reason: HOSPADM

## 2025-06-26 RX ORDER — GABAPENTIN 100 MG/1
100 CAPSULE ORAL
Status: DISCONTINUED | OUTPATIENT
Start: 2025-06-26 | End: 2025-06-27 | Stop reason: HOSPADM

## 2025-06-26 RX ADMIN — PANTOPRAZOLE SODIUM 40 MG: 40 TABLET, DELAYED RELEASE ORAL at 05:48

## 2025-06-26 RX ADMIN — DICLOFENAC SODIUM 2 G: 10 GEL TOPICAL at 10:04

## 2025-06-26 RX ADMIN — ACETAMINOPHEN 975 MG: 325 TABLET, FILM COATED ORAL at 10:04

## 2025-06-26 RX ADMIN — METHOCARBAMOL 500 MG: 500 TABLET ORAL at 13:55

## 2025-06-26 RX ADMIN — SULFASALAZINE 1000 MG: 500 TABLET ORAL at 10:04

## 2025-06-26 RX ADMIN — LOSARTAN POTASSIUM 50 MG: 50 TABLET, FILM COATED ORAL at 21:00

## 2025-06-26 RX ADMIN — ASPIRIN 81 MG 81 MG: 81 TABLET ORAL at 10:04

## 2025-06-26 RX ADMIN — DICLOFENAC SODIUM 2 G: 10 GEL TOPICAL at 17:00

## 2025-06-26 RX ADMIN — AMLODIPINE BESYLATE 10 MG: 10 TABLET ORAL at 10:04

## 2025-06-26 RX ADMIN — ATORVASTATIN CALCIUM 20 MG: 20 TABLET, FILM COATED ORAL at 16:53

## 2025-06-26 RX ADMIN — SENNOSIDES AND DOCUSATE SODIUM 1 TABLET: 50; 8.6 TABLET ORAL at 10:04

## 2025-06-26 RX ADMIN — ACETAMINOPHEN 975 MG: 325 TABLET, FILM COATED ORAL at 16:53

## 2025-06-26 RX ADMIN — GABAPENTIN 100 MG: 100 CAPSULE ORAL at 21:00

## 2025-06-26 RX ADMIN — ENOXAPARIN SODIUM 40 MG: 40 INJECTION SUBCUTANEOUS at 10:04

## 2025-06-26 RX ADMIN — SODIUM CHLORIDE 2 G: 1 TABLET ORAL at 16:53

## 2025-06-26 RX ADMIN — METHOCARBAMOL 500 MG: 500 TABLET ORAL at 10:04

## 2025-06-26 RX ADMIN — SODIUM CHLORIDE 2 G: 1 TABLET ORAL at 12:37

## 2025-06-26 RX ADMIN — LEVOTHYROXINE SODIUM 88 MCG: 0.09 TABLET ORAL at 05:47

## 2025-06-26 RX ADMIN — ACETAMINOPHEN 975 MG: 325 TABLET, FILM COATED ORAL at 01:56

## 2025-06-26 RX ADMIN — SULFASALAZINE 1000 MG: 500 TABLET ORAL at 17:00

## 2025-06-26 RX ADMIN — METHOCARBAMOL 500 MG: 500 TABLET ORAL at 01:57

## 2025-06-26 RX ADMIN — LOSARTAN POTASSIUM 50 MG: 50 TABLET, FILM COATED ORAL at 10:04

## 2025-06-26 RX ADMIN — METHOCARBAMOL 500 MG: 500 TABLET ORAL at 19:41

## 2025-06-26 RX ADMIN — SENNOSIDES AND DOCUSATE SODIUM 1 TABLET: 50; 8.6 TABLET ORAL at 17:00

## 2025-06-26 RX ADMIN — LIDOCAINE 1 PATCH: 50 PATCH CUTANEOUS at 10:04

## 2025-06-26 NOTE — PLAN OF CARE
Problem: PAIN - ADULT  Goal: Verbalizes/displays adequate comfort level or baseline comfort level  Description: Interventions:  - Encourage patient to monitor pain and request assistance  - Assess pain using appropriate pain scale  - Administer analgesics as ordered based on type and severity of pain and evaluate response  - Implement non-pharmacological measures as appropriate and evaluate response  - Consider cultural and social influences on pain and pain management  - Notify physician/advanced practitioner if interventions unsuccessful or patient reports new pain  - Educate patient/family on pain management process including their role and importance of  reporting pain   - Provide non-pharmacologic/complimentary pain relief interventions  Outcome: Not Progressing     Problem: SAFETY ADULT  Goal: Patient will remain free of falls  Description: INTERVENTIONS:  - Educate patient/family on patient safety including physical limitations  - Instruct patient to call for assistance with activity   - Consider consulting OT/PT to assist with strengthening/mobility based on AM PAC & JH-HLM score  - Consult OT/PT to assist with strengthening/mobility   - Keep Call bell within reach  - Keep bed low and locked with side rails adjusted as appropriate  - Keep care items and personal belongings within reach  - Initiate and maintain comfort rounds  - Make Fall Risk Sign visible to staff  - Offer Toileting every prn Hours, in advance of need  - Initiate/Maintain bed alarm  - Obtain necessary fall risk management equipment: n/a  - Apply yellow socks and bracelet for high fall risk patients  - Consider moving patient to room near nurses station  Outcome: Not Progressing  Goal: Maintain or return to baseline ADL function  Description: INTERVENTIONS:  -  Assess patient's ability to carry out ADLs; assess patient's baseline for ADL function and identify physical deficits which impact ability to perform ADLs (bathing, care of  mouth/teeth, toileting, grooming, dressing, etc.)  - Assess/evaluate cause of self-care deficits   - Assess range of motion  - Assess patient's mobility; develop plan if impaired  - Assess patient's need for assistive devices and provide as appropriate  - Encourage maximum independence but intervene and supervise when necessary  - Involve family in performance of ADLs  - Assess for home care needs following discharge   - Consider OT consult to assist with ADL evaluation and planning for discharge  - Provide patient education as appropriate  - Monitor functional capacity and physical performance, use of AM PAC & JH-HLM   - Monitor gait, balance and fatigue with ambulation    Outcome: Not Progressing     Problem: DISCHARGE PLANNING  Goal: Discharge to home or other facility with appropriate resources  Description: INTERVENTIONS:  - Identify barriers to discharge w/patient and caregiver  - Arrange for needed discharge resources and transportation as appropriate  - Identify discharge learning needs (meds, wound care, etc.)  - Arrange for interpretive services to assist at discharge as needed  - Refer to Case Management Department for coordinating discharge planning if the patient needs post-hospital services based on physician/advanced practitioner order or complex needs related to functional status, cognitive ability, or social support system  Outcome: Not Progressing     Problem: Knowledge Deficit  Goal: Patient/family/caregiver demonstrates understanding of disease process, treatment plan, medications, and discharge instructions  Description: Complete learning assessment and assess knowledge base.  Interventions:  - Provide teaching at level of understanding  - Provide teaching via preferred learning methods  Outcome: Not Progressing

## 2025-06-26 NOTE — ASSESSMENT & PLAN NOTE
- appears chronic.  Baseline between 127-134.  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  Patient was placed on fluid restriction and salt tablets with no improvement/drop in sodium level therefore patient was placed on 1.8% saline on 6/27 with improvement in sodium level up to 130.  -Current plan: Hold 1.8% saline.  Continue fluid restriction.  Diet supplement as needed.  Salt tablets 1 g 3 times daily  - Recheck sodium level every 8 hours.  Call parameters added

## 2025-06-26 NOTE — PROGRESS NOTES
Progress Note - Nephrology   Name: Ning Rodrigues 85 y.o. female I MRN: [id number]  Unit/Bed#: -01 I Date of Admission: 6/22/2025   Date of Service: 6/26/2025 I Hospital Day: 4     Assessment & Plan  Hyponatremia  - appears chronic.  Baseline between 127-134.  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  Patient was placed on fluid restriction and salt tablets with no improvement/drop in sodium level therefore patient was placed on 1.8% saline on 6/27 with improvement in sodium level up to 130.  -Current plan: Hold 1.8% saline.  Continue fluid restriction.  Diet supplement as needed.  Salt tablets 1 g 3 times daily  - Recheck sodium level every 8 hours.  Call parameters added  Essential hypertension  - BP elevated in the setting of acute pain.  Avoid overcorrection  - Losartan increased to 50 mg twice a day on 6/25.  Monitor response  Ulcerative colitis without complications (HCC)  - per primary team  Acute right flank pain  - per primary team  - Imaging unrevealing        Subjective     Review of systems: Complaining of right flank pain intermittent and spasmodic.  Tender to touch supporting musculoskeletal.  No overnight events.  Ate breakfast.  Able to take her meals.  Tolerating fluid restriction.  No difficulty urinating.  Chronic lower extremity weakness since knee surgery.  No numbness or tingling.    Objective :  Temp:  [97.6 °F (36.4 °C)-97.8 °F (36.6 °C)] 97.6 °F (36.4 °C)  HR:  [60-79] 62  BP: (151-176)/(62-88) 176/62  Resp:  [15-16] 16  SpO2:  [92 %-96 %] 92 %  O2  Device: None (Room air)    Current Weight: Weight - Scale: 64 kg (141 lb)  First Weight: Weight - Scale: 64 kg (141 lb)  I/O         06/24 0701  06/25 0700 06/25 0701  06/26 0700 06/26 0701  06/27 0700    P.O. 956 1000     Total Intake(mL/kg) 956 (14.9) 1000 (15.6)     Urine (mL/kg/hr) 1070 (0.7) 1850 (1.2)     Stool  0     Total Output 1070 1850     Net -114 -850            Unmeasured Urine Occurrence  1 x     Unmeasured Stool Occurrence  0 x           Physical Exam  Vitals reviewed.   Constitutional:       General: She is not in acute distress.     Appearance: She is not ill-appearing, toxic-appearing or diaphoretic.   HENT:      Head: Normocephalic and atraumatic.      Nose: Nose normal. No congestion.      Mouth/Throat:      Mouth: Mucous membranes are moist.     Eyes:      General: No scleral icterus.        Right eye: No discharge.         Left eye: No discharge.      Extraocular Movements: Extraocular movements intact.      Conjunctiva/sclera: Conjunctivae normal.     Neck:      Vascular: No carotid bruit or JVD.     Cardiovascular:      Rate and Rhythm: Normal rate and regular rhythm.      Heart sounds: No murmur heard.     No friction rub. No gallop.   Pulmonary:      Effort: Pulmonary effort is normal. No respiratory distress.      Breath sounds: Normal breath sounds.   Abdominal:      General: Bowel sounds are normal. There is no distension.      Palpations: Abdomen is soft.      Tenderness: There is no abdominal tenderness.     Musculoskeletal:         General: No swelling, tenderness or signs of injury.      Cervical back: Normal range of motion. No rigidity or tenderness.      Right lower leg: No edema.      Left lower leg: No edema.     Skin:     General: Skin is warm and dry.      Capillary Refill: Capillary refill takes less than 2 seconds.      Coloration: Skin is not jaundiced or pale.      Findings: No erythema or rash.     Neurological:      Mental Status: She is alert and oriented to person,  "place, and time.     Psychiatric:         Mood and Affect: Mood normal.         Behavior: Behavior normal.         Thought Content: Thought content normal.         Judgment: Judgment normal.         Medications:  Current Medications[1]      Lab Results: I have reviewed the following results:  Results from last 7 days   Lab Units 06/26/25  0557 06/26/25  0005 06/25/25  1835 06/25/25  1408 06/25/25  0902 06/24/25  0457 06/23/25  0525 06/22/25  1329 06/22/25  0845   WBC Thousand/uL  --   --   --   --  6.07  --  5.10  --  3.49*   HEMOGLOBIN g/dL  --   --   --   --  10.3*  --  9.1*  --  9.8*   HEMATOCRIT %  --   --   --   --  29.1*  --  26.5*  --  28.5*   PLATELETS Thousands/uL  --   --   --   --  317  --  294  --  301   POTASSIUM mmol/L 3.9 3.9 4.0 4.4 3.9 3.6 3.7   < > 3.6   CHLORIDE mmol/L 99 96 93* 90* 90* 96 97   < > 94*   CO2 mmol/L 25 26 25 25 25 24 25   < > 23   BUN mg/dL 10 11 11 11 11 10 9   < > 8   CREATININE mg/dL 0.63 0.73 0.77 0.79 0.60 0.63 0.67   < > 0.59*   CALCIUM mg/dL 8.6 8.7 8.7 8.9 9.1 9.0 8.8   < > 9.2   ALBUMIN g/dL  --   --   --   --   --   --  3.6  --  4.2    < > = values in this interval not displayed.       Administrative Statements     Portions of the record may have been created with voice recognition software. Occasional wrong word or \"sound a like\" substitutions may have occurred due to the inherent limitations of voice recognition software. Read the chart carefully and recognize, using context, where substitutions have occurred.If you have any questions, please contact the dictating provider.       [1]   Current Facility-Administered Medications:     acetaminophen (TYLENOL) tablet 975 mg, 975 mg, Oral, Q8H, Junior Taveras DO, 975 mg at 06/26/25 0156    amLODIPine (NORVASC) tablet 10 mg, 10 mg, Oral, Daily, Junior Taveras DO, 10 mg at 06/25/25 0923    aspirin chewable tablet 81 mg, 81 mg, Oral, Daily, Junior Taveras DO, 81 mg at 06/25/25 1020    atorvastatin (LIPITOR) tablet 20 mg, 20 mg, " Oral, Daily With Dinner, Junior Taveras, DO, 20 mg at 06/25/25 1740    Diclofenac Sodium (VOLTAREN) 1 % topical gel 2 g, 2 g, Topical, 4x Daily, ALINA Hernandez, 2 g at 06/25/25 2106    enoxaparin (LOVENOX) subcutaneous injection 40 mg, 40 mg, Subcutaneous, Daily, Junior Bantammy, DO, 40 mg at 06/25/25 0923    levothyroxine tablet 88 mcg, 88 mcg, Oral, Early Morning, Junior Taveras, DO, 88 mcg at 06/26/25 0547    losartan (COZAAR) tablet 50 mg, 50 mg, Oral, BID, Monisha Echols PA-C, 50 mg at 06/25/25 2106    methocarbamol (ROBAXIN) tablet 500 mg, 500 mg, Oral, Q6H LALO, Eliu Rivero DO, 500 mg at 06/26/25 0157    pantoprazole (PROTONIX) EC tablet 40 mg, 40 mg, Oral, Early Morning, Junior Taveras, DO, 40 mg at 06/26/25 0548    polyethylene glycol (MIRALAX) packet 17 g, 17 g, Oral, Daily, Junior Darcy, DO, 17 g at 06/24/25 0833    senna-docusate sodium (SENOKOT S) 8.6-50 mg per tablet 1 tablet, 1 tablet, Oral, BID, Junior Banai, DO, 1 tablet at 06/24/25 1706    sulfaSALAzine (AZULFIDINE) tablet 1,000 mg, 1,000 mg, Oral, BID, Junior Taveras, DO, 1,000 mg at 06/25/25 1740    traMADol (ULTRAM) tablet 50 mg, 50 mg, Oral, Q6H PRN, ALINA Hernandez

## 2025-06-26 NOTE — PLAN OF CARE
Problem: PAIN - ADULT  Goal: Verbalizes/displays adequate comfort level or baseline comfort level  Description: Interventions:  - Encourage patient to monitor pain and request assistance  - Assess pain using appropriate pain scale  - Administer analgesics as ordered based on type and severity of pain and evaluate response  - Implement non-pharmacological measures as appropriate and evaluate response  - Consider cultural and social influences on pain and pain management  - Notify physician/advanced practitioner if interventions unsuccessful or patient reports new pain  - Educate patient/family on pain management process including their role and importance of  reporting pain   - Provide non-pharmacologic/complimentary pain relief interventions  Outcome: Progressing     Problem: Knowledge Deficit  Goal: Patient/family/caregiver demonstrates understanding of disease process, treatment plan, medications, and discharge instructions  Description: Complete learning assessment and assess knowledge base.  Interventions:  - Provide teaching at level of understanding  - Provide teaching via preferred learning methods  Outcome: Progressing

## 2025-06-26 NOTE — ASSESSMENT & PLAN NOTE
BP reviewed, intermittently hypertensive  Continue with Increased losartan from 50 mg BID  Continue home Norvasc 10 mg daily

## 2025-06-26 NOTE — ASSESSMENT & PLAN NOTE
Patient initially with right flank pain, does not recall recent injury or strain. Denies abdominal pain associated.   Pain is reproducible on exam, likely from MSK causes & is mildly improved with Voltaren  CT a/p showed no acute abnormalities  Continue scheduled Tylenol, Robaxin. Aqua K; OOB as tolerated  With persistent reproducible pain (6/26)  Will DC Voltaren gel and transition to Lidocaine patches  Will DC Tramadol and transition to Oral Oxycodone  Will trial Gabapentin 100 mg qHs  Bowel regimen to prevent constipation  Recommend close outpatient follow up with PCP for continued evaluation

## 2025-06-26 NOTE — ASSESSMENT & PLAN NOTE
Acute on chronic hyponatremia, Na 126 in ED with baseline Na closer to low 130s.  Suspect SIADH as she has had hyponatremia in the past, was recommended she be seen by nephrology but patient does not recall if that was done or not  Hyponatremia workup: serum osmol 267, urine osmol 325, urine Na 53, uric acid wnl. Checking TSH/T4.  Received 1L NS bolus in ED with minimal improvement  S/p Hypertonic Solution (6/25)-> Now discontinued  Nephrology following  Hold Hypertonic solution. Continue FR. Plan for salt tablets 1g TID.   Monitor BMP closely, avoid overcorrection

## 2025-06-26 NOTE — PROGRESS NOTES
Progress Note - Hospitalist   Name: Ning Rodrigues 85 y.o. female I MRN: [id number]  Unit/Bed#: -01 I Date of Admission: 6/22/2025   Date of Service: 6/26/2025 I Hospital Day: 4    Assessment & Plan  Hyponatremia  Acute on chronic hyponatremia, Na 126 in ED with baseline Na closer to low 130s.  Suspect SIADH as she has had hyponatremia in the past, was recommended she be seen by nephrology but patient does not recall if that was done or not  Hyponatremia workup: serum osmol 267, urine osmol 325, urine Na 53, uric acid wnl. Checking TSH/T4.  Received 1L NS bolus in ED with minimal improvement  S/p Hypertonic Solution (6/25)-> Now discontinued  Nephrology following  Hold Hypertonic solution. Continue FR. Plan for salt tablets 1g TID.   Monitor BMP closely, avoid overcorrection  Acute right flank pain  Patient initially with right flank pain, does not recall recent injury or strain. Denies abdominal pain associated.   Pain is reproducible on exam, likely from MSK causes & is mildly improved with Voltaren  CT a/p showed no acute abnormalities  Continue scheduled Tylenol, Robaxin. Aqua K; OOB as tolerated  With persistent reproducible pain (6/26)  Will DC Voltaren gel and transition to Lidocaine patches  Will DC Tramadol and transition to Oral Oxycodone  Will trial Gabapentin 100 mg qHs  Bowel regimen to prevent constipation  Recommend close outpatient follow up with PCP for continued evaluation  Essential hypertension  BP reviewed, intermittently hypertensive  Continue with Increased losartan from 50 mg BID  Continue home Norvasc 10 mg daily  Ulcerative colitis without complications (HCC)  Not in acute exacerbation, denies changes in bowel habits  Continue sulfasalazine, F/U with GI outpatient    VTE Pharmacologic Prophylaxis: VTE Score: 4 Moderate Risk (Score 3-4) - Pharmacological DVT Prophylaxis Ordered: enoxaparin (Lovenox).    Mobility:   Basic Mobility Inpatient Raw Score: 23  -[facility] Goal: 7: Walk 25 feet or  more  JH-HLM Achieved: 7: Walk 25 feet or more  JH-HLM Goal achieved. Continue to encourage appropriate mobility.    Patient Centered Rounds: I performed bedside rounds with nursing staff today.   Discussions with Specialists or Other Care Team Provider: Neph    Education and Discussions with Family / Patient: Updated  (Family) at bedside.    Current Length of Stay: 4 day(s)  Current Patient Status: Inpatient   Certification Statement: The patient will continue to require additional inpatient hospital stay due to Hyponatremia requiring further Nephrology management  Discharge Plan: Anticipate discharge in 24-48 hrs to home with home services.    Code Status: Level 1 - Full Code    Subjective   Patient complaining of right sided flank pain reproducible on palpation.  Patient stating the pain comes and goes and has been going on for months now.  Patient denies any active chest pain, shortness of breath, abdominal pain, nausea, or vomiting.  Patient denies any urinary or fecal incontinence.  Patient denies any numbness or tingling or weakness in the lower extremities.    Objective :  Temp:  [97.6 °F (36.4 °C)-98.1 °F (36.7 °C)] 98.1 °F (36.7 °C)  HR:  [60-71] 71  BP: (143-176)/(62-87) 143/68  Resp:  [16-18] 18  SpO2:  [92 %-94 %] 94 %  O2 Device: None (Room air)    Body mass index is 25.79 kg/m².     Input and Output Summary (last 24 hours):     Intake/Output Summary (Last 24 hours) at 6/26/2025 1725  Last data filed at 6/26/2025 1001  Gross per 24 hour   Intake 1421 ml   Output 1550 ml   Net -129 ml       Physical Exam  Vitals and nursing note reviewed.   Constitutional:       General: She is not in acute distress.     Appearance: Normal appearance.   HENT:      Head: Normocephalic.      Nose: Nose normal. No congestion.      Mouth/Throat:      Mouth: Mucous membranes are moist.      Pharynx: Oropharynx is clear.     Cardiovascular:      Rate and Rhythm: Normal rate and regular rhythm.      Pulses:  Normal pulses.   Pulmonary:      Effort: Pulmonary effort is normal. No respiratory distress.   Abdominal:      General: There is no distension.      Palpations: Abdomen is soft.      Tenderness: There is no abdominal tenderness.     Musculoskeletal:         General: Tenderness (Right Flank) present. Normal range of motion.      Cervical back: Normal range of motion.      Right lower leg: No edema.      Left lower leg: No edema.     Skin:     General: Skin is warm and dry.      Capillary Refill: Capillary refill takes less than 2 seconds.     Neurological:      Mental Status: She is alert and oriented to person, place, and time. Mental status is at baseline.      Motor: No weakness.     Psychiatric:         Mood and Affect: Mood normal.         Speech: Speech normal.         Behavior: Behavior is cooperative.           Lines/Drains:              Lab Results: I have reviewed the following results:   Results from last 7 days   Lab Units 06/25/25  0902 06/23/25  0525 06/22/25  0845   WBC Thousand/uL 6.07   < > 3.49*   HEMOGLOBIN g/dL 10.3*   < > 9.8*   HEMATOCRIT % 29.1*   < > 28.5*   PLATELETS Thousands/uL 317   < > 301   SEGS PCT %  --   --  49   LYMPHO PCT %  --   --  36   MONO PCT %  --   --  13*   EOS PCT %  --   --  1    < > = values in this interval not displayed.     Results from last 7 days   Lab Units 06/26/25  1631 06/24/25  0457 06/23/25  0525   SODIUM mmol/L 129*   < > 128*   POTASSIUM mmol/L 3.8   < > 3.7   CHLORIDE mmol/L 98   < > 97   CO2 mmol/L 25   < > 25   BUN mg/dL 13   < > 9   CREATININE mg/dL 0.81   < > 0.67   ANION GAP mmol/L 6   < > 6   CALCIUM mg/dL 8.9   < > 8.8   ALBUMIN g/dL  --   --  3.6   TOTAL BILIRUBIN mg/dL  --   --  0.80   ALK PHOS U/L  --   --  59   ALT U/L  --   --  9   AST U/L  --   --  23   GLUCOSE RANDOM mg/dL 94   < > 88    < > = values in this interval not displayed.                       Recent Cultures (last 7 days):         Imaging Results Review: I reviewed radiology reports  from this admission including: CT abdomen/pelvis.  Other Study Results Review: No additional pertinent studies reviewed.    Last 24 Hours Medication List:     Current Facility-Administered Medications:     acetaminophen (TYLENOL) tablet 975 mg, Q8H    amLODIPine (NORVASC) tablet 10 mg, Daily    aspirin chewable tablet 81 mg, Daily    atorvastatin (LIPITOR) tablet 20 mg, Daily With Dinner    Diclofenac Sodium (VOLTAREN) 1 % topical gel 2 g, 4x Daily    enoxaparin (LOVENOX) subcutaneous injection 40 mg, Daily    levothyroxine tablet 88 mcg, Early Morning    lidocaine (LIDODERM) 5 % patch 1 patch, Daily    losartan (COZAAR) tablet 50 mg, BID    methocarbamol (ROBAXIN) tablet 500 mg, Q6H LALO    pantoprazole (PROTONIX) EC tablet 40 mg, Early Morning    polyethylene glycol (MIRALAX) packet 17 g, Daily    senna-docusate sodium (SENOKOT S) 8.6-50 mg per tablet 1 tablet, BID    sodium chloride tablet 2 g, TID With Meals    sulfaSALAzine (AZULFIDINE) tablet 1,000 mg, BID    traMADol (ULTRAM) tablet 50 mg, Q6H PRN    Administrative Statements   Today, Patient Was Seen By: ALINA Morales      **Please Note: This note may have been constructed using a voice recognition system.**

## 2025-06-26 NOTE — ASSESSMENT & PLAN NOTE
- BP elevated in the setting of acute pain.  Avoid overcorrection  - Losartan increased to 50 mg twice a day on 6/25.  Monitor response

## 2025-06-27 ENCOUNTER — TELEPHONE (OUTPATIENT)
Dept: NEPHROLOGY | Facility: CLINIC | Age: 85
End: 2025-06-27

## 2025-06-27 VITALS
RESPIRATION RATE: 16 BRPM | WEIGHT: 141 LBS | OXYGEN SATURATION: 94 % | DIASTOLIC BLOOD PRESSURE: 73 MMHG | HEART RATE: 64 BPM | TEMPERATURE: 98.2 F | SYSTOLIC BLOOD PRESSURE: 169 MMHG | HEIGHT: 62 IN | BODY MASS INDEX: 25.95 KG/M2

## 2025-06-27 DIAGNOSIS — I10 ESSENTIAL HYPERTENSION: Primary | ICD-10-CM

## 2025-06-27 LAB
ANION GAP SERPL CALCULATED.3IONS-SCNC: 6 MMOL/L (ref 4–13)
BUN SERPL-MCNC: 11 MG/DL (ref 5–25)
CALCIUM SERPL-MCNC: 8.6 MG/DL (ref 8.4–10.2)
CHLORIDE SERPL-SCNC: 102 MMOL/L (ref 96–108)
CO2 SERPL-SCNC: 25 MMOL/L (ref 21–32)
CREAT SERPL-MCNC: 0.63 MG/DL (ref 0.6–1.3)
ERYTHROCYTE [DISTWIDTH] IN BLOOD BY AUTOMATED COUNT: 14.1 % (ref 11.6–15.1)
GFR SERPL CREATININE-BSD FRML MDRD: 81 ML/MIN/1.73SQ M
GLUCOSE SERPL-MCNC: 84 MG/DL (ref 65–140)
HCT VFR BLD AUTO: 26.3 % (ref 34.8–46.1)
HCT VFR BLD AUTO: 27.8 % (ref 34.8–46.1)
HGB BLD-MCNC: 8.7 G/DL (ref 11.5–15.4)
HGB BLD-MCNC: 9.1 G/DL (ref 11.5–15.4)
MAGNESIUM SERPL-MCNC: 2 MG/DL (ref 1.9–2.7)
MCH RBC QN AUTO: 36 PG (ref 26.8–34.3)
MCHC RBC AUTO-ENTMCNC: 33.1 G/DL (ref 31.4–37.4)
MCV RBC AUTO: 109 FL (ref 82–98)
PLATELET # BLD AUTO: 266 THOUSANDS/UL (ref 149–390)
PMV BLD AUTO: 9.8 FL (ref 8.9–12.7)
POTASSIUM SERPL-SCNC: 3.5 MMOL/L (ref 3.5–5.3)
RBC # BLD AUTO: 2.42 MILLION/UL (ref 3.81–5.12)
SODIUM SERPL-SCNC: 133 MMOL/L (ref 135–147)
WBC # BLD AUTO: 3.33 THOUSAND/UL (ref 4.31–10.16)

## 2025-06-27 PROCEDURE — 99232 SBSQ HOSP IP/OBS MODERATE 35: CPT | Performed by: INTERNAL MEDICINE

## 2025-06-27 PROCEDURE — 85014 HEMATOCRIT: CPT

## 2025-06-27 PROCEDURE — 80048 BASIC METABOLIC PNL TOTAL CA: CPT

## 2025-06-27 PROCEDURE — 83735 ASSAY OF MAGNESIUM: CPT

## 2025-06-27 PROCEDURE — 85018 HEMOGLOBIN: CPT

## 2025-06-27 PROCEDURE — 85027 COMPLETE CBC AUTOMATED: CPT

## 2025-06-27 PROCEDURE — 99239 HOSP IP/OBS DSCHRG MGMT >30: CPT

## 2025-06-27 RX ORDER — SODIUM CHLORIDE 1 G/1
2 TABLET ORAL
Qty: 120 TABLET | Refills: 0 | Status: SHIPPED | OUTPATIENT
Start: 2025-06-27

## 2025-06-27 RX ORDER — GABAPENTIN 100 MG/1
100 CAPSULE ORAL
Qty: 30 CAPSULE | Refills: 0 | Status: SHIPPED | OUTPATIENT
Start: 2025-06-27

## 2025-06-27 RX ORDER — LIDOCAINE 50 MG/G
1 PATCH TOPICAL DAILY
Qty: 30 PATCH | Refills: 0 | Status: SHIPPED | OUTPATIENT
Start: 2025-06-28

## 2025-06-27 RX ORDER — METHOCARBAMOL 500 MG/1
500 TABLET, FILM COATED ORAL EVERY 6 HOURS PRN
Qty: 20 TABLET | Refills: 0 | Status: SHIPPED | OUTPATIENT
Start: 2025-06-27

## 2025-06-27 RX ORDER — VALSARTAN 80 MG/1
160 TABLET ORAL DAILY
Qty: 60 TABLET | Refills: 0 | Status: SHIPPED | OUTPATIENT
Start: 2025-06-27

## 2025-06-27 RX ADMIN — ACETAMINOPHEN 975 MG: 325 TABLET, FILM COATED ORAL at 08:47

## 2025-06-27 RX ADMIN — ENOXAPARIN SODIUM 40 MG: 40 INJECTION SUBCUTANEOUS at 08:03

## 2025-06-27 RX ADMIN — LIDOCAINE 1 PATCH: 50 PATCH CUTANEOUS at 08:03

## 2025-06-27 RX ADMIN — ASPIRIN 81 MG 81 MG: 81 TABLET ORAL at 08:03

## 2025-06-27 RX ADMIN — AMLODIPINE BESYLATE 10 MG: 10 TABLET ORAL at 08:03

## 2025-06-27 RX ADMIN — SODIUM CHLORIDE 2 G: 1 TABLET ORAL at 08:03

## 2025-06-27 RX ADMIN — SULFASALAZINE 1000 MG: 500 TABLET ORAL at 08:02

## 2025-06-27 RX ADMIN — LOSARTAN POTASSIUM 50 MG: 50 TABLET, FILM COATED ORAL at 08:03

## 2025-06-27 RX ADMIN — PANTOPRAZOLE SODIUM 40 MG: 40 TABLET, DELAYED RELEASE ORAL at 05:22

## 2025-06-27 RX ADMIN — SODIUM CHLORIDE 2 G: 1 TABLET ORAL at 11:46

## 2025-06-27 RX ADMIN — SENNOSIDES AND DOCUSATE SODIUM 1 TABLET: 50; 8.6 TABLET ORAL at 08:09

## 2025-06-27 RX ADMIN — METHOCARBAMOL 500 MG: 500 TABLET ORAL at 08:03

## 2025-06-27 RX ADMIN — POLYETHYLENE GLYCOL 3350 17 G: 17 POWDER, FOR SOLUTION ORAL at 08:09

## 2025-06-27 RX ADMIN — LEVOTHYROXINE SODIUM 88 MCG: 0.09 TABLET ORAL at 05:22

## 2025-06-27 NOTE — PROGRESS NOTES
Progress Note - Nephrology   Name: Ning Rodrigues 85 y.o. female I MRN: [id number]  Unit/Bed#: -01 I Date of Admission: 6/22/2025   Date of Service: 6/27/2025 I Hospital Day: 5     Assessment & Plan  Hyponatremia  - Acute on chronic.  Baseline between 127-134.  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  On lifelong Protonix  - workup: serum osm 267, urine osm initially 53 with repeat level 49, urine osmolality initially 325 with repeat level 377, uric acid 2.8, tsh 4.55.   - imaging: negative CT abd/pelvis, CT chest from December showing mild bronchiectasis with chronic mucous plugging  -Hospital course: No improvement with isotonic saline.  Patient was placed on fluid restriction and salt tablets with no improvement/drop in sodium level therefore patient was placed on 1.8% saline on 6/27 with improvement in sodium level up to 130.  1.8% saline discontinued 6/26 and placed on salt tablets 2 g 3 times daily along with fluid restriction reduced to 1200 mL/day  -Current plan: Stable for discharge from a nephrology standpoint with blood work planned in 1 week.  Recommend discharging on 2 g 3 times daily sodium chloride tablets along with fluid restriction 50 ounces per day.  Patient instructed to use nutrition supplements if appetite is poor.  Follow-up with pain control.  -- Plan of care discussed with primary service and patient  Essential hypertension  - BP elevated in the setting of acute pain.  Avoid overcorrection  - Losartan increased to 50 mg twice a day on 6/25.  Monitor response.  No changes  Ulcerative colitis without complications (HCC)  - per primary team  Acute right flank pain  - per primary team  - Imaging unrevealing        Subjective     Review of systems: Continues to have dull right lower back pain.  Does not seem to be exacerbated by movement.  Eating well.  No nausea vomiting diarrhea.    Objective  :  Temp:  [97.4 °F (36.3 °C)-98.2 °F (36.8 °C)] 98.2 °F (36.8 °C)  HR:  [60-71] 64  BP: (143-169)/(68-74) 169/73  Resp:  [16-18] 16  SpO2:  [94 %-98 %] 94 %  O2 Device: None (Room air)    Current Weight: Weight - Scale: 64 kg (141 lb)  First Weight: Weight - Scale: 64 kg (141 lb)  I/O         06/25 0701  06/26 0700 06/26 0701  06/27 0700 06/27 0701  06/28 0700    P.O. 1000 460     I.V. (mL/kg)  921 (14.4)     Total Intake(mL/kg) 1000 (15.6) 1381 (21.6)     Urine (mL/kg/hr) 1850 (1.2) 1050 (0.7)     Stool 0      Total Output 1850 1050     Net -850 +331            Unmeasured Urine Occurrence 1 x      Unmeasured Stool Occurrence 0 x            Physical Exam  Vitals reviewed.   Constitutional:       General: She is not in acute distress.     Appearance: She is not ill-appearing, toxic-appearing or diaphoretic.   HENT:      Head: Normocephalic and atraumatic.      Nose: Nose normal. No congestion.      Mouth/Throat:      Mouth: Mucous membranes are moist.     Eyes:      General: No scleral icterus.        Right eye: No discharge.         Left eye: No discharge.      Extraocular Movements: Extraocular movements intact.      Conjunctiva/sclera: Conjunctivae normal.     Neck:      Vascular: No carotid bruit or JVD.     Cardiovascular:      Rate and Rhythm: Normal rate and regular rhythm.      Heart sounds: No murmur heard.     No friction rub. No gallop.   Pulmonary:      Effort: Pulmonary effort is normal. No respiratory distress.      Breath sounds: Normal breath sounds.   Abdominal:      General: Bowel sounds are normal. There is no distension.      Palpations: Abdomen is soft.      Tenderness: There is no abdominal tenderness.     Musculoskeletal:         General: No swelling, tenderness or signs of injury.      Cervical back: Normal range of motion. No rigidity or tenderness.      Right lower leg: No edema.      Left lower leg: No edema.     Skin:     General: Skin is warm and dry.      Capillary Refill: Capillary  "refill takes less than 2 seconds.      Coloration: Skin is not jaundiced or pale.      Findings: No erythema or rash.     Neurological:      Mental Status: She is alert and oriented to person, place, and time.     Psychiatric:         Mood and Affect: Mood normal.         Behavior: Behavior normal.         Thought Content: Thought content normal.         Judgment: Judgment normal.         Medications:  Current Medications[1]      Lab Results: I have reviewed the following results:  Results from last 7 days   Lab Units 06/27/25  0451 06/26/25  1631 06/26/25  1058 06/26/25  0557 06/26/25  0005 06/25/25  1835 06/25/25  1408 06/25/25  0902 06/24/25  0457 06/23/25  0525 06/22/25  1329 06/22/25  0845   WBC Thousand/uL 3.33*  --   --   --   --   --   --  6.07  --  5.10  --  3.49*   HEMOGLOBIN g/dL 8.7*  --   --   --   --   --   --  10.3*  --  9.1*  --  9.8*   HEMATOCRIT % 26.3*  --   --   --   --   --   --  29.1*  --  26.5*  --  28.5*   PLATELETS Thousands/uL 266  --   --   --   --   --   --  317  --  294  --  301   POTASSIUM mmol/L 3.5 3.8 3.6 3.9 3.9 4.0 4.4 3.9   < > 3.7   < > 3.6   CHLORIDE mmol/L 102 98 99 99 96 93* 90* 90*   < > 97   < > 94*   CO2 mmol/L 25 25 24 25 26 25 25 25   < > 25   < > 23   BUN mg/dL 11 13 11 10 11 11 11 11   < > 9   < > 8   CREATININE mg/dL 0.63 0.81 0.65 0.63 0.73 0.77 0.79 0.60   < > 0.67   < > 0.59*   CALCIUM mg/dL 8.6 8.9 8.6 8.6 8.7 8.7 8.9 9.1   < > 8.8   < > 9.2   MAGNESIUM mg/dL 2.0  --   --   --   --   --   --   --   --   --   --   --    ALBUMIN g/dL  --   --   --   --   --   --   --   --   --  3.6  --  4.2    < > = values in this interval not displayed.       Administrative Statements     Portions of the record may have been created with voice recognition software. Occasional wrong word or \"sound a like\" substitutions may have occurred due to the inherent limitations of voice recognition software. Read the chart carefully and recognize, using context, where substitutions have " occurred.If you have any questions, please contact the dictating provider.       [1]   Current Facility-Administered Medications:     acetaminophen (TYLENOL) tablet 975 mg, 975 mg, Oral, Q8H, Junior Banai, DO, 975 mg at 06/26/25 1653    amLODIPine (NORVASC) tablet 10 mg, 10 mg, Oral, Daily, Junior Banai, DO, 10 mg at 06/27/25 0803    aspirin chewable tablet 81 mg, 81 mg, Oral, Daily, Junior Banai, DO, 81 mg at 06/27/25 0803    atorvastatin (LIPITOR) tablet 20 mg, 20 mg, Oral, Daily With Dinner, Junior Banai, DO, 20 mg at 06/26/25 1653    enoxaparin (LOVENOX) subcutaneous injection 40 mg, 40 mg, Subcutaneous, Daily, Junior Banai, DO, 40 mg at 06/27/25 0803    gabapentin (NEURONTIN) capsule 100 mg, 100 mg, Oral, HS, ALINA Morales, 100 mg at 06/26/25 2100    levothyroxine tablet 88 mcg, 88 mcg, Oral, Early Morning, Junior Banai, DO, 88 mcg at 06/27/25 0522    lidocaine (LIDODERM) 5 % patch 1 patch, 1 patch, Topical, Daily, ALINA Morales, 1 patch at 06/27/25 0803    losartan (COZAAR) tablet 50 mg, 50 mg, Oral, BID, Monisha Echols PA-C, 50 mg at 06/27/25 0803    methocarbamol (ROBAXIN) tablet 500 mg, 500 mg, Oral, Q6H LALO, Eliu Rivero DO, 500 mg at 06/27/25 0803    oxyCODONE (ROXICODONE) IR tablet 2.5 mg, 2.5 mg, Oral, Q6H PRN, ALINA Morales    pantoprazole (PROTONIX) EC tablet 40 mg, 40 mg, Oral, Early Morning, Junior Banai, DO, 40 mg at 06/27/25 0522    polyethylene glycol (MIRALAX) packet 17 g, 17 g, Oral, Daily, Junior Banai, DO, 17 g at 06/27/25 0809    senna-docusate sodium (SENOKOT S) 8.6-50 mg per tablet 1 tablet, 1 tablet, Oral, BID, Junior Taveras DO, 1 tablet at 06/27/25 0809    sodium chloride tablet 2 g, 2 g, Oral, TID With Meals, Nasima Nieves DO, 2 g at 06/27/25 0803    sulfaSALAzine (AZULFIDINE) tablet 1,000 mg, 1,000 mg, Oral, BID, Junior Taveras DO, 1,000 mg at 06/27/25 0802

## 2025-06-27 NOTE — ASSESSMENT & PLAN NOTE
BP reviewed, intermittently hypertensive  Continue  Norvasc 10 mg daily  Losartan transitioned back to Valsartan upon discharge-> Increase from  mg daily  Recommend outpatient follow up with PCP for continued management

## 2025-06-27 NOTE — DISCHARGE SUMMARY
Discharge Summary - Hospitalist   Name: Ning Rodrigues 85 y.o. female I MRN: [id number]  Unit/Bed#: -01 I Date of Admission: 6/22/2025   Date of Service: 6/27/2025 I Hospital Day: 5     Assessment & Plan  Hyponatremia  Acute on chronic hyponatremia, Na 126 in ED with baseline Na closer to low 130s.  Suspect SIADH as she has had hyponatremia in the past, was recommended she be seen by nephrology but patient does not recall if that was done or not  Hyponatremia workup: serum osmol 267, urine osmol 325, urine Na 53, uric acid wnl. Checking TSH/T4.  Received 1L NS bolus in ED with minimal improvement  S/p Hypertonic Solution (6/25)-> Now discontinued  Nephrology following  Continue FR of 1500 ml.. Plan for salt tablets 2g TID.   Recommend repeat BMP within 7 days of discharge and outpatient follow up with Nephrology  Acute right flank pain  Patient initially with right flank pain, does not recall recent injury or strain. Denies abdominal pain associated.   Pain is reproducible on exam, likely from MSK causes   CT a/p showed no acute abnormalities  Continue scheduled Tylenol, Robaxin. Aqua K; OOB as tolerated  Pain Gradually improved today (6/27)  Continue Lidocaine patches and Gabapentin 100 mg qHs upon discharge  Bowel regimen to prevent constipation  Recommend close outpatient follow up with PCP for continued evaluation and possible MRI spine  Essential hypertension  BP reviewed, intermittently hypertensive  Continue  Norvasc 10 mg daily  Losartan transitioned back to Valsartan upon discharge-> Increase from  mg daily  Recommend outpatient follow up with PCP for continued management  Ulcerative colitis without complications (HCC)  Not in acute exacerbation, denies changes in bowel habits  Continue sulfasalazine, F/U with GI outpatient     Medical Problems       Resolved Problems  Date Reviewed: 5/19/2025   None       Discharging Physician / Practitioner: ALINA Morales  PCP: Yohan Regan MD  Admission  Date:   Admission Orders (From admission, onward)       Ordered        06/22/25 1433  INPATIENT ADMISSION  Once            06/22/25 1400  Place in Observation  Once,   Status:  Canceled                          Discharge Date: 06/27/25    Next Steps for Physician/AP Assuming Care:  Repeat BMP and Outpatient Nephrology follow up for ongoing Hyponatremia  Follow up with PCP for ongoing Flank pain and possible MRI spine  Follow up with PCP for persistent Hypertension    Test Results Pending at Discharge (will require follow up):  None    Medication Changes for Discharge & Rationale:   Initiated Salt Tablets for Hyponatremia  Initiated Scheduled Tylenol, Robaxin, Lidocaine patches, and Gabapentin for ongoing Pain  Increased Valsartan to 160mg for Hypertension  See after visit summary for reconciled discharge medications provided to patient and/or family.     Consultations During Hospital Stay:  Nephrology    Procedures Performed:   None    Significant Findings / Test Results:     CT abdomen pelvis with contrast   Final Result by Roque Savage MD (06/22 1206)      No acute findings in the abdomen or pelvis.         Workstation performed: [id number]              Incidental Findings:   None    Hospital Course:   Ning Rodrigues is a 85 y.o. female patient  PMH of hypertension, ulcerative colitis, chronic hyponatremia who originally presented to the hospital on 6/22/2025 due to right flank pain.  Patient does not recall any recent injury or strains.  Patient denies any associated abdominal pain.    Upon arrival to the ED, vitals with notable hypertension.  Labs revealed acute on chronic hyponatremia.  With ongoing flank pain CT abdomen pelvis showed no acute abnormalities.  Likely musculoskeletal in the setting that pain is reproducible on examination. Patient was started on pain regiment with gradual improvement in pain. Will continue scheduled pain regiment with recommendations for possible outpatient MRI for  "continued evaluation. Recommend close outpatient follow-up with PCP for continued management of ongoing flank pain.  While admitted, hyponatremia gradually worsened with sodium as low as 121.  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  Nephrology was following.  Patient initially received hypertonic solution which since discontinued.  Plan to continue fluid restriction of 1500 ml on discharge and Salt Tablets 2g 3 times daily.  Recommend repeat BMP within 7 days of discharge and outpatient follow-up with primary nephrology.  With persistent hypertension likely pain contributing, losartan was increased.  With losartan transitioning back to valsartan upon discharge, recommend increasing home ARB upon discharge.  Recommend close outpatient follow-up with PCP for ongoing management of hypertension.    Discussed plan with patient and patient's family at bedside, all questions were answered.   Please see above list of diagnoses and related plan for additional information.     Discharge Day Visit / Exam:   Subjective: Patient states she feeling good today.  Patient states her flank pain has improved.  Patient denies any active chest pain, shortness of breath, abdominal pain, nausea, or vomiting.  Vitals: Blood Pressure: 169/73 (06/27/25 0800)  Pulse: 64 (06/27/25 0800)  Temperature: 98.2 °F (36.8 °C) (06/27/25 0800)  Temp Source: Oral (06/27/25 0800)  Respirations: 16 (06/27/25 0800)  Height: 5' 2\" (157.5 cm) (06/22/25 1935)  Weight - Scale: 64 kg (141 lb) (06/22/25 1935)  SpO2: 94 % (06/27/25 0800)  Physical Exam  Vitals and nursing note reviewed.   Constitutional:       General: She is not in acute distress.     Appearance: Normal appearance.   HENT:      Head: Normocephalic.      Nose: Nose normal. No congestion.      Mouth/Throat:      Mouth: Mucous membranes are moist.      Pharynx: Oropharynx is clear.     Cardiovascular:      Rate and Rhythm: Normal rate and regular rhythm.      Pulses: Normal " pulses.      Heart sounds: Normal heart sounds. No murmur heard.  Pulmonary:      Effort: No respiratory distress.      Breath sounds: Normal breath sounds.   Abdominal:      General: There is no distension.      Palpations: Abdomen is soft.      Tenderness: There is no abdominal tenderness.     Musculoskeletal:         General: Tenderness (Right FLank, Improved) present. Normal range of motion.      Cervical back: Normal range of motion.      Right lower leg: No edema.      Left lower leg: No edema.     Skin:     General: Skin is warm and dry.      Capillary Refill: Capillary refill takes less than 2 seconds.     Neurological:      Mental Status: She is alert and oriented to person, place, and time. Mental status is at baseline.      Motor: No weakness.     Psychiatric:         Mood and Affect: Mood is anxious.         Speech: Speech normal.         Behavior: Behavior is cooperative.        Discussion with Family: Updated  (Family) at bedside.    Discharge instructions/Information to patient and family:   See after visit summary for information provided to patient and family.      Provisions for Follow-Up Care:  See after visit summary for information related to follow-up care and any pertinent home health orders.      Mobility at time of Discharge:   Basic Mobility Inpatient Raw Score: 19  -HLM Goal: 6: Walk 10 steps or more  JH-HLM Achieved: 2: Bed activities/Dependent transfer  HLM Goal NOT achieved. Continue to encourage mobility in post discharge setting.     Disposition:   Home with VNA Services (Reminder: Complete face to face encounter)    Planned Readmission: No    Administrative Statements   Discharge Statement:  I have spent a total time of 35 minutes in caring for this patient on the day of the visit/encounter. >30 minutes of time was spent on: Diagnostic results, Risks and benefits of tx options, Instructions for management, Patient and family education, Importance of tx compliance,  Risk factor reductions, Impressions, Counseling / Coordination of care, Documenting in the medical record, Reviewing / ordering tests, medicine, procedures  , and Communicating with other healthcare professionals .    **Please Note: This note may have been constructed using a voice recognition system**

## 2025-06-27 NOTE — PLAN OF CARE
Problem: PAIN - ADULT  Goal: Verbalizes/displays adequate comfort level or baseline comfort level  Description: Interventions:  - Encourage patient to monitor pain and request assistance  - Assess pain using appropriate pain scale  - Administer analgesics as ordered based on type and severity of pain and evaluate response  - Implement non-pharmacological measures as appropriate and evaluate response  - Consider cultural and social influences on pain and pain management  - Notify physician/advanced practitioner if interventions unsuccessful or patient reports new pain  - Educate patient/family on pain management process including their role and importance of  reporting pain   - Provide non-pharmacologic/complimentary pain relief interventions  Outcome: Progressing     Problem: INFECTION - ADULT  Goal: Absence or prevention of progression during hospitalization  Description: INTERVENTIONS:  - Assess and monitor for signs and symptoms of infection  - Monitor lab/diagnostic results  - Monitor all insertion sites, i.e. indwelling lines, tubes, and drains  - Monitor endotracheal if appropriate and nasal secretions for changes in amount and color  - [place] appropriate cooling/warming therapies per order  - Administer medications as ordered  - Instruct and encourage patient and family to use good hand hygiene technique  - Identify and instruct in appropriate isolation precautions for identified infection/condition  Outcome: Progressing  Goal: Absence of fever/infection during neutropenic period  Description: INTERVENTIONS:  - Monitor WBC  - Perform strict hand hygiene  - Limit to healthy visitors only  - No plants, dried, fresh or silk flowers with huang in patient room  Outcome: Progressing     Problem: SAFETY ADULT  Goal: Maintain or return to baseline ADL function  Description: INTERVENTIONS:  -  Assess patient's ability to carry out ADLs; assess patient's baseline for ADL function and identify physical deficits  which impact ability to perform ADLs (bathing, care of mouth/teeth, toileting, grooming, dressing, etc.)  - Assess/evaluate cause of self-care deficits   - Assess range of motion  - Assess patient's mobility; develop plan if impaired  - Assess patient's need for assistive devices and provide as appropriate  - Encourage maximum independence but intervene and supervise when necessary  - Involve family in performance of ADLs  - Assess for home care needs following discharge   - Consider OT consult to assist with ADL evaluation and planning for discharge  - Provide patient education as appropriate  - Monitor functional capacity and physical performance, use of AM PAC & -HLM   - Monitor gait, balance and fatigue with ambulation    Outcome: Progressing  Goal: Maintains/Returns to pre admission functional level  Description: INTERVENTIONS:  - Perform AM-PAC 6 Click Basic Mobility/ Daily Activity assessment daily.  - Set and communicate daily mobility goal to care team and patient/family/caregiver.   - Collaborate with rehabilitation services on mobility goals if consulted  - Perform Range of Motion  - Stand patient  - Ambulate patient  - Out of bed to chair 3 times a day   - Out of bed for meals 3 times a day  - Out of bed for toileting  - Record patient progress and toleration of activity level   Outcome: Progressing

## 2025-06-27 NOTE — ASSESSMENT & PLAN NOTE
Patient initially with right flank pain, does not recall recent injury or strain. Denies abdominal pain associated.   Pain is reproducible on exam, likely from MSK causes   CT a/p showed no acute abnormalities  Continue scheduled Tylenol, Robaxin. Aqua K; OOB as tolerated  Pain Gradually improved today (6/27)  Continue Lidocaine patches and Gabapentin 100 mg qHs upon discharge  Bowel regimen to prevent constipation  Recommend close outpatient follow up with PCP for continued evaluation and possible MRI spine

## 2025-06-27 NOTE — ASSESSMENT & PLAN NOTE
- Acute on chronic.  Baseline between 127-134.  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  On lifelong Protonix  - workup: serum osm 267, urine osm initially 53 with repeat level 49, urine osmolality initially 325 with repeat level 377, uric acid 2.8, tsh 4.55.   - imaging: negative CT abd/pelvis, CT chest from December showing mild bronchiectasis with chronic mucous plugging  -Hospital course: No improvement with isotonic saline.  Patient was placed on fluid restriction and salt tablets with no improvement/drop in sodium level therefore patient was placed on 1.8% saline on 6/27 with improvement in sodium level up to 130.  1.8% saline discontinued 6/26 and placed on salt tablets 2 g 3 times daily along with fluid restriction reduced to 1200 mL/day  -Current plan: Stable for discharge from a nephrology standpoint with blood work planned in 1 week.  Recommend discharging on 2 g 3 times daily sodium chloride tablets along with fluid restriction 50 ounces per day.  Patient instructed to use nutrition supplements if appetite is poor.  Follow-up with pain control.  -- Plan of care discussed with primary service and patient

## 2025-06-27 NOTE — ASSESSMENT & PLAN NOTE
Acute on chronic hyponatremia, Na 126 in ED with baseline Na closer to low 130s.  Suspect SIADH as she has had hyponatremia in the past, was recommended she be seen by nephrology but patient does not recall if that was done or not  Hyponatremia workup: serum osmol 267, urine osmol 325, urine Na 53, uric acid wnl. Checking TSH/T4.  Received 1L NS bolus in ED with minimal improvement  S/p Hypertonic Solution (6/25)-> Now discontinued  Nephrology following  Continue FR of 1500 ml.. Plan for salt tablets 2g TID.   Recommend repeat BMP within 7 days of discharge and outpatient follow up with Nephrology

## 2025-06-27 NOTE — PLAN OF CARE
Problem: PAIN - ADULT  Goal: Verbalizes/displays adequate comfort level or baseline comfort level  Description: Interventions:  - Encourage patient to monitor pain and request assistance  - Assess pain using appropriate pain scale  - Administer analgesics as ordered based on type and severity of pain and evaluate response  - Implement non-pharmacological measures as appropriate and evaluate response  - Consider cultural and social influences on pain and pain management  - Notify physician/advanced practitioner if interventions unsuccessful or patient reports new pain  - Educate patient/family on pain management process including their role and importance of  reporting pain   - Provide non-pharmacologic/complimentary pain relief interventions  Outcome: Progressing     Problem: INFECTION - ADULT  Goal: Absence or prevention of progression during hospitalization  Description: INTERVENTIONS:  - Assess and monitor for signs and symptoms of infection  - Monitor lab/diagnostic results  - Monitor all insertion sites, i.e. indwelling lines, tubes, and drains  - Monitor endotracheal if appropriate and nasal secretions for changes in amount and color  - [place] appropriate cooling/warming therapies per order  - Administer medications as ordered  - Instruct and encourage patient and family to use good hand hygiene technique  - Identify and instruct in appropriate isolation precautions for identified infection/condition  Outcome: Progressing  Goal: Absence of fever/infection during neutropenic period  Description: INTERVENTIONS:  - Monitor WBC  - Perform strict hand hygiene  - Limit to healthy visitors only  - No plants, dried, fresh or silk flowers with huang in patient room  Outcome: Progressing     Problem: DISCHARGE PLANNING  Goal: Discharge to home or other facility with appropriate resources  Description: INTERVENTIONS:  - Identify barriers to discharge w/patient and caregiver  - Arrange for needed discharge resources  and transportation as appropriate  - Identify discharge learning needs (meds, wound care, etc.)  - Arrange for interpretive services to assist at discharge as needed  - Refer to Case Management Department for coordinating discharge planning if the patient needs post-hospital services based on physician/advanced practitioner order or complex needs related to functional status, cognitive ability, or social support system  Outcome: Progressing     Problem: Knowledge Deficit  Goal: Patient/family/caregiver demonstrates understanding of disease process, treatment plan, medications, and discharge instructions  Description: Complete learning assessment and assess knowledge base.  Interventions:  - Provide teaching at level of understanding  - Provide teaching via preferred learning methods  Outcome: Progressing

## 2025-06-27 NOTE — NURSING NOTE
Pt discharged home. Medication instruction were given to patient and Spouse. IV was removed and accompanied by RN to car ride.

## 2025-06-27 NOTE — ASSESSMENT & PLAN NOTE
- BP elevated in the setting of acute pain.  Avoid overcorrection  - Losartan increased to 50 mg twice a day on 6/25.  Monitor response.  No changes

## 2025-06-27 NOTE — TELEPHONE ENCOUNTER
Labs in the system.        ----- Message from ALINA Calderon sent at 6/27/2025  8:31 AM EDT -----  Was seen during hospitalization at [facility] for hyponatremia.  She is being discharged likely on 6/27.  Please arrange for BMP 5 to 7 days after discharge.  Follow-up appointment.  Nonurgent

## 2025-06-30 ENCOUNTER — TRANSITIONAL CARE MANAGEMENT (OUTPATIENT)
Dept: FAMILY MEDICINE CLINIC | Facility: CLINIC | Age: 85
End: 2025-06-30

## 2025-07-02 ENCOUNTER — APPOINTMENT (EMERGENCY)
Dept: CT IMAGING | Facility: HOSPITAL | Age: 85
End: 2025-07-02
Payer: MEDICARE

## 2025-07-02 ENCOUNTER — TELEPHONE (OUTPATIENT)
Dept: FAMILY MEDICINE CLINIC | Facility: CLINIC | Age: 85
End: 2025-07-02

## 2025-07-02 ENCOUNTER — HOSPITAL ENCOUNTER (EMERGENCY)
Facility: HOSPITAL | Age: 85
Discharge: HOME | End: 2025-07-02
Attending: EMERGENCY MEDICINE
Payer: MEDICARE

## 2025-07-02 VITALS
BODY MASS INDEX: 26.09 KG/M2 | WEIGHT: 142.64 LBS | RESPIRATION RATE: 16 BRPM | TEMPERATURE: 98.3 F | OXYGEN SATURATION: 96 % | HEART RATE: 55 BPM | SYSTOLIC BLOOD PRESSURE: 193 MMHG | DIASTOLIC BLOOD PRESSURE: 84 MMHG

## 2025-07-02 DIAGNOSIS — M54.9 RIGHT-SIDED BACK PAIN: Primary | ICD-10-CM

## 2025-07-02 LAB
ALBUMIN SERPL BCG-MCNC: 3.8 G/DL (ref 3.5–5)
ALP SERPL-CCNC: 51 U/L (ref 34–104)
ALT SERPL W P-5'-P-CCNC: 29 U/L (ref 7–52)
ANION GAP SERPL CALCULATED.3IONS-SCNC: 7 MMOL/L (ref 4–13)
AST SERPL W P-5'-P-CCNC: 29 U/L (ref 13–39)
BASOPHILS # BLD AUTO: 0.06 THOUSANDS/ÂΜL (ref 0–0.1)
BASOPHILS NFR BLD AUTO: 2 % (ref 0–1)
BILIRUB SERPL-MCNC: 0.71 MG/DL (ref 0.2–1)
BILIRUB UR QL STRIP: NEGATIVE
BUN SERPL-MCNC: 13 MG/DL (ref 5–25)
CALCIUM SERPL-MCNC: 9 MG/DL (ref 8.4–10.2)
CHLORIDE SERPL-SCNC: 102 MMOL/L (ref 96–108)
CLARITY UR: CLEAR
CO2 SERPL-SCNC: 27 MMOL/L (ref 21–32)
COLOR UR: YELLOW
CREAT SERPL-MCNC: 0.65 MG/DL (ref 0.6–1.3)
EOSINOPHIL # BLD AUTO: 0.14 THOUSAND/ÂΜL (ref 0–0.61)
EOSINOPHIL NFR BLD AUTO: 4 % (ref 0–6)
ERYTHROCYTE [DISTWIDTH] IN BLOOD BY AUTOMATED COUNT: 14.1 % (ref 11.6–15.1)
GFR SERPL CREATININE-BSD FRML MDRD: 80 ML/MIN/1.73SQ M
GLUCOSE SERPL-MCNC: 93 MG/DL (ref 65–140)
GLUCOSE UR STRIP-MCNC: NEGATIVE MG/DL
HCT VFR BLD AUTO: 24.8 % (ref 34.8–46.1)
HGB BLD-MCNC: 8.1 G/DL (ref 11.5–15.4)
HGB UR QL STRIP.AUTO: NEGATIVE
IMM GRANULOCYTES # BLD AUTO: 0.01 THOUSAND/UL (ref 0–0.2)
IMM GRANULOCYTES NFR BLD AUTO: 0 % (ref 0–2)
KETONES UR STRIP-MCNC: NEGATIVE MG/DL
LEUKOCYTE ESTERASE UR QL STRIP: NEGATIVE
LYMPHOCYTES # BLD AUTO: 1.67 THOUSANDS/ÂΜL (ref 0.6–4.47)
LYMPHOCYTES NFR BLD AUTO: 48 % (ref 14–44)
MCH RBC QN AUTO: 35.7 PG (ref 26.8–34.3)
MCHC RBC AUTO-ENTMCNC: 32.7 G/DL (ref 31.4–37.4)
MCV RBC AUTO: 109 FL (ref 82–98)
MONOCYTES # BLD AUTO: 0.57 THOUSAND/ÂΜL (ref 0.17–1.22)
MONOCYTES NFR BLD AUTO: 16 % (ref 4–12)
NEUTROPHILS # BLD AUTO: 1.03 THOUSANDS/ÂΜL (ref 1.85–7.62)
NEUTS SEG NFR BLD AUTO: 30 % (ref 43–75)
NITRITE UR QL STRIP: NEGATIVE
NRBC BLD AUTO-RTO: 0 /100 WBCS
PH UR STRIP.AUTO: 6 [PH]
PLATELET # BLD AUTO: 237 THOUSANDS/UL (ref 149–390)
PMV BLD AUTO: 9.8 FL (ref 8.9–12.7)
POTASSIUM SERPL-SCNC: 4 MMOL/L (ref 3.5–5.3)
PROT SERPL-MCNC: 6.5 G/DL (ref 6.4–8.4)
PROT UR STRIP-MCNC: NEGATIVE MG/DL
RBC # BLD AUTO: 2.27 MILLION/UL (ref 3.81–5.12)
SODIUM SERPL-SCNC: 136 MMOL/L (ref 135–147)
SP GR UR STRIP.AUTO: 1.01 (ref 1–1.03)
UROBILINOGEN UR QL STRIP.AUTO: 0.2 E.U./DL
WBC # BLD AUTO: 3.48 THOUSAND/UL (ref 4.31–10.16)

## 2025-07-02 PROCEDURE — 96361 HYDRATE IV INFUSION ADD-ON: CPT

## 2025-07-02 PROCEDURE — 96374 THER/PROPH/DIAG INJ IV PUSH: CPT

## 2025-07-02 PROCEDURE — 36415 COLL VENOUS BLD VENIPUNCTURE: CPT | Performed by: PHYSICIAN ASSISTANT

## 2025-07-02 PROCEDURE — 99284 EMERGENCY DEPT VISIT MOD MDM: CPT

## 2025-07-02 PROCEDURE — 99284 EMERGENCY DEPT VISIT MOD MDM: CPT | Performed by: EMERGENCY MEDICINE

## 2025-07-02 PROCEDURE — 85025 COMPLETE CBC W/AUTO DIFF WBC: CPT | Performed by: PHYSICIAN ASSISTANT

## 2025-07-02 PROCEDURE — 74176 CT ABD & PELVIS W/O CONTRAST: CPT

## 2025-07-02 PROCEDURE — 81003 URINALYSIS AUTO W/O SCOPE: CPT | Performed by: PHYSICIAN ASSISTANT

## 2025-07-02 PROCEDURE — 80053 COMPREHEN METABOLIC PANEL: CPT | Performed by: PHYSICIAN ASSISTANT

## 2025-07-02 RX ORDER — ACETAMINOPHEN 325 MG/1
650 TABLET ORAL ONCE
Status: COMPLETED | OUTPATIENT
Start: 2025-07-02 | End: 2025-07-02

## 2025-07-02 RX ORDER — KETOROLAC TROMETHAMINE 30 MG/ML
15 INJECTION, SOLUTION INTRAMUSCULAR; INTRAVENOUS ONCE
Status: COMPLETED | OUTPATIENT
Start: 2025-07-02 | End: 2025-07-02

## 2025-07-02 RX ADMIN — KETOROLAC TROMETHAMINE 15 MG: 30 INJECTION, SOLUTION INTRAMUSCULAR at 15:44

## 2025-07-02 RX ADMIN — SODIUM CHLORIDE 500 ML: 0.9 INJECTION, SOLUTION INTRAVENOUS at 15:43

## 2025-07-02 RX ADMIN — ACETAMINOPHEN 650 MG: 325 TABLET, FILM COATED ORAL at 15:42

## 2025-07-02 NOTE — DISCHARGE INSTRUCTIONS
Use your Tylenol and Ibuprofen at home for pain  You can add your lidocaine patches  You can add Voltaren gel.    Followup with you PCP to recommend pain management or further evaluation and treatment.

## 2025-07-02 NOTE — TELEPHONE ENCOUNTER
"Ning is having \"terrible unbearable pain in R lower back\", worse today.  She is only using Tylenol & it's not helping.  She wanted to be seen, but no openings.  Could there be medication she should try?  [facility]  "

## 2025-07-02 NOTE — ED NOTES
Pt aware of need for urine specimen.  Unable to provide at this time     Dannielle Harper, HEMA  07/02/25 3053

## 2025-07-02 NOTE — ED PROVIDER NOTES
Time reflects when diagnosis was documented in both MDM as applicable and the Disposition within this note       Time User Action Codes Description Comment    7/2/2025  5:43 PM BlackTiffani Add [M54.9] Right-sided back pain           ED Disposition       ED Disposition   Discharge    Condition   Stable    Date/Time   Wed Jul 2, 2025  5:42 PM    Comment   Ning Rodrigues discharge to home/self care.                   Assessment & Plan       Medical Decision Making  Patient with atraumatic,  ongoing right-sided back pain; tried to call PCP, was referred to outpatient clinic: was seen and noted to be anemic, so sent to emergency department, however that is noted to be chronic   Considered musculoskeletal, renal colic, pyelonephritis, rib fracture, pneumonia, shingles, chronic anemia, kidney injury among others    Labs reviewed, no infection, hemoglobin is chronic, CT scan does not review any acute process.  Symptoms likely musculoskeletal, possible radiculopathy related, referred back to primary care doctor for further evaluation and follow-up with pain specialist, spine specialist    Amount and/or Complexity of Data Reviewed  Labs: ordered. Decision-making details documented in ED Course.  Radiology: ordered. Decision-making details documented in ED Course.    Risk  OTC drugs.  Prescription drug management.        ED Course as of 07/02/25 1828   Wed Jul 02, 2025   1531 Hemoglobin(!): 8.1  Mild anemia, similar to baseline   1531 Hematocrit(!): 24.8   1614 CMP unremarkable       Medications   sodium chloride 0.9 % bolus 500 mL (0 mL Intravenous Stopped 7/2/25 1748)   acetaminophen (TYLENOL) tablet 650 mg (650 mg Oral Given 7/2/25 1542)   ketorolac (TORADOL) injection 15 mg (15 mg Intravenous Given 7/2/25 1544)       ED Risk Strat Scores                    (ISAR) Identification of Seniors at Risk  Before the illness or injury that brought you to the Emergency, did you need someone to help you on a regular basis?: 1  In  the last 24 hours, have you needed more help than usual?: 1  Have you been hospitalized for one or more nights during the past 6 months?: 1  In general, do you see well?: 1  In general, do you have serious problems with your memory?: 1  Do you take more than three different medications every day?: 1  ISAR Score: 6            SBIRT 20yo+      Flowsheet Row Most Recent Value   Initial Alcohol Screen: US AUDIT-C     1. How often do you have a drink containing alcohol? 0 Filed at: 07/02/2025 1429   2. How many drinks containing alcohol do you have on a typical day you are drinking?  0 Filed at: 07/02/2025 1429   3b. FEMALE Any Age, or MALE 65+: How often do you have 4 or more drinks on one occassion? 0 Filed at: 07/02/2025 1429   Audit-C Score 0 Filed at: 07/02/2025 1429   SARBJIT: How many times in the past year have you...    Used an illegal drug or used a prescription medication for non-medical reasons? Never Filed at: 07/02/2025 1429                            History of Present Illness       Chief Complaint   Patient presents with    Back Pain     Pt states she is here for a low blood count but then changed complaint to back pain  [facility] and [facility] found to be low.  Recently discharged.  Reports pain right abdomen that is unchanged from recent admission       Past Medical History[1]   Past Surgical History[2]   Family History[3]   Social History[4]   E-Cigarette/Vaping    E-Cigarette Use Never User       E-Cigarette/Vaping Substances    Nicotine No     THC No     CBD No     Flavoring No     Other No     Unknown No       I have reviewed and agree with the history as documented.     PMH: HTN, Hypercholesterolemia, Hypothyroidism, Ulcerative colitis, Low's esophagus, GERD, Pharyngoesophageal dysphagia, Herpes zoster, Calcified granuloma of lung, Asthma, Anemia due to chronic kidney disease, Diverticulitis, Hyperlipidemia,   PSH: TAVR (transcatheter aortic valve replacement), B/L TKR, Hemorroidectomy,  "Tonsillectomy, Tubal ligation, Foot surgery (Bilateral), Appendectomy, Cardiac catheterization     Pt presents to ED c/o chronic, intermittent, atraumatic, right sided back pain, worse over the past 1 week, now more aching/painful, along right side/flank/radiating around to right side/ribs, no strain, no rash, no fever/chills, cp, sob, abd pain, NVD, LE edema, no weakness  She called PCP who wanted pt to be seen; she went to outpt clinic and was referred to ED for \"low hgb 8.8\" - which pt appears to be at baseline for her past year 8.7 - 10.3              Review of Systems   Constitutional:  Negative for fever.   HENT:  Negative for sore throat.    Respiratory:  Negative for cough and shortness of breath.    Cardiovascular:  Negative for chest pain and leg swelling.   Gastrointestinal:  Negative for abdominal pain, constipation, diarrhea and vomiting.   Genitourinary:  Negative for dysuria and frequency.   Musculoskeletal:  Positive for back pain and myalgias. Negative for arthralgias.   Skin:  Negative for rash.   Neurological:  Negative for dizziness and weakness.   Psychiatric/Behavioral:  Negative for behavioral problems.            Objective       ED Triage Vitals [07/02/25 1425]   Temperature Pulse Blood Pressure Respirations SpO2 Patient Position - Orthostatic VS   98.3 °F (36.8 °C) 61 150/85 16 98 % Lying      Temp Source Heart Rate Source BP Location FiO2 (%) Pain Score    Oral Monitor Left arm -- 1      Vitals      Date and Time Temp Pulse SpO2 Resp BP Pain Score FACES Pain Rating User   07/02/25 1700 -- 55 96 % 16 193/84 1 -- RR   07/02/25 1600 -- 58 94 % 16 189/80 2 -- RR   07/02/25 1543 -- -- -- -- -- 6 -- RR   07/02/25 1425 98.3 °F (36.8 °C) 61 98 % 16 150/85 1 -- RR            Physical Exam  Vitals and nursing note reviewed.   Constitutional:       Appearance: She is well-developed.   HENT:      Head: Normocephalic and atraumatic.      Nose: Nose normal.      Mouth/Throat:      Mouth: Mucous membranes " are moist.      Pharynx: Oropharynx is clear.     Eyes:      Conjunctiva/sclera: Conjunctivae normal.       Cardiovascular:      Rate and Rhythm: Normal rate and regular rhythm.   Pulmonary:      Effort: Pulmonary effort is normal. No respiratory distress.      Breath sounds: Normal breath sounds.   Abdominal:      General: Bowel sounds are normal.      Palpations: Abdomen is soft.     Musculoskeletal:         General: Normal range of motion.      Cervical back: Normal range of motion.      Lumbar back: No swelling, edema or bony tenderness. Negative right straight leg raise test and negative left straight leg raise test.        Back:       Comments: Diffuse right sided paralumbar musculature/flank/some lower rib pain, on rash, no skin changes, no crepitus     Skin:     General: Skin is warm and dry.     Neurological:      Mental Status: She is alert.     Psychiatric:         Behavior: Behavior normal.         Results Reviewed       Procedure Component Value Units Date/Time    UA w Reflex to Microscopic w Reflex to Culture [[id number]]  (Normal) Collected: 07/02/25 1632    Lab Status: Final result Specimen: Urine, Other Updated: 07/02/25 1636     Color, UA Yellow     Clarity, UA Clear     Specific Gravity, UA 1.015     pH, UA 6.0     Leukocytes, UA Negative     Nitrite, UA Negative     Protein, UA Negative mg/dl      Glucose, UA Negative mg/dl      Ketones, UA Negative mg/dl      Urobilinogen, UA 0.2 E.U./dl      Bilirubin, UA Negative     Occult Blood, UA Negative    Comprehensive metabolic panel [[id number]] Collected: 07/02/25 1523    Lab Status: Final result Specimen: Blood from Arm, Left Updated: 07/02/25 1545     Sodium 136 mmol/L      Potassium 4.0 mmol/L      Chloride 102 mmol/L      CO2 27 mmol/L      ANION GAP 7 mmol/L      BUN 13 mg/dL      Creatinine 0.65 mg/dL      Glucose 93 mg/dL      Calcium 9.0 mg/dL      AST 29 U/L      ALT 29 U/L      Alkaline Phosphatase 51 U/L      Total Protein 6.5 g/dL       Albumin 3.8 g/dL      Total Bilirubin 0.71 mg/dL      eGFR 80 ml/min/1.73sq m     Narrative:      National Kid[employer] Foundation guidelines for Chronic Kidney Disease (CKD):     Stage 1 with normal or high GFR (GFR > 90 mL/min/1.73 square meters)    Stage 2 Mild CKD (GFR = 60-89 mL/min/1.73 square meters)    Stage 3A Moderate CKD (GFR = 45-59 mL/min/1.73 square meters)    Stage 3B Moderate CKD (GFR = 30-44 mL/min/1.73 square meters)    Stage 4 Severe CKD (GFR = 15-29 mL/min/1.73 square meters)    Stage 5 End Stage CKD (GFR <15 mL/min/1.73 square meters)  Note: GFR calculation is accurate only with a steady state creatinine    CBC and differential [[id number]]  (Abnormal) Collected: 07/02/25 1523    Lab Status: Final result Specimen: Blood from Arm, Left Updated: 07/02/25 1529     WBC 3.48 Thousand/uL      RBC 2.27 Million/uL      Hemoglobin 8.1 g/dL      Hematocrit 24.8 %       fL      MCH 35.7 pg      MCHC 32.7 g/dL      RDW 14.1 %      MPV 9.8 fL      Platelets 237 Thousands/uL      nRBC 0 /100 WBCs      Segmented % 30 %      Immature Grans % 0 %      Lymphocytes % 48 %      Monocytes % 16 %      Eosinophils Relative 4 %      Basophils Relative 2 %      Absolute Neutrophils 1.03 Thousands/µL      Absolute Immature Grans 0.01 Thousand/uL      Absolute Lymphocytes 1.67 Thousands/µL      Absolute Monocytes 0.57 Thousand/µL      Eosinophils Absolute 0.14 Thousand/µL      Basophils Absolute 0.06 Thousands/µL             CT renal stone study abdomen pelvis without contrast   Final Interpretation by Marco Antonio Gurrola MD (07/02 2238)   1.  2 mm nonobstructing renal stone in the interpolar region of the left kidney. No evidence of hydronephrosis.   2.  Cholelithiasis with gallbladder distention. No other findings to suggest acute cholecystitis.   3.  Mild nonspecific wall thickening at the sigmoid colon. Correlate with fecal occult blood test. No evidence of diverticulitis.   4.  Small amount of endobronchial debris  in the right lower lobe and bronchial wall thickening. Findings could be seen in the setting of bronchitis.         Workstation performed: [id number]             Procedures    ED Medication and Procedure Management   Prior to Admission Medications   Prescriptions Last Dose Informant Patient Reported? Taking?   Coenzyme Q10 (Co Q 10) 100 MG CAPS  Self Yes No   Sig: Take by mouth   Cyanocobalamin (VITAMIN B 12 PO)  Self Yes No   Sig: Take by mouth   acetaminophen (TYLENOL) 325 mg tablet  Self No No   Sig: Take 2 tablets (650 mg total) by mouth every 6 (six) hours as needed for fever, mild pain, moderate pain or headaches (temperature greater than 101 F)   amLODIPine (NORVASC) 10 mg tablet   No No   Sig: Take 1 tablet by mouth once daily   aspirin 81 mg chewable tablet  Self No No   Sig: Chew 1 tablet (81 mg total) daily Do not start before November 9, 2023.   atorvastatin (LIPITOR) 20 mg tablet   No No   Sig: Take 1 tablet by mouth once daily   cholecalciferol (VITAMIN D3) 1,000 units tablet  Self Yes No   Sig: Take 1,000 Units by mouth in the morning.   folic acid (KP Folic Acid) 1 mg tablet  Self No No   Sig: Take 1 tablet (1 mg total) by mouth daily   gabapentin (NEURONTIN) 100 mg capsule   No No   Sig: Take 1 capsule (100 mg total) by mouth daily at bedtime   levothyroxine 88 mcg tablet   No No   Sig: Take 1 tablet (88 mcg total) by mouth daily in the early morning   lidocaine (LIDODERM) 5 %   No No   Sig: Apply 1 patch topically daily over 12 hours Remove & Discard patch within 12 hours or as directed by MD   methocarbamol (ROBAXIN) 500 mg tablet   No No   Sig: Take 1 tablet (500 mg total) by mouth every 6 (six) hours as needed for muscle spasms   pantoprazole (PROTONIX) 40 mg tablet   No No   Sig: Take 1 tablet by mouth once daily   sodium chloride 1 g tablet   No No   Sig: Take 2 tablets (2 g total) by mouth 3 (three) times a day with meals   sulfaSALAzine (AZULFIDINE) 500 mg tablet   No No   Sig: Take 2 tablets  (1,000 mg total) by mouth 2 (two) times a day   valsartan (DIOVAN) 80 mg tablet   No No   Sig: Take 2 tablets (160 mg total) by mouth daily      Facility-Administered Medications: None     Discharge Medication List as of 7/2/2025  5:45 PM        CONTINUE these medications which have NOT CHANGED    Details   acetaminophen (TYLENOL) 325 mg tablet Take 2 tablets (650 mg total) by mouth every 6 (six) hours as needed for fever, mild pain, moderate pain or headaches (temperature greater than 101 F), Starting Wed 11/8/2023, No Print      amLODIPine (NORVASC) 10 mg tablet Take 1 tablet by mouth once daily, Starting Fri 1/31/2025, Normal      aspirin 81 mg chewable tablet Chew 1 tablet (81 mg total) daily Do not start before November 9, 2023., Starting Thu 11/9/2023, Normal      atorvastatin (LIPITOR) 20 mg tablet Take 1 tablet by mouth once daily, Starting Mon 6/2/2025, Normal      cholecalciferol (VITAMIN D3) 1,000 units tablet Take 1,000 Units by mouth in the morning., Historical Med      Coenzyme Q10 (Co Q 10) 100 MG CAPS Take by mouth, Historical Med      Cyanocobalamin (VITAMIN B 12 PO) Take by mouth, Historical Med      folic acid (KP Folic Acid) 1 mg tablet Take 1 tablet (1 mg total) by mouth daily, Starting Fri 11/1/2024, Normal      gabapentin (NEURONTIN) 100 mg capsule Take 1 capsule (100 mg total) by mouth daily at bedtime, Starting Fri 6/27/2025, Normal      levothyroxine 88 mcg tablet Take 1 tablet (88 mcg total) by mouth daily in the early morning, Starting Mon 6/2/2025, Normal      lidocaine (LIDODERM) 5 % Apply 1 patch topically daily over 12 hours Remove & Discard patch within 12 hours or as directed by MD, Starting Sat 6/28/2025, Normal      methocarbamol (ROBAXIN) 500 mg tablet Take 1 tablet (500 mg total) by mouth every 6 (six) hours as needed for muscle spasms, Starting Fri 6/27/2025, Normal      pantoprazole (PROTONIX) 40 mg tablet Take 1 tablet by mouth once daily, Starting Fri 1/17/2025, Normal     "  sodium chloride 1 g tablet Take 2 tablets (2 g total) by mouth 3 (three) times a day with meals, Starting Fri 6/27/2025, Normal      sulfaSALAzine (AZULFIDINE) 500 mg tablet Take 2 tablets (1,000 mg total) by mouth 2 (two) times a day, Starting Fri 5/23/2025, Normal      valsartan (DIOVAN) 80 mg tablet Take 2 tablets (160 mg total) by mouth daily, Starting Fri 6/27/2025, Normal           No discharge procedures on file.  ED SEPSIS DOCUMENTATION   Time reflects when diagnosis was documented in both MDM as applicable and the Disposition within this note       Time User Action Codes Description Comment    7/2/2025  5:43 PM Tiffani Cleaning Add [M54.9] Right-sided back pain                      [1]   Past Medical History:  Diagnosis Date    Allergic rhinitis 03/21/2023    Anemia     Arthritis     osteoporosis, djd knees, pt denies osteoporosis on 2/21/22    Low's esophagus     Cancer (HCC)     on face    Colon polyp     Cough 02/21/2022    cough for 3 years, expectorates yellow mucus    Cough variant asthma  07/26/2021    Disease of thyroid gland     low thyroid    Diverticulitis     gerd, diverticulitis    Dizziness     GERD (gastroesophageal reflux disease)     ulcerative colitis; Low's esophagus, diverticulosis; hx of laryngopharyngeal reflux    Hyperlipidemia     Hypertension     Hypoferremia 10/04/2024    Low vitamin D level     LPRD (laryngopharyngeal reflux disease) 02/21/2022    PONV (postoperative nausea and vomiting)     AFTER \"BIG SURGERIES\"    Postnasal drip     WITH COUGH    Ulcerative colitis (HCC)    [2]   Past Surgical History:  Procedure Laterality Date    APPENDECTOMY      CARDIAC CATHETERIZATION N/A 09/07/2023    Procedure: Cardiac RHC/LHC;  Surgeon: Rafael Sood MD;  Location: [facility];  Service: Cardiology    CARDIAC CATHETERIZATION N/A 11/07/2023    Procedure: Cardiac tavr;  Surgeon: Pete Hernández MD;  Location: [facility];  Service: Cardiology    CATARACT EXTRACTION Bilateral  "    COLONOSCOPY      colon polyps    COLONOSCOPY  07/28/2022    FOOT SURGERY Bilateral     bunionectomy    HEMORRHOID SURGERY      HEMORROIDECTOMY      hemorroid removal    JOINT REPLACEMENT Bilateral     knees    NM REPLACE AORTIC VALVE OPENFEMORAL ARTERY APPROACH N/A 11/07/2023    Procedure: REPLACEMENT AORTIC VALVE TRANSCATHETER (TAVR) TRANSFEMORAL W/ 26MM BUCKNER DOROTHEA S3 UR VALVE(ACCESS ON LEFT) GIOVANNY;  Surgeon: Felipe Buitrago DO;  Location: [facility];  Service: Cardiac Surgery    REPLACEMENT TOTAL KNEE Bilateral     b/ replacement    TONSILLECTOMY      TUBAL LIGATION      UPPER GASTROINTESTINAL ENDOSCOPY     [3]   Family History  Problem Relation Name Age of Onset    Heart disease Father      No Known Problems Sister      No Known Problems Sister      Heart disease Brother      Colon cancer Brother  55    Cancer Brother  67    No Known Problems Maternal Grandmother      No Known Problems Paternal Grandmother      No Known Problems Maternal Aunt      No Known Problems Paternal Aunt      Cancer Daughter          unsure of type of cancer, it was female cancer and hysterectomy done by Dr. Fierro   [4]   Social History  Tobacco Use    Smoking status: Never    Smokeless tobacco: Never   Vaping Use    Vaping status: Never Used   Substance Use Topics    Alcohol use: Not Currently    Drug use: Never        Tiffani Cleaning PA-C  07/02/25 1823

## 2025-07-09 ENCOUNTER — APPOINTMENT (OUTPATIENT)
Dept: LAB | Facility: HOSPITAL | Age: 85
End: 2025-07-09
Attending: PHYSICIAN ASSISTANT
Payer: MEDICARE

## 2025-07-09 DIAGNOSIS — I10 ESSENTIAL HYPERTENSION: ICD-10-CM

## 2025-07-09 LAB
ALBUMIN SERPL BCG-MCNC: 4 G/DL (ref 3.5–5)
ALP SERPL-CCNC: 55 U/L (ref 34–104)
ALT SERPL W P-5'-P-CCNC: 14 U/L (ref 7–52)
ANION GAP SERPL CALCULATED.3IONS-SCNC: 6 MMOL/L (ref 4–13)
AST SERPL W P-5'-P-CCNC: 24 U/L (ref 13–39)
BASOPHILS # BLD AUTO: 0.03 THOUSANDS/ÂΜL (ref 0–0.1)
BASOPHILS NFR BLD AUTO: 1 % (ref 0–1)
BILIRUB SERPL-MCNC: 0.48 MG/DL (ref 0.2–1)
BUN SERPL-MCNC: 16 MG/DL (ref 5–25)
CALCIUM SERPL-MCNC: 9 MG/DL (ref 8.4–10.2)
CHLORIDE SERPL-SCNC: 105 MMOL/L (ref 96–108)
CO2 SERPL-SCNC: 28 MMOL/L (ref 21–32)
CREAT SERPL-MCNC: 0.67 MG/DL (ref 0.6–1.3)
EOSINOPHIL # BLD AUTO: 0.1 THOUSAND/ÂΜL (ref 0–0.61)
EOSINOPHIL NFR BLD AUTO: 3 % (ref 0–6)
ERYTHROCYTE [DISTWIDTH] IN BLOOD BY AUTOMATED COUNT: 14.1 % (ref 11.6–15.1)
GFR SERPL CREATININE-BSD FRML MDRD: 79 ML/MIN/1.73SQ M
GLUCOSE SERPL-MCNC: 104 MG/DL (ref 65–140)
HCT VFR BLD AUTO: 26.2 % (ref 34.8–46.1)
HGB BLD-MCNC: 8.5 G/DL (ref 11.5–15.4)
IMM GRANULOCYTES # BLD AUTO: 0 THOUSAND/UL (ref 0–0.2)
IMM GRANULOCYTES NFR BLD AUTO: 0 % (ref 0–2)
LYMPHOCYTES # BLD AUTO: 1.74 THOUSANDS/ÂΜL (ref 0.6–4.47)
LYMPHOCYTES NFR BLD AUTO: 42 % (ref 14–44)
MCH RBC QN AUTO: 35.6 PG (ref 26.8–34.3)
MCHC RBC AUTO-ENTMCNC: 32.4 G/DL (ref 31.4–37.4)
MCV RBC AUTO: 110 FL (ref 82–98)
MONOCYTES # BLD AUTO: 0.58 THOUSAND/ÂΜL (ref 0.17–1.22)
MONOCYTES NFR BLD AUTO: 14 % (ref 4–12)
NEUTROPHILS # BLD AUTO: 1.61 THOUSANDS/ÂΜL (ref 1.85–7.62)
NEUTS SEG NFR BLD AUTO: 40 % (ref 43–75)
NRBC BLD AUTO-RTO: 0 /100 WBCS
PLATELET # BLD AUTO: 285 THOUSANDS/UL (ref 149–390)
PMV BLD AUTO: 10.2 FL (ref 8.9–12.7)
POTASSIUM SERPL-SCNC: 4 MMOL/L (ref 3.5–5.3)
PROT SERPL-MCNC: 6.9 G/DL (ref 6.4–8.4)
RBC # BLD AUTO: 2.39 MILLION/UL (ref 3.81–5.12)
SODIUM SERPL-SCNC: 139 MMOL/L (ref 135–147)
WBC # BLD AUTO: 4.06 THOUSAND/UL (ref 4.31–10.16)

## 2025-07-09 PROCEDURE — 85025 COMPLETE CBC W/AUTO DIFF WBC: CPT

## 2025-07-13 ENCOUNTER — RESULTS FOLLOW-UP (OUTPATIENT)
Dept: OTHER | Facility: HOSPITAL | Age: 85
End: 2025-07-13

## 2025-07-13 DIAGNOSIS — E87.1 HYPONATREMIA: Primary | ICD-10-CM

## 2025-07-13 NOTE — RESULT ENCOUNTER NOTE
Please let Ning know that we received results of her labs which were forwarded to me.  Sodium level is up to 139 which is great.  She is still on 2 salt tablets 3 times a day.  We can try cutting back to 1 salt tablet 3 times a day but she will need to go for follow-up labs in approximately a week to 10 days to recheck sodium level.  If she is in agreement to decrease the dose please have her go for BMP in 7 to 10 days.  Thank you.

## 2025-07-14 ENCOUNTER — OFFICE VISIT (OUTPATIENT)
Dept: FAMILY MEDICINE CLINIC | Facility: CLINIC | Age: 85
End: 2025-07-14
Payer: MEDICARE

## 2025-07-14 ENCOUNTER — HOSPITAL ENCOUNTER (OUTPATIENT)
Dept: INFUSION CENTER | Facility: CLINIC | Age: 85
Discharge: HOME | End: 2025-07-14
Attending: INTERNAL MEDICINE
Payer: MEDICARE

## 2025-07-14 ENCOUNTER — APPOINTMENT (OUTPATIENT)
Dept: LAB | Facility: HOSPITAL | Age: 85
End: 2025-07-14
Attending: INTERNAL MEDICINE
Payer: MEDICARE

## 2025-07-14 VITALS — SYSTOLIC BLOOD PRESSURE: 150 MMHG | DIASTOLIC BLOOD PRESSURE: 70 MMHG

## 2025-07-14 VITALS
OXYGEN SATURATION: 97 % | DIASTOLIC BLOOD PRESSURE: 70 MMHG | SYSTOLIC BLOOD PRESSURE: 138 MMHG | RESPIRATION RATE: 16 BRPM | WEIGHT: 142 LBS | BODY MASS INDEX: 26.13 KG/M2 | HEART RATE: 64 BPM | HEIGHT: 62 IN

## 2025-07-14 DIAGNOSIS — I10 ESSENTIAL HYPERTENSION: ICD-10-CM

## 2025-07-14 DIAGNOSIS — D53.9 MACROCYTIC ANEMIA: ICD-10-CM

## 2025-07-14 DIAGNOSIS — E87.1 HYPONATREMIA: Primary | ICD-10-CM

## 2025-07-14 DIAGNOSIS — Z95.2 S/P TAVR (TRANSCATHETER AORTIC VALVE REPLACEMENT): ICD-10-CM

## 2025-07-14 DIAGNOSIS — Z95.2 S/P TAVR (TRANSCATHETER AORTIC VALVE REPLACEMENT): Primary | ICD-10-CM

## 2025-07-14 DIAGNOSIS — N18.4 ANEMIA DUE TO STAGE 4 CHRONIC KIDNEY DISEASE  (HCC): ICD-10-CM

## 2025-07-14 DIAGNOSIS — D63.1 ANEMIA DUE TO STAGE 4 CHRONIC KIDNEY DISEASE  (HCC): ICD-10-CM

## 2025-07-14 DIAGNOSIS — E03.9 ACQUIRED HYPOTHYROIDISM: ICD-10-CM

## 2025-07-14 DIAGNOSIS — M54.50 ACUTE RIGHT-SIDED LOW BACK PAIN WITHOUT SCIATICA: ICD-10-CM

## 2025-07-14 DIAGNOSIS — E78.00 HYPERCHOLESTEROLEMIA: ICD-10-CM

## 2025-07-14 PROBLEM — R10.9 ACUTE RIGHT FLANK PAIN: Status: RESOLVED | Noted: 2025-06-22 | Resolved: 2025-07-14

## 2025-07-14 LAB
BASOPHILS # BLD AUTO: 0.05 THOUSANDS/ÂΜL (ref 0–0.1)
BASOPHILS NFR BLD AUTO: 1 % (ref 0–1)
EOSINOPHIL # BLD AUTO: 0.18 THOUSAND/ÂΜL (ref 0–0.61)
EOSINOPHIL NFR BLD AUTO: 5 % (ref 0–6)
ERYTHROCYTE [DISTWIDTH] IN BLOOD BY AUTOMATED COUNT: 14.1 % (ref 11.6–15.1)
HCT VFR BLD AUTO: 25.8 % (ref 34.8–46.1)
HGB BLD-MCNC: 8.4 G/DL (ref 11.5–15.4)
IMM GRANULOCYTES # BLD AUTO: 0.01 THOUSAND/UL (ref 0–0.2)
IMM GRANULOCYTES NFR BLD AUTO: 0 % (ref 0–2)
LYMPHOCYTES # BLD AUTO: 1.53 THOUSANDS/ÂΜL (ref 0.6–4.47)
LYMPHOCYTES NFR BLD AUTO: 42 % (ref 14–44)
MCH RBC QN AUTO: 36.8 PG (ref 26.8–34.3)
MCHC RBC AUTO-ENTMCNC: 32.6 G/DL (ref 31.4–37.4)
MCV RBC AUTO: 113 FL (ref 82–98)
MONOCYTES # BLD AUTO: 0.52 THOUSAND/ÂΜL (ref 0.17–1.22)
MONOCYTES NFR BLD AUTO: 14 % (ref 4–12)
NEUTROPHILS # BLD AUTO: 1.4 THOUSANDS/ÂΜL (ref 1.85–7.62)
NEUTS SEG NFR BLD AUTO: 38 % (ref 43–75)
NRBC BLD AUTO-RTO: 0 /100 WBCS
PLATELET # BLD AUTO: 267 THOUSANDS/UL (ref 149–390)
PMV BLD AUTO: 9.4 FL (ref 8.9–12.7)
RBC # BLD AUTO: 2.28 MILLION/UL (ref 3.81–5.12)
WBC # BLD AUTO: 3.69 THOUSAND/UL (ref 4.31–10.16)

## 2025-07-14 PROCEDURE — G2211 COMPLEX E/M VISIT ADD ON: HCPCS | Performed by: FAMILY MEDICINE

## 2025-07-14 PROCEDURE — 99214 OFFICE O/P EST MOD 30 MIN: CPT | Performed by: FAMILY MEDICINE

## 2025-07-14 PROCEDURE — 36415 COLL VENOUS BLD VENIPUNCTURE: CPT

## 2025-07-14 PROCEDURE — 96372 THER/PROPH/DIAG INJ SC/IM: CPT

## 2025-07-14 PROCEDURE — 85025 COMPLETE CBC W/AUTO DIFF WBC: CPT

## 2025-07-14 RX ADMIN — DARBEPOETIN ALFA 100 MCG: 100 INJECTION, SOLUTION INTRAVENOUS; SUBCUTANEOUS at 16:03

## 2025-07-14 NOTE — PROGRESS NOTES
Patient to infusion for Aranesp.  Labs reviewed from 7/2 Hgb 8.1, no parameter for treatment.  Injection tolerated in Left Arm.  Next appointment confirmed 8/13 4pm, copy of AVS provided.

## 2025-07-14 NOTE — PROGRESS NOTES
Name: Ning Rodrigues      : 1940      MRN: [id number]  Encounter Provider: Yohan Regan MD  Encounter Date: 2025   Encounter department: [facility] MEDICINE  :  Assessment & Plan  Hyponatremia  Patient was admitted in 2025 and started on salt tablets 2g three times a day. Level up to 139 in 2025 and patient told to cut back to 1g three times a day per Nephrology. Next appointment is in 2025.   Orders:  •  Basic metabolic panel; Future    Acute right-sided low back pain without sciatica  Patient was seen in ER on . Likely muscular and better now.          Macrocytic anemia  Hgb stable at 8.4 in 2025. Continue management per Hematology.        Essential hypertension  Blood pressure ok. Continue valsartan 80 mg daily and amlodipine 10 mg daily. Pt advised to continue low Na diet and to exercise on a regular basis.        Hypercholesterolemia  LDL 71 in May 2025. Continue atorvastatin 20 mg qhs. Advised pt to follow a low cholesterol diet and to exercise on a regular basis.       Acquired hypothyroidism  TSH 5.856 and Free T4 1.02 in 2025. Continue levothyroxine 88 mcg qd.              History of Present Illness   Patient here for follow-up hospital admission for hyponatremia. Level was 126 on admission/ Patient was seen by Nephrology and started on salt tablets 2g three times a day. Level was 133 on discharge and increased to 139 on . Patient told to cut back to 1g three times a day. Patient also had right back pain and seen in ER on . Pain was muscular and doing better now. No chest pain or shortness of breath. No headaches. No abdominal pain or diarrhea.       Review of Systems   Constitutional:  Negative for fatigue and unexpected weight change.   Respiratory:  Positive for cough. Negative for chest tightness and shortness of breath.    Cardiovascular:  Negative for chest pain and palpitations.   Gastrointestinal:  Negative for abdominal pain,  "constipation, diarrhea, nausea and vomiting.   Genitourinary:  Negative for difficulty urinating.   Musculoskeletal:  Positive for back pain. Negative for arthralgias.   Skin:  Negative for rash.   Neurological:  Negative for dizziness and headaches.       Objective   /70 (BP Location: Left arm, Patient Position: Sitting, Cuff Size: Standard)   Pulse 64   Resp 16   Ht 5' 2\" (1.575 m)   Wt 64.4 kg (142 lb)   SpO2 97%   BMI 25.97 kg/m²      Physical Exam  Vitals and nursing note reviewed.   Constitutional:       General: She is not in acute distress.     Appearance: Normal appearance. She is well-developed.      Comments: Walks with cane   Neck:      Vascular: No carotid bruit.     Cardiovascular:      Rate and Rhythm: Normal rate and regular rhythm.      Heart sounds: No murmur heard.  Pulmonary:      Effort: Pulmonary effort is normal. No respiratory distress.      Breath sounds: Normal breath sounds.     Musculoskeletal:      Cervical back: Normal range of motion and neck supple.      Right lower leg: No edema.      Left lower leg: No edema.   Lymphadenopathy:      Cervical: No cervical adenopathy.     Neurological:      Mental Status: She is alert.     Psychiatric:         Mood and Affect: Mood normal.         Behavior: Behavior normal.         Thought Content: Thought content normal.         Judgment: Judgment normal.         "

## 2025-07-14 NOTE — ASSESSMENT & PLAN NOTE
Patient was admitted in June 2025 and started on salt tablets 2g three times a day. Level up to 139 in July 2025 and patient told to cut back to 1g three times a day per Nephrology. Next appointment is in August 2025.   Orders:  •  Basic metabolic panel; Future

## 2025-07-14 NOTE — ASSESSMENT & PLAN NOTE
LDL 71 in May 2025. Continue atorvastatin 20 mg qhs. Advised pt to follow a low cholesterol diet and to exercise on a regular basis.

## 2025-07-15 ENCOUNTER — DOCUMENTATION (OUTPATIENT)
Dept: NEPHROLOGY | Facility: CLINIC | Age: 85
End: 2025-07-15

## 2025-07-16 ENCOUNTER — HOSPITAL ENCOUNTER (OUTPATIENT)
Dept: RADIOLOGY | Facility: HOSPITAL | Age: 85
Discharge: HOME | End: 2025-07-16
Attending: FAMILY MEDICINE
Payer: MEDICARE

## 2025-07-16 DIAGNOSIS — R92.8 ABNORMAL MAMMOGRAM: ICD-10-CM

## 2025-07-16 PROCEDURE — 77066 DX MAMMO INCL CAD BI: CPT

## 2025-07-16 PROCEDURE — G0279 TOMOSYNTHESIS, MAMMO: HCPCS

## 2025-07-17 ENCOUNTER — APPOINTMENT (EMERGENCY)
Dept: CT IMAGING | Facility: HOSPITAL | Age: 85
End: 2025-07-17
Payer: MEDICARE

## 2025-07-17 ENCOUNTER — HOSPITAL ENCOUNTER (EMERGENCY)
Facility: HOSPITAL | Age: 85
Discharge: HOME | End: 2025-07-18
Attending: EMERGENCY MEDICINE | Admitting: EMERGENCY MEDICINE
Payer: MEDICARE

## 2025-07-17 DIAGNOSIS — R93.89 ABNORMAL CT SCAN: ICD-10-CM

## 2025-07-17 DIAGNOSIS — K59.00 CONSTIPATION: ICD-10-CM

## 2025-07-17 DIAGNOSIS — R10.9 ABDOMINAL PAIN: ICD-10-CM

## 2025-07-17 LAB
ALBUMIN SERPL BCG-MCNC: 4.1 G/DL (ref 3.5–5)
ALP SERPL-CCNC: 66 U/L (ref 34–104)
ALT SERPL W P-5'-P-CCNC: 16 U/L (ref 7–52)
ANION GAP SERPL CALCULATED.3IONS-SCNC: 7 MMOL/L (ref 4–13)
APTT PPP: 34 SECONDS (ref 23–34)
AST SERPL W P-5'-P-CCNC: 38 U/L (ref 13–39)
BACTERIA UR QL AUTO: NORMAL /HPF
BASOPHILS # BLD AUTO: 0.07 THOUSANDS/ÂΜL (ref 0–0.1)
BASOPHILS NFR BLD AUTO: 1 % (ref 0–1)
BILIRUB SERPL-MCNC: 0.57 MG/DL (ref 0.2–1)
BILIRUB UR QL STRIP: NEGATIVE
BUN SERPL-MCNC: 13 MG/DL (ref 5–25)
CALCIUM SERPL-MCNC: 9.1 MG/DL (ref 8.4–10.2)
CHLORIDE SERPL-SCNC: 104 MMOL/L (ref 96–108)
CLARITY UR: CLEAR
CO2 SERPL-SCNC: 25 MMOL/L (ref 21–32)
COLOR UR: YELLOW
CREAT SERPL-MCNC: 0.83 MG/DL (ref 0.6–1.3)
EOSINOPHIL # BLD AUTO: 0.19 THOUSAND/ÂΜL (ref 0–0.61)
EOSINOPHIL NFR BLD AUTO: 4 % (ref 0–6)
ERYTHROCYTE [DISTWIDTH] IN BLOOD BY AUTOMATED COUNT: 14.2 % (ref 11.6–15.1)
GFR SERPL CREATININE-BSD FRML MDRD: 64 ML/MIN/1.73SQ M
GLUCOSE SERPL-MCNC: 101 MG/DL (ref 65–140)
GLUCOSE UR STRIP-MCNC: NEGATIVE MG/DL
HCT VFR BLD AUTO: 26.8 % (ref 34.8–46.1)
HEMOCCULT STL QL IA: POSITIVE
HGB BLD-MCNC: 8.6 G/DL (ref 11.5–15.4)
HGB UR QL STRIP.AUTO: ABNORMAL
IMM GRANULOCYTES # BLD AUTO: 0.03 THOUSAND/UL (ref 0–0.2)
IMM GRANULOCYTES NFR BLD AUTO: 1 % (ref 0–2)
INR PPP: 1.19 (ref 0.85–1.19)
KETONES UR STRIP-MCNC: NEGATIVE MG/DL
LACTATE SERPL-SCNC: 0.7 MMOL/L (ref 0.5–2)
LEUKOCYTE ESTERASE UR QL STRIP: ABNORMAL
LIPASE SERPL-CCNC: 6 U/L (ref 11–82)
LYMPHOCYTES # BLD AUTO: 1.91 THOUSANDS/ÂΜL (ref 0.6–4.47)
LYMPHOCYTES NFR BLD AUTO: 36 % (ref 14–44)
MAGNESIUM SERPL-MCNC: 2.1 MG/DL (ref 1.9–2.7)
MCH RBC QN AUTO: 35.8 PG (ref 26.8–34.3)
MCHC RBC AUTO-ENTMCNC: 32.1 G/DL (ref 31.4–37.4)
MCV RBC AUTO: 112 FL (ref 82–98)
MONOCYTES # BLD AUTO: 0.59 THOUSAND/ÂΜL (ref 0.17–1.22)
MONOCYTES NFR BLD AUTO: 11 % (ref 4–12)
NEUTROPHILS # BLD AUTO: 2.53 THOUSANDS/ÂΜL (ref 1.85–7.62)
NEUTS SEG NFR BLD AUTO: 47 % (ref 43–75)
NITRITE UR QL STRIP: NEGATIVE
NON-SQ EPI CELLS URNS QL MICRO: NORMAL /HPF
NRBC BLD AUTO-RTO: 0 /100 WBCS
PH UR STRIP.AUTO: 7 [PH]
PLATELET # BLD AUTO: 319 THOUSANDS/UL (ref 149–390)
PMV BLD AUTO: 10.5 FL (ref 8.9–12.7)
POTASSIUM SERPL-SCNC: 4.7 MMOL/L (ref 3.5–5.3)
PROT SERPL-MCNC: 7.1 G/DL (ref 6.4–8.4)
PROT UR STRIP-MCNC: NEGATIVE MG/DL
PROTHROMBIN TIME: 15.5 SECONDS (ref 12.3–15)
RBC # BLD AUTO: 2.4 MILLION/UL (ref 3.81–5.12)
RBC #/AREA URNS AUTO: NORMAL /HPF
SODIUM SERPL-SCNC: 136 MMOL/L (ref 135–147)
SP GR UR STRIP.AUTO: 1.01 (ref 1–1.03)
UROBILINOGEN UR QL STRIP.AUTO: 0.2 E.U./DL
WBC # BLD AUTO: 5.32 THOUSAND/UL (ref 4.31–10.16)
WBC #/AREA URNS AUTO: NORMAL /HPF

## 2025-07-17 PROCEDURE — 83690 ASSAY OF LIPASE: CPT | Performed by: EMERGENCY MEDICINE

## 2025-07-17 PROCEDURE — 93005 ELECTROCARDIOGRAM TRACING: CPT

## 2025-07-17 PROCEDURE — G0328 FECAL BLOOD SCRN IMMUNOASSAY: HCPCS | Performed by: EMERGENCY MEDICINE

## 2025-07-17 PROCEDURE — 96374 THER/PROPH/DIAG INJ IV PUSH: CPT

## 2025-07-17 PROCEDURE — 81001 URINALYSIS AUTO W/SCOPE: CPT | Performed by: EMERGENCY MEDICINE

## 2025-07-17 PROCEDURE — 85610 PROTHROMBIN TIME: CPT | Performed by: EMERGENCY MEDICINE

## 2025-07-17 PROCEDURE — 83605 ASSAY OF LACTIC ACID: CPT | Performed by: EMERGENCY MEDICINE

## 2025-07-17 PROCEDURE — 80053 COMPREHEN METABOLIC PANEL: CPT | Performed by: EMERGENCY MEDICINE

## 2025-07-17 PROCEDURE — 96361 HYDRATE IV INFUSION ADD-ON: CPT

## 2025-07-17 PROCEDURE — 85730 THROMBOPLASTIN TIME PARTIAL: CPT | Performed by: EMERGENCY MEDICINE

## 2025-07-17 PROCEDURE — 99284 EMERGENCY DEPT VISIT MOD MDM: CPT

## 2025-07-17 PROCEDURE — 96375 TX/PRO/DX INJ NEW DRUG ADDON: CPT

## 2025-07-17 PROCEDURE — 83735 ASSAY OF MAGNESIUM: CPT | Performed by: EMERGENCY MEDICINE

## 2025-07-17 PROCEDURE — 36415 COLL VENOUS BLD VENIPUNCTURE: CPT | Performed by: EMERGENCY MEDICINE

## 2025-07-17 PROCEDURE — 74177 CT ABD & PELVIS W/CONTRAST: CPT

## 2025-07-17 PROCEDURE — 99285 EMERGENCY DEPT VISIT HI MDM: CPT | Performed by: EMERGENCY MEDICINE

## 2025-07-17 PROCEDURE — 85025 COMPLETE CBC W/AUTO DIFF WBC: CPT | Performed by: EMERGENCY MEDICINE

## 2025-07-17 RX ORDER — FENTANYL CITRATE 50 UG/ML
25 INJECTION, SOLUTION INTRAMUSCULAR; INTRAVENOUS ONCE
Refills: 0 | Status: COMPLETED | OUTPATIENT
Start: 2025-07-17 | End: 2025-07-17

## 2025-07-17 RX ORDER — POLYETHYLENE GLYCOL 3350 17 G/17G
17 POWDER, FOR SOLUTION ORAL ONCE
Status: COMPLETED | OUTPATIENT
Start: 2025-07-17 | End: 2025-07-17

## 2025-07-17 RX ORDER — POLYETHYLENE GLYCOL 3350 17 G/17G
17 POWDER, FOR SOLUTION ORAL DAILY PRN
Qty: 7 EACH | Refills: 0 | Status: SHIPPED | OUTPATIENT
Start: 2025-07-17 | End: 2025-07-30

## 2025-07-17 RX ORDER — SODIUM PHOSPHATE,MONO-DIBASIC 19G-7G/118
1 ENEMA (ML) RECTAL ONCE
Status: COMPLETED | OUTPATIENT
Start: 2025-07-17 | End: 2025-07-17

## 2025-07-17 RX ORDER — ONDANSETRON 2 MG/ML
4 INJECTION INTRAMUSCULAR; INTRAVENOUS ONCE
Status: COMPLETED | OUTPATIENT
Start: 2025-07-17 | End: 2025-07-17

## 2025-07-17 RX ORDER — DOCUSATE SODIUM 100 MG/1
100 CAPSULE, LIQUID FILLED ORAL ONCE
Status: COMPLETED | OUTPATIENT
Start: 2025-07-17 | End: 2025-07-17

## 2025-07-17 RX ORDER — DOCUSATE SODIUM 100 MG/1
100 CAPSULE, LIQUID FILLED ORAL 2 TIMES DAILY
Qty: 14 CAPSULE | Refills: 0 | Status: SHIPPED | OUTPATIENT
Start: 2025-07-17 | End: 2025-07-24

## 2025-07-17 RX ADMIN — FENTANYL CITRATE 25 MCG: 50 INJECTION INTRAMUSCULAR; INTRAVENOUS at 23:01

## 2025-07-17 RX ADMIN — IOHEXOL 80 ML: 350 INJECTION, SOLUTION INTRAVENOUS at 22:11

## 2025-07-17 RX ADMIN — SODIUM CHLORIDE 500 ML: 0.9 INJECTION, SOLUTION INTRAVENOUS at 21:11

## 2025-07-17 RX ADMIN — SODIUM PHOSPHATE, DIBASIC AND SODIUM PHOSPHATE, MONOBASIC 1 ENEMA: 7; 19 ENEMA RECTAL at 23:50

## 2025-07-17 RX ADMIN — ONDANSETRON 4 MG: 2 INJECTION INTRAMUSCULAR; INTRAVENOUS at 22:51

## 2025-07-17 RX ADMIN — POLYETHYLENE GLYCOL 3350 17 G: 17 POWDER, FOR SOLUTION ORAL at 23:47

## 2025-07-17 RX ADMIN — DOCUSATE SODIUM 100 MG: 100 CAPSULE, LIQUID FILLED ORAL at 23:46

## 2025-07-18 ENCOUNTER — TELEPHONE (OUTPATIENT)
Age: 85
End: 2025-07-18

## 2025-07-18 VITALS
RESPIRATION RATE: 16 BRPM | SYSTOLIC BLOOD PRESSURE: 180 MMHG | TEMPERATURE: 98.4 F | OXYGEN SATURATION: 98 % | HEART RATE: 78 BPM | DIASTOLIC BLOOD PRESSURE: 82 MMHG

## 2025-07-18 LAB
ATRIAL RATE: 68 BPM
P AXIS: -3 DEGREES
PR INTERVAL: 178 MS
QRS AXIS: -18 DEGREES
QRSD INTERVAL: 82 MS
QT INTERVAL: 410 MS
QTC INTERVAL: 435 MS
T WAVE AXIS: 46 DEGREES
VENTRICULAR RATE: 68 BPM

## 2025-07-18 PROCEDURE — 93010 ELECTROCARDIOGRAM REPORT: CPT | Performed by: INTERNAL MEDICINE

## 2025-07-18 NOTE — TELEPHONE ENCOUNTER
Patient called in to let us know that she was in the ER yesterday.   No appointments till 8/1.  Please advise if you would like her to come in.  She does have a referral to Gastro in place.

## 2025-07-18 NOTE — DISCHARGE INSTRUCTIONS
Follow up with your primary doctor/outpatient providers, and return to the emergency department for new or worsening symptoms.          CT ABDOMEN AND PELVIS WITH IV CONTRAST     INDICATION: abdominal pain. .     COMPARISON: CT renal stone 7/2/2025.     TECHNIQUE: CT examination of the abdomen and pelvis was performed. Multiplanar 2D reformatted images were created from the source data.     This examination, like all CT scans performed in the [facility], was performed utilizing techniques to minimize radiation dose exposure, including the use of iterative reconstruction and automated exposure control. Radiation dose length   product (DLP) for this visit: 434.4 mGy-cm.     IV Contrast: 80 mL of iohexol (OMNIPAQUE)  Enteric Contrast: Not administered.     FINDINGS:     ABDOMEN     LOWER CHEST: Left basilar scarring. Bilateral lower lobe bronchiectasis with areas of bronchial wall thickening. Calcified granuloma in the medial right lower lobe. Small hiatal hernia.     LIVER/BILIARY TREE: Unremarkable.     GALLBLADDER: Cholelithiasis without findings of acute cholecystitis.     SPLEEN: Unremarkable.     PANCREAS: Unremarkable.     ADRENAL GLANDS: Unremarkable.     KIDNEYS/URETERS: Fullness of the collecting systems. No trey hydronephrosis.     STOMACH AND BOWEL: Large colonic stool burden. Multifocal colonic wall thickening most prominent in mid to distal transverse, distal descending, and sigmoid colon. Mild pericolonic inflammatory changes are present. Colonic diverticulosis without   convincing findings of acute diverticulitis.     APPENDIX: Surgically absent.     ABDOMINOPELVIC CAVITY: Trace amount of ascites. No pneumoperitoneum. No lymphadenopathy.     VESSELS: Unremarkable for patient's age.     PELVIS     REPRODUCTIVE ORGANS: Unremarkable for patient's age.     URINARY BLADDER: Significantly distended.     ABDOMINAL WALL/INGUINAL REGIONS: Unremarkable.     BONES: No acute fracture or suspicious  osseous lesion. Spinal degenerative changes.     IMPRESSION:     Multifocal colonic wall thickening most prominent in mid to distal transverse, distal descending, and sigmoid colon. Findings may reflect stercoral colitis given colonic stool burden. Other considerations include infectious or inflammatory colitis.     Colonic diverticulosis without convincing findings of acute diverticulitis.     Significantly distended urinary bladder. There is fullness of the collecting systems likely on the basis of distended urinary bladder.     Trace amount of ascites.

## 2025-07-18 NOTE — ED PROVIDER NOTES
Time reflects when diagnosis was documented in both MDM as applicable and the Disposition within this note       Time User Action Codes Description Comment    7/17/2025 11:35 PM Rahul Quintanilla Add [R10.9] Abdominal pain     7/17/2025 11:36 PM Rahul Quintanilla Add [K59.00] Constipation     7/17/2025 11:36 PM Rahul Quintanilla Modify [R10.9] Abdominal pain     7/17/2025 11:36 PM Rahul Quintanilla Add [R93.89] Abnormal CT scan     7/17/2025 11:36 PM Rahul Quintanilla Modify [R93.89] Abnormal CT scan           ED Disposition       ED Disposition   Discharge    Condition   Stable    Date/Time   Thu Jul 17, 2025 11:55 PM    Comment   Ning Rodrigues discharge to home/self care.                   Assessment & Plan       Medical Decision Making  Patient is an 85-year-old female seen in the emergency department with concern for abdominal pain.  EKG was obtained and noted, which showed no definite evidence of arrhythmia or ischemia.  Patient declined medication for pain control. Laboratory evaluation remarkable for low red blood cell count of 2.40, low hemoglobin of 8.6, low hematocrit of 26.8, elevated MCV of 112, fecal occult blood test positive, elevated PT of 15.5, urinalysis positive for trace leukocytes, 1+ occult blood, 2-4 red blood cells, 2-4 white blood cells, occasional epithelial cells, occasional bacteria. CT abdomen/pelvis was obtained to evaluate for diverticulitis, appendicitis, bowel obstruction, or other acute abnormality.  CT imaging was obtained and noted.  Patient declined admission for further evaluation and treatment, and preferred to be discharged home with outpatient follow-up.  Patient was treated with additional medication with good effect, successful bowel movement, and was started on bowel regimen at home. Plan to have patient follow up with PCP/outpatient providers.  Patient stable for discharge home.  Discharge instructions were reviewed with patient.    Problems Addressed:  Abdominal pain: acute illness or  injury    Amount and/or Complexity of Data Reviewed  Labs: ordered. Decision-making details documented in ED Course.  Radiology: ordered. Decision-making details documented in ED Course.  ECG/medicine tests: ordered and independent interpretation performed. Decision-making details documented in ED Course.    Risk  OTC drugs.  Prescription drug management.             Medications   sodium chloride 0.9 % bolus 500 mL (0 mL Intravenous Stopped 7/17/25 2222)   iohexol (OMNIPAQUE) 350 MG/ML injection (SINGLE-DOSE) 80 mL (80 mL Intravenous Given 7/17/25 2211)   fentaNYL injection 25 mcg (25 mcg Intravenous Given 7/17/25 2301)   ondansetron (ZOFRAN) injection 4 mg (4 mg Intravenous Given 7/17/25 2251)   sodium phosphate-biphosphate (FLEET) enema 1 enema (1 enema Rectal Given 7/17/25 2350)   polyethylene glycol (MIRALAX) packet 17 g (17 g Oral Given 7/17/25 2347)   docusate sodium (COLACE) capsule 100 mg (100 mg Oral Given 7/17/25 2346)       ED Risk Strat Scores                    No data recorded                            History of Present Illness       Chief Complaint   Patient presents with    Abdominal Pain     C/o lower abd pain pt has hx of diverticulitis and believes she has it again. +blood in stool.denies NV.  Tylenol taken pta.        Past Medical History[1]   Past Surgical History[2]   Family History[3]   Social History[4]   E-Cigarette/Vaping    E-Cigarette Use Never User       E-Cigarette/Vaping Substances    Nicotine No     THC No     CBD No     Flavoring No     Other No     Unknown No       I have reviewed and agree with the history as documented.     Patient is a 85-year-old female seen in the emergency department with concern for persistent lower abdominal pain over approximately the past day.  Patient notes no radiation of the pain.  Patient notes that she noticed some blood in her stool.  Patient notes no fever, chest pain, shortness of breath, nausea, vomiting, diarrhea, weakness, numbness, tingling.   Patient notes history of diverticulitis in the past, with similar symptoms.  Patient and family explained that the patient took Tylenol at home, with some improvement of symptoms noted.  Patient declines medication for pain.        Review of Systems   Constitutional:  Negative for chills and fever.   HENT:  Negative for ear pain and sore throat.    Eyes:  Negative for pain and visual disturbance.   Respiratory:  Negative for cough and shortness of breath.    Cardiovascular:  Negative for chest pain and palpitations.   Gastrointestinal:  Positive for abdominal pain and blood in stool. Negative for nausea and vomiting.   Genitourinary:  Negative for decreased urine volume and difficulty urinating.   Musculoskeletal:  Negative for gait problem and neck stiffness.   Skin:  Negative for color change and rash.   Neurological:  Negative for seizures and syncope.   Psychiatric/Behavioral:  Negative for agitation and confusion.    All other systems reviewed and are negative.          Objective       ED Triage Vitals   Temperature Pulse Blood Pressure Respirations SpO2 Patient Position - Orthostatic VS   07/17/25 2100 07/17/25 2100 07/17/25 2100 07/17/25 2100 07/17/25 2100 07/17/25 2100   98.4 °F (36.9 °C) 68 (!) 184/83 18 94 % Lying      Temp Source Heart Rate Source BP Location FiO2 (%) Pain Score    07/17/25 2100 07/17/25 2100 07/17/25 2100 -- 07/17/25 2250    Oral Monitor Left arm  7      Vitals      Date and Time Temp Pulse SpO2 Resp BP Pain Score FACES Pain Rating User   07/17/25 2306 -- -- -- -- 179/86 -- -- SR   07/17/25 2301 -- -- -- -- -- 7 -- SR   07/17/25 2250 -- 75 95 % 18 180/91 7 -- SR   07/17/25 2100 98.4 °F (36.9 °C) 68 94 % 18 184/83 -- -- PE            Physical Exam  Vitals and nursing note reviewed.   Constitutional:       General: She is not in acute distress.     Appearance: She is well-developed.   HENT:      Head: Normocephalic and atraumatic.      Right Ear: External ear normal.      Left Ear:  External ear normal.      Nose: Nose normal.      Mouth/Throat:      Pharynx: Oropharynx is clear.     Eyes:      General: No scleral icterus.     Conjunctiva/sclera: Conjunctivae normal.       Cardiovascular:      Rate and Rhythm: Normal rate and regular rhythm.      Heart sounds: No murmur heard.  Pulmonary:      Effort: Pulmonary effort is normal. No respiratory distress.      Breath sounds: Normal breath sounds.   Abdominal:      General: There is no distension.      Palpations: Abdomen is soft.      Tenderness: There is no abdominal tenderness.   Genitourinary:     Comments: Rectal exam(chaperoned by RN)- no external lesions or ulcerations; normal tone; soft/brown stool    Musculoskeletal:         General: No deformity or signs of injury.      Cervical back: Normal range of motion and neck supple.     Skin:     General: Skin is warm and dry.     Neurological:      General: No focal deficit present.      Mental Status: She is alert.      Cranial Nerves: No cranial nerve deficit.      Sensory: No sensory deficit.     Psychiatric:         Mood and Affect: Mood normal.         Thought Content: Thought content normal.         Results Reviewed       Procedure Component Value Units Date/Time    Urine Microscopic [[id number]]  (Normal) Collected: 07/17/25 2223    Lab Status: Final result Specimen: Urine, Other Updated: 07/17/25 2257     RBC, UA 2-4 /hpf      WBC, UA 2-4 /hpf      Epithelial Cells Occasional /hpf      Bacteria, UA Occasional /hpf     UA w Reflex to Microscopic w Reflex to Culture [[id number]]  (Abnormal) Collected: 07/17/25 2223    Lab Status: Final result Specimen: Urine, Other Updated: 07/17/25 2227     Color, UA Yellow     Clarity, UA Clear     Specific Gravity, UA 1.015     pH, UA 7.0     Leukocytes, UA Trace     Nitrite, UA Negative     Protein, UA Negative mg/dl      Glucose, UA Negative mg/dl      Ketones, UA Negative mg/dl      Urobilinogen, UA 0.2 E.U./dl      Bilirubin, UA Negative     Occult  Blood, UA 1+    Comprehensive metabolic panel [[id number]] Collected: 07/17/25 2106    Lab Status: Final result Specimen: Blood from Arm, Left Updated: 07/17/25 2149     Sodium 136 mmol/L      Potassium 4.7 mmol/L      Chloride 104 mmol/L      CO2 25 mmol/L      ANION GAP 7 mmol/L      BUN 13 mg/dL      Creatinine 0.83 mg/dL      Glucose 101 mg/dL      Calcium 9.1 mg/dL      AST 38 U/L      ALT 16 U/L      Alkaline Phosphatase 66 U/L      Total Protein 7.1 g/dL      Albumin 4.1 g/dL      Total Bilirubin 0.57 mg/dL      eGFR 64 ml/min/1.73sq m     Narrative:      [employer] guidelines for Chronic Kidney Disease (CKD):     Stage 1 with normal or high GFR (GFR > 90 mL/min/1.73 square meters)    Stage 2 Mild CKD (GFR = 60-89 mL/min/1.73 square meters)    Stage 3A Moderate CKD (GFR = 45-59 mL/min/1.73 square meters)    Stage 3B Moderate CKD (GFR = 30-44 mL/min/1.73 square meters)    Stage 4 Severe CKD (GFR = 15-29 mL/min/1.73 square meters)    Stage 5 End Stage CKD (GFR <15 mL/min/1.73 square meters)  Note: GFR calculation is accurate only with a steady state creatinine    Magnesium [[id number]]  (Normal) Collected: 07/17/25 2106    Lab Status: Final result Specimen: Blood from Arm, Left Updated: 07/17/25 2149     Magnesium 2.1 mg/dL     Lipase [[id number]]  (Abnormal) Collected: 07/17/25 2106    Lab Status: Final result Specimen: Blood from Arm, Left Updated: 07/17/25 2149     Lipase 6 u/L     Lactic acid, plasma (w/reflex if result > 2.0) [[id number]]  (Normal) Collected: 07/17/25 2106    Lab Status: Final result Specimen: Blood from Arm, Left Updated: 07/17/25 2149     LACTIC ACID 0.7 mmol/L     Narrative:      Result may be elevated if tourniquet was used during collection.    APTT [[id number]]  (Normal) Collected: 07/17/25 2111    Lab Status: Final result Specimen: Blood from Arm, Left Updated: 07/17/25 2131     PTT 34 seconds     Protime-INR [[id number]]  (Abnormal) Collected: 07/17/25 2111    Lab  Status: Final result Specimen: Blood from Arm, Left Updated: 07/17/25 2131     Protime 15.5 seconds      INR 1.19    Narrative:      INR Therapeutic Range    Indication                                             INR Range      Atrial Fibrillation                                               2.0-3.0  Hypercoagulable State                                    2.0.2.3  Left Ventricular Asist Device                            2.0-3.0  Mechanical Heart Valve                                  -    Aortic(with afib, MI, embolism, HF, LA enlargement,    and/or coagulopathy)                                     2.0-3.0 (2.5-3.5)     Mitral                                                             2.5-3.5  Prosthetic/Bioprosthetic Heart Valve               2.0-3.0  Venous thromboembolism (VTE: VT, PE        2.0-3.0    iFOBT (FIT) [[id number]]  (Abnormal) Collected: 07/17/25 2106    Lab Status: Final result Specimen: Stool from Per Rectum Updated: 07/17/25 2124     OCCULT BLD, FECAL IMMUNOLOGICAL Positive    Narrative:        Performed by Fecal Immunochemical Test.    CBC and differential [[id number]]  (Abnormal) Collected: 07/17/25 2106    Lab Status: Final result Specimen: Blood from Arm, Left Updated: 07/17/25 2120     WBC 5.32 Thousand/uL      RBC 2.40 Million/uL      Hemoglobin 8.6 g/dL      Hematocrit 26.8 %       fL      MCH 35.8 pg      MCHC 32.1 g/dL      RDW 14.2 %      MPV 10.5 fL      Platelets 319 Thousands/uL      nRBC 0 /100 WBCs      Segmented % 47 %      Immature Grans % 1 %      Lymphocytes % 36 %      Monocytes % 11 %      Eosinophils Relative 4 %      Basophils Relative 1 %      Absolute Neutrophils 2.53 Thousands/µL      Absolute Immature Grans 0.03 Thousand/uL      Absolute Lymphocytes 1.91 Thousands/µL      Absolute Monocytes 0.59 Thousand/µL      Eosinophils Absolute 0.19 Thousand/µL      Basophils Absolute 0.07 Thousands/µL             CT abdomen pelvis with contrast   Final Interpretation by  Destiny Byers MD (07/17 2481)      Multifocal colonic wall thickening most prominent in mid to distal transverse, distal descending, and sigmoid colon. Findings may reflect stercoral colitis given colonic stool burden. Other considerations include infectious or inflammatory colitis.      Colonic diverticulosis without convincing findings of acute diverticulitis.      Significantly distended urinary bladder. There is fullness of the collecting systems likely on the basis of distended urinary bladder.      Trace amount of ascites.      The study was marked in EPIC for immediate notification.         Workstation performed: [id number]             ECG 12 Lead Documentation Only    Date/Time: 7/17/2025 9:05 PM    Performed by: Rahul Quintanilla MD  Authorized by: Rahul Quintanilla MD    Indications / Diagnosis:  Abdominal pain  ECG reviewed by me, the ED Provider: yes    Patient location:  ED  Rate:     ECG rate:  68    ECG rate assessment: normal    Rhythm:     Rhythm: sinus rhythm    QRS:     QRS axis:  Left  ST segments:     ST segments:  Normal  T waves:     T waves: normal    Comments:      Normal sinus rhythm at 68, left axis deviation, , QRS 82, Qtc 435, no ST-T wave abnormality, no evidence of acute ischemia      ED Medication and Procedure Management   Prior to Admission Medications   Prescriptions Last Dose Informant Patient Reported? Taking?   Coenzyme Q10 (Co Q 10) 100 MG CAPS  Self Yes No   Sig: Take by mouth   Cyanocobalamin (VITAMIN B 12 PO)  Self Yes No   Sig: Take by mouth   acetaminophen (TYLENOL) 325 mg tablet  Self No No   Sig: Take 2 tablets (650 mg total) by mouth every 6 (six) hours as needed for fever, mild pain, moderate pain or headaches (temperature greater than 101 F)   amLODIPine (NORVASC) 10 mg tablet   No No   Sig: Take 1 tablet by mouth once daily   aspirin 81 mg chewable tablet  Self No No   Sig: Chew 1 tablet (81 mg total) daily Do not start before November 9, 2023.    atorvastatin (LIPITOR) 20 mg tablet   No No   Sig: Take 1 tablet by mouth once daily   cholecalciferol (VITAMIN D3) 1,000 units tablet  Self Yes No   Sig: Take 1,000 Units by mouth in the morning.   folic acid (KP Folic Acid) 1 mg tablet  Self No No   Sig: Take 1 tablet (1 mg total) by mouth daily   gabapentin (NEURONTIN) 100 mg capsule   No No   Sig: Take 1 capsule (100 mg total) by mouth daily at bedtime   levothyroxine 88 mcg tablet   No No   Sig: Take 1 tablet (88 mcg total) by mouth daily in the early morning   lidocaine (LIDODERM) 5 %   No No   Sig: Apply 1 patch topically daily over 12 hours Remove & Discard patch within 12 hours or as directed by MD   methocarbamol (ROBAXIN) 500 mg tablet   No No   Sig: Take 1 tablet (500 mg total) by mouth every 6 (six) hours as needed for muscle spasms   pantoprazole (PROTONIX) 40 mg tablet   No No   Sig: Take 1 tablet by mouth once daily   sodium chloride 1 g tablet   No No   Sig: Take 2 tablets (2 g total) by mouth 3 (three) times a day with meals   Patient taking differently: Take 1 g by mouth 3 (three) times a day with meals   sulfaSALAzine (AZULFIDINE) 500 mg tablet   No No   Sig: Take 2 tablets (1,000 mg total) by mouth 2 (two) times a day   valsartan (DIOVAN) 80 mg tablet   No No   Sig: Take 2 tablets (160 mg total) by mouth daily      Facility-Administered Medications: None     Patient's Medications   Discharge Prescriptions    BISACODYL (FLEET) 10 MG/30ML ENEM    Insert 30 mL (10 mg total) into the rectum once as needed for constipation for up to 1 dose       Start Date: 7/17/2025 End Date: --       Order Dose: 10 mg       Quantity: 30 mL    Refills: 0    DOCUSATE SODIUM (COLACE) 100 MG CAPSULE    Take 1 capsule (100 mg total) by mouth 2 (two) times a day for 7 days       Start Date: 7/17/2025 End Date: 7/24/2025       Order Dose: 100 mg       Quantity: 14 capsule    Refills: 0    POLYETHYLENE GLYCOL (MIRALAX) 17 G PACKET    Take 17 g by mouth daily as needed  "(constipation) for up to 10 days       Start Date: 7/17/2025 End Date: 7/27/2025       Order Dose: 17 g       Quantity: 7 each    Refills: 0       ED SEPSIS DOCUMENTATION   Time reflects when diagnosis was documented in both MDM as applicable and the Disposition within this note       Time User Action Codes Description Comment    7/17/2025 11:35 PM Rahul Quintanilla Add [R10.9] Abdominal pain     7/17/2025 11:36 PM Rahul Quintanilla [K59.00] Constipation     7/17/2025 11:36 PM Rahul Quintanilla Modify [R10.9] Abdominal pain     7/17/2025 11:36 PM Rahul Quintanilla [R93.89] Abnormal CT scan     7/17/2025 11:36 PM Rahul Quintanilla [R93.89] Abnormal CT scan                    Rahul Quintanilla MD  07/18/25 0044         [1]   Past Medical History:  Diagnosis Date    Allergic rhinitis 03/21/2023    Anemia     Arthritis     osteoporosis, djd knees, pt denies osteoporosis on 2/21/22    Low's esophagus     Cancer (HCC)     on face    Colon polyp     Cough 02/21/2022    cough for 3 years, expectorates yellow mucus    Cough variant asthma  07/26/2021    Disease of thyroid gland     low thyroid    Diverticulitis     gerd, diverticulitis    Dizziness     GERD (gastroesophageal reflux disease)     ulcerative colitis; Low's esophagus, diverticulosis; hx of laryngopharyngeal reflux    Hyperlipidemia     Hypertension     Hypoferremia 10/04/2024    Low vitamin D level     LPRD (laryngopharyngeal reflux disease) 02/21/2022    PONV (postoperative nausea and vomiting)     AFTER \"BIG SURGERIES\"    Postnasal drip     WITH COUGH    Ulcerative colitis (HCC)    [2]   Past Surgical History:  Procedure Laterality Date    APPENDECTOMY      CARDIAC CATHETERIZATION N/A 09/07/2023    Procedure: Cardiac RHC/LHC;  Surgeon: Rafael Sood MD;  Location: [facility];  Service: Cardiology    CARDIAC CATHETERIZATION N/A 11/07/2023    Procedure: Cardiac tavr;  Surgeon: Pete Hernández MD;  Location: [facility];  Service: Cardiology    " CATARACT EXTRACTION Bilateral     COLONOSCOPY      colon polyps    COLONOSCOPY  07/28/2022    FOOT SURGERY Bilateral     bunionectomy    HEMORRHOID SURGERY      HEMORROIDECTOMY      hemorroid removal    JOINT REPLACEMENT Bilateral     knees    SC REPLACE AORTIC VALVE OPENFEMORAL ARTERY APPROACH N/A 11/07/2023    Procedure: REPLACEMENT AORTIC VALVE TRANSCATHETER (TAVR) TRANSFEMORAL W/ 26MM BUCKNER DOROTHEA S3 UR VALVE(ACCESS ON LEFT) GIOVANNY;  Surgeon: Felipe Buitrago DO;  Location: [facility];  Service: Cardiac Surgery    REPLACEMENT TOTAL KNEE Bilateral     b/ replacement    TONSILLECTOMY      TUBAL LIGATION      UPPER GASTROINTESTINAL ENDOSCOPY     [3]   Family History  Problem Relation Name Age of Onset    Heart disease Father      No Known Problems Sister      No Known Problems Sister      Heart disease Brother      Colon cancer Brother  55    Cancer Brother  67    No Known Problems Maternal Grandmother      No Known Problems Paternal Grandmother      No Known Problems Maternal Aunt      No Known Problems Paternal Aunt      Cancer Daughter          unsure of type of cancer, it was female cancer and hysterectomy done by Dr. Fierro   [4]   Social History  Tobacco Use    Smoking status: Never    Smokeless tobacco: Never   Vaping Use    Vaping status: Never Used   Substance Use Topics    Alcohol use: Not Currently    Drug use: Never        Rahul Quintanilla MD  07/18/25 0044

## 2025-07-19 NOTE — TELEPHONE ENCOUNTER
Patient to see Gastroenterology on 7/30. That should be ok. Patient to call if her symptoms worsen or go back to ER

## 2025-07-24 ENCOUNTER — TELEPHONE (OUTPATIENT)
Age: 85
End: 2025-07-24

## 2025-07-24 NOTE — TELEPHONE ENCOUNTER
Patients son called because he as some concerns about the patient that he would like to discuss with her provider. Please call back to further assist. Thank you.    Phone:[phone]

## 2025-07-24 NOTE — TELEPHONE ENCOUNTER
Spoke to pts son and he is concerned about her memory. Advised to schedule appt with dr andrew. He will discuss it with her and call office back   declines

## 2025-07-29 DIAGNOSIS — K21.00 GASTROESOPHAGEAL REFLUX DISEASE WITH ESOPHAGITIS WITHOUT HEMORRHAGE: ICD-10-CM

## 2025-07-29 RX ORDER — PANTOPRAZOLE SODIUM 40 MG/1
40 TABLET, DELAYED RELEASE ORAL DAILY
Qty: 90 TABLET | Refills: 0 | Status: SHIPPED | OUTPATIENT
Start: 2025-07-29

## 2025-07-30 ENCOUNTER — OFFICE VISIT (OUTPATIENT)
Dept: GASTROENTEROLOGY | Facility: CLINIC | Age: 85
End: 2025-07-30
Payer: MEDICARE

## 2025-07-30 VITALS
BODY MASS INDEX: 25.76 KG/M2 | DIASTOLIC BLOOD PRESSURE: 84 MMHG | WEIGHT: 140 LBS | HEIGHT: 62 IN | HEART RATE: 100 BPM | SYSTOLIC BLOOD PRESSURE: 144 MMHG

## 2025-07-30 DIAGNOSIS — K59.00 CONSTIPATION: ICD-10-CM

## 2025-07-30 DIAGNOSIS — R93.89 ABNORMAL CT SCAN: ICD-10-CM

## 2025-07-30 DIAGNOSIS — K51.30 ULCERATIVE RECTOSIGMOIDITIS WITHOUT COMPLICATION (HCC): ICD-10-CM

## 2025-07-30 DIAGNOSIS — R10.9 ABDOMINAL PAIN: Primary | ICD-10-CM

## 2025-07-30 DIAGNOSIS — D53.9 MACROCYTIC ANEMIA: ICD-10-CM

## 2025-07-30 PROCEDURE — 99214 OFFICE O/P EST MOD 30 MIN: CPT | Performed by: NURSE PRACTITIONER

## 2025-08-13 ENCOUNTER — HOSPITAL ENCOUNTER (OUTPATIENT)
Dept: INFUSION CENTER | Facility: CLINIC | Age: 85
Discharge: HOME | End: 2025-08-13
Attending: INTERNAL MEDICINE
Payer: MEDICARE

## 2025-08-19 ENCOUNTER — TELEPHONE (OUTPATIENT)
Age: 85
End: 2025-08-19

## 2025-08-19 ENCOUNTER — DOCUMENTATION (OUTPATIENT)
Dept: NEPHROLOGY | Facility: CLINIC | Age: 85
End: 2025-08-19

## 2025-08-19 DIAGNOSIS — E78.00 HYPERCHOLESTEROLEMIA: ICD-10-CM

## 2025-08-19 DIAGNOSIS — N18.30 STAGE 3 CHRONIC KIDNEY DISEASE, UNSPECIFIED WHETHER STAGE 3A OR 3B CKD (HCC): ICD-10-CM

## 2025-08-19 DIAGNOSIS — Z95.2 S/P TAVR (TRANSCATHETER AORTIC VALVE REPLACEMENT): ICD-10-CM

## 2025-08-19 DIAGNOSIS — I35.0 AORTIC STENOSIS, SEVERE: ICD-10-CM

## 2025-08-19 DIAGNOSIS — I10 ESSENTIAL HYPERTENSION: ICD-10-CM

## 2025-08-19 DIAGNOSIS — E87.1 HYPONATREMIA: ICD-10-CM

## 2025-08-19 DIAGNOSIS — I35.9 NONRHEUMATIC AORTIC VALVE DISORDER: ICD-10-CM

## 2025-08-21 RX ORDER — SODIUM CHLORIDE 1 G/1
1 TABLET ORAL
Qty: 90 TABLET | Refills: 5 | Status: SHIPPED | OUTPATIENT
Start: 2025-08-21

## (undated) DEVICE — 3000CC GUARDIAN II: Brand: GUARDIAN

## (undated) DEVICE — ASTOUND STANDARD SURGICAL GOWN, XXL: Brand: CONVERTORS

## (undated) DEVICE — PINNACLE INTRODUCER SHEATH: Brand: PINNACLE

## (undated) DEVICE — INTENDED FOR TISSUE SEPARATION, AND OTHER PROCEDURES THAT REQUIRE A SHARP SURGICAL BLADE TO PUNCTURE OR CUT.: Brand: BARD-PARKER ® CARBON RIB-BACK BLADES

## (undated) DEVICE — Device

## (undated) DEVICE — PAD GROUNDING ADULT

## (undated) DEVICE — SPONGE 4 X 4 XRAY 16 PLY STRL LF RFD

## (undated) DEVICE — SWAN-GANZ BIPOLAR PACING CATHETER: Brand: SWAN-GANZ

## (undated) DEVICE — DGW .035 FC STR 260CM TEF: Brand: EMERALD

## (undated) DEVICE — Device: Brand: ASAHI SILVERWAY

## (undated) DEVICE — GLIDESHEATH SLENDER STAINLESS STEEL KIT: Brand: GLIDESHEATH SLENDER

## (undated) DEVICE — DGW .035 FC J3MM 260CM TEF: Brand: EMERALD

## (undated) DEVICE — RADIFOCUS OPTITORQUE ANGIOGRAPHIC CATHETER: Brand: OPTITORQUE

## (undated) DEVICE — GLOVE INDICATOR PI UNDERGLOVE SZ 8 BLUE

## (undated) DEVICE — THE VASC BAND HEMOSTAT IS A COMPRESSION DEVICE TO ASSIST HEMOSTASIS OF ARTERIAL, VENOUS AND HEMODIALYSIS PERCUTANEOUS ACCESS SITES.: Brand: VASC BAND™ HEMOSTAT

## (undated) DEVICE — DGW .035 FC J3MM 150CM TEF: Brand: EMERALD

## (undated) DEVICE — SUT SILK 0 CT-1 30 IN 424H

## (undated) DEVICE — MICROPUNCTURE INTRODUCER SET SILHOUETTE TRANSITIONLESS PUSH-PLUS DESIGN - STIFFENED CANNULA WITH NITINOL WIRE GUIDE: Brand: MICROPUNCTURE

## (undated) DEVICE — CATH DIAG 6FR IMPULSE 100CM FR4

## (undated) DEVICE — GLOVE SRG BIOGEL ECLIPSE 8

## (undated) DEVICE — CARDIO PERI-GROIN: Brand: CONVERTORS

## (undated) DEVICE — TRAY FOLEY 16FR SURESTEP TEMP SENS URIMETER STAT LOK

## (undated) DEVICE — HEAVY DUTY TABLE COVER: Brand: CONVERTORS

## (undated) DEVICE — CARDIOVASCULAR SPLIT DRAPE: Brand: CONVERTORS

## (undated) DEVICE — GAUZE SPONGES,USP TYPE VII GAUZE, 12 PLY: Brand: CURITY

## (undated) DEVICE — THERMOFLECT BLANKET, L, 25EA                               TS THERMOFLECT BLANKET, 48" X 84", SILVER, 5/BG, 5 BG/CS NW: Brand: THERMOFLECT

## (undated) DEVICE — ADHESIVE SKIN HIGH VISCOSITY EXOFIN 1ML

## (undated) DEVICE — AMPLATZ EXTRA STIFF WIRE GUIDE: Brand: AMPLATZ

## (undated) DEVICE — 3M™ TEGADERM™ TRANSPARENT FILM DRESSING FRAME STYLE, 1626W, 4 IN X 4-3/4 IN (10 CM X 12 CM), 50/CT 4CT/CASE: Brand: 3M™ TEGADERM™

## (undated) DEVICE — BETHLEHEM MAJOR GENERAL PACK: Brand: CARDINAL HEALTH

## (undated) DEVICE — CATH DIAG 6FR IMPULSE 110CM PIG

## (undated) DEVICE — GUIDEWIRE LUNDERQUIST TSCMG-35-300-LESDC